# Patient Record
Sex: MALE | Race: BLACK OR AFRICAN AMERICAN | Employment: UNEMPLOYED | URBAN - METROPOLITAN AREA
[De-identification: names, ages, dates, MRNs, and addresses within clinical notes are randomized per-mention and may not be internally consistent; named-entity substitution may affect disease eponyms.]

---

## 2018-01-25 ENCOUNTER — OFFICE VISIT (OUTPATIENT)
Dept: PODIATRY | Age: 58
End: 2018-01-25
Payer: MEDICAID

## 2018-01-25 VITALS
HEIGHT: 75 IN | WEIGHT: 255 LBS | DIASTOLIC BLOOD PRESSURE: 68 MMHG | BODY MASS INDEX: 31.71 KG/M2 | HEART RATE: 68 BPM | SYSTOLIC BLOOD PRESSURE: 126 MMHG | TEMPERATURE: 98.2 F

## 2018-01-25 DIAGNOSIS — E11.51 TYPE II DIABETES MELLITUS WITH PERIPHERAL CIRCULATORY DISORDER (HCC): Primary | ICD-10-CM

## 2018-01-25 DIAGNOSIS — M79.671 PAIN IN BOTH FEET: ICD-10-CM

## 2018-01-25 DIAGNOSIS — B35.1 DERMATOPHYTOSIS OF NAIL: ICD-10-CM

## 2018-01-25 DIAGNOSIS — I73.9 PVD (PERIPHERAL VASCULAR DISEASE) (HCC): ICD-10-CM

## 2018-01-25 DIAGNOSIS — M79.672 PAIN IN BOTH FEET: ICD-10-CM

## 2018-01-25 PROBLEM — M77.9 TENDONITIS: Status: ACTIVE | Noted: 2017-09-25

## 2018-01-25 PROBLEM — T85.22XA DISLOCATED INTRAOCULAR LENS: Status: ACTIVE | Noted: 2017-04-21

## 2018-01-25 PROBLEM — M16.11 PRIMARY OSTEOARTHRITIS OF RIGHT HIP: Status: ACTIVE | Noted: 2017-03-01

## 2018-01-25 PROCEDURE — G8417 CALC BMI ABV UP PARAM F/U: HCPCS | Performed by: PODIATRIST

## 2018-01-25 PROCEDURE — 99203 OFFICE O/P NEW LOW 30 MIN: CPT | Performed by: PODIATRIST

## 2018-01-25 PROCEDURE — G8599 NO ASA/ANTIPLAT THER USE RNG: HCPCS | Performed by: PODIATRIST

## 2018-01-25 PROCEDURE — 1036F TOBACCO NON-USER: CPT | Performed by: PODIATRIST

## 2018-01-25 PROCEDURE — G8427 DOCREV CUR MEDS BY ELIG CLIN: HCPCS | Performed by: PODIATRIST

## 2018-01-25 PROCEDURE — 3046F HEMOGLOBIN A1C LEVEL >9.0%: CPT | Performed by: PODIATRIST

## 2018-01-25 PROCEDURE — G8484 FLU IMMUNIZE NO ADMIN: HCPCS | Performed by: PODIATRIST

## 2018-01-25 PROCEDURE — 11721 DEBRIDE NAIL 6 OR MORE: CPT | Performed by: PODIATRIST

## 2018-01-25 PROCEDURE — 3017F COLORECTAL CA SCREEN DOC REV: CPT | Performed by: PODIATRIST

## 2018-01-25 RX ORDER — ATORVASTATIN CALCIUM 80 MG/1
10 TABLET, FILM COATED ORAL DAILY
COMMUNITY
Start: 2018-01-16 | End: 2022-03-28 | Stop reason: DRUGHIGH

## 2018-04-26 ENCOUNTER — OFFICE VISIT (OUTPATIENT)
Dept: PODIATRY | Age: 58
End: 2018-04-26
Payer: MEDICAID

## 2018-04-26 DIAGNOSIS — B35.1 DERMATOPHYTOSIS OF NAIL: Primary | ICD-10-CM

## 2018-04-26 DIAGNOSIS — I73.9 PVD (PERIPHERAL VASCULAR DISEASE) (HCC): ICD-10-CM

## 2018-04-26 DIAGNOSIS — E11.51 TYPE II DIABETES MELLITUS WITH PERIPHERAL CIRCULATORY DISORDER (HCC): ICD-10-CM

## 2018-04-26 DIAGNOSIS — M79.672 PAIN IN BOTH FEET: ICD-10-CM

## 2018-04-26 DIAGNOSIS — M79.671 PAIN IN BOTH FEET: ICD-10-CM

## 2018-04-26 PROCEDURE — 99213 OFFICE O/P EST LOW 20 MIN: CPT | Performed by: PODIATRIST

## 2018-04-26 PROCEDURE — G8599 NO ASA/ANTIPLAT THER USE RNG: HCPCS | Performed by: PODIATRIST

## 2018-04-26 PROCEDURE — 1036F TOBACCO NON-USER: CPT | Performed by: PODIATRIST

## 2018-04-26 PROCEDURE — 11721 DEBRIDE NAIL 6 OR MORE: CPT | Performed by: PODIATRIST

## 2018-04-26 PROCEDURE — 2022F DILAT RTA XM EVC RTNOPTHY: CPT | Performed by: PODIATRIST

## 2018-04-26 PROCEDURE — G8417 CALC BMI ABV UP PARAM F/U: HCPCS | Performed by: PODIATRIST

## 2018-04-26 PROCEDURE — 3046F HEMOGLOBIN A1C LEVEL >9.0%: CPT | Performed by: PODIATRIST

## 2018-04-26 PROCEDURE — G8428 CUR MEDS NOT DOCUMENT: HCPCS | Performed by: PODIATRIST

## 2018-04-26 PROCEDURE — 3017F COLORECTAL CA SCREEN DOC REV: CPT | Performed by: PODIATRIST

## 2018-04-26 RX ORDER — GABAPENTIN 300 MG/1
300 CAPSULE ORAL 3 TIMES DAILY
Qty: 90 CAPSULE | Refills: 2 | Status: SHIPPED | OUTPATIENT
Start: 2018-04-26 | End: 2019-03-27 | Stop reason: ALTCHOICE

## 2018-06-15 DIAGNOSIS — E11.51 TYPE II DIABETES MELLITUS WITH PERIPHERAL CIRCULATORY DISORDER (HCC): Primary | ICD-10-CM

## 2018-06-15 PROCEDURE — A5513 MULTI DEN INSERT CUSTOM MOLD: HCPCS | Performed by: PODIATRIST

## 2018-06-15 PROCEDURE — A5500 DIAB SHOE FOR DENSITY INSERT: HCPCS | Performed by: PODIATRIST

## 2019-03-27 RX ORDER — METOPROLOL TARTRATE 100 MG/1
25 TABLET ORAL 2 TIMES DAILY
COMMUNITY
End: 2022-03-28 | Stop reason: DRUGHIGH

## 2019-03-27 RX ORDER — PREGABALIN 75 MG/1
75 CAPSULE ORAL 2 TIMES DAILY
COMMUNITY
End: 2022-03-28 | Stop reason: SDUPTHER

## 2019-03-27 RX ORDER — GLIPIZIDE 10 MG/1
10 TABLET ORAL
COMMUNITY
End: 2022-03-28 | Stop reason: SDUPTHER

## 2019-03-27 RX ORDER — PANTOPRAZOLE SODIUM 40 MG/1
40 GRANULE, DELAYED RELEASE ORAL NIGHTLY
COMMUNITY
End: 2022-03-28 | Stop reason: SDUPTHER

## 2019-03-27 RX ORDER — LOSARTAN POTASSIUM 25 MG/1
25 TABLET ORAL NIGHTLY
COMMUNITY
End: 2022-03-28 | Stop reason: SDUPTHER

## 2019-03-27 RX ORDER — LORATADINE 10 MG/1
10 CAPSULE, LIQUID FILLED ORAL DAILY
COMMUNITY
End: 2022-03-28

## 2019-03-27 RX ORDER — MELOXICAM 15 MG/1
15 TABLET ORAL NIGHTLY
COMMUNITY
End: 2022-03-28

## 2019-03-28 ENCOUNTER — ANESTHESIA EVENT (OUTPATIENT)
Dept: OPERATING ROOM | Age: 59
End: 2019-03-28
Payer: MEDICAID

## 2019-03-28 ENCOUNTER — ANESTHESIA (OUTPATIENT)
Dept: OPERATING ROOM | Age: 59
End: 2019-03-28
Payer: MEDICAID

## 2019-03-28 ENCOUNTER — HOSPITAL ENCOUNTER (OUTPATIENT)
Age: 59
Setting detail: OUTPATIENT SURGERY
Discharge: HOME | End: 2019-03-28
Attending: OPHTHALMOLOGY | Admitting: OPHTHALMOLOGY
Payer: MEDICAID

## 2019-03-28 VITALS
SYSTOLIC BLOOD PRESSURE: 133 MMHG | OXYGEN SATURATION: 98 % | DIASTOLIC BLOOD PRESSURE: 85 MMHG | RESPIRATION RATE: 17 BRPM

## 2019-03-28 VITALS
SYSTOLIC BLOOD PRESSURE: 135 MMHG | TEMPERATURE: 97.7 F | RESPIRATION RATE: 15 BRPM | WEIGHT: 255.73 LBS | HEART RATE: 77 BPM | BODY MASS INDEX: 32.82 KG/M2 | HEIGHT: 74 IN | OXYGEN SATURATION: 97 % | DIASTOLIC BLOOD PRESSURE: 78 MMHG

## 2019-03-28 PROBLEM — H27.121: Chronic | Status: ACTIVE | Noted: 2019-03-28

## 2019-03-28 PROBLEM — H43.391 VITREOUS OPACITIES OF RIGHT EYE: Chronic | Status: ACTIVE | Noted: 2019-03-28

## 2019-03-28 LAB
EKG ATRIAL RATE: 81 BPM
EKG P AXIS: -27 DEGREES
EKG P-R INTERVAL: 226 MS
EKG Q-T INTERVAL: 376 MS
EKG QRS DURATION: 92 MS
EKG QTC CALCULATION (BAZETT): 436 MS
EKG R AXIS: -25 DEGREES
EKG T AXIS: 4 DEGREES
EKG VENTRICULAR RATE: 81 BPM
GFR NON-AFRICAN AMERICAN: 47 ML/MIN
GFR SERPL CREATININE-BSD FRML MDRD: 57 ML/MIN
GFR SERPL CREATININE-BSD FRML MDRD: ABNORMAL ML/MIN/{1.73_M2}
GLUCOSE BLD-MCNC: 194 MG/DL (ref 75–110)
GLUCOSE BLD-MCNC: 210 MG/DL (ref 75–110)
GLUCOSE BLD-MCNC: 259 MG/DL (ref 74–100)
POC CHLORIDE: 103 MMOL/L (ref 98–107)
POC CREATININE: 1.52 MG/DL (ref 0.51–1.19)
POC HEMATOCRIT: 42 % (ref 41–53)
POC HEMOGLOBIN: 14.4 G/DL (ref 13.5–17.5)
POC IONIZED CALCIUM: 1.25 MMOL/L (ref 1.15–1.33)
POC LACTIC ACID: 1.88 MMOL/L (ref 0.56–1.39)
POC POTASSIUM: 4.4 MMOL/L (ref 3.5–4.5)
POC SODIUM: 139 MMOL/L (ref 138–146)

## 2019-03-28 PROCEDURE — 83605 ASSAY OF LACTIC ACID: CPT

## 2019-03-28 PROCEDURE — 6370000000 HC RX 637 (ALT 250 FOR IP): Performed by: OPHTHALMOLOGY

## 2019-03-28 PROCEDURE — 2580000003 HC RX 258: Performed by: OPHTHALMOLOGY

## 2019-03-28 PROCEDURE — 84132 ASSAY OF SERUM POTASSIUM: CPT

## 2019-03-28 PROCEDURE — 84295 ASSAY OF SERUM SODIUM: CPT

## 2019-03-28 PROCEDURE — 6370000000 HC RX 637 (ALT 250 FOR IP)

## 2019-03-28 PROCEDURE — 2709999900 HC NON-CHARGEABLE SUPPLY: Performed by: OPHTHALMOLOGY

## 2019-03-28 PROCEDURE — 82330 ASSAY OF CALCIUM: CPT

## 2019-03-28 PROCEDURE — 3700000001 HC ADD 15 MINUTES (ANESTHESIA): Performed by: OPHTHALMOLOGY

## 2019-03-28 PROCEDURE — 3600000004 HC SURGERY LEVEL 4 BASE: Performed by: OPHTHALMOLOGY

## 2019-03-28 PROCEDURE — 7100000040 HC SPAR PHASE II RECOVERY - FIRST 15 MIN: Performed by: OPHTHALMOLOGY

## 2019-03-28 PROCEDURE — 85014 HEMATOCRIT: CPT

## 2019-03-28 PROCEDURE — 2500000003 HC RX 250 WO HCPCS: Performed by: OPHTHALMOLOGY

## 2019-03-28 PROCEDURE — 3600000014 HC SURGERY LEVEL 4 ADDTL 15MIN: Performed by: OPHTHALMOLOGY

## 2019-03-28 PROCEDURE — 6360000002 HC RX W HCPCS: Performed by: OPHTHALMOLOGY

## 2019-03-28 PROCEDURE — 93005 ELECTROCARDIOGRAM TRACING: CPT

## 2019-03-28 PROCEDURE — 82565 ASSAY OF CREATININE: CPT

## 2019-03-28 PROCEDURE — 2580000003 HC RX 258: Performed by: ANESTHESIOLOGY

## 2019-03-28 PROCEDURE — 6360000002 HC RX W HCPCS: Performed by: SPECIALIST

## 2019-03-28 PROCEDURE — 2500000003 HC RX 250 WO HCPCS: Performed by: SPECIALIST

## 2019-03-28 PROCEDURE — 82947 ASSAY GLUCOSE BLOOD QUANT: CPT

## 2019-03-28 PROCEDURE — 7100000041 HC SPAR PHASE II RECOVERY - ADDTL 15 MIN: Performed by: OPHTHALMOLOGY

## 2019-03-28 PROCEDURE — 3700000000 HC ANESTHESIA ATTENDED CARE: Performed by: OPHTHALMOLOGY

## 2019-03-28 PROCEDURE — 82435 ASSAY OF BLOOD CHLORIDE: CPT

## 2019-03-28 RX ORDER — BALANCED SALT SOLUTION ENRICHED WITH BICARBONATE, DEXTROSE, AND GLUTATHIONE
KIT INTRAOCULAR PRN
Status: DISCONTINUED | OUTPATIENT
Start: 2019-03-28 | End: 2019-03-28 | Stop reason: ALTCHOICE

## 2019-03-28 RX ORDER — ERYTHROMYCIN 5 MG/G
OINTMENT OPHTHALMIC PRN
Status: DISCONTINUED | OUTPATIENT
Start: 2019-03-28 | End: 2019-03-28 | Stop reason: ALTCHOICE

## 2019-03-28 RX ORDER — TOBRAMYCIN AND DEXAMETHASONE 3; 1 MG/ML; MG/ML
1 SUSPENSION/ DROPS OPHTHALMIC ONCE
Status: COMPLETED | OUTPATIENT
Start: 2019-03-28 | End: 2019-03-28

## 2019-03-28 RX ORDER — FENTANYL CITRATE 50 UG/ML
INJECTION, SOLUTION INTRAMUSCULAR; INTRAVENOUS PRN
Status: DISCONTINUED | OUTPATIENT
Start: 2019-03-28 | End: 2019-03-28 | Stop reason: SDUPTHER

## 2019-03-28 RX ORDER — PROPOFOL 10 MG/ML
INJECTION, EMULSION INTRAVENOUS PRN
Status: DISCONTINUED | OUTPATIENT
Start: 2019-03-28 | End: 2019-03-28 | Stop reason: SDUPTHER

## 2019-03-28 RX ORDER — SODIUM CHLORIDE, SODIUM LACTATE, POTASSIUM CHLORIDE, CALCIUM CHLORIDE 600; 310; 30; 20 MG/100ML; MG/100ML; MG/100ML; MG/100ML
INJECTION, SOLUTION INTRAVENOUS CONTINUOUS
Status: DISCONTINUED | OUTPATIENT
Start: 2019-03-28 | End: 2019-03-28 | Stop reason: HOSPADM

## 2019-03-28 RX ORDER — TROPICAMIDE 10 MG/ML
1 SOLUTION/ DROPS OPHTHALMIC EVERY 5 MIN PRN
Status: COMPLETED | OUTPATIENT
Start: 2019-03-28 | End: 2019-03-28

## 2019-03-28 RX ORDER — CEFTAZIDIME 1 G/1
INJECTION, POWDER, FOR SOLUTION INTRAMUSCULAR; INTRAVENOUS PRN
Status: DISCONTINUED | OUTPATIENT
Start: 2019-03-28 | End: 2019-03-28 | Stop reason: ALTCHOICE

## 2019-03-28 RX ORDER — PHENYLEPHRINE HYDROCHLORIDE 100 MG/ML
1 SOLUTION/ DROPS OPHTHALMIC EVERY 5 MIN PRN
Status: COMPLETED | OUTPATIENT
Start: 2019-03-28 | End: 2019-03-28

## 2019-03-28 RX ORDER — LIDOCAINE HYDROCHLORIDE 10 MG/ML
INJECTION, SOLUTION EPIDURAL; INFILTRATION; INTRACAUDAL; PERINEURAL PRN
Status: DISCONTINUED | OUTPATIENT
Start: 2019-03-28 | End: 2019-03-28 | Stop reason: SDUPTHER

## 2019-03-28 RX ADMIN — PHENYLEPHRINE HYDROCHLORIDE 1 DROP: 100 SOLUTION/ DROPS OPHTHALMIC at 07:44

## 2019-03-28 RX ADMIN — TOBRAMYCIN AND DEXAMETHASONE 1 DROP: 3; 1 SUSPENSION/ DROPS OPHTHALMIC at 08:08

## 2019-03-28 RX ADMIN — TROPICAMIDE 1 DROP: 10 SOLUTION/ DROPS OPHTHALMIC at 07:54

## 2019-03-28 RX ADMIN — PROPOFOL 50 MG: 10 INJECTION, EMULSION INTRAVENOUS at 09:45

## 2019-03-28 RX ADMIN — FENTANYL CITRATE 50 MCG: 50 INJECTION INTRAMUSCULAR; INTRAVENOUS at 09:45

## 2019-03-28 RX ADMIN — INSULIN LISPRO 5 UNITS: 100 INJECTION, SOLUTION INTRAVENOUS; SUBCUTANEOUS at 08:16

## 2019-03-28 RX ADMIN — LIDOCAINE HYDROCHLORIDE 50 MG: 10 INJECTION, SOLUTION EPIDURAL; INFILTRATION; INTRACAUDAL; PERINEURAL at 09:45

## 2019-03-28 RX ADMIN — TROPICAMIDE 1 DROP: 10 SOLUTION/ DROPS OPHTHALMIC at 07:49

## 2019-03-28 RX ADMIN — TROPICAMIDE 1 DROP: 10 SOLUTION/ DROPS OPHTHALMIC at 07:44

## 2019-03-28 RX ADMIN — PHENYLEPHRINE HYDROCHLORIDE 1 DROP: 100 SOLUTION/ DROPS OPHTHALMIC at 07:54

## 2019-03-28 RX ADMIN — SODIUM CHLORIDE, POTASSIUM CHLORIDE, SODIUM LACTATE AND CALCIUM CHLORIDE: 600; 310; 30; 20 INJECTION, SOLUTION INTRAVENOUS at 08:08

## 2019-03-28 RX ADMIN — PHENYLEPHRINE HYDROCHLORIDE 1 DROP: 100 SOLUTION/ DROPS OPHTHALMIC at 07:49

## 2019-03-28 ASSESSMENT — PULMONARY FUNCTION TESTS
PIF_VALUE: 0

## 2019-03-28 ASSESSMENT — PAIN SCALES - GENERAL
PAINLEVEL_OUTOF10: 0

## 2019-03-28 ASSESSMENT — PAIN - FUNCTIONAL ASSESSMENT: PAIN_FUNCTIONAL_ASSESSMENT: 0-10

## 2019-03-28 NOTE — OP NOTE
Haresh Puja  3/28/2019    10:44 AM    BRIEF OPERATIVE NOTE    PREOP Diagnosis:  Vitreous Opacities, subluxation of IOL OD    POSTOP Diagnosis:  Same    Procedure:  Vitrectomy, Reposition IOL OD    Anesthesia:  MAC    Surgeon:  Deisy Suazo    EBL:  Minimal    Fluids:  See anesthesia record    Drains:  None    Specimen:  None    Complications:  None    Reason for operation:  The patient has significant vitreous opacities that are interfering with daily activities. Patient wishes to have surgery to remove opacities in anticipation of better vision. Patient understands risks of surgery include, but are not limited to, bleeding, infection and retinal detachment. The PCIOL is subluxed inferiorly  Procedure: The patient was brought to the operating room in good condition. Transient Propofol was given. Retrobulbar and topical anaesthesia were administered. The patient was prepped and draped in the usual manner. 25 gauge vitrectomy trocars were inserted in the usual quadrants. Infusion was inserted in the inferior temporal quadrant. Light pipe and ocutome placed through the superior trocars. Vitreous was removed from anterior to posterior and trimmed out past the equator in all quadrants. A limbal incision was made with a 25g MVR blade. A Sinsky hook was placed thru the ST trochar. The IOL was rotated clockwise until it centered. It seemed stable in this position. No significant bleeding occurred at any time. No tears or detachment were found with scleral depression. The trocars were removed and sutured if there any leakage. Subtenon's antibiotic was injected in the inferior nasal quadrant. The eye was patched in the usual fashion and the patient taken to the recovery room in good condition.     Electronically signed by Eliane Nicholas MD on 3/28/2019 at 10:44 AM

## 2019-03-28 NOTE — H&P
History and Physical    Pt Name: Erna Pickens  MRN: [id number]  YOB: 1960  Date of evaluation: 3/28/2019  Primary Care Physician: Jose Willett  Patient evaluated at the request of  Dr. Dayton Vazquez    Reason for evaluation:  ? Right eye( sunset) IOL repositioning , vitreous floaters in right eye  SUBJECTIVE:   History of Chief Complaint:      Elena Hudson is a 60 y.o. male          who presents with a hx of visual  changes that started approx  ago in  His    RIGHT  eye ,  DM x  10 yrs ,   Quit smoking approx 3 yrs ago  ,\" Blurry vision \" x  2 yrs \" transparent ghost moving across right eye\"   S/p right eye cataract approx 2 yrs ago with IOL. Past Medical History      has a past medical history of Arthritis, DM (diabetes mellitus) ([address] GERD (gastroesophageal reflux disease), Heart murmur, HTN (hypertension), Hyperlipidemia, Sleep apnea, Vision abnormalities, and Wears glasses. Past Surgical History   has a past surgical history that includes Colonoscopy (2010); Knee arthroscopy; eye surgery (Bilateral, 2017); and Vasectomy (). Medications   Scheduled Meds:  Continuous Infusions:  PRN Meds:. Allergies  is allergic to lisinopril; melatonin; and trazodone. Family History    family history includes Diabetes in his brother and sister.     Family Status   Relation Name Status    Mother      Father      Sister JAYLEN     Brother KATY Alive    MGM      MGF      [facility]      PGF      Sister [facility]     Sister Osmani Garcia     Sister [address] [facility]     Sister [address]    Sister [employer] Alive    Sister [address] Alive    Brother [address] Al[address]    Brother [employer] Alive    Brother [facility] Alive    Brother  Alive         Social History  Social History     Socioeconomic History    Marital status: Single     Spouse name: Not on file    Number of children: Not on file    Years of education: Not on file    Highest education level: Not on file   Occupational History    Not on file   Social Needs    Financial resource strain: Not on file    Food insecurity:     Worry: Not on file     Inability: Not on file    Transportation needs:     Medical: Not on file     Non-medical: Not on file   Tobacco Use    Smoking status: Former Smoker     Packs/day: 0.00     Last attempt to quit: 3/27/2016     Years since quitting: 3.0    Smokeless tobacco: Never Used   Substance and Sexual Activity    Alcohol use: Yes     Comment: etoh abuse. BEER, WINE  12 A WEEK    Drug use: No    Sexual activity: Yes   Lifestyle    Physical activity:     Days per week: Not on file     Minutes per session: Not on file    Stress: Not on file   Relationships    Social connections:     Talks on phone: Not on file     Gets together: Not on file     Attends Lutheran service: Not on file     Active member of club or organization: Not on file     Attends meetings of clubs or organizations: Not on file     Relationship status: Not on file    Intimate partner violence:     Fear of current or ex partner: Not on file     Emotionally abused: Not on file     Physically abused: Not on file     Forced sexual activity: Not on file   Other Topics Concern    Not on file   Social History Narrative    Not on file        Service:No    Occupation:  [employer]ande[employer] x 25 yrs     Hobbies:  Grandfather     OBJECTIVE:   VITALS:  height is 6' 2\" (1.88 m) and weight is 245 lb (111.1 kg). CONSTITUTIONAL: Alert and oriented times 3, no acute distress and cooperative to examination. friendly and pleasant , very muscular, robust  build     SKIN: rash No    HEENT: Head is normocephalic, atraumatic. EOMI, PERRLA    Oral air way :slightly narrow Yes    NECK: neck supple, no lymphadenopathy noted, trachea midline and straight       2+ carotid, no bruit    LUNGS: Chest expands equally bilaterally upon respiration, no accessory muscle used. Ausculation reveals no adventitious breath sounds. CARDIOVASCULAR: \"Heart sounds are normal.  Regular rate and rhythm without murmur,    ABDOMEN: Bowel sounds are present in all four quadrants      GENATALIA:Deferred. NEUROLOGIC: \"CN II-XII are grossly intact. EXTREMITIES: Pitting edema:  No,  Varicose veins: No     Dorsal pedal/posterior tibial pulses palpable: Yes         Strength:  Normal       Patient Active Problem List   Diagnosis    DM (diabetes mellitus) ([address]    HTN (hypertension)    [facility]    Hypertensive urgency    Abnormal ambulatory electrocardiogram    Abnormal ambulatory electrocardiography    Abnormal kidney function    Adiposity    Astigmatism    Atherosclerotic heart disease of native coronary artery without angina pectoris    Cervical radiculopathy    Chronic back pain    Decreased cardiac output    Diabetic peripheral neuropathy (HCC)    Dislocated intraocular lens    Disturbance in sleep behavior    Dizziness    Dyssomnia    Floaters    Impotence of organic origin    Left ventricular dysfunction    Low vision, both eyes    Myopia    Nuclear sclerosis of left eye    Obstructive sleep apnea syndrome    Palpitations    Personal history of other diseases of the digestive system    Posterior vitreous detachment    Presbyopia    Primary osteoarthritis of right hip    Pseudophakia of right eye    Repetitive intrusions of sleep    Sciatica    Tendonitis    Vitamin D deficiency               IMPRESSIONS:   1.  ? Right eye( sunset) IOL repositioning , vitreous floaters in right eye  2. does not have any pertinent problems on file.     [facility]  Electronically signed 3/28/2019 at 7:17 AM       Scheduled for:  Right eye surgery

## 2022-03-14 ENCOUNTER — HOSPITAL ENCOUNTER (OUTPATIENT)
Age: 62
Discharge: HOME | End: 2022-03-14
Payer: MEDICARE

## 2022-03-14 ENCOUNTER — OFFICE VISIT (OUTPATIENT)
Dept: ORTHOPEDIC SURGERY | Age: 62
End: 2022-03-14
Payer: MEDICARE

## 2022-03-14 VITALS — HEIGHT: 74 IN | BODY MASS INDEX: 32.73 KG/M2 | WEIGHT: 255 LBS

## 2022-03-14 DIAGNOSIS — Z98.890 HISTORY OF REMOVAL OF JOINT PROSTHESIS OF RIGHT HIP DUE TO INFECTION: ICD-10-CM

## 2022-03-14 DIAGNOSIS — Z86.19 HISTORY OF REMOVAL OF JOINT PROSTHESIS OF RIGHT HIP DUE TO INFECTION: ICD-10-CM

## 2022-03-14 DIAGNOSIS — M16.11 PRIMARY OSTEOARTHRITIS OF RIGHT HIP: Primary | ICD-10-CM

## 2022-03-14 DIAGNOSIS — Z96.641 HISTORY OF TOTAL HIP ARTHROPLASTY, RIGHT: Primary | ICD-10-CM

## 2022-03-14 DIAGNOSIS — Z96.641 HISTORY OF RIGHT HIP HEMIARTHROPLASTY: ICD-10-CM

## 2022-03-14 LAB
ALBUMIN SERPL-MCNC: 4.3 G/DL (ref 3.5–5.2)
ALBUMIN/GLOBULIN RATIO: 1.3 (ref 1–2.5)
ALP BLD-CCNC: 137 U/L (ref 40–129)
ALT SERPL-CCNC: 16 U/L (ref 5–41)
ANION GAP SERPL CALCULATED.3IONS-SCNC: 16 MMOL/L (ref 9–17)
AST SERPL-CCNC: 17 U/L
BILIRUB SERPL-MCNC: <0.1 MG/DL (ref 0.3–1.2)
BUN BLDV-MCNC: 25 MG/DL (ref 8–23)
C-REACTIVE PROTEIN: 22.1 MG/L (ref 0–5)
CALCIUM SERPL-MCNC: 9.5 MG/DL (ref 8.6–10.4)
CHLORIDE BLD-SCNC: 98 MMOL/L (ref 98–107)
CO2: 21 MMOL/L (ref 20–31)
CREAT SERPL-MCNC: 1.48 MG/DL (ref 0.7–1.2)
GFR AFRICAN AMERICAN: 58 ML/MIN
GFR NON-AFRICAN AMERICAN: 48 ML/MIN
GFR SERPL CREATININE-BSD FRML MDRD: ABNORMAL ML/MIN/{1.73_M2}
GLUCOSE BLD-MCNC: 235 MG/DL (ref 70–99)
HCT VFR BLD CALC: 35.1 % (ref 40.7–50.3)
HEMOGLOBIN: 10.9 G/DL (ref 13–17)
MCH RBC QN AUTO: 26.7 PG (ref 25.2–33.5)
MCHC RBC AUTO-ENTMCNC: 31.1 G/DL (ref 28.4–34.8)
MCV RBC AUTO: 86 FL (ref 82.6–102.9)
NRBC AUTOMATED: 0 PER 100 WBC
PDW BLD-RTO: 16.4 % (ref 11.8–14.4)
PLATELET # BLD: 297 K/UL (ref 138–453)
PMV BLD AUTO: 9.8 FL (ref 8.1–13.5)
POTASSIUM SERPL-SCNC: 5.4 MMOL/L (ref 3.7–5.3)
RBC # BLD: 4.08 M/UL (ref 4.21–5.77)
SEDIMENTATION RATE, ERYTHROCYTE: 78 MM (ref 0–20)
SODIUM BLD-SCNC: 135 MMOL/L (ref 135–144)
TOTAL PROTEIN: 7.6 G/DL (ref 6.4–8.3)
WBC # BLD: 8.8 K/UL (ref 3.5–11.3)

## 2022-03-14 PROCEDURE — 99204 OFFICE O/P NEW MOD 45 MIN: CPT | Performed by: PHYSICIAN ASSISTANT

## 2022-03-14 PROCEDURE — G8417 CALC BMI ABV UP PARAM F/U: HCPCS | Performed by: PHYSICIAN ASSISTANT

## 2022-03-14 PROCEDURE — 80053 COMPREHEN METABOLIC PANEL: CPT

## 2022-03-14 PROCEDURE — 85652 RBC SED RATE AUTOMATED: CPT

## 2022-03-14 PROCEDURE — 86140 C-REACTIVE PROTEIN: CPT

## 2022-03-14 PROCEDURE — 3017F COLORECTAL CA SCREEN DOC REV: CPT | Performed by: PHYSICIAN ASSISTANT

## 2022-03-14 PROCEDURE — G8484 FLU IMMUNIZE NO ADMIN: HCPCS | Performed by: PHYSICIAN ASSISTANT

## 2022-03-14 PROCEDURE — 85027 COMPLETE CBC AUTOMATED: CPT

## 2022-03-14 PROCEDURE — G8427 DOCREV CUR MEDS BY ELIG CLIN: HCPCS | Performed by: PHYSICIAN ASSISTANT

## 2022-03-14 PROCEDURE — 1036F TOBACCO NON-USER: CPT | Performed by: PHYSICIAN ASSISTANT

## 2022-03-14 ASSESSMENT — ENCOUNTER SYMPTOMS
VOMITING: 0
COLOR CHANGE: 0
COUGH: 0
SHORTNESS OF BREATH: 0

## 2022-03-14 NOTE — PROGRESS NOTES
[address] [facility]  [address] [place] [address] [facility]  Dept: [phone]    Ambulatory Orthopedic Consult      CHIEF COMPLAINT:    Chief Complaint   Patient presents with    New Patient     OA right hip. Swelling. Fluid          HISTORY OF PRESENT ILLNESS:      The patient is a 57 y.o. 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 Patient had a history of a right total hip arthroplasty performed on 11/25/2019 and subsequently underwent several perioperative I&D's on 12/11/2019, 1/6/2020, for 4/24/2020. He had persistent infection and underwent an explant on 7/15/2021 followed by a stage II revision on 10/18/2021 he also underwent a subsequent I&D on 12/9/2021. His wound has closed since then and he was placed on oral antibiotics by Dr. Roly Rothman. The patient was seen by infectious disease in January 2022 at [facility] and was placed on oral doxycycline. Labs were ordered and there was a recommendation for aspiration of the right hip. The patient has not had an aspiration of the right hip yet    Recently he denies concerns with the wound there has been no active drainage from his incision and he has not had any pain with ambulation. He feels as if he is improving but is recently completed the oral doxycycline. Patient notes that he is unable to be seen by Dr. Roly Rothman or Dr Justus Nj due to insurance issues. Therefore he was referred to our office for further evaluation and treatment. The patient is a type II diabetic. He has also recently diagnosed with prostate cancer and is awaiting radiation treatment but is unable to begin due to his chronic infection within the right hip.         Past Medical History:    Past Medical History:   Diagnosis Date    Arthritis     DM (diabetes mellitus) [facility] 2009    IDDM    GERD (gastroesophageal reflux disease) 2017    ON RX    Heart murmur 2013    ASYMPTOMATIC    HTN (hypertension) 6/29/2012    ON RX    Hyperlipidemia 06/29/2012    ON RX    Sleep apnea 2016    TRIED MACHINE COULD NOT TOLERATE    Vision abnormalities 2019    FLOATERS RIGHT EYE    Wears glasses        Past Surgical History:    Past Surgical History:   Procedure Laterality Date    COLONOSCOPY  07/23/2010    Dr. Yuridia Palma Bilateral 2017    CATARACT EXTRACTION WITH IOL    KNEE ARTHROSCOPY     [address]    VITRECTOMY Right 03/28/2019    VITRECTOMY Right 3/28/2019    VITRECTOMY 25 GAUGE,  IOL REPOSITION  (Gerber Boomman) performed by Tran Díaz MD at [place] [address]       Current Medications:   Current Outpatient Medications   Medication Sig Dispense Refill    Dulaglutide (TRULICITY) 1.5 YB/2.9VD SOPN Inject 1.5 mg into the skin once a week TAKES ON Sunday       glipiZIDE (GLUCOTROL) 10 MG tablet Take 10 mg by mouth 2 times daily (before meals)      insulin glargine (TOUJEO MAX SOLOSTAR) 300 UNIT/ML injection pen Inject 45 Units into the skin 2 times daily      loratadine (CLARITIN) 10 MG capsule Take 10 mg by mouth daily      losartan (COZAAR) 25 MG tablet Take 25 mg by mouth nightly      meloxicam (MOBIC) 15 MG tablet Take 15 mg by mouth nightly      metoprolol (LOPRESSOR) 100 MG tablet Take 25 mg by mouth 2 times daily      pantoprazole sodium (PROTONIX) 40 MG PACK packet Take 40 mg by mouth nightly      insulin lispro (HUMALOG) 100 UNIT/ML injection vial Inject 3 Units into the skin daily MORNING      pregabalin (LYRICA) 75 MG capsule Take 75 mg by mouth 2 times daily.       insulin lispro (ADMELOG) 100 UNIT/ML injection vial Inject 5 Units into the skin nightly      atorvastatin (LIPITOR) 80 MG tablet Take 10 mg by mouth daily       Cholecalciferol 2000 units CAPS Take 2,000 Units by mouth nightly       metFORMIN (GLUCOPHAGE) 500 MG tablet TAKE TWO TABLETS BY MOUTH TWICE A DAY WITH MEALS 60 tablet 0    amLODIPine (NORVASC) 10 MG tablet Take 1 tablet by mouth daily. (Patient taking differently: Take 10 mg by mouth nightly ) 30 tablet 2    aspirin 325 MG EC tablet Take 1 tablet by mouth daily. (Patient taking differently: Take 81 mg by mouth daily ) 30 tablet 2    spironolactone (ALDACTONE) 25 MG tablet Take 1 tablet by mouth daily. (Patient taking differently: Take 50 mg by mouth daily ) 30 tablet 2    Insulin Pen Needle (B-D ULTRAFINE III SHORT PEN) 31G X 8 MM MISC by Does not apply route. 100 each 3    hydrochlorothiazide (HYDRODIURIL) 25 MG tablet Take 1 tablet by mouth daily. 30 tablet 11     No current facility-administered medications for this visit. Allergies:    Lisinopril, Melatonin, and Trazodone    Social History:   Social History     Socioeconomic History    Marital status: Single     Spouse name: Not on file    Number of children: Not on file    Years of education: Not on file    Highest education level: Not on file   Occupational History    Not on file   Tobacco Use    Smoking status: Former Smoker     Packs/day: 0.00     Quit date: 3/27/2016     Years since quittin.9    Smokeless tobacco: Never Used   Vaping Use    Vaping Use: Never used   Substance and Sexual Activity    Alcohol use: Yes     Comment: etoh abuse. BEER, WINE  12 A WEEK    Drug use: No    Sexual activity: Yes   Other Topics Concern    Not on file   Social History Narrative    Not on file     Social Determinants of Health     Financial Resource Strain:     Difficulty of Paying Living Expenses: Not on file   Food Insecurity:     Worried About Running Out of Food in the Last Year: Not on file    Christine of Food in the Last Year: Not on file   Transportation Needs:     Lack of Transportation (Medical): Not on file    Lack of Transportation (Non-Medical):  Not on file   Physical Activity:     Days of Exercise per Week: Not on file    Minutes of Exercise per Session: Not on file   Stress:     Feeling of Stress : Not on file   Social Connections:     Frequency of Communication with Friends and Family: Not on file    Frequency of Social Gatherings with Friends and Family: Not on file    Attends Buddhism Services: Not on file    Active Member of [address] [place] [address] or Organizations: Not on file    Attends Club or Organization Meetings: Not on file    Marital Status: Not on file   Intimate Partner Violence:     Fear of Current or Ex-Partner: Not on file    Emotionally Abused: Not on file    Physically Abused: Not on file    Sexually Abused: Not on file   Housing Stability:     Unable to Pay for Housing in the Last Year: Not on file    Number of [place] in the Last Year: Not on file    Unstable Housing in the Last Year: Not on file       Family History:  Family History   Problem Relation Age of Onset    Diabetes Sister     Diabetes Brother          REVIEW OF SYSTEMS:  Review of Systems   Constitutional: Negative for activity change and fever. HENT: Negative for sneezing. Respiratory: Negative for cough and shortness of breath. Cardiovascular: Negative for chest pain. Gastrointestinal: Negative for vomiting. Musculoskeletal: Positive for arthralgias (right hip). Negative for joint swelling and myalgias. Skin: Negative for color change. Neurological: Negative for weakness and numbness. Psychiatric/Behavioral: Negative for sleep disturbance. PHYSICAL EXAM:  Ht 6' 2\" (1.88 m)   Wt 255 lb (115.7 kg)   BMI 32.74 kg/m²  Body mass index is 32.74 kg/m². Physical Exam  Gen: alert and oriented to person and place. Psych:  Appropriate affect; Appropriate knowledge base; Appropriate mood; No hallucinations; Head: normocephalic, atraumatic   Chest: symmetric chest excursion; nonlabored respiratory effort.   Pelvis: stable; no obvious pelvis deformity  Ortho Exam  Extremity:  Patient ambulates independently with a moderate limp noted to the right lower extremity. Evaluation of the right hip reveals a well-healed surgical incision along the lateral aspect of the right hip. There is mild erythema and chronic appearing skin changes noted along the lateral aspect of the right hip (Picture below). Patient is able to complete a seated march with minimal difficulty. Negative hip logroll appreciated. Mild tenderness noted with palpation surrounding the surgical incision. Patient notes areas of dysesthesias surrounding the surgical incision. Patient has nearly full range of motion of the right knee and ankle without discomfort noted. Sensation is intact to light touch to the left lower extremity without focal deficits present. The skin is noted to be warm with brisk capillary refill distally on the left foot. Radiology:     XR HIP RIGHT (2-3 VIEWS)    Result Date: 3/14/2022  History: Status post right total hip arthroplasty revision. Findings:    Low AP pelvis, AP Right  hip, cross table lateral xrays Right hip done in the office today shows long stem revision total hip arthroplasty in good position without signs complication. Leg lengths are approximate. Components are in good position without signs of loosening. No evidence of fracture, subluxation, dislocation, radiopaque foreign body, radiopaque foreign tumor is noted. Impression: Revision right total hip arthroplasty in good position without complication appreciated. ASSESSMENT:     1. History of total hip arthroplasty, right    2. History of removal of joint prosthesis of right hip due to infection         PLAN:     Patient is here for second opinion at the request of Dr. Giovanny Montano for presumed chronic right hip infection status post right total hip arthroplasty and multiple revision surgeries. I personally reviewed the patient's right right hip x-rays from today revealing right total hip arthroplasty revision components in good alignment without signs of loosening. Patient does have chronic skin changes noted surrounding his incision with mild erythema, but he notes that he is doing well and does not feel sick. Patient recently stopped taking doxycycline 100 mg twice daily that was previously prescribed by infectious disease. Due to the patient's known chronic right hip infection blood work was ordered today (CBC, CMP, sed rate and CRP). A follow-up appointment was made for tomorrow, 3/15/2022 with Dr. Bruno Main at the [place] location for the patient to establish care and discuss next steps in his treatment. I did discuss with him that we will likely order a right hip intra-articular aspiration that will need to be done under fluoroscopic guidance to further evaluate for continued infection within the right hip. A referral was placed for the patient to be evaluated by [facility] infectious disease, Dr. Gilbert Nichols. Patient will follow up with Dr. Lionel Fernandez tomorrow 3/15/2022. He was instructed to call our office with any questions or concerns prior to his appointment tomorrow. He voiced his understanding. Return in about 1 day (around 3/15/2022) for Follow-up appointment with Dr. Lionel Fernandez. No orders of the defined types were placed in this encounter.       Orders Placed This Encounter   Procedures    Comprehensive Metabolic Panel     Standing Status:   Future     Number of Occurrences:   1     Standing Expiration Date:   3/15/2023    CBC     Standing Status:   Future     Number of Occurrences:   1     Standing Expiration Date:   3/15/2023    C-Reactive Protein     Standing Status:   Future     Number of Occurrences:   1     Standing Expiration Date:   3/15/2023    Sedimentation Rate     Standing Status:   Future     Number of Occurrences:   1     Standing Expiration Date:   3/15/2023   Neyda Camarillo MD, Infectious Disease, [facility]     Referral Priority:   Routine     Referral Type:   Eval and Treat     Referral Reason:   Specialty Services Required     Referred to Provider:   Javon Puga MD     Requested Specialty:   Infectious Diseases     Number of Visits Requested:   1       This note is created with the assistance of a speech recognition program.  While intending to generate a document that actually reflects the content of the visit, the document can still have some errors including those of syntax and sound a like substitutions which may escape proof reading. In such instances, actual meaning can be extrapolated by contextual diversion.      Electronically signed by Joana Abarca PA-C on 3/14/2022 at 4:25 PM

## 2022-03-16 ENCOUNTER — OFFICE VISIT (OUTPATIENT)
Dept: ORTHOPEDIC SURGERY | Age: 62
End: 2022-03-16
Payer: MEDICARE

## 2022-03-16 ENCOUNTER — HOSPITAL ENCOUNTER (OUTPATIENT)
Dept: INTERVENTIONAL RADIOLOGY/VASCULAR | Age: 62
Discharge: HOME | End: 2022-03-18
Payer: MEDICARE

## 2022-03-16 VITALS — BODY MASS INDEX: 32.73 KG/M2 | WEIGHT: 255 LBS | HEIGHT: 74 IN | RESPIRATION RATE: 16 BRPM

## 2022-03-16 DIAGNOSIS — Z98.890 HISTORY OF REMOVAL OF JOINT PROSTHESIS OF RIGHT HIP DUE TO INFECTION: ICD-10-CM

## 2022-03-16 DIAGNOSIS — Z86.19 HISTORY OF REMOVAL OF JOINT PROSTHESIS OF RIGHT HIP DUE TO INFECTION: ICD-10-CM

## 2022-03-16 DIAGNOSIS — Z96.641 HISTORY OF TOTAL HIP ARTHROPLASTY, RIGHT: Primary | ICD-10-CM

## 2022-03-16 DIAGNOSIS — Z96.641 HISTORY OF TOTAL HIP ARTHROPLASTY, RIGHT: ICD-10-CM

## 2022-03-16 LAB
REASON FOR REJECTION: NORMAL
ZZ NTE CLEAN UP: ORDERED TEST: NORMAL
ZZ NTE WITH NAME CLEAN UP: SPECIMEN SOURCE: NORMAL

## 2022-03-16 PROCEDURE — 87075 CULTR BACTERIA EXCEPT BLOOD: CPT

## 2022-03-16 PROCEDURE — 77002 NEEDLE LOCALIZATION BY XRAY: CPT

## 2022-03-16 PROCEDURE — 1036F TOBACCO NON-USER: CPT | Performed by: ORTHOPAEDIC SURGERY

## 2022-03-16 PROCEDURE — 20610 DRAIN/INJ JOINT/BURSA W/O US: CPT

## 2022-03-16 PROCEDURE — G8484 FLU IMMUNIZE NO ADMIN: HCPCS | Performed by: ORTHOPAEDIC SURGERY

## 2022-03-16 PROCEDURE — 87077 CULTURE AEROBIC IDENTIFY: CPT

## 2022-03-16 PROCEDURE — 87102 FUNGUS ISOLATION CULTURE: CPT

## 2022-03-16 PROCEDURE — 87205 SMEAR GRAM STAIN: CPT

## 2022-03-16 PROCEDURE — 2709999900 HC NON-CHARGEABLE SUPPLY

## 2022-03-16 PROCEDURE — 86403 PARTICLE AGGLUT ANTBDY SCRN: CPT

## 2022-03-16 PROCEDURE — G8427 DOCREV CUR MEDS BY ELIG CLIN: HCPCS | Performed by: ORTHOPAEDIC SURGERY

## 2022-03-16 PROCEDURE — 87070 CULTURE OTHR SPECIMN AEROBIC: CPT

## 2022-03-16 PROCEDURE — 87186 SC STD MICRODIL/AGAR DIL: CPT

## 2022-03-16 PROCEDURE — G8417 CALC BMI ABV UP PARAM F/U: HCPCS | Performed by: ORTHOPAEDIC SURGERY

## 2022-03-16 PROCEDURE — 3017F COLORECTAL CA SCREEN DOC REV: CPT | Performed by: ORTHOPAEDIC SURGERY

## 2022-03-16 PROCEDURE — 99214 OFFICE O/P EST MOD 30 MIN: CPT | Performed by: ORTHOPAEDIC SURGERY

## 2022-03-16 RX ORDER — DOXYCYCLINE HYCLATE 100 MG
100 TABLET ORAL 2 TIMES DAILY
Qty: 28 TABLET | Refills: 0 | Status: CANCELLED | OUTPATIENT
Start: 2022-03-16 | End: 2022-03-30

## 2022-03-16 RX ORDER — DOXYCYCLINE 100 MG/1
100 CAPSULE ORAL 2 TIMES DAILY
Qty: 20 CAPSULE | Refills: 0 | Status: CANCELLED | OUTPATIENT
Start: 2022-03-16

## 2022-03-16 RX ORDER — DOXYCYCLINE HYCLATE 100 MG
100 TABLET ORAL 2 TIMES DAILY
Qty: 28 TABLET | Refills: 0 | Status: SHIPPED | OUTPATIENT
Start: 2022-03-16 | End: 2022-03-28 | Stop reason: ALTCHOICE

## 2022-03-16 ASSESSMENT — ENCOUNTER SYMPTOMS
ROS SKIN COMMENTS: NEGATIVE FOR RASH
ABDOMINAL PAIN: 0
SHORTNESS OF BREATH: 0
EYE DISCHARGE: 0

## 2022-03-16 NOTE — BRIEF OP NOTE
Brief Postoperative Note    Enzo Braxton  YOB: 1960  [id number]    Pre-operative Diagnosis: Right hip pain    Post-operative Diagnosis: Same    Procedure: Right hip aspiration    Anesthesia: Local    Surgeons/Assistants: WILVER Bridges    Estimated Blood Loss: less than 50     Complications: None    Specimens: Was Obtained:     Findings: Successful right hip aspiration. No spontaneous fluid aspirated, joint lavaged with 1 mL of sterile saline. Scant amount of pink fluid aspirated.      Electronically signed by WILVER Timmons on 3/16/2022 at 3:20 PM

## 2022-03-16 NOTE — PROGRESS NOTES
[address] [place] [address] AND SPORTS MEDICINE  [facility] [address]  Dept: [phone]  Dept Fax: [phone]        Ambulatory Follow Up      Subjective: Higinio Ramos is a 57y.o. year old male who presents to our office today for routine followup regarding his   1. History of total hip arthroplasty, right    2. 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 Chief Complaint   Patient presents with    Hip Pain     Right FANG        HPI     Mr. Kenny Kilpatrick is a 59-year-old male who presents the office today for recheck of his right hip. He has had multiple surgeries on the right hip secondary to infection. He has had several I&D's on 12/11/2019, 1/6/2020, 4/24/2020 after a total hip replacement on 11/25/2019. He has also undergone explantation on 7/15/2021 with a cement spacer placed and then revision of the spacer on 10/18/2021 and also a subsequent I&D on 12/9/2021. He was on oral antibiotics by his surgeon Dr. Devan Napoles. Recently the patient's insurance changed and he is no longer able to go to his primary doctor and now shows up for further evaluation for me. He saw the physician assistant Sally Joe on 3/14/2022. He has had recent labs in a presents to the office today for further evaluation. He denies fevers, chills, nausea, vomiting, diarrhea. He states that the hip itself does bother him somewhat but after being on the antibiotics he noticed moderate improvement in his symptoms. Review of Systems   Constitutional: Positive for activity change. Negative for fever. HENT: Negative for dental problem. Eyes: Negative for discharge. Respiratory: Negative for shortness of breath. Cardiovascular: Negative for chest pain. Gastrointestinal: Negative for abdominal pain. Genitourinary: Negative. Musculoskeletal: Positive for arthralgias.    Skin:        Negative for rash   Neurological: Positive for weakness. Psychiatric/Behavioral: Negative for confusion. I have reviewed the CC, HPI, ROS, PMH, FHX, Social History, and if not present in this note, I have reviewed in the patient's chart. I agree with the documentation provided by other staff and have reviewed their documentation prior to providing my signature indicating agreement. Objective :   Resp 16   Ht 6' 2\" (1.88 m)   Wt 255 lb (115.7 kg)   BMI 32.74 kg/m²  Body mass index is 32.74 kg/m². General: Uli Sierra is a 57 y.o. male who is alert and oriented and sitting comfortably in our office. Ortho Exam  MS: Evaluation of the right hip reveals hypertrophic scarring along the proximal one third. Mild increased warmth of the hip is noted. No gross signs of infection appreciated. Patient ambulates without antalgia or short weightbearing phase to the right lower extremity. Motor, sensory, vascular examination of the right lower extremity is grossly intact without focal deficits. Neuro: alert and oriented to person and place. Eyes: Extra-ocular muscles intact  Mouth: Oral mucosa moist. No perioral lesions  Pulm: Respirations unlabored and regular. Symmetric chest excursion without outward deformity is noted. Skin: warm, well perfused  Psych:   Patient has good fund of knowledge and displays understanging of exam, diagnosis, and plan. Radiology: XR HIP RIGHT (2-3 VIEWS)    Result Date: 3/16/2022  History: Status post right total hip arthroplasty revision. Findings:    Low AP pelvis, AP Right  hip, cross table lateral xrays Right hip done in the office today shows long stem revision total hip arthroplasty in good position without signs complication. Leg lengths are approximate. Components are in good position without signs of loosening. No evidence of fracture, subluxation, dislocation, radiopaque foreign body, radiopaque foreign tumor is noted.  Impression: Revision right total hip arthroplasty in good position without complication appreciated. Laboratory assessment done on 3/14/2022 shows a sedimentation rate of 78 and a CRP of 22.1. White blood cell count is 8.8. Platelets are normal.    Assessment:      1. History of total hip arthroplasty, right    2. 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 We discussed operative versus nonoperative treatment options. At this point further surgery may not be warranted as he has had multiple surgeries without successful eradication of the infection. Chronic antibiotic suppression may be considered. We would like to get him to Dr. Shirin Zamora our infectious disease specialist for further evaluation and treatment and recommendations from an infectious disease standpoint. We are going to start the patient back on doxycycline as soon as the aspiration is done we are going to try to get the aspiration done stat today or tomorrow. Follow up:No follow-ups on file. No orders of the defined types were placed in this encounter.         Orders Placed This Encounter   Procedures    Gram Stain     Right hip     Standing Status:   Future     Standing Expiration Date:   3/16/2023     Order Specific Question:   Specify Specimen Type     Answer:   Fluid    Culture, Aerobic and Anaerobic     Hold for 14 days  Right hip     Standing Status:   Future     Standing Expiration Date:   3/16/2023    AFB Fluid Culture (ISO)     Right hip     Standing Status:   Future     Standing Expiration Date:   3/16/2023    Culture, Fungus     Right hip     Standing Status:   Future     Standing Expiration Date:   3/16/2023     Order Specific Question:   Specify Specimen Type     Answer: Fluid    IR ARTHR/ASP/INJ INT JT/BURSA W US     Standing Status:   Future     Standing Expiration Date:   3/16/2023     Order Specific Question:   Reason for exam:     Answer:   right hip aspiration, send fluid to lab for cultures (already ordered)    Cell Count with Differential, Body Fluid     Right hip     Standing Status:   Future     Standing Expiration Date:   3/16/2023     Order Specific Question:   Specify Specimen Type     Answer:   Sheryl Jacinto MD, Infectious Disease, [facility]     Referral Priority:   Routine     Referral Type:   Eval and Treat     Referral Reason:   Specialty Services Required     Referred to Provider:   Veronika Terrazas MD     Requested Specialty:   Infectious Diseases     Number of Visits Requested:   1       This note is created with the assistance of a speech recognition program.  While intending to generate a document that actually reflects the content of the visit, the document can still have some errors including those of syntax and sound a like substitutions which may escape proof reading.  In such instances, actual meaning can be extrapolated by contextual diversion      Electronically signed by Judyann Gosselin on 3/16/2022 at 1:38 PM

## 2022-03-20 LAB
CULTURE: ABNORMAL
DIRECT EXAM: ABNORMAL
DIRECT EXAM: ABNORMAL
SPECIMEN DESCRIPTION: ABNORMAL

## 2022-03-23 DIAGNOSIS — Z98.890 HISTORY OF REMOVAL OF JOINT PROSTHESIS OF RIGHT HIP DUE TO INFECTION: Primary | ICD-10-CM

## 2022-03-23 DIAGNOSIS — Z86.19 HISTORY OF REMOVAL OF JOINT PROSTHESIS OF RIGHT HIP DUE TO INFECTION: Primary | ICD-10-CM

## 2022-03-23 RX ORDER — CIPROFLOXACIN 500 MG/1
500 TABLET, FILM COATED ORAL 2 TIMES DAILY
Qty: 28 TABLET | Refills: 0 | Status: SHIPPED | OUTPATIENT
Start: 2022-03-23 | End: 2022-04-06

## 2022-03-28 ENCOUNTER — OFFICE VISIT (OUTPATIENT)
Dept: FAMILY MEDICINE CLINIC | Age: 62
End: 2022-03-28
Payer: MEDICARE

## 2022-03-28 ENCOUNTER — HOSPITAL ENCOUNTER (OUTPATIENT)
Age: 62
Setting detail: SPECIMEN
Discharge: HOME | End: 2022-03-28

## 2022-03-28 ENCOUNTER — TELEPHONE (OUTPATIENT)
Dept: FAMILY MEDICINE CLINIC | Age: 62
End: 2022-03-28

## 2022-03-28 VITALS
DIASTOLIC BLOOD PRESSURE: 66 MMHG | WEIGHT: 269.6 LBS | SYSTOLIC BLOOD PRESSURE: 97 MMHG | HEART RATE: 88 BPM | BODY MASS INDEX: 34.61 KG/M2

## 2022-03-28 DIAGNOSIS — C61 PROSTATE CA (HCC): ICD-10-CM

## 2022-03-28 DIAGNOSIS — E11.69 TYPE 2 DIABETES MELLITUS WITH OTHER SPECIFIED COMPLICATION, WITH LONG-TERM CURRENT USE OF INSULIN (HCC): ICD-10-CM

## 2022-03-28 DIAGNOSIS — I10 ESSENTIAL HYPERTENSION: ICD-10-CM

## 2022-03-28 DIAGNOSIS — I50.9 CONGESTIVE HEART FAILURE, UNSPECIFIED HF CHRONICITY, UNSPECIFIED HEART FAILURE TYPE (HCC): ICD-10-CM

## 2022-03-28 DIAGNOSIS — Z79.4 TYPE 2 DIABETES MELLITUS WITH OTHER SPECIFIED COMPLICATION, WITH LONG-TERM CURRENT USE OF INSULIN (HCC): ICD-10-CM

## 2022-03-28 DIAGNOSIS — I10 ESSENTIAL HYPERTENSION: Primary | ICD-10-CM

## 2022-03-28 LAB
ANION GAP SERPL CALCULATED.3IONS-SCNC: 11 MMOL/L (ref 9–17)
BILIRUBIN, POC: NEGATIVE
BLOOD URINE, POC: NEGATIVE
BUN BLDV-MCNC: 27 MG/DL (ref 8–23)
CALCIUM SERPL-MCNC: 9.5 MG/DL (ref 8.6–10.4)
CHLORIDE BLD-SCNC: 101 MMOL/L (ref 98–107)
CLARITY, POC: NORMAL
CO2: 23 MMOL/L (ref 20–31)
COLOR, POC: NORMAL
CREAT SERPL-MCNC: 1.6 MG/DL (ref 0.7–1.2)
GFR AFRICAN AMERICAN: 53 ML/MIN
GFR NON-AFRICAN AMERICAN: 44 ML/MIN
GFR SERPL CREATININE-BSD FRML MDRD: ABNORMAL ML/MIN/{1.73_M2}
GLUCOSE BLD-MCNC: 337 MG/DL (ref 70–99)
GLUCOSE URINE, POC: 1000
HBA1C MFR BLD: 8.3 %
KETONES, POC: NEGATIVE
LEUKOCYTE EST, POC: NEGATIVE
NITRITE, POC: NEGATIVE
PH, POC: 5
POTASSIUM SERPL-SCNC: 5.7 MMOL/L (ref 3.7–5.3)
PROTEIN, POC: NEGATIVE
SODIUM BLD-SCNC: 135 MMOL/L (ref 135–144)
SPECIFIC GRAVITY, POC: 1.01
UROBILINOGEN, POC: 0.2

## 2022-03-28 PROCEDURE — G8427 DOCREV CUR MEDS BY ELIG CLIN: HCPCS | Performed by: STUDENT IN AN ORGANIZED HEALTH CARE EDUCATION/TRAINING PROGRAM

## 2022-03-28 PROCEDURE — G8484 FLU IMMUNIZE NO ADMIN: HCPCS | Performed by: STUDENT IN AN ORGANIZED HEALTH CARE EDUCATION/TRAINING PROGRAM

## 2022-03-28 PROCEDURE — 3052F HG A1C>EQUAL 8.0%<EQUAL 9.0%: CPT | Performed by: STUDENT IN AN ORGANIZED HEALTH CARE EDUCATION/TRAINING PROGRAM

## 2022-03-28 PROCEDURE — 3017F COLORECTAL CA SCREEN DOC REV: CPT | Performed by: STUDENT IN AN ORGANIZED HEALTH CARE EDUCATION/TRAINING PROGRAM

## 2022-03-28 PROCEDURE — 1036F TOBACCO NON-USER: CPT | Performed by: STUDENT IN AN ORGANIZED HEALTH CARE EDUCATION/TRAINING PROGRAM

## 2022-03-28 PROCEDURE — 83036 HEMOGLOBIN GLYCOSYLATED A1C: CPT | Performed by: STUDENT IN AN ORGANIZED HEALTH CARE EDUCATION/TRAINING PROGRAM

## 2022-03-28 PROCEDURE — 81002 URINALYSIS NONAUTO W/O SCOPE: CPT | Performed by: STUDENT IN AN ORGANIZED HEALTH CARE EDUCATION/TRAINING PROGRAM

## 2022-03-28 PROCEDURE — 2022F DILAT RTA XM EVC RTNOPTHY: CPT | Performed by: STUDENT IN AN ORGANIZED HEALTH CARE EDUCATION/TRAINING PROGRAM

## 2022-03-28 PROCEDURE — G8417 CALC BMI ABV UP PARAM F/U: HCPCS | Performed by: STUDENT IN AN ORGANIZED HEALTH CARE EDUCATION/TRAINING PROGRAM

## 2022-03-28 PROCEDURE — 99203 OFFICE O/P NEW LOW 30 MIN: CPT | Performed by: STUDENT IN AN ORGANIZED HEALTH CARE EDUCATION/TRAINING PROGRAM

## 2022-03-28 RX ORDER — MELATONIN
1000 DAILY
Qty: 90 TABLET | Refills: 1 | Status: SHIPPED | OUTPATIENT
Start: 2022-03-28 | End: 2022-04-12 | Stop reason: SDUPTHER

## 2022-03-28 RX ORDER — PREGABALIN 75 MG/1
75 CAPSULE ORAL 2 TIMES DAILY
Qty: 60 CAPSULE | Refills: 1 | Status: SHIPPED | OUTPATIENT
Start: 2022-03-28 | End: 2022-05-24

## 2022-03-28 RX ORDER — ATORVASTATIN CALCIUM 40 MG/1
40 TABLET, FILM COATED ORAL DAILY
Qty: 30 TABLET | Refills: 3 | Status: SHIPPED | OUTPATIENT
Start: 2022-03-28 | End: 2022-04-12 | Stop reason: SDUPTHER

## 2022-03-28 RX ORDER — EMPAGLIFLOZIN 25 MG/1
25 TABLET, FILM COATED ORAL DAILY
COMMUNITY
End: 2022-03-28 | Stop reason: SDUPTHER

## 2022-03-28 RX ORDER — ASPIRIN 81 MG/1
81 TABLET ORAL DAILY
Qty: 90 TABLET | Refills: 1 | Status: SHIPPED | OUTPATIENT
Start: 2022-03-28 | End: 2022-04-12 | Stop reason: SDUPTHER

## 2022-03-28 RX ORDER — EMPAGLIFLOZIN 25 MG/1
25 TABLET, FILM COATED ORAL DAILY
Qty: 30 TABLET | Refills: 1 | Status: SHIPPED | OUTPATIENT
Start: 2022-03-28 | End: 2022-04-12 | Stop reason: SDUPTHER

## 2022-03-28 RX ORDER — GLIPIZIDE 10 MG/1
10 TABLET ORAL
Qty: 60 TABLET | Refills: 0 | Status: SHIPPED | OUTPATIENT
Start: 2022-03-28 | End: 2022-04-12 | Stop reason: ALTCHOICE

## 2022-03-28 RX ORDER — INSULIN GLARGINE 100 [IU]/ML
42 INJECTION, SOLUTION SUBCUTANEOUS 2 TIMES DAILY
Qty: 5 PEN | Refills: 3 | Status: SHIPPED | OUTPATIENT
Start: 2022-03-28 | End: 2022-04-12 | Stop reason: SDUPTHER

## 2022-03-28 RX ORDER — TAMSULOSIN HYDROCHLORIDE 0.4 MG/1
0.4 CAPSULE ORAL DAILY
Qty: 30 CAPSULE | Refills: 1 | Status: SHIPPED | OUTPATIENT
Start: 2022-03-28 | End: 2022-04-12

## 2022-03-28 RX ORDER — ESOMEPRAZOLE MAGNESIUM 20 MG/1
20 FOR SUSPENSION ORAL DAILY
Qty: 30 PACKET | Refills: 1 | Status: SHIPPED | OUTPATIENT
Start: 2022-03-28 | End: 2022-04-12

## 2022-03-28 RX ORDER — LOSARTAN POTASSIUM 25 MG/1
25 TABLET ORAL NIGHTLY
Qty: 30 TABLET | Refills: 1 | Status: SHIPPED | OUTPATIENT
Start: 2022-03-28 | End: 2022-04-12

## 2022-03-28 RX ORDER — TAMSULOSIN HYDROCHLORIDE 0.4 MG/1
0.4 CAPSULE ORAL DAILY
COMMUNITY
End: 2022-03-28 | Stop reason: SDUPTHER

## 2022-03-28 RX ORDER — DULAGLUTIDE 1.5 MG/.5ML
1.5 INJECTION, SOLUTION SUBCUTANEOUS WEEKLY
Qty: 1 PEN | Refills: 2 | Status: SHIPPED | OUTPATIENT
Start: 2022-03-28 | End: 2022-04-12 | Stop reason: ALTCHOICE

## 2022-03-28 ASSESSMENT — ENCOUNTER SYMPTOMS
BLOOD IN STOOL: 0
SHORTNESS OF BREATH: 1
NAUSEA: 0
SINUS PAIN: 0
BACK PAIN: 0
SINUS PRESSURE: 0
DIARRHEA: 0
ABDOMINAL PAIN: 0
VOMITING: 0
CONSTIPATION: 0
COLOR CHANGE: 0
WHEEZING: 0
COUGH: 0

## 2022-03-28 ASSESSMENT — PATIENT HEALTH QUESTIONNAIRE - PHQ9
SUM OF ALL RESPONSES TO PHQ9 QUESTIONS 1 & 2: 0
SUM OF ALL RESPONSES TO PHQ QUESTIONS 1-9: 0
2. FEELING DOWN, DEPRESSED OR HOPELESS: 0
SUM OF ALL RESPONSES TO PHQ QUESTIONS 1-9: 0
1. LITTLE INTEREST OR PLEASURE IN DOING THINGS: 0

## 2022-03-28 NOTE — PROGRESS NOTES
Visit Information    Have you changed or started any medications since your last visit including any over-the-counter medicines, vitamins, or herbal medicines? no   Are you having any side effects from any of your medications? -  no  Have you stopped taking any of your medications? Is so, why? -  no    Have you seen any other physician or provider since your last visit? no  Have you had any other diagnostic tests since your last visit? No  Have you been seen in the emergency room and/or had an admission to a hospital since we last saw you? No  Have you had your routine dental cleaning in the past 6 months? no    Have you activated your [employer] account? If not, what are your barriers?  No:      No care team member to display    Medical History Review  Past Medical, Family, and Social History reviewed and does not contribute to the patient presenting condition    Health Maintenance   Topic Date Due    Hepatitis C screen  Never done    Depression Screen  Never done    HIV screen  Never done    Diabetic microalbuminuria test  Never done    Diabetic retinal exam  Never done    DTaP/Tdap/Td vaccine (1 - Tdap) Never done    Shingles Vaccine (2 of 3) 02/08/2011    Lipid screen  08/15/2014    Diabetic foot exam  04/27/2019    Colorectal Cancer Screen  07/23/2020    COVID-19 Vaccine (2 - Booster for Rinku series) 07/13/2021    Flu vaccine (1) 09/01/2021    Annual Wellness Visit (AWV)  03/14/2022    A1C test (Diabetic or Prediabetic)  03/01/2023    Potassium monitoring  03/14/2023    Creatinine monitoring  03/14/2023    Pneumococcal 0-64 years Vaccine (2 of 2 - PPSV23) 05/23/2024    Hepatitis A vaccine  Aged Out    Hib vaccine  Aged Out    Meningococcal (ACWY) vaccine  Aged Out

## 2022-03-28 NOTE — PATIENT INSTRUCTIONS
Thank you for letting us take care of you today. We hope all your questions were addressed. If a question was overlooked or something else comes to mind after you return home, please contact a member of your Care Team listed below. Your Care Team at [place] [address] is Team #5  Tan Horner MD (Faculty)  Lynda Howe MD (Resident)  Rosalino Summers MD (Resident)  Loida Kruse MD (Resident)  Pal Bob MD (Resident)  Celeste Zavala., DEEPAK Ayala., SARAH Beltran., Pranav Taylor, [facility] office)  Pili Mazariegos, [address] (Clinical Practice Manager)  Priya Soto [facility] (Clinical Pharmacist)       Office phone number: [phone]    If you need to get in right away due to illness, please be advised we have \"Same Day\" appointments available Monday-Friday. Please call us at [phone] option #3 to schedule your \"Same Day\" appointment.

## 2022-03-28 NOTE — PROGRESS NOTES
hypertension  aspirin EC 81 MG EC tablet    empagliflozin (JARDIANCE) 25 MG tablet    losartan (COZAAR) 25 MG tablet    metoprolol tartrate (LOPRESSOR) 25 MG tablet    tamsulosin (FLOMAX) 0.4 MG capsule    Basic Metabolic Panel    [facility]) Primary Care Pharmacist   2. Type 2 diabetes mellitus with other specified complication, with long-term current use of insulin (HCC)  vitamin D3 (CHOLECALCIFEROL) 25 MCG (1000 UT) TABS tablet    empagliflozin (JARDIANCE) 25 MG tablet    metFORMIN (GLUCOPHAGE) 500 MG tablet    Dulaglutide (TRULICITY) 1.5 QO/7.7MJ SOPN    insulin glargine (LANTUS SOLOSTAR) 100 UNIT/ML injection pen    glipiZIDE (GLUCOTROL) 10 MG tablet    insulin lispro (HUMALOG) 100 UNIT/ML injection vial    atorvastatin (LIPITOR) 40 MG tablet    esomeprazole Magnesium (NEXIUM) 20 MG PACK    pregabalin (LYRICA) 75 MG capsule    POCT Urinalysis no Micro    Hemoglobin A1C    [facility] Pharmacist    POCT glycosylated hemoglobin (Hb A1C)   3. Congestive heart failure, unspecified HF chronicity, unspecified heart failure type ([facility]  ECHO Complete 2D W Doppler [id number] [facility] Primary Care Pharmacist   4. Prostate CA [facility])  [facility] Pharmac[facility]    . I agree with orders as documented by the resident. More than 25 minutes spent  in face to face encounter with the patient and more than half in counseling. Patient's questions were answered. Patient Voiced understanding to the counseling.   Return in about 4 weeks (around 4/25/2022) for Lab Work, HTN, DM.   (GC Modifier)-Dr. Neeraj Fuentes MD

## 2022-03-28 NOTE — PROGRESS NOTES
Subjective: Gavino Grove is a 57 y.o. male with  has a past medical history of Arthritis, DM (diabetes mellitus) ([address] GERD (gastroesophageal reflux disease), Heart murmur, HTN (hypertension), Hyperlipidemia, Sleep apnea, Vision abnormalities, and Wears glasses. 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 Cc: Essential hypertension   Today's Blood Pressure: 97/66   BP monitor: Yes   Readings at home: Less than 140/90   Symptoms of HA/CP/SOB/Blurry vision/Racing of heart/AP: None   Current medication: Cozaar 25 mg, amlodipine 10 mg, Aldactone 25 mg, hydrochlorothiazide 25 mg, Lopressor 12.5 mg twice daily   Compliance: Yes   Smoking: No   Previous CVA/CAD/DM: DM   Last Lipid Profile: unremarkable   Last Hba1c: Ordered today   BP at Goal: Yes  Hypertension is defined according to age. Age group Hypertension Goal   Adults 18 to 72 ?140/90 120 to 130/70 to 79   Adults 65 to 79 ? 140/90 130 to 139/70 to 79     Adults 80 years and older   ? 160/90   130 to 139/70 to 78     Adults with diabetes, 25to 72years of age   ? 140/90   120 to 130/70 to 78     Adults with CKD, 25to 72years of age   ?  140/90   130 to 139/70 to 79       Cc: Diabetic Mellitus   Type I/II/Unknown: 2   HbA1c: Ordered today   Insulin dependant: Lantus 42 units twice daily, lispro 12 twice daily   Blood sugar numbers at home: Around 200s   Hypoglycemic episodes after last visit: None   Hyperglycemia or hospital admission: None   Current medication: Insulin as mentioned as above, Metformin 500 mg, glipizide 10 mg twice daily, Jardiance 25 mg daily, Trulicity 1.5 mg weekly   Urine Micro: Ordered today   Lipid Profile: Unremarkable on Care Everywhere   Foot exam: NA   Eye Exam: NA   Neuropathy: Yes   Gastroparesis: No   CVA/CAD/CKD: NA   BP at goal: Yes    Hypertension is defined according to age. Age group Hypertension Goal   Adults 18 to 72 ?140/90 120 to 130/70 to 79   Adults 65 to 79 ? 140/90 130 to 139/70 to 79     Adults 80 years and older   ? 160/90   130 to 139/70 to 78     Adults with diabetes, 25to 72years of age   ? 140/90   120 to 130/70 to 78     Adults with CKD, 25to 72years of age   ? 140/90   130 to 139/70 to 79                 Review of Systems   Constitutional: Negative for chills and fever. HENT: Negative for congestion, sinus pressure and sinus pain. Respiratory: Positive for shortness of breath. Negative for cough and wheezing. Cardiovascular: Negative for chest pain, palpitations and leg swelling. Gastrointestinal: Negative for abdominal pain, blood in stool, constipation, diarrhea, nausea and vomiting. Genitourinary: Negative for difficulty urinating, flank pain and hematuria. Musculoskeletal: Negative for arthralgias, back pain and myalgias. Skin: Negative for color change and wound. Neurological: Negative for dizziness, light-headedness and headaches. Hematological: Does not bruise/bleed easily. Psychiatric/Behavioral: Negative for agitation and confusion. ROS negative except what mentioned in HPI. The patient has a   Family History   Problem Relation Age of Onset    Diabetes Sister     Diabetes Brother        Objective:    BP 97/66 (Site: Left Upper Arm, Position: Sitting, Cuff Size: Medium Adult)   Pulse 88   Wt 269 lb 9.6 oz (122.3 kg)   BMI 34.61 kg/m²    BP Readings from Last 3 Encounters:   03/28/22 97/66   03/28/19 135/78   03/28/19 133/85       Physical Exam  Vitals and nursing note reviewed. Constitutional:       Appearance: Normal appearance.    HENT:      Head: Normocephalic and atraumatic. Mouth/Throat:      Mouth: Mucous membranes are moist.   Eyes:      Conjunctiva/sclera: Conjunctivae normal.   Cardiovascular:      Rate and Rhythm: Normal rate and regular rhythm. Pulmonary:      Effort: Pulmonary effort is normal.      Breath sounds: Normal breath sounds. Abdominal:      General: Bowel sounds are normal. There is no distension. Palpations: Abdomen is soft. There is no mass. Tenderness: There is no abdominal tenderness. There is no guarding. Musculoskeletal:      Right lower leg: No edema. Left lower leg: No edema. Neurological:      General: No focal deficit present. Mental Status: He is alert and oriented to person, place, and time. Psychiatric:         Mood and Affect: Mood normal.         Behavior: Behavior normal.         Lab Results   Component Value Date    WBC 8.8 03/14/2022    HGB 10.9 (L) 03/14/2022    HCT 35.1 (L) 03/14/2022     03/14/2022    CHOL 179 08/15/2013    TRIG 163 (H) 08/15/2013    HDL 52 08/15/2013    ALT 16 03/14/2022    AST 17 03/14/2022     03/14/2022    K 5.4 (H) 03/14/2022    CL 98 03/14/2022    CREATININE 1.48 (H) 03/14/2022    BUN 25 (H) 03/14/2022    CO2 21 03/14/2022    PSA 1.75 07/01/2012    INR 1.0 08/15/2013    LABA1C 7.8 (H) 08/15/2013     Lab Results   Component Value Date    CALCIUM 9.5 03/14/2022     Lab Results   Component Value Date    LDLCHOLESTEROL 94 08/15/2013       Examination including this and mentioned above in HPI. Assessment and Plan:    1. Essential hypertension  -We will discontinue amlodipine at this time, as the patient to hold Aldactone since the last BMP showed potassium: 5.5, repeat ordered today.  -Will maximize the current medicine first and then add on new medication of the blood pressure is not controlled. - aspirin EC 81 MG EC tablet; Take 1 tablet by mouth daily  Dispense: 90 tablet; Refill: 1  - empagliflozin (JARDIANCE) 25 MG tablet;  Take 25 mg by mouth daily  Dispense: 30 tablet; Refill: 1  - losartan (COZAAR) 25 MG tablet; Take 1 tablet by mouth nightly  Dispense: 30 tablet; Refill: 1  - metoprolol tartrate (LOPRESSOR) 25 MG tablet; Take 0.5 tablets by mouth 2 times daily  Dispense: 30 tablet; Refill: 0  - tamsulosin (FLOMAX) 0.4 MG capsule; Take 1 capsule by mouth daily  Dispense: 30 capsule; Refill: 1  - Basic Metabolic Panel; Future  - [facility]) Primary Care Pharmacist    2. Type 2 diabetes mellitus with other specified complication, with long-term current use of insulin ([facility])  -HbA1c: 8.3 today  -We will call pharmacy and discontinue glipizide and Trulicity if needed, will maximize Metformin and escalate on insulin therapy, patient has a history of CHF so we will continue with Jardiance. - vitamin D3 (CHOLECALCIFEROL) 25 MCG (1000 UT) TABS tablet; Take 1 tablet by mouth daily  Dispense: 90 tablet; Refill: 1  - empagliflozin (JARDIANCE) 25 MG tablet; Take 25 mg by mouth daily  Dispense: 30 tablet; Refill: 1  - metFORMIN (GLUCOPHAGE) 500 MG tablet; Take 1 tablet by mouth 2 times daily (with meals)  Dispense: 60 tablet; Refill: 1  - Dulaglutide (TRULICITY) 1.5 BZ/5.4YG SOPN; Inject 1.5 mg into the skin once a week TAKES ON Sunday  Dispense: 1 pen; Refill: 2  - insulin glargine (LANTUS SOLOSTAR) 100 UNIT/ML injection pen; Inject 42 Units into the skin 2 times daily  Dispense: 5 pen; Refill: 3  - glipiZIDE (GLUCOTROL) 10 MG tablet; Take 1 tablet by mouth 2 times daily (before meals)  Dispense: 60 tablet; Refill: 0  - insulin lispro (HUMALOG) 100 UNIT/ML injection vial; Inject 6 Units into the skin 3 times daily (before meals) MORNING  Dispense: 10 mL; Refill: 3  - atorvastatin (LIPITOR) 40 MG tablet; Take 1 tablet by mouth daily  Dispense: 30 tablet; Refill: 3  - esomeprazole Magnesium (NEXIUM) 20 MG PACK; Take 1 packet by mouth daily  Dispense: 30 packet; Refill: 1  - pregabalin (LYRICA) 75 MG capsule; Take 1 capsule by mouth 2 times daily for 30 days.   Dispense: 60 capsule; Refill: 1  - POCT Urinalysis no Micro  - Hemoglobin A1C; Future  - [facility]) Primary Care Pharmacist  - POCT glycosylated hemoglobin (Hb A1C)    3. Congestive heart failure, unspecified HF chronicity, unspecified heart failure type ([facility]  - ECHO Complete 2D W Doppler W Color; Future  - [facility]) Primary Care Pharmacist    4. Prostate CA ([facility]  -History of prostate cancer, was on Lupron, referred to hematologist oncologist care.  -Concern about smell of urine and increased frequency, urine analysis done today is unremarkable.   - [id number] Primary Care Pharmacist          Requested Prescriptions     Signed Prescriptions Disp Refills    aspirin EC 81 MG EC tablet 90 tablet 1     Sig: Take 1 tablet by mouth daily    vitamin D3 (CHOLECALCIFEROL) 25 MCG (1000 UT) TABS tablet 90 tablet 1     Sig: Take 1 tablet by mouth daily    empagliflozin (JARDIANCE) 25 MG tablet 30 tablet 1     Sig: Take 25 mg by mouth daily    metFORMIN (GLUCOPHAGE) 500 MG tablet 60 tablet 1     Sig: Take 1 tablet by mouth 2 times daily (with meals)    Dulaglutide (TRULICITY) 1.5 QO/1.7UC SOPN 1 pen 2     Sig: Inject 1.5 mg into the skin once a week TAKES ON Sunday    insulin glargine (LANTUS SOLOSTAR) 100 UNIT/ML injection pen 5 pen 3     Sig: Inject 42 Units into the skin 2 times daily    glipiZIDE (GLUCOTROL) 10 MG tablet 60 tablet 0     Sig: Take 1 tablet by mouth 2 times daily (before meals)    insulin lispro (HUMALOG) 100 UNIT/ML injection vial 10 mL 3     Sig: Inject 6 Units into the skin 3 times daily (before meals) MORNING    losartan (COZAAR) 25 MG tablet 30 tablet 1     Sig: Take 1 tablet by mouth nightly    metoprolol tartrate (LOPRESSOR) 25 MG tablet 30 tablet 0     Sig: Take 0.5 tablets by mouth 2 times daily    atorvastatin (LIPITOR) 40 MG tablet 30 tablet 3     Sig: Take 1 tablet by mouth daily    tamsulosin (FLOMAX) 0.4 MG capsule 30 capsule 1     Sig: Take 1 capsule by mouth daily  esomeprazole Magnesium (NEXIUM) 20 MG PACK 30 packet 1     Sig: Take 1 packet by mouth daily    pregabalin (LYRICA) 75 MG capsule 60 capsule 1     Sig: Take 1 capsule by mouth 2 times daily for 30 days. Medications Discontinued During This Encounter   Medication Reason    aspirin 325 MG EC tablet DOSE ADJUSTMENT    doxycycline hyclate (VIBRA-TABS) 100 MG tablet Therapy completed    insulin glargine (TOUJEO MAX SOLOSTAR) 300 UNIT/ML injection pen DUPLICATE    insulin lispro (ADMELOG) 100 UNIT/ML injection vial DUPLICATE    loratadine (CLARITIN) 10 MG capsule LIST CLEANUP    meloxicam (MOBIC) 15 MG tablet LIST CLEANUP    metoprolol (LOPRESSOR) 100 MG tablet DOSE ADJUSTMENT    atorvastatin (LIPITOR) 80 MG tablet DOSE ADJUSTMENT    amLODIPine (NORVASC) 10 MG tablet Therapy completed    Cholecalciferol 2000 units CAPS Therapy completed    metFORMIN (GLUCOPHAGE) 500 MG tablet REORDER    Dulaglutide (TRULICITY) 1.5 DU/8.5HM SOPN REORDER    glipiZIDE (GLUCOTROL) 10 MG tablet REORDER    losartan (COZAAR) 25 MG tablet REORDER    pantoprazole sodium (PROTONIX) 40 MG PACK packet REORDER    insulin lispro (HUMALOG) 100 UNIT/ML injection vial REORDER    pregabalin (LYRICA) 75 MG capsule REORDER    empagliflozin (JARDIANCE) 25 MG tablet REORDER    tamsulosin (FLOMAX) 0.4 MG capsule REORDER       Ryder received counseling on the following healthy behaviors: nutrition, exercise and medication adherence      Patient was counseled about importance of taking medication which were prescribed today and also which he is already using during today conversation. Discussed use,benefit, and side effects of prescribed medications. Barriers to medication compliance addressed. Patient was also counseled that lifestyle changes including diet, smoking and use of less alcohol will benefit their health in long term.  All the question asked by the patient were answered in non-medical terms and to be best of writer knowledge. Patient understands and agree to the plan. Teach back method was used while having the conversation. All patient questions answered. Pt voiced understanding. Return in about 3 months (around 6/28/2022). Disclaimer: Some oral of this note was transcribed using voice-recognition software. This may cause typographical errors occasionally, please review it with the context of the note. Although all effort is made to fix these errors, please do not hesitate to contact our office if there Ella Nolen concern with the understanding of this note.

## 2022-03-28 NOTE — TELEPHONE ENCOUNTER
Writer attempted to call the patient at the given number, access call cannot be completed. Tried the other number, nobody picked it up. Patient was counseled to not take Aldactone until potassium level is replete, will inform  to update the number.       Bernardo Wiseman  PGY-2  Family Medicine     3/28/2022  7:53 PM

## 2022-03-29 ENCOUNTER — TELEPHONE (OUTPATIENT)
Dept: FAMILY MEDICINE CLINIC | Age: 62
End: 2022-03-29

## 2022-03-29 NOTE — TELEPHONE ENCOUNTER
Spoke with patient was able to get through with the number in the patients chart [phone]. Patient was informed physician will reach back out.

## 2022-03-29 NOTE — TELEPHONE ENCOUNTER
----- Message from Deb Alvarez MD sent at 3/28/2022  7:53 PM EDT -----  Patient given number is not working, the significant other number is tried and nobody is picking up. If we can figure out, patient any other number that he left in the  number, patient was told that I will call him about his lab result. Let me know, if we have a new number. I want to discuss the labs which came yesterday.

## 2022-04-01 ENCOUNTER — TELEPHONE (OUTPATIENT)
Dept: FAMILY MEDICINE CLINIC | Age: 62
End: 2022-04-01

## 2022-04-01 ENCOUNTER — HOSPITAL ENCOUNTER (OUTPATIENT)
Facility: MEDICAL CENTER | Age: 62
End: 2022-04-01

## 2022-04-01 DIAGNOSIS — E87.5 HYPERKALEMIA: Primary | ICD-10-CM

## 2022-04-01 RX ORDER — SODIUM POLYSTYRENE SULFONATE 15 G/60ML
15 SUSPENSION ORAL; RECTAL 2 TIMES DAILY
Qty: 120 ML | Refills: 0 | Status: SHIPPED | OUTPATIENT
Start: 2022-04-01 | End: 2022-04-12 | Stop reason: ALTCHOICE

## 2022-04-01 NOTE — TELEPHONE ENCOUNTER
----- Message from Elvin Connolly MD sent at 4/1/2022  1:36 PM EDT -----  Can we schedule this patient appointment with me next week as his labs are abnormal, we will have to recheck his potassium and adjust medication.

## 2022-04-01 NOTE — TELEPHONE ENCOUNTER
Writer called and informed the patient about abnormal labs. Patient potassium is still 5.7, Aldactone was not refilled during the last encounter. Patient is still on Cozaar but is not sure as he is getting most of the medication with the bubble pack. We will give him 2 doses of SPS, will schedule appointment next week to recheck the potassium and adjust the medication.       Bernardo Beckman Morning  PGY-2  Family Medicine     4/1/2022  1:35 PM

## 2022-04-01 NOTE — TELEPHONE ENCOUNTER
Writer spoke with patient and scheduled an appointment for 4/4/22 at [facility] with Dr. Derik Quesada.

## 2022-04-04 ENCOUNTER — OFFICE VISIT (OUTPATIENT)
Dept: FAMILY MEDICINE CLINIC | Age: 62
End: 2022-04-04
Payer: MEDICARE

## 2022-04-04 ENCOUNTER — TELEPHONE (OUTPATIENT)
Dept: FAMILY MEDICINE CLINIC | Age: 62
End: 2022-04-04

## 2022-04-04 VITALS
SYSTOLIC BLOOD PRESSURE: 114 MMHG | WEIGHT: 271.2 LBS | HEART RATE: 93 BPM | DIASTOLIC BLOOD PRESSURE: 80 MMHG | TEMPERATURE: 96.6 F | HEIGHT: 74 IN | BODY MASS INDEX: 34.8 KG/M2

## 2022-04-04 DIAGNOSIS — E11.69 TYPE 2 DIABETES MELLITUS WITH OTHER SPECIFIED COMPLICATION, WITH LONG-TERM CURRENT USE OF INSULIN (HCC): Primary | ICD-10-CM

## 2022-04-04 DIAGNOSIS — Z79.4 TYPE 2 DIABETES MELLITUS WITH OTHER SPECIFIED COMPLICATION, WITH LONG-TERM CURRENT USE OF INSULIN (HCC): Primary | ICD-10-CM

## 2022-04-04 PROCEDURE — 3052F HG A1C>EQUAL 8.0%<EQUAL 9.0%: CPT | Performed by: STUDENT IN AN ORGANIZED HEALTH CARE EDUCATION/TRAINING PROGRAM

## 2022-04-04 PROCEDURE — 99213 OFFICE O/P EST LOW 20 MIN: CPT | Performed by: STUDENT IN AN ORGANIZED HEALTH CARE EDUCATION/TRAINING PROGRAM

## 2022-04-04 PROCEDURE — 2022F DILAT RTA XM EVC RTNOPTHY: CPT | Performed by: STUDENT IN AN ORGANIZED HEALTH CARE EDUCATION/TRAINING PROGRAM

## 2022-04-04 PROCEDURE — G8417 CALC BMI ABV UP PARAM F/U: HCPCS | Performed by: STUDENT IN AN ORGANIZED HEALTH CARE EDUCATION/TRAINING PROGRAM

## 2022-04-04 PROCEDURE — 1036F TOBACCO NON-USER: CPT | Performed by: STUDENT IN AN ORGANIZED HEALTH CARE EDUCATION/TRAINING PROGRAM

## 2022-04-04 PROCEDURE — G8427 DOCREV CUR MEDS BY ELIG CLIN: HCPCS | Performed by: STUDENT IN AN ORGANIZED HEALTH CARE EDUCATION/TRAINING PROGRAM

## 2022-04-04 PROCEDURE — 3017F COLORECTAL CA SCREEN DOC REV: CPT | Performed by: STUDENT IN AN ORGANIZED HEALTH CARE EDUCATION/TRAINING PROGRAM

## 2022-04-04 ASSESSMENT — ENCOUNTER SYMPTOMS
SINUS PRESSURE: 0
WHEEZING: 0
ABDOMINAL PAIN: 0
DIARRHEA: 0
BACK PAIN: 0
COUGH: 0
VOMITING: 0
SHORTNESS OF BREATH: 0
SINUS PAIN: 0
CONSTIPATION: 0
COLOR CHANGE: 0
NAUSEA: 0
BLOOD IN STOOL: 0

## 2022-04-04 NOTE — PROGRESS NOTES
HYPERTENSION visit     BP Readings from Last 3 Encounters:   03/28/22 97/66   03/28/19 135/78   03/28/19 133/85       LDL Cholesterol (mg/dL)   Date Value   08/15/2013 94     HDL (mg/dL)   Date Value   08/15/2013 52     BUN (mg/dL)   Date Value   03/28/2022 27 (H)     CREATININE (mg/dL)   Date Value   03/28/2022 1.60 (H)     Glucose (mg/dL)   Date Value   03/28/2022 337 (H)              Have you changed or started any medications since your last visit including any over-the-counter medicines, vitamins, or herbal medicines? no   Have you stopped taking any of your medications? Is so, why? -  no  Are you having any side effects from any of your medications? - no  How often do you miss doses of your medication? rare      Have you seen any other physician or provider since your last visit?  no   Have you had any other diagnostic tests since your last visit? yes - Labs, ECHO   Have you been seen in the emergency room and/or had an admission in a hospital since we last saw you?  no   Have you had your routine dental cleaning in the past 6 months?  no     Do you have an active MyChart account? If no, what is the barrier?   No: Declines    Patient Care Team:  Erin Dubon MD as PCP - General (Emergency Medicine)  Jonathan Beaulieu RN as Nurse Navigator (Oncology)    Medical History Review  Past Medical, Family, and Social History reviewed and does contribute to the patient presenting condition    Health Maintenance   Topic Date Due    Hepatitis C screen  Never done    HIV screen  Never done    Diabetic microalbuminuria test  Never done    Diabetic retinal exam  Never done    DTaP/Tdap/Td vaccine (1 - Tdap) Never done    Shingles Vaccine (2 of 3) 02/08/2011    PSA counseling  07/01/2013    Lipid screen  08/15/2014    Diabetic foot exam  04/27/2019    Colorectal Cancer Screen  07/23/2020    Annual Wellness Visit (AWV)  03/14/2022    Flu vaccine (Season Ended) 09/01/2022    A1C test (Diabetic or Prediabetic) 03/28/2023    Depression Screen  03/28/2023    Potassium monitoring  03/28/2023    Creatinine monitoring  03/28/2023    Pneumococcal 0-64 years Vaccine (2 of 2 - PPSV23) 05/23/2024    COVID-19 Vaccine  Completed    Hepatitis A vaccine  Aged Out    Hib vaccine  Aged Out    Meningococcal (ACWY) vaccine  Aged Out

## 2022-04-04 NOTE — PROGRESS NOTES
I have reviewed and discussed key elements of [facility] with the resident including plan of care and follow up and agree with the care elvie plan. Elevated potassium  Now has kayexalate to take. Follow up  one week. Recheck lytes then.   Also has pharmacy team appt to review and hopefully \"clean up\" med list.

## 2022-04-04 NOTE — PROGRESS NOTES
Subjective: Murali Robbins is a 57 y.o. male with  has a past medical history of Arthritis, DM (diabetes mellitus) ([address] GERD (gastroesophageal reflux disease), Heart murmur, HTN (hypertension), Hyperlipidemia, Sleep apnea, Vision abnormalities, and Wears glasses. Presented to the office today for:  Chief Complaint   Patient presents with    Hypertension     follow up labs, echo        HPI  This is a 30-year-old gentleman with a history of essential hypertension, type 2 diabetes mellitus on insulin, CKD stage III chronic septic arthritis following up with ID/orthopedic in today for regular follow-up. Cc: Follow-up on labs  Patient is taking multiple medication for the blood pressure and diabetes. Lab last time showed hyperkalemia 5.7, patient was given SPS/Kayexalate. Patient just received the medication today. He is completely asymptomatic at this time, no chest pain, no shortness of breath, no racing of heart, no lightheadedness or dizziness, no palpitation. Review of Systems   Constitutional: Negative for chills and fever. HENT: Negative for congestion, sinus pressure and sinus pain. Respiratory: Negative for cough, shortness of breath and wheezing. Cardiovascular: Negative for chest pain, palpitations and leg swelling. Gastrointestinal: Negative for abdominal pain, blood in stool, constipation, diarrhea, nausea and vomiting. Genitourinary: Negative for difficulty urinating, flank pain and hematuria. Musculoskeletal: Negative for arthralgias, back pain and myalgias. Skin: Negative for color change and wound. Neurological: Negative for dizziness, light-headedness and headaches. Psychiatric/Behavioral: Negative for agitation and confusion. ROS negative except what mentioned in HPI.         The patient has a   Family History   Problem Relation Age of Onset    Diabetes Sister     Diabetes Brother        Objective:    /80 (Site: Right Upper Arm, Position: Sitting, Cuff Size: Large Adult)   Pulse 93   Temp 96.6 °F (35.9 °C) (Temporal)   Ht 6' 2.02\" (1.88 m)   Wt 271 lb 3.2 oz (123 kg)   BMI 34.80 kg/m²    BP Readings from Last 3 Encounters:   04/04/22 114/80   03/28/22 97/66   03/28/19 135/78       Physical Exam  Vitals and nursing note reviewed. Constitutional:       Appearance: Normal appearance. HENT:      Head: Normocephalic and atraumatic. Mouth/Throat:      Mouth: Mucous membranes are moist.   Eyes:      Conjunctiva/sclera: Conjunctivae normal.   Cardiovascular:      Rate and Rhythm: Normal rate and regular rhythm. Pulmonary:      Effort: Pulmonary effort is normal.      Breath sounds: Normal breath sounds. Abdominal:      General: Bowel sounds are normal. There is no distension. Palpations: Abdomen is soft. There is no mass. Tenderness: There is no abdominal tenderness. There is no guarding. Musculoskeletal:      Right lower leg: No edema. Left lower leg: No edema. Neurological:      General: No focal deficit present. Mental Status: He is alert and oriented to person, place, and time. Psychiatric:         Mood and Affect: Mood normal.         Behavior: Behavior normal.         Lab Results   Component Value Date    WBC 8.8 03/14/2022    HGB 10.9 (L) 03/14/2022    HCT 35.1 (L) 03/14/2022     03/14/2022    CHOL 179 08/15/2013    TRIG 163 (H) 08/15/2013    HDL 52 08/15/2013    ALT 16 03/14/2022    AST 17 03/14/2022     03/28/2022    K 5.7 (H) 03/28/2022     03/28/2022    CREATININE 1.60 (H) 03/28/2022    BUN 27 (H) 03/28/2022    CO2 23 03/28/2022    PSA 1.75 07/01/2012    INR 1.0 08/15/2013    LABA1C 8.3 03/28/2022     Lab Results   Component Value Date    CALCIUM 9.5 03/28/2022     Lab Results   Component Value Date    LDLCHOLESTEROL 94 08/15/2013       Examination including this and mentioned above in HPI. Assessment and Plan:    1. Hyper kalemia:  - K: 5.7 (T), given SPS 2 doses.   We will repeat BMP in a week. - Counseled the patient to hold on Cozaar and Aldactone till we repeated the BMP to check the potassium. Wrote down the medication, patient understand and said that he will take out the medication from the bubble pack. 2.  We will de-escalate the blood pressure medication starting with Aldactone, will maximize the other medication before adding a new medication. 3.  We will discontinue Trulicity, glipizide after discussing with pharmacy. Requested Prescriptions      No prescriptions requested or ordered in this encounter       There are no discontinued medications. Gerardo Almanza received counseling on the following healthy behaviors: nutrition, exercise and medication adherence      Patient was counseled about importance of taking medication which were prescribed today and also which he is already using during today conversation. Discussed use,benefit, and side effects of prescribed medications. Barriers to medication compliance addressed. Patient was also counseled that lifestyle changes including diet, smoking and use of less alcohol will benefit their health in long term. All the question asked by the patient were answered in non-medical terms and to be best of writer knowledge. Patient understands and agree to the plan. Teach back method was used while having the conversation. All patient questions answered. Pt voiced understanding. Return in about 8 days (around 4/12/2022) for Lab Work, HTN. Disclaimer: Some oral of this note was transcribed using voice-recognition software. This may cause typographical errors occasionally, please review it with the context of the note. Although all effort is made to fix these errors, please do not hesitate to contact our office if there K[address] Syed concern with the understanding of this note.

## 2022-04-04 NOTE — TELEPHONE ENCOUNTER
----- Message from [employer] sent at 4/4/2022  2:26 PM EDT -----  Has this been done?    ----- Message -----  From: Gabi Vee MD  Sent: 4/1/2022   1:37 PM EDT  To: Gurpreet Hull MA, [employer]    Can we schedule this patient appointment with me next week as his labs are abnormal, we will have to recheck his potassium and adjust medication.

## 2022-04-07 ENCOUNTER — OFFICE VISIT (OUTPATIENT)
Dept: INFECTIOUS DISEASES | Age: 62
End: 2022-04-07
Payer: MEDICARE

## 2022-04-07 VITALS
SYSTOLIC BLOOD PRESSURE: 124 MMHG | DIASTOLIC BLOOD PRESSURE: 83 MMHG | TEMPERATURE: 96.4 F | HEIGHT: 74 IN | HEART RATE: 94 BPM | OXYGEN SATURATION: 97 % | BODY MASS INDEX: 34.32 KG/M2 | WEIGHT: 267.4 LBS

## 2022-04-07 DIAGNOSIS — T84.51XS INFECTION ASSOCIATED WITH INTERNAL RIGHT HIP PROSTHESIS, SEQUELA: Primary | ICD-10-CM

## 2022-04-07 PROCEDURE — 1036F TOBACCO NON-USER: CPT | Performed by: INTERNAL MEDICINE

## 2022-04-07 PROCEDURE — G8427 DOCREV CUR MEDS BY ELIG CLIN: HCPCS | Performed by: INTERNAL MEDICINE

## 2022-04-07 PROCEDURE — G8417 CALC BMI ABV UP PARAM F/U: HCPCS | Performed by: INTERNAL MEDICINE

## 2022-04-07 PROCEDURE — 3017F COLORECTAL CA SCREEN DOC REV: CPT | Performed by: INTERNAL MEDICINE

## 2022-04-07 PROCEDURE — 99204 OFFICE O/P NEW MOD 45 MIN: CPT | Performed by: INTERNAL MEDICINE

## 2022-04-07 RX ORDER — CIPROFLOXACIN 500 MG/1
500 TABLET, FILM COATED ORAL 2 TIMES DAILY
Qty: 60 TABLET | Refills: 3 | Status: SHIPPED | OUTPATIENT
Start: 2022-04-07 | End: 2022-05-07

## 2022-04-07 RX ORDER — DOXYCYCLINE HYCLATE 100 MG
100 TABLET ORAL 2 TIMES DAILY
Qty: 28 TABLET | Refills: 0 | Status: SHIPPED | OUTPATIENT
Start: 2022-04-07 | End: 2022-04-21

## 2022-04-07 ASSESSMENT — ENCOUNTER SYMPTOMS
RESPIRATORY NEGATIVE: 1
GASTROINTESTINAL NEGATIVE: 1
ALLERGIC/IMMUNOLOGIC NEGATIVE: 1

## 2022-04-07 NOTE — PROGRESS NOTES
[facility]   Office Consult Note  Today's Date and Time: 4/7/2022, 10:39 AM    Impression:     1. Infection associated with internal right hip prosthesis, sequela         Recommendations   · The patient has right prosthetic hip joint infection with Pseudomonas aeruginosa and coagulase-negative staph isolated on the aspirate culture of the right hip. · The question at this point in time is whether there will be a surgical approach to his treatment. · I did discuss with him that despite him having multiple surgeries the ideal approach would be a two-stage revision. · At this point in time I will start him on ciprofloxacin and doxycycline orally while he discusses his options with the surgical team.  · If there is no plan for surgery we can continue him on the above antibiotics with a plan to \"suppression\" indefinitely. · If there is no plan for surgery then I do not see any other option other than trying to proceed with his radiation seed implants for the prostate cancer    I have ordered the following medications/ labs:  No orders of the defined types were placed in this encounter. Orders Placed This Encounter   Medications    ciprofloxacin (CIPRO) 500 MG tablet     Sig: Take 1 tablet by mouth 2 times daily     Dispense:  60 tablet     Refill:  3    doxycycline hyclate (VIBRA-TABS) 100 MG tablet     Sig: Take 1 tablet by mouth 2 times daily for 14 days     Dispense:  28 tablet     Refill:  0       Chief complaint/reason for consultation:     Chief Complaint   Patient presents with    New Patient     new pt / history of total hip arthoplasty         History of Present Illness: Samantha Barragan is a 57y.o.-year-old male who is seen at there request of ROSALIA Osbornjoe Perez is seen in the office and he is here with his wife.   It is my understanding that he underwent a right prosthetic hip joint infection and this was complicated by a MSSA right prostatic hip joint infection for which he had hardware removed treated with cefazolin for 6 weeks and cefadroxil for maintenance thereafter but then he had reinfection in July 2021 underwent another joint explant and cultures grew out MSSA as well as Enterococcus faecalis for which I understand he was treated with ceftriaxone. [Ceftriaxone has no enterococcal coverage]  The patient had recurrent infection and had an I&D done in December 2021 and no cultures were sent at that time. He was given doxycycline sent to wound care and wound cultures did subsequently grow out Pseudomonas aeruginosa and ciprofloxacin was added to the antimicrobial regimen. The patient has had multiple surgeries on the right hip secondary to infection. He has had several I&D's on 12/11/2019, 1/6/2020, 4/24/2020 after a total hip replacement on 11/25/2019. He has also undergone explantation on 7/15/2021 with a cement spacer placed and then revision of the spacer on 10/18/2021 and also a subsequent I&D on 12/9/2021 as outlined above. The patient recently underwent an aspiration of the right hip on 1622 and cultures have grown out Pseudomonas aeruginosa as well as coagulase-negative staph    The patient has suspected residual infection and also was recently diagnosed with prostate cancer and there is a plan for radiation seeds to be placed. I was asked to evaluate and help with antibiotic therapy. I have personally reviewed the past medical history,past surgical history, medications, social history, and family history, and I have updated the database accordingly.   PastMedical History:     Past Medical History:   Diagnosis Date    Arthritis     DM (diabetes mellitus) ([facility] [address] 2009    IDDM    GERD (gastroesophageal reflux disease) 2017    ON RX    Heart murmur 2013    ASYMPTOMATIC    HTN (hypertension) 6/29/2012    ON RX    Hyperlipidemia 06/29/2012    ON RX    Sleep apnea 2016    TRIED MACHINE COULD NOT TOLERATE    Vision abnormalities 2019    FLOATERS RIGHT EYE    Wears glasses      Past Surgical  History:     Past Surgical History:   Procedure Laterality Date    COLONOSCOPY  07/23/2010    Dr. Macie Hollis Bilateral 2017    CATARACT EXTRACTION WITH IOL    KNEE ARTHROSCOPY     [address]    VITRECTOMY Right 03/28/2019    VITRECTOMY Right 3/28/2019    VITRECTOMY 25 GAUGE,  IOL REPOSITION  ([facility]) performed by Magalys Madrid MD at [place] [address]     Medications:     Current Outpatient Medications   Medication Sig Dispense Refill    ciprofloxacin (CIPRO) 500 MG tablet Take 1 tablet by mouth 2 times daily 60 tablet 3    doxycycline hyclate (VIBRA-TABS) 100 MG tablet Take 1 tablet by mouth 2 times daily for 14 days 28 tablet 0    aspirin EC 81 MG EC tablet Take 1 tablet by mouth daily 90 tablet 1    vitamin D3 (CHOLECALCIFEROL) 25 MCG (1000 UT) TABS tablet Take 1 tablet by mouth daily 90 tablet 1    empagliflozin (JARDIANCE) 25 MG tablet Take 25 mg by mouth daily 30 tablet 1    metFORMIN (GLUCOPHAGE) 500 MG tablet Take 1 tablet by mouth 2 times daily (with meals) 60 tablet 1    Dulaglutide (TRULICITY) 1.5 JL/0.8UN SOPN Inject 1.5 mg into the skin once a week TAKES ON Sunday 1 pen 2    insulin glargine (LANTUS SOLOSTAR) 100 UNIT/ML injection pen Inject 42 Units into the skin 2 times daily 5 pen 3    glipiZIDE (GLUCOTROL) 10 MG tablet Take 1 tablet by mouth 2 times daily (before meals) 60 tablet 0    insulin lispro (HUMALOG) 100 UNIT/ML injection vial Inject 6 Units into the skin 3 times daily (before meals) MORNING 10 mL 3    losartan (COZAAR) 25 MG tablet Take 1 tablet by mouth nightly 30 tablet 1    metoprolol tartrate (LOPRESSOR) 25 MG tablet Take 0.5 tablets by mouth 2 times daily 30 tablet 0    atorvastatin (LIPITOR) 40 MG tablet Take 1 tablet by mouth daily 30 tablet 3    tamsulosin (FLOMAX) 0.4 MG capsule Take 1 capsule by mouth daily 30 capsule 1    esomeprazole Magnesium (NEXIUM) 20 MG PACK Take 1 packet by mouth daily 30 packet 1    pregabalin (LYRICA) 75 MG capsule Take 1 capsule by mouth 2 times daily for 30 days. 60 capsule 1    Insulin Pen Needle (B-D ULTRAFINE III SHORT PEN) 31G X 8 MM MISC by Does not apply route. 100 each 3    hydrochlorothiazide (HYDRODIURIL) 25 MG tablet Take 1 tablet by mouth daily. 30 tablet 11    sodium polystyrene (SPS) 15 GM/60ML suspension Take 60 mLs by mouth 2 times daily for 2 doses 120 mL 0    spironolactone (ALDACTONE) 25 MG tablet Take 1 tablet by mouth daily. (Patient not taking: Reported on 2022) 30 tablet 2     No current facility-administered medications for this visit. Social History:     Social History     Socioeconomic History    Marital status: Single     Spouse name: Not on file    Number of children: Not on file    Years of education: Not on file    Highest education level: Not on file   Occupational History    Not on file   Tobacco Use    Smoking status: Former Smoker     Packs/day: 0.00     Years: 15.00     Pack years: 0.00     Quit date: 3/27/2016     Years since quittin.0    Smokeless tobacco: Never Used   Vaping Use    Vaping Use: Never used   Substance and Sexual Activity    Alcohol use: Yes     Comment: etoh abuse. BEER, WINE  12 A WEEK rarley    Drug use: No    Sexual activity: Yes   Other Topics Concern    Not on file   Social History Narrative    Not on file     Social Determinants of Health     Financial Resource Strain:     Difficulty of Paying Living Expenses: Not on file   Food Insecurity:     Worried About Running Out of Food in the Last Year: Not on file    Christine of Food in the Last Year: Not on file   Transportation Needs:     Lack of Transportation (Medical): Not on file    Lack of Transportation (Non-Medical):  Not on file   Physical Activity:     Days of Exercise per Week: Not on file    Minutes of Exercise per Session: Not on file   Stress:     Feeling of Stress : Not on file   Social Connections:     Frequency of Communication with Friends and Family: Not on file    Frequency of Social Gatherings with Friends and Family: Not on file    Attends Yarsanism Services: Not on file    Active Member of Clubs or Organizations: Not on file    Attends Club or Organization Meetings: Not on file    Marital Status: Not on file   Intimate Partner Violence:     Fear of Current or Ex-Partner: Not on file    Emotionally Abused: Not on file    Physically Abused: Not on file    Sexually Abused: Not on file   Housing Stability:     Unable to Pay for Housing in the Last Year: Not on file    Number of [place] in the Last Year: Not on file    Unstable Housing in the Last Year: Not on file     Family History:     Family History   Problem Relation Age of Onset    Diabetes Sister     Diabetes Brother     Cancer Brother       Allergies:   Lisinopril, Melatonin, and Trazodone     Review of Systems:   Review of Systems   Constitutional: Negative. Respiratory: Negative. Cardiovascular: Negative. Gastrointestinal: Negative. Genitourinary: Negative. Musculoskeletal: Negative. Allergic/Immunologic: Negative. Neurological: Negative. Physical Examination :   /83 (Site: Left Upper Arm, Position: Sitting, Cuff Size: Large Adult)   Pulse 94   Temp 96.4 °F (35.8 °C)   Ht 6' 2\" (1.88 m)   Wt 267 lb 6.4 oz (121.3 kg)   SpO2 97%   BMI 34.33 kg/m²     Physical Exam  Constitutional:       Appearance: He is well-developed. HENT:      Head: Normocephalic and atraumatic. Cardiovascular:      Rate and Rhythm: Normal rate. Heart sounds: Normal heart sounds. No friction rub. No gallop. Pulmonary:      Effort: Pulmonary effort is normal.      Breath sounds: Normal breath sounds. No wheezing. Abdominal:      General: Bowel sounds are normal.      Palpations: Abdomen is soft. There is no mass. Tenderness: There is no abdominal tenderness. Musculoskeletal:         General: Normal range of motion. Cervical back: Normal range of motion and neck supple. Lymphadenopathy:      Cervical: No cervical adenopathy. Skin:     General: Skin is warm and dry. Comments: There is a right lateral hip scar with some mild hyperpigmentation/erythroderma surrounding the middle of the scar. Neurological:      Mental Status: He is alert and oriented to person, place, and time.              Medical Decision Making:   I haveindependently reviewed the following labs:  CBC with Differential:  Lab Results   Component Value Date    WBC 8.8 03/14/2022    WBC 4.0 08/18/2013    HGB 10.9 03/14/2022    HGB 13.5 08/18/2013    HCT 35.1 03/14/2022    HCT 40.3 08/18/2013     03/14/2022     08/18/2013    LYMPHOPCT 45 08/15/2013    LYMPHOPCT 47 07/03/2012    MONOPCT 12 08/15/2013    MONOPCT 11 07/03/2012     BMP:  Lab Results   Component Value Date     03/28/2022     03/14/2022    K 5.7 03/28/2022    K 5.4 03/14/2022     03/28/2022    CL 98 03/14/2022    CO2 23 03/28/2022    CO2 21 03/14/2022    BUN 27 03/28/2022    BUN 25 03/14/2022    CREATININE 1.60 03/28/2022    CREATININE 1.48 03/14/2022    MG 2.0 08/16/2013    MG 1.7 07/03/2012     Hepatic Function Panel:   Lab Results   Component Value Date    PROT 7.6 03/14/2022    LABALBU 4.3 03/14/2022    LABALBU 4.6 07/03/2012    BILITOT <0.10 03/14/2022    BILITOT 0.70 07/03/2012    ALKPHOS 137 03/14/2022    ALKPHOS 75 07/03/2012    ALT 16 03/14/2022    ALT 25 07/03/2012    AST 17 03/14/2022    AST 23 07/03/2012     Lab Results   Component Value Date    CRP 22.1 (H) 03/14/2022     Lab Results   Component Value Date    SEDRATE 66 (H) 03/14/2022       Imaging Studies:   X-ray of the right hip 2-3 views done 3/18/2022  Findings:    Low AP pelvis, AP Right  hip, cross table lateral xrays Right    hip done in the office today shows long stem revision total hip    arthroplasty in good position without signs complication.   Leg lengths    are approximate.   Components are in good position without signs of    loosening.  No evidence of fracture, subluxation, dislocation, radiopaque    foreign body, radiopaque foreign tumor is noted.        Impression: Revision right total hip arthroplasty in good position without    complication appreciated.           Cultures:      3/16/22 1532   Specimen Description . HIP RIGHT    Direct Exam NO NEUTROPHILS SEEN    Direct Exam NO BACTERIA SEEN    Culture PSEUDOMONAS AERUGINOSA SCANT GROWTH Abnormal     Culture  Abnormal   STAPHYLOCOCCUS SPECIES, COAGULASE NEGATIVE Growth in broth medium only.  The significance of broth-only isolates has not been established.  Clinical correlation suggested. Culture NO ANAEROBIC ORGANISMS ISOLATED AT [facility]. Patric[address]          Susceptibility      Pseudomonas aeruginosa (1)    Antibiotic Interpretation Microscan Method Status    cefepime Sensitive <=1 BACTERIAL SUSCEPTIBILITY PANEL LELA Final    ciprofloxacin Sensitive <=0.25 BACTERIAL SUSCEPTIBILITY PANEL LELA Final    gentamicin Sensitive <=1 BACTERIAL SUSCEPTIBILITY PANEL LELA Final    tobramycin Sensitive <=1 BACTERIAL SUSCEPTIBILITY PANEL LELA Final    piperacillin-tazobactam Sensitive 8 BACTERIAL SUSCEPTIBILITY PANEL LELA Final          Specimen Collected: 03/16/22 15:32 Last Resulted: 03/20/22 12:58           Component 3/16/22 1531   Specimen Description . HIP RIGHT P    Culture NO GROWTH 18 DAYS P            Thank you for allowing us to participate in the care of this patient. Pleasecall with questions. Camelia Escobar MD  Perfect Serve messaging: [phone]    This note is created with the assistance of a speech recognition program.  While intending to generate a document that actually reflects the content ofthe visit, the document can still have some errors including those of syntax and sound a like substitutions which may escape proof reading.   It such instances, actual meaning can be extrapolated by contextual diversion.

## 2022-04-08 ENCOUNTER — TELEPHONE (OUTPATIENT)
Dept: ONCOLOGY | Age: 62
End: 2022-04-08

## 2022-04-08 NOTE — TELEPHONE ENCOUNTER
PATIENT CALLED ON 4/8/22 AND LEFT A VM TO GET RESCHEDULED FOR THE CONSULT HE MISSED ON 4/6/22. WRITER CALLED PATIENT BACK AND HE WAS AT ANOTHER APPOINTMENT SO PATIENT WILL CALL BACK TO GET RESCHEDULED WITH DR. STEVENSON.

## 2022-04-11 ENCOUNTER — HOSPITAL ENCOUNTER (OUTPATIENT)
Facility: MEDICAL CENTER | Age: 62
End: 2022-04-11

## 2022-04-11 NOTE — PROGRESS NOTES
Medication Management Service  [facility]  [phone]     CLINICAL PHARMACY NOTE: Comprehensive Medication Review      SUBJECTIVE/OBJECTIVE:    Juan Salgado is a 57 y.o. male who was referred to [facility] for Comprehensive Medication Review by Dr. Herbie Dietrich MD. Patient has compliance packaging from [address] [place] [address] and individual prescriptions from [facility]. Herbie Dietrich MD recently made medication changes. Patient currently prescribed the following medications:    Medication Orders    Current Outpatient Medications   Medication Sig Dispense Refill    ferrous sulfate (IRON 325) 325 (65 Fe) MG tablet Take 325 mg by mouth daily (with breakfast)      zinc 50 MG CAPS Take 1 capsule by mouth daily      ciprofloxacin (CIPRO) 500 MG tablet Take 1 tablet by mouth 2 times daily 60 tablet 3    doxycycline hyclate (VIBRA-TABS) 100 MG tablet Take 1 tablet by mouth 2 times daily for 14 days 28 tablet 0    pregabalin (LYRICA) 75 MG capsule Take 1 capsule by mouth 2 times daily for 30 days.  60 capsule 1    aspirin EC 81 MG EC tablet Take 1 tablet by mouth daily 90 tablet 1    atorvastatin (LIPITOR) 40 MG tablet Take 1 tablet by mouth daily 30 tablet 3    empagliflozin (JARDIANCE) 25 MG tablet Take 25 mg by mouth daily 30 tablet 1    insulin glargine (LANTUS SOLOSTAR) 100 UNIT/ML injection pen Inject 42 Units into the skin 2 times daily 5 pen 3    Insulin Pen Needle (B-D ULTRAFINE III SHORT PEN) 31G X 8 MM MISC 1 each by Does not apply route daily 100 each 3    metoprolol tartrate (LOPRESSOR) 25 MG tablet Take 0.5 tablets by mouth 2 times daily 30 tablet 0    vitamin D3 (CHOLECALCIFEROL) 25 MCG (1000 UT) TABS tablet Take 1 tablet by mouth daily 90 tablet 1    Dulaglutide (TRULICITY) 3 DP/9.8UR SOPN Inject 3 mg into the skin once a week 1 pen 3    insulin lispro, 1 Unit Dial, (HUMALOG KWIKPEN) 100 UNIT/ML SOPN Inject 6 Units into the skin 3 times daily (before meals) 3 pen 2    metFORMIN (GLUCOPHAGE) 500 MG tablet Take 2 tablets by mouth 2 times daily (with meals) 60 tablet 1    spironolactone (ALDACTONE) 25 MG tablet Take 1 tablet by mouth daily. (Patient not taking: Reported on 4/12/2022) 30 tablet 2     No current facility-administered medications for this visit. Additional Medications:      ASSESSMENT/PLAN:  · Adherence: adherent to therapy  · Cost: no cost barriers reported at this time  · Current pharmacy/pharmacies: [id number] and [facility]. Will coordinate bubble packaging at [address]   · Drug interactions: No clinically significant interactions identified via [employer] Interaction Analysis as category D or higher. Medication reconciliation completed. Identified medication discrepancies/issues:    Medication(s) Issue Action   Esomeprazole Patient is no longer taking Removed from medication list   Ferrous sulfate  Zinc Patient is taking, but medications are not currently on medication list Medications added to list   Dulaglutide  Insulin lispro  Tamsulosin Incorrect product/directions documented on medication list Medication list updated   Glipizide  Hydrochlorothiazide  Losartan  Spironolactone   Discontinued by Dr. Ministerio Fischer patient and caregiver. Identified tablets to be taken out of current compliance packaging   Aspirin  Atorvastatin  Dulaglutide  Empagliflozin  Insulin glargine  Insulin lispro  Metformin  Metoprolol tartrate  Tamsulosin  Vitamin D3   Will require new prescriptions sent to [address] to coordinate bubble packaging                 Verbally communicated with Marta Moran MD      Writer called [facility] to deactivate prescriptions and stop compliance packaging service. Deactivation confirmed by [employer]. Writer called [employer] to transfer remaining prescriptions (cirpofloxacin, ferrous sulfate, pregabalin) to [address] [place] [address] Pharmacy.      Writer called [facility] to enroll patient in compliance packaging program. Enrollment confirmed by Napoleon Paris, PharmD  PGY2 Ambulatory Care Pharmacy Resident    4/13/2022 10:41 AM    =========================================================    For Pharmacy Admin Tracking Only     CPA in place:  No   Recommendation Provided To: Provider: 7 via Note to Provider and Verbally to provider, Patient/Caregiver: 4 via In person and Other: 3   Intervention Detail: Device Training, Discontinued Rx: 5, reason: Therapy Complete, Therapy De-escalation, Dose Adjustment: 3, reason: Therapy Optimization, New Rx: 2, reason: Patient Preference and Patient Access Assistance/Sample Provided   Gap Closed?: No    Intervention Accepted By: Provider: 7, Patient/Caregiver: 4 and Other: 3   Time Spent (min): 105

## 2022-04-12 ENCOUNTER — OFFICE VISIT (OUTPATIENT)
Dept: FAMILY MEDICINE CLINIC | Age: 62
End: 2022-04-12
Payer: MEDICARE

## 2022-04-12 ENCOUNTER — HOSPITAL ENCOUNTER (OUTPATIENT)
Age: 62
Setting detail: SPECIMEN
Discharge: HOME | End: 2022-04-12

## 2022-04-12 ENCOUNTER — OFFICE VISIT (OUTPATIENT)
Dept: FAMILY MEDICINE CLINIC | Age: 62
End: 2022-04-12

## 2022-04-12 VITALS
WEIGHT: 267 LBS | HEIGHT: 74 IN | TEMPERATURE: 97.2 F | BODY MASS INDEX: 34.27 KG/M2 | HEART RATE: 94 BPM | SYSTOLIC BLOOD PRESSURE: 111 MMHG | DIASTOLIC BLOOD PRESSURE: 74 MMHG

## 2022-04-12 DIAGNOSIS — Z11.4 ENCOUNTER FOR SCREENING FOR HIV: ICD-10-CM

## 2022-04-12 DIAGNOSIS — Z11.59 NEED FOR HEPATITIS C SCREENING TEST: ICD-10-CM

## 2022-04-12 DIAGNOSIS — I10 ESSENTIAL HYPERTENSION: ICD-10-CM

## 2022-04-12 DIAGNOSIS — E11.69 TYPE 2 DIABETES MELLITUS WITH OTHER SPECIFIED COMPLICATION, WITH LONG-TERM CURRENT USE OF INSULIN (HCC): ICD-10-CM

## 2022-04-12 DIAGNOSIS — E11.69 TYPE 2 DIABETES MELLITUS WITH OTHER SPECIFIED COMPLICATION, WITH LONG-TERM CURRENT USE OF INSULIN (HCC): Primary | ICD-10-CM

## 2022-04-12 DIAGNOSIS — E11.40 TYPE 2 DIABETES MELLITUS WITH DIABETIC NEUROPATHY, UNSPECIFIED WHETHER LONG TERM INSULIN USE (HCC): ICD-10-CM

## 2022-04-12 DIAGNOSIS — Z79.4 TYPE 2 DIABETES MELLITUS WITH OTHER SPECIFIED COMPLICATION, WITH LONG-TERM CURRENT USE OF INSULIN (HCC): Primary | ICD-10-CM

## 2022-04-12 DIAGNOSIS — I10 HYPERTENSION, UNSPECIFIED TYPE: ICD-10-CM

## 2022-04-12 DIAGNOSIS — Z79.4 TYPE 2 DIABETES MELLITUS WITH OTHER SPECIFIED COMPLICATION, WITH LONG-TERM CURRENT USE OF INSULIN (HCC): ICD-10-CM

## 2022-04-12 LAB
ANION GAP SERPL CALCULATED.3IONS-SCNC: 18 MMOL/L (ref 9–17)
BUN BLDV-MCNC: 20 MG/DL (ref 8–23)
CALCIUM SERPL-MCNC: 10 MG/DL (ref 8.6–10.4)
CHLORIDE BLD-SCNC: 101 MMOL/L (ref 98–107)
CHOLESTEROL/HDL RATIO: 2.5
CHOLESTEROL: 123 MG/DL
CO2: 23 MMOL/L (ref 20–31)
CREAT SERPL-MCNC: 1.31 MG/DL (ref 0.7–1.2)
GFR AFRICAN AMERICAN: >60 ML/MIN
GFR NON-AFRICAN AMERICAN: 55 ML/MIN
GFR SERPL CREATININE-BSD FRML MDRD: ABNORMAL ML/MIN/{1.73_M2}
GLUCOSE BLD-MCNC: 67 MG/DL (ref 70–99)
HDLC SERPL-MCNC: 49 MG/DL
HEPATITIS C ANTIBODY: NONREACTIVE
HIV AG/AB: NONREACTIVE
LDL CHOLESTEROL: 52 MG/DL (ref 0–130)
POTASSIUM SERPL-SCNC: 4.5 MMOL/L (ref 3.7–5.3)
SODIUM BLD-SCNC: 142 MMOL/L (ref 135–144)
TRIGL SERPL-MCNC: 111 MG/DL

## 2022-04-12 PROCEDURE — 2022F DILAT RTA XM EVC RTNOPTHY: CPT | Performed by: STUDENT IN AN ORGANIZED HEALTH CARE EDUCATION/TRAINING PROGRAM

## 2022-04-12 PROCEDURE — 99213 OFFICE O/P EST LOW 20 MIN: CPT | Performed by: STUDENT IN AN ORGANIZED HEALTH CARE EDUCATION/TRAINING PROGRAM

## 2022-04-12 PROCEDURE — 99999 PR OFFICE/OUTPT VISIT,PROCEDURE ONLY: CPT | Performed by: PHARMACIST

## 2022-04-12 PROCEDURE — G8427 DOCREV CUR MEDS BY ELIG CLIN: HCPCS | Performed by: STUDENT IN AN ORGANIZED HEALTH CARE EDUCATION/TRAINING PROGRAM

## 2022-04-12 PROCEDURE — 1036F TOBACCO NON-USER: CPT | Performed by: STUDENT IN AN ORGANIZED HEALTH CARE EDUCATION/TRAINING PROGRAM

## 2022-04-12 PROCEDURE — 3017F COLORECTAL CA SCREEN DOC REV: CPT | Performed by: STUDENT IN AN ORGANIZED HEALTH CARE EDUCATION/TRAINING PROGRAM

## 2022-04-12 PROCEDURE — G8417 CALC BMI ABV UP PARAM F/U: HCPCS | Performed by: STUDENT IN AN ORGANIZED HEALTH CARE EDUCATION/TRAINING PROGRAM

## 2022-04-12 PROCEDURE — 3052F HG A1C>EQUAL 8.0%<EQUAL 9.0%: CPT | Performed by: STUDENT IN AN ORGANIZED HEALTH CARE EDUCATION/TRAINING PROGRAM

## 2022-04-12 PROCEDURE — 83036 HEMOGLOBIN GLYCOSYLATED A1C: CPT | Performed by: STUDENT IN AN ORGANIZED HEALTH CARE EDUCATION/TRAINING PROGRAM

## 2022-04-12 PROCEDURE — APPNB60 APP NON BILLABLE TIME 46-60 MINS: Performed by: PHARMACIST

## 2022-04-12 RX ORDER — ASPIRIN 81 MG/1
81 TABLET ORAL DAILY
Qty: 90 TABLET | Refills: 1 | Status: SHIPPED | OUTPATIENT
Start: 2022-04-12

## 2022-04-12 RX ORDER — MELATONIN
1000 DAILY
Qty: 90 TABLET | Refills: 1 | Status: SHIPPED | OUTPATIENT
Start: 2022-04-12

## 2022-04-12 RX ORDER — EMPAGLIFLOZIN 25 MG/1
25 TABLET, FILM COATED ORAL DAILY
Qty: 30 TABLET | Refills: 1 | Status: SHIPPED | OUTPATIENT
Start: 2022-04-12 | End: 2022-06-14

## 2022-04-12 RX ORDER — ATORVASTATIN CALCIUM 40 MG/1
40 TABLET, FILM COATED ORAL DAILY
Qty: 30 TABLET | Refills: 3 | Status: SHIPPED | OUTPATIENT
Start: 2022-04-12 | End: 2022-08-17 | Stop reason: SDUPTHER

## 2022-04-12 RX ORDER — FERROUS SULFATE 325(65) MG
325 TABLET ORAL
COMMUNITY

## 2022-04-12 RX ORDER — INSULIN LISPRO 100 [IU]/ML
6 INJECTION, SOLUTION INTRAVENOUS; SUBCUTANEOUS
COMMUNITY
End: 2022-04-12 | Stop reason: SDUPTHER

## 2022-04-12 RX ORDER — DULAGLUTIDE 3 MG/.5ML
3 INJECTION, SOLUTION SUBCUTANEOUS WEEKLY
COMMUNITY
End: 2022-04-12 | Stop reason: SDUPTHER

## 2022-04-12 RX ORDER — INSULIN GLARGINE 100 [IU]/ML
42 INJECTION, SOLUTION SUBCUTANEOUS 2 TIMES DAILY
Qty: 5 PEN | Refills: 3 | Status: SHIPPED | OUTPATIENT
Start: 2022-04-12 | End: 2022-05-17

## 2022-04-12 RX ORDER — INSULIN LISPRO 100 [IU]/ML
6 INJECTION, SOLUTION INTRAVENOUS; SUBCUTANEOUS
Qty: 3 PEN | Refills: 2 | Status: SHIPPED | OUTPATIENT
Start: 2022-04-12

## 2022-04-12 RX ORDER — PEN NEEDLE, DIABETIC 31 GX5/16"
1 NEEDLE, DISPOSABLE MISCELLANEOUS DAILY
Qty: 100 EACH | Refills: 3 | Status: SHIPPED | OUTPATIENT
Start: 2022-04-12

## 2022-04-12 RX ORDER — DULAGLUTIDE 3 MG/.5ML
3 INJECTION, SOLUTION SUBCUTANEOUS WEEKLY
Qty: 1 PEN | Refills: 3 | Status: SHIPPED | OUTPATIENT
Start: 2022-04-12 | End: 2022-05-17

## 2022-04-12 ASSESSMENT — ENCOUNTER SYMPTOMS
BLOOD IN STOOL: 0
BACK PAIN: 0
SINUS PRESSURE: 0
CONSTIPATION: 0
VOMITING: 0
SINUS PAIN: 0
COUGH: 0
ABDOMINAL PAIN: 0
DIARRHEA: 0
NAUSEA: 0
WHEEZING: 0
COLOR CHANGE: 0
SHORTNESS OF BREATH: 0

## 2022-04-12 NOTE — PROGRESS NOTES
Subjective: Gabriela Kruse is a 57 y.o. male with  has a past medical history of Arthritis, DM (diabetes mellitus) ([address] GERD (gastroesophageal reflux disease), Heart murmur, HTN (hypertension), Hyperlipidemia, Sleep apnea, Vision abnormalities, and Wears glasses. Presented to the office today for:  Chief Complaint   Patient presents with    Diabetes     follow up appointment on diabeties and HTN    Hypertension       HPI  This is a 50-year-old gentleman with a history of essential hypertension, type 2 diabetes mellitus on insulin, CKD stage III chronic septic arthritis following up with ID/orthopedic in today for regular follow-up. Cc: Follow-up on labs  -Last potassium was 5.7.  -S/p SPS  -Recheck BMP today  -Holding Cozaar, patient does not have Aldactone. Review of Systems   Constitutional: Negative for chills and fever. HENT: Negative for congestion, sinus pressure and sinus pain. Respiratory: Negative for cough, shortness of breath and wheezing. Cardiovascular: Negative for chest pain, palpitations and leg swelling. Gastrointestinal: Negative for abdominal pain, blood in stool, constipation, diarrhea, nausea and vomiting. Genitourinary: Negative for difficulty urinating, flank pain and hematuria. Musculoskeletal: Negative for arthralgias, back pain and myalgias. Skin: Negative for color change and wound. Neurological: Negative for dizziness, light-headedness and headaches. Psychiatric/Behavioral: Negative for agitation and confusion. ROS negative except what mentioned in HPI.         The patient has a   Family History   Problem Relation Age of Onset    Diabetes Sister     Diabetes Brother     Cancer Brother        Objective:    /74 (Site: Right Upper Arm, Position: Sitting, Cuff Size: Large Adult)   Pulse 94   Temp 97.2 °F (36.2 °C)   Ht 6' 2.02\" (1.88 m)   Wt 267 lb (121.1 kg)   BMI 34.27 kg/m²    BP Readings from Last 3 Encounters:   04/12/22 111/74   04/07/22 124/83   04/04/22 114/80       Physical Exam  Vitals and nursing note reviewed. Constitutional:       Appearance: Normal appearance. HENT:      Head: Normocephalic and atraumatic. Mouth/Throat:      Mouth: Mucous membranes are moist.   Eyes:      Conjunctiva/sclera: Conjunctivae normal.   Cardiovascular:      Rate and Rhythm: Normal rate and regular rhythm. Pulmonary:      Effort: Pulmonary effort is normal.      Breath sounds: Normal breath sounds. Abdominal:      General: Bowel sounds are normal. There is no distension. Palpations: Abdomen is soft. There is no mass. Tenderness: There is no abdominal tenderness. There is no guarding. Musculoskeletal:      Right lower leg: No edema. Left lower leg: No edema. Neurological:      General: No focal deficit present. Mental Status: He is alert and oriented to person, place, and time. Psychiatric:         Mood and Affect: Mood normal.         Behavior: Behavior normal.         Lab Results   Component Value Date    WBC 8.8 03/14/2022    HGB 10.9 (L) 03/14/2022    HCT 35.1 (L) 03/14/2022     03/14/2022    CHOL 179 08/15/2013    TRIG 163 (H) 08/15/2013    HDL 52 08/15/2013    ALT 16 03/14/2022    AST 17 03/14/2022     03/28/2022    K 5.7 (H) 03/28/2022     03/28/2022    CREATININE 1.60 (H) 03/28/2022    BUN 27 (H) 03/28/2022    CO2 23 03/28/2022    PSA 1.75 07/01/2012    INR 1.0 08/15/2013    LABA1C 8.3 03/28/2022     Lab Results   Component Value Date    CALCIUM 9.5 03/28/2022     Lab Results   Component Value Date    LDLCHOLESTEROL 94 08/15/2013       Examination including this and mentioned above in HPI. Assessment and Plan:    1. Type 2 diabetes mellitus with other specified complication, with long-term current use of insulin (HCC)  - Basic Metabolic Panel; Future  - Microalbumin, Ur; Future  - Lipid Panel; Future  - metFORMIN (GLUCOPHAGE) 500 MG tablet;  Take 2 tablets by mouth 2 times daily (with meals)  Dispense: 60 tablet; Refill: 1  -Increase Metformin to 1000 mg twice daily, will discontinue glipizide. We will keep Trulicity, Jardiance and  with insulin at this time. 2.  CHF reduced ejection fraction/essential hypertension:  -We will hold losartan and spironolactone due to concern of hyperkalemia.  -We will keep metoprolol 12.5 mg twice daily, amlodipine 10 mg.  -Discontinued hydrochlorothiazide.  -We will discontinue amlodipine once losartan and Spironolactone is resumed due to concern of diabetic nephropathy versus CHF with reduced ejection fraction. Requested Prescriptions     Signed Prescriptions Disp Refills    metFORMIN (GLUCOPHAGE) 500 MG tablet 60 tablet 1     Sig: Take 2 tablets by mouth 2 times daily (with meals)       Medications Discontinued During This Encounter   Medication Reason    sodium polystyrene (SPS) 15 GM/60ML suspension Therapy completed    glipiZIDE (GLUCOTROL) 10 MG tablet Therapy completed    hydrochlorothiazide (HYDRODIURIL) 25 MG tablet Therapy completed    metFORMIN (GLUCOPHAGE) 500 MG tablet        Ryder received counseling on the following healthy behaviors: nutrition, exercise and medication adherence      Patient was counseled about importance of taking medication which were prescribed today and also which he is already using during today conversation. Discussed use,benefit, and side effects of prescribed medications. Barriers to medication compliance addressed. Patient was also counseled that lifestyle changes including diet, smoking and use of less alcohol will benefit their health in long term. All the question asked by the patient were answered in non-medical terms and to be best of writer knowledge. Patient understands and agree to the plan. Teach back method was used while having the conversation. All patient questions answered. Pt voiced understanding. Return in about 6 weeks (around 5/24/2022).         Disclaimer: Some oral of this note was transcribed using voice-recognition software. This may cause typographical errors occasionally, please review it with the context of the note. Although all effort is made to fix these errors, please do not hesitate to contact our office if there [place] concern with the understanding of this note.

## 2022-04-12 NOTE — PROGRESS NOTES
DIABETES and HYPERTENSION visit    BP Readings from Last 3 Encounters:   04/07/22 124/83   04/04/22 114/80   03/28/22 97/66        Hemoglobin A1C (%)   Date Value   03/28/2022 8.3   08/15/2013 7.8 (H)   04/10/2013 12.7 (H)     LDL Cholesterol (mg/dL)   Date Value   08/15/2013 94     HDL (mg/dL)   Date Value   08/15/2013 52     BUN (mg/dL)   Date Value   03/28/2022 27 (H)     CREATININE (mg/dL)   Date Value   03/28/2022 1.60 (H)     Glucose (mg/dL)   Date Value   03/28/2022 337 (H)            Have you changed or started any medications since your last visit including any over-the-counter medicines, vitamins, or herbal medicines? no   Have you stopped taking any of your medications? Is so, why? -  no  Are you having any side effects from any of your medications? - no    Have you seen any other physician or provider since your last visit?  no   Have you had any other diagnostic tests since your last visit?  no   Have you been seen in the emergency room and/or had an admission in a hospital since we last saw you?  no   Have you had your routine dental cleaning in the past 6 months?  no     Have you had your annual diabetic retinal (eye) exam? No   (ensure copy of exam is in the chart)    Do you have an active MyChart account? If no, what is the barrier?   No: declined    Patient Care Team:  Ariel Armendariz MD as PCP - General (Emergency Medicine)  Nurys Ramires RN as Nurse Navigator (Oncology)  Jonah Larson MD as Consulting Physician (Infectious Diseases)    Medical History Review  Past Medical, Family, and Social History reviewed and does not contribute to the patient presenting condition    Health Maintenance   Topic Date Due    Hepatitis C screen  Never done    HIV screen  Never done    Diabetic microalbuminuria test  Never done    Diabetic retinal exam  Never done    DTaP/Tdap/Td vaccine (1 - Tdap) Never done    Shingles Vaccine (2 of 3) 02/08/2011    PSA counseling  07/01/2013    Lipid screen 08/15/2014    Diabetic foot exam  04/27/2019    Pneumococcal 0-64 years Vaccine (2 - PCV) 12/19/2019    Colorectal Cancer Screen  07/23/2020    Annual Wellness Visit (AWV)  03/14/2022    Flu vaccine (Season Ended) 09/01/2022    A1C test (Diabetic or Prediabetic)  03/28/2023    Depression Screen  03/28/2023    Potassium monitoring  03/28/2023    Creatinine monitoring  03/28/2023    COVID-19 Vaccine  Completed    Hepatitis A vaccine  Aged Out    Hib vaccine  Aged Out    Meningococcal (ACWY) vaccine  Aged Out

## 2022-04-12 NOTE — PATIENT INSTRUCTIONS
Thank you for letting us take care of you today. We hope all your questions were addressed. If a question was overlooked or something else comes to mind after you return home, please contact a member of your Care Team listed below. Your Care Team at [place] [address] is Team #5  Skyler Galicia MD (Faculty)  Panfilo Mendieta MD (Resident)  Shanika Truong MD (Resident)  Latanya Sheets MD (Resident)  Donell Bailey MD (Resident)  Ashley Mcdonald., DEEPAK Jimenez., SARAH Vega., Madhav Sheehan., [facility] office)  Astrid Chavez, [address] (Clinical Practice Manager)  Eldon Bansal [facility] (Clinical Pharmacist)       Office phone number: [phone]    If you need to get in right away due to illness, please be advised we have \"Same Day\" appointments available Monday-Friday. Please call us at [phone] option #[phone] to schedule your \"Same Day\" appointment.

## 2022-04-13 ENCOUNTER — HOSPITAL ENCOUNTER (OUTPATIENT)
Age: 62
Setting detail: SPECIMEN
Discharge: HOME | End: 2022-04-13

## 2022-04-13 DIAGNOSIS — E11.69 TYPE 2 DIABETES MELLITUS WITH OTHER SPECIFIED COMPLICATION, WITH LONG-TERM CURRENT USE OF INSULIN (HCC): ICD-10-CM

## 2022-04-13 DIAGNOSIS — Z79.4 TYPE 2 DIABETES MELLITUS WITH OTHER SPECIFIED COMPLICATION, WITH LONG-TERM CURRENT USE OF INSULIN (HCC): ICD-10-CM

## 2022-04-13 LAB
CREATININE URINE: 124.3 MG/DL (ref 39–259)
HBA1C MFR BLD: 8.3 %
MICROALBUMIN/CREAT 24H UR: <12 MG/L
MICROALBUMIN/CREAT UR-RTO: NORMAL MCG/MG CREAT

## 2022-04-14 ENCOUNTER — TELEPHONE (OUTPATIENT)
Dept: ONCOLOGY | Age: 62
End: 2022-04-14

## 2022-04-14 DIAGNOSIS — Z79.4 TYPE 2 DIABETES MELLITUS WITH OTHER SPECIFIED COMPLICATION, WITH LONG-TERM CURRENT USE OF INSULIN (HCC): Primary | ICD-10-CM

## 2022-04-14 DIAGNOSIS — E11.69 TYPE 2 DIABETES MELLITUS WITH OTHER SPECIFIED COMPLICATION, WITH LONG-TERM CURRENT USE OF INSULIN (HCC): Primary | ICD-10-CM

## 2022-04-14 RX ORDER — BLOOD-GLUCOSE TRANSMITTER
EACH MISCELLANEOUS
Qty: 1 EACH | Refills: 3 | Status: SHIPPED | OUTPATIENT
Start: 2022-04-14

## 2022-04-14 RX ORDER — BLOOD-GLUCOSE,RECEIVER,CONT
EACH MISCELLANEOUS
Qty: 1 EACH | Refills: 1 | Status: SHIPPED | OUTPATIENT
Start: 2022-04-14

## 2022-04-14 RX ORDER — BLOOD-GLUCOSE SENSOR
EACH MISCELLANEOUS
Qty: 3 EACH | Refills: 11 | Status: SHIPPED | OUTPATIENT
Start: 2022-04-14

## 2022-04-14 NOTE — TELEPHONE ENCOUNTER
Dexcom G6 order packet faxed to [address] [place] [address]. Packet scanned and uploaded to [employer].      Nupur Moreno PharmD  PGY2 Ambulatory Care Pharmacy Resident    4/14/2022 11:28 AM

## 2022-04-14 NOTE — TELEPHONE ENCOUNTER
Medication Management Service ([address] [place] [address])  [facility]  [phone]     CLINICAL PHARMACY NOTE:  Telephone    Herbie Dietrich MD requesting assistance with Dexcom G6 continuous glucose monitoring. Patient screened to determine if Continuous Glucose Monitoring (CGM) is appropriate at this time. Patient would benefit from CGM and meets the following Medicare requirements:    1. Patient has diagnosed insulin dependent Type 2 diabetes mellitus  2. Patient utilizes 5 insulin injections per day to manage their diagnosis  3. Patient's insulin treatment regimen requires frequent adjustments based on their blood glucose monitoring or continuous glucose monitor testing results  4. Patient has had an in-person appointment to evaluate diabetes management in the last 6 months   5. Patient will have an in-person follow up appointment in the next 6 months with myself or a treating practitioner to assess adherence to continuous glucose monitor and diabetes treatment plan  6. Additionally, patient has Motivation to achieve and maintain improved glycemic control and Nocturnal hypoglycemia    Patient's insurance requires utilization of a [employer] to be signed by an attending physician. Orders pended for your convenience. Will fax the following to [address] [place] [address] ([phone]):   Demographic report   This visit note and provider's last visit note if applicable   Hard copy prescription signed by attending physician      Appointment scheduled with Medication Management Service for Dexcom G6 initiation on 05/03/2022 @2:30 pm    Alex Gomez PharmD  PGY2 Ambulatory Care Pharmacy Resident    4/14/2022 9:08 AM    ======================================================================    For Pharmacy [id number] [place] [address] in place:   Yes   Recommendation Provided To: Provider: 3 via Note to Provider and Verbally to provider and Patient/Caregiver: 1 via Telephone   Intervention Detail: New Rx: 3, reason: Patient Preference and Scheduled Appointment   Gap Closed?: No    Intervention Accepted By: Provider: 3 and Patient/Caregiver: 1   Time Spent (min): 30

## 2022-04-14 NOTE — TELEPHONE ENCOUNTER
Name: Gabriela Kruse  : 1960  MRN: [id number]    Oncology Navigation- Initial Note:    Intake-  Contact Type: Telephone    Diagnosis: Prostate    Home Disposition: Lives with other who is able to assist    Patient needs and barriers to care: Financial Concerns/ Disability     Referral Source: Health Professional    Interventions-   General Interventions: none     Education/Screenings:  no     Currently on HRT: no     Referrals: [facility]     Biopsy site status: prostate       Continuum of Care: Diagnosis/Active Treatment    Notes: Writer called patient and introduced self as navigator. Name and contact number provided. Writer explained my role in his care moving forward. Pt was previously seeing [facility] but changed to [employer] change. Pt has received 2 injections and c/o severe hot flashes. Pt has his consult with Dr. Leonard Selby tomorrow at [facility]. Pt is on disablility and does have a s.o. that lives with him. Writer will place [employer] referral for assistance. Pt appreciative of call. Will continue to follow.     Electronically signed by Clovis Gates RN on 2022 at 3:22 PM

## 2022-04-15 ENCOUNTER — INITIAL CONSULT (OUTPATIENT)
Dept: ONCOLOGY | Age: 62
End: 2022-04-15
Payer: MEDICARE

## 2022-04-15 ENCOUNTER — TELEPHONE (OUTPATIENT)
Dept: ONCOLOGY | Age: 62
End: 2022-04-15

## 2022-04-15 VITALS
TEMPERATURE: 95.9 F | RESPIRATION RATE: 20 BRPM | DIASTOLIC BLOOD PRESSURE: 84 MMHG | HEIGHT: 74 IN | BODY MASS INDEX: 34.59 KG/M2 | WEIGHT: 269.5 LBS | HEART RATE: 98 BPM | SYSTOLIC BLOOD PRESSURE: 118 MMHG

## 2022-04-15 DIAGNOSIS — C61 PROSTATE CA (HCC): Primary | ICD-10-CM

## 2022-04-15 PROCEDURE — 99202 OFFICE O/P NEW SF 15 MIN: CPT

## 2022-04-15 PROCEDURE — G8427 DOCREV CUR MEDS BY ELIG CLIN: HCPCS | Performed by: INTERNAL MEDICINE

## 2022-04-15 PROCEDURE — G8417 CALC BMI ABV UP PARAM F/U: HCPCS | Performed by: INTERNAL MEDICINE

## 2022-04-15 PROCEDURE — 99205 OFFICE O/P NEW HI 60 MIN: CPT | Performed by: INTERNAL MEDICINE

## 2022-04-15 RX ORDER — VENLAFAXINE HYDROCHLORIDE 37.5 MG/1
37.5 CAPSULE, EXTENDED RELEASE ORAL DAILY
Qty: 30 CAPSULE | Refills: 3 | Status: SHIPPED | OUTPATIENT
Start: 2022-04-15 | End: 2022-07-15

## 2022-04-15 NOTE — TELEPHONE ENCOUNTER
[facility] HERE FOR CONSULTATION  RAD/ ONC REFERRAL  RV 6 WKS W/ PSA & LABS  RAD/ONC IS ON 4/20/22 @ 8:30AM @ [facility]  LABS CDP CMP FE TIBC FERRITIN PSA ORDERS GIVEN TO PT TO BE DONE 5/18/22  MD VISIT 5/25/22 @ 10:15AM  AVS PRINTED W/ INSTRUCTIONS AND GIVEN TO PT ON EXIT

## 2022-04-15 NOTE — PROGRESS NOTES
Patient ID: Roderick Brooke, 1960, [id number], 57 y.o. Referred by : Omero Zamarripa MD  Reason for consultation:   Prostate cancer diagnosed in March 2021, initial PSA 5.1, HIghest [place] Grade: 4+3=7, Clinical stage is: Stage IIc, cT1cNo MO, NCCN clinical risk group is: Unfavorable intermediate risk    Urologist Dr. Spencer Herrera    Patient has been started on 6 monthly Lupron injection in July 2021 while awaiting initiation of radiation therapy which was significantly delayed secondary to his hip infection  HISTORY OF PRESENT ILLNESS:    Oncologic History:  Roderick Brooke is a 57 y.o. AA male was seen during his consultation visit for diagnosis of prostate cancer. Patient was diagnosed with prostate cancer in March 2021 when his initial PSA was elevated at 5.11. His biopsy showed [place] 7 diagnosed with T1CN0, unfavorable intermediate risk prostatic adenocarcinoma. Patient was referred to radiation oncology for consideration of radiation therapy. He had a bone scan and CT abdomen pelvis at that that really did not show any nephrolithiasis or distant metastatic disease. He was seen by [facility] and external beam radiation therapy was recommended and he did get fiducial spacer placement in August 2021. Patient has had right hip replacement and had been following with orthopedic surgery for hip infection. He had right total hip arthroplasty revision on 10/18/2021. Therefore his radiation therapy was delayed. In the interim patient was started on Lupron injection in July 2021. He has received 2 injections so far. Patient has been following with infectious disease for his infection and has been on antibiotics. He had a debridement and VAC placement and had another surgery in December 2021. He reports that his most recent surgery was in January of this year. His most recent PSA in February was 1.8.     Past Medical History:   Diagnosis Date    Arthritis     DM (diabetes mellitus) ([facility] [address] 2009    IDDM    GERD (gastroesophageal reflux disease) 2017    ON RX    Heart murmur 2013    ASYMPTOMATIC    HTN (hypertension) 6/29/2012    ON RX    Hyperlipidemia 06/29/2012    ON RX    Sleep apnea 2016    TRIED MACHINE COULD NOT TOLERATE    Vision abnormalities 2019    FLOATERS RIGHT EYE    Wears glasses        Past Surgical History:   Procedure Laterality Date    COLONOSCOPY  07/23/2010    Dr. Sharri Moreno Bilateral 2017    CATARACT EXTRACTION WITH IOL    KNEE ARTHROSCOPY      VASECTOMY  1999    VITRECTOMY Right 03/28/2019    VITRECTOMY Right 3/28/2019    VITRECTOMY 25 GAUGE,  IOL REPOSITION  ([facility]) performed [facility] Far[facility]en Jimenes [facility] [id number]   Allergen Reactions    Lisinopril Swelling     Outer Neck swelling    Melatonin Swelling    Trazodone Swelling     Chest swells       Current Outpatient Medications   Medication Sig Dispense Refill    Continuous Blood Gluc Transmit (DEXCOM G6 TRANSMITTER) MISC Use in combination with new sensor every 10 days.  Transmitter lasts 3 months 1 each 3    Continuous Blood Gluc Sensor (DEXCOM G6 SENSOR) MISC Apply new sensor to abdomen every 10 days 3 each 11    Continuous Blood Gluc  (DEXCOM G6 ) MAMADOU Use as directed to monitor glucose continuously 1 each 1    ferrous sulfate (IRON 325) 325 (65 Fe) MG tablet Take 325 mg by mouth daily (with breakfast)      zinc 50 MG CAPS Take 1 capsule by mouth daily      aspirin EC 81 MG EC tablet Take 1 tablet by mouth daily 90 tablet 1    atorvastatin (LIPITOR) 40 MG tablet Take 1 tablet by mouth daily 30 tablet 3    empagliflozin (JARDIANCE) 25 MG tablet Take 25 mg by mouth daily 30 tablet 1    insulin glargine (LANTUS SOLOSTAR) 100 UNIT/ML injection pen Inject 42 Units into the skin 2 times daily 5 pen 3    Insulin Pen Needle (B-D ULTRAFINE III SHORT PEN) 31G X 8 MM MISC 1 each by Does not apply route daily 100 each 3    metoprolol tartrate (LOPRESSOR) 25 MG tablet Take 0.5 tablets by mouth 2 times daily 30 tablet 0    vitamin D3 (CHOLECALCIFEROL) 25 MCG (1000 UT) TABS tablet Take 1 tablet by mouth daily 90 tablet 1    Dulaglutide (TRULICITY) 3 NZ/3.8GO SOPN Inject 3 mg into the skin once a week 1 pen 3    insulin lispro, 1 Unit Dial, (HUMALOG KWIKPEN) 100 UNIT/ML SOPN Inject 6 Units into the skin 3 times daily (before meals) 3 pen 2    metFORMIN (GLUCOPHAGE) 500 MG tablet Take 2 tablets by mouth 2 times daily (with meals) 60 tablet 1    ciprofloxacin (CIPRO) 500 MG tablet Take 1 tablet by mouth 2 times daily 60 tablet 3    doxycycline hyclate (VIBRA-TABS) 100 MG tablet Take 1 tablet by mouth 2 times daily for 14 days 28 tablet 0    pregabalin (LYRICA) 75 MG capsule Take 1 capsule by mouth 2 times daily for 30 days. 60 capsule 1    spironolactone (ALDACTONE) 25 MG tablet Take 1 tablet by mouth daily. 30 tablet 2     No current facility-administered medications for this visit. Social History     Socioeconomic History    Marital status: Single     Spouse name: Not on file    Number of children: Not on file    Years of education: Not on file    Highest education level: Not on file   Occupational History    Not on file   Tobacco Use    Smoking status: Former Smoker     Packs/day: 0.00     Years: 15.00     Pack years: 0.00     Quit date: 3/27/2016     Years since quittin.0    Smokeless tobacco: Never Used   Vaping Use    Vaping Use: Never used   Substance and Sexual Activity    Alcohol use: Yes     Comment: etoh abuse.  BEER, WINE  12 A WEEK rarley    Drug use: No    Sexual activity: Yes   Other Topics Concern    Not on file   Social History Narrative    Not on file     Social Determinants of Health     Financial Resource Strain:     Difficulty of Paying Living Expenses: Not on file   Food Insecurity:     Worried About Running Out of Food in the Last Year: Not on file    Christine of Food in the Last Year: Not on file Transportation Needs:     Lack of Transportation (Medical): Not on file    Lack of Transportation (Non-Medical): Not on file   Physical Activity:     Days of Exercise per Week: Not on file    Minutes of Exercise per Session: Not on file   Stress:     Feeling of Stress : Not on file   Social Connections:     Frequency of Communication with Friends and Family: Not on file    Frequency of Social Gatherings with Friends and Family: Not on file    Attends Confucianist Services: Not on file    Active Member of [address] [place] [address] or Organizations: Not on file    Attends Club or Organization Meetings: Not on file    Marital Status: Not on file   Intimate Partner Violence:     Fear of Current or Ex-Partner: Not on file    Emotionally Abused: Not on file    Physically Abused: Not on file    Sexually Abused: Not on file   Housing Stability:     Unable to Pay for Housing in the Last Year: Not on file    Number of [place] in the Last Year: Not on file    Unstable Housing in the Last Year: Not on file       Family History   Problem Relation Age of Onset    Diabetes Sister     Diabetes Brother     Cancer Brother         REVIEW OF SYSTEM:     Constitutional: No fever or chills. No night sweats, no weight loss   Eyes: No eye discharge, double vision, or eye pain   HEENT: negative for sore mouth, sore throat, hoarseness and voice change   Respiratory: negative for cough , sputum, dyspnea, wheezing, hemoptysis, chest pain   Cardiovascular: negative for chest pain, dyspnea, palpitations, orthopnea, PND   Gastrointestinal: negative for nausea, vomiting, diarrhea, constipation, abdominal pain, Dysphagia, hematemesis and hematochezia   Genitourinary: negative for frequency, dysuria, nocturia, urinary incontinence, and hematuria   Integument: negative for rash, skin lesions, bruises.    Hematologic/Lymphatic: negative for easy bruising, bleeding, lymphadenopathy, petechiae and swelling/edema   Endocrine: negative for heat or cold intolerance, tremor, weight changes, change in bowel habits and hair loss   Musculoskeletal: negative for myalgias, arthralgias, pain, joint swelling,and bone pain   Neurological: negative for headaches, dizziness, seizures, weakness, numbness       OBJECTIVE:         Vitals:    04/15/22 1001   BP: 118/84   Pulse: 98   Resp: 20   Temp: 95.9 °F (35.5 °C)       PHYSICAL EXAM:   General appearance - well appearing, no in pain or distress   Mental status - alert and cooperative   Eyes - pupils equal and reactive, extraocular eye movements intact   Ears - bilateral TM's and external ear canals normal   Mouth - mucous membranes moist, pharynx normal without lesions   Neck - supple, no significant adenopathy   Lymphatics - no palpable lymphadenopathy, no hepatosplenomegaly   Chest - clear to auscultation, no wheezes, rales or rhonchi, symmetric air entry   Heart - normal rate, regular rhythm, normal S1, S2, no murmurs, rubs, clicks or gallops   Abdomen - soft, nontender, nondistended, no masses or organomegaly   Neurological - alert, oriented, normal speech, no focal findings or movement disorder noted   Musculoskeletal - no joint tenderness, deformity or swelling   Extremities - peripheral pulses normal, no pedal edema, no clubbing or cyanosis   Skin - normal coloration and turgor, no rashes, no suspicious skin lesions noted ,      LABORATORY DATA:     Lab Results   Component Value Date    WBC 8.8 03/14/2022    HGB 10.9 (L) 03/14/2022    HCT 35.1 (L) 03/14/2022    MCV 86.0 03/14/2022     03/14/2022    LYMPHOPCT 45 (H) 08/15/2013    RBC 4.08 (L) 03/14/2022    MCH 26.7 03/14/2022    MCHC 31.1 03/14/2022    RDW 16.4 (H) 03/14/2022    MONOPCT 12 (H) 08/15/2013    BASOPCT 1 08/15/2013    NEUTROABS 1.50 (L) 08/15/2013    LYMPHSABS 1.80 08/15/2013    MONOSABS 0.50 08/15/2013    EOSABS 0.10 08/15/2013    BASOSABS 0.00 08/15/2013         Chemistry        Component Value Date/Time     04/12/2022 1602    K 4.5 2022 1602     2022 1602    CO2 23 2022 1602    BUN 20 2022 1602    CREATININE 1.31 (H) 2022 1602        Component Value Date/Time    CALCIUM 10.0 2022 1602    ALKPHOS 137 (H) 2022 1421    AST 17 2022 1421    ALT 16 2022 1421    BILITOT <0.10 (L) 2022 1421            PATHOLOGY DATA:   Patient Name: Christiano Kessler. : 1960 (Age: 63) Gender: M Taken: 3/30/2021 Reported: 2021 Physician(s): Sasha Thornton MD ([phone]) Copy To:  Accession #: [id number] [facility] Rec. #: [id number] Acct: # [de-identified]   Final Pathologic Diagnosis   1. Prostate, LLA, core biopsy:        Prostatic tissue, no tumor present. 2. Posterior the, LLB, core biopsy:        Prostatic tissue, no tumor present. 3. Prostate, LLM, core biopsy:        Prostatic tissue, no tumor present. 4. Prostate, LB, core biopsy:        Prostatic tissue, no tumor present. 5. Prostate, LM, core biopsy:        Prostatic tissue, no tumor present. 6. Prostate, LA, core biopsy:        Prostatic tissue, no tumor present. 7. Prostate, RB, core biopsy:        Prostatic tissue, no tumor present. 8. Posterior the, RM, core biopsy:        Prostatic tissue, no tumor present. 9. Prostate, RA, core biopsy:        Prostatic tissue, no tumor present. 10. Prostate, RLB, core biopsy:        Prostatic adenocarcinoma, [place]av[place] score 3+3 = 6 (grade group 1), involving one of three tissue core fragments.        Proportion of prostatic tissue involved by tumor: 4%.        Total linear millimeters of the carcinoma: 1.0 mm.        Total linear millimeters of needle core tissue:28.0 mm.           11. Prostate, RLM, core biopsy:        Prostatic adenocarcinoma, [place] score 4+3 = 7 (grade group 3), involving one of three tissue core fragments.        Proportion of prostatic tissue involved by tumor: 21%.         Total linear millimeters of the carcinoma: 3.0 mm.        Total linear millimeters of needle core tissue:14.0 mm. 12. Prostate, RLA, core biopsy:        Prostatic tissue, no tumor present. Comment:  In order to confirm invasion, immunoperoxidase stains for p63 and K903 were performed 0n 10A and 11A with adequate control. The stains are negative in concerning glands, supporting the interpretation        IMAGING DATA:      IR ARTHR/ASP/INJ INT JT/BURSA W US  Narrative: EXAMINATION:  FLUOROSCOPIC GUIDED RIGHT HIP ASPIRATION    3/16/2022 3:21 pm    HISTORY:  ORDERING SYSTEM PROVIDED HISTORY: History of total hip arthroplasty, right  TECHNOLOGIST PROVIDED HISTORY:  right hip aspiration, send fluid to lab for cultures (already ordered)    Right hip pain    FLUOROSCOPY DOSE AND TYPE OR TIME AND EXPOSURES:  Fluoro time 0.3 minutes     cGy cm 2    PROCEDURE:  :  Hardik Alves PA-C    This procedure was performed by Marika Law PA-C under direct supervision  of . Informed consent was obtained from patient's and are questions  were answered to patient's satisfaction. The patient was placed supine and  the right hip hip was prepped and draped in standard sterile fashion using  maximum sterile barrier technique. Using fluoroscopic guidance a appropriate  location overlying the right hip prosthesis was chosen. Local anesthesia was  achieved using 1% lidocaine. Using fluoroscopic guidance, a 20 g needle was  advanced to the neck of the right hip prosthesis until metal was reached. An  attempt at spontaneous aspiration was made without success. Subsequently  approximately 1 mL sterile saline was injected in the hip joint was lavaged. Approximately 0.5 mL of pink colored fluid was aspirated at that point and  sent to the lab. The needle was withdrawn and a Band-Aid was applied at the  site. Estimated blood loss was less than 1 mL. The patient tolerated the  procedure well and left the Department in stable condition.   Impression: Successful fluoroscopic-guided right hip aspiration. XR HIP RIGHT (2-3 VIEWS)  History: Status post right total hip arthroplasty revision. Findings:    Low AP pelvis, AP Right  hip, cross table lateral xrays Right   hip done in the office today shows long stem revision total hip   arthroplasty in good position without signs complication. Leg lengths   are approximate. Components are in good position without signs of   loosening. No evidence of fracture, subluxation, dislocation, radiopaque   foreign body, radiopaque foreign tumor is noted. Impression: Revision right total hip arthroplasty in good position without   complication appreciated. ASSESSMENT:    Ed Medeiros is a 57 y.o. male with a diagnosis ofAA unfavorable intermediate risk prostate cancer, clinical stage T1 cN0 M0, [place] 4+3 + 7, initial PSA 5.11.,  Diagnosed in March 2021  I reviewed laboratory, outside records, prior records, discussed diagnosis, prognosis treatment recommendations. I reviewed the significance and goals of care. Patient did not want surgery and wanted radiation therapy at that time and [place] [facility] [place] was recommended definitive external beam radiation therapy along with androgen suppression therapy and has received 2 doses of Lupron shot 45 mg starting in July 2021. Patient received placement of fiducial marker, SpaceO[employer] on 8/13/2021. Patient had right hip revision on 10/18/21 for his ongoing hip prosthesis infection therefore his radiation was delayed. I will plan to continue androgen suppression therapy and refer him to radiation oncology. During today's visit, the patient and the family had a number of reasonable questions which were answered to their satisfaction. They verbalized understanding of the information provided and they agreed to proceed as outlined above.   PLAN:   I recommend continue androgen suppression therapy  Referral to radiation oncology  Follow-up with infectious disease for his infection and orthopedic surgery  Return to clinic in 6 weeks with labs prior        Dedrick Garcia MD  Hematologist/Medical Oncologist    On this date 4/15/22  I have spent 80 minutes reviewing previous notes, test results and face to face with the patient discussing the diagnosis and importance of compliance with the treatment plan. Greater than 50% of that time was spent face-to-face with the patient in counseling and coordinating her care. This note is created with the assistance of a speech recognition program.  While intending to generate a document that actually reflects the content of the visit, the document can still have some errors including those of syntax and sound a like substitutions which may escape proof reading. It such instances, actual meaning can be extrapolated by contextual diversion.

## 2022-04-18 LAB
CULTURE: NORMAL
SPECIMEN DESCRIPTION: NORMAL

## 2022-04-20 ENCOUNTER — TELEPHONE (OUTPATIENT)
Dept: RADIATION ONCOLOGY | Facility: MEDICAL CENTER | Age: 62
End: 2022-04-20

## 2022-04-20 ENCOUNTER — HOSPITAL ENCOUNTER (OUTPATIENT)
Dept: RADIATION ONCOLOGY | Facility: MEDICAL CENTER | Age: 62
Discharge: HOME | End: 2022-04-20
Payer: MEDICARE

## 2022-04-20 VITALS
RESPIRATION RATE: 16 BRPM | SYSTOLIC BLOOD PRESSURE: 132 MMHG | HEART RATE: 54 BPM | BODY MASS INDEX: 34.74 KG/M2 | WEIGHT: 270.6 LBS | DIASTOLIC BLOOD PRESSURE: 73 MMHG | TEMPERATURE: 98 F

## 2022-04-20 DIAGNOSIS — C61 PROSTATE CA (HCC): ICD-10-CM

## 2022-04-20 DIAGNOSIS — C61 MALIGNANT NEOPLASM OF PROSTATE (HCC): Primary | ICD-10-CM

## 2022-04-20 PROCEDURE — 99213 OFFICE O/P EST LOW 20 MIN: CPT | Performed by: STUDENT IN AN ORGANIZED HEALTH CARE EDUCATION/TRAINING PROGRAM

## 2022-04-20 PROCEDURE — 99205 OFFICE O/P NEW HI 60 MIN: CPT | Performed by: STUDENT IN AN ORGANIZED HEALTH CARE EDUCATION/TRAINING PROGRAM

## 2022-04-20 NOTE — PROGRESS NOTES
Referring Physician:       Vitals:    22 0836   BP: 132/73   Pulse: 54   Resp: 16   Temp: 98 °F (36.7 °C)    : Wt Readings from Last 1 Encounters:   22 270 lb 9.6 oz (122.7 kg)        Body mass index is 34.74 kg/m². Immunizations:    Influenza status:    []   Current   [x]   Patient declined    Pneumococcal status:  []   Current  [x]   Patient declined  Covid status:   [x]  Dose #1:                     [x]  Dose #2:     []  Booster:               []  Patient declined    Smoking Status:    [] Smoker - PPD:   [x] Nonsmoker - Quit Date:               [] Never a smoker      No chief complaint on file. Cancer Staging  No matching staging information was found for the patient. Prior Radiation Therapy? No   If yes, site treated:   Facility:                             Date:    Concurrent Chemo/radiation? No   If yes, start date:    Prior Chemotherapy? No   If yes    Facility:                             Date:    Prior Hormonal Therapy or Contraceptive Medications? Yes   If yes,  Medication: Lupron injection x2- 6 months apart                               Duration:    Head and Neck Cancer? No   If yes, please remind physician to place swallow study order and speech therapy order. Pacemaker/Defibulator/ICD:  No    Do you have any musculoskeletal diseases, Lupus or arthritic conditions? No   If yes, are you currently taking Methotrexate? []  Yes  [x]  No           BREAST/GYN  Patient only: N/A    LMP:    Age at first Menses:    Para:    :    Cup size:        PROSTATE patient only :    Oral hormone replacement therapy []  Yes  [x]  No            Current Outpatient Medications   Medication Sig Dispense Refill    venlafaxine (EFFEXOR XR) 37.5 MG extended release capsule Take 1 capsule by mouth daily 30 capsule 3    Continuous Blood Gluc Transmit (DEXCOM G6 TRANSMITTER) MISC Use in combination with new sensor every 10 days.  Transmitter lasts 3 months 1 each 3    Continuous Blood Gluc Sensor (DEXCOM G6 SENSOR) MISC Apply new sensor to abdomen every 10 days 3 each 11    Continuous Blood Gluc  (DEXCOM G6 ) MAMADOU Use as directed to monitor glucose continuously 1 each 1    ferrous sulfate (IRON 325) 325 (65 Fe) MG tablet Take 325 mg by mouth daily (with breakfast)      zinc 50 MG CAPS Take 1 capsule by mouth daily      aspirin EC 81 MG EC tablet Take 1 tablet by mouth daily 90 tablet 1    atorvastatin (LIPITOR) 40 MG tablet Take 1 tablet by mouth daily 30 tablet 3    empagliflozin (JARDIANCE) 25 MG tablet Take 25 mg by mouth daily 30 tablet 1    insulin glargine (LANTUS SOLOSTAR) 100 UNIT/ML injection pen Inject 42 Units into the skin 2 times daily 5 pen 3    Insulin Pen Needle (B-D ULTRAFINE III SHORT PEN) 31G X 8 MM MISC 1 each by Does not apply route daily 100 each 3    metoprolol tartrate (LOPRESSOR) 25 MG tablet Take 0.5 tablets by mouth 2 times daily 30 tablet 0    vitamin D3 (CHOLECALCIFEROL) 25 MCG (1000 UT) TABS tablet Take 1 tablet by mouth daily 90 tablet 1    Dulaglutide (TRULICITY) 3 PY/5.5RR SOPN Inject 3 mg into the skin once a week 1 pen 3    insulin lispro, 1 Unit Dial, (HUMALOG KWIKPEN) 100 UNIT/ML SOPN Inject 6 Units into the skin 3 times daily (before meals) 3 pen 2    metFORMIN (GLUCOPHAGE) 500 MG tablet Take 2 tablets by mouth 2 times daily (with meals) 60 tablet 1    ciprofloxacin (CIPRO) 500 MG tablet Take 1 tablet by mouth 2 times daily 60 tablet 3    doxycycline hyclate (VIBRA-TABS) 100 MG tablet Take 1 tablet by mouth 2 times daily for 14 days 28 tablet 0    pregabalin (LYRICA) 75 MG capsule Take 1 capsule by mouth 2 times daily for 30 days. 60 capsule 1    spironolactone (ALDACTONE) 25 MG tablet Take 1 tablet by mouth daily. 30 tablet 2     No current facility-administered medications for this encounter.        Past Medical History:   Diagnosis Date    Arthritis     DM (diabetes mellitus) ([facility] [address] 2009    IDDM    GERD (gastroesophageal reflux disease) 2017    ON RX    Heart murmur 2013    ASYMPTOMATIC    HTN (hypertension) 2012    ON RX    Hyperlipidemia 2012    ON RX    Sleep apnea 2016    TRIED MACHINE COULD NOT TOLERATE    Vision abnormalities 2019    FLOATERS RIGHT EYE    Wears glasses        Past Surgical History:   Procedure Laterality Date    COLONOSCOPY  2010    Dr. Katia Azar Bilateral 2017    CATARACT EXTRACTION WITH IOL    KNEE ARTHROSCOPY      VASECTOMY  1999    VITRECTOMY Right 2019    VITRECTOMY Right 3/28/2019    VITRECTOMY 25 GAUGE,  IOL REPOSITION  (Alessio Del Real) performed by Rosa Elena Vega MD at [place] History   Problem Relation Age of Onset    Diabetes Sister     Diabetes Brother     Cancer Brother        Social History     Socioeconomic History    Marital status: Single     Spouse name: Not on file    Number of children: Not on file    Years of education: Not on file    Highest education level: Not on file   Occupational History    Not on file   Tobacco Use    Smoking status: Former Smoker     Packs/day: 0.00     Years: 15.00     Pack years: 0.00     Quit date: 3/27/2016     Years since quittin.0    Smokeless tobacco: Never Used   Vaping Use    Vaping Use: Never used   Substance and Sexual Activity    Alcohol use: Yes     Comment: etoh abuse. BEER, WINE  12 A WEEK [facility] [place]    Drug use: No    Sexual activity: Yes   Other Topics Concern    Not on file   Social History Narrative    Not on file     Social Determinants of Health     Financial Resource Strain:     Difficulty of Paying Living Expenses: Not on file   Food Insecurity:     Worried About [address] in the Last Year: Not on file    Ran Out of Food in the Last Year: Not on file   Transportation Needs:     Lack of Transportation (Medical): Not on file    Lack of Transportation (Non-Medical):  Not on file   Physical Activity:     Days of Exercise per Week: Not on file    Minutes of Exercise per Session: Not on file   Stress:     Feeling of Stress : Not on file   Social Connections:     Frequency of Communication with Friends and Family: Not on file    Frequency of Social Gatherings with Friends and Family: Not on file    Attends Alevism Services: Not on file    Active Member of Clubs or Organizations: Not on file    Attends Club or Organization Meetings: Not on file    Marital Status: Not on file   Intimate Partner Violence:     Fear of Current or Ex-Partner: Not on file    Emotionally Abused: Not on file    Physically Abused: Not on file    Sexually Abused: Not on file   Housing Stability:     Unable to Pay for Housing in the Last Year: Not on file    Number of [place] in the Last Year: Not on file    Unstable Housing in the Last Year: Not on file             FALLS RISK SCREEN  Instructions:  Assess the patient and enter the appropriate indicators that are present for fall risk identification. Total the numbers entered and assign a fall risk score from Table 2.  Reassess patient at a minimum every 12 weeks or with status change. Assessment   Date  4/20/2022     1. Mental Ability: confusion/cognitively impaired 0     2. Elimination Issues: incontinence, frequency 0       3. Ambulatory: use of assistive devices (walker, cane, off-loading devices),        attached to equipment (IV pole, oxygen) 0     4. Sensory Limitations: dizziness, vertigo, impaired vision 0     5. Age less than 65        0     6. Age 72 or greater 0     7. Medication: diuretics, strong analgesics, hypnotics, sedatives,        antihypertensive agents 0   8. Falls:  recent history of falls within the last 3 months (not to include slipping or        tripping) 0   TOTAL 0    If score of 4 or greater was education given? No       TABLE 2   Risk Score Risk Level Plan of Care   0-3 Little or  No Risk 1. Provide assistance as indicated for ambulation activities  2. Reorient confused/cognitively impaired patient  3. Call-light/bell within patient's reach  4. Chair/bed in low position, stretcher/bed with siderails up except when performing patient care activities  5. Educate patient/family/caregiver on falls prevention  6.  Reassess in 12 weeks or with any noted change in patient condition which places them at a risk for a fall   4-6 Moderate Risk 1. Provide assistance as indicated for ambulation activities  2. Reorient confused/cognitively impaired patient  3. Call-light/bell within patient's reach  4. Chair/bed in low position, stretcher/bed with siderails up except when performing patient care activities  5. Educate patient/family/caregiver on falls prevention     7 or   Higher High Risk 1. Place patient in easily observable treatment room  2. Patient attended at all times by family member or staff  3. Provide assistance as indicated for ambulation activities  4. Reorient confused/cognitively impaired patient  5. Call-light/bell within patient's reach  6. Chair/bed in low position, stretcher/bed with siderails up except when performing patient care activities  7. Educate patient/family/caregiver on falls prevention             Assessment/Plan: Patient was seen today for consultation. History of prostate cancer with treatment delays due to post-op hip infection and insurance changes. AUA assessment form completed by patient. No recent PSA. Dr. Humberto Crowell examined patient and discussed treatment plan. Informed consent process completed by Dr. Humberto Crowell for radiation therapy. Patient provided with fully executed copy of informed consent. Patient will have MRI of prostate and return post MRI for follow-up to review results, teaching and simulation visit.         Sarah Beth Nguyen RN 4/20/2022 8:47 AM

## 2022-04-20 NOTE — ADDENDUM NOTE
Encounter addended by:  Aron Hall MD on: 4/20/2022 12:40 PM   Actions taken: Visit diagnoses modified

## 2022-04-20 NOTE — CONSULTS
[facility]     [address] [address], \[facility] [place] [facility]\""[address]   Lesly Patterson: [phone]  F: [phone]  [email or web address]          RADIATION ONCOLOGY NEW PATIENT VISIT:    Patient: Sukhwinder Iqbal   YOB: 1960   MRN:  [id number]     Date of Service: 4/20/2022    Referring Provider for today's consult: Mart Mario MD    CHIEF COMPLAINT: \"prostate cancer\"    DIAGNOSIS: Sukhwinder Iqbal is a 57 y.o. 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 Plan was to treat prostate with definitive radiation around August of 2021, but patient had a septic joint from a right total hip replacement done in 2019, with multiple revision surgeries and I&Ds to the hip. He is currently followed by infectious diseases and is on chronic antibiotics. HISTORY OF PRESENT ILLNESS:     Sukhwinder Iqbal is a 57 y.o. 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 Plan was to treat prostate with definitive radiation around August of 2021, but patient had a septic joint from a right total hip replacement done in 2019, with multiple revision surgeries and I&Ds to the hip. He is currently followed by infectious diseases and is on chronic antibiotics. Patient had a change in insurance and is establishing with specialists in the [facility] system. He was seen by Dr. Elvis De La Cruz and was subsequently referred here to discuss initiating radiation treatment for his prostate cancer at this time. 3/30/2021 Prostate Biopsy    Final Pathologic Diagnosis   1.  Prostate, LLA, core biopsy:        Prostatic tissue, no tumor present. 2. Posterior the, LLB, core biopsy:        Prostatic tissue, no tumor present. 3. Prostate, LLM, core biopsy:        Prostatic tissue, no tumor present. 4. Prostate, LB, core biopsy:        Prostatic tissue, no tumor present. 5. Prostate, LM, core biopsy:        Prostatic tissue, no tumor present. 6. Prostate, LA, core biopsy:        Prostatic tissue, no tumor present. 7. Prostate, RB, core biopsy:        Prostatic tissue, no tumor present. 8. Posterior the, RM, core biopsy:        Prostatic tissue, no tumor present. 9. Prostate, RA, core biopsy:        Prostatic tissue, no tumor present. 10. Prostate, RLB, core biopsy:        Prostatic adenocarcinoma, Octavio score 3+3 = 6 (grade group 1), involving one of three tissue core fragments.        Proportion of prostatic tissue involved by tumor: 4%.        Total linear millimeters of the carcinoma: 1.0 mm.        Total linear millimeters of needle core tissue:28.0 mm. 11. Prostate, RLM, core biopsy:        Prostatic adenocarcinoma, Salisbury score 4+3 = 7 (grade group 3), involving one of three tissue core fragments.        Proportion of prostatic tissue involved by tumor: 21%.      Total linear millimeters of the carcinoma: 3.0 mm.        Total linear millimeters of needle core tissue:14.0 mm. 12. Prostate, RLA, core biopsy:        Prostatic tissue, no tumor present. PSAs:    Component 02/16/22 12/13/21 12/11/20 04/22/20 10/22/19 07/18/19   PSA 1.84 2.57 5.11 High  4.49 High  4.28 High  3.86     Current symptoms:    Patient's urination is ok and main issue is frequency and nocturia. He is on Flomax. His AUA score is 13. He has normal bowel movements. In regard to his hip, he denies any pain or drainage at the surgical site. He denies any fevers.      PRIOR RADIATION HISTORY:  No prior history of radiation therapy    PACEMAKER: None    PAST MEDICAL HISTORY:    Noble Jorgensen  has a past medical history of Arthritis, DM (diabetes mellitus) ([facility] [address] (2009), GERD (gastroesophageal reflux disease) (2017), Heart murmur (2013), HTN (hypertension) (6/29/2012), Hyperlipidemia (06/29/2012), Sleep apnea (2016), Vision abnormalities (2019), and Wears glasses. PAST SURGICAL HISTORY:    Ed Medeiros  has a past surgical history that includes Colonoscopy (07/23/2010); Knee arthroscopy; eye surgery (Bilateral, 2017); Vasectomy (1999); vitrectomy (Right, 03/28/2019); and vitrectomy (Right, 3/28/2019). MEDICATIONS:  Current Outpatient Medications on File Prior to Encounter   Medication Sig Dispense Refill    venlafaxine (EFFEXOR XR) 37.5 MG extended release capsule Take 1 capsule by mouth daily 30 capsule 3    Continuous Blood Gluc Transmit (DEXCOM G6 TRANSMITTER) MISC Use in combination with new sensor every 10 days.  Transmitter lasts 3 months 1 each 3    Continuous Blood Gluc Sensor (DEXCOM G6 SENSOR) MISC Apply new sensor to abdomen every 10 days 3 each 11    Continuous Blood Gluc  (DEXCOM G6 ) MAMADOU Use as directed to monitor glucose continuously 1 each 1    ferrous sulfate (IRON 325) 325 (65 Fe) MG tablet Take 325 mg by mouth daily (with breakfast)      zinc 50 MG CAPS Take 1 capsule by mouth daily      aspirin EC 81 MG EC tablet Take 1 tablet by mouth daily 90 tablet 1    atorvastatin (LIPITOR) 40 MG tablet Take 1 tablet by mouth daily 30 tablet 3    empagliflozin (JARDIANCE) 25 MG tablet Take 25 mg by mouth daily 30 tablet 1    insulin glargine (LANTUS SOLOSTAR) 100 UNIT/ML injection pen Inject 42 Units into the skin 2 times daily 5 pen 3    Insulin Pen Needle (B-D ULTRAFINE III SHORT PEN) 31G X 8 MM MISC 1 each by Does not apply route daily 100 each 3    metoprolol tartrate (LOPRESSOR) 25 MG tablet Take 0.5 tablets by mouth 2 times daily 30 tablet 0    vitamin D3 (CHOLECALCIFEROL) 25 MCG (1000 UT) TABS tablet Take 1 tablet by mouth daily 90 tablet 1    Dulaglutide (TRULICITY) 3 EE/8.6TN SOPN Inject 3 mg into the skin once a week 1 pen 3    insulin lispro, 1 Unit Dial, (HUMALOG KWIKPEN) 100 UNIT/ML SOPN Inject 6 Units into the skin 3 times daily (before meals) 3 pen 2    metFORMIN (GLUCOPHAGE) 500 MG tablet Take 2 tablets by mouth 2 times daily (with meals) 60 tablet 1    ciprofloxacin (CIPRO) 500 MG tablet Take 1 tablet by mouth 2 times daily 60 tablet 3    doxycycline hyclate (VIBRA-TABS) 100 MG tablet Take 1 tablet by mouth 2 times daily for 14 days 28 tablet 0    pregabalin (LYRICA) 75 MG capsule Take 1 capsule by mouth 2 times daily for 30 days. 60 capsule 1    spironolactone (ALDACTONE) 25 MG tablet Take 1 tablet by mouth daily. 30 tablet 2     No current facility-administered medications on file prior to encounter. ALLERGIES:   Lisinopril, Melatonin, and Trazodone    SOCIAL HISTORY:  Sukhwinder Iqbal  reports that he quit smoking about 6 years ago. He smoked 0.00 packs per day for 15.00 years. He has never used smokeless tobacco. He reports current alcohol use. He reports that he does not use drugs. FAMILY HISTORY:  Sukhwinder Iqbal family history includes Cancer in his brother; Diabetes in his brother and sister. PHYSICAL EXAMINATION:    CHAPERONE: Family/friend/ present  ECO Asymptomatic  /73   Pulse 54   Temp 98 °F (36.7 °C) (Temporal)   Resp 16   Wt 270 lb 9.6 oz (122.7 kg)   BMI 34.74 kg/m²   CONSTITUTIONAL: Well developed, well nourished male. Alert and oriented x3. No acute distress. HEENT: Head normocephalic and atraumatic. Extraocular movements intact. NEUROLOGIC EXAM: Cranial nerves II through XII grossly intact. No focal neurologic deficit. Speech is fluent. Gait and posture are steady. PSYCHIATRIC:  Appropriate mood and affect for his clinical situation. Right hip:        ASSESSMENT AND PLAN:     --- I discussed at length with Mr. [place] Iqbal his diagnosis and the nature of intermediate-risk prostate adenocarcinoma.  This included how considerations such as PSA, Octavio score, the amount of cancer present on the biopsy report, clinical exam, among other things can influence recommendations. --- I explained different treatment strategies, including surgery versus radiation therapy, hormonal therapy, as well as differences of active surveillance and watchful waiting. This included pros and cons of the various options; balancing likelihood of successfully management vs possible side effects. --- I explained in detail the logistics of EBRT including the planning simulation followed by treatment for approximately 6 weeks. We discussed the potential early and late side effects of RT which include but are not limited to obstructive or irritative bladder symptoms, loose stools or diarrhea and fatigue. In the long term there is risk of bleeding from rectum or bladder and decreased sexual function. We also discussed the risk aggravating his right hip septic joint, as he is on antibiotics for the forseable future (as there are no further surgical plans to clear the infection). I explained that while he main remain on ADT to treat his prostate cancer, this is not curable. Other options include surgery however given his issues with his right hip replacement, he does not want to pursue this. I therefore recommended that we proceed with definitive radiation to his prostate, understanding that he is at risk of potentiating this infection. I advised him to follow-up with infectious diseases as well as his orthopedic surgeon to rule out any further surgery to his hip. Based on the available notes, there is no planned surgery to clear his right hip infection. --- Mr. Leann Mckee seemed to understand our conversation well. He asked very appropriate questions which were answered to the best of our ability and to their apparent satisfaction. --- He would like to proceed with definitive radiation treatment. Informed consent was obtained.    --- He will be schedule for a CT simulation, with an MRI of his prostate prior for treatment planning. He was instructed to arrive with a full bladder and an empty rectum for the simulation and daily radiation treatments. --- Lastly, I referred the patient to Urology to establish with a [facility].      Thank you for the referral and allowing me to participate in the care of this pleasant patient. Please do not hesitate to reach out @ [phone] if you would like to discuss the case further. Orders Placed:     Orders Placed This Encounter   Procedures   Heather Ontiveros MD, Urology, [place]   - CT simulation    CC:    Jodi Olivares MD    Total time spent on this case on the day of encounter is more than 80 minutes, with over 50% of this visit was spent on counseling and coordinating care. This time includes combination of one or more of the following - review of necessary tests, review of pertinent medical records from the EMR, performing medically appropriate examination and evaluation, counseling and educating the patient/family/caregiver, ordering necessary medical tests, procedures etc., documenting the clinical information in the electronic medical record, care coordination, referring and communicating with other health care providers and interpretation of results independently.

## 2022-04-20 NOTE — TELEPHONE ENCOUNTER
Dr Chani Dewtit referred Wilton Moritz to Dr Luigi Dimas.  I called his office to verify that they received referral.

## 2022-04-22 ENCOUNTER — HOSPITAL ENCOUNTER (OUTPATIENT)
Dept: NON INVASIVE DIAGNOSTICS | Age: 62
Discharge: HOME | End: 2022-04-22
Payer: MEDICARE

## 2022-04-22 DIAGNOSIS — I50.9 CONGESTIVE HEART FAILURE, UNSPECIFIED HF CHRONICITY, UNSPECIFIED HEART FAILURE TYPE (HCC): ICD-10-CM

## 2022-04-22 LAB
LV EF: 55 %
LVEF MODALITY: NORMAL

## 2022-04-22 PROCEDURE — 93306 TTE W/DOPPLER COMPLETE: CPT

## 2022-04-23 DIAGNOSIS — E11.69 TYPE 2 DIABETES MELLITUS WITH OTHER SPECIFIED COMPLICATION, WITH LONG-TERM CURRENT USE OF INSULIN (HCC): ICD-10-CM

## 2022-04-23 DIAGNOSIS — Z79.4 TYPE 2 DIABETES MELLITUS WITH OTHER SPECIFIED COMPLICATION, WITH LONG-TERM CURRENT USE OF INSULIN (HCC): ICD-10-CM

## 2022-04-25 ENCOUNTER — OFFICE VISIT (OUTPATIENT)
Dept: UROLOGY | Age: 62
End: 2022-04-25
Payer: MEDICARE

## 2022-04-25 VITALS
SYSTOLIC BLOOD PRESSURE: 132 MMHG | HEIGHT: 74 IN | TEMPERATURE: 97.3 F | DIASTOLIC BLOOD PRESSURE: 76 MMHG | HEART RATE: 59 BPM | RESPIRATION RATE: 16 BRPM | WEIGHT: 270 LBS | BODY MASS INDEX: 34.65 KG/M2

## 2022-04-25 DIAGNOSIS — R23.2 HOT FLASHES: ICD-10-CM

## 2022-04-25 DIAGNOSIS — C61 PROSTATE CANCER (HCC): Primary | ICD-10-CM

## 2022-04-25 DIAGNOSIS — R39.12 WEAK URINARY STREAM: ICD-10-CM

## 2022-04-25 PROCEDURE — 3017F COLORECTAL CA SCREEN DOC REV: CPT | Performed by: UROLOGY

## 2022-04-25 PROCEDURE — G8427 DOCREV CUR MEDS BY ELIG CLIN: HCPCS | Performed by: UROLOGY

## 2022-04-25 PROCEDURE — 99204 OFFICE O/P NEW MOD 45 MIN: CPT | Performed by: UROLOGY

## 2022-04-25 PROCEDURE — 1036F TOBACCO NON-USER: CPT | Performed by: UROLOGY

## 2022-04-25 PROCEDURE — G8417 CALC BMI ABV UP PARAM F/U: HCPCS | Performed by: UROLOGY

## 2022-04-25 RX ORDER — GLIPIZIDE 10 MG/1
TABLET ORAL
Qty: 60 TABLET | Refills: 0 | OUTPATIENT
Start: 2022-04-25

## 2022-04-25 ASSESSMENT — ENCOUNTER SYMPTOMS
SHORTNESS OF BREATH: 0
NAUSEA: 0
WHEEZING: 0
DIARRHEA: 0
CONSTIPATION: 0
BACK PAIN: 0
EYE PAIN: 0
ABDOMINAL PAIN: 0
VOMITING: 0
COUGH: 0

## 2022-04-25 NOTE — TELEPHONE ENCOUNTER
E-scribe request for med refills. Please review and e-scribe if applicable. Last Visit Date:4/12/22    Next Visit Date:  5/3/2022    Hemoglobin A1C (%)   Date Value   04/12/2022 8.3   03/28/2022 8.3   08/15/2013 7.8 (H)             ( goal A1C is < 7)   Microalb/Crt.  Ratio (mcg/mg creat)   Date Value   04/13/2022 Can not be calculated     LDL Cholesterol (mg/dL)   Date Value   04/12/2022 52       (goal LDL is <100)   AST (U/L)   Date Value   03/14/2022 17     ALT (U/L)   Date Value   03/14/2022 16     BUN (mg/dL)   Date Value   04/12/2022 20     BP Readings from Last 3 Encounters:   04/20/22 132/73   04/15/22 118/84   04/12/22 111/74          (goal 120/80)        Patient Active Problem List:     DM (diabetes mellitus) ([address]     HTN (hypertension)     ETOHism (HCC)     Hypertensive urgency     Abnormal ambulatory electrocardiogram     Abnormal ambulatory electrocardiography     Abnormal kidney function     Adiposity     Astigmatism     Atherosclerotic heart disease of native coronary artery without angina pectoris     Cervical radiculopathy     Chronic back pain     Decreased cardiac output     Diabetic peripheral neuropathy (HCC)     Dislocated intraocular lens     Disturbance in sleep behavior     Dizziness     Dyssomnia     Floaters     Impotence of organic origin     Left ventricular dysfunction     Low vision, both eyes     Myopia     Nuclear sclerosis of left eye     Obstructive sleep apnea syndrome     Palpitations     Personal history of other diseases of the digestive system     Posterior vitreous detachment     Presbyopia     Primary osteoarthritis of right hip     Pseudophakia of right eye     Repetitive intrusions of sleep     Sciatica     Tendonitis     Vitamin D deficiency     Vitreous opacities of right eye     Dislocated IOL (intraocular lens), anterior, right     Prostate CA ([address]     Congestive heart failure ([address]     Essential hypertension     Need for hepatitis C screening test     Encounter for screening for HIV      ----Sonu Gama

## 2022-04-25 NOTE — PROGRESS NOTES
Review of Systems   Constitutional: Negative for appetite change, chills, fatigue and fever. Eyes: Negative for pain and visual disturbance. Respiratory: Negative for cough, shortness of breath and wheezing. Cardiovascular: Negative for chest pain and leg swelling. Gastrointestinal: Negative for abdominal pain, constipation, diarrhea, nausea and vomiting. Genitourinary: Negative for difficulty urinating, dysuria, frequency, hematuria, penile pain and testicular pain. Musculoskeletal: Negative for back pain and myalgias. Neurological: Negative for dizziness, tremors, weakness, light-headedness, numbness and headaches. Hematological: Negative for adenopathy. Does not bruise/bleed easily.    Patient states spartic urination form time to time

## 2022-04-25 NOTE — PROGRESS NOTES
[address] [address]  Dept: [phone]  Dept Fax: [phone] [facility] Urology Office Note - New patient    Patient:  Sukhwinder Iqbal  YOB: 1960  Date: 4/25/2022    The patient is a 57 y.o. male who presents todayfor evaluation of the following problems:   Chief Complaint   Patient presents with    New Patient     prostate cancer      referred by Dora Castro MD.      HPI  78-year-old gentleman who has a history of prostate cancer. He was diagnosed by Dr. Le Nielsen about 1 year ago. He switched insurance plans and can no longer see Dr. Le Nielsen. He had been worked up for external beam radiation therapy and has been on androgen deprivation therapy for 9 months. Unfortunately, he developed a septic hip joint and required 6 exploratory operations including debridements and incision and drainage for these infections. This put his radiation on hold. He is currently being evaluated by [facility] radiation oncology. However, he needs clearance from orthopedic surgery before proceeding with radiation. He does take Flomax for voiding dysfunction. This seems to help his symptoms to his satisfaction. (Patient's old records have been requested, reviewed and summarized in today's note.)    Summary of old records: N/A    Additional History: N/A    Procedures Today: N/A    Last several PSA's:  Lab Results   Component Value Date    PSA 1.75 07/01/2012     Last total testosterone:  No results found for: TESTOSTERONE  Urinalysis today:  No results found for this visit on 04/25/22. AUA Symptom Score (4/25/2022):   INCOMPLETE EMPTYING: How often have you had the sensation of not emptying your bladder?: Not at all  FREQUENCY: How often do you have to urinate less than every two hours?: Not at all  INTERMITTENCY: How often have you found you stopped and started again several times when you urinated?: About Half the time  URGENCY: How often have you found it difficult to postpone urination?: Not at all  WEAK STREAM: How often have you had a weak urinary stream?: Not at all  STRAINING: How often have you had to strain to start  urination?: About Half the time  NOCTURIA: How many times did you typically get up at night to uriniate?: 3 Times  TOTAL I-PSS SCORE[de-identified] 9       Last BUN and creatinine:  Lab Results   Component Value Date    BUN 20 04/12/2022     Lab Results   Component Value Date    CREATININE 1.31 (H) 04/12/2022       Additional Lab/Culture results: none    Imaging Reviewed during this Office Visit: none  (results were independently reviewed by physician and radiology report verified)    PAST MEDICAL, FAMILY AND SOCIAL HISTORY:  Past Medical History:   Diagnosis Date    Arthritis     DM (diabetes mellitus) ([facility] [address] 2009    IDDM    GERD (gastroesophageal reflux disease) 2017    ON RX    Heart murmur 2013    ASYMPTOMATIC    HTN (hypertension) 6/29/2012    ON RX    Hyperlipidemia 06/29/2012    ON RX    Sleep apnea 2016    TRIED MACHINE COULD NOT TOLERATE    Vision abnormalities 2019    FLOATERS RIGHT EYE    Wears glasses      Past Surgical History:   Procedure Laterality Date    COLONOSCOPY  07/23/2010    Dr. Clem Walden Bilateral 2017    CATARACT EXTRACTION WITH IOL    KNEE ARTHROSCOPY      VASECTOMY  1999    VITRECTOMY Right 03/28/2019    VITRECTOMY Right 3/28/2019    VITRECTOMY 25 GAUGE,  IOL REPOSITION  (Abelardo Sweeney) performed by Mare Velazquez MD at [address] [place] [address] History   Problem Relation Age of Onset    Diabetes Sister     Diabetes Brother     Cancer Brother      Outpatient Medications Marked as Taking for the 4/25/22 encounter (Office Visit) with Maite Whiteside MD   Medication Sig Dispense Refill    venlafaxine (EFFEXOR XR) 37.5 MG extended release capsule Take 1 capsule by mouth daily 30 capsule 3    Continuous Blood Gluc Transmit (DEXCOM G6 TRANSMITTER) MISC Use in combination with new sensor every 10 days. Transmitter lasts 3 months 1 each 3    Continuous Blood Gluc Sensor (DEXCOM G6 SENSOR) MISC Apply new sensor to abdomen every 10 days 3 each 11    Continuous Blood Gluc  (DEXCOM G6 ) MAMADOU Use as directed to monitor glucose continuously 1 each 1    ferrous sulfate (IRON 325) 325 (65 Fe) MG tablet Take 325 mg by mouth daily (with breakfast)      zinc 50 MG CAPS Take 1 capsule by mouth daily      aspirin EC 81 MG EC tablet Take 1 tablet by mouth daily 90 tablet 1    atorvastatin (LIPITOR) 40 MG tablet Take 1 tablet by mouth daily 30 tablet 3    empagliflozin (JARDIANCE) 25 MG tablet Take 25 mg by mouth daily 30 tablet 1    insulin glargine (LANTUS SOLOSTAR) 100 UNIT/ML injection pen Inject 42 Units into the skin 2 times daily 5 pen 3    Insulin Pen Needle (B-D ULTRAFINE III SHORT PEN) 31G X 8 MM MISC 1 each by Does not apply route daily 100 each 3    metoprolol tartrate (LOPRESSOR) 25 MG tablet Take 0.5 tablets by mouth 2 times daily 30 tablet 0    vitamin D3 (CHOLECALCIFEROL) 25 MCG (1000 UT) TABS tablet Take 1 tablet by mouth daily 90 tablet 1    Dulaglutide (TRULICITY) 3 FV/8.0HB SOPN Inject 3 mg into the skin once a week 1 pen 3    insulin lispro, 1 Unit Dial, (HUMALOG KWIKPEN) 100 UNIT/ML SOPN Inject 6 Units into the skin 3 times daily (before meals) 3 pen 2    metFORMIN (GLUCOPHAGE) 500 MG tablet Take 2 tablets by mouth 2 times daily (with meals) 60 tablet 1    ciprofloxacin (CIPRO) 500 MG tablet Take 1 tablet by mouth 2 times daily 60 tablet 3    pregabalin (LYRICA) 75 MG capsule Take 1 capsule by mouth 2 times daily for 30 days. 60 capsule 1    spironolactone (ALDACTONE) 25 MG tablet Take 1 tablet by mouth daily.  30 tablet 2        Lisinopril, Melatonin, and Trazodone  Social History     Tobacco Use   Smoking Status Former Smoker    Packs/day: 0.00    Years: 15.00    Pack years: 0.00    Quit date: 3/27/2016  Years since quittin.0   Smokeless Tobacco Never Used      (If patient a smoker, smoking cessation counseling offered)   Social History     Substance and Sexual Activity   Alcohol Use Yes    Comment: etoh abuse. BEER, WINE  12 A WEEK emma       REVIEW OF SYSTEMS:  Review of Systems    Physical Exam:    This a 57 y.o. male   Vitals:    22 1400   BP: 132/76   Pulse: 59   Resp: 16   Temp: 97.3 °F (36.3 °C)     Body mass index is 34.67 kg/m². Physical Exam  Constitutional: Patient in no acute distress. Neuro: Alert and oriented to person, place and time. Psych: Mood normal, affect normal  Skin: No rash noted  HEENT: Head: Normocephalic and atraumatic  Conjunctivae and EOM are normal. Pupils are equal, round  Nose: Normal  Right External Ear: Normal; Left External Ear: Normal  Mouth: Mucosa Moist  Neck: Supple  Lungs:Respiratory effort is normal  Cardiovascular: Warm & Pink  Abdomen: Soft, non-tender, non-distendedwith no CVA,  No flank tenderness,  Orhepatosplenomegaly   Lymphatics: No palpable lymphadenopathy. Bladder non-tender and not distended. Musculoskeletal: Normal gait and station  Penis normal and circumcised  Urethral meatus normal  Scrotal exam normal  Testicles normal bilaterally  Epididymis normal bilaterally  No evidence of inguinal hernia  Normal rectal tone with no masses        Assessment and Plan      1. Prostate cancer ([address]    2. Weak urinary stream    3. Hot flashes           Plan: Will need clearance from Dr. Rico lopez to proceed with XRT  Will hold off on additional ADT for now (pt has had two 6 mo injections of Lupron)  XRT pending  F/u 3 mo PSA       Prescriptions Ordered:  No orders of the defined types were placed in this encounter. Orders Placed:  Orders Placed This Encounter   Procedures    PSA, Diagnostic     Standing Status:   Future     Standing Expiration Date:   2023             Kate Ayala MD    Agree with the ROS entered by the MA.

## 2022-04-26 ENCOUNTER — OFFICE VISIT (OUTPATIENT)
Dept: PODIATRY | Age: 62
End: 2022-04-26
Payer: MEDICARE

## 2022-04-26 VITALS — HEIGHT: 74 IN | RESPIRATION RATE: 16 BRPM | BODY MASS INDEX: 33.88 KG/M2 | WEIGHT: 264 LBS

## 2022-04-26 DIAGNOSIS — I73.9 INTERMITTENT CLAUDICATION (HCC): Primary | ICD-10-CM

## 2022-04-26 PROCEDURE — 3017F COLORECTAL CA SCREEN DOC REV: CPT | Performed by: PODIATRIST

## 2022-04-26 PROCEDURE — G8417 CALC BMI ABV UP PARAM F/U: HCPCS | Performed by: PODIATRIST

## 2022-04-26 PROCEDURE — 99203 OFFICE O/P NEW LOW 30 MIN: CPT | Performed by: PODIATRIST

## 2022-04-26 PROCEDURE — G8427 DOCREV CUR MEDS BY ELIG CLIN: HCPCS | Performed by: PODIATRIST

## 2022-04-26 PROCEDURE — 1036F TOBACCO NON-USER: CPT | Performed by: PODIATRIST

## 2022-04-26 RX ORDER — AMOXICILLIN 500 MG/1
TABLET, FILM COATED ORAL
COMMUNITY
Start: 2022-03-28 | End: 2022-06-08 | Stop reason: ALTCHOICE

## 2022-04-26 NOTE — PROGRESS NOTES
[address] [facility] PODIATRY [facility]  [address] [address] [place] [address]  Dept: [phone]  Dept Fax: [phone]    NEW PATIENT PROGRESS NOTE  Date of patient's visit: 4/26/2022  Patient's Name:  Juan Salgado YOB: 1960            Patient Care Team:  Herbie Dietrich MD as PCP - General (Emergency Medicine)  Jerry Rosas RN as Nurse Navigator (Oncology)  Jolanta Barragan MD as Consulting Physician (Infectious Diseases)  Roma Jorge MD as Consulting Physician (Radiation Oncology)  Laura Barkley DPM as Physician (Podiatry)        Chief Complaint   Patient presents with    New Patient     to establish care    Diabetes    Peripheral Neuropathy    Foot Pain     left foot pain after hip sx          HPI:   Juan Salgado is a 57 y.o. male who presents to the office today complaining of foot pain, numbness and cold. Symptoms began 2 year(s) ago. Patient relates pain is Present. Pain is rated 10 out of 10 and is described as constant. Treatments prior to today's visit include: previous EMG . Currently denies F/C/N/V. Pt's primary care physician is Herbie Dietrich MD last seen 04/12/2022     Allergies   Allergen Reactions    Lisinopril Swelling     Outer Neck swelling    Melatonin Swelling    Trazodone Swelling     Chest swells       Past Medical History:   Diagnosis Date    Arthritis     DM (diabetes mellitus) ([facility] [address] 2009    IDDM    GERD (gastroesophageal reflux disease) 2017    ON RX    Heart murmur 2013    ASYMPTOMATIC    HTN (hypertension) 6/29/2012    ON RX    Hyperlipidemia 06/29/2012    ON RX    Sleep apnea 2016    TRIED MACHINE COULD NOT TOLERATE    Vision abnormalities 2019    FLOATERS RIGHT EYE    Wears glasses        Prior to Admission medications    Medication Sig Start Date End Date Taking?  Authorizing Provider   Amoxicillin 500 MG TABS take 4 tablets by mouth 1 hour prior to dental appointment 3/28/22  Yes Historical Provider, MD   venlafaxine (EFFEXOR XR) 37.5 MG extended release capsule Take 1 capsule by mouth daily 4/15/22  Yes Mathew Dela Cruz MD   Continuous Blood Gluc Transmit (DEXCOM G6 TRANSMITTER) MISC Use in combination with new sensor every 10 days.  Transmitter lasts 3 months 4/14/22  Yes [place] Better, DO   Continuous Blood Gluc Sensor (DEXCOM G6 SENSOR) MISC Apply new sensor to abdomen every 10 days 4/14/22  Yes [place] Better, DO   Continuous Blood Gluc  ([address]) MAMADOU Use as directed to monitor glucose continuously 4/14/22  Yes Neal Better, DO   ferrous sulfate (IRON 325) 325 (65 Fe) MG tablet Take 325 mg by mouth daily (with breakfast)   Yes Historical Provider, MD   zinc 50 MG CAPS Take 1 capsule by mouth daily   Yes Historical Provider, MD   aspirin EC 81 MG EC tablet Take 1 tablet by mouth daily 4/12/22  Yes Bernardo Slade MD   atorvastatin (LIPITOR) 40 MG tablet Take 1 tablet by mouth daily 4/12/22  Yes Bernardo Slade MD   empagliflozin (JARDIANCE) 25 MG tablet Take 25 mg by mouth daily 4/12/22  Yes Bernardo Slade MD   insulin glargine (LANTUS SOLOSTAR) 100 UNIT/ML injection pen Inject 42 Units into the skin 2 times daily 4/12/22  Yes Bernardo Slade MD   Insulin Pen Needle (B-D ULTRAFINE III SHORT PEN) 31G X 8 MM MISC 1 each by Does not apply route daily 4/12/22  Yes Bernardo Slade MD   metoprolol tartrate (LOPRESSOR) 25 MG tablet Take 0.5 tablets by mouth 2 times daily 4/12/22  Yes Bernardo Slade MD   vitamin D3 (CHOLECALCIFEROL) 25 MCG (1000 UT) TABS tablet Take 1 tablet by mouth daily 4/12/22  Yes Bernardo Slade MD   Dulaglutide (TRULICITY) 3 HA/7.3UE SOPN Inject 3 mg into the skin once a week 4/12/22  Yes Bernardo Slade MD   insulin lispro, 1 Unit Dial, (HUMALOG KWIKPEN) 100 UNIT/ML SOPN Inject 6 Units into the skin 3 times daily (before meals) 4/12/22  Yes Bernardo Perez [place], MD   metFORMIN (GLUCOPHAGE) 500 MG tablet Take 2 tablets by mouth 2 times daily (with meals) 22  Yes Bernardo Strong MD   ciprofloxacin (CIPRO) 500 MG tablet Take 1 tablet by mouth 2 times daily 22 Yes Jolanta Barragan MD   pregabalin (LYRICA) 75 MG capsule Take 1 capsule by mouth 2 times daily for 30 days. 3/28/22 4/27/22 Yes Bernardo Strong MD   spironolactone (ALDACTONE) 25 MG tablet Take 1 tablet by mouth daily. 13  Yes Ángel Garrett DO       Past Surgical History:   Procedure Laterality Date    COLONOSCOPY  2010    Dr. Leonardo Soulier Bilateral 2017    CATARACT EXTRACTION WITH IOL    KNEE ARTHROSCOPY      VASECTOMY  1999    VITRECTOMY Right 2019    VITRECTOMY Right 3/28/2019    VITRECTOMY 25 GAUGE,  IOL REPOSITION  (Fatoumata Stone) performed by Benton Mondragon MD at [place] History   Problem Relation Age of Onset    Diabetes Sister     Diabetes Brother     Cancer Brother        Social History     Tobacco Use    Smoking status: Former Smoker     Packs/day: 0.00     Years: 15.00     Pack years: 0.00     Quit date: 3/27/2016     Years since quittin.0    Smokeless tobacco: Never Used   Substance Use Topics    Alcohol use: Yes     Comment: etoh abuse. BEER, WINE  12 A WEEK emma       Review of Systems    Review of Systems:   History obtained from chart review and the patient  General ROS: negative for - chills, fatigue, fever, night sweats or weight gain  Constitutional: Negative for chills, diaphoresis, fatigue, fever and unexpected weight change. Musculoskeletal: Positive for arthralgias, gait problem and joint swelling. Neurological ROS: negative for - behavioral changes, confusion, headaches or seizures. Negative for weakness and numbness. Dermatological ROS: negative for - mole changes, rash  Cardiovascular: Negative for leg swelling. Gastrointestinal: Negative for constipation, diarrhea, nausea and vomiting.                Lower Extremity Physical Examination: Vitals:   Vitals:    04/26/22 1045   Resp: 16     General: AAO x 3 in NAD. Dermatologic Exam:  Skin lesion/ulceration Absent . Skin No rashes or nodules noted. .       Musculoskeletal:     1st MPJ ROM decreased, Bilateral.  Muscle strength 5/5, Bilateral.  Pain present upon palpation of right and left foot along the entire leg. .  Medial longitudinal arch, Bilateral WNL. Ankle ROM WNL,Bilateral.    Dorsally contracted digits absent digits 1-5 Bilateral.     Vascular: DP and PT pulses palpable 2/4, Bilateral.  CFT <3 seconds, Bilateral.  Hair growth present to the level of the digits, Bilateral.  Edema absent, Bilateral.  Varicosities absent, Bilateral. Erythema absent, Bilateral    Neurological: Sensation intact to light touch to level of digits, Bilateral.  Protective sensation intact 10/10 sites via 5.07/10g [place]-Stephen Monofilament, Bilateral.  negative Tinel's, Bilateral.  negative Valleix sign, Bilateral.      Integument: Warm, dry, supple, Bilateral.  Open lesion absent, Bilateral.  Interdigital maceration absent to web spaces 1-4, Bilateral.  Nails are normal in length, thickness and color 1-5 bilateral.  Fissures absent, Bilateral.       Asessment: Patient is a 57 y.o. male with:    Diagnosis Orders   1. Intermittent claudication (HCC)  VL LOWER EXTREMITY ARTERIAL SEGMENTAL PRESSURES W PPG         Plan: Patient examined and evaluated. Current condition and treatment options discussed in detail. Discussed conservative and surgical options with the patient. Advised pt to his conditon. I am ordering non invasive vasuclar studies the patients lower legs are extremely cold and discolored with cyanosis. The patient has decreased hair growth to the lower extremites with a delayed capillary refill time. This test is necessary to see if vascular surgery involvement is necessary and would relieve patient of their painful symptoms. .  Verbal and written instructions given to patient.  Contact office with any questions/problems/concerns. RTC in 1week(s) after results .     4/26/2022    Electronically signed by Grecia Yee DPM on 4/26/2022 at 10:48 AM  4/26/2022

## 2022-04-27 ENCOUNTER — HOSPITAL ENCOUNTER (OUTPATIENT)
Dept: MRI IMAGING | Age: 62
Discharge: HOME | End: 2022-04-29
Payer: MEDICARE

## 2022-04-27 ENCOUNTER — OFFICE VISIT (OUTPATIENT)
Dept: ORTHOPEDIC SURGERY | Age: 62
End: 2022-04-27
Payer: MEDICARE

## 2022-04-27 ENCOUNTER — TELEPHONE (OUTPATIENT)
Dept: ONCOLOGY | Age: 62
End: 2022-04-27

## 2022-04-27 VITALS — HEIGHT: 74 IN | WEIGHT: 267 LBS | BODY MASS INDEX: 34.27 KG/M2 | RESPIRATION RATE: 12 BRPM

## 2022-04-27 DIAGNOSIS — T84.50XD INFECTION OF PROSTHETIC JOINT, SUBSEQUENT ENCOUNTER: Primary | ICD-10-CM

## 2022-04-27 DIAGNOSIS — C61 MALIGNANT NEOPLASM OF PROSTATE (HCC): ICD-10-CM

## 2022-04-27 PROCEDURE — 2580000003 HC RX 258: Performed by: STUDENT IN AN ORGANIZED HEALTH CARE EDUCATION/TRAINING PROGRAM

## 2022-04-27 PROCEDURE — 72197 MRI PELVIS W/O & W/DYE: CPT

## 2022-04-27 PROCEDURE — 99213 OFFICE O/P EST LOW 20 MIN: CPT | Performed by: ORTHOPAEDIC SURGERY

## 2022-04-27 PROCEDURE — G8417 CALC BMI ABV UP PARAM F/U: HCPCS | Performed by: ORTHOPAEDIC SURGERY

## 2022-04-27 PROCEDURE — 1036F TOBACCO NON-USER: CPT | Performed by: ORTHOPAEDIC SURGERY

## 2022-04-27 PROCEDURE — 6360000004 HC RX CONTRAST MEDICATION: Performed by: STUDENT IN AN ORGANIZED HEALTH CARE EDUCATION/TRAINING PROGRAM

## 2022-04-27 PROCEDURE — 3017F COLORECTAL CA SCREEN DOC REV: CPT | Performed by: ORTHOPAEDIC SURGERY

## 2022-04-27 PROCEDURE — G8427 DOCREV CUR MEDS BY ELIG CLIN: HCPCS | Performed by: ORTHOPAEDIC SURGERY

## 2022-04-27 PROCEDURE — A9579 GAD-BASE MR CONTRAST NOS,1ML: HCPCS | Performed by: STUDENT IN AN ORGANIZED HEALTH CARE EDUCATION/TRAINING PROGRAM

## 2022-04-27 RX ORDER — SODIUM CHLORIDE 0.9 % (FLUSH) 0.9 %
10 SYRINGE (ML) INJECTION
Status: COMPLETED | OUTPATIENT
Start: 2022-04-27 | End: 2022-04-27

## 2022-04-27 RX ORDER — IBUPROFEN 600 MG/1
600 TABLET ORAL 3 TIMES DAILY PRN
Qty: 90 TABLET | Refills: 0 | Status: SHIPPED | OUTPATIENT
Start: 2022-04-27 | End: 2022-06-04 | Stop reason: SDUPTHER

## 2022-04-27 RX ORDER — 0.9 % SODIUM CHLORIDE 0.9 %
20 INTRAVENOUS SOLUTION INTRAVENOUS
Status: DISCONTINUED | OUTPATIENT
Start: 2022-04-27 | End: 2022-04-30 | Stop reason: HOSPADM

## 2022-04-27 RX ADMIN — GADOTERIDOL 20 ML: 279.3 INJECTION, SOLUTION INTRAVENOUS at 14:35

## 2022-04-27 RX ADMIN — Medication 10 ML: at 14:35

## 2022-04-27 NOTE — PROGRESS NOTES
Patient ambulates with a mildly antalgic gait. Surgical incision is well-healed with no signs of infection. He does have some mild tenderness palpation over the lateral hip. Compartments are soft and compressible. EHL/FHL/TA/GS complex motor intact. Baseline dysesthesias in the lower extremity but he is responsive to light touch in the sural, saphenous, superificial/deep peroneal, and plantar nerve distribution. Posterior tibial pulse 2+ with BCR. CV: no obvious JVD, no dependent edema, distal pulses 2+  Respiratory: chest rise symmetric, unlabored respirations, no audible wheezing  Skin: warm, well perfused, no obvious rashes or lesions  Psych: Patient displays understanding of exam, diagnosis, and plan. Radiology:   No new imaging obtained in the office today. Assessment:      1. 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 We discussed treatment options including surgical treatment modalities such as further irrigation debridements with potential two-stage procedure versus a Girdlestone procedure to the right hip. We also discussed nonoperative treatment modalities regarding lifelong chronic suppressive antibiotics. Patient continues to elect to pursue lifelong suppressive antibiotics regarding his right hip. We also discussed that it is okay for radiation therapy from an orthopedics perspective. Patient had asked for Motrin for pain control. We did provide a prescription for Motrin 600 mg 3 times daily as needed. We also discussed he should reach out to his primary care physician regarding safety of the taking Motrin in regards to his other comorbidities. We did prescribe laboratory markers for inflammation that he is to get prior to his next visit in our office. We would also like for an x-ray of the right hip at his next visit.   He is to follow-up in our office in 3 months time for routine monitoring. Follow up:Return in about 3 months (around 7/27/2022) for routine follow up with xray imaging.     Orders Placed This Encounter   Medications    ibuprofen (ADVIL;MOTRIN) 600 MG tablet     Sig: Take 1 tablet by mouth 3 times daily as needed for Pain     Dispense:  90 tablet     Refill:  0          Orders Placed This Encounter   Procedures    C-reactive protein    Sedimentation Rate     Standing Status:   Future     Standing Expiration Date:   4/27/2023    CBC with Auto Differential     Standing Status:   Future     Standing Expiration Date:   4/27/2023       Emili Bond DO  Orthopedic Surgery Resident, PGY-3  [facility]

## 2022-04-27 NOTE — TELEPHONE ENCOUNTER
Name: Beverly Mcfadden  : 1960  MRN: [id number]    Oncology Navigation Follow-Up Note    Contact Type:  Telephone    Notes: Writer called patient to check on him and to see what if Dr. Ronn Olivas cleared him for radiation per Dr. Aracely Reardon recent note. Pt states he seen Dr. Ronn Olivas this am and he is cleared to proceed with XRT, see note in epic. Writer encouraged him to reach out to me for any needs, if not, I'd f/u with him soon. Pt appreciative of check in. Will continue to follow.     Electronically signed by Lucero Hamilton RN on 2022 at 2:55 PM

## 2022-05-02 ENCOUNTER — HOSPITAL ENCOUNTER (OUTPATIENT)
Dept: VASCULAR LAB | Age: 62
Discharge: HOME | End: 2022-05-02
Payer: MEDICARE

## 2022-05-02 DIAGNOSIS — I73.9 INTERMITTENT CLAUDICATION (HCC): ICD-10-CM

## 2022-05-02 PROCEDURE — 93923 UPR/LXTR ART STDY 3+ LVLS: CPT

## 2022-05-03 ENCOUNTER — OFFICE VISIT (OUTPATIENT)
Dept: FAMILY MEDICINE CLINIC | Age: 62
End: 2022-05-03

## 2022-05-03 VITALS — WEIGHT: 262.4 LBS | BODY MASS INDEX: 33.69 KG/M2

## 2022-05-03 DIAGNOSIS — Z79.4 TYPE 2 DIABETES MELLITUS WITH OTHER SPECIFIED COMPLICATION, WITH LONG-TERM CURRENT USE OF INSULIN (HCC): Primary | ICD-10-CM

## 2022-05-03 DIAGNOSIS — E11.40 TYPE 2 DIABETES MELLITUS WITH DIABETIC NEUROPATHY, UNSPECIFIED WHETHER LONG TERM INSULIN USE (HCC): ICD-10-CM

## 2022-05-03 DIAGNOSIS — E11.69 TYPE 2 DIABETES MELLITUS WITH OTHER SPECIFIED COMPLICATION, WITH LONG-TERM CURRENT USE OF INSULIN (HCC): Primary | ICD-10-CM

## 2022-05-03 PROCEDURE — 99999 PR OFFICE/OUTPT VISIT,PROCEDURE ONLY: CPT | Performed by: PHARMACIST

## 2022-05-03 PROCEDURE — APPNB60 APP NON BILLABLE TIME 46-60 MINS: Performed by: PHARMACIST

## 2022-05-03 NOTE — PATIENT INSTRUCTIONS
1. Call DevinCaleb Cortland Obdulio ([phone]) and ask about iron and vitamin D in next bubble packaging  2. Starting looking at the carbohydrates in your foods  3. Bring your Dexcom reader to your follow up   4.  Call [phone] with any concerns

## 2022-05-03 NOTE — PROGRESS NOTES
Medication Management Service  Liset Batista is a 57 y.o. male that presents for an initial diabetes mellitus office visit with Medication Management Service per referral from Dr. Bryan Hoff MD  for Diabetes Management under Collaborative Practice Agreement with A1c goal < 8 %. Subjective/Objective     New Problems/Changes: Dexcom G6 initiation    DIET  Breakfast: coffee, muffin  Lunch: [employer] quarterpounder, fries, water  Dinner: can't remember  Snacks: fruit  Sweets: jello cups  Beverages: Lower calorie V8 juices, water, Squirt soda    EXERCISE  Patient plans to ride bicycle and walk more with nicer weather. Patient reports that he would like to take his dog for a walk. WEIGHT  Weight: 262 pounds 6.4 ounces  BMI: 33.69 kg/m2    DIABETES MANAGEMENT    Diabetes Goals: Using ADA Standards of Care     Goal A1c:  Less than 8 %   Fasting Blood Sugars:  80-130mg/dL  Postprandial glucose:  Less than 180mg/dL     Lab Results   Component Value Date    LABA1C 8.3 2022    LABA1C 8.3 2022    LABA1C 7.8 (H) 08/15/2013       Current DM Medications   Jardiance 25 mg daily   Insulin glargine 42 units twice daily   Trulicity 3 mg once weekly   Insulin lispro 6 units three times daily before meals   Metformin 500 m tablets twice daily    Home Glucose Readings:   Patient recently began utilizing Dexcom G6 continuous glucose monitor. Patient reports his friend assisted with application of sensor and transmitter and set up of reader on 2022 (5 days ago)    Patient utilized Dexcom G6 CGM to monitor glucose. Data from 14 days Priscilla Moon report (2022 through 2022) with initiation on 2022    Ambulatory Glucose Profile (AGP) Report indicates an adequate number of days (14) for assessment.      Glucose Statistics and Targets:     Goal Patient's Value per AGP Report Interpretation of patient's value compared to goal     Average Glucose Target Glucose Range:   70 - 180 mg/dL for patients with T1DM or T2DM 184 mg/dL Not at goal.     Glucose Management Indication   (similar to A1c)   Patient's goal A1c:  < 8% N/A % N/A   Glucose Variability < 36% 26.1 % At goal. However, patient recently began wearing device and is consistently above glucose goals       Snapshot:  Average Glucose:     Goal Patient's Value per AGP Report Interpretation of patient's value compared to goal     % of time above Target Glucose Range   < 25% for patients with T1DM or T2DM 48 % Not at goal.     % of time in Target Glucose Range   > 70% for patients with T1DM or T2DM 51 % Not at goal.     % of time below Target Glucose Range   < 4% for patients with T1DM or T2DM    1 % At goal     Full Ambulatory Glucose Profile Report uploaded as Media and attached to this encounter     Hypoglycemia:    No symptoms of hypoglycemia   Patient reports two episodes of nocturnal hypoglycemia in the last 7 days that caused patient to wake up in the middle of the night, but did not provide writer glucose readings during episodes   Patient treats hypoglycemia with orange juice and/or peanut candies    Renal monitoring:  Lab Results   Component Value Date    LABMICR Can not be calculated 04/13/2022     Lab Results   Component Value Date    CREATININE 1.31 (H) 04/12/2022     Estimated Creatinine Clearance: 81 mL/min (A) (based on SCr of 1.31 mg/dL (H)).     MEDICATIONS    New/Discontinued medications since last encounter:    Discontinue glipizide    Current Outpatient Medications   Medication Sig Dispense Refill    ibuprofen (ADVIL;MOTRIN) 600 MG tablet Take 1 tablet by mouth 3 times daily as needed for Pain 90 tablet 0    Amoxicillin 500 MG TABS take 4 tablets by mouth 1 hour prior to dental appointment      venlafaxine (EFFEXOR XR) 37.5 MG extended release capsule Take 1 capsule by mouth daily 30 capsule 3    Continuous Blood Gluc Transmit (DEXCOM G6 TRANSMITTER) MISC Use in combination with new sensor every 10 days. Transmitter lasts 3 months 1 each 3    Continuous Blood Gluc Sensor (DEXCOM G6 SENSOR) MISC Apply new sensor to abdomen every 10 days 3 each 11    Continuous Blood Gluc  (DEXCOM G6 ) MAMADOU Use as directed to monitor glucose continuously 1 each 1    ferrous sulfate (IRON 325) 325 (65 Fe) MG tablet Take 325 mg by mouth daily (with breakfast)      zinc 50 MG CAPS Take 1 capsule by mouth daily      aspirin EC 81 MG EC tablet Take 1 tablet by mouth daily 90 tablet 1    atorvastatin (LIPITOR) 40 MG tablet Take 1 tablet by mouth daily 30 tablet 3    empagliflozin (JARDIANCE) 25 MG tablet Take 25 mg by mouth daily 30 tablet 1    insulin glargine (LANTUS SOLOSTAR) 100 UNIT/ML injection pen Inject 42 Units into the skin 2 times daily 5 pen 3    Insulin Pen Needle (B-D ULTRAFINE III SHORT PEN) 31G X 8 MM MISC 1 each by Does not apply route daily 100 each 3    metoprolol tartrate (LOPRESSOR) 25 MG tablet Take 0.5 tablets by mouth 2 times daily 30 tablet 0    vitamin D3 (CHOLECALCIFEROL) 25 MCG (1000 UT) TABS tablet Take 1 tablet by mouth daily 90 tablet 1    Dulaglutide (TRULICITY) 3 QH/8.5MN SOPN Inject 3 mg into the skin once a week 1 pen 3    insulin lispro, 1 Unit Dial, (HUMALOG KWIKPEN) 100 UNIT/ML SOPN Inject 6 Units into the skin 3 times daily (before meals) 3 pen 2    metFORMIN (GLUCOPHAGE) 500 MG tablet Take 2 tablets by mouth 2 times daily (with meals) 60 tablet 1    ciprofloxacin (CIPRO) 500 MG tablet Take 1 tablet by mouth 2 times daily 60 tablet 3    pregabalin (LYRICA) 75 MG capsule Take 1 capsule by mouth 2 times daily for 30 days. 60 capsule 1    spironolactone (ALDACTONE) 25 MG tablet Take 1 tablet by mouth daily. 30 tablet 2     No current facility-administered medications for this visit.      On Statin: Yes    On ACE-I or ARB for HTN or Microalbuminuria: No- lisinopril allergy and holding losartan    Medication Adherence/Cost/Adverse Events:    Newly enrolled in [address] bubble pack program. Patient picked up packages for the first time prior to today's appointment  o Missing iron and vitamin D  o Per Estefania Watson, both iron and vitamin D were not covered by insurance.   o Following appointment, writer left voicemail for patient regarding iron and vitamin D omission and encouraged him to call [address] to arrange fill//packaging   Patient used to have medications dispensed in bubble packages through [employer]. Assessment/Plan     Diabetes Management:   Nearly-controlled diabetes mellitus as evidence by A1c of 8.3% on 04/12/2022 and average glucose of 184 mg/dL. Unable to fully assess Ambulatory Glucose Profile Report provided by Dexcom G6 continuous glucose monitor as patient has only worn device for roughly 5 days.  Diet  o Patient is not currently following dietary recommendations for patients with diabetes  o Discussed material included in \"Planning Healthy Meals\" booklet, including portion sizes, My Plate, and how to find carbohydrate/sugar contents on nutrition labels  o Encouraged patient to begin to look at portion sizes and carbohydrate contents in beverages  o Anticipate further discussion of \"Planning Healthy Meals\" booklet at follow up appointment   Physical Activity  o Patient is not currently performing 150 minutes per week of cardiovascular exercise  o Encouraged patient to take his dog [employer] mix) for a brisk walk every other day   Glucose Testing  o Patient recently began utilizing the Dexcom G6 continuous glucose monitoring system  o Educated patient on Dexcom G6 device set up and utilization with visual demonstration  o Will downloaded and fully assess Ambulatory Glucose Profile report at follow up appointment once patient has worn device for 14 days.    Pharmacologic Therapy  o Will defer adjustments to pharmacologic therapy until assessment of complete Ambulatory Glucose Profile Report at follow up appointment  o Continue the following medications:   Jardiance 25 mg daily   Insulin glargine 42 units twice daily with hopes of decreasing basal requirements over time (0.69 units/kg currently)   Trulicity 3 mg once weekly   Insulin lispro 6 units three times daily before meals   Metformin 500 m tablets twice daily    The following education was provided during today's visit:     [x] Medication adherence   [] Insulin technique and storage   [x] Healthy lifestyle including diet and exercise changes   [x] Hypoglycemia symptoms and management   [x] Blood glucose goals    [x] Introduction to Dexcom G6 and continuing glucose monitoring   [x] Interpreting Ambulatory Glucose Profile (AGP) Report with focus on page 1, average number of scans and glucose levels per day, and snapshot     The following brochure(s)/handout(s) discussed with patient during visit:       [] What is diabetes   [] Checking your blood sugar   [] Know your numbers   [] Hypoglycemia symptoms and management/Hyperglycemia symptoms   [] Blood glucose log sheet(s)   [x] Planning Healthy Meals   [] My Plate   [] DM snacks   [] One Week-DM meal plan   [] Building a balanced meal   [] FreeStyle Domenico 2 - Get Started    [x] Today's Ambulatory Glucose Profile (AGP) Report    Next Follow-Up:   · Medication Management office visit 2022 @ 2:30 pm  · Song Hinds MD office visit 2022 @ 2:15 pm    Patient verbalized understanding of care plan. Patient advised to call Medication Management with any questions, concerns, or changes prior to next appointment. Progress note sent to referring provider. (Dr Song Hinds MD)   Patient acknowledges working in a consult agreement with the pharmacist as referred by his/her physician.      Aundrea Adams, [place]  PGY2 Ambulatory Care Pharmacy Resident    2022 7:15 PM      ==================================================================    For Pharmacy Admin Tracking Only     CPA in place: Yes   Recommendation Provided To: Patient/Caregiver: 4 via In person   Intervention Detail: Adherence Monitorin, Device Training and Scheduled Appointment   Gap Closed?: No    Intervention Accepted By: Patient/Caregiver: 4   Time Spent (min): 60

## 2022-05-04 ENCOUNTER — HOSPITAL ENCOUNTER (OUTPATIENT)
Dept: RADIATION ONCOLOGY | Facility: MEDICAL CENTER | Age: 62
Discharge: HOME | End: 2022-05-04
Attending: STUDENT IN AN ORGANIZED HEALTH CARE EDUCATION/TRAINING PROGRAM
Payer: MEDICARE

## 2022-05-04 PROCEDURE — 77263 THER RADIOLOGY TX PLNG CPLX: CPT | Performed by: STUDENT IN AN ORGANIZED HEALTH CARE EDUCATION/TRAINING PROGRAM

## 2022-05-04 PROCEDURE — 77334 RADIATION TREATMENT AID(S): CPT | Performed by: STUDENT IN AN ORGANIZED HEALTH CARE EDUCATION/TRAINING PROGRAM

## 2022-05-04 NOTE — PROGRESS NOTES
Pt here today for teach and simulation scan. Radiation and You information provided along with additional education regarding radiation therapy and what to expect. Pt verbalizes understanding and all questions answered to the best of my knowledge. Pt escorted to CT room without any difficulty.

## 2022-05-05 ENCOUNTER — TELEPHONE (OUTPATIENT)
Dept: INFUSION THERAPY | Age: 62
End: 2022-05-05

## 2022-05-05 ENCOUNTER — TELEPHONE (OUTPATIENT)
Dept: ONCOLOGY | Facility: CLINIC | Age: 62
End: 2022-05-05

## 2022-05-05 ENCOUNTER — OFFICE VISIT (OUTPATIENT)
Dept: PODIATRY | Age: 62
End: 2022-05-05
Payer: MEDICARE

## 2022-05-05 VITALS — HEIGHT: 74 IN | BODY MASS INDEX: 33.62 KG/M2 | WEIGHT: 262 LBS

## 2022-05-05 DIAGNOSIS — M54.31 SCIATICA OF RIGHT SIDE: Primary | ICD-10-CM

## 2022-05-05 PROCEDURE — G8427 DOCREV CUR MEDS BY ELIG CLIN: HCPCS | Performed by: PODIATRIST

## 2022-05-05 PROCEDURE — 3017F COLORECTAL CA SCREEN DOC REV: CPT | Performed by: PODIATRIST

## 2022-05-05 PROCEDURE — 1036F TOBACCO NON-USER: CPT | Performed by: PODIATRIST

## 2022-05-05 PROCEDURE — G8417 CALC BMI ABV UP PARAM F/U: HCPCS | Performed by: PODIATRIST

## 2022-05-05 PROCEDURE — 99213 OFFICE O/P EST LOW 20 MIN: CPT | Performed by: PODIATRIST

## 2022-05-05 NOTE — TELEPHONE ENCOUNTER
SW reviewed biopsychosocial. SW followed up with patient and financial concerns. SW advised it looks like he has dual coverage so he should not have medical bills. States he gets 1800 per month. States that he was told by [facility] that insurance might not cover something for him but he wasn't sure. SW advised will look into it and get back to him. MICHAEL emailed Vin Tyson RN, for updates on the insurance. Will monitor for response.

## 2022-05-05 NOTE — PROGRESS NOTES
[address] PODIATRY [facility]  [address] [place] [address]  Dept: [phone]  Dept Fax: [phone]    RETURN PATIENT PROGRESS NOTE  Date of patient's visit: 5/5/2022  Patient's Name:  Izabella Coleman YOB: 1960            Patient Care Team:  Judie Varghese MD as PCP - General (Emergency Medicine)  Dyana Reynolds RN as Nurse Navigator (Oncology)  Coy Washington MD as Consulting Physician (Infectious Diseases)  Aron Hall MD as Consulting Physician (Radiation Oncology)  Elena Rincon DPM as Physician (Podiatry)       Izabella Coleman 57 y.o. male that presents for follow-up of   Chief Complaint   Patient presents with    Follow Up After Procedure     review vascular test - completed 5/2/22     Pt's primary care physician is Judie Varghese MD last seen 05/03/2022  Symptoms began several  month(s) ago and are unchanged . Patient relates pain is Absent . Pain is rated 0 out of 10 and is described as none. Treatments prior to today's visit include: previous podiatry treatment and here to discuss Vascular results today. Currently denies F/C/N/V. Allergies   Allergen Reactions    Lisinopril Swelling     Outer Neck swelling    Melatonin Swelling    Trazodone Swelling     Chest swells       Past Medical History:   Diagnosis Date    Arthritis     DM (diabetes mellitus) ([facility] [address] 2009    IDDM    GERD (gastroesophageal reflux disease) 2017    ON RX    Heart murmur 2013    ASYMPTOMATIC    HTN (hypertension) 6/29/2012    ON RX    Hyperlipidemia 06/29/2012    ON RX    Sleep apnea 2016    TRIED MACHINE COULD NOT TOLERATE    Vision abnormalities 2019    FLOATERS RIGHT EYE    Wears glasses        Prior to Admission medications    Medication Sig Start Date End Date Taking?  Authorizing Provider   ibuprofen (ADVIL;MOTRIN) 600 MG tablet Take 1 tablet by mouth 3 times daily as needed for Pain 4/27/22 5/27/22 Yes Sreedhar Stewart, DO   Amoxicillin 500 MG TABS take 4 tablets by mouth 1 hour prior to dental appointment 3/28/22  Yes Historical Provider, MD   venlafaxine (EFFEXOR XR) 37.5 MG extended release capsule Take 1 capsule by mouth daily 4/15/22  Yes Susie Samaniego MD   Continuous Blood Gluc Transmit (DEXCOM G6 TRANSMITTER) MISC Use in combination with new sensor every 10 days.  Transmitter lasts 3 months 4/14/22  Yes Marshall Fail, DO   Continuous Blood Gluc Sensor (DEXCOM G6 SENSOR) MISC Apply new sensor to abdomen every 10 days 4/14/22  Yes Marshall Fail, DO   Continuous Blood Gluc  ([address]) MAMADOU Use as directed to monitor glucose continuously 4/14/22  Yes Marshall Fail, DO   ferrous sulfate (IRON 325) 325 (65 Fe) MG tablet Take 325 mg by mouth daily (with breakfast)   Yes Historical Provider, MD   zinc 50 MG CAPS Take 1 capsule by mouth daily   Yes Historical Provider, MD   aspirin EC 81 MG EC tablet Take 1 tablet by mouth daily 4/12/22  Yes Bernardo Lange MD   atorvastatin (LIPITOR) 40 MG tablet Take 1 tablet by mouth daily 4/12/22  Yes Bernardo Lange MD   empagliflozin (JARDIANCE) 25 MG tablet Take 25 mg by mouth daily 4/12/22  Yes Bernardo Lange MD   insulin glargine (LANTUS SOLOSTAR) 100 UNIT/ML injection pen Inject 42 Units into the skin 2 times daily 4/12/22  Yes Bernardo Lange MD   Insulin Pen Needle (B-D ULTRAFINE III SHORT PEN) 31G X 8 MM MISC 1 each by Does not apply route daily 4/12/22  Yes Bernardo Lange MD   metoprolol tartrate (LOPRESSOR) 25 MG tablet Take 0.5 tablets by mouth 2 times daily 4/12/22  Yes Bernardo Lange MD   vitamin D3 (CHOLECALCIFEROL) 25 MCG (1000 UT) TABS tablet Take 1 tablet by mouth daily 4/12/22  Yes Bernardo Lange MD   Dulaglutide (TRULICITY) 3 MK/2.4IY SOPN Inject 3 mg into the skin once a week 4/12/22  Yes Bernardo Lange MD   insulin lispro, 1 Unit Dial, (HUMALOG KWIKPEN) 100 UNIT/ML SOPN Inject 6 Units into the skin 3 times daily (before meals) 4/12/22  Yes Bernardo Yeung MD   metFORMIN (GLUCOPHAGE) 500 MG tablet Take 2 tablets by mouth 2 times daily (with meals) 4/12/22  Yes Bernardo Yeung MD   ciprofloxacin (CIPRO) 500 MG tablet Take 1 tablet by mouth 2 times daily 4/7/22 5/7/22 Yes Barbara Payan MD   spironolactone (ALDACTONE) 25 MG tablet Take 1 tablet by mouth daily. 8/17/13  Yes Juli Pickard DO   pregabalin (LYRICA) 75 MG capsule Take 1 capsule by mouth 2 times daily for 30 days. 3/28/22 4/27/22  Bernardo Yeung MD       Review of Systems    Review of Systems:  History obtained from chart review and the patient  General ROS: negative for - chills, fatigue, fever, night sweats or weight gain  Constitutional: Negative for chills, diaphoresis, fatigue, fever and unexpected weight change. Musculoskeletal: Positive for arthralgias, gait problem and joint swelling. Neurological ROS: negative for - behavioral changes, confusion, headaches or seizures. Negative for weakness and numbness. Dermatological ROS: negative for - mole changes, rash  Cardiovascular: Negative for leg swelling. Gastrointestinal: Negative for constipation, diarrhea, nausea and vomiting. Lower Extremity Physical Examination:     Vitals:   Vitals:    05/05/22 1253   Resp: 16     General: AAO x 3 in NAD. Dermatologic Exam:  Skin lesion/ulceration Absent . Skin No rashes or nodules noted. .       Musculoskeletal:     1st MPJ ROM decreased, Bilateral.  Muscle strength 5/5, Bilateral.  Pain present upon palpation of right posteriour hip and to the common peroneal nerve right. No foot drop seen . Medial longitudinal arch, Bilateral WNL.   Ankle ROM WNL,Bilateral.    Dorsally contracted digits absent digits 1-5 Bilateral.     Vascular: DP and PT pulses palpable 2/4, Bilateral.  CFT <3 seconds, Bilateral.  Hair growth present to the level of the digits, Bilateral.  Edema absent, Bilateral.  Varicosities absent, Bilateral. Erythema absent, Bilateral    Neurological: Sensation intact to light touch to level of digits, Bilateral.  Protective sensation intact 10/10 sites via 5.07/10g [place] Monofilament, Bilateral.  negative Tinel's, Bilateral.  negative Valleix sign, Bilateral.      Integument: Warm, dry, supple, Bilateral.  Open lesion absent, Bilateral.  Interdigital maceration absent to web spaces 1-4, Bilateral.  Nails are normal in length, thickness and color 1-5 bilateral.  Fissures absent, Bilateral.     PVR w PPG         - Brachial Pressure:Right: 140. Left:129.         - MANFRED:Right: 1.15. Left: 1.09.       Plethysmographic Digit Evaluation   +---------++--------+-----+---------------++--------+-----+----------------+   !         ! ! Right   !     ! Left           !!        !     !                !   +---------++--------+-----+---------------++--------+-----+----------------+   ! Location ! !Pressure! Ratio! PPG Wave Form  !!Pressure! Ratio! PPG Wave Form   !   +---------++--------+-----+---------------++--------+-----+----------------+   ! Great WZK!!255     !1.06 !               !!141     !1.01 !                !   +---------++--------+-----+---------------++--------+-----+----------------+       Asessment: Patient is a 57 y.o. male with:    Diagnosis Orders   1. Sciatica of right side  Ambulatory referral to Physical Therapy         Plan: Patient examined and evaluated. Current condition and treatment options discussed in detail. Advised pt to his condition. pvr results are as above. Pt does not have a severe blood flow issue but may have a nerve entrapment. Pt is very tight to the posterior hip musculature      rx provided for PT to work on posteriour hip capsule muslces via ultrasound stretching and or ionophoresis     . Verbal and written instructions given to patient. Contact office with any questions/problems/concerns. No orders of the defined types were placed in this encounter.     No orders of the defined types were placed in

## 2022-05-05 NOTE — TELEPHONE ENCOUNTER
[facility] Radiation Oncology Nutrition Note    PGSGA scoring tool reviewed with score <4. Automatic nutrition assessment consult populates from PGSGA tool for scores > 4. Will continue to follow as requested.     EDUAR Cai, RD, LD  Registered Dietitian   [place]  [phone]

## 2022-05-06 ENCOUNTER — HOSPITAL ENCOUNTER (OUTPATIENT)
Dept: RADIATION ONCOLOGY | Facility: MEDICAL CENTER | Age: 62
Discharge: HOME | End: 2022-05-06
Attending: STUDENT IN AN ORGANIZED HEALTH CARE EDUCATION/TRAINING PROGRAM
Payer: MEDICARE

## 2022-05-06 PROCEDURE — 77336 RADIATION PHYSICS CONSULT: CPT | Performed by: STUDENT IN AN ORGANIZED HEALTH CARE EDUCATION/TRAINING PROGRAM

## 2022-05-06 PROCEDURE — 77338 DESIGN MLC DEVICE FOR IMRT: CPT | Performed by: STUDENT IN AN ORGANIZED HEALTH CARE EDUCATION/TRAINING PROGRAM

## 2022-05-06 PROCEDURE — 77301 RADIOTHERAPY DOSE PLAN IMRT: CPT | Performed by: STUDENT IN AN ORGANIZED HEALTH CARE EDUCATION/TRAINING PROGRAM

## 2022-05-06 PROCEDURE — 77300 RADIATION THERAPY DOSE PLAN: CPT | Performed by: STUDENT IN AN ORGANIZED HEALTH CARE EDUCATION/TRAINING PROGRAM

## 2022-05-09 ENCOUNTER — HOSPITAL ENCOUNTER (OUTPATIENT)
Dept: RADIATION ONCOLOGY | Facility: MEDICAL CENTER | Age: 62
Discharge: HOME | End: 2022-05-09
Attending: STUDENT IN AN ORGANIZED HEALTH CARE EDUCATION/TRAINING PROGRAM
Payer: MEDICARE

## 2022-05-09 PROCEDURE — 77385 HC NTSTY MODUL RAD TX DLVR SMPL: CPT | Performed by: STUDENT IN AN ORGANIZED HEALTH CARE EDUCATION/TRAINING PROGRAM

## 2022-05-09 PROCEDURE — 77014 PR CT GUIDANCE PLACEMENT RAD THERAPY FIELDS: CPT | Performed by: STUDENT IN AN ORGANIZED HEALTH CARE EDUCATION/TRAINING PROGRAM

## 2022-05-10 ENCOUNTER — HOSPITAL ENCOUNTER (OUTPATIENT)
Dept: RADIATION ONCOLOGY | Facility: MEDICAL CENTER | Age: 62
Discharge: HOME | End: 2022-05-10
Attending: STUDENT IN AN ORGANIZED HEALTH CARE EDUCATION/TRAINING PROGRAM
Payer: MEDICARE

## 2022-05-10 VITALS
DIASTOLIC BLOOD PRESSURE: 94 MMHG | HEART RATE: 64 BPM | BODY MASS INDEX: 33.92 KG/M2 | SYSTOLIC BLOOD PRESSURE: 151 MMHG | RESPIRATION RATE: 16 BRPM | TEMPERATURE: 97 F | WEIGHT: 264.2 LBS | OXYGEN SATURATION: 98 %

## 2022-05-10 PROCEDURE — 77014 PR CT GUIDANCE PLACEMENT RAD THERAPY FIELDS: CPT | Performed by: STUDENT IN AN ORGANIZED HEALTH CARE EDUCATION/TRAINING PROGRAM

## 2022-05-10 PROCEDURE — 77385 HC NTSTY MODUL RAD TX DLVR SMPL: CPT | Performed by: STUDENT IN AN ORGANIZED HEALTH CARE EDUCATION/TRAINING PROGRAM

## 2022-05-10 NOTE — PROGRESS NOTES
Radiation Oncology On-Treatment Visit:     Fraction #  (5 Gy)    Diagnosis:  Mr. Indiana Iqbal is a 57 y.o. 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 He is currently followed by infectious diseases and is on chronic antibiotics. Treatment course: Definitive radiation to the prostate    Progress note:  Mr. [place] Iqbal was seen and evaluated. Patient has not noticed any notable side effects of radiation (second treatment today). Physical exam:   VITAL SIGNS: There were no vitals taken for this visit. ECO Asymptomatic   CONSTITUTIONAL: Well developed, well nourished male. Alert and oriented x3. No acute distress. HEENT: Head normocephalic and atraumatic. Extraocular movements intact. NEUROLOGIC EXAM: Cranial nerves II through XII grossly intact. No focal neurologic deficit. Speech is fluent. Gait and posture are steady. PSYCHIATRIC:  Appropriate mood and affect for his clinical situation. Plan:  · Continue radiation therapy as planned. · Expected toxicities reviewed with the patient. · I have checked the imaging performed to date, which confirm appropriate positioning.

## 2022-05-10 NOTE — PROGRESS NOTES
Hannah Montiel  5/10/2022  Wt Readings from Last 3 Encounters:   05/10/22 264 lb 3.2 oz (119.8 kg)   05/05/22 262 lb (118.8 kg)   05/03/22 262 lb 6.4 oz (119 kg)     Body mass index is 33.92 kg/m². Treatment Area:prostate    Patient was seen today for weekly visit. Treatment day 2. Reviewed potential side effects- urinary symptoms, fatigue and diarrhea and management strategies. Patient voices understanding of information. Dr. Niru Resendez examined patient. No change in plan. Comfort Alteration    Fatigue: None    Nutritional Alteration  Anorexia: No  Nausea: No  Vomiting: No     Elimination Alterations  Constipation: no  Diarrhea:  no  Urinary Frequency/Urgency: No  Urinary Retention: No  Dysuria: No  Urinary Incontinence: No  Proctitis: No  Nocturia: Yes #/night: 2     Emotional  Coping: effective      Skin Alteration   Sensation:intact    Radiation Dermatitis:  Intact [x]     Erythema  []     Discoloration  []     Rash []     Dry desquamation  []     Moist desquamation []           Injury, potential bleeding or infection: none    Lab Results   Component Value Date    WBC 8.8 03/14/2022     03/14/2022         BP (!) 151/94   Pulse 64   Temp 97 °F (36.1 °C) (Temporal)   Resp 16   Wt 264 lb 3.2 oz (119.8 kg)   SpO2 98%   BMI 33.92 kg/m²      Pain Assessment: None - Denies Pain            Assessment/Plan: Patient was seen today for weekly visit.       Jas Eagle RN

## 2022-05-11 ENCOUNTER — HOSPITAL ENCOUNTER (OUTPATIENT)
Dept: RADIATION ONCOLOGY | Facility: MEDICAL CENTER | Age: 62
Discharge: HOME | End: 2022-05-11
Attending: STUDENT IN AN ORGANIZED HEALTH CARE EDUCATION/TRAINING PROGRAM
Payer: MEDICARE

## 2022-05-11 PROCEDURE — 77385 HC NTSTY MODUL RAD TX DLVR SMPL: CPT | Performed by: STUDENT IN AN ORGANIZED HEALTH CARE EDUCATION/TRAINING PROGRAM

## 2022-05-11 PROCEDURE — 77014 PR CT GUIDANCE PLACEMENT RAD THERAPY FIELDS: CPT | Performed by: STUDENT IN AN ORGANIZED HEALTH CARE EDUCATION/TRAINING PROGRAM

## 2022-05-11 PROCEDURE — 77336 RADIATION PHYSICS CONSULT: CPT | Performed by: STUDENT IN AN ORGANIZED HEALTH CARE EDUCATION/TRAINING PROGRAM

## 2022-05-12 ENCOUNTER — HOSPITAL ENCOUNTER (OUTPATIENT)
Dept: RADIATION ONCOLOGY | Facility: MEDICAL CENTER | Age: 62
Discharge: HOME | End: 2022-05-12
Attending: STUDENT IN AN ORGANIZED HEALTH CARE EDUCATION/TRAINING PROGRAM
Payer: MEDICARE

## 2022-05-12 PROBLEM — Z11.59 NEED FOR HEPATITIS C SCREENING TEST: Status: RESOLVED | Noted: 2022-04-12 | Resolved: 2022-05-12

## 2022-05-12 PROBLEM — Z11.4 ENCOUNTER FOR SCREENING FOR HIV: Status: RESOLVED | Noted: 2022-04-12 | Resolved: 2022-05-12

## 2022-05-12 PROCEDURE — 77014 PR CT GUIDANCE PLACEMENT RAD THERAPY FIELDS: CPT | Performed by: STUDENT IN AN ORGANIZED HEALTH CARE EDUCATION/TRAINING PROGRAM

## 2022-05-12 PROCEDURE — 77385 HC NTSTY MODUL RAD TX DLVR SMPL: CPT | Performed by: STUDENT IN AN ORGANIZED HEALTH CARE EDUCATION/TRAINING PROGRAM

## 2022-05-13 ENCOUNTER — HOSPITAL ENCOUNTER (OUTPATIENT)
Dept: RADIATION ONCOLOGY | Facility: MEDICAL CENTER | Age: 62
Discharge: HOME | End: 2022-05-13
Attending: STUDENT IN AN ORGANIZED HEALTH CARE EDUCATION/TRAINING PROGRAM
Payer: MEDICARE

## 2022-05-13 PROCEDURE — 77014 PR CT GUIDANCE PLACEMENT RAD THERAPY FIELDS: CPT | Performed by: STUDENT IN AN ORGANIZED HEALTH CARE EDUCATION/TRAINING PROGRAM

## 2022-05-13 PROCEDURE — 77427 RADIATION TX MANAGEMENT X5: CPT | Performed by: STUDENT IN AN ORGANIZED HEALTH CARE EDUCATION/TRAINING PROGRAM

## 2022-05-13 PROCEDURE — 77385 HC NTSTY MODUL RAD TX DLVR SMPL: CPT | Performed by: STUDENT IN AN ORGANIZED HEALTH CARE EDUCATION/TRAINING PROGRAM

## 2022-05-16 ENCOUNTER — HOSPITAL ENCOUNTER (OUTPATIENT)
Dept: RADIATION ONCOLOGY | Facility: MEDICAL CENTER | Age: 62
Discharge: HOME | End: 2022-05-16
Attending: STUDENT IN AN ORGANIZED HEALTH CARE EDUCATION/TRAINING PROGRAM
Payer: MEDICARE

## 2022-05-16 ENCOUNTER — HOSPITAL ENCOUNTER (OUTPATIENT)
Dept: PHYSICAL THERAPY | Age: 62
Setting detail: THERAPIES SERIES
Discharge: HOME | End: 2022-05-16
Payer: MEDICARE

## 2022-05-16 PROCEDURE — 77385 HC NTSTY MODUL RAD TX DLVR SMPL: CPT | Performed by: STUDENT IN AN ORGANIZED HEALTH CARE EDUCATION/TRAINING PROGRAM

## 2022-05-16 PROCEDURE — 97110 THERAPEUTIC EXERCISES: CPT

## 2022-05-16 PROCEDURE — 97161 PT EVAL LOW COMPLEX 20 MIN: CPT

## 2022-05-16 PROCEDURE — 77014 PR CT GUIDANCE PLACEMENT RAD THERAPY FIELDS: CPT | Performed by: STUDENT IN AN ORGANIZED HEALTH CARE EDUCATION/TRAINING PROGRAM

## 2022-05-16 NOTE — CONSULTS
[x] Dean Crane  Outpatient Physical Therapy  [address]  Phone: [phone]  Fax: [phone] [] [facility] at [address] [place] [address]  Phone: [phone]   Fax: [phone]     Physical Therapy Evaluation    Date:  2022  Patient: Ashanti George  : 1960  MRN: [id number]  Physician: Prasanth Blackwell DPM   Insurance: [facility] Medicare / Medicaid   Medical Diagnosis: Sciatica of right side M54.31    Rehab Codes: M54.31, M25.571, M25.572, R20.2   Onset Date: 22                              Next 's appt:     Subjective:   CC:Patient reports bilateral foot pain, numbness, and tingling that is getting worse. Patient notes he also has a history of back and R hip pain. Patient has difficulty ambulating, standing, and sleeping due to pain in his feet. HPI: History of diabetic neuropathy, R FANG with multiple irrigation and debridement due to infection. Patient showed large hypertrophic scar on his lateral R hip.       Past Medical History:   Diagnosis Date    Arthritis     DM (diabetes mellitus) ([facility] [address]     IDDM    GERD (gastroesophageal reflux disease) 2017    ON RX    Heart murmur 2013    ASYMPTOMATIC    HTN (hypertension) 2012    ON RX    Hyperlipidemia 2012    ON RX    Sleep apnea 2016    TRIED MACHINE COULD NOT TOLERATE    Vision abnormalities 2019    FLOATERS RIGHT EYE    Wears glasses      Prostate Cancer (currently receiving treatment)      Comorbidities:   [x] Obesity [] Dialysis  [] Other:   [] Asthma/COPD [] Dementia [] Other:   [] Stroke [] Sleep apnea [] Other:   [] Vascular disease [] Rheumatic disease [] Other:     Tests: [] X-Ray: [] MRI:  [x] Other: PVR: Normal     Medications: [x] Refer to full medical record [] None [] Other:  Allergies:      [x] Refer to full medical record  [] None [] Other:    Function:    Patient lives with: alone   In what type of home -  One story   x Two story   - Split level Number of stairs to enter 3   With handrail on the -x Right to enter   - Left to enter   Bathroom has a -  Tub only  x Tub/shower combo   - Walk in shower    -  Grab bars   Washing machine is on -x Main level   - Second level   - Basement     ADL/IADL Previous level of function Current level of function Who currently assists the patient with task   Bathing  - Independent   - Independent  -    Dress/grooming - Independent  - - Independent  -    Transfer/mobility - Independent  -  - Independent  - Assist    Feeding - Independent  -  - Independent      Toileting - Independent  - - Independent  -    Driving - Independent   - Independent  -    Housekeeping - Independent  -  - Independent  -     Grocery shop/meal prep - Independent  -  - Independent  -       Gait Prior level of function Current level of function    - Independent  - - Independent  -    Device: - Independent - Independent     Working:  [] Full-time  [] Part-time  [] Off d/t condition  [x] Retired  [] Disability  [] N/A  Job/Employer:    Pain:  [x] Yes  [] No Location: Feet  Pain Rating: (0-10 scale) 7/10, pain, numbness   Pain altered Tx:  [] Yes  [x] No  Action:    Objective: p = pain, L = Lacks      ROM  ° A/P STRENGTH  ROM    Left Right Left Right Cervical    Shoulder Flex     Flexion    Abduction     Extension    ER     Rotation L R    IR     Side bending L R   Elbow Flex          Ext          Wrist Flex         Ext     Lumbar    Hand      Flexion 75%   Hip Flexion   90 4/5 4/5 Extension 25%   Ext   4/5 4/5 Rotation L 75% R 75%   Abd   4/5 4/5 Sidebend L 75% R 75%   Add     -------------------- --------- --------   ER     STRENGTH     IR     Abdominals 4/5    Knee Flex   5/5 5/5 Erector Spinae 4/5    Ext    5/5 5/5 Scapular L R   Ankle DF 10 10 5/5 5/5 -Scaption     PF full  full 5/5 5/5 -Retraction     Inversion full full 5/5 5/5 -Horz Abd     Eversion full full 5/5 5/5 -Extension        Special Tests:   Positive:        Negative:SLR, slump test OBSERVATION Comments   Posture No Deficit    Joint Alignment No Deficit    Gait No Deficit    Palpation Large hypertrophic scar along lateral hip   Edema No Deficit    Neurological Numbness plantar and dorsal surface of foot. Assessment: Patient demonstrates bilateral foot and low back pain likely due to chronic  diabetic neuropathy vs. Sciatica. Patient may benefit form physical therapy to improve core strengthening and reduce radicular pain. Problems:    [x] ? Pain:Feet, back   [x] ? ROM: R hip extension, hip internal and external rotation  [x] ? Flexibility: R hip flexor, hamstrings, calves   [x] ? Strength: glutes, hip flexors, abs  [x] ? Function: Oswestry score 58% impaired  [] Other:    STG: (to be met in 10 treatments)  1. ? Pain: 3/10 feet pain with ADLs. 2. ? Strength: 4+/5 R hip flexion to demonstrate improved core strength. 3. ? Function: Oswestry score to 38% impaired or better to improve ADLs. 4. Independent with Home Exercise Programs    Patient goals: reduce pain in feet   Rehab Potential:  [x] Good  [] Fair  [] Poor   Suggested Professional Referral:  [x] No  [] Yes:  Barriers to Goal Achievement[de-identified]  [x] No  [] Yes:  Domestic Concerns:  [x] No  [] Yes:    Pt. Education:  [x] Plans/Goals, Risks/Benefits discussed  [x] Home exercise program: See chart below  Method of Education: [x] Verbal  [x] Demo  [x] Written  Comprehension of Education:  [x] Verbalizes understanding. [x] Demonstrates understanding. [x] Needs Review. [] Demonstrates/verbalizes understanding of HEP/Ed previously given.     Treatment Plan:  [x] Therapeutic Exercise   12650  [x] Vasopneumatic cold with compression  52633    [x] Therapeutic Activity  52655 [x] Cold/hotpack    [] Gait Training   44551 [x] Lumbar/Cervical Traction  [id number]   [x] Neuromuscular Re-education  [id number] [x] Electrical Stimulation Unattended  64933   [x] Manual Therapy    11455 [x] Electrical Stimulation Attended  [id number]   [] Iontophoresis: 4 mg/mL Dexamethasone Sodium Phosphate  [facility]  [address] []  Medication allergies reviewed for use of   Dexamethasone Sodium Phosphate 4mg/ml with iontophoresis treatments. Pt is not allergic. Frequency:  2x/week for 10 visits    Todays Treatment:  Precautions:  Modalities:   Exercises:  Access Code: [email or web address]  URL: [email or web address]  Date: 05/16/2022  Prepared by: Jacque Felty    Exercises  Supine Active Straight Leg Raise - 1 x daily - 7 x weekly - 3 sets - 10 reps  Bent Knee Fallouts - 1 x daily - 7 x weekly - 3 sets - 10 reps  Prone Knee Flexion - 1 x daily - 7 x weekly - 3 sets - 10 reps  Supine Quadriceps Stretch with Strap on Table - 1 x daily - 7 x weekly - 3 sets - 10 reps - 20 hold  Seated Hamstring Stretch - 1 x daily - 7 x weekly - 3 sets - 10 reps    Specific Instructions for next treatment[de-identified] R hip and core strengthening       Evaluation Complexity:  History (Personal factors, comorbidities) [] 0 [x] 1-2 [] 3+   Exam (limitations, restrictions) [] 1-2 [x] 3 [] 4+   Clinical presentation (progression) [] Stable [x] Evolving  [] Unstable   Decision Making [x] Low [] Moderate [] High    [x] Low Complexity [] Moderate Complexity [] High Complexity       Treatment Charges: Mins Units   [x] Evaluation       [x]  Low       []  Moderate       []  High 25 1   []  Modalities     [x]  Ther Exercise 15 1   []  Manual Therapy     []  Ther Activities     []  Aquatics     []  Vasocompression     []  Other       TOTAL TREATMENT TIME: 40      Time in:1000    Time out:1040    Electronically signed by: Sherrie Solorzano PT

## 2022-05-17 ENCOUNTER — HOSPITAL ENCOUNTER (OUTPATIENT)
Dept: RADIATION ONCOLOGY | Facility: MEDICAL CENTER | Age: 62
Discharge: HOME | End: 2022-05-17
Attending: STUDENT IN AN ORGANIZED HEALTH CARE EDUCATION/TRAINING PROGRAM
Payer: MEDICARE

## 2022-05-17 VITALS
RESPIRATION RATE: 16 BRPM | HEART RATE: 92 BPM | SYSTOLIC BLOOD PRESSURE: 138 MMHG | WEIGHT: 260 LBS | BODY MASS INDEX: 33.38 KG/M2 | TEMPERATURE: 97 F | DIASTOLIC BLOOD PRESSURE: 89 MMHG | OXYGEN SATURATION: 99 %

## 2022-05-17 DIAGNOSIS — Z79.4 TYPE 2 DIABETES MELLITUS WITH OTHER SPECIFIED COMPLICATION, WITH LONG-TERM CURRENT USE OF INSULIN (HCC): ICD-10-CM

## 2022-05-17 DIAGNOSIS — C61 PROSTATE CA (HCC): Primary | ICD-10-CM

## 2022-05-17 DIAGNOSIS — I10 ESSENTIAL HYPERTENSION: ICD-10-CM

## 2022-05-17 DIAGNOSIS — E11.69 TYPE 2 DIABETES MELLITUS WITH OTHER SPECIFIED COMPLICATION, WITH LONG-TERM CURRENT USE OF INSULIN (HCC): ICD-10-CM

## 2022-05-17 PROCEDURE — 77385 HC NTSTY MODUL RAD TX DLVR SMPL: CPT | Performed by: STUDENT IN AN ORGANIZED HEALTH CARE EDUCATION/TRAINING PROGRAM

## 2022-05-17 PROCEDURE — 77014 PR CT GUIDANCE PLACEMENT RAD THERAPY FIELDS: CPT | Performed by: STUDENT IN AN ORGANIZED HEALTH CARE EDUCATION/TRAINING PROGRAM

## 2022-05-17 RX ORDER — DULAGLUTIDE 3 MG/.5ML
INJECTION, SOLUTION SUBCUTANEOUS
Qty: 2 ML | Refills: 3 | Status: SHIPPED | OUTPATIENT
Start: 2022-05-17 | End: 2022-09-07

## 2022-05-17 RX ORDER — PREGABALIN 75 MG/1
CAPSULE ORAL
Qty: 60 CAPSULE | Refills: 0 | OUTPATIENT
Start: 2022-05-17

## 2022-05-17 RX ORDER — INSULIN GLARGINE 100 [IU]/ML
INJECTION, SOLUTION SUBCUTANEOUS
Qty: 15 ML | Refills: 3 | Status: SHIPPED | OUTPATIENT
Start: 2022-05-17

## 2022-05-17 NOTE — PROGRESS NOTES
Radiation Oncology On-Treatment Visit:     Fraction #  (17.5 Gy)    Diagnosis:  Mr. Nona Arreguin is a 57 y.o. male with unfavorable intermediate risk prostate adenocarcinoma (cT1c, Octavio 4+3, PSA 5.11) s/p prostate biopsy on 3/30/2021, ADT with leuprolide 45 mg on 2021 and 2022,  Space[facility] and fiducial placement on 2021 at [address] [place] [address] St was to treat prostate with definitive radiation around 2021, but patient had a septic joint from a right total hip replacement done in , with multiple revision surgeries and I&Ds to the hip. He is currently followed by infectious diseases and is on chronic antibiotics. Treatment course: Definitive radiation to the prostate    Progress note:  Mr. Nona Arreguin was seen and evaluated. Patient has not noticed any notable side effects of radiation. Physical exam:   VITAL SIGNS: /89   Pulse 92   Temp 97 °F (36.1 °C) (Temporal)   Resp 16   Wt 260 lb (117.9 kg)   SpO2 99%   BMI 33.38 kg/m²   ECO Asymptomatic   CONSTITUTIONAL: Well developed, well nourished male. Alert and oriented x3. No acute distress. HEENT: Head normocephalic and atraumatic. Extraocular movements intact. NEUROLOGIC EXAM: Cranial nerves II through XII grossly intact. No focal neurologic deficit. Speech is fluent. Gait and posture are steady. PSYCHIATRIC:  Appropriate mood and affect for his clinical situation. Plan:  · Continue radiation therapy as planned. · I have checked the imaging performed to date, which confirm appropriate positioning.

## 2022-05-17 NOTE — PROGRESS NOTES
Noble Jorgensen  5/17/2022  Wt Readings from Last 3 Encounters:   05/17/22 260 lb (117.9 kg)   05/10/22 264 lb 3.2 oz (119.8 kg)   05/05/22 262 lb (118.8 kg)     Body mass index is 33.38 kg/m². Treatment Area:Prostate #7/28    Patient was seen today for weekly visit. Denies any bowel or urinary complaints. Complains of mild fatigue. Encouraged to stay as active as possible but to rest when needed. Dr. Uriel Pressley examined patient. Continue treatment plan. Comfort Alteration    Fatigue: Moderate    Nutritional Alteration  Anorexia: No  Nausea: No  Vomiting: No     Elimination Alterations  Constipation: no  Diarrhea:  no  Urinary Frequency/Urgency: No  Urinary Retention: No  Dysuria: No  Urinary Incontinence: No  Proctitis: No  Nocturia: Yes #/night: 1     Emotional  Coping: effective      Skin Alteration   Sensation:intact    Radiation Dermatitis:  Intact [x]     Erythema  []     Discoloration  []     Rash []     Dry desquamation  []     Moist desquamation []           Injury, potential bleeding or infection: none    Lab Results   Component Value Date    WBC 8.8 03/14/2022     03/14/2022         /89   Pulse 92   Temp 97 °F (36.1 °C) (Temporal)   Resp 16   Wt 260 lb (117.9 kg)   SpO2 99%   BMI 33.38 kg/m²      Pain Assessment: None - Denies Pain            Assessment/Plan: Patient was seen today for weekly visit.       Salome Valerio RN

## 2022-05-17 NOTE — TELEPHONE ENCOUNTER
Please address the medication refill and close the encounter. If I can be of assistance, please route to the applicable pool. Thank you.       Last visit: 5-3-22  Last Med refill: 5-3-22 Lantus    5-3-22 metoprolol    5-73-41 Trulicity    8-2-54 metformin    5-3-22 pregabalin    Does patient have enough medication for 72 hours: No:     Next Visit Date:  Future Appointments   Date Time Provider Liset Pierce   5/18/2022  3:15 PM [facility] Lorra Campos   5/19/2022 11:45 AM Amanda Jean, PTA [facility]   5/19/2022  3:15 PM [facility]   5/20/2022  3:15 PM [facility]   5/23/2022  2:15 PM Bernardo Lora MD [facility]   5/23/2022  3:15 PM [facility]   5/24/2022  1:00 PM Jacquie Garcia, PTA [facility]   5/24/2022  3:15 PM [facility] ST[facility] RAD [facility]   5/24/2022  3:30 PM Gloria Schroeder MD [facility] ST[facility] RAD [facility]   5/25/2022 10:15 AM Jolynn Giordano MD [facility]   5/25/2022  3:15 PM STV[facility] STA RAD [facility]   5/26/2022 12:00 PM Jacquie Garcia, PTA [facility]   5/26/2022  3:15 PM STVZ [facility] STA RAD [facility]   5/27/2022  3:15 PM STV[facility]SA [facility]Z STA RAD [facility]   5/31/2022  3:15 PM STVZ MIGNON VERSA [facility] STA RAD [facility]   5/31/2022  3:30 PM Azradha Berkeley, MD [facility] ST[facility]   6/1/2022  3:15 PM ST[facility]   6/2/2022  3:15 PM STV[facility] ST[facility]   6/3/2022  3:15 PM ST[facility]   6/6/2022  1:15 PM DO SERA [place] SPECIA [facility]   6/6/2022  3:15 PM STVZ MIGNON VERSA STVZ STA RAD [facility]   6/7/2022  3:15 PM STVZ MIGNON VERSA STVZ STA RAD [facility]   6/7/2022  3:30 PM Gloria Schroeder MD [facility] ST[facility] [place]   6/8/2022 10:45 AM Megan Del Castillo MD INFT DISEASE [facility]   6/8/2022  3:15 PM STV[facility]   6/9/2022  3:15 PM STVZ MIGNON VERSA [facility]Z STA RAD [facility]   6/10/2022  3:15 PM STVZ MIGNON VERSA [facility] STA RAD [facility]   6/13/2022  3:15 PM STVZ MINGON VERSA [facility]VZ STA RAD [facility]   6/14/2022 11:30 AM Nneka Moore DPM [facility]   6/14/2022  3:15 PM STV[facility]SA [facility] STA RAD [facility]   6/14/2022  3:30 PM Gloria Schroeder MD [facility] STA RAD [facility]   6/15/2022  3:15 PM STVZ IMGNON VERSA STVZ STA RAD [facility]   6/16/2022  3:15 PM STVZ MIGNON VERSA STVZ STA RAD [facility]   7/25/2022  9:45 AM Neela Olmstead DO ORTHO SPECIA [facility]   7/25/2022  1:50 PM Usama Saini MD [address] Maintenance   Topic Date Due    Annual Wellness Visit (AWV)  Never done    Diabetic retinal exam  Never done    DTaP/Tdap/Td vaccine (1 - Tdap) Never done    Shingles vaccine (2 of 3) 02/08/2011    Diabetic foot exam  04/27/2019    Pneumococcal 0-64 years Vaccine (2 - PCV) 12/19/2019    Colorectal Cancer Screen  07/23/2020    COVID-19 Vaccine (3 - Booster for Rinku series) 01/25/2022    Flu vaccine (Season Ended) 09/01/2022    Prostate Specific Antigen (PSA) Screening or Monitoring  02/16/2023    Depression Screen  03/28/2023    A1C test (Diabetic or Prediabetic)  04/12/2023    Lipids  04/12/2023    Diabetic microalbuminuria test  04/13/2023    Hepatitis C screen  Completed    HIV screen  Completed    Hepatitis A vaccine  Aged Out    Hib vaccine  Aged Out    Meningococcal (ACWY) vaccine  Aged Out       Hemoglobin A1C (%)   Date Value   04/12/2022 8.3   03/28/2022 8.3   08/15/2013 7.8 (H)             ( goal A1C is < 7)   Microalb/Crt.  Ratio (mcg/mg creat)   Date Value   04/13/2022 Can not be calculated     LDL Cholesterol (mg/dL)   Date Value   04/12/2022 52   08/15/2013 94       (goal LDL is <100)   AST (U/L)   Date Value   03/14/2022 17     ALT (U/L)   Date Value   03/14/2022 16     BUN (mg/dL)   Date Value   04/12/2022 20     BP Readings from Last 3 Encounters: 05/17/22 138/89   05/10/22 (!) 151/94   04/25/22 132/76          (goal 120/80)    All Future Testing planned in [facility]  Lab Frequency Next Occurrence   Hemoglobin A1C Once 03/28/2023   PSA, Diagnostic Once 04/15/2022   Ferritin Once 04/15/2022   Iron and TIBC Once 04/15/2022   CBC with Auto Differential Once 04/15/2022   Comprehensive Metabolic Panel Once 69/89/4706   PSA, Diagnostic Once 07/24/2022   Sedimentation Rate Once 04/27/2022   CBC with Auto Differential Once 04/27/2022               Patient Active Problem List:     DM (diabetes mellitus) ([address]     HTN (hypertension)     ETOHism ([address]     Hypertensive urgency     Abnormal ambulatory electrocardiogram     Abnormal ambulatory electrocardiography     Abnormal kidney function     Adiposity     Astigmatism     Atherosclerotic heart disease of native coronary artery without angina pectoris     Cervical radiculopathy     Chronic back pain     Decreased cardiac output     Diabetic peripheral neuropathy (HCC)     Dislocated intraocular lens     Disturbance in sleep behavior     Dizziness     Dyssomnia     Floaters     Impotence of organic origin     Left ventricular dysfunction     Low vision, both eyes     Myopia     Nuclear sclerosis of left eye     Obstructive sleep apnea syndrome     Palpitations     Personal history of other diseases of the digestive system     Posterior vitreous detachment     Presbyopia     Primary osteoarthritis of right hip     Pseudophakia of right eye     Repetitive intrusions of sleep     Sciatica     Tendonitis     Vitamin D deficiency     Vitreous opacities of right eye     Dislocated IOL (intraocular lens), anterior, right     Prostate CA ([address]     Congestive heart failure ([address]     Essential hypertension

## 2022-05-18 ENCOUNTER — HOSPITAL ENCOUNTER (OUTPATIENT)
Age: 62
Setting detail: SPECIMEN
Discharge: HOME | End: 2022-05-18

## 2022-05-18 ENCOUNTER — HOSPITAL ENCOUNTER (OUTPATIENT)
Dept: RADIATION ONCOLOGY | Facility: MEDICAL CENTER | Age: 62
Discharge: HOME | End: 2022-05-18
Attending: STUDENT IN AN ORGANIZED HEALTH CARE EDUCATION/TRAINING PROGRAM
Payer: MEDICARE

## 2022-05-18 DIAGNOSIS — C61 PROSTATE CA (HCC): ICD-10-CM

## 2022-05-18 LAB
ABSOLUTE EOS #: 0.18 K/UL (ref 0–0.44)
ABSOLUTE IMMATURE GRANULOCYTE: <0.03 K/UL (ref 0–0.3)
ABSOLUTE LYMPH #: 1.57 K/UL (ref 1.1–3.7)
ABSOLUTE MONO #: 0.53 K/UL (ref 0.1–1.2)
ALBUMIN SERPL-MCNC: 4.2 G/DL (ref 3.5–5.2)
ALBUMIN/GLOBULIN RATIO: 1.1 (ref 1–2.5)
ALP BLD-CCNC: 125 U/L (ref 40–129)
ALT SERPL-CCNC: 19 U/L (ref 5–41)
ANION GAP SERPL CALCULATED.3IONS-SCNC: 14 MMOL/L (ref 9–17)
AST SERPL-CCNC: 22 U/L
BASOPHILS # BLD: 1 % (ref 0–2)
BASOPHILS ABSOLUTE: 0.04 K/UL (ref 0–0.2)
BILIRUB SERPL-MCNC: 0.42 MG/DL (ref 0.3–1.2)
BUN BLDV-MCNC: 17 MG/DL (ref 8–23)
CALCIUM SERPL-MCNC: 9.7 MG/DL (ref 8.6–10.4)
CHLORIDE BLD-SCNC: 100 MMOL/L (ref 98–107)
CO2: 24 MMOL/L (ref 20–31)
CREAT SERPL-MCNC: 1.06 MG/DL (ref 0.7–1.2)
EOSINOPHILS RELATIVE PERCENT: 4 % (ref 1–4)
FERRITIN: 169 NG/ML (ref 30–400)
GFR AFRICAN AMERICAN: >60 ML/MIN
GFR NON-AFRICAN AMERICAN: >60 ML/MIN
GFR SERPL CREATININE-BSD FRML MDRD: ABNORMAL ML/MIN/{1.73_M2}
GLUCOSE BLD-MCNC: 173 MG/DL (ref 70–99)
HCT VFR BLD CALC: 39.2 % (ref 40.7–50.3)
HEMOGLOBIN: 12 G/DL (ref 13–17)
IMMATURE GRANULOCYTES: 0 %
IRON SATURATION: 21 % (ref 20–55)
IRON: 51 UG/DL (ref 59–158)
LYMPHOCYTES # BLD: 33 % (ref 24–43)
MCH RBC QN AUTO: 25.9 PG (ref 25.2–33.5)
MCHC RBC AUTO-ENTMCNC: 30.6 G/DL (ref 28.4–34.8)
MCV RBC AUTO: 84.7 FL (ref 82.6–102.9)
MONOCYTES # BLD: 11 % (ref 3–12)
NRBC AUTOMATED: 0 PER 100 WBC
PDW BLD-RTO: 15.9 % (ref 11.8–14.4)
PLATELET # BLD: 389 K/UL (ref 138–453)
PMV BLD AUTO: 9.7 FL (ref 8.1–13.5)
POTASSIUM SERPL-SCNC: 4.2 MMOL/L (ref 3.7–5.3)
PROSTATE SPECIFIC ANTIGEN: 5.12 NG/ML
RBC # BLD: 4.63 M/UL (ref 4.21–5.77)
RBC # BLD: ABNORMAL 10*6/UL
SEG NEUTROPHILS: 51 % (ref 36–65)
SEGMENTED NEUTROPHILS ABSOLUTE COUNT: 2.49 K/UL (ref 1.5–8.1)
SODIUM BLD-SCNC: 138 MMOL/L (ref 135–144)
TOTAL IRON BINDING CAPACITY: 247 UG/DL (ref 250–450)
TOTAL PROTEIN: 7.9 G/DL (ref 6.4–8.3)
UNSATURATED IRON BINDING CAPACITY: 196 UG/DL (ref 112–347)
WBC # BLD: 4.8 K/UL (ref 3.5–11.3)

## 2022-05-18 PROCEDURE — 77014 PR CT GUIDANCE PLACEMENT RAD THERAPY FIELDS: CPT | Performed by: STUDENT IN AN ORGANIZED HEALTH CARE EDUCATION/TRAINING PROGRAM

## 2022-05-18 PROCEDURE — 77385 HC NTSTY MODUL RAD TX DLVR SMPL: CPT | Performed by: STUDENT IN AN ORGANIZED HEALTH CARE EDUCATION/TRAINING PROGRAM

## 2022-05-18 PROCEDURE — 77336 RADIATION PHYSICS CONSULT: CPT | Performed by: STUDENT IN AN ORGANIZED HEALTH CARE EDUCATION/TRAINING PROGRAM

## 2022-05-19 ENCOUNTER — HOSPITAL ENCOUNTER (OUTPATIENT)
Dept: PHYSICAL THERAPY | Age: 62
Setting detail: THERAPIES SERIES
Discharge: HOME | End: 2022-05-19
Payer: MEDICARE

## 2022-05-19 ENCOUNTER — HOSPITAL ENCOUNTER (OUTPATIENT)
Dept: RADIATION ONCOLOGY | Facility: MEDICAL CENTER | Age: 62
Discharge: HOME | End: 2022-05-19
Attending: STUDENT IN AN ORGANIZED HEALTH CARE EDUCATION/TRAINING PROGRAM
Payer: MEDICARE

## 2022-05-19 PROCEDURE — 97110 THERAPEUTIC EXERCISES: CPT

## 2022-05-19 PROCEDURE — 77385 HC NTSTY MODUL RAD TX DLVR SMPL: CPT | Performed by: STUDENT IN AN ORGANIZED HEALTH CARE EDUCATION/TRAINING PROGRAM

## 2022-05-19 PROCEDURE — 77014 PR CT GUIDANCE PLACEMENT RAD THERAPY FIELDS: CPT | Performed by: STUDENT IN AN ORGANIZED HEALTH CARE EDUCATION/TRAINING PROGRAM

## 2022-05-19 NOTE — FLOWSHEET NOTE
[] [address] [facility] &  Therapy  [address]  [phone]  F: [phone] [] [address]  [address]   [address]  P: [phone]  F: [phone] [] [address]  [address]  P: [phone]  F: [phone] [] [address]  P: [phone]  F: [phone] [] [address]  [facility]   [address]   [place]: [phone]  F: [phone]      Physical Therapy Daily Treatment Note    Date:  2022  Patient Name:  Kavitha Jay    :  1960  MRN: [id number]  Physician: Hortencia Dennis DPM                     Insurance: [facility] Medicare / Medicaid   Medical Diagnosis: Sciatica of right side M54.31               Rehab Codes: M54.31, M25.571, M25.572, R20.2   Onset Date: 22                              Next 's appt:   Visit# / total visits: 2/10    Cancels/No Shows: 0/0    Subjective:    Pain:  [x] Yes  [] No Location: R hip  Pain Rating: (0-10 scale) 8/10  Pain altered Tx:  [x] No  [] Yes  Action:  Comments: Pt arrives reporting moderately high R hip pain this date. Reports compliance with HEP.     Objective:  Modalities:   Precautions:  Exercises:  Exercise Reps/ Time Weight/ Level Comments   SciFit 5' L1          Seated      Hamstring stretch 3x15\"     LAQ 15x ea     Hamstring curl 15x ea lime    Piriformis stretch 3x30\"     Hip abduction 15x lime    Hip adduction  15x ball    Heel raises 15x                  Supine      Bent knee fall out 5x10\"     SLR 10x ea     Marches with TrA 15x ea      LTR 10x5\"     SKTC 3x15\" ea     Amor stretch 2x1' ea           Prone      Hamstring curls 15x ea             Other:      Treatment Charges: Mins Units   []  Modalities     [x]  Ther Exercise 45 3   []  Manual Therapy []  Ther Activities     []  Aquatics     []  Vasocompression     []  Other     Total Treatment time 45 3       Assessment: [x] Progressing toward goals. Initiated treatment with SciFit for active warm up with good tolerance. Focused treatment on progressing bilateral hip and core strength this date. Good recall of HEP exercises with only min verbal cues required for exercise technique. Pt reports an overall decrease in hip pain at end of treatment. [] No change. [] Other:  [x] Patient would continue to benefit from skilled physical therapy services in order to:  improve core strengthening and reduce radicular pain. STG: (to be met in 10 treatments)  1. ? Pain: 3/10 feet pain with ADLs. 2. ? Strength: 4+/5 R hip flexion to demonstrate improved core strength. 3. ? Function: Oswestry score to 38% impaired or better to improve ADLs. 4. Independent with Home Exercise Programs      Pt. Education:  [x] Yes  [] No  [x] Reviewed Prior HEP/Ed  Method of Education: [x] Verbal  [x] Demo  [] Written  Comprehension of Education:  [x] Verbalizes understanding. [] Demonstrates understanding. [x] Needs review. [] Demonstrates/verbalizes HEP/Ed previously given. Access Code: [email or web address]  URL: [email or web address]  Date: 05/16/2022  Prepared by: Benja Santos     Exercises  Supine Active Straight Leg Raise - 1 x daily - 7 x weekly - 3 sets - 10 reps  Bent Knee Fallouts - 1 x daily - 7 x weekly - 3 sets - 10 reps  Prone Knee Flexion - 1 x daily - 7 x weekly - 3 sets - 10 reps  Supine Quadriceps Stretch with Strap on Table - 1 x daily - 7 x weekly - 3 sets - 10 reps - 20 hold  Seated Hamstring Stretch - 1 x daily - 7 x weekly - 3 sets - 10 reps    Plan: [x] Continue current frequency toward long and short term goals.     [x] Specific Instructions for subsequent treatments:  R hip and core strengthening          Time In: 11:24am            Time Out: 12:15pm    Electronically signed by:  Joie Mcgarry

## 2022-05-20 ENCOUNTER — HOSPITAL ENCOUNTER (OUTPATIENT)
Dept: RADIATION ONCOLOGY | Facility: MEDICAL CENTER | Age: 62
Discharge: HOME | End: 2022-05-20
Attending: STUDENT IN AN ORGANIZED HEALTH CARE EDUCATION/TRAINING PROGRAM
Payer: MEDICARE

## 2022-05-20 PROCEDURE — 77014 PR CT GUIDANCE PLACEMENT RAD THERAPY FIELDS: CPT | Performed by: STUDENT IN AN ORGANIZED HEALTH CARE EDUCATION/TRAINING PROGRAM

## 2022-05-20 PROCEDURE — 77427 RADIATION TX MANAGEMENT X5: CPT | Performed by: STUDENT IN AN ORGANIZED HEALTH CARE EDUCATION/TRAINING PROGRAM

## 2022-05-20 PROCEDURE — 77385 HC NTSTY MODUL RAD TX DLVR SMPL: CPT | Performed by: STUDENT IN AN ORGANIZED HEALTH CARE EDUCATION/TRAINING PROGRAM

## 2022-05-23 ENCOUNTER — HOSPITAL ENCOUNTER (OUTPATIENT)
Dept: RADIATION ONCOLOGY | Facility: MEDICAL CENTER | Age: 62
Discharge: HOME | End: 2022-05-23
Payer: MEDICARE

## 2022-05-23 PROCEDURE — 77014 PR CT GUIDANCE PLACEMENT RAD THERAPY FIELDS: CPT | Performed by: STUDENT IN AN ORGANIZED HEALTH CARE EDUCATION/TRAINING PROGRAM

## 2022-05-23 PROCEDURE — 77385 HC NTSTY MODUL RAD TX DLVR SMPL: CPT | Performed by: STUDENT IN AN ORGANIZED HEALTH CARE EDUCATION/TRAINING PROGRAM

## 2022-05-24 ENCOUNTER — HOSPITAL ENCOUNTER (OUTPATIENT)
Dept: PHYSICAL THERAPY | Age: 62
Setting detail: THERAPIES SERIES
Discharge: HOME | End: 2022-05-24
Payer: MEDICARE

## 2022-05-24 ENCOUNTER — HOSPITAL ENCOUNTER (OUTPATIENT)
Dept: RADIATION ONCOLOGY | Facility: MEDICAL CENTER | Age: 62
Discharge: HOME | End: 2022-05-24
Attending: STUDENT IN AN ORGANIZED HEALTH CARE EDUCATION/TRAINING PROGRAM
Payer: MEDICARE

## 2022-05-24 ENCOUNTER — HOSPITAL ENCOUNTER (OUTPATIENT)
Dept: RADIATION ONCOLOGY | Facility: MEDICAL CENTER | Age: 62
Discharge: HOME | End: 2022-05-24
Payer: MEDICARE

## 2022-05-24 ENCOUNTER — HOSPITAL ENCOUNTER (OUTPATIENT)
Facility: MEDICAL CENTER | Age: 62
End: 2022-05-24

## 2022-05-24 VITALS — BODY MASS INDEX: 33.05 KG/M2 | WEIGHT: 257.4 LBS

## 2022-05-24 DIAGNOSIS — C61 PROSTATE CA (HCC): Primary | ICD-10-CM

## 2022-05-24 DIAGNOSIS — Z79.4 TYPE 2 DIABETES MELLITUS WITH OTHER SPECIFIED COMPLICATION, WITH LONG-TERM CURRENT USE OF INSULIN (HCC): ICD-10-CM

## 2022-05-24 DIAGNOSIS — E11.69 TYPE 2 DIABETES MELLITUS WITH OTHER SPECIFIED COMPLICATION, WITH LONG-TERM CURRENT USE OF INSULIN (HCC): ICD-10-CM

## 2022-05-24 DIAGNOSIS — I10 ESSENTIAL HYPERTENSION: ICD-10-CM

## 2022-05-24 PROCEDURE — 77014 PR CT GUIDANCE PLACEMENT RAD THERAPY FIELDS: CPT | Performed by: STUDENT IN AN ORGANIZED HEALTH CARE EDUCATION/TRAINING PROGRAM

## 2022-05-24 PROCEDURE — 97110 THERAPEUTIC EXERCISES: CPT

## 2022-05-24 PROCEDURE — 77385 HC NTSTY MODUL RAD TX DLVR SMPL: CPT | Performed by: STUDENT IN AN ORGANIZED HEALTH CARE EDUCATION/TRAINING PROGRAM

## 2022-05-24 RX ORDER — TAMSULOSIN HYDROCHLORIDE 0.4 MG/1
0.4 CAPSULE ORAL DAILY
Qty: 90 CAPSULE | Refills: 1 | Status: SHIPPED | OUTPATIENT
Start: 2022-05-24

## 2022-05-24 RX ORDER — PREGABALIN 75 MG/1
CAPSULE ORAL
Qty: 60 CAPSULE | Refills: 1 | Status: SHIPPED | OUTPATIENT
Start: 2022-05-24 | End: 2022-06-17 | Stop reason: SDUPTHER

## 2022-05-24 NOTE — PROGRESS NOTES
Sadiqangela Mathew  5/24/2022  Wt Readings from Last 3 Encounters:   05/24/22 257 lb 6.4 oz (116.8 kg)   05/17/22 260 lb (117.9 kg)   05/10/22 264 lb 3.2 oz (119.8 kg)     Body mass index is 33.05 kg/m². Treatment Area:prostate    Patient was seen today for weekly visit. Tolerating treatment without complaints. Complains of slow urine stream.  States he has to sit down to urinate because it takes too long to empty bladder. Dr. Wili Cisse notified of assessment and examined patient. Flomax ordered for urinary symptoms. No change in treatment plan. Comfort Alteration    Fatigue: None    Nutritional Alteration  Anorexia: No  Nausea: No  Vomiting: No     Elimination Alterations  Constipation: no  Diarrhea:  no  Urinary Frequency/Urgency: No  Urinary Retention: No  Dysuria: No  Urinary Incontinence: No  Proctitis: No  Nocturia: No #/night: 1     Emotional  Coping: effective      Skin Alteration   Sensation:intact    Radiation Dermatitis:  Intact [x]     Erythema  []     Discoloration  []     Rash []     Dry desquamation  []     Moist desquamation []           Injury, potential bleeding or infection:none    Lab Results   Component Value Date    WBC 4.8 05/18/2022     05/18/2022         Wt 257 lb 6.4 oz (116.8 kg)   BMI 33.05 kg/m²      Pain Assessment: None - Denies Pain            Assessment/Plan: Patient was seen today for weekly visit.       Nnamdi Lux RN

## 2022-05-24 NOTE — TELEPHONE ENCOUNTER
Last visit: 5/3/22  Last Med refill: 5/22  Does patient have enough medication for 72 hours: Yes    Next Visit Date:  Future Appointments   Date Time Provider Liset Estradai   5/24/2022  1:00 PM Vivi Rolle, PTA [facility]   5/24/2022  3:15 PM [facility]   5/24/2022  3:30 PM Carlos Verdugo MD [facility]   5/25/2022 10:15 AM Vane Kat MD [facility]   5/25/2022  3:15 PM [facility]   5/26/2022 12:00 PM Vivi Rolle, PTA [facility]   5/26/2022  3:15 PM ST[facility]SA [facility]   5/27/2022  3:15 PM STV[facility] M[facility] VERSA [facility]   5/31/2022  3:15 PM ST[facility] MIGN[facility] VERSA [facility]   5/31/2022  3:30 PM Morgan Nicolas MD [facility]   6/1/2022  3:15 PM ST[facility]SA [facility]   6/2/2022  3:15 PM ST[facility]SA [facility]   6/3/2022  3:15 PM ST[facility]SA [facility]   6/6/2022  1:15 PM Juve William, DO ORTHO [facility]   6/6/2022  3:15 PM [facility]   6/7/2022  3:15 PM [facility]   6/7/2022  3:30 PM Carlos Verdugo MD [facility]   6/8/2022 10:45 AM Linsey Zapien MD [facility]   6/8/2022  3:15 PM [facility]SA [facility]   6/9/2022  3:15 PM [facility]   6/10/2022  3:15 PM ST[facility] M[facility] VERSA [facility]   6/13/2022  3:15 PM [facility]   6/14/2022 11:30 AM Halley Quick DPM [facility]   6/14/2022  3:15 PM [facility]   6/14/2022  3:30 PM Carlos Verdugo MD [facility]   6/15/2022  3:15 PM [facility]   6/16/2022  3:15 PM ST[facility] VERSA [facility]   6/17/2022 10:45 AM Bernardo Gutierres MD [facility]   7/25/2022  9:45 AM Juve William DO ORTHO SPECIA [facility]   7/25/2022  1:50 PM Abdi Stephen MD [address]  Maintenance   Topic Date Due    Annual Wellness Visit (AWV)  Never done    Diabetic retinal exam  Never done    DTaP/Tdap/Td vaccine (1 - Tdap) Never done    Shingles vaccine (2 of 3) 02/08/2011    Diabetic foot exam  04/27/2019    Pneumococcal 0-64 years Vaccine (2 - PCV) 12/19/2019    Colorectal Cancer Screen  07/23/2020    COVID-19 Vaccine (3 - Booster for Rinku series) 01/25/2022    Flu vaccine (Season Ended) 09/01/2022    Depression Screen  03/28/2023    A1C test (Diabetic or Prediabetic)  04/12/2023    Lipids  04/12/2023    Diabetic microalbuminuria test  04/13/2023    Prostate Specific Antigen (PSA) Screening or Monitoring  05/18/2023    Hepatitis C screen  Completed    HIV screen  Completed    Hepatitis A vaccine  Aged Out    Hib vaccine  Aged Out    Meningococcal (ACWY) vaccine  Aged Out       Hemoglobin A1C (%)   Date Value   04/12/2022 8.3   03/28/2022 8.3   08/15/2013 7.8 (H)             ( goal A1C is < 7)   Microalb/Crt.  Ratio (mcg/mg creat)   Date Value   04/13/2022 Can not be calculated     LDL Cholesterol (mg/dL)   Date Value   04/12/2022 52   08/15/2013 94       (goal LDL is <100)   AST (U/L)   Date Value   05/18/2022 22     ALT (U/L)   Date Value   05/18/2022 19     BUN (mg/dL)   Date Value   05/18/2022 17     BP Readings from Last 3 Encounters:   05/17/22 138/89   05/10/22 (!) 151/94   04/25/22 132/76          (goal 120/80)    All Future Testing planned in [facility]  Lab Frequency Next Occurrence   Hemoglobin A1C Once 03/28/2023   PSA, Diagnostic Once 07/24/2022   Sedimentation Rate Once 04/27/2022   CBC with Auto Differential Once 04/27/2022               Patient Active Problem List:     DM (diabetes mellitus) ([facility] [facility]     HTN (hypertension)     ETOHism ([facility] [address] [facility]     Hypertensive urgency     Abnormal ambulatory electrocardiogram     Abnormal ambulatory electrocardiography     Abnormal kidney function     Adiposity     Astigmatism     Atherosclerotic heart disease of native coronary artery without angina pectoris     Cervical radiculopathy     Chronic back pain     Decreased cardiac output     Diabetic peripheral neuropathy (HCC)     Dislocated intraocular lens     Disturbance in sleep behavior     Dizziness     Dyssomnia     Floaters     Impotence of organic origin     Left ventricular dysfunction     Low vision, both eyes     Myopia     Nuclear sclerosis of left eye     Obstructive sleep apnea syndrome     Palpitations     Personal history of other diseases of the digestive system     Posterior vitreous detachment     Presbyopia     Primary osteoarthritis of right hip     Pseudophakia of right eye     Repetitive intrusions of sleep     Sciatica     Tendonitis     Vitamin D deficiency     Vitreous opacities of right eye     Dislocated IOL (intraocular lens), anterior, right     Prostate CA ([address]     Congestive heart failure ([address]     Essential hypertension

## 2022-05-24 NOTE — PROGRESS NOTES
Radiation Oncology On-Treatment Visit:     Fraction #  (30 Gy)    Diagnosis:  Mr. Gela Aviles is a 60 y.o. male with unfavorable intermediate risk prostate adenocarcinoma (cT1c, Octavio 4+3, PSA 5.11) s/p prostate biopsy on 3/30/2021, ADT with leuprolide 45 mg on 2021 and 2022,  [employer] and fiducial placement on 2021 at [address] [place] [address] St was to treat prostate with definitive radiation around 2021, but patient had a septic joint from a right total hip replacement done in , with multiple revision surgeries and I&Ds to the hip. He is currently followed by infectious diseases and is on chronic antibiotics. Treatment course: Definitive radiation to the prostate    Progress note:  Mr. Gela Aviles was seen and evaluated. Patient notes some increased incomplete emptying of urine. Normal bowel movements. Some hot flashes. Physical exam:   VITAL SIGNS: Wt 257 lb 6.4 oz (116.8 kg)   BMI 33.05 kg/m²   ECO Asymptomatic   CONSTITUTIONAL: Well developed, well nourished male. Alert and oriented x3. No acute distress. HEENT: Head normocephalic and atraumatic. Extraocular movements intact. NEUROLOGIC EXAM: Cranial nerves II through XII grossly intact. No focal neurologic deficit. Speech is fluent. Gait and posture are steady. PSYCHIATRIC:  Appropriate mood and affect for his clinical situation. Plan:  · Continue radiation therapy as planned. · Incomplete emptying of urine: Will start flomax 0.4 mg QHS. · I have checked the imaging performed to date, which confirm appropriate positioning.     Drugs Prescribed:  New Prescriptions    TAMSULOSIN (FLOMAX) 0.4 MG CAPSULE    Take 1 capsule by mouth daily

## 2022-05-24 NOTE — FLOWSHEET NOTE
[] [facility]) - [facility]  [facility]rapy  [address]  [phone]  F: [phone] [] [address]  [address]   [address]  P: [phone]  F: [phone] [] [address]  [address]  P: [phone]  F: [phone] [] [address]  P: [phone]  F: [phone] [] [address]  [facility]   [address]   [place]: [phone]  F: [phone]      Physical Therapy Daily Treatment Note    Date:  2022  Patient Name:  Misael Freedman    :  1960  MRN: [id number]  Physician: Jerica Navarro DPM                     Insurance: [facility] Medicare / Medicaid   Medical Diagnosis: Sciatica of right side M54.31               Rehab Codes: M54.31, M25.571, M25.572, R20.2   Onset Date: 22                              Next 's appt:   Visit# / total visits: 3/10    Cancels/No Shows: 0/0    Subjective:    Pain:  [x] Yes  [] No Location: R hip  Pain Rating: (0-10 scale) 9/10  Pain altered Tx:  [x] No  [] Yes  Action:  Comments: Patient arrives with higher pain numeric today. Reports adherence to HEP \"as he can\" and notes no significant issues with that. Reports he does feel that his walking is getting more fluid.     Objective:  Modalities:   Precautions:  Exercises:  Exercise Reps/ Time Weight/ Level Comments   SciFit 5' L2          Standing   Added 5/24   Gastroc stretch 3x20\"     Heel raises  15x           Seated      Hamstring stretch 3x15\"     LAQ 15x ea     Hamstring curl 15x ea lime    Piriformis stretch 3x30\"     Hip adduction  15x ball    Heel raises                  Supine      Bent knee fall out 5x10\"     SLR 10x ea     Marches with TrA 15x ea      LTR 10x5\"     SKTC 3x15\" ea     Amor stretch 2x1' ea     Bridge 15x Added 5/24   Clamshell 2x10 Lime          Prone      Hamstring curls 15x ea             Other:      Treatment Charges: Mins Units   []  Modalities     [x]  Ther Exercise 45 3   []  Manual Therapy     []  Ther Activities     []  Aquatics     []  Vasocompression     []  Other     Total Treatment time 45 3       Assessment: [x] Progressing toward goals. Progressed LE strengthening with addition of standing exercises as noted above with no considerable pain or symptoms noted per patient. Notes improved motion and decreased tension in R hip post treatment this date. Educated for use of ice/heat at if any soreness onset. [] No change. [] Other:  [x] Patient would continue to benefit from skilled physical therapy services in order to:  improve core strengthening and reduce radicular pain. STG: (to be met in 10 treatments)  1. ? Pain: 3/10 feet pain with ADLs. 2. ? Strength: 4+/5 R hip flexion to demonstrate improved core strength. 3. ? Function: Oswestry score to 38% impaired or better to improve ADLs. 4. Independent with Home Exercise Programs      Pt. Education:  [x] Yes  [] No  [x] Reviewed Prior HEP/Ed  Method of Education: [x] Verbal  [x] Demo  [] Written  Comprehension of Education:  [x] Verbalizes understanding. [] Demonstrates understanding. [x] Needs review. [] Demonstrates/verbalizes HEP/Ed previously given. Access Code: [email or web address]  URL: [email or web address]  Date: 05/16/2022  Prepared by: Srinath Moralez     Exercises  Supine Active Straight Leg Raise - 1 x daily - 7 x weekly - 3 sets - 10 reps  Bent Knee Fallouts - 1 x daily - 7 x weekly - 3 sets - 10 reps  Prone Knee Flexion - 1 x daily - 7 x weekly - 3 sets - 10 reps  Supine Quadriceps Stretch with Strap on Table - 1 x daily - 7 x weekly - 3 sets - 10 reps - 20 hold  Seated Hamstring Stretch - 1 x daily - 7 x weekly - 3 sets - 10 reps    Plan: [x] Continue current frequency toward long and short term goals.     [x] Specific Instructions for subsequent treatments:  R hip and core strengthening          Time In: 100 pm            Time Out: 150 pm    Electronically signed by:  Mei Colin PTA

## 2022-05-25 ENCOUNTER — OFFICE VISIT (OUTPATIENT)
Dept: ONCOLOGY | Age: 62
End: 2022-05-25
Payer: MEDICARE

## 2022-05-25 ENCOUNTER — HOSPITAL ENCOUNTER (OUTPATIENT)
Dept: RADIATION ONCOLOGY | Facility: MEDICAL CENTER | Age: 62
Discharge: HOME | End: 2022-05-25
Payer: MEDICARE

## 2022-05-25 ENCOUNTER — TELEPHONE (OUTPATIENT)
Dept: ONCOLOGY | Age: 62
End: 2022-05-25

## 2022-05-25 VITALS
WEIGHT: 259.1 LBS | BODY MASS INDEX: 33.27 KG/M2 | DIASTOLIC BLOOD PRESSURE: 91 MMHG | HEART RATE: 91 BPM | SYSTOLIC BLOOD PRESSURE: 132 MMHG | TEMPERATURE: 96.9 F

## 2022-05-25 DIAGNOSIS — I10 ESSENTIAL HYPERTENSION: ICD-10-CM

## 2022-05-25 DIAGNOSIS — E11.69 TYPE 2 DIABETES MELLITUS WITH OTHER SPECIFIED COMPLICATION, WITH LONG-TERM CURRENT USE OF INSULIN (HCC): ICD-10-CM

## 2022-05-25 DIAGNOSIS — C61 PROSTATE CA (HCC): Primary | ICD-10-CM

## 2022-05-25 DIAGNOSIS — Z79.4 TYPE 2 DIABETES MELLITUS WITH OTHER SPECIFIED COMPLICATION, WITH LONG-TERM CURRENT USE OF INSULIN (HCC): ICD-10-CM

## 2022-05-25 PROCEDURE — 77385 HC NTSTY MODUL RAD TX DLVR SMPL: CPT | Performed by: STUDENT IN AN ORGANIZED HEALTH CARE EDUCATION/TRAINING PROGRAM

## 2022-05-25 PROCEDURE — G8427 DOCREV CUR MEDS BY ELIG CLIN: HCPCS | Performed by: INTERNAL MEDICINE

## 2022-05-25 PROCEDURE — 77336 RADIATION PHYSICS CONSULT: CPT | Performed by: STUDENT IN AN ORGANIZED HEALTH CARE EDUCATION/TRAINING PROGRAM

## 2022-05-25 PROCEDURE — 1036F TOBACCO NON-USER: CPT | Performed by: INTERNAL MEDICINE

## 2022-05-25 PROCEDURE — G8417 CALC BMI ABV UP PARAM F/U: HCPCS | Performed by: INTERNAL MEDICINE

## 2022-05-25 PROCEDURE — 77014 PR CT GUIDANCE PLACEMENT RAD THERAPY FIELDS: CPT | Performed by: STUDENT IN AN ORGANIZED HEALTH CARE EDUCATION/TRAINING PROGRAM

## 2022-05-25 PROCEDURE — 99214 OFFICE O/P EST MOD 30 MIN: CPT | Performed by: INTERNAL MEDICINE

## 2022-05-25 PROCEDURE — 99211 OFF/OP EST MAY X REQ PHY/QHP: CPT

## 2022-05-25 PROCEDURE — 3017F COLORECTAL CA SCREEN DOC REV: CPT | Performed by: INTERNAL MEDICINE

## 2022-05-25 NOTE — PROGRESS NOTES
Patient ID: Heather Payan, 1960, [id number], 60 y.o. Referred by : Susan Chávez MD  Reason for consultation:   Prostate cancer diagnosed in March 2021, initial PSA 5.1, HIghest [place] Grade: 4+3=7, Clinical stage is: Stage IIc, cT1cNo MO, NCCN clinical risk group is: Unfavorable intermediate risk    Urologist Dr. Chandrika Burt    Patient has been started on 6 monthly Lupron injection in July 2021 while awaiting initiation of radiation therapy which was significantly delayed secondary to his hip infection  Now his radiation therapy started on 5/9/2022  HISTORY OF PRESENT ILLNESS:    Oncologic History:  Heather Payan is a 60 y.o. AA male was seen during his consultation visit for diagnosis of prostate cancer. Patient was diagnosed with prostate cancer in March 2021 when his initial PSA was elevated at 5.11. His biopsy showed [place] 7 diagnosed with T1CN0, unfavorable intermediate risk prostatic adenocarcinoma. Patient was referred to radiation oncology for consideration of radiation therapy. He had a bone scan and CT abdomen pelvis at that that really did not show any nephrolithiasis or distant metastatic disease. He was seen by [facility] and external beam radiation therapy was recommended and he did get fiducial spacer placement in August 2021. Patient has had right hip replacement and had been following with orthopedic surgery for hip infection. He had right total hip arthroplasty revision on 10/18/2021. Therefore his radiation therapy was delayed. In the interim patient was started on Lupron injection in July 2021. He has received 2 injections so far. Patient has been following with infectious disease for his infection and has been on antibiotics. He had a debridement and VAC placement and had another surgery in December 2021. He reports that his most recent surgery was in January of this year. His most recent PSA in February was 1.8.     Interval history:  Patient is returning for follow-up visit and to discuss lab results and further recommendations. His PSA has slightly increased to 5.1. He has started radiation therapy about 2 weeks ago and tolerating well. He denies any abdominal pain, nausea vomiting. He is still on antibiotic as per ID for his hip infection. He is following with orthopedic surgery Dr. Marcella Blake. And no plans for surgery at this point. During this visit patient's allergy, social, medical, surgical history and medications were reviewed and updated.       Past Medical History:   Diagnosis Date    Arthritis     DM (diabetes mellitus) ([address] 2009    IDDM    GERD (gastroesophageal reflux disease) 2017    ON RX    Heart murmur 2013    ASYMPTOMATIC    HTN (hypertension) 6/29/2012    ON RX    Hyperlipidemia 06/29/2012    ON RX    Sleep apnea 2016    TRIED MACHINE COULD NOT TOLERATE    Vision abnormalities 2019    FLOATERS RIGHT EYE    Wears glasses        Past Surgical History:   Procedure Laterality Date    COLONOSCOPY  07/23/2010    Dr. Asif Yao Bilateral 2017    CATARACT EXTRACTION WITH IOL    KNEE ARTHROSCOPY     [address]    VITRECTOMY Right 03/28/2019    VITRECTOMY Right 3/28/2019    VITRECTOMY 25 GAUGE,  IOL REPOSITION  (Jasmyn Sheth) performed by Diamond Velasquez MD at [address]   Allergen Reactions    Lisinopril Swelling     Outer Neck swelling    Melatonin Swelling    Trazodone Swelling     Chest swells       Current Outpatient Medications   Medication Sig Dispense Refill    metoprolol tartrate (LOPRESSOR) 25 MG tablet TAKE ONE - HALF (1/2) TABLET BY MOUTH TWO TIMES A DAY 30 tablet 2    pregabalin (LYRICA) 75 MG capsule TAKE 1 CAPSULE BY MOUTH 2 TIMES A DAY 60 capsule 1    tamsulosin (FLOMAX) 0.4 mg capsule Take 1 capsule by mouth daily 90 capsule 1    metFORMIN (GLUCOPHAGE) 500 MG tablet TAKE TWO TABLETS BY MOUTH TWICE A DAY WITH MEALS 60 tablet 3    TRULICITY 3 YS/7.8AQ SOPN INJECT THE CONTENTS OF ONE SYRINGE (3MG) UNDER THE SKIN ONCE WEEKLY 2 mL 3    LANTUS SOLOSTAR 100 UNIT/ML injection pen INJECT 42 UNITS INTO THE SKIN TWO TIMES A DAY 15 mL 3    ibuprofen (ADVIL;MOTRIN) 600 MG tablet Take 1 tablet by mouth 3 times daily as needed for Pain 90 tablet 0    Continuous Blood Gluc Transmit (DEXCOM G6 TRANSMITTER) MISC Use in combination with new sensor every 10 days. Transmitter lasts 3 months 1 each 3    Continuous Blood Gluc Sensor (DEXCOM G6 SENSOR) MISC Apply new sensor to abdomen every 10 days 3 each 11    Continuous Blood Gluc  (DEXCOM G6 ) MAMADOU Use as directed to monitor glucose continuously 1 each 1    zinc 50 MG CAPS Take 1 capsule by mouth daily      aspirin EC 81 MG EC tablet Take 1 tablet by mouth daily 90 tablet 1    atorvastatin (LIPITOR) 40 MG tablet Take 1 tablet by mouth daily 30 tablet 3    empagliflozin (JARDIANCE) 25 MG tablet Take 25 mg by mouth daily 30 tablet 1    Insulin Pen Needle (B-D ULTRAFINE III SHORT PEN) 31G X 8 MM MISC 1 each by Does not apply route daily 100 each 3    insulin lispro, 1 Unit Dial, (HUMALOG KWIKPEN) 100 UNIT/ML SOPN Inject 6 Units into the skin 3 times daily (before meals) 3 pen 2    spironolactone (ALDACTONE) 25 MG tablet Take 1 tablet by mouth daily. 30 tablet 2    Amoxicillin 500 MG TABS take 4 tablets by mouth 1 hour prior to dental appointment (Patient not taking: Reported on 5/25/2022)      venlafaxine (EFFEXOR XR) 37.5 MG extended release capsule Take 1 capsule by mouth daily 30 capsule 3    ferrous sulfate (IRON 325) 325 (65 Fe) MG tablet Take 325 mg by mouth daily (with breakfast) (Patient not taking: Reported on 5/25/2022)      vitamin D3 (CHOLECALCIFEROL) 25 MCG (1000 UT) TABS tablet Take 1 tablet by mouth daily (Patient not taking: Reported on 5/25/2022) 90 tablet 1     No current facility-administered medications for this visit.        Social History     Socioeconomic History    Marital status: Single Spouse name: Not on file    Number of children: Not on file    Years of education: Not on file    Highest education level: Not on file   Occupational History    Not on file   Tobacco Use    Smoking status: Former Smoker     Packs/day: 0.00     Years: 15.00     Pack years: 0.00     Quit date: 3/27/2016     Years since quittin.1    Smokeless tobacco: Never Used   Vaping Use    Vaping Use: Never used   Substance and Sexual Activity    Alcohol use: Yes     Comment: etoh abuse. BEER, WINE  12 A WEEK rarley    Drug use: No    Sexual activity: Yes   Other Topics Concern    Not on file   Social History Narrative    Not on file     Social Determinants of Health     Financial Resource Strain:     Difficulty of Paying Living Expenses: Not on file   Food Insecurity:     Worried About Running Out of Food in the Last Year: Not on file    Christine of Food in the Last Year: Not on file   Transportation Needs:     Lack of Transportation (Medical): Not on file    Lack of Transportation (Non-Medical):  Not on file   Physical Activity:     Days of Exercise per Week: Not on file    Minutes of Exercise per Session: Not on file   Stress:     Feeling of Stress : Not on file   Social Connections:     Frequency of Communication with Friends and Family: Not on file    Frequency of Social Gatherings with Friends and Family: Not on file    Attends Taoism Services: Not on file    Active Member of [address] [place] [address] or Organizations: Not on file    Attends Club or Organization Meetings: Not on file    Marital Status: Not on file   Intimate Partner Violence:     Fear of Current or Ex-Partner: Not on file    Emotionally Abused: Not on file    Physically Abused: Not on file    Sexually Abused: Not on file   Housing Stability:     Unable to Pay for Housing in the Last Year: Not on file    Number of [place] in the Last Year: Not on file    Unstable Housing in the Last Year: Not on file       Family History   Problem Relation Age of Onset    Diabetes Sister     Diabetes Brother     Cancer Brother         REVIEW OF SYSTEM:     Constitutional: No fever or chills. No night sweats, no weight loss   Eyes: No eye discharge, double vision, or eye pain   HEENT: negative for sore mouth, sore throat, hoarseness and voice change   Respiratory: negative for cough , sputum, dyspnea, wheezing, hemoptysis, chest pain   Cardiovascular: negative for chest pain, dyspnea, palpitations, orthopnea, PND   Gastrointestinal: negative for nausea, vomiting, diarrhea, constipation, abdominal pain, Dysphagia, hematemesis and hematochezia   Genitourinary: negative for frequency, dysuria, nocturia, urinary incontinence, and hematuria   Integument: negative for rash, skin lesions, bruises.    Hematologic/Lymphatic: negative for easy bruising, bleeding, lymphadenopathy, petechiae and swelling/edema   Endocrine: negative for heat or cold intolerance, tremor, weight changes, change in bowel habits and hair loss   Musculoskeletal: negative for myalgias, arthralgias, pain, joint swelling,and bone pain   Neurological: negative for headaches, dizziness, seizures, weakness, numbness       OBJECTIVE:         Vitals:    05/25/22 1033   BP: (!) 132/91   Pulse:    Temp:        PHYSICAL EXAM:   General appearance - well appearing, no in pain or distress   Mental status - alert and cooperative   Eyes - pupils equal and reactive, extraocular eye movements intact   Ears - bilateral TM's and external ear canals normal   Mouth - mucous membranes moist, pharynx normal without lesions   Neck - supple, no significant adenopathy   Lymphatics - no palpable lymphadenopathy, no hepatosplenomegaly   Chest - clear to auscultation, no wheezes, rales or rhonchi, symmetric air entry   Heart - normal rate, regular rhythm, normal S1, S2, no murmurs, rubs, clicks or gallops   Abdomen - soft, nontender, nondistended, no masses or organomegaly   Neurological - alert, oriented, normal speech, no focal findings or movement disorder noted   Musculoskeletal - no joint tenderness, deformity or swelling   Extremities - peripheral pulses normal, no pedal edema, no clubbing or cyanosis   Skin - normal coloration and turgor, no rashes, no suspicious skin lesions noted ,      LABORATORY DATA:     Lab Results   Component Value Date    WBC 4.8 2022    HGB 12.0 (L) 2022    HCT 39.2 (L) 2022    MCV 84.7 2022     2022    LYMPHOPCT 33 2022    RBC 4.63 2022    MCH 25.9 2022    MCHC 30.6 2022    RDW 15.9 (H) 2022    MONOPCT 11 2022    BASOPCT 1 2022    NEUTROABS 2.49 2022    LYMPHSABS 1.57 2022    MONOSABS 0.53 2022    EOSABS 0.18 2022    BASOSABS 0.04 2022         Chemistry        Component Value Date/Time     2022 0808    K 4.2 2022 0808     2022 0808    CO2 24 2022 0808    BUN 17 2022 0808    CREATININE 1.06 2022 0808        Component Value Date/Time    CALCIUM 9.7 2022 0808    ALKPHOS 125 2022 0808    AST 22 2022 0808    ALT 19 2022 0808    BILITOT 0.42 2022 0808            PATHOLOGY DATA:   Patient Name: Elham Vasquez. : 1960 (Age: 63) Gender: M Taken: 3/30/2021 Reported: 2021 Physician(s): Lex Bauman MD [phone]) Copy To:  Accession #: [id number] [facility]. Rec. #: [id number] Acct: # [de-identified]   Final Pathologic Diagnosis   1. Prostate, LLA, core biopsy:        Prostatic tissue, no tumor present. 2. Posterior the, LLB, core biopsy:        Prostatic tissue, no tumor present. 3. Prostate, LLM, core biopsy:        Prostatic tissue, no tumor present. 4. Prostate, LB, core biopsy:        Prostatic tissue, no tumor present. 5. Prostate, LM, core biopsy:        Prostatic tissue, no tumor present. 6. Prostate, LA, core biopsy:        Prostatic tissue, no tumor present.      7. Prostate, RB, core biopsy:        Prostatic tissue, no tumor present. 8. Posterior the, RM, core biopsy:        Prostatic tissue, no tumor present. 9. Prostate, RA, core biopsy:        Prostatic tissue, no tumor present. 10. Prostate, RLB, core biopsy:        Prostatic adenocarcinoma, Octavio score 3+3 = 6 (grade group 1), involving one of three tissue core fragments.        Proportion of prostatic tissue involved by tumor: 4%.        Total linear millimeters of the carcinoma: 1.0 mm.        Total linear millimeters of needle core tissue:28.0 mm.           11. Prostate, RLM, core biopsy:        Prostatic adenocarcinoma, [place] score 4+3 = 7 (grade group 3), involving one of three tissue core fragments.        Proportion of prostatic tissue involved by tumor: 21%.      Total linear millimeters of the carcinoma: 3.0 mm.        Total linear millimeters of needle core tissue:14.0 mm. 12. Prostate, RLA, core biopsy:        Prostatic tissue, no tumor present. Comment:  In order to confirm invasion, immunoperoxidase stains for p63 and K903 were performed 0n 10A and 11A with adequate control.  The stains are negative in concerning glands, supporting the interpretation        IMAGING DATA:      Echocardiogram Complete 2D w Doppler w Color  Transthoracic Echocardiography Report (TTE)     Patient Name  Eddie Reagan     Date of Study                   04/22/2022                 Carmen Little      Date of Birth 1960  Gender                          Male      Age           57 year(s)  Race                            Black      Room Number      Corporate ID  [id number]   #      Patient Acct  [de-identified]   #      MR #          [id number]     Sonographer                     Cami Silver      Accession #   [id number]  Interpreting Physician          [address] [place] [address]      Fellow                    Referring Nurse Practitioner      Interpreting              Referring Physician             Manda Esparza   Fellow     Type of Study      TTE procedure:2D Echocardiogram, M-Mode, Doppler, Color Doppler. Procedure Date  Date: 04/22/2022 Start: 11:10 AM    Study Location: [facility]  Technical Quality: Adequate visualization    Indications:Congestive heart failure. History / Tech. Comments:  Procedure explained to patient. Echo completed in the Echo Lab. PMHx:  Former smoker, Alcohol abuse  Hypertension, Diabetes    Patient Status: Outpatient    CONCLUSIONS    Summary  Left ventricle is normal in size, global left ventricular systolic function  is preserved, estimated ejection fraction is 55%. (Abnormal heart rate,  making it difficult to assess for wall motion abnormalities.)  Evidence of diastolic dysfunction. Left atrial dilatation. Inter-atrial septum is intact with no evidence for an atrial septal defect,  by color doppler. Signature  ----------------------------------------------------------------------------   Electronically signed by Laurence Portillo(Sonographer) on 04/22/2022 02:39 PM  ----------------------------------------------------------------------------    ----------------------------------------------------------------------------   Electronically signed by Warren Mesa(Interpreting physician) on 04/22/2022   04:11 PM  ----------------------------------------------------------------------------  FINDINGS  Left Atrium  Left atrial dilatation. Inter-atrial septum is intact with no evidence for an atrial septal defect,  by color doppler. Left Ventricle  Left ventricle is normal in size, global left ventricular systolic function  is preserved, estimated ejection fraction is 55%. (Abnormal heart rate,  making it difficult to assess for wall motion abnormalities.)  Evidence of diastolic dysfunction. Right Atrium  Right atrium is normal in size. Right Ventricle  Normal right ventricular size and function. Mitral Valve  Normal mitral valve structure. No mitral stenosis. No evidence of mitral regurgitation.   Aortic Valve  Aortic valve is trileaflet. No aortic stenosis. No aortic insufficiency. Tricuspid Valve  Tricuspid valve was not well visualized. No tricuspid regurgitation was seen. Insignificant tricuspid regurgitation, unable to estimate RVSP. Pulmonic Valve  Pulmonic valve not well visualized but Doppler velocities are normal.  Pericardial Effusion  No significant pericardial effusion is seen. Miscellaneous  Normal aortic root dimension. E/E' average = 7.2. IVC normal diameter & inspiratory collapse indicating normal RA filling  pressure .     M-mode / 2D Measurements & Calculations:      LVIDd:5.1 cm(3.7 - 5.6 cm)        Diastolic AJNFPW:805.67 ml   IVSd:1.4 cm(0.6 - 1.1 cm)         Systolic MQUBCE:48.3 ml   LVPWd:1 cm(0.6 - 1.1 cm)          Aortic Root:3.6 cm(2.0 - 3.7 cm)                                     LA Dimension: 4.1 cm(1.9 - 4.0 cm)   Calculated LVEF (%): 56.93 %      LA volume/Index: 80.26 ml                                     LVOT:2.3 cm      Mitral:                                 Aortic      Valve Area (P1/2-Time): 3.55 cm^2       Peak Velocity: 1.12 m/s   Peak E-Wave: 0.62 m/s                   Mean Velocity: 0.80 m/s   Peak A-Wave: 0.75 m/s                   Peak Gradient: 5.02 mmHg   E/A Ratio: 0.82                         Mean Gradient: 3 mmHg   Peak Gradient: 1.54 mmHg   Mean Gradient: 1 mmHg   Deceleration Time: 185 msec             Area (continuity): 4.28 cm^2   P1/2t: 62 msec                          AV VTI: 22.1 cm      Area (continuity): 4.08 cm^2   Mean Velocity: 0.50 m/s                                              Pulmonic:                                              Peak Velocity: 1.31 m/s                                           Peak Gradient: 6.86 mmHg     Diastology / Tissue Doppler  Septal Wall E' velocity:0.08 m/s  Septal Wall E/E':7.4  Lateral Wall E' velocity:0.09 m/s  Lateral Wall E/E':7     ASSESSMENT:    Mireille Sumner is a 60 y.o. male with a diagnosis ofAA unfavorable intermediate risk prostate cancer, clinical stage T1 cN0 M0, [place] 4+3 + 7, initial PSA 5.11.,  Diagnosed in March 2021  I reviewed laboratory, outside records, prior records, discussed diagnosis, prognosis treatment recommendations. I reviewed the significance and goals of care. Patient did not want surgery and wanted radiation therapy at that time and was evaluated by radiation oncology at [facility].  He was recommended definitive external beam radiation therapy along with androgen suppression therapy and has received 2 doses of Lupron shot 45 mg starting in July 2021. Patient received placement of fiducial marker, SpaceO[employer] on 8/13/2021. Patient had right hip revision on 10/18/21 for his ongoing hip prosthesis infection therefore his radiation was delayed. Now he started on radiation therapy. During today's visit, the patient and the family had a number of reasonable questions which were answered to their satisfaction. They verbalized understanding of the information provided and they agreed to proceed as outlined above. PLAN:   I reviewed his recent lab work, discussed diagnosis and treatment recommendations  Now he started on radiation therapy about 2 weeks ago and tolerating well  Follow-up with orthopedic surgery and ID for his hip infection  Return to clinic in 8 weeks with labs prior        Dedrick De La Cruz MD  Hematologist/Medical Oncologist    On this date 5/25/22  I have spent 40 minutes reviewing previous notes, test results and face to face with the patient discussing the diagnosis and importance of compliance with the treatment plan. Greater than 50% of that time was spent face-to-face with the patient in counseling and coordinating her care.         This note is created with the assistance of a speech recognition program.  While intending to generate a document that actually reflects the content of the visit, the document can still have some errors including those of syntax and sound a like substitutions which may escape proof reading. It such instances, actual meaning can be extrapolated by contextual diversion.

## 2022-05-25 NOTE — TELEPHONE ENCOUNTER
Last visit:   Last Med refill:   Does patient have enough medication for 72 hours: No:     Next Visit Date:  Future Appointments   Date Time Provider Liset Kari   5/25/2022  3:15 PM STVZ MIGNON VERSA [facility]   5/26/2022 12:00 PM Diccarola Fletcher, PTA [facility]   5/26/2022  3:15 PM STVZ MIGNON VERSA [facility]Z ST[facility]   5/27/2022  3:15 PM STVZ MIGNON VERSA [facility]VZ ST[facility]   5/31/2022  3:15 PM STVZ MIGNON VERSA [facility]VZ ST[facility]   5/31/2022  3:30 PM Mirella Basilio MD [facility]   6/1/2022  9:00 AM Phillip Fletcher, PTA [facility]   6/1/2022  3:15 PM STVZ MIGNON VERSA [facility]VZ ST[facility]   6/2/2022  3:15 PM STVZ MIGNON VERSA [facility]VZ ST[facility]   6/3/2022  9:00 AM Phillip Fletcher, PTA [facility]   6/3/2022  3:15 PM STVZ MIGNON VERSA STVZ STA [facility]   6/6/2022  1:15 PM [place] DO ORTHO SPECIA [facility]   6/6/2022  3:15 PM STVZ MIGNON VERSA STVZ STA RAD [facility]   6/7/2022  3:15 PM STVZ MIGNON VERSA STVZ STA [facility]   6/7/2022  3:30 PM Mirella Basilio MD [facility]   6/8/2022 10:45 AM [facility]   6/8/2022  3:15 PM STVZ MIGNON VERSA STVZ STA [facility]   6/9/2022  3:15 PM STVZ MIGNON VERSA STVZ STA [facility]   6/10/2022  3:15 PM STVZ MIGNON VERSA [facility]VZ ST[facility]   6/13/2022  3:15 PM STVZ MIGNON VERSA [facility]VZ ST[facility]   6/14/2022 11:30 AM Naomi Riley DPM [facility]   6/14/2022  3:15 PM STVZ MIGNON VERSA [facility]VZ STA [facility]   6/14/2022  3:30 PM Mirella Basilio MD [facility]   6/15/2022  3:15 PM STVZ MIGNON VERSA [facility]VZ STA [facility]   6/16/2022  3:15 PM STVZ MIGNON VERSA [facility]VZ ST[facility]   6/17/2022 10:45 AM Bernardo Fleming MD [facility]   7/6/2022  9:00 AM Gisell Hagan MD [facility]   7/25/2022  9:45 AM Breezy Enriquez DO ORTHO SPECIA [facility]   7/25/2022  1:50 PM Iam Ellington MD [facility] [address] Maintenance   Topic Date Due    Annual Wellness Visit (AWV)  Never done    Diabetic retinal exam  Never done    DTaP/Tdap/Td vaccine (1 - Tdap) Never done    Shingles vaccine (2 of 3) 02/08/2011    Diabetic foot exam  04/27/2019    Pneumococcal 0-64 years Vaccine (2 - PCV) 12/19/2019    Colorectal Cancer Screen  07/23/2020    COVID-19 Vaccine (3 - Booster for Rinku series) 01/25/2022    Flu vaccine (Season Ended) 09/01/2022    Depression Screen  03/28/2023    A1C test (Diabetic or Prediabetic)  04/12/2023    Lipids  04/12/2023    Diabetic microalbuminuria test  04/13/2023    Prostate Specific Antigen (PSA) Screening or Monitoring  05/18/2023    Hepatitis C screen  Completed    HIV screen  Completed    Hepatitis A vaccine  Aged Out    Hib vaccine  Aged Out    Meningococcal (ACWY) vaccine  Aged Out       Hemoglobin A1C (%)   Date Value   04/12/2022 8.3   03/28/2022 8.3   08/15/2013 7.8 (H)             ( goal A1C is < 7)   Microalb/Crt.  Ratio (mcg/mg creat)   Date Value   04/13/2022 Can not be calculated     LDL Cholesterol (mg/dL)   Date Value   04/12/2022 52   08/15/2013 94       (goal LDL is <100)   AST (U/L)   Date Value   05/18/2022 22     ALT (U/L)   Date Value   05/18/2022 19     BUN (mg/dL)   Date Value   05/18/2022 17     BP Readings from Last 3 Encounters:   05/25/22 (!) 132/91   05/17/22 138/89   05/10/22 (!) 151/94          (goal 120/80)    All Future Testing planned in [facility]  Lab Frequency Next Occurrence   Hemoglobin A1C Once 03/28/2023   PSA, Diagnostic Once 07/24/2022   Sedimentation Rate Once 04/27/2022   CBC with Auto Differential Once 04/27/2022   CBC with Auto Differential Once 05/25/2022   Comprehensive Metabolic Panel Once 91/28/7375   PSA, Diagnostic Once 05/25/2022               Patient Active Problem List:     DM (diabetes mellitus) ([facility]     HTN (hypertension)     ETOHism ([facility] [address]     Hypertensive urgency     Abnormal ambulatory electrocardiogram Abnormal ambulatory electrocardiography     Abnormal kidney function     Adiposity     Astigmatism     Atherosclerotic heart disease of native coronary artery without angina pectoris     Cervical radiculopathy     Chronic back pain     Decreased cardiac output     Diabetic peripheral neuropathy (HCC)     Dislocated intraocular lens     Disturbance in sleep behavior     Dizziness     Dyssomnia     Floaters     Impotence of organic origin     Left ventricular dysfunction     Low vision, both eyes     Myopia     Nuclear sclerosis of left eye     Obstructive sleep apnea syndrome     Palpitations     Personal history of other diseases of the digestive system     Posterior vitreous detachment     Presbyopia     Primary osteoarthritis of right hip     Pseudophakia of right eye     Repetitive intrusions of sleep     Sciatica     Tendonitis     Vitamin D deficiency     Vitreous opacities of right eye     Dislocated IOL (intraocular lens), anterior, right     Prostate CA (HCC)     Congestive heart failure ([address]     Essential hypertension           Please address the medication refill and close the encounter. If I can be of assistance, please route to the applicable pool. Thank you.

## 2022-05-25 NOTE — TELEPHONE ENCOUNTER
Ettie Carrel MD VISIT  RTC in 6-8 weeks with Labs a week prior  LABS CDP CMP PSA ORDERS GIVEN TO PT ON EXIT TO BE DONE ON 6/29/22  MD VISIT 7/6/22 @ 9AM  AVS PRINTED W/ INSTRUCTIONS AND GIVEN TO PT ON EXIT

## 2022-05-26 ENCOUNTER — HOSPITAL ENCOUNTER (OUTPATIENT)
Dept: RADIATION ONCOLOGY | Facility: MEDICAL CENTER | Age: 62
Discharge: HOME | End: 2022-05-26
Payer: MEDICARE

## 2022-05-26 ENCOUNTER — HOSPITAL ENCOUNTER (OUTPATIENT)
Dept: PHYSICAL THERAPY | Age: 62
Setting detail: THERAPIES SERIES
Discharge: HOME | End: 2022-05-26
Payer: MEDICARE

## 2022-05-26 PROCEDURE — 77385 HC NTSTY MODUL RAD TX DLVR SMPL: CPT | Performed by: STUDENT IN AN ORGANIZED HEALTH CARE EDUCATION/TRAINING PROGRAM

## 2022-05-26 PROCEDURE — 77014 PR CT GUIDANCE PLACEMENT RAD THERAPY FIELDS: CPT | Performed by: STUDENT IN AN ORGANIZED HEALTH CARE EDUCATION/TRAINING PROGRAM

## 2022-05-26 PROCEDURE — 97110 THERAPEUTIC EXERCISES: CPT

## 2022-05-26 RX ORDER — PREGABALIN 75 MG/1
CAPSULE ORAL
Qty: 60 CAPSULE | Refills: 1 | OUTPATIENT
Start: 2022-05-26 | End: 2022-06-23

## 2022-05-26 NOTE — FLOWSHEET NOTE
[x] [facility]) - [facility]  [facility]  [address]  [phone]  F: [phone] [] [address]  [address]   [address]  P: [phone]  F: [phone] [] [address]  [address]  P: [phone]  F: [phone] [] [address]  P: [phone]  F: [phone] [] [address]  [facility]   [address]   [place]: [phone]  F: [phone]      Physical Therapy Daily Treatment Note    Date:  2022  Patient Name:  Gabriela Kruse    :  1960  MRN: [id number]  Physician: Carlos Smith DPM                     Insurance: [facility] Medicare / Medicaid   Medical Diagnosis: Sciatica of right side M54.31               Rehab Codes: M54.31, M25.571, M25.572, R20.2   Onset Date: 22                              Next 's appt:   Visit# / total visits: 4/10    Cancels/No Shows: 0/0    Subjective:    Pain:  [x] Yes  [] No Location: R hip  Pain Rating: (0-10 scale) 9/10  Pain altered Tx:  [x] No  [] Yes  Action:  Comments: Patient states pain is mostly unchanged and reports he did not complete any HEP exercise yesterday, \"had other stuff to do\". Notes felt more loose after last treatment with no significant soreness.      Objective:  Modalities:   Precautions:  Exercises:  Exercise Reps/ Time Weight/ Level Comments   SciFit 5' L2          Standing      Gastroc stretch 3x20\"     Heel raises  15x     Hip abduction 15x ea  Added                      Seated   Added weight    Hamstring stretch 3x15\"     LAQ 15x ea 2lb    Hamstring curl 15x ea lime    Piriformis stretch 3x30\"     Hip adduction  15x ball    Lumbar flexion 10x5\"  Added          Supine   Added weights    Bent knee fall out 5x10\"     SLR 15x ea 2lb [place] with TrA 15x ea  2lb    LTR 10x5\"     SKTC 3x15\" ea     Amor stretch 2x1' ea     Bridge 15x     Clamshell 2x10 Blue          Prone      Hamstring curls 15x ea 2lb            Other:      Treatment Charges: Mins Units   []  Modalities     [x]  Ther Exercise 45 3   []  Manual Therapy     []  Ther Activities     []  Aquatics     []  Vasocompression     []  Other     Total Treatment time 45 3       Assessment: [x] Progressing toward goals. Continue with standing progressions with patient noting minimal increased pain in R hip with abduction. Added resistance to seated/mat exercises as noted above with excellent tolerance. Verbalized minimal fatigue with addition of weight, but patient pleased with challenge. [] No change. [] Other:  [x] Patient would continue to benefit from skilled physical therapy services in order to:  improve core strengthening and reduce radicular pain. STG: (to be met in 10 treatments)  1. ? Pain: 3/10 feet pain with ADLs. 2. ? Strength: 4+/5 R hip flexion to demonstrate improved core strength. 3. ? Function: Oswestry score to 38% impaired or better to improve ADLs. 4. Independent with Home Exercise Programs      Pt. Education:  [x] Yes  [] No  [x] Reviewed Prior HEP/Ed  Method of Education: [x] Verbal  [x] Demo  [] Written  Comprehension of Education:  [x] Verbalizes understanding. [] Demonstrates understanding. [x] Needs review. [] Demonstrates/verbalizes HEP/Ed previously given. Access Code: [email or web address]  URL: [email or web address]  Date: 05/16/2022  Prepared by: Justino Nowak     Exercises  Supine Active Straight Leg Raise - 1 x daily - 7 x weekly - 3 sets - 10 reps  Bent Knee Fallouts - 1 x daily - 7 x weekly - 3 sets - 10 reps  Prone Knee Flexion - 1 x daily - 7 x weekly - 3 sets - 10 reps  Supine Quadriceps Stretch with Strap on Table - 1 x daily - 7 x weekly - 3 sets - 10 reps - 20 hold  Seated Hamstring Stretch - 1 x daily - 7 x weekly - 3 sets - 10 reps    Plan: [x] Continue current frequency toward long and short term goals.     [x] Specific Instructions for subsequent treatments:  R hip and core strengthening          Time In: 1770 am            Time Out: 1235 pm    Electronically signed by:  Mei Colin PTA

## 2022-05-27 ENCOUNTER — HOSPITAL ENCOUNTER (OUTPATIENT)
Dept: RADIATION ONCOLOGY | Facility: MEDICAL CENTER | Age: 62
Discharge: HOME | End: 2022-05-27
Payer: MEDICARE

## 2022-05-27 PROCEDURE — 77385 HC NTSTY MODUL RAD TX DLVR SMPL: CPT | Performed by: STUDENT IN AN ORGANIZED HEALTH CARE EDUCATION/TRAINING PROGRAM

## 2022-05-27 PROCEDURE — 77427 RADIATION TX MANAGEMENT X5: CPT | Performed by: STUDENT IN AN ORGANIZED HEALTH CARE EDUCATION/TRAINING PROGRAM

## 2022-05-27 PROCEDURE — 77014 PR CT GUIDANCE PLACEMENT RAD THERAPY FIELDS: CPT | Performed by: STUDENT IN AN ORGANIZED HEALTH CARE EDUCATION/TRAINING PROGRAM

## 2022-05-31 ENCOUNTER — HOSPITAL ENCOUNTER (OUTPATIENT)
Dept: RADIATION ONCOLOGY | Facility: MEDICAL CENTER | Age: 62
Discharge: HOME | End: 2022-05-31
Attending: STUDENT IN AN ORGANIZED HEALTH CARE EDUCATION/TRAINING PROGRAM
Payer: MEDICARE

## 2022-05-31 ENCOUNTER — HOSPITAL ENCOUNTER (OUTPATIENT)
Dept: PHYSICAL THERAPY | Age: 62
Setting detail: THERAPIES SERIES
Discharge: HOME | End: 2022-05-31
Payer: MEDICARE

## 2022-05-31 ENCOUNTER — TELEPHONE (OUTPATIENT)
Dept: ONCOLOGY | Age: 62
End: 2022-05-31

## 2022-05-31 ENCOUNTER — HOSPITAL ENCOUNTER (OUTPATIENT)
Dept: RADIATION ONCOLOGY | Facility: MEDICAL CENTER | Age: 62
Discharge: HOME | End: 2022-05-31
Payer: MEDICARE

## 2022-05-31 VITALS
WEIGHT: 258.8 LBS | HEART RATE: 108 BPM | TEMPERATURE: 98 F | OXYGEN SATURATION: 98 % | BODY MASS INDEX: 33.23 KG/M2 | SYSTOLIC BLOOD PRESSURE: 142 MMHG | DIASTOLIC BLOOD PRESSURE: 90 MMHG | RESPIRATION RATE: 16 BRPM

## 2022-05-31 DIAGNOSIS — C61 PROSTATE CA (HCC): Primary | ICD-10-CM

## 2022-05-31 PROCEDURE — 97110 THERAPEUTIC EXERCISES: CPT

## 2022-05-31 PROCEDURE — 77014 PR CT GUIDANCE PLACEMENT RAD THERAPY FIELDS: CPT | Performed by: STUDENT IN AN ORGANIZED HEALTH CARE EDUCATION/TRAINING PROGRAM

## 2022-05-31 PROCEDURE — 97140 MANUAL THERAPY 1/> REGIONS: CPT

## 2022-05-31 PROCEDURE — 77385 HC NTSTY MODUL RAD TX DLVR SMPL: CPT | Performed by: STUDENT IN AN ORGANIZED HEALTH CARE EDUCATION/TRAINING PROGRAM

## 2022-05-31 NOTE — PROGRESS NOTES
Murphy Clay  5/31/2022  Wt Readings from Last 3 Encounters:   05/31/22 258 lb 12.8 oz (117.4 kg)   05/25/22 259 lb 1.6 oz (117.5 kg)   05/24/22 257 lb 6.4 oz (116.8 kg)     Body mass index is 33.23 kg/m². Treatment Area:prostate    Patient was seen today for weekly visit. Comfort Alteration    Fatigue: Mild    Nutritional Alteration  Anorexia: No  Nausea: No  Vomiting: No     Elimination Alterations  Constipation: no  Diarrhea:  no  Urinary Frequency/Urgency: No  Urinary Retention: Yes  Dysuria: No  Urinary Incontinence: No  Proctitis: No  Nocturia: Yes #/night: 1     Emotional  Coping: effective      Skin Alteration   Sensation:intact    Radiation Dermatitis:  Intact [x]     Erythema  []     Discoloration  []     Rash []     Dry desquamation  []     Moist desquamation []           Injury, potential bleeding or infection: none    Lab Results   Component Value Date    WBC 4.8 05/18/2022     05/18/2022         BP (!) 142/90   Pulse (!) 108   Temp 98 °F (36.7 °C) (Temporal)   Resp 16   Wt 258 lb 12.8 oz (117.4 kg)   SpO2 98%   BMI 33.23 kg/m²      Pain Assessment: None - Denies Pain            Assessment/Plan: Patient was seen today for weekly visit. States his urine has a strong smell. Also states he has difficulty starting stream.  Flomax ordered last week. Patient unsure if it has been added to his pill packs from pharmacy but will verify. Dr. Mary Koenig examined patient. Urine analysis ordered. Patient will provide speciimen tomorrow. Continue current plan.     Dimitri Deleon RN

## 2022-05-31 NOTE — FLOWSHEET NOTE
[x] [facility] &  [facility]  [address]  [phone]  F: [phone] [] [address]  [address]   [address]  P: [phone]  F: [phone] [] [address]  [address]  P: [phone]  F: [phone] [] [address]  P: [phone]  F: [phone] [] [address]  [facility]   [address]   [place]: [phone]  F: [phone]      Physical Therapy Daily Treatment Note    Date:  2022  Patient Name:  Gabriela Kruse    :  1960  MRN: [id number]  Physician: Carlos Smith DPM                     Insurance: [facility] Medicare / Medicaid   Medical Diagnosis: Sciatica of right side M54.31               Rehab Codes: M54.31, M25.571, M25.572, R20.2   Onset Date: 22                              Next 's appt:   Visit# / total visits: 5/10    Cancels/No Shows: 0/0    Subjective:    Pain:  [x] Yes  [] No Location: R hip  Pain Rating: (0-10 scale) 7/10  Pain altered Tx:  [x] No  [] Yes  Action:  Comments: Patient states pain is the same, radiating down to foot. Denies tingling, just pain. Notes that he's been busy mowing the lawn and doing outside work, and this seems to aggravate pain as well.      Objective:  Modalities:   Precautions:  Exercises:  Exercise Reps/ Time Weight/ Level Comments   SciFit 5' L2          Standing   HELD on  d/t time constraints w/ manual evaluation   Gastroc stretch 3x20\"     Heel raises  15x     Hip abduction 15x ea  Added          Seated   Added weight    Hamstring stretch 3x15\"     LAQ 15x ea 2lb    Hamstring curl 15x ea blue    Piriformis stretch 3x30\"     Hip adduction  15x ball    Lumbar flexion 10x5\"  Added          Supine   Added weights    Bent knee fall out 5x10\"     SLR 15x ea 2lb    Marches with TrA 20x ea  2lb    LTR 10x5\"     SKTC 3x15\" ea     Amor stretch 2x1' ea     Bridge 15x     Clamshell 2x15 Blue          Prone      Hamstring curls 15x ea 2lb            Other:   1) Skilled assessment of leg length - noted to be functionally longer in RLE at all major landmarks, however objective length of RLE is 1 cm shorter than LLE (96.5cm R vs 97.5cm L)  2) Anterior tilt on R ilium MET correction - fair to good correction noted - no change in gait or pain  3) Lateral shift manual adjustment - hips to right, shoulders to left x10 - no change in gait or pain   - all billed with manual therapy    Treatment Charges: Mins Units   []  Modalities     [x]  Ther Exercise 27 2   [x]  Manual Therapy 20 1   []  Ther Activities     []  Aquatics     []  Vasocompression     []  Other     Total Treatment time 47 3       Assessment: [x] Progressing toward goals. Pt ambulates in with limp on RLE, noted to have slowed gait speed and obvious pain with RLE weightbearing. Postural assessment reveals R trunk shift, attempted manual correction but poor ability to correct, and no effect on gait/pain. Leg length discrepancy apparent, however functionally appears longer on mat table on RLE, with excessive anterior tilt on R ilium noted. Corrected this with MET technique with fair to good correction noted, however no change on gait/pain. Resumed LE/core strengthening and lumbar mobility program with good tolerance, weakness noted throughout RLE with all exercises thus far, good challenge noted with current program.     [] No change. [x] Other: Plan to return to more standing RLE strengthening in upcoming sessions. [x] Patient would continue to benefit from skilled physical therapy services in order to:  improve core strengthening and reduce radicular pain. STG: (to be met in 10 treatments)  1. ? Pain: 3/10 feet pain with ADLs.    2. ? Strength: 4+/5 R hip flexion to demonstrate improved core strength. 3. ? Function: Oswestry score to 38% impaired or better to improve ADLs. 4. Independent with Home Exercise Programs      Pt. Education:  [x] Yes  [] No  [x] Reviewed Prior HEP/Ed  Method of Education: [x] Verbal  [x] Demo  [] Written  Comprehension of Education:  [x] Verbalizes understanding. [] Demonstrates understanding. [x] Needs review. [] Demonstrates/verbalizes HEP/Ed previously given. Access Code: [email or web address]  URL: [email or web address]  Date: 05/16/2022  Prepared by: Dereje Isaacs     Exercises  Supine Active Straight Leg Raise - 1 x daily - 7 x weekly - 3 sets - 10 reps  Bent Knee Fallouts - 1 x daily - 7 x weekly - 3 sets - 10 reps  Prone Knee Flexion - 1 x daily - 7 x weekly - 3 sets - 10 reps  Supine Quadriceps Stretch with Strap on Table - 1 x daily - 7 x weekly - 3 sets - 10 reps - 20 hold  Seated Hamstring Stretch - 1 x daily - 7 x weekly - 3 sets - 10 reps    Plan: [x] Continue current frequency toward long and short term goals.     [x] Specific Instructions for subsequent treatments:  R hip and core strengthening          Time In: 9:00 am            Time Out: 9:52 am    Electronically signed by:  Gwendolyn Curry PT

## 2022-05-31 NOTE — TELEPHONE ENCOUNTER
Writer called patient to check on him and to see how XRT was going. No answer, detailed VM left requesting a return call.

## 2022-05-31 NOTE — PROGRESS NOTES
Radiation Oncology On-Treatment Visit:     Fraction # 16  (40 Gy)    Diagnosis:  Mr. Indiana Iqbal is a 60 y.o. 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 He is currently followed by infectious diseases and is on chronic antibiotics. Treatment course: Definitive radiation to the prostate    Progress note:  Mr. Indiana Iqbal was seen and evaluated. Patient notes some increased incomplete emptying of urine. Unsure if he started Flomax yet. He also notes smell in his urine - no dysuria or blood in urine. Normal bowel movements. Physical exam:   VITAL SIGNS: BP (!) 142/90   Pulse (!) 108   Temp 98 °F (36.7 °C) (Temporal)   Resp 16   Wt 258 lb 12.8 oz (117.4 kg)   SpO2 98%   BMI 33.23 kg/m²   ECO Asymptomatic   CONSTITUTIONAL: Well developed, well nourished male. Alert and oriented x3. No acute distress. HEENT: Head normocephalic and atraumatic. Extraocular movements intact. NEUROLOGIC EXAM: Cranial nerves II through XII grossly intact. No focal neurologic deficit. Speech is fluent. Gait and posture are steady. PSYCHIATRIC:  Appropriate mood and affect for his clinical situation. Plan:  · Continue radiation therapy as planned. · Incomplete emptying of urine: Continue flomax 0.4 mg QHS. · Smell in urine - I am not overly concerned about this but the patient appeared to be worried. I will order a UA to evaluate. · I have checked the imaging performed to date, which confirm appropriate positioning.     Orders Placed:     Orders Placed This Encounter   Procedures    Urinalysis with Reflex to Culture

## 2022-06-01 ENCOUNTER — HOSPITAL ENCOUNTER (OUTPATIENT)
Age: 62
Setting detail: SPECIMEN
Discharge: HOME | End: 2022-06-01

## 2022-06-01 ENCOUNTER — HOSPITAL ENCOUNTER (OUTPATIENT)
Dept: PHYSICAL THERAPY | Age: 62
Setting detail: THERAPIES SERIES
End: 2022-06-01
Payer: MEDICARE

## 2022-06-01 ENCOUNTER — HOSPITAL ENCOUNTER (OUTPATIENT)
Dept: RADIATION ONCOLOGY | Facility: MEDICAL CENTER | Age: 62
Discharge: HOME | End: 2022-06-01
Payer: MEDICARE

## 2022-06-01 DIAGNOSIS — C61 PROSTATE CA (HCC): ICD-10-CM

## 2022-06-01 LAB
BILIRUBIN URINE: NEGATIVE
COLOR: YELLOW
COMMENT UA: ABNORMAL
GLUCOSE URINE: ABNORMAL
KETONES, URINE: NEGATIVE
LEUKOCYTE ESTERASE, URINE: NEGATIVE
NITRITE, URINE: NEGATIVE
PH UA: 5 (ref 5–8)
PROTEIN UA: NEGATIVE
SPECIFIC GRAVITY UA: 1.05 (ref 1–1.03)
TURBIDITY: CLEAR
URINE HGB: NEGATIVE
UROBILINOGEN, URINE: NORMAL

## 2022-06-01 PROCEDURE — 77385 HC NTSTY MODUL RAD TX DLVR SMPL: CPT | Performed by: STUDENT IN AN ORGANIZED HEALTH CARE EDUCATION/TRAINING PROGRAM

## 2022-06-01 PROCEDURE — 77014 PR CT GUIDANCE PLACEMENT RAD THERAPY FIELDS: CPT | Performed by: STUDENT IN AN ORGANIZED HEALTH CARE EDUCATION/TRAINING PROGRAM

## 2022-06-02 ENCOUNTER — HOSPITAL ENCOUNTER (OUTPATIENT)
Dept: RADIATION ONCOLOGY | Facility: MEDICAL CENTER | Age: 62
Discharge: HOME | End: 2022-06-02
Payer: MEDICARE

## 2022-06-02 PROCEDURE — 77014 PR CT GUIDANCE PLACEMENT RAD THERAPY FIELDS: CPT | Performed by: STUDENT IN AN ORGANIZED HEALTH CARE EDUCATION/TRAINING PROGRAM

## 2022-06-02 PROCEDURE — 77385 HC NTSTY MODUL RAD TX DLVR SMPL: CPT | Performed by: STUDENT IN AN ORGANIZED HEALTH CARE EDUCATION/TRAINING PROGRAM

## 2022-06-03 ENCOUNTER — HOSPITAL ENCOUNTER (OUTPATIENT)
Dept: PHYSICAL THERAPY | Age: 62
Setting detail: THERAPIES SERIES
Discharge: HOME | End: 2022-06-03
Payer: MEDICARE

## 2022-06-03 ENCOUNTER — HOSPITAL ENCOUNTER (OUTPATIENT)
Dept: RADIATION ONCOLOGY | Facility: MEDICAL CENTER | Age: 62
Discharge: HOME | End: 2022-06-03
Payer: MEDICARE

## 2022-06-03 PROCEDURE — 77014 PR CT GUIDANCE PLACEMENT RAD THERAPY FIELDS: CPT | Performed by: STUDENT IN AN ORGANIZED HEALTH CARE EDUCATION/TRAINING PROGRAM

## 2022-06-03 PROCEDURE — 77385 HC NTSTY MODUL RAD TX DLVR SMPL: CPT | Performed by: STUDENT IN AN ORGANIZED HEALTH CARE EDUCATION/TRAINING PROGRAM

## 2022-06-03 PROCEDURE — 97110 THERAPEUTIC EXERCISES: CPT

## 2022-06-03 NOTE — FLOWSHEET NOTE
[x] [facility] &  [facility]  [address]  [phone]  F: [phone] [] [address]  [address]   [address]  P: [phone]  F: [phone] [] [address]  [address]  P: [phone]  F: [phone] [] [address]  P: [phone]  F: [phone] [] [address]  [facility]   [address]   [place]: [phone]  F: [phone]      Physical Therapy Daily Treatment Note    Date:  6/3/2022  Patient Name:  Beverly Mcfadden    :  1960  MRN: [id number]  Physician: Jacky Stanton DPM                     Insurance: [facility] Medicare / Medicaid   Medical Diagnosis: Sciatica of right side M54.31               Rehab Codes: M54.31, M25.571, M25.572, R20.2   Onset Date: 22                              Next 's appt:   Visit# / total visits: 6/10    Cancels/No Shows: 0/0    Subjective:    Pain:  [x] Yes  [] No Location: R hip  Pain Rating: (0-10 scale) --/10  Pain altered Tx:  [x] No  [] Yes  Action:  Comments: Patient arrives stating mostly stiff, no considerable pain, rates mostly stiff today. R hip bothering him the most. Notes therapist issued him a heel lift at previous appt, has had no reactions to it and feels okay, but notes no considerable pain difference.       Objective:  Modalities:   Precautions:  Exercises:  Exercise Reps/ Time Weight/ Level Comments    SciFit 5' L2  x          Standing       Gastroc stretch 3x20\"   x   Heel raises  15x   x   Hip abduction 15x ea   x   Hip extension 15x ea  Added 6/3 x   Total [employer] squats 2x10 L30 Added 6/3 x          Seated       Hamstring stretch 3x15\"   x   LAQ 15x ea 2lb  x   Piriformis stretch 1 min ea   x   Hip adduction  15x ball     Lumbar flexion 10x5\"   x Supine       Bent knee fall out 5x10\"      SLR 15x ea 2lb  x   Marches with TrA 20x ea  2lb  x   LTR 10x5\"      SKTC 3x15\" ea      Hip flexor stretch EOB 2x1' ea      Bridge 15x   x   Clamshell 2x15 Blue  x          Prone       Hamstring curls 15x ea 2lb              Other:       Treatment Charges: Mins Units   []  Modalities     [x]  Ther Exercise 50 3   [x]  Manual Therapy     []  Ther Activities     []  Aquatics     []  Vasocompression     []  Other     Total Treatment time 50 3       Assessment: [x] Progressing toward goals. Resumed therapeutic exercises with progressions as noted per log with good tolerance. Patient with no noted exacerbation or onset of pain/symptoms in R hip. Continues to demonstrate R hip weakness>LLE throughout. [] No change. [x] Other: Various exercises held due to longer than average time to schedule patient for remaining appts. [x] Patient would continue to benefit from skilled physical therapy services in order to:  improve core strengthening and reduce radicular pain. STG: (to be met in 10 treatments)  1. ? Pain: 3/10 feet pain with ADLs. 2. ? Strength: 4+/5 R hip flexion to demonstrate improved core strength. 3. ? Function: Oswestry score to 38% impaired or better to improve ADLs. 4. Independent with Home Exercise Programs      Pt. Education:  [x] Yes  [] No  [x] Reviewed Prior HEP/Ed  Method of Education: [x] Verbal  [x] Demo  [] Written  Comprehension of Education:  [x] Verbalizes understanding. [] Demonstrates understanding. [x] Needs review. [] Demonstrates/verbalizes HEP/Ed previously given. Access Code: [email or web address]  URL: [email or web address]  Date: 05/16/2022  Prepared by: Jen Triana     Exercises  Supine Active Straight Leg Raise - 1 x daily - 7 x weekly - 3 sets - 10 reps  Bent Knee Fallouts - 1 x daily - 7 x weekly - 3 sets - 10 reps  Prone Knee Flexion - 1 x daily - 7 x weekly - 3 sets - 10 reps  Supine Quadriceps Stretch with Strap on Table - 1 x daily - 7 x weekly - 3 sets - 10 reps - 20 hold  Seated Hamstring Stretch - 1 x daily - 7 x weekly - 3 sets - 10 reps    Plan: [x] Continue current frequency toward long and short term goals.     [x] Specific Instructions for subsequent treatments:  R hip and core strengthening          Time In: 9:00 am            Time Out: 1005 am    Electronically signed by:  Juli Pastor PTA

## 2022-06-04 RX ORDER — IBUPROFEN 600 MG/1
600 TABLET ORAL 3 TIMES DAILY PRN
Qty: 90 TABLET | Refills: 0 | Status: SHIPPED | OUTPATIENT
Start: 2022-06-04 | End: 2022-08-17 | Stop reason: ALTCHOICE

## 2022-06-06 ENCOUNTER — TELEPHONE (OUTPATIENT)
Dept: ONCOLOGY | Age: 62
End: 2022-06-06

## 2022-06-06 ENCOUNTER — OFFICE VISIT (OUTPATIENT)
Dept: ORTHOPEDIC SURGERY | Age: 62
End: 2022-06-06
Payer: MEDICARE

## 2022-06-06 ENCOUNTER — HOSPITAL ENCOUNTER (OUTPATIENT)
Dept: RADIATION ONCOLOGY | Facility: MEDICAL CENTER | Age: 62
End: 2022-06-06
Payer: MEDICARE

## 2022-06-06 VITALS — WEIGHT: 258 LBS | HEIGHT: 74 IN | BODY MASS INDEX: 33.11 KG/M2

## 2022-06-06 DIAGNOSIS — M16.11 PRIMARY OSTEOARTHRITIS OF RIGHT HIP: Primary | ICD-10-CM

## 2022-06-06 DIAGNOSIS — Z96.641 HISTORY OF TOTAL HIP ARTHROPLASTY, RIGHT: ICD-10-CM

## 2022-06-06 DIAGNOSIS — Z98.890 HISTORY OF REMOVAL OF JOINT PROSTHESIS OF RIGHT HIP DUE TO INFECTION: ICD-10-CM

## 2022-06-06 DIAGNOSIS — Z86.19 HISTORY OF REMOVAL OF JOINT PROSTHESIS OF RIGHT HIP DUE TO INFECTION: ICD-10-CM

## 2022-06-06 DIAGNOSIS — T84.50XD INFECTION OF PROSTHETIC JOINT, SUBSEQUENT ENCOUNTER: ICD-10-CM

## 2022-06-06 PROCEDURE — 3017F COLORECTAL CA SCREEN DOC REV: CPT | Performed by: ORTHOPAEDIC SURGERY

## 2022-06-06 PROCEDURE — 99213 OFFICE O/P EST LOW 20 MIN: CPT | Performed by: ORTHOPAEDIC SURGERY

## 2022-06-06 PROCEDURE — 1036F TOBACCO NON-USER: CPT | Performed by: ORTHOPAEDIC SURGERY

## 2022-06-06 PROCEDURE — G8417 CALC BMI ABV UP PARAM F/U: HCPCS | Performed by: ORTHOPAEDIC SURGERY

## 2022-06-06 PROCEDURE — G8427 DOCREV CUR MEDS BY ELIG CLIN: HCPCS | Performed by: ORTHOPAEDIC SURGERY

## 2022-06-06 ASSESSMENT — ENCOUNTER SYMPTOMS
SHORTNESS OF BREATH: 0
ABDOMINAL PAIN: 0
ROS SKIN COMMENTS: NEGATIVE FOR RASH
EYE DISCHARGE: 0

## 2022-06-06 NOTE — PROGRESS NOTES
[address]  [address]  Dept: [phone]  Dept Fax: [phone]        Ambulatory Follow Up      Subjective: Ed Medeiros is a 60y.o. year old male who presents to our office today for routine followup regarding his   1. Primary osteoarthritis of right hip    2. History of total hip arthroplasty, right    3. History of removal of joint prosthesis of right hip due to infection    4. Infection of prosthetic joint, subsequent encounter    . Chief Complaint   Patient presents with    Follow-up     right hip -        HPI     \Mr. Kapil Lindsey is a 70-year-old male who presents the office today for recheck of his right hip. He notes that he has seen some improvement with physical therapy and is starting to walk a little bit better. He denies any fevers, chills, nausea, vomiting, diarrhea, feelings of being sick. For review of his history he is undergone previous right total hip arthroplasty with multiple incision, irrigation, debridement as well as cement spacer placement and revision as well as incision, irrigation, debridement after the incision. He is on chronic antibiotic suppression. Review of Systems   Constitutional: Positive for activity change. Negative for fever. HENT: Negative for dental problem. Eyes: Negative for discharge. Respiratory: Negative for shortness of breath. Cardiovascular: Negative for chest pain. Gastrointestinal: Negative for abdominal pain. Genitourinary: Negative. Musculoskeletal: Positive for arthralgias. Skin:        Negative for rash   Neurological: Positive for weakness. Psychiatric/Behavioral: Negative for confusion. I have reviewed the CC, HPI, ROS, PMH, FHX, Social History, and if not present in this note, I have reviewed in the patient's chart.    I agree with the documentation provided by other staff and have reviewed their documentation prior to providing my signature indicating agreement. Objective :   Ht 6' 2\" (1.88 m)   Wt 258 lb (117 kg)   BMI 33.13 kg/m²  Body mass index is 33.13 kg/m². General: Beverly Mcfadden is a 60 y.o. male who is alert and oriented and sitting comfortably in our office. Ortho Exam  MS: Right hip incision is unchanged from previous. Minimal warmth is appreciated. Patient ambulates with minimal short weightbearing phase to the right lower extremity and no antalgia. Motor, sensory, vascular examination of the right lower extremity is grossly intact without focal deficits. Neuro: alert and oriented to person and place. Eyes: Extra-ocular muscles intact  Mouth: Oral mucosa moist. No perioral lesions  Pulm: Respirations unlabored and regular. Symmetric chest excursion without outward deformity is noted. Skin: warm, well perfused  Psych:   Patient has good fund of knowledge and displays understanging of exam, diagnosis, and plan. Radiology: No results found. Assessment:      1. Primary osteoarthritis of right hip    2. History of total hip arthroplasty, right    3. History of removal of joint prosthesis of right hip due to infection    4. Infection of prosthetic joint, subsequent encounter       Plan:      All and all the patient continues to improve with physical therapy and time. He is on chronic antibiotic suppression. We did discuss options surgical and nonsurgical.  The best treatment at this point is felt to be chronic suppression and the patient notes understanding. I plan to repeat x-rays in 6 months and clinical evaluation in 6 months to ensure that there is no loosening of components or osteolysis from osteomyelitis. The patient notes understanding. He is going to continue with the physical therapy/home exercise program process. Follow up:Return in about 6 months (around 12/6/2022) for x-rays. No orders of the defined types were placed in this encounter.         No orders of the defined types were placed in this encounter.       This note is created with the assistance of a speech recognition program.  While intending to generate a document that actually reflects the content of the visit, the document can still have some errors including those of syntax and sound a like substitutions which may escape proof reading.  In such instances, actual meaning can be extrapolated by contextual diversion      Electronically signed by Radha Oneal on 6/6/2022 at 4:27 PM

## 2022-06-06 NOTE — TELEPHONE ENCOUNTER
Name: Morena Donohue  : 1960  MRN: [id number]    Oncology Navigation Follow-Up Note    Contact Type:  Telephone    Notes: Writer received a call back from patient. He states his radiation is going well other than feeling a bit more tired. He states his hip wound is still healing and bothering him however he has had some improvement. He states he's doing PT for his back but it doesn't seem to be helping that much so he is going to talk to his ordering DrChristine About it. Pt was appreciative of check in. Will continue to follow.     Electronically signed by Blanca Silverman RN on 2022 at 12:20 PM

## 2022-06-07 ENCOUNTER — APPOINTMENT (OUTPATIENT)
Dept: RADIATION ONCOLOGY | Facility: MEDICAL CENTER | Age: 62
End: 2022-06-07
Payer: MEDICARE

## 2022-06-08 ENCOUNTER — HOSPITAL ENCOUNTER (OUTPATIENT)
Dept: RADIATION ONCOLOGY | Facility: MEDICAL CENTER | Age: 62
Discharge: HOME | End: 2022-06-08
Payer: MEDICARE

## 2022-06-08 ENCOUNTER — OFFICE VISIT (OUTPATIENT)
Dept: INFECTIOUS DISEASES | Age: 62
End: 2022-06-08
Payer: MEDICARE

## 2022-06-08 VITALS
DIASTOLIC BLOOD PRESSURE: 72 MMHG | WEIGHT: 259 LBS | HEIGHT: 74 IN | HEART RATE: 117 BPM | BODY MASS INDEX: 33.24 KG/M2 | SYSTOLIC BLOOD PRESSURE: 111 MMHG | TEMPERATURE: 96.8 F

## 2022-06-08 VITALS
SYSTOLIC BLOOD PRESSURE: 129 MMHG | BODY MASS INDEX: 33.46 KG/M2 | WEIGHT: 260.6 LBS | DIASTOLIC BLOOD PRESSURE: 91 MMHG | RESPIRATION RATE: 16 BRPM | OXYGEN SATURATION: 97 % | TEMPERATURE: 98 F

## 2022-06-08 DIAGNOSIS — C61 PROSTATE CA (HCC): Primary | ICD-10-CM

## 2022-06-08 DIAGNOSIS — C61 PROSTATE CA (HCC): ICD-10-CM

## 2022-06-08 DIAGNOSIS — T84.51XS INFECTION ASSOCIATED WITH INTERNAL RIGHT HIP PROSTHESIS, SEQUELA: Primary | ICD-10-CM

## 2022-06-08 PROCEDURE — 77427 RADIATION TX MANAGEMENT X5: CPT | Performed by: STUDENT IN AN ORGANIZED HEALTH CARE EDUCATION/TRAINING PROGRAM

## 2022-06-08 PROCEDURE — 1036F TOBACCO NON-USER: CPT | Performed by: INTERNAL MEDICINE

## 2022-06-08 PROCEDURE — 77385 HC NTSTY MODUL RAD TX DLVR SMPL: CPT | Performed by: STUDENT IN AN ORGANIZED HEALTH CARE EDUCATION/TRAINING PROGRAM

## 2022-06-08 PROCEDURE — 77336 RADIATION PHYSICS CONSULT: CPT | Performed by: STUDENT IN AN ORGANIZED HEALTH CARE EDUCATION/TRAINING PROGRAM

## 2022-06-08 PROCEDURE — G8417 CALC BMI ABV UP PARAM F/U: HCPCS | Performed by: INTERNAL MEDICINE

## 2022-06-08 PROCEDURE — 99214 OFFICE O/P EST MOD 30 MIN: CPT | Performed by: INTERNAL MEDICINE

## 2022-06-08 PROCEDURE — 3017F COLORECTAL CA SCREEN DOC REV: CPT | Performed by: INTERNAL MEDICINE

## 2022-06-08 PROCEDURE — 77014 PR CT GUIDANCE PLACEMENT RAD THERAPY FIELDS: CPT | Performed by: STUDENT IN AN ORGANIZED HEALTH CARE EDUCATION/TRAINING PROGRAM

## 2022-06-08 PROCEDURE — G8427 DOCREV CUR MEDS BY ELIG CLIN: HCPCS | Performed by: INTERNAL MEDICINE

## 2022-06-08 RX ORDER — CIPROFLOXACIN 500 MG/1
500 TABLET, FILM COATED ORAL 2 TIMES DAILY
Qty: 60 TABLET | Refills: 3 | Status: SHIPPED | OUTPATIENT
Start: 2022-06-08 | End: 2022-07-08

## 2022-06-08 RX ORDER — LOSARTAN POTASSIUM 25 MG/1
TABLET ORAL
COMMUNITY
Start: 2022-05-05 | End: 2022-06-17 | Stop reason: ALTCHOICE

## 2022-06-08 RX ORDER — CIPROFLOXACIN 500 MG/1
TABLET, FILM COATED ORAL
COMMUNITY
Start: 2022-05-24 | End: 2022-06-08 | Stop reason: SDUPTHER

## 2022-06-08 RX ORDER — DOXYCYCLINE HYCLATE 100 MG
100 TABLET ORAL 2 TIMES DAILY
Qty: 56 TABLET | Refills: 3 | Status: SHIPPED | OUTPATIENT
Start: 2022-06-08 | End: 2022-07-06

## 2022-06-08 ASSESSMENT — ENCOUNTER SYMPTOMS
RESPIRATORY NEGATIVE: 1
GASTROINTESTINAL NEGATIVE: 1
ALLERGIC/IMMUNOLOGIC NEGATIVE: 1

## 2022-06-08 NOTE — PROGRESS NOTES
Twilaredd Farmer  6/8/2022  Wt Readings from Last 3 Encounters:   06/08/22 260 lb 9.6 oz (118.2 kg)   06/08/22 259 lb (117.5 kg)   06/06/22 258 lb (117 kg)     Body mass index is 33.46 kg/m². Treatment Area:prostate    Patient was seen today for weekly visit. Denies complaints. Dr. Danna Perez examined patient. No change in treatment plan. Comfort Alteration    Fatigue: Mild    Nutritional Alteration  Anorexia: No  Nausea: No  Vomiting: No     Elimination Alterations  Constipation: no  Diarrhea:  no  Urinary Frequency/Urgency: No  Urinary Retention: No  Dysuria: No  Urinary Incontinence: No  Proctitis: No  Nocturia: Yes #/night: 2     Emotional  Coping: effective      Skin Alteration   Sensation:intact    Radiation Dermatitis:  Intact [x]     Erythema  []     Discoloration  []     Rash []     Dry desquamation  []     Moist desquamation []           Injury, potential bleeding or infection: None    Lab Results   Component Value Date    WBC 4.8 05/18/2022     05/18/2022         BP (!) 129/91   Temp 98 °F (36.7 °C) (Temporal)   Resp 16   Wt 260 lb 9.6 oz (118.2 kg)   SpO2 97%   BMI 33.46 kg/m²      Pain Assessment: None - Denies Pain            Assessment/Plan: Patient was seen today for weekly visit.       Lito Muniz RN

## 2022-06-08 NOTE — PROGRESS NOTES
Radiation Oncology On-Treatment Visit:     Fraction # 20 /  (50 Gy)    Diagnosis:  Mr. Kristie Esquivel is a 60 y.o. 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 He is currently followed by infectious diseases and is on chronic antibiotics. Treatment course: Definitive radiation to the prostate    Progress note:  Mr. Kristie Esquivel was seen and evaluated. Urinating normally. Normal bowel movements. Physical exam:   VITAL SIGNS: BP (!) 129/91   Temp 98 °F (36.7 °C) (Temporal)   Resp 16   Wt 260 lb 9.6 oz (118.2 kg)   SpO2 97%   BMI 33.46 kg/m²   ECO Asymptomatic   CONSTITUTIONAL: Well developed, well nourished male. Alert and oriented x3. No acute distress. HEENT: Head normocephalic and atraumatic. Extraocular movements intact. NEUROLOGIC EXAM: Cranial nerves II through XII grossly intact. No focal neurologic deficit. Speech is fluent. Gait and posture are steady. PSYCHIATRIC:  Appropriate mood and affect for his clinical situation. Plan:  · Continue radiation therapy as planned. · Incomplete emptying of urine: Continue flomax 0.4 mg QHS. · Smell in urine - UA was negative. · I have checked the imaging performed to date, which confirm appropriate positioning.

## 2022-06-08 NOTE — PROGRESS NOTES
[facility]  Office Progress Note  Today's Date and Time: 6/8/2022, 11:05 AM    Impression:     1. Infection associated with internal right hip prosthesis, sequela    2. Prostate CA ([address]         Recommendations   · At this point in time there is no plan for any surgical intervention for his right hip. · The patient continues on radiation treatment for the prostate cancer. · He will continue on oral suppressive therapy with ciprofloxacin and doxycycline  · We will continue treatment for 3 to 6 months and reassess at that point in time. · The care was discussed with the patient and his wife and all of their questions were answered    I have ordered the following medications/ labs:  No orders of the defined types were placed in this encounter. Orders Placed This Encounter   Medications    ciprofloxacin (CIPRO) 500 mg tablet     Sig: Take 1 tablet by mouth 2 times daily     Dispense:  60 tablet     Refill:  3    doxycycline hyclate (VIBRA-TABS) 100 MG tablet     Sig: Take 1 tablet by mouth 2 times daily for 28 days     Dispense:  56 tablet     Refill:  3       Chief complaint/reason for consultation:     Chief Complaint   Patient presents with    Frequent Infections     infection right hip prosthesis        History of Present Illness: Hannah Montiel is a 60y.o.-year-old male who I am seeing in follow-up for a right hip prosthetic joint infection. The patient has had Pseudomonas and coagulase-negative staph isolated from the hip joint and he has been on oral antimicrobial therapy with ciprofloxacin and doxycycline. The patient has undergone multiple surgical revisions in the past and was seeking medical treatment also to be able for him to address his prostate cancer. The patient has been tolerating his radiation therapy well and also has been tolerating the antimicrobial therapy well without any untoward effects. Does not report any nausea, vomiting or diarrhea.   His hip has otherwise been doing well he does not report any increased pain or any drainage from his incision. He has been getting radiation treatment for his prostate cancer  I have personally reviewedthe past medical history, medications, social history, and I have updated the database accordingly.   Past Medical History:     Past Medical History:   Diagnosis Date    Arthritis     DM (diabetes mellitus) ([facility] [address] 2009    IDDM    GERD (gastroesophageal reflux disease) 2017    ON RX    Heart murmur 2013    ASYMPTOMATIC    HTN (hypertension) 6/29/2012    ON RX    Hyperlipidemia 06/29/2012    ON RX    Sleep apnea 2016    TRIED MACHINE COULD NOT TOLERATE    Vision abnormalities 2019    FLOATERS RIGHT EYE    Wears glasses      Medications:     Current Outpatient Medications   Medication Sig Dispense Refill    losartan (COZAAR) 25 MG tablet take 1 tablet by mouth nightly      ciprofloxacin (CIPRO) 500 mg tablet Take 1 tablet by mouth 2 times daily 60 tablet 3    doxycycline hyclate (VIBRA-TABS) 100 MG tablet Take 1 tablet by mouth 2 times daily for 28 days 56 tablet 3    ibuprofen (ADVIL;MOTRIN) 600 MG tablet Take 1 tablet by mouth 3 times daily as needed for Pain 90 tablet 0    metoprolol tartrate (LOPRESSOR) 25 MG tablet TAKE ONE - HALF (1/2) TABLET BY MOUTH TWO TIMES A DAY 30 tablet 2    pregabalin (LYRICA) 75 MG capsule TAKE 1 CAPSULE BY MOUTH 2 TIMES A DAY 60 capsule 1    tamsulosin (FLOMAX) 0.4 mg capsule Take 1 capsule by mouth daily 90 capsule 1    metFORMIN (GLUCOPHAGE) 500 MG tablet TAKE TWO TABLETS BY MOUTH TWICE A DAY WITH MEALS 60 tablet 3    TRULICITY 3 MS/8.5KG SOPN INJECT THE CONTENTS OF ONE SYRINGE (3MG) UNDER THE SKIN ONCE WEEKLY 2 mL 3    LANTUS SOLOSTAR 100 UNIT/ML injection pen INJECT 42 UNITS INTO THE SKIN TWO TIMES A DAY 15 mL 3    venlafaxine (EFFEXOR XR) 37.5 MG extended release capsule Take 1 capsule by mouth daily 30 capsule 3    Continuous Blood Gluc Transmit (DEXCOM G6 TRANSMITTER) gallop. Pulmonary:      Effort: Pulmonary effort is normal.      Breath sounds: Normal breath sounds. No wheezing. Abdominal:      General: Bowel sounds are normal.      Palpations: Abdomen is soft. There is no mass. Tenderness: There is no abdominal tenderness. Musculoskeletal:         General: Normal range of motion. Cervical back: Normal range of motion and neck supple. Lymphadenopathy:      Cervical: No cervical adenopathy. Skin:     General: Skin is warm and dry. Comments: The patient does have an incision in the right lateral hip which is well approximated. There are some dermatitis around the incision but this is chronic. Neurological:      Mental Status: He is alert and oriented to person, place, and time.          Laboratory studies :  Medical Decision Making:   I have independently reviewed the following labs:  CBC with Differential:  Lab Results   Component Value Date    WBC 4.8 05/18/2022    WBC 8.8 03/14/2022    HGB 12.0 05/18/2022    HGB 10.9 03/14/2022    HCT 39.2 05/18/2022    HCT 35.1 03/14/2022     05/18/2022     03/14/2022    LYMPHOPCT 33 05/18/2022    LYMPHOPCT 45 08/15/2013    MONOPCT 11 05/18/2022    MONOPCT 12 08/15/2013       BMP:  Lab Results   Component Value Date     05/18/2022     04/12/2022    K 4.2 05/18/2022    K 4.5 04/12/2022     05/18/2022     04/12/2022    CO2 24 05/18/2022    CO2 23 04/12/2022    BUN 17 05/18/2022    BUN 20 04/12/2022    CREATININE 1.06 05/18/2022    CREATININE 1.31 04/12/2022    MG 2.0 08/16/2013    MG 1.7 07/03/2012       Hepatic Function Panel:   Lab Results   Component Value Date    PROT 7.9 05/18/2022    PROT 7.6 03/14/2022    LABALBU 4.2 05/18/2022    LABALBU 4.3 03/14/2022    BILITOT 0.42 05/18/2022    BILITOT <0.10 03/14/2022    ALKPHOS 125 05/18/2022    ALKPHOS 137 03/14/2022    ALT 19 05/18/2022    ALT 16 03/14/2022    AST 22 05/18/2022    AST 17 03/14/2022       Lab Results   Component Value Date    CRP 22.1 03/14/2022     Lab Results   Component Value Date    SEDRATE 78 03/14/2022         Thank you for allowing us to participate in the care of this patient. Pleasecall with questions. Johny Marroquin MD  [employer] messaging: [phone]    This note is created with the assistance of a speech recognition program.  While intending to generate a document that actually reflects the content ofthe visit, the document can still have some errors including those of syntax and sound a like substitutions which may escape proof reading. It such instances, actual meaning can be extrapolated by contextual diversion.

## 2022-06-09 ENCOUNTER — HOSPITAL ENCOUNTER (OUTPATIENT)
Dept: PHYSICAL THERAPY | Age: 62
Setting detail: THERAPIES SERIES
Discharge: HOME | End: 2022-06-09
Payer: MEDICARE

## 2022-06-09 ENCOUNTER — HOSPITAL ENCOUNTER (OUTPATIENT)
Dept: RADIATION ONCOLOGY | Facility: MEDICAL CENTER | Age: 62
Discharge: HOME | End: 2022-06-09
Payer: MEDICARE

## 2022-06-09 PROCEDURE — 77385 HC NTSTY MODUL RAD TX DLVR SMPL: CPT | Performed by: STUDENT IN AN ORGANIZED HEALTH CARE EDUCATION/TRAINING PROGRAM

## 2022-06-09 PROCEDURE — 97110 THERAPEUTIC EXERCISES: CPT

## 2022-06-09 PROCEDURE — 77014 PR CT GUIDANCE PLACEMENT RAD THERAPY FIELDS: CPT | Performed by: STUDENT IN AN ORGANIZED HEALTH CARE EDUCATION/TRAINING PROGRAM

## 2022-06-09 NOTE — FLOWSHEET NOTE
[x] [facility] &  Therapy  [address]  [phone]  F: [phone] [] [address]  [address]   [address]  P: [phone]  F: [phone] [] [address]  [address]  P: [phone]  F: [phone] [] [address]  P: [phone]  F: [phone] [] [address]  [facility]   [address]   [place]: [phone]  F: [phone]      Physical Therapy Daily Treatment Note    Date:  2022  Patient Name:  Izabella Coleman    :  1960  MRN: [id number]  Physician: Elena Rincon DPM                     Insurance: [facility] Medicare / Medicaid   Medical Diagnosis: Sciatica of right side M54.31               Rehab Codes: M54.31, M25.571, M25.572, R20.2   Onset Date: 22                              Next 's appt:   Visit# / total visits: 7/10    Cancels/No Shows: 0/0    Subjective:    Pain:  [x] Yes  [] No Location: R hip  Pain Rating: (0-10 scale) 5/10  Pain altered Tx:  [x] No  [] Yes  Action:  Comments: Patient states has continued pain in the hip, increased antalgic gait ambulating into clinic. Reports that the insert in R shoe has helped some. Overall reports getting around at home and outside the home has minimal change.      Objective:  Modalities:   Precautions:  Exercises:  Exercise Reps/ Time Weight/ Level Comments    SciFit 5' L2  x          Standing       Gastroc stretch 3x20\"   x   Heel raises  20x   x   Hip abduction 20x ea  Increased reps 6/9 x   Hip extension 20x ea  Increased reps 6/9 x   Total Gym squats 2x10 L35  x   Palloff press 15x ea Blue tube Added 6/9 x   Tband ext 2x10 Blue tube Added 6/9 x          Seated       Hamstring stretch 3x15\"   x   LAQ 20x ea 2lb  x   Piriformis stretch 1 min ea      Hip adduction  15x ball     Lumbar flexion 10x5\"   x          Supine       Bent knee fall out 5x10\"      SLR 20x ea 2lb  x   Marches with TrA 20x ea  2lb  x   LTR 10x5\"   x   SKTC 3x15\" ea      Hip flexor stretch EOB 2x1' ea      Bridge 15x   x   Clamshell 2x15 Blue  x          Prone       Hamstring curls 15x ea 2lb              Other:       Treatment Charges: Mins Units   []  Modalities     [x]  Ther Exercise 50 3   [x]  Manual Therapy     []  Ther Activities     []  Aquatics     []  Vasocompression     []  Other     Total Treatment time 50 3       Assessment: [x] Progressing toward goals. Progressed core strengthening with addition of tband resisted exercises as noted above with good tolerance and no onset of symptoms or discomfort. Verbal cues for core activation throughout progressions with patient noting could feel activation. [] No change. [x] Other:  Patient with increased overall exhaustion and drowsiness due to not sleeping well the night before. Required increased seated rest breaks throughout program, but able to complete with progressions. [x] Patient would continue to benefit from skilled physical therapy services in order to:  improve core strengthening and reduce radicular pain. STG: (to be met in 10 treatments)  1. ? Pain: 3/10 feet pain with ADLs. 2. ? Strength: 4+/5 R hip flexion to demonstrate improved core strength. 3. ? Function: Oswestry score to 38% impaired or better to improve ADLs. 4. Independent with Home Exercise Programs      Pt. Education:  [x] Yes  [] No  [x] Reviewed Prior HEP/Ed  Method of Education: [x] Verbal  [x] Demo  [] Written  Comprehension of Education:  [x] Verbalizes understanding. [] Demonstrates understanding. [x] Needs review. [] Demonstrates/verbalizes HEP/Ed previously given. Access Code: [email or web address]  URL: [email or web address]  Date: 05/16/2022  Prepared by: Olivier Corral  Supine Active Straight Leg Raise - 1 x daily - 7 x weekly - 3 sets - 10 reps  Bent Knee Fallouts - 1 x daily - 7 x weekly - 3 sets - 10 reps  Prone Knee Flexion - 1 x daily - 7 x weekly - 3 sets - 10 reps  Supine Quadriceps Stretch with Strap on Table - 1 x daily - 7 x weekly - 3 sets - 10 reps - 20 hold  Seated Hamstring Stretch - 1 x daily - 7 x weekly - 3 sets - 10 reps    Plan: [x] Continue current frequency toward long and short term goals.     [x] Specific Instructions for subsequent treatments:  R hip and core strengthening          Time In: 9:55 am            Time Out: 1045 am    Electronically signed by:  Richi Majano PTA

## 2022-06-10 ENCOUNTER — HOSPITAL ENCOUNTER (OUTPATIENT)
Dept: RADIATION ONCOLOGY | Facility: MEDICAL CENTER | Age: 62
Discharge: HOME | End: 2022-06-10
Payer: MEDICARE

## 2022-06-10 ENCOUNTER — HOSPITAL ENCOUNTER (OUTPATIENT)
Dept: PHYSICAL THERAPY | Age: 62
Setting detail: THERAPIES SERIES
Discharge: HOME | End: 2022-06-10
Payer: MEDICARE

## 2022-06-10 PROCEDURE — 97110 THERAPEUTIC EXERCISES: CPT

## 2022-06-10 PROCEDURE — 77014 PR CT GUIDANCE PLACEMENT RAD THERAPY FIELDS: CPT | Performed by: STUDENT IN AN ORGANIZED HEALTH CARE EDUCATION/TRAINING PROGRAM

## 2022-06-10 PROCEDURE — 77385 HC NTSTY MODUL RAD TX DLVR SMPL: CPT | Performed by: STUDENT IN AN ORGANIZED HEALTH CARE EDUCATION/TRAINING PROGRAM

## 2022-06-10 NOTE — FLOWSHEET NOTE
Supine       Bent knee fall out 5x10\"      SLR 20x ea 2lb  x   Marches with TrA 20x ea  2lb  x   LTR 10x5\"   x   SKTC 3x15\" ea      Hip flexor stretch EOB 2x1' ea      Bridge 15x   x   Clamshell 2x15 Blue  x          Prone       Hamstring curls 15x ea 2lb              Other:       Treatment Charges: Mins Units   []  Modalities     [x]  Ther Exercise 40 3   [x]  Manual Therapy     []  Ther Activities     []  Aquatics     []  Vasocompression     []  Other     Total Treatment time 40 3       Assessment: [x] Progressing toward goals. Pt with good tolerance to all activities, in need of multiple breaks due to fatigue. Pt was able to perform all reps as listed above. No progressions this date due to fatigue. [] No change. [x] Other:  Patient with increased overall exhaustion and drowsiness due to not sleeping well the night before. Required increased seated rest breaks throughout program, but able to complete with progressions. [x] Patient would continue to benefit from skilled physical therapy services in order to:  improve core strengthening and reduce radicular pain. STG: (to be met in 10 treatments)  1. ? Pain: 3/10 feet pain with ADLs. 2. ? Strength: 4+/5 R hip flexion to demonstrate improved core strength. 3. ? Function: Oswestry score to 38% impaired or better to improve ADLs. 4. Independent with Home Exercise Programs      Pt. Education:  [x] Yes  [] No  [x] Reviewed Prior HEP/Ed  Method of Education: [x] Verbal  [x] Demo  [] Written  Comprehension of Education:  [x] Verbalizes understanding. [] Demonstrates understanding. [x] Needs review. [] Demonstrates/verbalizes HEP/Ed previously given. Access Code: [email or web address]  URL: [email or web address]  Date: 05/16/2022  Prepared by: Ab Bassett     Exercises  Supine Active Straight Leg Raise - 1 x daily - 7 x weekly - 3 sets - 10 reps  Bent Knee Fallouts - 1 x daily - 7 x weekly - 3 sets - 10 reps  Prone Knee Flexion - 1 x daily -

## 2022-06-13 ENCOUNTER — HOSPITAL ENCOUNTER (OUTPATIENT)
Dept: RADIATION ONCOLOGY | Facility: MEDICAL CENTER | Age: 62
Discharge: HOME | End: 2022-06-13
Payer: MEDICARE

## 2022-06-13 ENCOUNTER — HOSPITAL ENCOUNTER (OUTPATIENT)
Dept: PHYSICAL THERAPY | Age: 62
Setting detail: THERAPIES SERIES
Discharge: HOME | End: 2022-06-13
Payer: MEDICARE

## 2022-06-13 DIAGNOSIS — Z79.4 TYPE 2 DIABETES MELLITUS WITH OTHER SPECIFIED COMPLICATION, WITH LONG-TERM CURRENT USE OF INSULIN (HCC): ICD-10-CM

## 2022-06-13 DIAGNOSIS — I10 ESSENTIAL HYPERTENSION: ICD-10-CM

## 2022-06-13 DIAGNOSIS — E11.69 TYPE 2 DIABETES MELLITUS WITH OTHER SPECIFIED COMPLICATION, WITH LONG-TERM CURRENT USE OF INSULIN (HCC): ICD-10-CM

## 2022-06-13 PROCEDURE — 77014 PR CT GUIDANCE PLACEMENT RAD THERAPY FIELDS: CPT | Performed by: STUDENT IN AN ORGANIZED HEALTH CARE EDUCATION/TRAINING PROGRAM

## 2022-06-13 PROCEDURE — 77385 HC NTSTY MODUL RAD TX DLVR SMPL: CPT | Performed by: STUDENT IN AN ORGANIZED HEALTH CARE EDUCATION/TRAINING PROGRAM

## 2022-06-13 NOTE — FLOWSHEET NOTE
[x] [facility] &  Therapy  [address]    [phone]  F: [phone]   [] [address]  [facility] [address]   [address]  P: [phone]  F: [phone]  [] [address] &  Therapy  [address]  P: [phone]  F: [phone] [] [address]  P: [phone]  F: [phone]  [] [address]  [facility]   [address]   [place]: [phone]  F: [phone]   [] [place] [address] [facility] [address]  [place]: [phone]   F: [phone]     Physical Therapy Cancel/No Show note    Date: 2022  Patient: Misael Freedman  : 1960  MRN: [id number]    Cancels/No Shows to date:     For today's appointment patient:    [x]  Cancelled    [] Rescheduled appointment    [] No-show     Reason given by patient:    []  Patient ill    []  Conflicting appointment    [] No transportation      [] Conflict with work    [x] No reason given    [] Weather related    [] [id number]    [] Other:      Comments:        [x] Next appointment was confirmed    Electronically signed by: Angelita Gregg PTA

## 2022-06-14 ENCOUNTER — HOSPITAL ENCOUNTER (OUTPATIENT)
Dept: RADIATION ONCOLOGY | Facility: MEDICAL CENTER | Age: 62
Discharge: HOME | End: 2022-06-14
Payer: MEDICARE

## 2022-06-14 ENCOUNTER — HOSPITAL ENCOUNTER (OUTPATIENT)
Dept: RADIATION ONCOLOGY | Facility: MEDICAL CENTER | Age: 62
Discharge: HOME | End: 2022-06-14
Attending: STUDENT IN AN ORGANIZED HEALTH CARE EDUCATION/TRAINING PROGRAM
Payer: MEDICARE

## 2022-06-14 ENCOUNTER — OFFICE VISIT (OUTPATIENT)
Dept: PODIATRY | Age: 62
End: 2022-06-14
Payer: MEDICARE

## 2022-06-14 VITALS
RESPIRATION RATE: 16 BRPM | WEIGHT: 249.6 LBS | DIASTOLIC BLOOD PRESSURE: 85 MMHG | BODY MASS INDEX: 32.05 KG/M2 | HEART RATE: 100 BPM | OXYGEN SATURATION: 98 % | TEMPERATURE: 97.8 F | SYSTOLIC BLOOD PRESSURE: 119 MMHG

## 2022-06-14 VITALS — HEIGHT: 74 IN | WEIGHT: 260 LBS | BODY MASS INDEX: 33.37 KG/M2

## 2022-06-14 DIAGNOSIS — M51.16 RADICULOPATHY DUE TO LUMBAR INTERVERTEBRAL DISC DISORDER: Primary | ICD-10-CM

## 2022-06-14 DIAGNOSIS — C61 PROSTATE CA (HCC): Primary | ICD-10-CM

## 2022-06-14 DIAGNOSIS — M54.31 SCIATICA OF RIGHT SIDE: ICD-10-CM

## 2022-06-14 PROCEDURE — G8427 DOCREV CUR MEDS BY ELIG CLIN: HCPCS | Performed by: PODIATRIST

## 2022-06-14 PROCEDURE — 99213 OFFICE O/P EST LOW 20 MIN: CPT | Performed by: PODIATRIST

## 2022-06-14 PROCEDURE — G8417 CALC BMI ABV UP PARAM F/U: HCPCS | Performed by: PODIATRIST

## 2022-06-14 PROCEDURE — 77014 PR CT GUIDANCE PLACEMENT RAD THERAPY FIELDS: CPT | Performed by: STUDENT IN AN ORGANIZED HEALTH CARE EDUCATION/TRAINING PROGRAM

## 2022-06-14 PROCEDURE — 77385 HC NTSTY MODUL RAD TX DLVR SMPL: CPT | Performed by: STUDENT IN AN ORGANIZED HEALTH CARE EDUCATION/TRAINING PROGRAM

## 2022-06-14 PROCEDURE — 1036F TOBACCO NON-USER: CPT | Performed by: PODIATRIST

## 2022-06-14 PROCEDURE — 3017F COLORECTAL CA SCREEN DOC REV: CPT | Performed by: PODIATRIST

## 2022-06-14 RX ORDER — EMPAGLIFLOZIN 25 MG/1
TABLET, FILM COATED ORAL
Qty: 30 TABLET | Refills: 1 | Status: SHIPPED | OUTPATIENT
Start: 2022-06-14 | End: 2022-08-12

## 2022-06-14 NOTE — TELEPHONE ENCOUNTER
Please address the medication refill and close the encounter. If I can be of assistance, please route to the applicable pool. Thank you.       Last visit: 5-3-22  Last Med refill: 4-12-22  Does patient have enough medication for 72 hours: No:     Next Visit Date:  Future Appointments   Date Time Provider Liset Estradai   6/14/2022 11:30 AM Lion Ulrich DPM [facility]   6/14/2022  3:15 PM STVZ MIGNON VERSA [facility] ST[facility]   6/14/2022  3:30 PM Joseph Monroe MD [facility] ST[facility] RAD [facility]   6/15/2022  3:15 PM STVZ MIGNON VERSA STVZ STA RAD [facility]   6/16/2022  1:30 PM Leanne Cleary, PT [facility]   6/16/2022  3:15 PM STVZ MIGNON VERSA STVZ STA RAD [facility]   6/17/2022 10:45 AM Bernardo Garcia MD [facility]   6/17/2022  3:15 PM STVZ MIGNON VERSA STVZ STA RAD [facility]   7/6/2022  9:00 AM [facility]   7/25/2022  1:50 PM N[facility]stor Patterson MD [facility]   9/8/2022 10:30 AM Nicolás Mckeon MD INFT DISEASE [facility]   12/19/2022 11:00 AM Breezy Cevallos, [address]   Topic Date Due    Annual Wellness Visit (AWV)  Never done    Diabetic retinal exam  Never done    DTaP/Tdap/Td vaccine (1 - Tdap) Never done    Shingles vaccine (2 of 3) 02/08/2011    Diabetic foot exam  04/27/2019    Pneumococcal 0-64 years Vaccine (2 - PCV) 12/19/2019    Colorectal Cancer Screen  07/23/2020    COVID-19 Vaccine (3 - Booster for Rinku series) 01/25/2022    Flu vaccine (Season Ended) 09/01/2022    Depression Screen  03/28/2023    A1C test (Diabetic or Prediabetic)  04/12/2023    Lipids  04/12/2023    Diabetic microalbuminuria test  04/13/2023    Prostate Specific Antigen (PSA) Screening or Monitoring  05/18/2023    Hepatitis C screen  Completed    HIV screen  Completed    Hepatitis A vaccine  Aged Out    Hib vaccine  Aged Out    Meningococcal (ACWY) vaccine  Aged Out       Hemoglobin A1C (%) Date Value   04/12/2022 8.3   03/28/2022 8.3   08/15/2013 7.8 (H)             ( goal A1C is < 7)   Microalb/Crt.  Ratio (mcg/mg creat)   Date Value   04/13/2022 Can not be calculated     LDL Cholesterol (mg/dL)   Date Value   04/12/2022 52   08/15/2013 94       (goal LDL is <100)   AST (U/L)   Date Value   05/18/2022 22     ALT (U/L)   Date Value   05/18/2022 19     BUN (mg/dL)   Date Value   05/18/2022 17     BP Readings from Last 3 Encounters:   06/08/22 (!) 129/91   06/08/22 111/72   05/31/22 (!) 142/90          (goal 120/80)    All Future Testing planned in [facility]  Lab Frequency Next Occurrence   Hemoglobin A1C Once 03/28/2023   PSA, Diagnostic Once 07/24/2022   Sedimentation Rate Once 04/27/2022   CBC with Auto Differential Once 04/27/2022   CBC with Auto Differential Once 05/25/2022   Comprehensive Metabolic Panel Once 35/50/6863   PSA, Diagnostic Once 05/25/2022               Patient Active Problem List:     DM (diabetes mellitus) ([address]     HTN (hypertension)     ETOHism ([address]     Hypertensive urgency     Abnormal ambulatory electrocardiogram     Abnormal ambulatory electrocardiography     Abnormal kidney function     Adiposity     Astigmatism     Atherosclerotic heart disease of native coronary artery without angina pectoris     Cervical radiculopathy     Chronic back pain     Decreased cardiac output     Diabetic peripheral neuropathy ([address]     Dislocated intraocular lens     Disturbance in sleep behavior     Dizziness     Dyssomnia     Floaters     Impotence of organic origin     Left ventricular dysfunction     Low vision, both eyes     Myopia     Nuclear sclerosis of left eye     Obstructive sleep apnea syndrome     Palpitations     Personal history of other diseases of the digestive system     Posterior vitreous detachment     Presbyopia     Primary osteoarthritis of right hip     Pseudophakia of right eye     Repetitive intrusions of sleep     Sciatica     Tendonitis     Vitamin D deficiency Vitreous opacities of right eye     Dislocated IOL (intraocular lens), anterior, right     Prostate CA (HCC)     Congestive heart failure (HCC)     Essential hypertension

## 2022-06-14 NOTE — PROGRESS NOTES
[address] [facility] PODIATRY [facility]  [address] [place] [address]  Dept: [phone]  Dept Fax: [phone]    RETURN PATIENT PROGRESS NOTE  Date of patient's visit: 6/14/2022  Patient's Name:  Beverly Mcfadden YOB: 1960            Patient Care Team:  Noel Russell MD as PCP - General (Emergency Medicine)  Dmitriy Gtz RN as Nurse Navigator (Oncology)  Lazarus Gutta, MD as Consulting Physician (Infectious Diseases)  Luisa Sharma MD as Consulting Physician (Radiation Oncology)  Jacky Stanton DPM as Physician (Podiatry)       Beverly Mcfadden 60 y.o. male that presents for follow-up of   Chief Complaint   Patient presents with    Foot Pain     right     Diabetes     Pt's primary care physician is oNel Russell MD last seen 05/03/2022  Symptoms began several month(s) ago and are unchanged . Patient relates pain is Present. Pain is rated 6 out of 10 and is described as constant, moderate. Treatments prior to today's visit include: stretching and PT. Currently denies F/C/N/V. Allergies   Allergen Reactions    Lisinopril Swelling     Outer Neck swelling    Melatonin Swelling    Trazodone Swelling     Chest swells       Past Medical History:   Diagnosis Date    Arthritis     DM (diabetes mellitus) [facility] 2009    IDDM    GERD (gastroesophageal reflux disease) 2017    ON RX    Heart murmur 2013    ASYMPTOMATIC    HTN (hypertension) 6/29/2012    ON RX    Hyperlipidemia 06/29/2012    ON RX    Sleep apnea 2016    TRIED MACHINE COULD NOT TOLERATE    Vision abnormalities 2019    FLOATERS RIGHT EYE    Wears glasses        Prior to Admission medications    Medication Sig Start Date End Date Taking?  Authorizing Provider   JARDIANCE 25 MG tablet TAKE 1 TABLET BY MOUTH ONE TIME A DAY 6/14/22  Yes Antony Avelar MD   losartan (COZAAR) 25 MG tablet take 1 tablet by mouth nightly 5/5/22  Yes Historical Provider, MD   ciprofloxacin (CIPRO) 500 mg tablet Take 1 tablet by mouth 2 times daily 6/8/22 7/8/22 Yes Montserrat Patterson MD   doxycycline hyclate (VIBRA-TABS) 100 MG tablet Take 1 tablet by mouth 2 times daily for 28 days 6/8/22 7/6/22 Yes Montserrat Patterson MD   ibuprofen (ADVIL;MOTRIN) 600 MG tablet Take 1 tablet by mouth 3 times daily as needed for Pain 6/4/22 7/4/22 Yes Belle Choe MD   metoprolol tartrate (LOPRESSOR) 25 MG tablet TAKE ONE - HALF (1/2) TABLET BY MOUTH TWO TIMES A DAY 5/24/22  Yes Bernardo Lozano MD   pregabalin (LYRICA) 75 MG capsule TAKE 1 CAPSULE BY MOUTH 2 TIMES A DAY 5/24/22 6/21/22 Yes Bernardo Lozano MD   tamsulosin (FLOMAX) 0.4 mg capsule Take 1 capsule by mouth daily 5/24/22  Yes Morgan Nicolas MD   metFORMIN (GLUCOPHAGE) 500 MG tablet TAKE TWO TABLETS BY MOUTH TWICE A DAY WITH MEALS 5/17/22  Yes Manny Davis MD   TRULICITY 3 PK/1.9IZ SOPN INJECT THE CONTENTS OF ONE SYRINGE (3MG) UNDER THE SKIN ONCE WEEKLY 5/17/22  Yes Manny Davis MD   LANTUS SOLOSTAR 100 UNIT/ML injection pen INJECT 42 UNITS INTO THE SKIN TWO TIMES A DAY 5/17/22  Yes Manny Davis MD   venlafaxine (EFFEXOR XR) 37.5 MG extended release capsule Take 1 capsule by mouth daily 4/15/22  Yes Leena Friend MD   Continuous Blood Gluc Transmit (DEXCOM G6 TRANSMITTER) MISC Use in combination with new sensor every 10 days.  Transmitter lasts 3 months 4/14/22  Yes Ross Dubose, DO   Continuous Blood Gluc Sensor (DEXCOM G6 SENSOR) MISC Apply new sensor to abdomen every 10 days 4/14/22  Yes Ross Dubose, DO   Continuous Blood Gluc  (DEXCOM G6 ) MAMADOU Use as directed to monitor glucose continuously 4/14/22  Yes Ross Dubose, DO   ferrous sulfate (IRON 325) 325 (65 Fe) MG tablet Take 325 mg by mouth daily (with breakfast)    Yes Historical Provider, MD   zinc 50 MG CAPS Take 1 capsule by mouth daily   Yes Historical Provider, MD   aspirin EC 81 MG EC tablet Take 1 tablet by mouth daily 4/12/22  Yes Bernardo Hardy MD   atorvastatin (LIPITOR) 40 MG tablet Take 1 tablet by mouth daily 4/12/22  Yes Bernardo Hardy MD   Insulin Pen Needle (B-D ULTRAFINE III SHORT PEN) 31G X 8 MM MISC 1 each by Does not apply route daily 4/12/22  Yes Bernardo Hardy MD   vitamin D3 (CHOLECALCIFEROL) 25 MCG (1000 UT) TABS tablet Take 1 tablet by mouth daily 4/12/22  Yes Bernardo Hardy MD   insulin lispro, 1 Unit Dial, (HUMALOG KWIKPEN) 100 UNIT/ML SOPN Inject 6 Units into the skin 3 times daily (before meals) 4/12/22  Yes Bernardo Hardy MD   spironolactone (ALDACTONE) 25 MG tablet Take 1 tablet by mouth daily. 8/17/13  Yes Moisés Salgado DO       Review of Systems    Review of Systems:  History obtained from chart review and the patient  General ROS: negative for - chills, fatigue, fever, night sweats or weight gain  Constitutional: Negative for chills, diaphoresis, fatigue, fever and unexpected weight change. Musculoskeletal: Positive for arthralgias, gait problem and joint swelling. Neurological ROS: negative for - behavioral changes, confusion, headaches or seizures. Negative for weakness and numbness. Dermatological ROS: negative for - mole changes, rash  Cardiovascular: Negative for leg swelling. Gastrointestinal: Negative for constipation, diarrhea, nausea and vomiting. Lower Extremity Physical Examination:     Vitals: There were no vitals filed for this visit. General: AAO x 3 in NAD. Dermatologic Exam:  Skin lesion/ulceration Absent . Skin No rashes or nodules noted. .       Musculoskeletal:     1st MPJ ROM decreased, Bilateral.  Muscle strength 5/5, Bilateral.  Pain present upon palpation of right leg and tightness to the hip flexors . Medial longitudinal arch, Bilateral WNL.   Ankle ROM WNL,Bilateral.    Dorsally contracted digits absent digits 1-5 Bilateral.     Vascular: DP and PT pulses palpable 2/4, Bilateral.  CFT <3 seconds, Bilateral.  Hair growth present to the level of the digits, Bilateral.  Edema absent, Bilateral.  Varicosities absent, Bilateral. Erythema absent, Bilateral    Neurological: Sensation intact to light touch to level of digits, Bilateral.  Protective sensation intact 10/10 sites via 5.07/10g Clinton-Stephen Monofilament, Bilateral.  negative Tinel's, Bilateral.  negative Valleix sign, Bilateral.      Integument: Warm, dry, supple, Bilateral.  Open lesion absent, Bilateral.  Interdigital maceration absent to web spaces 1-4, Bilateral.  Nails are normal in length, thickness and color 1-5 bilateral.  Fissures absent, Bilateral.         Asessment: Patient is a 60 y.o. male with:    Diagnosis Orders   1. Radiculopathy due to lumbar intervertebral disc disorder  EMG   2. Sciatica of right side  EMG       Plan: Patient examined and evaluated. Current condition and treatment options discussed in detail. Advised pt to his condition. His leg numbness  May be due from sciatica and pt will need to get an EMG for the sarah lower legs to rule out radiculopathy . Verbal and written instructions given to patient. Contact office with any questions/problems/concerns. No orders of the defined types were placed in this encounter. No orders of the defined types were placed in this encounter. RTC in 6week(s).     6/14/2022      Electronically signed by Lion Ulrich DPM on 6/14/2022 at 11:04 AM  6/14/2022

## 2022-06-14 NOTE — PATIENT INSTRUCTIONS
Schedule a Vaccine  When you qualify to receive the vaccine, call the [facility]) COVID-19 Vaccination Hotline to schedule your appointment or to get additional information about the [facility]) locations which are offering the COVID-19 vaccine. To be 94% effective, it's important that you receive two doses of one of the COVID-19 vaccines. -If you are receiving the [employer] vaccine, your second shot will be scheduled as close to 21 days after the first shot as possible. -If you are receiving the Moderna vaccine, your second shot will be scheduled as close to 28 days after the first shot as possible. [facility]) COVID-19 Vaccination Hotline: [phone]    Links to [facility]) website and [facility] website:    [employer]-monitoring-coronavirus-covid-19/covid-19-vaccine/[email or web address]-vaccine    [email or web address]

## 2022-06-14 NOTE — PROGRESS NOTES
Sumitkatiakoffi Saraviacathy  6/14/2022  Wt Readings from Last 3 Encounters:   06/14/22 249 lb 9.6 oz (113.2 kg)   06/14/22 260 lb (117.9 kg)   06/08/22 260 lb 9.6 oz (118.2 kg)     Body mass index is 32.05 kg/m². Treatment Area:prostate    Patient was seen today for weekly visit. Comfort Alteration    Fatigue: Mild    Nutritional Alteration  Anorexia: No  Nausea: No  Vomiting: No     Elimination Alterations  Constipation: no  Diarrhea:  no  Urinary Frequency/Urgency: No  Urinary Retention: No  Dysuria: No  Urinary Incontinence: No  Proctitis: No  Nocturia: Yes #/night: 1     Emotional  Coping: effective      Skin Alteration   Sensation:intact    Radiation Dermatitis:  Intact [x]     Erythema  []     Discoloration  []     Rash []     Dry desquamation  []     Moist desquamation []           Injury, potential bleeding or infection:none    Lab Results   Component Value Date    WBC 4.8 05/18/2022     05/18/2022         /85   Pulse 100   Temp 97.8 °F (36.6 °C) (Temporal)   Resp 16   Wt 249 lb 9.6 oz (113.2 kg)   SpO2 98%   BMI 32.05 kg/m²      Pain Assessment: None - Denies Pain            Assessment/Plan: Patient was seen today for weekly visit. Denies any complaints other than left knee/hip pain- chronic. Continues to go to physical therapy. Dr. Millicent Alvarenga notified of assessment and examined patient. No change in plan.     Shaquille Martini RN

## 2022-06-15 ENCOUNTER — HOSPITAL ENCOUNTER (OUTPATIENT)
Dept: RADIATION ONCOLOGY | Facility: MEDICAL CENTER | Age: 62
Discharge: HOME | End: 2022-06-15
Payer: MEDICARE

## 2022-06-15 PROCEDURE — 77385 HC NTSTY MODUL RAD TX DLVR SMPL: CPT | Performed by: STUDENT IN AN ORGANIZED HEALTH CARE EDUCATION/TRAINING PROGRAM

## 2022-06-15 PROCEDURE — 77014 PR CT GUIDANCE PLACEMENT RAD THERAPY FIELDS: CPT | Performed by: STUDENT IN AN ORGANIZED HEALTH CARE EDUCATION/TRAINING PROGRAM

## 2022-06-16 ENCOUNTER — HOSPITAL ENCOUNTER (OUTPATIENT)
Dept: PHYSICAL THERAPY | Age: 62
Setting detail: THERAPIES SERIES
Discharge: HOME | End: 2022-06-16
Payer: MEDICARE

## 2022-06-16 ENCOUNTER — HOSPITAL ENCOUNTER (OUTPATIENT)
Dept: RADIATION ONCOLOGY | Facility: MEDICAL CENTER | Age: 62
Discharge: HOME | End: 2022-06-16
Payer: MEDICARE

## 2022-06-16 PROCEDURE — 77385 HC NTSTY MODUL RAD TX DLVR SMPL: CPT | Performed by: STUDENT IN AN ORGANIZED HEALTH CARE EDUCATION/TRAINING PROGRAM

## 2022-06-16 PROCEDURE — 97110 THERAPEUTIC EXERCISES: CPT

## 2022-06-16 PROCEDURE — 77014 PR CT GUIDANCE PLACEMENT RAD THERAPY FIELDS: CPT | Performed by: STUDENT IN AN ORGANIZED HEALTH CARE EDUCATION/TRAINING PROGRAM

## 2022-06-16 NOTE — FLOWSHEET NOTE
[x] [facility]) - [facility] TWELVESTEP [facility] CENTER &  Therapy  [address]  [phone]  F: [phone] [] [address]  [address]   [address]  P: [phone]  F: [phone] [] [address]  [address]  P: [phone]  F: [phone] [] [address]  P: [phone]  F: [phone] [] [address]  [facility]   [address]   [place]: [phone]  F: [phone]      Physical Therapy Daily Treatment Note    Date:  2022  Patient Name:  Ankush Booth    :  1960  MRN: [id number]  Physician: Fadia Carlos DPM                     Insurance: [facility] Medicare / Medicaid   Medical Diagnosis: Sciatica of right side M54.31               Rehab Codes: M54.31, M25.571, M25.572, R20.2   Onset Date: 22                              Next 's appt:   Visit# / total visits: 9/10    Cancels/No Shows: 0/0    Subjective:    Pain:  [x] Yes  [] No Location: R hip  Pain Rating: (0-10 scale) 5/10  Pain altered Tx:  [x] No  [] Yes  Action:  Comments: Patient reports no change in R hip pain.       Objective:  Modalities:   Precautions:  Exercises:  Exercise Reps/ Time Weight/ Level Comments    SciFit 5' L2  x          Standing       Gastroc stretch 3x20\"   x   Heel raises  20x   x   Hip abduction 20x ea  Increased reps 6/9 x   Hip extension 20x ea  Increased reps 6/9 x   Hip flexion  20x   x   [employer] squats 2x10 L35  x   Palloff press 15x ea Blue tube Added 6/9 x   Rows       Tband ext 2x10 Blue tube Added 6/9 x          Seated       Hamstring stretch 3x15\"   x   LAQ 20x ea 2lb     Piriformis stretch 1 min ea      Hip adduction  15x ball     Lumbar flexion 10x5\"   x          Supine       Bent knee fall out 5x10\"      SLR 20x ea 2lb  x   [employer] with TrA 20x ea  2lb  x   LTR 10x5\"   x   SKTC 3x15\" ea      Hip flexor stretch EOB 2x1' ea      Bridge 15x   x   Clamshell 2x15 Blue  x          Prone       Hamstring curls 15x ea 2lb              Other:       Treatment Charges: Mins Units   []  Modalities     [x]  Ther Exercise 40 3   [x]  Manual Therapy     []  Ther Activities     []  Aquatics     []  Vasocompression     []  Other     Total Treatment time 40 3       Assessment: [x] Progressing toward goals. See Discharge note. STG: (to be met in 10 treatments)  1. ? Pain: 3/10 feet pain with ADLs. Not MET  2. ? Strength: 4+/5 R hip flexion to demonstrate improved core strength. MET  3. ? Function: Oswestry score to 38% impaired or better to improve ADLs. 4. Independent with Home Exercise Programs. MET      Pt. Education:  [x] Yes  [] No  [x] Reviewed Prior HEP/Ed  Method of Education: [x] Verbal  [x] Demo  [] Written  Comprehension of Education:  [x] Verbalizes understanding. [] Demonstrates understanding. [x] Needs review. [] Demonstrates/verbalizes HEP/Ed previously given. Access Code: [email or web address]  URL: [email or web address]  Date: 05/16/2022  Prepared by: Yanira Carrillo     Exercises  Supine Active Straight Leg Raise - 1 x daily - 7 x weekly - 3 sets - 10 reps  Bent Knee Fallouts - 1 x daily - 7 x weekly - 3 sets - 10 reps  Prone Knee Flexion - 1 x daily - 7 x weekly - 3 sets - 10 reps  Supine Quadriceps Stretch with Strap on Table - 1 x daily - 7 x weekly - 3 sets - 10 reps - 20 hold  Seated Hamstring Stretch - 1 x daily - 7 x weekly - 3 sets - 10 reps    Plan: [x] Continue current frequency toward long and short term goals.     [x] Specific Instructions for subsequent treatments:  R hip and core strengthening          Time In: 1330            Time Out: 1410    Electronically signed by:  Bita Keith, PT

## 2022-06-16 NOTE — DISCHARGE SUMMARY
[x] [facility] &  Therapy  [address]  [phone]  F: [phone] [] [address]  [address]   [address]  P: [phone]  F: [phone] [] [address] &  Therapy  [address]  P: [phone]  F: [phone] [] [address]  P: [phone]  F: [phone] [] [address]  [address] [facility] [address]   [address]   [place]: [phone]  F: [phone]      Physical Therapy Discharge Note    Date: 2022      Patient: Tanna Freire  : 1960  MRN: [id number]    [address] [place] [address]                     Insurance: [facility] Medicare / [facility] of right side M54.31               Rehab Codes: M54.31, M25.571, M25.572, R20.2   Onset Date: 22                              Next 's appt:   Visit# / total visits: 9/10                      Cancels/No Shows: 0/0  Date of initial visit:  22              Date of final visit: 22    Subjective:    Pain:  [x]? Yes  []? No   Location: R hip            Pain Rating: (0-10 scale) 5/10  Pain altered Tx:  [x]? No  []? Yes  Action:  Comments: Patient reports his R hip and low back pain has improved. He is compliant with HEP. Patient inquires about Aquatic therapy to help his feet. Objective:  Test Measurements:  Strength: 4+/5 R hip flexion   Function: Oswestry score     Assessment:  Patient has improved trunk and hip ROM, strength, and tolerance to ADLs. Patient's bilateral foot pain , numbness, and tingling is unchanged. Recommends f/u with referring physician to discuss this issue. His condition appears to be bilateral peripheral neuropathy over sciatica. D/C to HEP at this time.      STG: (to be met in 10 treatments)  1. ? Pain: 3/10 feet pain with ADLs. Not MET  2. ? Strength: 4+/5 R hip flexion to demonstrate improved core strength.  MET  3. ? Function: Oswestry score to 38% impaired or better to improve ADLs.  Progress 40% impaired   4. Independent with Home Exercise Programs. MET      Treatment to Date:  [x] Therapeutic Exercise    [] Modalities:  [] Therapeutic Activity     [] Ultrasound  [] Electrical Stimulation  [] Gait Training     [] Massage       [] Lumbar/Cervical Traction  [] Neuromuscular Re-education [] Cold/hotpack [] Iontophoresis: 4 mg/mL  [x] Instruction in Home Exercise Program                     Dexamethasone Sodium  [] Manual Therapy             Phosphate 40-80 mAmin  [] Aquatic Therapy                   [] Vasocompression/    [] Other:             Game Ready    Discharge Status:     [] Pt recovered from conditions. Treatment goals were met. [x] Pt received maximum benefit. No further therapy indicated at this time. [x] Pt to continue exercise/home instructions independently. [] Therapy interrupted due to:    [] Pt has 2 or more no shows/cancels, is discontinued per our policy. [] Pt has completed prescribed number of treatment sessions. [] Other:         Electronically signed by Elliot Fernandez PT on 6/16/2022 at 2:07 PM      If you have any questions or concerns, please don't hesitate to call.   Thank you for your referral.

## 2022-06-17 ENCOUNTER — HOSPITAL ENCOUNTER (OUTPATIENT)
Dept: RADIATION ONCOLOGY | Facility: MEDICAL CENTER | Age: 62
Discharge: HOME | End: 2022-06-17
Payer: MEDICARE

## 2022-06-17 ENCOUNTER — OFFICE VISIT (OUTPATIENT)
Dept: FAMILY MEDICINE CLINIC | Age: 62
End: 2022-06-17
Payer: MEDICARE

## 2022-06-17 VITALS
DIASTOLIC BLOOD PRESSURE: 86 MMHG | BODY MASS INDEX: 32.23 KG/M2 | SYSTOLIC BLOOD PRESSURE: 125 MMHG | TEMPERATURE: 97.1 F | HEART RATE: 110 BPM | WEIGHT: 251 LBS

## 2022-06-17 DIAGNOSIS — E11.69 TYPE 2 DIABETES MELLITUS WITH OTHER SPECIFIED COMPLICATION, WITH LONG-TERM CURRENT USE OF INSULIN (HCC): Primary | ICD-10-CM

## 2022-06-17 DIAGNOSIS — Z79.4 TYPE 2 DIABETES MELLITUS WITH OTHER SPECIFIED COMPLICATION, WITH LONG-TERM CURRENT USE OF INSULIN (HCC): Primary | ICD-10-CM

## 2022-06-17 DIAGNOSIS — Z12.11 COLON CANCER SCREENING: ICD-10-CM

## 2022-06-17 PROCEDURE — G8427 DOCREV CUR MEDS BY ELIG CLIN: HCPCS | Performed by: STUDENT IN AN ORGANIZED HEALTH CARE EDUCATION/TRAINING PROGRAM

## 2022-06-17 PROCEDURE — 3052F HG A1C>EQUAL 8.0%<EQUAL 9.0%: CPT | Performed by: STUDENT IN AN ORGANIZED HEALTH CARE EDUCATION/TRAINING PROGRAM

## 2022-06-17 PROCEDURE — 99213 OFFICE O/P EST LOW 20 MIN: CPT | Performed by: STUDENT IN AN ORGANIZED HEALTH CARE EDUCATION/TRAINING PROGRAM

## 2022-06-17 PROCEDURE — G8417 CALC BMI ABV UP PARAM F/U: HCPCS | Performed by: STUDENT IN AN ORGANIZED HEALTH CARE EDUCATION/TRAINING PROGRAM

## 2022-06-17 PROCEDURE — 2022F DILAT RTA XM EVC RTNOPTHY: CPT | Performed by: STUDENT IN AN ORGANIZED HEALTH CARE EDUCATION/TRAINING PROGRAM

## 2022-06-17 PROCEDURE — 77385 HC NTSTY MODUL RAD TX DLVR SMPL: CPT | Performed by: STUDENT IN AN ORGANIZED HEALTH CARE EDUCATION/TRAINING PROGRAM

## 2022-06-17 PROCEDURE — 1036F TOBACCO NON-USER: CPT | Performed by: STUDENT IN AN ORGANIZED HEALTH CARE EDUCATION/TRAINING PROGRAM

## 2022-06-17 PROCEDURE — 3017F COLORECTAL CA SCREEN DOC REV: CPT | Performed by: STUDENT IN AN ORGANIZED HEALTH CARE EDUCATION/TRAINING PROGRAM

## 2022-06-17 PROCEDURE — 77014 PR CT GUIDANCE PLACEMENT RAD THERAPY FIELDS: CPT | Performed by: STUDENT IN AN ORGANIZED HEALTH CARE EDUCATION/TRAINING PROGRAM

## 2022-06-17 RX ORDER — PREGABALIN 100 MG/1
100 CAPSULE ORAL 2 TIMES DAILY
Qty: 60 CAPSULE | Refills: 2 | Status: SHIPPED | OUTPATIENT
Start: 2022-06-17 | End: 2022-09-07

## 2022-06-17 ASSESSMENT — ENCOUNTER SYMPTOMS
COUGH: 0
COLOR CHANGE: 0
SHORTNESS OF BREATH: 0
ABDOMINAL PAIN: 0
SINUS PRESSURE: 0
CONSTIPATION: 0
BACK PAIN: 0
WHEEZING: 0
BLOOD IN STOOL: 0
DIARRHEA: 0
VOMITING: 0
SINUS PAIN: 0
NAUSEA: 0

## 2022-06-17 NOTE — PROGRESS NOTES
I have reviewed and discussed key elements of [facility] with the resident including plan of care and follow up and agree with the care elvie plan. Past Medical History:   Diagnosis Date    Arthritis     DM (diabetes mellitus) ([address] 2009    IDDM    GERD (gastroesophageal reflux disease) 2017    ON RX    Heart murmur 2013    ASYMPTOMATIC    HTN (hypertension) 6/29/2012    ON RX    Hyperlipidemia 06/29/2012    ON RX    Sleep apnea 2016    TRIED MACHINE COULD NOT TOLERATE    Vision abnormalities 2019    FLOATERS RIGHT EYE    Wears glasses        Vitals:    06/17/22 1042   BP: 125/86   Pulse: (!) 110   Temp: 97.1 °F (36.2 °C)     Component Ref Range & Units 4/12/22 1418 3/28/22 1548 8/15/13 0514 4/10/13 1528 7/3/12 0920 6/29/12 1509   Hemoglobin A1C % 8.3  8.3  7.8 High  R  12.7 High  R  10.3 High  R  10.2 High  R     (Resulted: 4/22/2022) Provider Status: Reviewed       Echocardiogram Complete 2D w Doppler w Color  Order: [id number]   Status: Edited Result - FINAL       Visible to patient: No (not released)       Next appt:  Today at 03:15 PM in Radiation Oncology ([address])       0 Result Notes      Details    Reading Physician Reading Date Result Priority   Sylvia Velasquez, [address] 4/22/2022    Unknown Provider Result 4/22/2022      Narrative & Impression  Transthoracic Echocardiography Report (TTE)      Patient Name  Liu Araujo     Date of Study                   04/22/2022                 Anaya Kirk      Date of Birth 1960  Gender                          Male      Age           57 year(s)  Race                            Black      Room Number      Corporate ID  [id number]   #      Patient Acct  [de-identified]   #      MR #          [id number]     Sonographer                     Lamont Barrera      Accession #   [id number]  Interpreting Physician          [facility]      Fellow                    Referring Nurse Practitioner      Interpreting              Referring Physician Bernardo Brown   Fellow     Type of Study      TTE procedure:2D Echocardiogram, M-Mode, Doppler, Color Doppler. Procedure Date  Date: 04/22/2022 Start: 11:10 AM     Study Location: [facility]  Technical Quality: Adequate visualization     Indications:Congestive heart failure.     History / Tech. Comments:  Procedure explained to patient. Echo completed in the Echo Lab.     PMHx:  Former smoker, Alcohol abuse  Hypertension, Diabetes     Patient Status: Outpatient     CONCLUSIONS     Summary  Left ventricle is normal in size, global left ventricular systolic function  is preserved, estimated ejection fraction is 55%. (Abnormal heart rate,  making it difficult to assess for wall motion abnormalities.)  Evidence of diastolic dysfunction. Left atrial dilatation. Inter-atrial septum is intact with no evidence for an atrial septal defect,  by color doppler.                 Diagnosis Orders   1. Type 2 diabetes mellitus with other specified complication, with long-term current use of insulin (HCC)  pregabalin (LYRICA) 100 MG capsule    [facility] Care Pharmacist   2.  Colon cancer screening  Fecal DNA Colorectal cancer screening (Cologuard)

## 2022-06-17 NOTE — PROGRESS NOTES
Visit Information    Have you changed or started any medications since your last visit including any over-the-counter medicines, vitamins, or herbal medicines? no   Have you stopped taking any of your medications? Is so, why? -  no  Are you having any side effects from any of your medications? - no    Have you seen any other physician or provider since your last visit?  no   Have you had any other diagnostic tests since your last visit?  no   Have you been seen in the emergency room and/or had an admission in a hospital since we last saw you?  no   Have you had your routine dental cleaning in the past 6 months?  no     Do you have an active MyChart account? If no, what is the barrier?   No:     Patient Care Team:  Ziggy Damon MD as PCP - General (Emergency Medicine)  Terrie Arnett RN as Nurse Navigator (Oncology)  Shoshana Joseph MD as Consulting Physician (Infectious Diseases)  Russ Tobias MD as Consulting Physician (Radiation Oncology)  oGvind White DPM as Physician (Podiatry)    Medical History Review  Past Medical, Family, and Social History reviewed and does not contribute to the patient presenting condition    Health Maintenance   Topic Date Due    Annual Wellness Visit (AWV)  Never done    Diabetic retinal exam  Never done    DTaP/Tdap/Td vaccine (1 - Tdap) Never done    Shingles vaccine (2 of 3) 02/08/2011    Diabetic foot exam  04/27/2019    Pneumococcal 0-64 years Vaccine (2 - PCV) 12/19/2019    Colorectal Cancer Screen  07/23/2020    COVID-19 Vaccine (3 - Booster for Rinku series) 01/25/2022    Flu vaccine (Season Ended) 09/01/2022    Depression Screen  03/28/2023    A1C test (Diabetic or Prediabetic)  04/12/2023    Lipids  04/12/2023    Diabetic microalbuminuria test  04/13/2023    Prostate Specific Antigen (PSA) Screening or Monitoring  05/18/2023    Hepatitis C screen  Completed    HIV screen  Completed    Hepatitis A vaccine  Aged Out    Hib vaccine  Aged Out

## 2022-06-17 NOTE — PROGRESS NOTES
Subjective: Twila Farmer is a 60 y.o. male with  has a past medical history of Arthritis, DM (diabetes mellitus) ([address] GERD (gastroesophageal reflux disease), Heart murmur, HTN (hypertension), Hyperlipidemia, Sleep apnea, Vision abnormalities, and Wears glasses. 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 Cc: Type 2 diabetes mellitus   Type I/II/Unknown: 2   HbA1c: 8.3 (4/12/2022)   Insulin dependant: Yes   Blood sugar numbers at home: On CGM managed by pharmacy   Hypoglycemic episodes after last visit: None   Hyperglycemia or hospital admission: None   Current medication: Jardiance 25 Mg daily, insulin Lantus 42 units twice a day, Trulicity 3 mg once weekly, 6 units of lispro 3 times daily before meals, metformin 500 mg twice daily   Urine Micro: Unremarkable   Lipid Profile: Done on 4/12/2022, on Lipitor 40 mg   Foot exam: Established with podiatry   Eye Exam: Scheduled in August   Neuropathy: Yes   Gastroparesis: No   CVA/CAD/CKD: None   BP at goal: Yes    Hypertension is defined according to age. Age group Hypertension Goal   Adults 18 to 72 ?140/90 120 to 130/70 to 79   Adults 65 to 79 ? 140/90 130 to 139/70 to 79     Adults 80 years and older   ? 160/90   130 to 139/70 to 78     Adults with diabetes, 25to 72years of age   ? 140/90   120 to 130/70 to 78     Adults with CKD, 25to 72years of age   ? 140/90   130 to 139/70 to 79                 Review of Systems   Constitutional: Negative for chills and fever.    HENT: Negative for congestion, sinus pressure and sinus pain. Respiratory: Negative for cough, shortness of breath and wheezing. Cardiovascular: Negative for chest pain, palpitations and leg swelling. Gastrointestinal: Negative for abdominal pain, blood in stool, constipation, diarrhea, nausea and vomiting. Genitourinary: Negative for difficulty urinating, flank pain and hematuria. Musculoskeletal: Negative for arthralgias, back pain and myalgias. Skin: Negative for color change and wound. Neurological: Positive for numbness. Negative for dizziness, light-headedness and headaches. Psychiatric/Behavioral: Negative for agitation and confusion. ROS negative except what mentioned in HPI. The patient has a   Family History   Problem Relation Age of Onset    Diabetes Sister     Diabetes Brother     Cancer Brother        Objective:    /86 (Site: Left Upper Arm, Position: Sitting, Cuff Size: Medium Adult)   Pulse (!) 110   Temp 97.1 °F (36.2 °C) (Temporal)   Wt 251 lb (113.9 kg)   BMI 32.23 kg/m²    BP Readings from Last 3 Encounters:   06/17/22 125/86   06/14/22 119/85   06/08/22 (!) 129/91       Physical Exam  Vitals and nursing note reviewed. Constitutional:       Appearance: Normal appearance. HENT:      Mouth/Throat:      Mouth: Mucous membranes are moist.   Eyes:      Conjunctiva/sclera: Conjunctivae normal.   Cardiovascular:      Rate and Rhythm: Normal rate and regular rhythm. Pulmonary:      Effort: Pulmonary effort is normal.      Breath sounds: Normal breath sounds. Abdominal:      General: Bowel sounds are normal. There is no distension. Palpations: Abdomen is soft. There is no mass. Tenderness: There is no abdominal tenderness. There is no guarding. Musculoskeletal:      Right lower leg: No edema. Left lower leg: No edema. Neurological:      General: No focal deficit present. Mental Status: He is alert and oriented to person, place, and time. Psychiatric:         Mood and Affect: Mood normal.         Behavior: Behavior normal.         Lab Results   Component Value Date    WBC 4.8 05/18/2022    HGB 12.0 (L) 05/18/2022    HCT 39.2 (L) 05/18/2022     05/18/2022    CHOL 123 04/12/2022    TRIG 111 04/12/2022    HDL 49 04/12/2022    ALT 19 05/18/2022    AST 22 05/18/2022     05/18/2022    K 4.2 05/18/2022     05/18/2022    CREATININE 1.06 05/18/2022    BUN 17 05/18/2022    CO2 24 05/18/2022    PSA 5.12 (H) 05/18/2022    INR 1.0 08/15/2013    LABA1C 8.3 04/12/2022    LABMICR Can not be calculated 04/13/2022     Lab Results   Component Value Date    CALCIUM 9.7 05/18/2022     Lab Results   Component Value Date    LDLCHOLESTEROL 52 04/12/2022       Examination including this and mentioned above in HPI. Assessment and Plan:    1. Type 2 diabetes mellitus with other specified complication, with long-term current use of insulin ([facility])  -HbA1c: 8.3, will repeat in 2 months  -Continue Jardiance 25 Mg daily, insulin Lantus 42 units twice a day, Trulicity 3 mg once weekly, 6 units of lispro 3 times daily before meals, metformin 500 mg twice daily  - pregabalin (LYRICA) 100 MG capsule; Take 1 capsule by mouth 2 times daily for 30 days. Dispense: 60 capsule; Refill: 2  - [facility]) Primary Care Pharmacist    2. Colon cancer screening  -No family history of colon cancer, no blood per stools we will do a Cologuard. - Fecal DNA Colorectal cancer screening (Cologuard)          Requested Prescriptions     Signed Prescriptions Disp Refills    pregabalin (LYRICA) 100 MG capsule 60 capsule 2     Sig: Take 1 capsule by mouth 2 times daily for 30 days.        Medications Discontinued During This Encounter   Medication Reason    losartan (COZAAR) 25 MG tablet Therapy completed    pregabalin (LYRICA) 75 MG capsule REORDER       Ryder received counseling on the following healthy behaviors: nutrition, exercise and medication adherence      Patient was counseled about importance of taking medication which were prescribed today and also which he is already using during today conversation. Discussed use,benefit, and side effects of prescribed medications. Barriers to medication compliance addressed. Patient was also counseled that lifestyle changes including diet, smoking and use of less alcohol will benefit their health in long term. All the question asked by the patient were answered in non-medical terms and to be best of writer knowledge. Patient understands and agree to the plan. Teach back method was used while having the conversation. All patient questions answered. Pt voiced understanding. Return in about 3 months (around 9/17/2022) for HTN, DM. Disclaimer: Some oral of this note was transcribed using voice-recognition software. This may cause typographical errors occasionally, please review it with the context of the note. Although all effort is made to fix these errors, please do not hesitate to contact our office if there Minerva Lambert concern with the understanding of this note.

## 2022-06-20 ENCOUNTER — HOSPITAL ENCOUNTER (OUTPATIENT)
Dept: RADIATION ONCOLOGY | Facility: MEDICAL CENTER | Age: 62
Discharge: HOME | End: 2022-06-20
Payer: MEDICARE

## 2022-06-20 PROCEDURE — 77014 PR CT GUIDANCE PLACEMENT RAD THERAPY FIELDS: CPT | Performed by: STUDENT IN AN ORGANIZED HEALTH CARE EDUCATION/TRAINING PROGRAM

## 2022-06-20 PROCEDURE — 77385 HC NTSTY MODUL RAD TX DLVR SMPL: CPT | Performed by: STUDENT IN AN ORGANIZED HEALTH CARE EDUCATION/TRAINING PROGRAM

## 2022-06-23 PROCEDURE — 77336 RADIATION PHYSICS CONSULT: CPT | Performed by: STUDENT IN AN ORGANIZED HEALTH CARE EDUCATION/TRAINING PROGRAM

## 2022-06-29 ENCOUNTER — HOSPITAL ENCOUNTER (OUTPATIENT)
Age: 62
Discharge: HOME | End: 2022-06-29
Payer: MEDICARE

## 2022-06-29 DIAGNOSIS — C61 PROSTATE CA (HCC): ICD-10-CM

## 2022-06-29 LAB
ABSOLUTE EOS #: 0.13 K/UL (ref 0–0.44)
ABSOLUTE IMMATURE GRANULOCYTE: <0.03 K/UL (ref 0–0.3)
ABSOLUTE LYMPH #: 0.72 K/UL (ref 1.1–3.7)
ABSOLUTE MONO #: 0.49 K/UL (ref 0.1–1.2)
ALBUMIN SERPL-MCNC: 4.3 G/DL (ref 3.5–5.2)
ALBUMIN/GLOBULIN RATIO: 1.1 (ref 1–2.5)
ALP BLD-CCNC: 131 U/L (ref 40–129)
ALT SERPL-CCNC: 18 U/L (ref 5–41)
ANION GAP SERPL CALCULATED.3IONS-SCNC: 13 MMOL/L (ref 9–17)
AST SERPL-CCNC: 17 U/L
BASOPHILS # BLD: 1 % (ref 0–2)
BASOPHILS ABSOLUTE: 0.04 K/UL (ref 0–0.2)
BILIRUB SERPL-MCNC: 0.35 MG/DL (ref 0.3–1.2)
BUN BLDV-MCNC: 13 MG/DL (ref 8–23)
CALCIUM SERPL-MCNC: 9.6 MG/DL (ref 8.6–10.4)
CHLORIDE BLD-SCNC: 100 MMOL/L (ref 98–107)
CO2: 25 MMOL/L (ref 20–31)
CREAT SERPL-MCNC: 0.92 MG/DL (ref 0.7–1.2)
EOSINOPHILS RELATIVE PERCENT: 3 % (ref 1–4)
GFR AFRICAN AMERICAN: >60 ML/MIN
GFR NON-AFRICAN AMERICAN: >60 ML/MIN
GFR SERPL CREATININE-BSD FRML MDRD: ABNORMAL ML/MIN/{1.73_M2}
GLUCOSE BLD-MCNC: 199 MG/DL (ref 70–99)
HCT VFR BLD CALC: 38.8 % (ref 40.7–50.3)
HEMOGLOBIN: 12 G/DL (ref 13–17)
IMMATURE GRANULOCYTES: 0 %
LYMPHOCYTES # BLD: 18 % (ref 24–43)
MCH RBC QN AUTO: 26 PG (ref 25.2–33.5)
MCHC RBC AUTO-ENTMCNC: 30.9 G/DL (ref 28.4–34.8)
MCV RBC AUTO: 84.2 FL (ref 82.6–102.9)
MONOCYTES # BLD: 12 % (ref 3–12)
NRBC AUTOMATED: 0 PER 100 WBC
PDW BLD-RTO: 15.6 % (ref 11.8–14.4)
PLATELET # BLD: 324 K/UL (ref 138–453)
PMV BLD AUTO: 9.3 FL (ref 8.1–13.5)
POTASSIUM SERPL-SCNC: 4.2 MMOL/L (ref 3.7–5.3)
PROSTATE SPECIFIC ANTIGEN: 1.23 NG/ML
RBC # BLD: 4.61 M/UL (ref 4.21–5.77)
RBC # BLD: ABNORMAL 10*6/UL
SEG NEUTROPHILS: 66 % (ref 36–65)
SEGMENTED NEUTROPHILS ABSOLUTE COUNT: 2.63 K/UL (ref 1.5–8.1)
SODIUM BLD-SCNC: 138 MMOL/L (ref 135–144)
TOTAL PROTEIN: 8.2 G/DL (ref 6.4–8.3)
WBC # BLD: 4 K/UL (ref 3.5–11.3)

## 2022-06-29 PROCEDURE — 80053 COMPREHEN METABOLIC PANEL: CPT

## 2022-06-29 PROCEDURE — 85025 COMPLETE CBC W/AUTO DIFF WBC: CPT

## 2022-06-29 PROCEDURE — 84153 ASSAY OF PSA TOTAL: CPT

## 2022-06-29 PROCEDURE — 36415 COLL VENOUS BLD VENIPUNCTURE: CPT

## 2022-06-30 ENCOUNTER — HOSPITAL ENCOUNTER (OUTPATIENT)
Facility: MEDICAL CENTER | Age: 62
End: 2022-06-30

## 2022-07-05 ENCOUNTER — OFFICE VISIT (OUTPATIENT)
Dept: FAMILY MEDICINE CLINIC | Age: 62
End: 2022-07-05

## 2022-07-05 VITALS — BODY MASS INDEX: 31.92 KG/M2 | WEIGHT: 248.6 LBS

## 2022-07-05 DIAGNOSIS — Z79.899 MEDICATION MANAGEMENT: Primary | ICD-10-CM

## 2022-07-05 PROCEDURE — 99999 PR OFFICE/OUTPT VISIT,PROCEDURE ONLY: CPT | Performed by: PHARMACIST

## 2022-07-05 NOTE — Clinical Note
Hi Dr Bowen Chavez,    Met with patient today to review CGM readings. Patient did not bring  to appointment today. Unable to download report. Patient talked of seeing high readings in the 300s today and then mentions taking insulin. So admittedly I am a little confused as to how he is taking his insulin. Patient is scheduled tomorrow to see you. Patient will come in early so that I can download a report. No changes made to his DM medications today. Rick Cartagena, Pharm. D., [address] [facility] Medication Management Service  [phone]  7/5/2022  2:42 PM

## 2022-07-05 NOTE — PROGRESS NOTES
Medication Management Service  Liset Teresa is a 60 y.o. male that presents for an initial and a follow-up diabetes mellitus office visit with Medication Management Service per referral from Dr. Mojgan Williamson MD for Diabetes Management under Collaborative Practice Agreement with A1c goal < 8 %. Blister packing through [facility]. Patient is unsure of medications. Patient did not bring Dexcom 6  to appointment. Unable to download readings. Mentioned seeing a reading in the 300s this morning after eating. Did take 6 units of Humalog with the reading being so high. Subjective/Objective     New Problems/Changes: On CGM (Dexcom 6), managed by Meds Mgmt. Last Meds Mgmt appointment was cancelled by patient on 2022. No recent downloads of Clarity report. Last down load was on 2022. On 5/3/2022, the following was planned. ? Encouraged patient to begin to look at portion sizes and carbohydrate contents in beverages  ? Encouraged patient to take his dog [employer] mix) for a brisk walk every other day  ? Educated patient on Dexcom G6 device set up and utilization with visual demonstration  ? Will defer adjustments to pharmacologic therapy until assessment of complete Ambulatory Glucose Profile Report at follow up appointment  ? Continue the following medications:  · Jardiance 25 mg daily  · Insulin glargine 42 units twice daily with hopes of decreasing basal requirements over time (0.69 units/kg currently)-takes   · Trulicity 3 mg once weekly  · Insulin lispro 6 units (Humalog) three times daily before meals  · Metformin 500 m tablets twice daily    DIET  2 meals per with 3 snacks. Fruit and variety. Drinking [place] tea-maybe 1 per day. Unsure if it is sugar free. EXERCISE  Not walking dog as much. Getting out of the house some.       WEIGHT  Weight: 112.8 kg  BMI: 31.92 kg/m2    DIABETES MANAGEMENT    Diabetes Goals: Using ADA Standards of Care     Goal A1c:  Less than 8 %   Fasting Blood Sugars:  80-130mg/dL  Postprandial glucose:  Less than 180mg/dL     Lab Results   Component Value Date    LABA1C 8.3 2022    LABA1C 8.3 2022    LABA1C 7.8 (H) 08/15/2013     Current DM Medications  · Jardiance 25 mg daily  · Insulin glargine 42 units twice daily  · Trulicity 3 mg once weekly  · Insulin lispro (Humalog) 6 units three times daily before meals  · Metformin 500 m tablets twice daily    Home Glucose Readings:  Patient uses streamOnce glucose monitoring system for monitoring glucose. Patient did not bring  to appointment today. Unable to download Clarity report. Hypoglycemia:    Did not discuss    Renal monitoring:  Lab Results   Component Value Date/Time    MICROALBUR <12 2022 10:15 AM    LABCREA 124.3 2022 10:15 AM    LABMICR Can not be calculated 2022 10:15 AM     CrCl cannot be calculated (Unknown ideal weight.). BLOOD PRESSURE MANAGEMENT  BP Readings from Last 3 Encounters:   22 125/86   22 119/85   22 (!) 129/91       CHOLESTEROL MANAGEMENT    Lab Results   Component Value Date    CHOL 123 2022    TRIG 111 2022    HDL 49 2022    LDLCHOLESTEROL 52 2022     ALT   Date Value Ref Range Status   2022 18 5 - 41 U/L Final     AST   Date Value Ref Range Status   2022 17 <40 U/L Final     The ASCVD Risk score (Susan Cannon et al., 2013) failed to calculate for the following reasons: The valid total cholesterol range is 130 to 320 mg/dL     MEDICATIONS    Current Outpatient Medications   Medication Sig Dispense Refill    pregabalin (LYRICA) 100 MG capsule Take 1 capsule by mouth 2 times daily for 30 days.  60 capsule 2    JARDIANCE 25 MG tablet TAKE 1 TABLET BY MOUTH ONE TIME A DAY 30 tablet 1    ciprofloxacin (CIPRO) 500 mg tablet Take 1 tablet by mouth 2 times daily 60 tablet 3    doxycycline hyclate (VIBRA-TABS) 100 MG tablet Take 1 tablet by mouth 2 times daily for 28 days 56 tablet 3    ibuprofen (ADVIL;MOTRIN) 600 MG tablet Take 1 tablet by mouth 3 times daily as needed for Pain 90 tablet 0    metoprolol tartrate (LOPRESSOR) 25 MG tablet TAKE ONE - HALF (1/2) TABLET BY MOUTH TWO TIMES A DAY 30 tablet 2    tamsulosin (FLOMAX) 0.4 mg capsule Take 1 capsule by mouth daily 90 capsule 1    metFORMIN (GLUCOPHAGE) 500 MG tablet TAKE TWO TABLETS BY MOUTH TWICE A DAY WITH MEALS 60 tablet 3    TRULICITY 3 AZ/2.6HV SOPN INJECT THE CONTENTS OF ONE SYRINGE (3MG) UNDER THE SKIN ONCE WEEKLY 2 mL 3    LANTUS SOLOSTAR 100 UNIT/ML injection pen INJECT 42 UNITS INTO THE SKIN TWO TIMES A DAY 15 mL 3    venlafaxine (EFFEXOR XR) 37.5 MG extended release capsule Take 1 capsule by mouth daily 30 capsule 3    Continuous Blood Gluc Transmit (DEXCOM G6 TRANSMITTER) MISC Use in combination with new sensor every 10 days. Transmitter lasts 3 months 1 each 3    Continuous Blood Gluc Sensor (DEXCOM G6 SENSOR) MISC Apply new sensor to abdomen every 10 days 3 each 11    Continuous Blood Gluc  (DEXCOM G6 ) MAMADOU Use as directed to monitor glucose continuously 1 each 1    ferrous sulfate (IRON 325) 325 (65 Fe) MG tablet Take 325 mg by mouth daily (with breakfast)       zinc 50 MG CAPS Take 1 capsule by mouth daily      aspirin EC 81 MG EC tablet Take 1 tablet by mouth daily 90 tablet 1    atorvastatin (LIPITOR) 40 MG tablet Take 1 tablet by mouth daily 30 tablet 3    Insulin Pen Needle (B-D ULTRAFINE III SHORT PEN) 31G X 8 MM MISC 1 each by Does not apply route daily 100 each 3    vitamin D3 (CHOLECALCIFEROL) 25 MCG (1000 UT) TABS tablet Take 1 tablet by mouth daily 90 tablet 1    insulin lispro, 1 Unit Dial, (HUMALOG KWIKPEN) 100 UNIT/ML SOPN Inject 6 Units into the skin 3 times daily (before meals) 3 pen 2    spironolactone (ALDACTONE) 25 MG tablet Take 1 tablet by mouth daily.  30 tablet 2     No current facility-administered medications for this visit. On Statin: Yes-atorvastatin 40mg daily-included in adherence packaging. On ACE-I or ARB for HTN or Microalbuminuria: No    Medication Adherence/Cost/Adverse Events:    Patient talks of missing a dose of insulin glargine. Unclear as to the frequency.  Talks of using rapid acting insulin (Humalog) when he notices blood glucose readings are elevated. On average uses 2x per day. Assessment/Plan     Diabetes Management:   DM appears uncontrolled with readings of 300mg/dL being reported by patient. Patient did not bring  to appointment today. Uable to download Clarity report.  Diet  o Encouraged patient to begin to look at portion sizes and carbohydrate contents in beverages   Physical Activity  ? Encouraged patient to take his dog for a brisk walk every other day   Glucose Testing  o Continue to use Dexcom G6 CGM for glucose monitoring. o Plan to meet with writer prior to PCP appointment tomorrow to download clarity report.    o Patient will bring both  and phone tomorrow.  o Will try to set up cell phone so that patient can use either  or phone for monitoring glucose  o Patient reports having an android government phone   Pharmacologic Therapy  ? Will defer adjustments to pharmacologic therapy until assessment of complete Ambulatory Glucose Profile Report at follow up appointment  ? Continue the following medications:  · Jardiance 25 mg daily  · Insulin glargine 42 units twice daily with hopes of decreasing basal requirements over time (0.69 units/kg currently)-takes   · Trulicity 3 mg once weekly  · Insulin lispro 6 units (Humalog) three times daily before meals  · Metformin 500 m tablets twice daily  ? Medications are placed into adherence packaging through [address] [place] [address]. Next Follow-Up:   Medication Management,  to download clarity report and possibly set up phone to receive glucose readings.   Will meet with patient before PCP appointment. Next PCP appointment scheduled for 7/6/2022     Patient verbalized understanding of care plan. Patient advised to call Medication Management with any questions, concerns, or changes prior to next appointment. Progress note sent to referring provider. (Dr Robbin Teran))   Patient acknowledges working in a consult agreement with the pharmacist as referred by his/her physician. DM CPA has been signed and uploaded into Epic. Lizzie Bright, Pharm. D., [address] Medication Management Service  [phone]  7/5/2022  2:39 PM    ==================================================================    For Pharmacy Admin Tracking Only     CPA in place:   Yes   Recommendation Provided To: Patient/Caregiver: 1 via In person   Intervention Detail: Scheduled Appointment   Intervention Accepted By: Patient/Caregiver: 1   Time Spent (min): 60

## 2022-07-06 ENCOUNTER — TELEPHONE (OUTPATIENT)
Dept: FAMILY MEDICINE CLINIC | Age: 62
End: 2022-07-06

## 2022-07-06 ENCOUNTER — TELEPHONE (OUTPATIENT)
Dept: ONCOLOGY | Age: 62
End: 2022-07-06

## 2022-07-06 ENCOUNTER — OFFICE VISIT (OUTPATIENT)
Dept: ONCOLOGY | Age: 62
End: 2022-07-06
Payer: MEDICARE

## 2022-07-06 ENCOUNTER — OFFICE VISIT (OUTPATIENT)
Dept: FAMILY MEDICINE CLINIC | Age: 62
End: 2022-07-06
Payer: MEDICARE

## 2022-07-06 VITALS
DIASTOLIC BLOOD PRESSURE: 86 MMHG | TEMPERATURE: 99.1 F | WEIGHT: 248.8 LBS | RESPIRATION RATE: 16 BRPM | SYSTOLIC BLOOD PRESSURE: 118 MMHG | BODY MASS INDEX: 31.94 KG/M2 | HEART RATE: 88 BPM

## 2022-07-06 DIAGNOSIS — Z00.00 WELCOME TO MEDICARE PREVENTIVE VISIT: Primary | ICD-10-CM

## 2022-07-06 DIAGNOSIS — Z79.4 TYPE 2 DIABETES MELLITUS WITH OTHER SPECIFIED COMPLICATION, WITH LONG-TERM CURRENT USE OF INSULIN (HCC): ICD-10-CM

## 2022-07-06 DIAGNOSIS — I10 BENIGN ESSENTIAL HTN: ICD-10-CM

## 2022-07-06 DIAGNOSIS — C61 PROSTATE CA (HCC): ICD-10-CM

## 2022-07-06 DIAGNOSIS — E11.69 TYPE 2 DIABETES MELLITUS WITH OTHER SPECIFIED COMPLICATION, WITH LONG-TERM CURRENT USE OF INSULIN (HCC): ICD-10-CM

## 2022-07-06 PROBLEM — E11.9 DIABETES MELLITUS (HCC): Status: ACTIVE | Noted: 2022-07-06

## 2022-07-06 PROCEDURE — G8417 CALC BMI ABV UP PARAM F/U: HCPCS | Performed by: INTERNAL MEDICINE

## 2022-07-06 PROCEDURE — 3052F HG A1C>EQUAL 8.0%<EQUAL 9.0%: CPT | Performed by: STUDENT IN AN ORGANIZED HEALTH CARE EDUCATION/TRAINING PROGRAM

## 2022-07-06 PROCEDURE — 3017F COLORECTAL CA SCREEN DOC REV: CPT | Performed by: STUDENT IN AN ORGANIZED HEALTH CARE EDUCATION/TRAINING PROGRAM

## 2022-07-06 PROCEDURE — G0402 INITIAL PREVENTIVE EXAM: HCPCS | Performed by: STUDENT IN AN ORGANIZED HEALTH CARE EDUCATION/TRAINING PROGRAM

## 2022-07-06 PROCEDURE — 3017F COLORECTAL CA SCREEN DOC REV: CPT | Performed by: INTERNAL MEDICINE

## 2022-07-06 PROCEDURE — 99211 OFF/OP EST MAY X REQ PHY/QHP: CPT | Performed by: INTERNAL MEDICINE

## 2022-07-06 PROCEDURE — G8427 DOCREV CUR MEDS BY ELIG CLIN: HCPCS | Performed by: INTERNAL MEDICINE

## 2022-07-06 PROCEDURE — 99214 OFFICE O/P EST MOD 30 MIN: CPT | Performed by: INTERNAL MEDICINE

## 2022-07-06 PROCEDURE — 1036F TOBACCO NON-USER: CPT | Performed by: INTERNAL MEDICINE

## 2022-07-06 SDOH — ECONOMIC STABILITY: FOOD INSECURITY: WITHIN THE PAST 12 MONTHS, YOU WORRIED THAT YOUR FOOD WOULD RUN OUT BEFORE YOU GOT MONEY TO BUY MORE.: NEVER TRUE

## 2022-07-06 SDOH — ECONOMIC STABILITY: FOOD INSECURITY: WITHIN THE PAST 12 MONTHS, THE FOOD YOU BOUGHT JUST DIDN'T LAST AND YOU DIDN'T HAVE MONEY TO GET MORE.: NEVER TRUE

## 2022-07-06 ASSESSMENT — LIFESTYLE VARIABLES
HOW MANY STANDARD DRINKS CONTAINING ALCOHOL DO YOU HAVE ON A TYPICAL DAY: 3 OR 4
HOW OFTEN DO YOU HAVE A DRINK CONTAINING ALCOHOL: MONTHLY OR LESS

## 2022-07-06 ASSESSMENT — PATIENT HEALTH QUESTIONNAIRE - PHQ9
7. TROUBLE CONCENTRATING ON THINGS, SUCH AS READING THE NEWSPAPER OR WATCHING TELEVISION: 0
SUM OF ALL RESPONSES TO PHQ QUESTIONS 1-9: 9
SUM OF ALL RESPONSES TO PHQ QUESTIONS 1-9: 9
4. FEELING TIRED OR HAVING LITTLE ENERGY: 3
2. FEELING DOWN, DEPRESSED OR HOPELESS: 3
5. POOR APPETITE OR OVEREATING: 0
8. MOVING OR SPEAKING SO SLOWLY THAT OTHER PEOPLE COULD HAVE NOTICED. OR THE OPPOSITE, BEING SO FIGETY OR RESTLESS THAT YOU HAVE BEEN MOVING AROUND A LOT MORE THAN USUAL: 0
3. TROUBLE FALLING OR STAYING ASLEEP: 0
1. LITTLE INTEREST OR PLEASURE IN DOING THINGS: 3
6. FEELING BAD ABOUT YOURSELF - OR THAT YOU ARE A FAILURE OR HAVE LET YOURSELF OR YOUR FAMILY DOWN: 0
SUM OF ALL RESPONSES TO PHQ QUESTIONS 1-9: 9
SUM OF ALL RESPONSES TO PHQ QUESTIONS 1-9: 9
SUM OF ALL RESPONSES TO PHQ9 QUESTIONS 1 & 2: 6
10. IF YOU CHECKED OFF ANY PROBLEMS, HOW DIFFICULT HAVE THESE PROBLEMS MADE IT FOR YOU TO DO YOUR WORK, TAKE CARE OF THINGS AT HOME, OR GET ALONG WITH OTHER PEOPLE: 1
9. THOUGHTS THAT YOU WOULD BE BETTER OFF DEAD, OR OF HURTING YOURSELF: 0

## 2022-07-06 ASSESSMENT — SOCIAL DETERMINANTS OF HEALTH (SDOH): HOW HARD IS IT FOR YOU TO PAY FOR THE VERY BASICS LIKE FOOD, HOUSING, MEDICAL CARE, AND HEATING?: NOT HARD AT ALL

## 2022-07-06 NOTE — PROGRESS NOTES
Medicare Annual Wellness Visit    Adam Teresa is here for Medicare AWV    Assessment & Plan   Welcome to Medicare preventive visit  Benign essential HTN  Type 2 diabetes mellitus with other specified complication, with long-term current use of insulin ([address]      Recommendations for Preventive Services Due: see orders and patient instructions/AVS.  Recommended screening schedule for the next 5-10 years is provided to the patient in written form: see Patient Instructions/AVS.     Return for Medicare Annual Wellness Visit in 1 year. Subjective   The following acute and/or chronic problems were also addressed today:  Type 2 diabetes mellitus. Patient's complete Health Risk Assessment and screening values have been reviewed and are found in Flowsheets. The following problems were reviewed today and where indicated follow up appointments were made and/or referrals ordered.     Positive Risk Factor Screenings with Interventions:    Fall Risk:  Do you feel unsteady or are you worried about falling? : (!) yes  2 or more falls in past year?: no  Fall with injury in past year?: no     Fall Risk Interventions:    · Home safety tips provided  · Home exercises provided to promote strength and balance  · Patient advised to follow-up in this office for further evaluation and treatment within 3 month(s)     Depression:  PHQ-2 Score: 6  PHQ-9 Total Score: 9    Severity:1-4 = minimal depression, 5-9 = mild depression, 10-14 = moderate depression, 15-19 = moderately severe depression, 20-27 = severe depression    Depression Interventions:  · PHQ-9 is 7            General Health and ACP:  General  In general, how would you say your health is?: (!) Poor  In the past 7 days, have you experienced any of the following: New or Increased Pain, New or Increased Fatigue, Loneliness, Social Isolation, Stress or Anger?: (!) Yes  Select all that apply: (!) New or Increased Pain  Do you get the social and emotional support that you need?: Yes  Do you have a Living Will?: (!) No    Advance Directives     Power of [address] Will ACP-Advance Directive ACP-Power of     Not on File Not on File Not on File Not on File      General Health Risk Interventions:  · Fatigue: regular exercise recommended- 3-5 times per week, 30-45 minutes per session  · No Living Will: Patient referred to [place] Habits/Nutrition:     Physical Activity: Inactive    Days of Exercise per Week: 0 days    Minutes of Exercise per Session: 0 min     Have you lost any weight without trying in the past 3 months?: (!) Yes     Have you seen the dentist within the past year?: Yes    Health Habits/Nutrition Interventions:  · Inadequate physical activity:  patient agrees to exercise for at least 150 minutes/week     Safety:  Do you have working smoke detectors?: (!) No  Do you have any tripping hazards - loose or unsecured carpets or rugs?: No  Do you have any tripping hazards - clutter in doorways, halls, or stairs?: No  Do you have either shower bars, grab bars, non-slip mats or non-slip surfaces in your shower or bathtub?: Yes  Do all of your stairways have a railing or banister?: Yes  Do you always fasten your seatbelt when you are in a car?: Yes    Safety Interventions:  · Home safety tips provided           Objective   There were no vitals filed for this visit. There is no height or weight on file to calculate BMI.         General Appearance: alert and oriented to person, place and time, well developed and well- nourished, in no acute distress  Eyes: pupils equal, round, and reactive to light, extraocular eye movements intact, conjunctivae normal  Pulmonary/Chest: clear to auscultation bilaterally- no wheezes, rales or rhonchi, normal air movement, no respiratory distress  Cardiovascular: normal rate, regular rhythm, normal S1 and S2, no murmurs, rubs, clicks, or gallops, distal pulses intact, no carotid bruits  Abdomen: soft, non-tender, non-distended, normal bowel sounds, no masses or organomegaly  Extremities: no cyanosis, clubbing or edema  Neurologic: reflexes normal and symmetric, no cranial nerve deficit, gait, coordination and speech normal       Allergies   Allergen Reactions    Lisinopril Swelling     Outer Neck swelling    Melatonin Swelling    Trazodone Swelling     Chest swells     Prior to Visit Medications    Medication Sig Taking? Authorizing Provider   pregabalin (LYRICA) 100 MG capsule Take 1 capsule by mouth 2 times daily for 30 days. Yes Bernardo Green MD   JARDIANCE 25 MG tablet TAKE 1 TABLET BY MOUTH ONE TIME A DAY Yes Sana Cobian MD   ciprofloxacin (CIPRO) 500 mg tablet Take 1 tablet by mouth 2 times daily Yes Michelle Michaud MD   doxycycline hyclate (VIBRA-TABS) 100 MG tablet Take 1 tablet by mouth 2 times daily for 28 days Yes Michelle Michaud MD   ibuprofen (ADVIL;MOTRIN) 600 MG tablet Take 1 tablet by mouth 3 times daily as needed for Pain Yes Francine Laughlin MD   metoprolol tartrate (LOPRESSOR) 25 MG tablet TAKE ONE - HALF (1/2) TABLET BY MOUTH TWO TIMES A DAY Yes Bernardo Green MD   tamsulosin (FLOMAX) 0.4 mg capsule Take 1 capsule by mouth daily Yes Morgan Nicolas MD   metFORMIN (GLUCOPHAGE) 500 MG tablet TAKE TWO TABLETS BY MOUTH TWICE A DAY WITH MEALS Yes Blanche Brown MD   TRULICITY 3 QX/5.0AP SOPN INJECT THE CONTENTS OF ONE SYRINGE (3MG) UNDER THE SKIN ONCE WEEKLY Yes Blanche Brown MD   LANTUS SOLOSTAR 100 UNIT/ML injection pen INJECT 42 UNITS INTO THE SKIN TWO TIMES A DAY Yes Blanche Brown MD   venlafaxine (EFFEXOR XR) 37.5 MG extended release capsule Take 1 capsule by mouth daily Yes Arlene Fernandez MD   Continuous Blood Gluc Transmit (DEXCOM G6 TRANSMITTER) MISC Use in combination with new sensor every 10 days.  Transmitter lasts 3 months Yes Ashia oHpson, DO   Continuous Blood Gluc Sensor (DEXCOM G6 SENSOR) MISC Apply new sensor to abdomen every 10 days Yes blood pressure and treat if higher than 140/90 mmHg  · Maintain blood total cholesterol levels under 5 mmol/l or 190 mg/dl  · Maintain LDL cholesterol levels under 3.0 mmol/l or 115 mg/dl   · Control blood glucose levels  · Consider taking aspirin (75 mg daily), once blood pressure is controlled   Provided a follow up plan.   Time spent (minutes): 35 MINS

## 2022-07-06 NOTE — PROGRESS NOTES
Attending Physician Statement  I have discussed the care of Nirav Whiting 60 y.o. male, including pertinent history and exam findings, with the resident Dr. Kenyon Segovia MD.    History and Exam:   Chief Complaint   Patient presents with    Medicare AWV       Past Medical History:   Diagnosis Date    Arthritis     DM (diabetes mellitus) ([address] 2009    IDDM    GERD (gastroesophageal reflux disease) 2017    ON RX    Heart murmur 2013    ASYMPTOMATIC    HTN (hypertension) 6/29/2012    ON RX    Hyperlipidemia 06/29/2012    ON RX    Sleep apnea 2016    TRIED MACHINE COULD NOT TOLERATE    Vision abnormalities 2019    FLOATERS RIGHT EYE    Wears glasses      Allergies   Allergen Reactions    Lisinopril Swelling     Outer Neck swelling    Melatonin Swelling    Trazodone Swelling     Chest swells      I have seen and examined the patient and the key elements of the encounter have been performed by me. BP Readings from Last 3 Encounters:   07/06/22 118/86   06/17/22 125/86   06/14/22 119/85     There were no vitals taken for this visit. Lab Results   Component Value Date    WBC 4.0 06/29/2022    HGB 12.0 (L) 06/29/2022    HCT 38.8 (L) 06/29/2022     06/29/2022    CHOL 123 04/12/2022    TRIG 111 04/12/2022    HDL 49 04/12/2022    ALT 18 06/29/2022    AST 17 06/29/2022     06/29/2022    K 4.2 06/29/2022     06/29/2022    CREATININE 0.92 06/29/2022    BUN 13 06/29/2022    CO2 25 06/29/2022    PSA 1.23 06/29/2022    INR 1.0 08/15/2013    LABA1C 8.3 04/12/2022    LABMICR Can not be calculated 04/13/2022     Lab Results   Component Value Date    LABPROT 7.6 07/03/2012    LABALBU 4.3 06/29/2022     Lab Results   Component Value Date    IRON 51 (L) 05/18/2022    TIBC 247 (L) 05/18/2022    FERRITIN 169 05/18/2022     Lab Results   Component Value Date    LDLCHOLESTEROL 52 04/12/2022     I agree with the assessment, plan and the diagnosis of    Diagnosis Orders   1.  Welcome to Medicare preventive visit     2. Benign essential HTN     3. Type 2 diabetes mellitus with other specified complication, with long-term current use of insulin ([facility] [address] [facility]      . I agree with orders as documented by the resident. More than 25 minutes spent  in face to face encounter with the patient and more than half in counseling. Patient's questions were answered. Patient Voiced understanding to the counseling. Return for Medicare Annual Wellness Visit in 1 year.    (GC Modifier)-Dr. Elbert Parikh MD

## 2022-07-06 NOTE — PROGRESS NOTES
Visit Information    Have you changed or started any medications since your last visit including any over-the-counter medicines, vitamins, or herbal medicines? no   Have you stopped taking any of your medications? Is so, why? -  no  Are you having any side effects from any of your medications? - no    Have you seen any other physician or provider since your last visit?  no   Have you had any other diagnostic tests since your last visit?  no   Have you been seen in the emergency room and/or had an admission in a hospital since we last saw you?  no   Have you had your routine dental cleaning in the past 6 months?  yes -      Do you have an active MyChart account? If no, what is the barrier?   No: declined    Patient Care Team:  Rachael Moreno MD as PCP - General (Emergency Medicine)  Shun Mullen RN as Nurse Navigator (Oncology)  Berkley Lopes MD as Consulting Physician (Infectious Diseases)  Marquita Duff MD as Consulting Physician (Radiation Oncology)  Aimee Back DPM as Physician (Podiatry)  Linda Cruz MD as Consulting Physician (Hematology and Oncology)    Medical History Review  Past Medical, Family, and Social History reviewed and does not contribute to the patient presenting condition    Health Maintenance   Topic Date Due    Annual Wellness Visit (AWV)  Never done    Diabetic retinal exam  Never done    Colorectal Cancer Screen  Never done    Diabetic foot exam  04/27/2019    DTaP/Tdap/Td vaccine (1 - Tdap) 06/17/2023 (Originally 5/23/1979)    Shingles vaccine (2 of 3) 06/17/2023 (Originally 2/8/2011)   Alex Linn COVID-19 Vaccine (3 - Booster for Philbert Bc series) 05/28/2024 (Originally 1/25/2022)    Pneumococcal 0-64 years Vaccine (2 - PCV) 06/11/2024 (Originally 12/19/2019)    Flu vaccine (1) 09/01/2022    Depression Screen  03/28/2023    A1C test (Diabetic or Prediabetic)  04/12/2023    Lipids  04/12/2023    Diabetic microalbuminuria test  04/13/2023    Prostate Specific Antigen (PSA) Screening or Monitoring  06/29/2023    Hepatitis C screen  Completed    HIV screen  Completed    Hepatitis A vaccine  Aged Out    Hib vaccine  Aged Out    Meningococcal (ACWY) vaccine  Aged Out

## 2022-07-06 NOTE — PATIENT INSTRUCTIONS
Thank you for letting us take care of you today. We hope all your questions were addressed. If a question was overlooked or something else comes to mind after you return home, please contact a member of your Care Team listed below. Your Care Team at [place] [address] is Team #5  Sina Medrano MD (Faculty)  Maxi Ochoa MD (Resident)  Es Buck MD (Resident)  Elodia Greene MD (Resident)  Edith Rea MD (Resident)  Denton Godfrey., DEEPAK Arteaga,  SARAH Estes., Jason Beauchamp., [facility] office)  Keren Saha, [address] (Clinical Practice Manager)  Cholo Seth [facility] (Clinical Pharmacist)       Office phone number: [phone]    If you need to get in right away due to illness, please be advised we have \"Same Day\" appointments available Monday-Friday. Please call us at [phone] option #3 to schedule your \"Same Day\" appointment. Patient Education        Well Visit, Men 48 to 72: Care Instructions  Overview     Well visits can help you stay healthy. Your doctor has checked your overall health and may have suggested ways to take good care of yourself. Your doctor also may have recommended tests. At home, you can help prevent illness withhealthy eating, regular exercise, and other steps. Follow-up care is a key part of your treatment and safety. Be sure to make and go to all appointments, and call your doctor if you are having problems. It's also a good idea to know your test results and keep alist of the medicines you take. How can you care for yourself at home?  Get screening tests that you and your doctor decide on. Screening helps find diseases before any symptoms appear.  Eat healthy foods. Choose fruits, vegetables, whole grains, protein, and low-fat dairy foods. Limit fat, especially saturated fat. Reduce salt in your diet.  Limit alcohol. Have no more than 2 drinks a day or 14 drinks a week.  Get at least 30 minutes of exercise on most days of the week. Walking is a good choice. You also may want to do other activities, such as running, swimming, cycling, or playing tennis or team sports.  Reach and stay at a healthy weight. This will lower your risk for many problems, such as obesity, diabetes, heart disease, and high blood pressure.  Do not smoke. Smoking can make health problems worse. If you need help quitting, talk to your doctor about stop-smoking programs and medicines. These can increase your chances of quitting for good.  Care for your mental health. It is easy to get weighed down by worry and stress. Learn strategies to manage stress, like deep breathing and mindfulness, and stay connected with your family and community. If you find you often feel sad or hopeless, talk with your doctor. Treatment can help.  Talk to your doctor about whether you have any risk factors for sexually transmitted infections (STIs). You can help prevent STIs if you wait to have sex with a new partner (or partners) until you've each been tested for STIs. It also helps if you use condoms (male or female condoms) and if you limit your sex partners to one person who only has sex with you. Vaccines are available for some STIs.  If it's important to you to prevent pregnancy with your partner, talk with your doctor about birth control options that might be best for you.  If you think you may have a problem with alcohol or drug use, talk to your doctor. This includes prescription medicines (such as amphetamines and opioids) and illegal drugs (such as cocaine and methamphetamine). Your doctor can help you figure out what type of treatment is best for you.  Protect your skin from too much sun. When you're outdoors from 10 a.m. to 4 p.m., stay in the shade or cover up with clothing and a hat with a wide brim. Wear sunglasses that block UV rays. Even when it's cloudy, put broad-spectrum sunscreen (SPF 30 or higher) on any exposed skin.    See a dentist one or two times a year for checkups and to have range  Cholesterol test.  This test measures the amount of a type of fat in the blood. It is common for people with diabetes to also have high cholesterol. Too much cholesterol in the blood can build up inside the blood vessels and raise the risk for heart attackand stroke.  How often: At the time of your diabetes diagnosis, and as often as your doctor recommends after that   Goal: A cholesterol level in your target range  Albumin-creatinine ratio test.  This test checks for kidney damage by looking for the protein albumin (say \"al-BYOO-he\") in the urine. Albumin is normally found in the blood. Kandee Binning can let small amounts of it (microalbumin) leak into the urine.  How often: Once a year   Goal: No protein in the urine  Blood creatinine test/estimated glomerular filtration (eGFR). The blood creatinine (say \"bicy-ZW-md-neen\") level shows how well your kidneys are working. Creatinine is a waste product that muscles release into the blood. Blood creatinine is used to estimate the glomerular filtration rate. A high level of creatinine and/or a low eGFR may mean your kidneys are not working aswell as they should.  How often: Once a year   Goal: Normal level of creatinine in the blood. The eGFR goal is greater than 60 mL/min/1.73 m². Complete foot exam.  The doctor checks for foot sores and whether any sensation has been lost.   How often: Once a year   Goal: Healthy feet with no foot ulcers or loss of feeling  Dental exam and cleaning. The dentist checks for gum disease and tooth decay. People with high bloodsugar are more likely to have these problems.  How often: Every 6 months   Goal: Healthy teeth and gums  Complete eye exam.  High blood sugar levels can damage the eyes. This exam is done by an ophthalmologist or optometrist. It includes a dilated eye exam. The exam showswhether there's damage to the back of the eye (diabetic retinopathy).  How often: Once a year.  If you don't have any signs of diabetic retinopathy, your doctor may recommend an exam every 2 years.  Goal: No damage to the back of the eye  Thyroid-stimulating hormone (TSH) blood test.  This test checks for thyroid disease. Too little thyroid hormone can cause some medicines (like insulin) to stay in the body longer. This can cause low blood sugar. You may be tested if you have high cholesterol or are a woman over 54years old.  How often: As part of your diabetes diagnosis, and as often as your doctor recommends after that   Goal: Normal level of TSH in the blood  Follow-up care is a key part of your treatment and safety. Be sure to make and go to all appointments, and call your doctor if you are having problems. It's also a good idea to know your test results and keep alist of the medicines you take. Where can you learn more? Go to [email or web address] org and sign in to your [employer] account. Enter [id number] in the [facility] box to learn more about \"Learning About Tests When You Have Diabetes. \"     If you do not have an account, please click on the \"Sign Up Now\" link. Current as of: July 28, 2021               Content Version: 13.3  © 2006-2022 Healthwise, Incorporated. Care instructions adapted under license by [facility] ([facility]). If you have questions about a medical condition or this instruction, always ask your healthcare professional. [place] [address] any warranty or liability for your use of this information.

## 2022-07-06 NOTE — TELEPHONE ENCOUNTER
Medication Management Service ([address] [place] [address])  [facility]  [phone]     CLINICAL PHARMACY NOTE:      Met with patient during PCP to download Clarity report and then uploaded into Epic. Summary of report findings below:    Patient utilized Dexcom Continuous Glucose Monitor to monitor glucose. Data from 13.6 days    Ambulatory Glucose Profile Report (06/23/2022 through 07/06/2022) indicates an adequate number of days (? 12) for assessment. Glucose Statistics and Targets:     Goal Patient's Value per AGP Report Interpretation of patient's value compared to goal     Average Glucose Target Glucose Range:   70 - 180 mg/dL for patients with T1DM or T2DM 213 mg/dL Not at goal.     Glucose Management Indication   (similar to A1c)   Patient's goal A1c:  < 8% 8.4% % Not at goal.     Glucose Variability < 36% 26 % At goal       Snapshot:  Average Glucose:     Goal Patient's Value per AGP Report Interpretation of patient's value compared to goal     % of time above Target Glucose Range   < 25% for patients with T1DM or T2DM 68.8 % Not at goal.     % of time in Target Glucose Range   > 50% for patients that are older or high risk 31.2 % Not at goal.     % of time below Target Glucose Range   < 4% for patients with T1DM or T2DM 0 % At goal     Low glucose events: None recorded      Full Ambulatory Glucose Profile Report uploaded as Media and attached to this encounter    Findings of report shared with PCP (Fadi Perry MD)  · No changes made to DM medications. Need clarity on how patient is taking insulin glargine and insulin lispro (Humalog). Concern for adherence with insulin administration/adherence. Unclear as to how many doses per week of insulin glargine patient is missing. · Requested that patient keep track of how much insulin he takes each day. Also make note if he misses a dose.   Provided education on the importance of taking Humalog with meals as opposed to waiting to see if glucose goes up.  · Continue the following medications:  · Jardiance 25 mg daily  · Insulin glargine 42 units twice daily with hopes of decreasing basal requirements over time (0.69 units/kg currently)-takes   · Trulicity 3 mg once weekly  · Insulin lispro 6 units (Humalog) three times daily before meals  · Metformin 500 m tablets twice daily  ? Medications are placed into adherence packaging through [address] [place] [address]. Tried to download Dexcom G6 application to patient's government phone. Would not download as the current version of the application is not supported by his phone. Patient will need to continue to use only the  to obtain glucose readings. Follow up visit with Medication Management on Friday, 2022. Pt instructed to bring  so that Clarity report can be downloaded and reviewed. Per PCP request. Phone call to [address] [place] [address]. Confirmed that neither losartan or spironolactone has not been in adherence packing since patient started filling at the pharmacy on 5/3/2022. This information was shared with PCP. Minna Cox, Pharm. D., [address] [facility] Medication Management Service  [phone]  2022  5:01 PM    ====================================  For Pharmacy Admin Tracking Only     CPA in place:   Yes   Recommendation Provided To: Patient/Caregiver: 1 via In person   Intervention Detail: Scheduled Appointment   Intervention Accepted By: Patient/Caregiver: 1   Time Spent (min): 45

## 2022-07-06 NOTE — PROGRESS NOTES
Patient ID: Lesley Mcclain, 1960, [id number], 60 y.o. Referred by : Doristine Heimlich, MD  Reason for consultation:   Prostate cancer diagnosed in March 2021, initial PSA 5.1, HIghest Octavio Grade: 4+3=7, Clinical stage is: Stage IIc, cT1cNo MO, NCCN clinical risk group is: Unfavorable intermediate risk    Urologist Dr. Anca Pedersen    Patient has been started on 6 monthly Lupron injection in July 2021 while awaiting initiation of radiation therapy which was significantly delayed secondary to his hip infection  Now his radiation therapy started on 5/9/2022 and radiation therapy completed on 6/20/2022  HISTORY OF PRESENT ILLNESS:    Oncologic History:  Lesley Mcclain is a 60 y.o. AA male was seen during his consultation visit for diagnosis of prostate cancer. Patient was diagnosed with prostate cancer in March 2021 when his initial PSA was elevated at 5.11. His biopsy showed Gallant 7 diagnosed with T1CN0, unfavorable intermediate risk prostatic adenocarcinoma. Patient was referred to radiation oncology for consideration of radiation therapy. He had a bone scan and CT abdomen pelvis at that that really did not show any nephrolithiasis or distant metastatic disease. He was seen by ration oncology and external beam radiation therapy was recommended and he did get fiducial spacer placement in August 2021. Patient has had right hip replacement and had been following with orthopedic surgery for hip infection. He had right total hip arthroplasty revision on 10/18/2021. Therefore his radiation therapy was delayed. In the interim patient was started on Lupron injection in July 2021. He has received 2 injections so far. Patient has been following with infectious disease for his infection and has been on antibiotics. He had a debridement and VAC placement and had another surgery in December 2021. He reports that his most recent surgery was in January of this year.   His most recent PSA in February was 1.8. Interval history:  Patient is returning for follow-up Makenziean to discuss lab results and further recommendations. He has completed radiation therapy on 6/20/2022. He denies any abdominal pain nausea vomiting. Any new bone pain or back pain he is on chronic antibiotic as per infectious disease. He denies any fever chills. His PSA has decreased. During this visit patient's allergy, social, medical, surgical history and medications were reviewed and updated. Past Medical History:   Diagnosis Date    Arthritis     DM (diabetes mellitus) ([facility] [address] 2009    IDDM    GERD (gastroesophageal reflux disease) 2017    ON RX    Heart murmur 2013    ASYMPTOMATIC    HTN (hypertension) 6/29/2012    ON RX    Hyperlipidemia 06/29/2012    ON RX    Sleep apnea 2016    TRIED MACHINE COULD NOT TOLERATE    Vision abnormalities 2019    FLOATERS RIGHT EYE    Wears glasses        Past Surgical History:   Procedure Laterality Date    COLONOSCOPY  07/23/2010    Dr. Sherman Cornea Bilateral 2017    CATARACT EXTRACTION WITH IOL    KNEE ARTHROSCOPY     [address]    VITRECTOMY Right 03/28/2019    VITRECTOMY Right 3/28/2019    VITRECTOMY 25 GAUGE,  IOL REPOSITION  ([facility]ete A[facility]son) performed by Marquis Garcia MD at [address] [place] [address] Rd [address]   Allergen Reactions    Lisinopril Swelling     Outer Neck swelling    Melatonin Swelling    Trazodone Swelling     Chest swells       Current Outpatient Medications   Medication Sig Dispense Refill    pregabalin (LYRICA) 100 MG capsule Take 1 capsule by mouth 2 times daily for 30 days.  60 capsule 2    JARDIANCE 25 MG tablet TAKE 1 TABLET BY MOUTH ONE TIME A DAY 30 tablet 1    ciprofloxacin (CIPRO) 500 mg tablet Take 1 tablet by mouth 2 times daily 60 tablet 3    doxycycline hyclate (VIBRA-TABS) 100 MG tablet Take 1 tablet by mouth 2 times daily for 28 days 56 tablet 3    ibuprofen (ADVIL;MOTRIN) 600 MG tablet Take 1 tablet by mouth 3 times daily as needed for Pain 90 tablet 0    metoprolol tartrate (LOPRESSOR) 25 MG tablet TAKE ONE - HALF (1/2) TABLET BY MOUTH TWO TIMES A DAY 30 tablet 2    tamsulosin (FLOMAX) 0.4 mg capsule Take 1 capsule by mouth daily 90 capsule 1    metFORMIN (GLUCOPHAGE) 500 MG tablet TAKE TWO TABLETS BY MOUTH TWICE A DAY WITH MEALS 60 tablet 3    TRULICITY 3 BY/8.6QP SOPN INJECT THE CONTENTS OF ONE SYRINGE (3MG) UNDER THE SKIN ONCE WEEKLY 2 mL 3    LANTUS SOLOSTAR 100 UNIT/ML injection pen INJECT 42 UNITS INTO THE SKIN TWO TIMES A DAY 15 mL 3    venlafaxine (EFFEXOR XR) 37.5 MG extended release capsule Take 1 capsule by mouth daily 30 capsule 3    Continuous Blood Gluc Transmit (DEXCOM G6 TRANSMITTER) MISC Use in combination with new sensor every 10 days. Transmitter lasts 3 months 1 each 3    Continuous Blood Gluc Sensor (DEXCOM G6 SENSOR) MISC Apply new sensor to abdomen every 10 days 3 each 11    Continuous Blood Gluc  (DEXCOM G6 ) MAMADOU Use as directed to monitor glucose continuously 1 each 1    ferrous sulfate (IRON 325) 325 (65 Fe) MG tablet Take 325 mg by mouth daily (with breakfast)       zinc 50 MG CAPS Take 1 capsule by mouth daily      aspirin EC 81 MG EC tablet Take 1 tablet by mouth daily (Patient taking differently: Take 162 mg by mouth daily ) 90 tablet 1    atorvastatin (LIPITOR) 40 MG tablet Take 1 tablet by mouth daily 30 tablet 3    Insulin Pen Needle (B-D ULTRAFINE III SHORT PEN) 31G X 8 MM MISC 1 each by Does not apply route daily 100 each 3    vitamin D3 (CHOLECALCIFEROL) 25 MCG (1000 UT) TABS tablet Take 1 tablet by mouth daily 90 tablet 1    insulin lispro, 1 Unit Dial, (HUMALOG KWIKPEN) 100 UNIT/ML SOPN Inject 6 Units into the skin 3 times daily (before meals) 3 pen 2    spironolactone (ALDACTONE) 25 MG tablet Take 1 tablet by mouth daily. 30 tablet 2     No current facility-administered medications for this visit.        Social History     Socioeconomic History    Marital status: Problem Relation Age of Onset    Diabetes Sister     Diabetes Brother     Cancer Brother         REVIEW OF SYSTEM:     Constitutional: No fever or chills. No night sweats, no weight loss   Eyes: No eye discharge, double vision, or eye pain   HEENT: negative for sore mouth, sore throat, hoarseness and voice change   Respiratory: negative for cough , sputum, dyspnea, wheezing, hemoptysis, chest pain   Cardiovascular: negative for chest pain, dyspnea, palpitations, orthopnea, PND   Gastrointestinal: negative for nausea, vomiting, diarrhea, constipation, abdominal pain, Dysphagia, hematemesis and hematochezia   Genitourinary: negative for frequency, dysuria, nocturia, urinary incontinence, and hematuria   Integument: negative for rash, skin lesions, bruises.    Hematologic/Lymphatic: negative for easy bruising, bleeding, lymphadenopathy, petechiae and swelling/edema   Endocrine: negative for heat or cold intolerance, tremor, weight changes, change in bowel habits and hair loss   Musculoskeletal: negative for myalgias, arthralgias, pain, joint swelling,and bone pain   Neurological: negative for headaches, dizziness, seizures, weakness, numbness       OBJECTIVE:         Vitals:    07/06/22 0907   BP: 118/86   Pulse: 88   Resp: 16   Temp: 99.1 °F (37.3 °C)       PHYSICAL EXAM:   General appearance - well appearing, no in pain or distress   Mental status - alert and cooperative   Eyes - pupils equal and reactive, extraocular eye movements intact   Ears - bilateral TM's and external ear canals normal   Mouth - mucous membranes moist, pharynx normal without lesions   Neck - supple, no significant adenopathy   Lymphatics - no palpable lymphadenopathy, no hepatosplenomegaly   Chest - clear to auscultation, no wheezes, rales or rhonchi, symmetric air entry   Heart - normal rate, regular rhythm, normal S1, S2, no murmurs, rubs, clicks or gallops   Abdomen - soft, nontender, nondistended, no masses or organomegaly Neurological - alert, oriented, normal speech, no focal findings or movement disorder noted   Musculoskeletal - no joint tenderness, deformity or swelling   Extremities - peripheral pulses normal, no pedal edema, no clubbing or cyanosis   Skin - normal coloration and turgor, no rashes, no suspicious skin lesions noted ,      LABORATORY DATA:     Lab Results   Component Value Date    WBC 4.0 2022    HGB 12.0 (L) 2022    HCT 38.8 (L) 2022    MCV 84.2 2022     2022    LYMPHOPCT 18 (L) 2022    RBC 4.61 2022    MCH 26.0 2022    MCHC 30.9 2022    RDW 15.6 (H) 2022    MONOPCT 12 2022    BASOPCT 1 2022    NEUTROABS 2.63 2022    LYMPHSABS 0.72 (L) 2022    MONOSABS 0.49 2022    EOSABS 0.13 2022    BASOSABS 0.04 2022         Chemistry        Component Value Date/Time     2022 1037    K 4.2 2022 1037     2022 1037    CO2 25 2022 1037    BUN 13 2022 1037    CREATININE 0.92 2022 1037        Component Value Date/Time    CALCIUM 9.6 2022 1037    ALKPHOS 131 (H) 2022 1037    AST 17 2022 1037    ALT 18 2022 1037    BILITOT 0.35 2022 1037            PATHOLOGY DATA:   Patient Name: Anthony Kong. : 1960 (Age: 63) Gender: M Taken: 3/30/2021 Reported: 2021 Physician(s): Zan Lugo MD [phone]) Copy To:  Accession #: [id number] Med. Rec. #: [id number] Acct: # [de-identified]   Final Pathologic Diagnosis   1. Prostate, LLA, core biopsy:        Prostatic tissue, no tumor present. 2. Posterior the, LLB, core biopsy:        Prostatic tissue, no tumor present. 3. Prostate, LLM, core biopsy:        Prostatic tissue, no tumor present. 4. Prostate, LB, core biopsy:        Prostatic tissue, no tumor present. 5. Prostate, LM, core biopsy:        Prostatic tissue, no tumor present.      6. Prostate, LA, core biopsy:        Prostatic tissue, no tumor present. 7. Prostate, RB, core biopsy:        Prostatic tissue, no tumor present. 8. Posterior the, RM, core biopsy:        Prostatic tissue, no tumor present. 9. Prostate, RA, core biopsy:        Prostatic tissue, no tumor present. 10. Prostate, RLB, core biopsy:        Prostatic adenocarcinoma, [place] score 3+3 = 6 (grade group 1), involving one of three tissue core fragments.        Proportion of prostatic tissue involved by tumor: 4%.        Total linear millimeters of the carcinoma: 1.0 mm.        Total linear millimeters of needle core tissue:28.0 mm.           11. Prostate, RLM, core biopsy:        Prostatic adenocarcinoma, Octavio score 4+3 = 7 (grade group 3), involving one of three tissue core fragments.        Proportion of prostatic tissue involved by tumor: 21%.      Total linear millimeters of the carcinoma: 3.0 mm.        Total linear millimeters of needle core tissue:14.0 mm. 12. Prostate, RLA, core biopsy:        Prostatic tissue, no tumor present. Comment:  In order to confirm invasion, immunoperoxidase stains for p63 and K903 were performed 0n 10A and 11A with adequate control.  The stains are negative in concerning glands, supporting the interpretation        IMAGING DATA:      Echocardiogram Complete 2D w Doppler w Color  Transthoracic Echocardiography Report (TTE)     Patient Name  Inda Soulier     Date of Study                   04/22/2022                 Екатерина Stark      Date of Birth 1960  Gender                          Male      Age           57 year(s)  Race                            Black      Room Number      Corporate ID  [id number]   #      Patient Fabián  [de-identified]   #      MR #          [id number]     Sonographer                     Maricruz Powers      Accession #   [id number]  Interpreting Physician          [address] [place] [address]      Fellow                    Referring Nurse Practitioner      Interpreting              Referring Physician Bernardo Gaxiola     Type of Study      TTE procedure:2D Echocardiogram, M-Mode, Doppler, Color Doppler. Procedure Date  Date: 04/22/2022 Start: 11:10 AM    Study Location: [facility]  Technical Quality: Adequate visualization    Indications:Congestive heart failure. History / Tech. Comments:  Procedure explained to patient. Echo completed in the Echo Lab. PMHx:  Former smoker, Alcohol abuse  Hypertension, Diabetes    Patient Status: Outpatient    CONCLUSIONS    Summary  Left ventricle is normal in size, global left ventricular systolic function  is preserved, estimated ejection fraction is 55%. (Abnormal heart rate,  making it difficult to assess for wall motion abnormalities.)  Evidence of diastolic dysfunction. Left atrial dilatation. Inter-atrial septum is intact with no evidence for an atrial septal defect,  by color doppler. Signature  ----------------------------------------------------------------------------   Electronically signed by Laurence Gilman(Sonographer) on 04/22/2022 02:39 PM  ----------------------------------------------------------------------------    ----------------------------------------------------------------------------   Electronically signed by Warren Mesa(Interpreting physician) on 04/22/2022   04:11 PM  ----------------------------------------------------------------------------  FINDINGS  Left Atrium  Left atrial dilatation. Inter-atrial septum is intact with no evidence for an atrial septal defect,  by color doppler. Left Ventricle  Left ventricle is normal in size, global left ventricular systolic function  is preserved, estimated ejection fraction is 55%. (Abnormal heart rate,  making it difficult to assess for wall motion abnormalities.)  Evidence of diastolic dysfunction. Right Atrium  Right atrium is normal in size. Right Ventricle  Normal right ventricular size and function. Mitral Valve  Normal mitral valve structure.   No mitral stenosis. No evidence of mitral regurgitation. Aortic Valve  Aortic valve is trileaflet. No aortic stenosis. No aortic insufficiency. Tricuspid Valve  Tricuspid valve was not well visualized. No tricuspid regurgitation was seen. Insignificant tricuspid regurgitation, unable to estimate RVSP. Pulmonic Valve  Pulmonic valve not well visualized but Doppler velocities are normal.  Pericardial Effusion  No significant pericardial effusion is seen. Miscellaneous  Normal aortic root dimension. E/E' average = 7.2. IVC normal diameter & inspiratory collapse indicating normal RA filling  pressure .     M-mode / 2D Measurements & Calculations:      LVIDd:5.1 cm(3.7 - 5.6 cm)        Diastolic SPIPQX:402.45 ml   IVSd:1.4 cm(0.6 - 1.1 cm)         Systolic ZUIWZQ:96.7 ml   LVPWd:1 cm(0.6 - 1.1 cm)          Aortic Root:3.6 cm(2.0 - 3.7 cm)                                     LA Dimension: 4.1 cm(1.9 - 4.0 cm)   Calculated LVEF (%): 56.93 %      LA volume/Index: 80.26 ml                                     LVOT:2.3 cm      Mitral:                                 Aortic      Valve Area (P1/2-Time): 3.55 cm^2       Peak Velocity: 1.12 m/s   Peak E-Wave: 0.62 m/s                   Mean Velocity: 0.80 m/s   Peak A-Wave: 0.75 m/s                   Peak Gradient: 5.02 mmHg   E/A Ratio: 0.82                         Mean Gradient: 3 mmHg   Peak Gradient: 1.54 mmHg   Mean Gradient: 1 mmHg   Deceleration Time: 185 msec             Area (continuity): 4.28 cm^2   P1/2t: 62 msec                          AV VTI: 22.1 cm      Area (continuity): 4.08 cm^2   Mean Velocity: 0.50 m/s                                              Pulmonic:                                              Peak Velocity: 1.31 m/s                                           Peak Gradient: 6.86 mmHg     Diastology / Tissue Doppler  Septal Wall E' velocity:0.08 m/s  Septal Wall E/E':7.4  Lateral Wall E' velocity:0.09 m/s  Lateral Wall E/E':7     ASSESSMENT:    Alley Jay Abhi Aviles is a 60 y.o. male with a diagnosis ofAA unfavorable intermediate risk prostate cancer, clinical stage T1 cN0 M0, [place] 4+3 + 7, initial PSA 5.11.,  Diagnosed in March 2021  I reviewed laboratory, outside records, prior records, discussed diagnosis, prognosis treatment recommendations. I reviewed the significance and goals of care. Patient did not want surgery and wanted radiation therapy at that time and was evaluated by radiation oncology at [facility].  He was recommended definitive external beam radiation therapy along with androgen suppression therapy and has received 2 doses of Lupron shot 45 mg starting in July 2021. Patient received placement of fiducial marker, SpaceOAR on 8/13/2021. Patient had right hip revision on 10/18/21 for his ongoing hip prosthesis infection therefore his radiation was delayed. Now he has completed radiation therapy on 6/20/2022. During today's visit, the patient and the family had a number of reasonable questions which were answered to their satisfaction. They verbalized understanding of the information provided and they agreed to proceed as outlined above. PLAN:   I reviewed his recent lab work, discussed diagnosis and treatment recommendations   Completed ration therapy on 6/20   PSA has declined   Return to clinic in 3 months with labs prior or earlier if needed     Dedrick Malloy MD  Hematologist/Medical Oncologist    On this date 7/6/22  I have spent 40 minutes reviewing previous notes, test results and face to face with the patient discussing the diagnosis and importance of compliance with the treatment plan. Greater than 50% of that time was spent face-to-face with the patient in counseling and coordinating her care.         This note is created with the assistance of a speech recognition program.  While intending to generate a document that actually reflects the content of the visit, the document can still have some errors including those of syntax

## 2022-07-06 NOTE — TELEPHONE ENCOUNTER
Rola Alvarenga MD VISIT  RTC in 3 months with labs a week prior  LABS CDP CMP PSA ORDERS GIVEN TO PT ON EXIT TO BE DONE 9/28/22  MD VISIT 10/5/22 @ 11:15AM  AVS PRINTED W/ INSTRUCTIONS AND GIVEN TO PT ON EXIT

## 2022-07-07 ENCOUNTER — TELEPHONE (OUTPATIENT)
Dept: ONCOLOGY | Age: 62
End: 2022-07-07

## 2022-07-07 NOTE — TELEPHONE ENCOUNTER
Name: Marilu Rodriguez  : 1960  MRN: [id number]    Oncology Navigation Follow-Up Note    Contact Type:  Telephone    Notes: Writer called patient to check on him and to assess any needs he may have. No answer, detailed VM left requesting a return call.     Electronically signed by Eduardo Jackson RN on 2022 at 2:57 PM

## 2022-07-12 DIAGNOSIS — E11.69 TYPE 2 DIABETES MELLITUS WITH OTHER SPECIFIED COMPLICATION, WITH LONG-TERM CURRENT USE OF INSULIN (HCC): ICD-10-CM

## 2022-07-12 DIAGNOSIS — Z79.4 TYPE 2 DIABETES MELLITUS WITH OTHER SPECIFIED COMPLICATION, WITH LONG-TERM CURRENT USE OF INSULIN (HCC): ICD-10-CM

## 2022-07-12 NOTE — TELEPHONE ENCOUNTER
Last visit: 7/6/22  Last Med refill:   Does patient have enough medication for 72 hours: No:     Next Visit Date:  Future Appointments   Date Time Provider Liset Kari   7/20/2022  2:45 PM SCHEDULE, [facility]C NURSE [facility]   7/25/2022  1:50 PM Aureliano Barraza MD [facility]   7/29/2022 11:00 AM Kizzy Antony, [address] FP TOLPP   9/8/2022 10:30 AM Kathia Villatoro MD INFT DISEASE TOLP   9/14/2022  2:00 PM ST[facility] EMG  [facility] EMG [facility]   9/14/2022  2:00 PM [facility]   9/20/2022 11:00 AM Saba Isaacs DPM [facility] TOLP   10/5/2022 11:15 AM Avinash Little MD [facility] TOLP   12/19/2022 11:00 AM Breezy Mark,  [facility] Maintenance   Topic Date Due    Diabetic retinal exam  Never done    Colorectal Cancer Screen  Never done    Diabetic foot exam  04/27/2019    DTaP/Tdap/Td vaccine (1 - Tdap) 06/17/2023 (Originally 5/23/1978)    Shingles vaccine (2 of 3) 06/17/2023 (Originally 2/8/2011)    COVID-19 Vaccine (3 - Booster for Rinku series) 05/28/2024 (Originally 1/25/2022)    Pneumococcal 0-64 years Vaccine (2 - PCV) 06/11/2024 (Originally 12/19/2019)    Flu vaccine (1) 09/01/2022    A1C test (Diabetic or Prediabetic)  04/12/2023    Lipids  04/12/2023    Diabetic microalbuminuria test  04/13/2023    Prostate Specific Antigen (PSA) Screening or Monitoring  06/29/2023    Depression Screen  07/06/2023    Annual Wellness Visit (AWV)  07/07/2023    Hepatitis C screen  Completed    HIV screen  Completed    Hepatitis A vaccine  Aged Out    Hib vaccine  Aged Out    Meningococcal (ACWY) vaccine  Aged Out       Hemoglobin A1C (%)   Date Value   04/12/2022 8.3   03/28/2022 8.3   08/15/2013 7.8 (H)             ( goal A1C is < 7)   Microalb/Crt.  Ratio (mcg/mg creat)   Date Value   04/13/2022 Can not be calculated     LDL Cholesterol (mg/dL)   Date Value   04/12/2022 52 08/15/2013 94       (goal LDL is <100)   AST (U/L)   Date Value   06/29/2022 17     ALT (U/L)   Date Value   06/29/2022 18     BUN (mg/dL)   Date Value   06/29/2022 13     BP Readings from Last 3 Encounters:   07/06/22 118/86   06/17/22 125/86   06/14/22 119/85          (goal 120/80)    All Future Testing planned in [facility]  Lab Frequency Next Occurrence   Hemoglobin A1C Once 03/28/2023   PSA, Diagnostic Once 07/24/2022   Sedimentation Rate Once 04/27/2022   CBC with Auto Differential Once 04/27/2022   EMG Once 06/14/2022   PSA, Diagnostic Once 07/06/2022   CBC with Auto Differential Once 07/06/2022   Comprehensive Metabolic Panel Once 24/61/0713               Patient Active Problem List:     DM (diabetes mellitus) ([address]     HTN (hypertension)     ETOHism ([address]     Hypertensive urgency     Abnormal ambulatory electrocardiogram     Abnormal ambulatory electrocardiography     Abnormal kidney function     Adiposity     Astigmatism     Atherosclerotic heart disease of native coronary artery without angina pectoris     Cervical radiculopathy     Chronic back pain     Decreased cardiac output     Diabetic peripheral neuropathy (HCC)     Dislocated intraocular lens     Disturbance in sleep behavior     Dizziness     Dyssomnia     Floaters     Impotence of organic origin     Left ventricular dysfunction     Low vision, both eyes     Myopia     Nuclear sclerosis of left eye     Obstructive sleep apnea syndrome     Palpitations     Personal history of other diseases of the digestive system     Posterior vitreous detachment     Presbyopia     Primary osteoarthritis of right hip     Pseudophakia of right eye     Repetitive intrusions of sleep     Sciatica     Tendonitis     Vitamin D deficiency     Vitreous opacities of right eye     Dislocated IOL (intraocular lens), anterior, right     Prostate CA ([address]     Congestive heart failure ([address]     Essential hypertension     Welcome to Medicare preventive visit     Benign

## 2022-07-15 RX ORDER — VENLAFAXINE HYDROCHLORIDE 37.5 MG/1
CAPSULE, EXTENDED RELEASE ORAL
Qty: 30 CAPSULE | Refills: 3 | Status: SHIPPED | OUTPATIENT
Start: 2022-07-15

## 2022-07-20 ENCOUNTER — HOSPITAL ENCOUNTER (OUTPATIENT)
Dept: RADIATION ONCOLOGY | Facility: MEDICAL CENTER | Age: 62
Discharge: HOME | End: 2022-07-20
Payer: MEDICARE

## 2022-07-20 ENCOUNTER — HOSPITAL ENCOUNTER (OUTPATIENT)
Age: 62
Setting detail: SPECIMEN
Discharge: HOME | End: 2022-07-20

## 2022-07-20 VITALS
SYSTOLIC BLOOD PRESSURE: 130 MMHG | HEART RATE: 105 BPM | TEMPERATURE: 96.7 F | WEIGHT: 250.5 LBS | BODY MASS INDEX: 32.16 KG/M2 | DIASTOLIC BLOOD PRESSURE: 94 MMHG | OXYGEN SATURATION: 96 % | RESPIRATION RATE: 20 BRPM

## 2022-07-20 DIAGNOSIS — C61 PROSTATE CA (HCC): ICD-10-CM

## 2022-07-20 DIAGNOSIS — C61 PROSTATE CA (HCC): Primary | ICD-10-CM

## 2022-07-20 LAB
BILIRUBIN URINE: NEGATIVE
COLOR: YELLOW
COMMENT UA: ABNORMAL
GLUCOSE URINE: ABNORMAL
KETONES, URINE: ABNORMAL
LEUKOCYTE ESTERASE, URINE: NEGATIVE
NITRITE, URINE: NEGATIVE
PH UA: 5.5 (ref 5–8)
PROSTATE SPECIFIC ANTIGEN: 0.89 NG/ML
PROTEIN UA: NEGATIVE
SPECIFIC GRAVITY UA: 1.04 (ref 1–1.03)
TURBIDITY: CLEAR
URINE HGB: NEGATIVE
UROBILINOGEN, URINE: NORMAL

## 2022-07-20 PROCEDURE — 99212 OFFICE O/P EST SF 10 MIN: CPT | Performed by: STUDENT IN AN ORGANIZED HEALTH CARE EDUCATION/TRAINING PROGRAM

## 2022-07-20 ASSESSMENT — PAIN DESCRIPTION - DESCRIPTORS: DESCRIPTORS: BURNING

## 2022-07-20 ASSESSMENT — PAIN DESCRIPTION - LOCATION: LOCATION: PENIS

## 2022-07-20 ASSESSMENT — PAIN SCALES - GENERAL: PAINLEVEL_OUTOF10: 6

## 2022-07-20 ASSESSMENT — PAIN DESCRIPTION - FREQUENCY: FREQUENCY: INTERMITTENT

## 2022-07-20 NOTE — PROGRESS NOTES
Radiation Oncology Office Note    Diagnosis/Treatment History:   Mr. Diogo Bedoya is a 60 y.o. male with unfavorable intermediate risk prostate adenocarcinoma (cT1c, Octavio 4+3, PSA 5.11) s/p prostate biopsy on 3/30/2021, ADT with leuprolide 45 mg on 2021 and 2022,  SpaceOAR and fiducial placement on 2021 at [facility]. Plan was to treat prostate with definitive radiation around 2021, but patient had a septic joint from a right total hip replacement done in 2019, with multiple revision surgeries and I&Ds to the hip. He is currently followed by infectious diseases and is on chronic antibiotics. He completed definitive radiation to the prostate - 70 Gy in 28 fractions - on 2022. Interval History:   Mr. Diogo Bedoya returns with his wife for routine follow-up. Overall, he reports some burning with urination. His AUA score is 16 (up from 13 pre-treatment). He otherwise has normal urination and bowel movements. PSAs:  Lab Results   Component Value Date    PSA 1.23 2022    PSA 5.12 (H) 2022    PSA 1.75 2012     Physical Examination:   BP (!) 130/94   Pulse (!) 105   Temp (!) 96.7 °F (35.9 °C) (Temporal)   Resp 20   Wt 250 lb 8 oz (113.6 kg)   SpO2 96%   BMI 32.16 kg/m²   ECO Asymptomatic  CONSTITUTIONAL: Well developed, well nourished male. Alert and oriented x3. No acute distress. HEENT: Head normocephalic and atraumatic. Extraocular movements intact. BACK: Non-tender to palpation or percussion; no CVA tenderness. Axial skeleton non-tender to palpation. NEUROLOGIC EXAM: Cranial nerves II through XII grossly intact. No focal neurologic deficit. Speech is fluent. Gait and posture are steady. PSYCHIATRIC:  Appropriate mood and affect for his clinical situation. Assessment/Plan:  Mr. Diogo Bedoya appears to be doing well from an oncologic standpoint with decreasing PSA after definitve radiation and short course ADT (~1 year).      Regarding the burning with

## 2022-07-20 NOTE — PROGRESS NOTES
Nolan Christy  7/20/2022  2:45 PM    PT here for follow up visit. Patient states pain with urination only. PSA went down. Dr Ramesh Render to eval.  Vitals:    07/20/22 1437   BP: (!) 130/94   Pulse: (!) 105   Resp: 20   Temp: (!) 96.7 °F (35.9 °C)   SpO2: 96%    :     Pain Assessment: 0-10  Pain Level: 6       Wt Readings from Last 1 Encounters:   07/20/22 250 lb 8 oz (113.6 kg)                Current Outpatient Medications:     DOXYCYCLINE CALCIUM PO, Take by mouth Pt unsure of dose., Disp: , Rfl:     venlafaxine (EFFEXOR XR) 37.5 MG extended release capsule, TAKE 1 CAPSULE BY MOUTH ONE TIME A DAY, Disp: 30 capsule, Rfl: 3    metFORMIN (GLUCOPHAGE) 500 MG tablet, TAKE TWO TABLETS BY MOUTH TWICE A DAY WITH MEALS, Disp: 60 tablet, Rfl: 3    pregabalin (LYRICA) 100 MG capsule, Take 1 capsule by mouth 2 times daily for 30 days. , Disp: 60 capsule, Rfl: 2    JARDIANCE 25 MG tablet, TAKE 1 TABLET BY MOUTH ONE TIME A DAY, Disp: 30 tablet, Rfl: 1    ibuprofen (ADVIL;MOTRIN) 600 MG tablet, Take 1 tablet by mouth 3 times daily as needed for Pain, Disp: 90 tablet, Rfl: 0    metoprolol tartrate (LOPRESSOR) 25 MG tablet, TAKE ONE - HALF (1/2) TABLET BY MOUTH TWO TIMES A DAY, Disp: 30 tablet, Rfl: 2    tamsulosin (FLOMAX) 0.4 mg capsule, Take 1 capsule by mouth daily, Disp: 90 capsule, Rfl: 1    TRULICITY 3 AB/6.3CS SOPN, INJECT THE CONTENTS OF ONE SYRINGE (3MG) UNDER THE SKIN ONCE WEEKLY, Disp: 2 mL, Rfl: 3    LANTUS SOLOSTAR 100 UNIT/ML injection pen, INJECT 42 UNITS INTO THE SKIN TWO TIMES A DAY, Disp: 15 mL, Rfl: 3    Continuous Blood Gluc Transmit (DEXCOM G6 TRANSMITTER) MISC, Use in combination with new sensor every 10 days.  Transmitter lasts 3 months, Disp: 1 each, Rfl: 3    Continuous Blood Gluc Sensor (DEXCOM G6 SENSOR) MISC, Apply new sensor to abdomen every 10 days, Disp: 3 each, Rfl: 11    Continuous Blood Gluc  (DEXCOM G6 ) MAMADOU, Use as directed to monitor glucose continuously, Disp: 1 each, Rfl: 1 ferrous sulfate (IRON 325) 325 (65 Fe) MG tablet, Take 325 mg by mouth daily (with breakfast) , Disp: , Rfl:     zinc 50 MG CAPS, Take 1 capsule by mouth daily, Disp: , Rfl:     aspirin EC 81 MG EC tablet, Take 1 tablet by mouth daily (Patient taking differently: Take 162 mg by mouth daily ), Disp: 90 tablet, Rfl: 1    atorvastatin (LIPITOR) 40 MG tablet, Take 1 tablet by mouth daily, Disp: 30 tablet, Rfl: 3    Insulin Pen Needle (B-D ULTRAFINE III SHORT PEN) 31G X 8 MM MISC, 1 each by Does not apply route daily, Disp: 100 each, Rfl: 3    vitamin D3 (CHOLECALCIFEROL) 25 MCG (1000 UT) TABS tablet, Take 1 tablet by mouth daily, Disp: 90 tablet, Rfl: 1    insulin lispro, 1 Unit Dial, (HUMALOG KWIKPEN) 100 UNIT/ML SOPN, Inject 6 Units into the skin 3 times daily (before meals), Disp: 3 pen, Rfl: 2    Immunizations:    Influenza status:    []   Current   [x]   Patient declined    Pneumococcal status:  []   Current  [x]   Patient declined  Covid status:   [x]  Dose #1:                     [x]  Dose #2:     booster              []   Patient declined    Smoking Status:    [] Smoker - PPD:   [x] Nonsmoker - Quit Date:   2016            [] Never a smoker      Cancer Screening:  Colonoscopy   [x] Current       [] Not current   [] Not current, but scheduled   [] NA  Mammogram   [] Current       [] Not current   [] Not current, but scheduled   [x] NA  Prostate           [x] Current       [] Not current   [] Not current, but scheduled   [] NA  PAP/Pelvic      [] Current       [] Not current   [] Not current, but scheduled   [x] NA  Skin                 [] Current       [x] Not current   [] Not current, but scheduled   [] NA     Hormone:  Lupron []   Last dose given:           Next dose due:   Eligard []   Last dose given:           Next dose due:   Aromatase Inhibitors []   Medication name: January was last given. Client unsure of what kind.   N/A:  []           FALLS RISK SCREEN  Instructions:  Assess the patient and enter the position, stretcher/bed with siderails up except when performing patient care activities  6.   Educate patient/family/caregiver on falls prevention         PLAN: Patient is seen today in follow up        Lux Myers RN

## 2022-07-21 ENCOUNTER — TELEPHONE (OUTPATIENT)
Dept: ONCOLOGY | Age: 62
End: 2022-07-21

## 2022-07-21 LAB — NONINV COLON CA DNA+OCC BLD SCRN STL QL: NEGATIVE

## 2022-07-25 ENCOUNTER — OFFICE VISIT (OUTPATIENT)
Dept: UROLOGY | Age: 62
End: 2022-07-25
Payer: MEDICARE

## 2022-07-25 VITALS
OXYGEN SATURATION: 98 % | TEMPERATURE: 97.2 F | DIASTOLIC BLOOD PRESSURE: 70 MMHG | SYSTOLIC BLOOD PRESSURE: 100 MMHG | HEIGHT: 74 IN | WEIGHT: 250 LBS | HEART RATE: 98 BPM | BODY MASS INDEX: 32.08 KG/M2

## 2022-07-25 DIAGNOSIS — R30.0 DYSURIA: ICD-10-CM

## 2022-07-25 DIAGNOSIS — R23.2 HOT FLASHES: ICD-10-CM

## 2022-07-25 DIAGNOSIS — R39.12 WEAK URINARY STREAM: ICD-10-CM

## 2022-07-25 DIAGNOSIS — C61 PROSTATE CANCER (HCC): Primary | ICD-10-CM

## 2022-07-25 PROCEDURE — 99214 OFFICE O/P EST MOD 30 MIN: CPT | Performed by: UROLOGY

## 2022-07-25 RX ORDER — DOXYCYCLINE HYCLATE 100 MG
TABLET ORAL
COMMUNITY
Start: 2022-07-12

## 2022-07-25 ASSESSMENT — ENCOUNTER SYMPTOMS
DIARRHEA: 0
SHORTNESS OF BREATH: 0
SORE THROAT: 0
VOMITING: 0
WHEEZING: 0
COUGH: 1
NAUSEA: 0

## 2022-07-25 NOTE — PROGRESS NOTES
[address] [address]  Dept: [address] [facility] Urology Office Note - Established    Patient:  Ghanshyam Batista  YOB: 1960  Date: 7/25/2022    The patient is a 60 y.o. male who presents todayfor evaluation of the following problems:   Chief Complaint   Patient presents with    Prostate Cancer    3 Month Follow-Up     PSA Follow up       HPI  This is a very pleasant 69-year-old gentleman with a history of prostate cancer. He was diagnosed over a year ago when his definitive treatment with radiation was delayed due to an infected hip joint which required multiple surgical interventions. He has finally finished his radiation therapy about 3 to 4 weeks ago. His PSAs come down to 0.89. He does have worsening dysuria and frequency since radiation. He continues to take Flomax and he thinks that this is helping him. Summary of old records: N/A    Additional History: N/A    Procedures Today: N/A    Urinalysis today:  No results found for this visit on 07/25/22. Last several PSA's:  Lab Results   Component Value Date    PSA 0.89 07/20/2022    PSA 1.23 06/29/2022    PSA 5.12 (H) 05/18/2022     Last total testosterone:  No results found for: TESTOSTERONE    AUA Symptom Score (7/25/2022):                                Last BUN and creatinine:  Lab Results   Component Value Date    BUN 13 06/29/2022     Lab Results   Component Value Date    CREATININE 0.92 06/29/2022       Additional Lab/Culture results: none    Imaging Reviewed during this Office Visit: none  (results were independently reviewed by physician and radiology report verified)    PAST MEDICAL, FAMILY AND SOCIAL HISTORY UPDATE:  Past Medical History:   Diagnosis Date    Arthritis     DM (diabetes mellitus) ([facility] [address] 2009    IDDM    GERD (gastroesophageal reflux disease) 2017    ON RX    Heart murmur 2013    ASYMPTOMATIC    HTN (hypertension) 6/29/2012    ON RX    Hyperlipidemia 06/29/2012    ON RX    Sleep apnea 2016    TRIED MACHINE COULD NOT TOLERATE    Vision abnormalities 2019    FLOATERS RIGHT EYE    Wears glasses      Past Surgical History:   Procedure Laterality Date    COLONOSCOPY  07/23/2010    Dr. Kp Garces Bilateral 2017    CATARACT EXTRACTION WITH IOL    KNEE ARTHROSCOPY      VASECTOMY  1999    VITRECTOMY Right 03/28/2019    VITRECTOMY Right 3/28/2019    VITRECTOMY 25 GAUGE,  IOL REPOSITION  (Ant Armendariz) performed by Hiro Torres MD at [address] [place] [address] History   Problem Relation Age of Onset    Diabetes Sister     Diabetes Brother     Cancer Brother      Outpatient Medications Marked as Taking for the 7/25/22 encounter (Office Visit) with Usama Saini MD   Medication Sig Dispense Refill    venlafaxine (EFFEXOR XR) 37.5 MG extended release capsule TAKE 1 CAPSULE BY MOUTH ONE TIME A DAY 30 capsule 3    metFORMIN (GLUCOPHAGE) 500 MG tablet TAKE TWO TABLETS BY MOUTH TWICE A DAY WITH MEALS 60 tablet 3    JARDIANCE 25 MG tablet TAKE 1 TABLET BY MOUTH ONE TIME A DAY 30 tablet 1    metoprolol tartrate (LOPRESSOR) 25 MG tablet TAKE ONE - HALF (1/2) TABLET BY MOUTH TWO TIMES A DAY 30 tablet 2    tamsulosin (FLOMAX) 0.4 mg capsule Take 1 capsule by mouth daily 90 capsule 1    TRULICITY 3 [place]/9.0FU SOPN INJECT THE CONTENTS OF ONE SYRINGE (3MG) UNDER THE SKIN ONCE WEEKLY 2 mL 3    LANTUS SOLOSTAR 100 UNIT/ML injection pen INJECT 42 UNITS INTO THE SKIN TWO TIMES A DAY 15 mL 3    Continuous Blood Gluc Transmit (DEXCOM G6 TRANSMITTER) MISC Use in combination with new sensor every 10 days.  Transmitter lasts 3 months 1 each 3    Continuous Blood Gluc Sensor (DEXCOM G6 SENSOR) MISC Apply new sensor to abdomen every 10 days 3 each 11    Continuous Blood Gluc  (DEXCOM G6 ) MAMADOU Use as directed to monitor glucose continuously 1 each 1    ferrous sulfate (IRON 325) 325 (65 Fe) MG tablet Take 325 mg by mouth daily (with breakfast)       zinc 50 MG CAPS Take 1 capsule by mouth daily      atorvastatin (LIPITOR) 40 MG tablet Take 1 tablet by mouth daily 30 tablet 3    Insulin Pen Needle (B-D ULTRAFINE III SHORT PEN) 31G X 8 MM MISC 1 each by Does not apply route daily 100 each 3    vitamin D3 (CHOLECALCIFEROL) 25 MCG (1000 UT) TABS tablet Take 1 tablet by mouth daily 90 tablet 1    insulin lispro, 1 Unit Dial, (HUMALOG KWIKPEN) 100 UNIT/ML SOPN Inject 6 Units into the skin 3 times daily (before meals) 3 pen 2       Lisinopril, Melatonin, and Trazodone  Social History     Tobacco Use   Smoking Status Former    Packs/day: 0.00    Years: 15.00    Pack years: 0.00    Types: Cigarettes    Quit date: 3/27/2016    Years since quittin.3   Smokeless Tobacco Never     (Ifpatient a smoker, smoking cessation counseling offered)    Social History     Substance and Sexual Activity   Alcohol Use Yes    Comment: etoh abuse. BEER, WINE  12 A WEEK emma       REVIEW OF SYSTEMS:  Review of Systems    Physical Exam:      Vitals:    22 1346   BP: 100/70   Pulse: 98   Temp: 97.2 °F (36.2 °C)   SpO2: 98%     Body mass index is 32.1 kg/m². Patient is a 60 y.o. male in no acute distress and alert and oriented to person, place and time. Physical Exam  Constitutional: Patient in no acute distress. Neuro: Alert and oriented to person, place and time. Psych: Mood normal, affect normal  Skin: No rash noted  HEENT: Head: Normocephalic andatraumatic  Conjunctivae and EOM are normal. Pupils are equal, round      Assessment and Plan      1. Prostate cancer ([address]    2. Weak urinary stream    3. Hot flashes    4. Dysuria           Plan:   F/u 3 mo psa  Cont flomax  Will reassess LUTS (they have worsened since xrt)      Return in about 3 months (around 10/25/2022) for psa and T.    Prescriptions Ordered:  No orders of the defined types were placed in this encounter.     Orders Placed:  Orders Placed This Encounter   Procedures    PSA, Diagnostic Standing Status:   Future     Standing Expiration Date:   7/25/2023    Testosterone     Standing Status:   Future     Standing Expiration Date:   7/20/2023             Junior Nichols MD    Agree with the ROS entered by the MA.

## 2022-07-26 ENCOUNTER — TELEPHONE (OUTPATIENT)
Dept: FAMILY MEDICINE CLINIC | Age: 62
End: 2022-07-26

## 2022-07-27 ENCOUNTER — TELEPHONE (OUTPATIENT)
Dept: FAMILY MEDICINE CLINIC | Age: 62
End: 2022-07-27

## 2022-07-27 NOTE — TELEPHONE ENCOUNTER
Medication Management Service ([address] [place] [address])  [facility]  [phone]     CLINICAL PHARMACY NOTE:      Telephone/Face-To-Face/Follow-up     Spoke to patient on the phone this morning regarding his upcoming appointment with Meds Management on 7/29/22. He was agreeable to reschedule this appointment to 8/12/22 at 11 am.  Appointment was rescheduled for this time.     Howard Barker, PharmD, [address]  PGY2 Ambulatory Care Pharmacy Resident   [facility] Medication Management Service  [phone]  7/27/2022  9:18 AM

## 2022-08-03 ENCOUNTER — TELEPHONE (OUTPATIENT)
Dept: RADIATION ONCOLOGY | Facility: MEDICAL CENTER | Age: 62
End: 2022-08-03

## 2022-08-05 PROBLEM — Z00.00 WELCOME TO MEDICARE PREVENTIVE VISIT: Status: RESOLVED | Noted: 2022-07-06 | Resolved: 2022-08-05

## 2022-08-12 ENCOUNTER — TELEPHONE (OUTPATIENT)
Dept: FAMILY MEDICINE CLINIC | Age: 62
End: 2022-08-12

## 2022-08-12 DIAGNOSIS — E11.69 TYPE 2 DIABETES MELLITUS WITH OTHER SPECIFIED COMPLICATION, WITH LONG-TERM CURRENT USE OF INSULIN (HCC): ICD-10-CM

## 2022-08-12 DIAGNOSIS — Z79.4 TYPE 2 DIABETES MELLITUS WITH OTHER SPECIFIED COMPLICATION, WITH LONG-TERM CURRENT USE OF INSULIN (HCC): ICD-10-CM

## 2022-08-12 DIAGNOSIS — I10 ESSENTIAL HYPERTENSION: ICD-10-CM

## 2022-08-12 DIAGNOSIS — M25.512 LEFT SHOULDER PAIN, UNSPECIFIED CHRONICITY: Primary | ICD-10-CM

## 2022-08-12 RX ORDER — EMPAGLIFLOZIN 25 MG/1
TABLET, FILM COATED ORAL
Qty: 30 TABLET | Refills: 1 | Status: SHIPPED | OUTPATIENT
Start: 2022-08-12 | End: 2022-10-10

## 2022-08-12 NOTE — TELEPHONE ENCOUNTER
Last visit: 7/6/22  Last Med refill: 7/14/22  Does patient have enough medication for 72 hours: Yes    Next Visit Date:  Future Appointments   Date Time Provider Liset Kari   8/15/2022  2:45 PM Meli Iqbal DO Sports Med  [place]   8/17/2022  3:30 PM Bernardo Perez MD [facility] FP [facility]   9/8/2022 10:30 AM Alexey Calle MD INFT DISEASE [facility]   9/14/2022  2:00 PM STC EMG  [facility]Z EMG [facility]   9/14/2022  2:00 PM Manolo Gomez MD [facility] med/reha [facility]   9/20/2022 11:00 AM Nancy Birseno DPM [facility]   10/5/2022 11:15 AM Joaquín Ludwig MD [facility]   10/24/2022  2:10 PM Catie Coleman MD [facility]   12/19/2022 11:00 AM Meli Iqbal DO Sports Med  [place]   1/23/2023  2:45 PM SCHEDULE, [facility] ONC NURSE [facility] RAD [address] Maintenance   Topic Date Due    Diabetic retinal exam  Never done    Diabetic foot exam  04/27/2019    DTaP/Tdap/Td vaccine (1 - Tdap) 06/17/2023 (Originally 5/23/1978)    Shingles vaccine (1 of 2) 06/17/2023 (Originally 2/8/2011)    COVID-19 Vaccine (3 - Rinku risk series) 05/28/2024 (Originally 1/25/2022)    Pneumococcal 0-64 years Vaccine (2 - PCV) 06/11/2024 (Originally 12/19/2019)    Flu vaccine (1) 09/01/2022    A1C test (Diabetic or Prediabetic)  04/12/2023    Lipids  04/12/2023    Diabetic microalbuminuria test  04/13/2023    Depression Screen  07/06/2023    Annual Wellness Visit (AWV)  07/07/2023    Prostate Specific Antigen (PSA) Screening or Monitoring  07/20/2023    Colorectal Cancer Screen  07/16/2025    Hepatitis C screen  Completed    HIV screen  Completed    Hepatitis A vaccine  Aged Out    Hib vaccine  Aged Out    Meningococcal (ACWY) vaccine  Aged Out       Hemoglobin A1C (%)   Date Value   04/12/2022 8.3   03/28/2022 8.3   08/15/2013 7.8 (H)             ( goal A1C is < 7)   Microalb/Crt.  Ratio (mcg/mg creat)   Date Value   04/13/2022 Can not be calculated     LDL Cholesterol (mg/dL)   Date Value   04/12/2022 52   08/15/2013 94       (goal LDL is <100)   AST (U/L)   Date Value   06/29/2022 17     ALT (U/L)   Date Value   06/29/2022 18     BUN (mg/dL)   Date Value   06/29/2022 13     BP Readings from Last 3 Encounters:   07/25/22 100/70   07/20/22 (!) 130/94   07/06/22 118/86          (goal 120/80)    All Future Testing planned in [facility]  Lab Frequency Next Occurrence   Hemoglobin A1C Once 03/28/2023   PSA, Diagnostic Once 07/24/2022   Sedimentation Rate Once 04/27/2022   CBC with Auto Differential Once 04/27/2022   EMG Once 06/14/2022   CBC with Auto Differential Once 07/06/2022   Comprehensive Metabolic Panel Once 08/88/3043   PSA, Diagnostic Once 01/20/2023   PSA, Diagnostic Once 10/23/2022   Testosterone Once 10/25/2022   XR SHOULDER LEFT (MIN 2 VIEWS) Once 08/12/2022               Patient Active Problem List:     DM (diabetes mellitus) ([address]     HTN (hypertension)     ETOHism ([address]     Hypertensive urgency     Abnormal ambulatory electrocardiogram     Abnormal ambulatory electrocardiography     Abnormal kidney function     Adiposity     Astigmatism     Atherosclerotic heart disease of native coronary artery without angina pectoris     Cervical radiculopathy     Chronic back pain     Decreased cardiac output     Diabetic peripheral neuropathy (HCC)     Dislocated intraocular lens     Disturbance in sleep behavior     Dizziness     Dyssomnia     Floaters     Impotence of organic origin     Left ventricular dysfunction     Low vision, both eyes     Myopia     Nuclear sclerosis of left eye     Obstructive sleep apnea syndrome     Palpitations     Personal history of other diseases of the digestive system     Posterior vitreous detachment     Presbyopia     Primary osteoarthritis of right hip     Pseudophakia of right eye     Repetitive intrusions of sleep     Sciatica     Tendonitis     Vitamin D deficiency     Vitreous opacities of right eye     Dislocated IOL (intraocular lens), anterior, right     Prostate CA ([facility] [address]     Congestive heart failure (HCC)     Essential hypertension     Benign essential HTN     Diabetes mellitus ([facility] [address]

## 2022-08-12 NOTE — TELEPHONE ENCOUNTER
Medication Management Service  Liset Godfrey is a 60 y.o. male was schedule for a follow-up diabetes mellitus office visit with Medication Management Service per referral from Dr. Jeanine Talavera MD   for Diabetes Management under Collaborative Practice Agreement with A1c goal < 7 %. Patient did not present for appointment or call to reschedule. Writer called patient to reschedule appointment. Voicemail box was either full or not set-up - was not able to leave a message. Appointment not rescheduled, will try to reach out again in order to reschedule.       Israel Ace, PharmD  Pharmacy PGY1 Resident  Medication Management Service - [facility]  [phone]

## 2022-08-12 NOTE — TELEPHONE ENCOUNTER
Last visit: 7/6/22  Last Med refill: 7/24/22  Does patient have enough medication for 72 hours: Yes    Next Visit Date:  Future Appointments   Date Time Provider Liset Kari   8/15/2022  2:45 PM Julisa Rakes, DO Sports Med [place]   8/17/2022  3:30 PM Bernardo Gupta MD [id number] [facility] FP [facility]   9/8/2022 10:30 AM Renay Johnson MD INFT DISEASE [facility]   9/14/2022  2:00 PM STC EMG  [facility] EMG [facility]   9/14/2022  2:00 PM Danial Grady MD [facility] med/reha [facility]   9/20/2022 11:00 AM Olayinka Sánchez DPM [facility]   10/5/2022 11:15 AM Néstor Shaw MD [facility]   10/24/2022  2:10 PM Hannah Wood MD [facility]   12/19/2022 11:00 AM Julisa Nowakkes, DO Sports Med [place]   1/23/2023  2:45 PM SCHEDULE, [facility] ONC NURSE [facility] [address] Maintenance   Topic Date Due    Diabetic retinal exam  Never done    Diabetic foot exam  04/27/2019    DTaP/Tdap/Td vaccine (1 - Tdap) 06/17/2023 (Originally 5/23/1978)    Shingles vaccine (1 of 2) 06/17/2023 (Originally 2/8/2011)    COVID-19 Vaccine (3 - Rinku risk series) 05/28/2024 (Originally 1/25/2022)    Pneumococcal 0-64 years Vaccine (2 - PCV) 06/11/2024 (Originally 12/19/2019)    Flu vaccine (1) 09/01/2022    A1C test (Diabetic or Prediabetic)  04/12/2023    Lipids  04/12/2023    Diabetic microalbuminuria test  04/13/2023    Depression Screen  07/06/2023    Annual Wellness Visit (AWV)  07/07/2023    Prostate Specific Antigen (PSA) Screening or Monitoring  07/20/2023    Colorectal Cancer Screen  07/16/2025    Hepatitis C screen  Completed    HIV screen  Completed    Hepatitis A vaccine  Aged Out    Hib vaccine  Aged Out    Meningococcal (ACWY) vaccine  Aged Out       Hemoglobin A1C (%)   Date Value   04/12/2022 8.3   03/28/2022 8.3   08/15/2013 7.8 (H)             ( goal A1C is < 7)   Microalb/Crt.  Ratio (mcg/mg creat)   Date Value   04/13/2022 Can not be calculated     LDL Cholesterol (mg/dL)   Date Value   04/12/2022 52   08/15/2013 94       (goal LDL is <100)   AST (U/L)   Date Value   06/29/2022 17     ALT (U/L)   Date Value   06/29/2022 18     BUN (mg/dL)   Date Value   06/29/2022 13     BP Readings from Last 3 Encounters:   07/25/22 100/70   07/20/22 (!) 130/94   07/06/22 118/86          (goal 120/80)    All Future Testing planned in [facility]  Lab Frequency Next Occurrence   Hemoglobin A1C Once 03/28/2023   PSA, Diagnostic Once 07/24/2022   Sedimentation Rate Once 04/27/2022   CBC with Auto Differential Once 04/27/2022   EMG Once 06/14/2022   CBC with Auto Differential Once 07/06/2022   Comprehensive Metabolic Panel Once 89/23/6974   PSA, Diagnostic Once 01/20/2023   PSA, Diagnostic Once 10/23/2022   Testosterone Once 10/25/2022   XR SHOULDER LEFT (MIN 2 VIEWS) Once 08/12/2022               Patient Active Problem List:     DM (diabetes mellitus) ([address]     HTN (hypertension)     ETOHism ([address]     Hypertensive urgency     Abnormal ambulatory electrocardiogram     Abnormal ambulatory electrocardiography     Abnormal kidney function     Adiposity     Astigmatism     Atherosclerotic heart disease of native coronary artery without angina pectoris     Cervical radiculopathy     Chronic back pain     Decreased cardiac output     Diabetic peripheral neuropathy (HCC)     Dislocated intraocular lens     Disturbance in sleep behavior     Dizziness     Dyssomnia     Floaters     Impotence of organic origin     Left ventricular dysfunction     Low vision, both eyes     Myopia     Nuclear sclerosis of left eye     Obstructive sleep apnea syndrome     Palpitations     Personal history of other diseases of the digestive system     Posterior vitreous detachment     Presbyopia     Primary osteoarthritis of right hip     Pseudophakia of right eye     Repetitive intrusions of sleep     Sciatica     Tendonitis     Vitamin D deficiency     Vitreous opacities of right eye     Dislocated IOL (intraocular lens), anterior, right     Prostate CA ([facility] [address]     Congestive heart failure (HCC)     Essential hypertension     Benign essential HTN     Diabetes mellitus ([facility] [address]

## 2022-08-15 ENCOUNTER — OFFICE VISIT (OUTPATIENT)
Dept: ORTHOPEDIC SURGERY | Age: 62
End: 2022-08-15
Payer: MEDICARE

## 2022-08-15 VITALS
OXYGEN SATURATION: 97 % | HEIGHT: 74 IN | WEIGHT: 246 LBS | SYSTOLIC BLOOD PRESSURE: 131 MMHG | DIASTOLIC BLOOD PRESSURE: 95 MMHG | BODY MASS INDEX: 31.57 KG/M2 | HEART RATE: 118 BPM

## 2022-08-15 DIAGNOSIS — Z96.641 HISTORY OF TOTAL HIP ARTHROPLASTY, RIGHT: ICD-10-CM

## 2022-08-15 DIAGNOSIS — T84.50XD INFECTION OF PROSTHETIC JOINT, SUBSEQUENT ENCOUNTER: ICD-10-CM

## 2022-08-15 DIAGNOSIS — M19.012 PRIMARY OSTEOARTHRITIS OF LEFT SHOULDER: Primary | ICD-10-CM

## 2022-08-15 DIAGNOSIS — M75.102 ROTATOR CUFF TEAR ARTHROPATHY OF LEFT SHOULDER: ICD-10-CM

## 2022-08-15 DIAGNOSIS — M12.812 ROTATOR CUFF TEAR ARTHROPATHY OF LEFT SHOULDER: ICD-10-CM

## 2022-08-15 PROCEDURE — 3017F COLORECTAL CA SCREEN DOC REV: CPT | Performed by: ORTHOPAEDIC SURGERY

## 2022-08-15 PROCEDURE — G8417 CALC BMI ABV UP PARAM F/U: HCPCS | Performed by: ORTHOPAEDIC SURGERY

## 2022-08-15 PROCEDURE — 99214 OFFICE O/P EST MOD 30 MIN: CPT | Performed by: ORTHOPAEDIC SURGERY

## 2022-08-15 PROCEDURE — G8427 DOCREV CUR MEDS BY ELIG CLIN: HCPCS | Performed by: ORTHOPAEDIC SURGERY

## 2022-08-15 PROCEDURE — 20610 DRAIN/INJ JOINT/BURSA W/O US: CPT | Performed by: ORTHOPAEDIC SURGERY

## 2022-08-15 PROCEDURE — 1036F TOBACCO NON-USER: CPT | Performed by: ORTHOPAEDIC SURGERY

## 2022-08-16 RX ORDER — BUPIVACAINE HYDROCHLORIDE 2.5 MG/ML
2 INJECTION, SOLUTION INFILTRATION; PERINEURAL ONCE
Status: SHIPPED | OUTPATIENT
Start: 2022-08-16

## 2022-08-16 RX ORDER — METHYLPREDNISOLONE ACETATE 80 MG/ML
80 INJECTION, SUSPENSION INTRA-ARTICULAR; INTRALESIONAL; INTRAMUSCULAR; SOFT TISSUE ONCE
Status: SHIPPED | OUTPATIENT
Start: 2022-08-16

## 2022-08-16 ASSESSMENT — ENCOUNTER SYMPTOMS
ROS SKIN COMMENTS: NEGATIVE FOR RASH
EYE DISCHARGE: 0
SHORTNESS OF BREATH: 0
ABDOMINAL PAIN: 0

## 2022-08-16 NOTE — PROGRESS NOTES
[address] [place] [address] AND SPORTS MEDICINE  [facility] [address]  Dept: [phone]  Dept Fax: [phone]        Ambulatory Follow Up      Subjective: Luma Montana is a 60y.o. year old male who presents to our office today for routine followup regarding his   1. Primary osteoarthritis of left shoulder    2. History of total hip arthroplasty, right    3. Infection of prosthetic joint, subsequent encounter    4. Rotator cuff tear arthropathy of left shoulder    . Chief Complaint   Patient presents with    Shoulder Pain     Left shoulder pain       HPI  Fly Xiao is a 19-year-old male who presents to the office today for evaluation of left shoulder pain. Patient has a history of prostate cancer and he finished radiation treatment to his right hip recently. The patient has had previous multiple surgeries for his right hip and has chronic right periprosthetic infection for which he continuous chronic antibiotic suppression. He also has a previous history of right reverse total shoulder arthroplasty on 1/11/2021. He states he was supposed to undergo left shoulder replacement by Dr. Kamilah Wakefield in the past.    Patient has difficulty ambulating long distances secondary to his chronic pain with a right hip and would like to consider a motorized scooter secondary to him being mobility impaired. Review of Systems   Constitutional:  Positive for activity change. Negative for fever. HENT:  Negative for dental problem. Eyes:  Negative for discharge. Respiratory:  Negative for shortness of breath. Cardiovascular:  Negative for chest pain. Gastrointestinal:  Negative for abdominal pain. Genitourinary: Negative. Musculoskeletal:  Positive for arthralgias. Skin:         Negative for rash   Neurological:  Positive for weakness. Psychiatric/Behavioral:  Negative for confusion.       I have reviewed the CC, HPI, ROS, PMH, FHX, Social History, and if not present in this note, I have reviewed in the patient's chart. I agree with the documentation provided by other staff and have reviewed their documentation prior to providing my signature indicating agreement. Objective :   BP (!) 131/95   Pulse (!) 118   Ht 6' 2\" (1.88 m)   Wt 246 lb (111.6 kg)   SpO2 97%   BMI 31.58 kg/m²  Body mass index is 31.58 kg/m². General: Ra Clay is a 60 y.o. male who is alert and oriented and sitting comfortably in our office. Ortho Exam  MS: Evaluation of the left shoulder reveals no outward deformity. Range of motion is 260 degrees of forward elevation and 160 degrees of abduction. Patient has mild decreased motor strength with external rotation strength testing. No instability of the left shoulder is noted. Motor, sensory, vascular examination of the left lower extremity is grossly intact without focal deficits. Moderate swelling of the right hip is appreciated. Warmth of the right hip is noted. Skin discoloration without erythema as noted over the right hip. Patient ambulates with mild short weightbearing phase and mild antalgia to the right lower extremity. Motor, sensory, vascular examination of the right lower extremity is grossly intact without focal deficits. Neuro: alert and oriented to person and place. Eyes: Extra-ocular muscles intact  Mouth: Oral mucosa moist. No perioral lesions  Pulm: Respirations unlabored and regular. Symmetric chest excursion without outward deformity is noted. Skin: warm, well perfused  Psych:   Patient has good fund of knowledge and displays understanging of exam, diagnosis, and plan.     Radiology: XR SHOULDER LEFT (MIN 2 VIEWS)    Result Date: 8/15/2022  History: Left shoulder pain Findings: AP, scapular Y, axial view x-rays of the left shoulder done the office today shows erosions, sclerotic changes and cystic changes of the greater tuberosity insertion of the rotator cuff. Humeral head is well-maintained within the glenoid. Significant degenerative narrowing at the acromioclavicular joint is appreciated. No further evidence of fracture, subluxation, dislocation, radiopaque foreign body, radiopaque tumors noted. Mild glenohumeral degenerative changes are noted. Impression: Rotator cuff pathology left shoulder with degenerative changes as described above. SHOULDER INJECTION PROCEDURE NOTE:  The patient was identified. The left shoulder was confirmed with the patient. After a sterile prep with Betadine the shoulder  was injected using a posterior approach to the glenohumeral space with a mixture of 2 mL of 0.25% Marcaine and 80 mg of Depo-Medrol. Patient tolerated the procedure well without post injection complications. I instructed the patient to call our office immediately if they have any swelling or increased pain at the injection site. Assessment:      1. Primary osteoarthritis of left shoulder    2. History of total hip arthroplasty, right    3. Infection of prosthetic joint, subsequent encounter    4. Rotator cuff tear arthropathy of left shoulder         Plan:      We discussed multiple treatment options for the patient's shoulder today. At this point with a chronic infection to the right hip it is felt that shoulder replacement surgery should not be undertaken at this time. The shoulder was injected on the left without complications. The patient is going to continue home exercise program.    The patient is concerned about safe ambulation especially long distances with the right hip and I think that is very reasonable as he is mobility impaired and does have at least a moderate limp to the right lower extremity. It is felt that a motorized scooter would be very helpful and should be considered at this point. I will be seeing the patient back for the routine follow-up for his hip and as needed for his shoulder. Follow up: No follow-ups on file. Orders Placed This Encounter   Medications    bupivacaine (MARCAINE) 0.25 % injection 5 mg    methylPREDNISolone acetate (DEPO-MEDROL) injection 80 mg         Orders Placed This Encounter   Procedures    KS ARTHROCENTESIS ASPIR&/INJ MAJOR JT/BURSA W/O US    DME Order for (Specify) as OP     - DME device ordered - motorized scooter   - Diagnosis: history of total hip arthroplasty right, infection of prosthetic joint,   - Length of Need: 12 Months       This note is created with the assistance of a speech recognition program.  While intending to generate a document that actually reflects the content of the visit, the document can still have some errors including those of syntax and sound a like substitutions which may escape proof reading.   In such instances, actual meaning can be extrapolated by contextual diversion    Electronically signed by Greg Streeter DO, FAOAO on 8/16/2022 at 7:32 AM

## 2022-08-17 ENCOUNTER — OFFICE VISIT (OUTPATIENT)
Dept: FAMILY MEDICINE CLINIC | Age: 62
End: 2022-08-17
Payer: MEDICARE

## 2022-08-17 VITALS
WEIGHT: 243.4 LBS | TEMPERATURE: 96.8 F | DIASTOLIC BLOOD PRESSURE: 78 MMHG | BODY MASS INDEX: 31.24 KG/M2 | HEART RATE: 105 BPM | SYSTOLIC BLOOD PRESSURE: 130 MMHG | HEIGHT: 74 IN | OXYGEN SATURATION: 96 %

## 2022-08-17 DIAGNOSIS — Z79.4 TYPE 2 DIABETES MELLITUS WITH OTHER SPECIFIED COMPLICATION, WITH LONG-TERM CURRENT USE OF INSULIN (HCC): ICD-10-CM

## 2022-08-17 DIAGNOSIS — E11.69 TYPE 2 DIABETES MELLITUS WITH OTHER SPECIFIED COMPLICATION, WITH LONG-TERM CURRENT USE OF INSULIN (HCC): Primary | ICD-10-CM

## 2022-08-17 DIAGNOSIS — Z79.4 TYPE 2 DIABETES MELLITUS WITH OTHER SPECIFIED COMPLICATION, WITH LONG-TERM CURRENT USE OF INSULIN (HCC): Primary | ICD-10-CM

## 2022-08-17 DIAGNOSIS — E11.69 TYPE 2 DIABETES MELLITUS WITH OTHER SPECIFIED COMPLICATION, WITH LONG-TERM CURRENT USE OF INSULIN (HCC): ICD-10-CM

## 2022-08-17 DIAGNOSIS — M25.512 CHRONIC LEFT SHOULDER PAIN: ICD-10-CM

## 2022-08-17 DIAGNOSIS — B49 FUNGAL INFECTION: ICD-10-CM

## 2022-08-17 DIAGNOSIS — G89.29 CHRONIC LEFT SHOULDER PAIN: ICD-10-CM

## 2022-08-17 DIAGNOSIS — B35.0 TINEA CAPITIS: ICD-10-CM

## 2022-08-17 LAB — HBA1C MFR BLD: 8.1 %

## 2022-08-17 PROCEDURE — 1036F TOBACCO NON-USER: CPT | Performed by: STUDENT IN AN ORGANIZED HEALTH CARE EDUCATION/TRAINING PROGRAM

## 2022-08-17 PROCEDURE — 99213 OFFICE O/P EST LOW 20 MIN: CPT | Performed by: STUDENT IN AN ORGANIZED HEALTH CARE EDUCATION/TRAINING PROGRAM

## 2022-08-17 PROCEDURE — G8417 CALC BMI ABV UP PARAM F/U: HCPCS | Performed by: STUDENT IN AN ORGANIZED HEALTH CARE EDUCATION/TRAINING PROGRAM

## 2022-08-17 PROCEDURE — 3017F COLORECTAL CA SCREEN DOC REV: CPT | Performed by: STUDENT IN AN ORGANIZED HEALTH CARE EDUCATION/TRAINING PROGRAM

## 2022-08-17 PROCEDURE — 83036 HEMOGLOBIN GLYCOSYLATED A1C: CPT | Performed by: STUDENT IN AN ORGANIZED HEALTH CARE EDUCATION/TRAINING PROGRAM

## 2022-08-17 PROCEDURE — 3052F HG A1C>EQUAL 8.0%<EQUAL 9.0%: CPT | Performed by: STUDENT IN AN ORGANIZED HEALTH CARE EDUCATION/TRAINING PROGRAM

## 2022-08-17 PROCEDURE — 2022F DILAT RTA XM EVC RTNOPTHY: CPT | Performed by: STUDENT IN AN ORGANIZED HEALTH CARE EDUCATION/TRAINING PROGRAM

## 2022-08-17 PROCEDURE — G8427 DOCREV CUR MEDS BY ELIG CLIN: HCPCS | Performed by: STUDENT IN AN ORGANIZED HEALTH CARE EDUCATION/TRAINING PROGRAM

## 2022-08-17 RX ORDER — ATORVASTATIN CALCIUM 40 MG/1
40 TABLET, FILM COATED ORAL DAILY
Qty: 30 TABLET | Refills: 3 | Status: SHIPPED | OUTPATIENT
Start: 2022-08-17

## 2022-08-17 RX ORDER — IBUPROFEN 400 MG/1
400 TABLET ORAL EVERY 6 HOURS PRN
Qty: 120 TABLET | Refills: 1 | Status: SHIPPED | OUTPATIENT
Start: 2022-08-17

## 2022-08-17 RX ORDER — PRENATAL VIT 91/IRON/FOLIC/DHA 28-975-200
COMBINATION PACKAGE (EA) ORAL
Qty: 15 G | Refills: 0 | Status: SHIPPED | OUTPATIENT
Start: 2022-08-17 | End: 2022-08-30

## 2022-08-17 NOTE — TELEPHONE ENCOUNTER
Please address the medication refill and close the encounter. If I can be of assistance, please route to the applicable pool. Thank you. Last visit: 7/5/22  Last Med refill: 4/12/22  Does patient have enough medication for 72 hours: No:     Next Visit Date:  Future Appointments   Date Time Provider Liset Estradai   8/17/2022  3:30 PM Bernardo Rebolledo MD [facility] FP [facility]   9/8/2022 10:30 AM Laz Somers MD INFT DISEASE [facility]   9/14/2022  2:00 PM [facility] EMG  [facility] EMG [facility]   9/14/2022  2:00 PM Jessenia Frye MD [facility]   9/20/2022 11:00 AM Cuauhtemoc Mathews DPM [facility]   10/5/2022 11:15 AM Beau Cross MD [facility]   10/24/2022  2:10 PM Abdi Stephen MD [facility]   12/19/2022 11:00 AM Susan Leonard DO Sports Med [facility]   1/23/2023  2:45 PM SCHEDULE, STV[facility] RAD ONC NURSE [facility] [address] [place] Maintenance   Topic Date Due    Diabetic retinal exam  Never done    Diabetic foot exam  04/27/2019    DTaP/Tdap/Td vaccine (1 - Tdap) 06/17/2023 (Originally 5/23/1978)    Shingles vaccine (1 of 2) 06/17/2023 (Originally 2/8/2011)    COVID-19 Vaccine (3 - Rinku risk series) 05/28/2024 (Originally 1/25/2022)    Pneumococcal 0-64 years Vaccine (2 - PCV) 06/11/2024 (Originally 12/19/2019)    Flu vaccine (1) 09/01/2022    A1C test (Diabetic or Prediabetic)  04/12/2023    Lipids  04/12/2023    Diabetic microalbuminuria test  04/13/2023    Depression Screen  07/06/2023    Annual Wellness Visit (AWV)  07/07/2023    Prostate Specific Antigen (PSA) Screening or Monitoring  07/20/2023    Colorectal Cancer Screen  07/16/2025    Hepatitis C screen  Completed    HIV screen  Completed    Hepatitis A vaccine  Aged Out    Hib vaccine  Aged Out    Meningococcal (ACWY) vaccine  Aged Out       Hemoglobin A1C (%)   Date Value   04/12/2022 8.3   03/28/2022 8.3   08/15/2013 7.8 (H)             ( goal A1C is < 7)   Microalb/Crt.  Ratio (mcg/mg creat)   Date Value   04/13/2022 Can not be calculated     LDL Cholesterol (mg/dL)   Date Value   04/12/2022 52   08/15/2013 94       (goal LDL is <100)   AST (U/L)   Date Value   06/29/2022 17     ALT (U/L)   Date Value   06/29/2022 18     BUN (mg/dL)   Date Value   06/29/2022 13     BP Readings from Last 3 Encounters:   08/15/22 (!) 131/95   07/25/22 100/70   07/20/22 (!) 130/94          (goal 120/80)    All Future Testing planned in [facility]  Lab Frequency Next Occurrence   Hemoglobin A1C Once 03/28/2023   PSA, Diagnostic Once 07/24/2022   Sedimentation Rate Once 04/27/2022   CBC with Auto Differential Once 04/27/2022   CBC with Auto Differential Once 07/06/2022   Comprehensive Metabolic Panel Once 41/88/4748   PSA, Diagnostic Once 01/20/2023   PSA, Diagnostic Once 10/23/2022   Testosterone Once 10/25/2022               Patient Active Problem List:     DM (diabetes mellitus) ([address]     HTN (hypertension)     ETOHism ([facility] [address]     Hypertensive urgency     Abnormal ambulatory electrocardiogram     Abnormal ambulatory electrocardiography     Abnormal kidney function     Adiposity     Astigmatism     Atherosclerotic heart disease of native coronary artery without angina pectoris     Cervical radiculopathy     Chronic back pain     Decreased cardiac output     Diabetic peripheral neuropathy (HCC)     Dislocated intraocular lens     Disturbance in sleep behavior     Dizziness     Dyssomnia     Floaters     Impotence of organic origin     Left ventricular dysfunction     Low vision, both eyes     Myopia     Nuclear sclerosis of left eye     Obstructive sleep apnea syndrome     Palpitations     Personal history of other diseases of the digestive system     Posterior vitreous detachment     Presbyopia     Primary osteoarthritis of right hip     Pseudophakia of right eye     Repetitive intrusions of sleep     Sciatica     Tendonitis     Vitamin D deficiency     Vitreous opacities of right eye     Dislocated IOL

## 2022-08-17 NOTE — PATIENT INSTRUCTIONS
Thank you for letting us take care of you today. We hope all your questions were addressed. If a question was overlooked or something else comes to mind after you return home, please contact a member of your Care Team listed below. Your Care Team at [place] [address] is Team #5  Sina Medrano MD (Faculty)  Mason Hdz MD (Resident)  Elodia Greene MD (Resident)  Edith Rea MD (Resident)  Windy Dennison MD, (Resident)  Denton Godfrey., DEEPAK Hernandez., YESENIA Yu.,  LPN  Sonny Estes., Jason Beauchamp., [facility] office)  Keren Saha, [address] (Clinical Practice Manager)  Cholo Seth, [facility] (Clinical Pharmacist)       Office phone number: [phone]    If you need to get in right away due to illness, please be advised we have \"Same Day\" appointments available Monday-Friday. Please call us at [phone] option #3 to schedule your \"Same Day\" appointment.

## 2022-08-17 NOTE — PROGRESS NOTES
[facility] Medicine Residency Program - Outpatient Note      Subjective: Syd Godfrey is a 60 y.o. male  presented to the office on 08/17/22 with complaints of: Follow Up on Diabetes    Cc: Diabetes Mellitus  Type I/II/Unknown: 2  HbA1c: 8.3--->8.1 (T)  Insulin dependant: Yes  Blood sugar numbers at home: on CGM  Hypoglycemic episodes after last visit: None  Hyperglycemia or hospital admission: None  Current medication: Jardiance 25 Mg daily, insulin 42 mg twice daily, Trulicity 3 mg every week, 6 units of lispro 3 times daily before meals, metformin 500 mg twice daily  Urine Micro: See report  Lipid Profile: See report  Neuropathy: Yes  Gastroparesis: None  CVA/CAD/CKD: None  BP at goal: Yes    Hypertension is defined according to age. Age group Hypertension Goal   Adults 18 to 72 ?140/90 120 to 130/70 to 79   Adults 65 to 79 ? 140/90 130 to 139/70 to 79     Adults 80 years and older   ? 160/90   130 to 139/70 to 78     Adults with diabetes, 25to 72years of age   ? 140/90   120 to 130/70 to 78     Adults with CKD, 25to 72years of age   ? 140/90   130 to 139/70 to 79               Review of systems (Except what is mentioned in the HPI)  CONSTITUTIONAL: Negative  RESPIRATORY: Negative  CARDIOVASCULAR: Negative  GASTROINTESTINAL: Negative  GENITOURINARY: Negative   MUSCULOSKELETAL: Negative  NEUROLOGICAL: Negative  BEHAVIOR/PSYCH: Negative      Objective:      Vitals:    08/17/22 1527   BP: 130/78   Pulse: (!) 105   Temp: 96.8 °F (36 °C)   SpO2: 96%       General Appearance - Alert and oriented x 3  HEENT - No obvious deformity  Lungs - Bilateral good air entry , no wheezes or rales  Cardiovascular - Regular rate and rhythm. No murmur  Abdomen - Soft and nontender  Neurologic - No new focal motor or sensory deficits  Skin - No bruising or bleeding on exposed skin area  MSK - No new joint/bone pains   Psych - normal affect       Assessment:        ICD-10-CM    1.  Type 2 diabetes mellitus with other specified complication, with long-term current use of insulin (HCC)  E11.69 POCT glycosylated hemoglobin (Hb A1C)    Z79.4           Plan:    Hemoglobin A1c is trending down. Do not have full report from [facility]. We will schedule patient with pharmacist and physician together in 3 months. We will try to simplify the regimen further. Patient do mention complaints of white spots on the scalp, concern of tenia versicolor, will give patient topical terbinafine and reassess. Patient also requested rheumatology evaluation. No follow-ups on file. Requested Prescriptions      No prescriptions requested or ordered in this encounter       There are no discontinued medications. Ella Escobar received counseling on the following healthy behaviors: nutrition, exercise and medication adherence    Discussed use, benefit, and side effects of prescribed medications. Barriers to medication compliance addressed. All patient questions answered. Pt voiced understanding. Disclaimer: Some oral of this note was transcribed using voice-recognition software. This may cause typographical errors occasionally. Although all effort is made to fix these errors, please do not hesitate to contact our office if there Augusta Howell concern with the understanding of this note.     Bernardo Anderson President  PGY-3  Family Medicine     8/17/2022  3:45 PM

## 2022-08-22 NOTE — PROGRESS NOTES
Attending Physician Statement    Wt Readings from Last 3 Encounters:   08/17/22 243 lb 6.4 oz (110.4 kg)   08/15/22 246 lb (111.6 kg)   07/25/22 250 lb (113.4 kg)     Temp Readings from Last 3 Encounters:   08/17/22 96.8 °F (36 °C) (Temporal)   07/25/22 97.2 °F (36.2 °C) (Infrared)   07/20/22 (!) 96.7 °F (35.9 °C) (Temporal)     BP Readings from Last 3 Encounters:   08/17/22 130/78   08/15/22 (!) 131/95   07/25/22 100/70     Pulse Readings from Last 3 Encounters:   08/17/22 (!) 105   08/15/22 (!) 118   07/25/22 98         I have discussed the care of Michelle Loving, including pertinent history and exam findings with the resident. I have reviewed the key elements of all parts of the encounter with the resident. I agree with the assessment, plan and orders as documented by the resident.   (GE Modifier)

## 2022-08-25 ENCOUNTER — OFFICE VISIT (OUTPATIENT)
Dept: FAMILY MEDICINE CLINIC | Age: 62
End: 2022-08-25
Payer: MEDICARE

## 2022-08-25 ENCOUNTER — NURSE TRIAGE (OUTPATIENT)
Dept: OTHER | Facility: CLINIC | Age: 62
End: 2022-08-25

## 2022-08-25 VITALS
BODY MASS INDEX: 31.73 KG/M2 | TEMPERATURE: 100.1 F | SYSTOLIC BLOOD PRESSURE: 116 MMHG | WEIGHT: 247.2 LBS | HEART RATE: 117 BPM | DIASTOLIC BLOOD PRESSURE: 87 MMHG | HEIGHT: 74 IN

## 2022-08-25 DIAGNOSIS — M00.9 CHRONIC INFECTION OF RIGHT HIP ON ANTIBIOTICS (HCC): Primary | ICD-10-CM

## 2022-08-25 PROCEDURE — G8417 CALC BMI ABV UP PARAM F/U: HCPCS

## 2022-08-25 PROCEDURE — G8427 DOCREV CUR MEDS BY ELIG CLIN: HCPCS

## 2022-08-25 PROCEDURE — 3017F COLORECTAL CA SCREEN DOC REV: CPT

## 2022-08-25 PROCEDURE — 99213 OFFICE O/P EST LOW 20 MIN: CPT

## 2022-08-25 PROCEDURE — 1036F TOBACCO NON-USER: CPT

## 2022-08-25 ASSESSMENT — ENCOUNTER SYMPTOMS
VOMITING: 0
DIARRHEA: 0
STRIDOR: 0
ABDOMINAL DISTENTION: 0
SHORTNESS OF BREATH: 0
NAUSEA: 0
WHEEZING: 0
RHINORRHEA: 0
CONSTIPATION: 0
COUGH: 0
CHEST TIGHTNESS: 0
COLOR CHANGE: 1

## 2022-08-25 NOTE — TELEPHONE ENCOUNTER
Received call from Blanca Quesada at [facility] with [employer]. Subjective: Caller states \"incision on right hip looks infected. Swollen and red and getting larger. \"     Has followed up surgeon and Infectious Disease  Denies discharge    Current Symptoms: see above    Onset: 1 month ago; worsening    Associated Symptoms: NA    Pain Severity: 6/10; dull; constant    Temperature: denies fever     What has been tried: Ibuprofen, antibiotics    LMP: NA Pregnant: NA    Recommended disposition: Go to Office Now    Care advice provided, patient verbalizes understanding; denies any other questions or concerns; instructed to call back for any new or worsening symptoms. Patient/Caller agrees with recommended disposition; writer provided warm transfer to Han Lawler at [facility] for appointment scheduling     Attention Provider: Thank you for allowing me to participate in the care of your patient. The patient was connected to triage in response to information provided to the ECC/PSC. Please do not respond through this encounter as the response is not directed to a shared pool.       Reason for Disposition   Incision looks infected (spreading redness, pain)    Protocols used: Post-Op Incision Symptoms and Questions-ADULT-[place]

## 2022-08-25 NOTE — PROGRESS NOTES
Visit Information    Have you changed or started any medications since your last visit including any over-the-counter medicines, vitamins, or herbal medicines? no   Have you stopped taking any of your medications? Is so, why? -  no  Are you having any side effects from any of your medications? - no    Have you seen any other physician or provider since your last visit?  no   Have you had any other diagnostic tests since your last visit?  no   Have you been seen in the emergency room and/or had an admission in a hospital since we last saw you?  no   Have you had your routine dental cleaning in the past 6 months?  no     Do you have an active MyChart account? If no, what is the barrier?   No: Declined    Patient Care Team:  Levander Lennox, MD as PCP - General (Emergency Medicine)  Nathen Castellano RN as Nurse Navigator (Oncology)  Jose Hickman MD as Consulting Physician (Infectious Diseases)  James Devries MD as Consulting Physician (Radiation Oncology)  Governor Rebeca DPM as Physician (Podiatry)  Trena Yoo MD as Consulting Physician (Hematology and Oncology)    Medical History Review  Past Medical, Family, and Social History reviewed and does not contribute to the patient presenting condition    Health Maintenance   Topic Date Due    Diabetic retinal exam  Never done    Diabetic foot exam  04/27/2019    DTaP/Tdap/Td vaccine (1 - Tdap) 06/17/2023 (Originally 5/23/1978)    Shingles vaccine (1 of 2) 06/17/2023 (Originally 2/8/2011)    COVID-19 Vaccine (3 - Rinku risk series) 05/28/2024 (Originally 1/25/2022)    Pneumococcal 0-64 years Vaccine (2 - PCV) 06/11/2024 (Originally 12/19/2019)    Flu vaccine (1) 09/01/2022    Lipids  04/12/2023    Diabetic microalbuminuria test  04/13/2023    Depression Screen  07/06/2023    Annual Wellness Visit (AWV)  07/07/2023    Prostate Specific Antigen (PSA) Screening or Monitoring  07/20/2023    A1C test (Diabetic or Prediabetic)  08/17/2023    Colorectal Cancer Screen  07/16/2025    Hepatitis C screen  Completed    HIV screen  Completed    Hepatitis A vaccine  Aged Out    Hib vaccine  Aged Out    Meningococcal (ACWY) vaccine  Aged Out

## 2022-08-25 NOTE — PROGRESS NOTES
Subjective: Olamide Paez is a 60 y.o. male with  has a past medical history of Arthritis, DM (diabetes mellitus) ([address] GERD (gastroesophageal reflux disease), Heart murmur, HTN (hypertension), Hyperlipidemia, Sleep apnea, Vision abnormalities, and Wears glasses. Presented to the office today for:  Chief Complaint   Patient presents with    Wound Check     Complaint of wound infection of right hip - patient had hip replacement surgery twice - patient concerned for UTI due to hip infection causing a strong odor in urine. 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 The patient endorsed new onset bubbling and pain at the incision site, present for the past 1 month. He mentioned he has experienced similar symptoms in the past, which always progressed to prosthetic hip infection. He also mentioned black discoloration around the incision site for the past 1 year. The patient has been following with infectious disease and was prescribed Doxycycline. He saw an orthopedic surgeon last week who advised hip replacement, amputation, or continuation of antibiotics. The patient elected to continue antibiotics, however the bubbling at the incision site progressed, prompting the patient to make an appointment at the [facility] clinic. The patient also endorsed dysuria and foul smelling urine. He denies headache, dizziness, syncope, chest pain, shortness of breath, abdominal pain, nausea, vomiting, constipation and diarrhea. On physical examination, the patient had tachycardia () and low grade fever (100.1). Review of Systems   Constitutional:  Negative for chills, fatigue and fever. HENT:  Negative for congestion and rhinorrhea. Respiratory:  Negative for cough, chest tightness, shortness of breath, wheezing and stridor.     Cardiovascular: Negative for chest pain, palpitations and leg swelling. Gastrointestinal:  Negative for abdominal distention, constipation, diarrhea, nausea and vomiting. Endocrine: Negative for cold intolerance and heat intolerance. Genitourinary:  Positive for dysuria and frequency. Negative for urgency. Skin:  Positive for color change. Negative for pallor and rash. Black discoloration on right hip around incision site (1 year); new onset bubbling at incision site for past 1 month    Neurological:  Negative for dizziness, syncope, weakness, light-headedness and headaches. Psychiatric/Behavioral:  Negative for agitation and confusion. The patient has a   Family History   Problem Relation Age of Onset    Diabetes Sister     Diabetes Brother     Cancer Brother        Objective:    /87 (Site: Right Upper Arm, Position: Sitting, Cuff Size: Large Adult) Comment: m  Pulse (!) 117   Temp 100.1 °F (37.8 °C) (Oral)   Ht 6' 2.02\" (1.88 m)   Wt 247 lb 3.2 oz (112.1 kg)   BMI 31.72 kg/m²    BP Readings from Last 3 Encounters:   08/25/22 116/87   08/17/22 130/78   08/15/22 (!) 131/95       Physical Exam  Constitutional:       General: He is not in acute distress. Appearance: Normal appearance. HENT:      Head: Normocephalic and atraumatic. Nose: No congestion or rhinorrhea. Eyes:      Extraocular Movements: Extraocular movements intact. Conjunctiva/sclera: Conjunctivae normal.      Pupils: Pupils are equal, round, and reactive to light. Cardiovascular:      Rate and Rhythm: Normal rate and regular rhythm. Pulses: Normal pulses. Heart sounds: Normal heart sounds. No murmur heard. No friction rub. No gallop. Pulmonary:      Effort: Pulmonary effort is normal. No respiratory distress. Breath sounds: Normal breath sounds. No wheezing or rales. Abdominal:      General: Abdomen is flat. Bowel sounds are normal. There is no distension. Tenderness:  There is no abdominal tenderness. There is no guarding. Musculoskeletal:      Right lower leg: No edema. Left lower leg: No edema. Skin:     Capillary Refill: Capillary refill takes less than 2 seconds. Coloration: Skin is not jaundiced or pale. Comments: Black discoloration on right hip; new onset bubbling at incision site   Neurological:      General: No focal deficit present. Mental Status: He is alert and oriented to person, place, and time. Sensory: No sensory deficit. Motor: No weakness. Psychiatric:         Mood and Affect: Mood normal.         Assessment and Plan:    There are no diagnoses linked to this encounter. Incision site infection (right hip)  -Low grade fever 100.1 and tachycardia 117  -Infectious disease appointment September 8  -patient saw orthopedic surgeon last week; recommended to continue antibiotics or amputate or replace hip  -continue doxycycline   -patient advised to go to ED in the morning      Hypertension  -BP well controlled (116/87)  -continue metoprolol 25 mg po daily  -continue to monitor; follow up in 4 weeks       DM 2  -Hgb A1C on 8/17/22: 8.1%  -continue Jardiance 25 mg po daily   -continue metformin 500 mg BID po  -continue insulin lispro       Requested Prescriptions      No prescriptions requested or ordered in this encounter       There are no discontinued medications. All patient questions answered. Pt voiced understanding. No follow-ups on file.

## 2022-08-25 NOTE — PATIENT INSTRUCTIONS
Thank you for letting us take care of you today. We hope all your questions were addressed. If a question was overlooked or something else comes to mind after you return home, please contact a member of your Care Team listed below. Your Care Team at [place] [id number] is Team #2  Verónica Mercer DO (Faculty)  Johanny Wood (Faculty)  Tomas Perez MD (Resident)  Mary Kay Escobar MD (Resident)  Genny Lopes MD (Resident)  Alejandra More MD (Resident)  Ryder Fong., YESENIA Blankenship.,  AYLEEN Laws., LPN Lovenia Brunner., [facility] office)  Roderick Castaneda, [address] (Clinical Practice Manager)  Shayna Velazquez [facility] (Clinical Pharmacist)     Office phone number: [phone]    If you need to get in right away due to illness, please be advised we have \"Same Day\" appointments available Monday-Friday. Please call us at [phone] option #3 to schedule your \"Same Day\" appointment.

## 2022-08-25 NOTE — PROGRESS NOTES
Attending Physician Statement  I have seen and discussed the care of Gokul Vega 60 y.o. male, including pertinent history and exam findings, with the resident Dr. Natalie Garrett MD.    History and Exam:   Chief Complaint   Patient presents with    Wound Check     Complaint of wound infection of right hip - patient had hip replacement surgery twice - patient concerned for UTI due to hip infection causing a strong odor in urine. Past Medical History:   Diagnosis Date    Arthritis     DM (diabetes mellitus) ([address] 2009    IDDM    GERD (gastroesophageal reflux disease) 2017    ON RX    Heart murmur 2013    ASYMPTOMATIC    HTN (hypertension) 6/29/2012    ON RX    Hyperlipidemia 06/29/2012    ON RX    Sleep apnea 2016    TRIED MACHINE COULD NOT TOLERATE    Vision abnormalities 2019    FLOATERS RIGHT EYE    Wears glasses      Allergies   Allergen Reactions    Lisinopril Swelling     Outer Neck swelling    Melatonin Swelling    Trazodone Swelling     Chest swells      I have seen and examined the patient and the key elements of the encounter have been performed by me.   BP Readings from Last 3 Encounters:   08/26/22 129/82   08/25/22 116/87   08/17/22 130/78     /87 (Site: Right Upper Arm, Position: Sitting, Cuff Size: Large Adult) Comment: m  Pulse (!) 117   Temp 100.1 °F (37.8 °C) (Oral)   Ht 6' 2.02\" (1.88 m)   Wt 247 lb 3.2 oz (112.1 kg)   BMI 31.72 kg/m²   Lab Results   Component Value Date    WBC 6.3 08/26/2022    HGB 10.8 (L) 08/26/2022    HCT 33.7 (L) 08/26/2022     08/26/2022    CHOL 123 04/12/2022    TRIG 111 04/12/2022    HDL 49 04/12/2022    ALT 18 06/29/2022    AST 17 06/29/2022     08/26/2022    K 4.6 08/26/2022     08/26/2022    CREATININE 0.82 08/26/2022    BUN 16 08/26/2022    CO2 26 08/26/2022    PSA 0.89 07/20/2022    INR 1.0 08/15/2013    LABA1C 8.1 08/17/2022    LABMICR Can not be calculated 04/13/2022     Lab Results   Component Value Date    LABPROT 7.6 07/03/2012    LABALBU 4.3 06/29/2022     Lab Results   Component Value Date    IRON 51 (L) 05/18/2022    TIBC 247 (L) 05/18/2022    FERRITIN 169 05/18/2022     Lab Results   Component Value Date    LDLCHOLESTEROL 52 04/12/2022     I agree with the assessment, plan and the diagnosis of    Diagnosis Orders   1. Chronic infection of right hip on antibiotics ([facility] [address]         . I agree with orders as documented by the resident. More than 25 minutes spent  in face to face encounter with the patient and more than half in counseling. Patient's questions were answered. Patient Voiced understanding to the counseling. Return in about 4 weeks (around 9/22/2022).    (GC Modifier)-Dr. Blanche Rain MD

## 2022-08-26 ENCOUNTER — HOSPITAL ENCOUNTER (EMERGENCY)
Age: 62
Discharge: HOME | End: 2022-08-26
Attending: EMERGENCY MEDICINE
Payer: MEDICARE

## 2022-08-26 ENCOUNTER — APPOINTMENT (OUTPATIENT)
Dept: GENERAL RADIOLOGY | Age: 62
End: 2022-08-26
Payer: MEDICARE

## 2022-08-26 VITALS
TEMPERATURE: 98.4 F | HEART RATE: 91 BPM | OXYGEN SATURATION: 99 % | RESPIRATION RATE: 18 BRPM | DIASTOLIC BLOOD PRESSURE: 82 MMHG | SYSTOLIC BLOOD PRESSURE: 129 MMHG

## 2022-08-26 DIAGNOSIS — M25.559 HIP PAIN: Primary | ICD-10-CM

## 2022-08-26 LAB
ABSOLUTE EOS #: 0.13 K/UL (ref 0–0.44)
ABSOLUTE IMMATURE GRANULOCYTE: <0.03 K/UL (ref 0–0.3)
ABSOLUTE LYMPH #: 0.9 K/UL (ref 1.1–3.7)
ABSOLUTE MONO #: 0.56 K/UL (ref 0.1–1.2)
ANION GAP SERPL CALCULATED.3IONS-SCNC: 11 MMOL/L (ref 9–17)
BASOPHILS # BLD: 1 % (ref 0–2)
BASOPHILS ABSOLUTE: 0.04 K/UL (ref 0–0.2)
BILIRUBIN URINE: NEGATIVE
BUN BLDV-MCNC: 16 MG/DL (ref 8–23)
C-REACTIVE PROTEIN: 34.7 MG/L (ref 0–5)
CALCIUM SERPL-MCNC: 8.9 MG/DL (ref 8.6–10.4)
CHLORIDE BLD-SCNC: 100 MMOL/L (ref 98–107)
CO2: 26 MMOL/L (ref 20–31)
COLOR: YELLOW
COMMENT UA: ABNORMAL
CREAT SERPL-MCNC: 0.82 MG/DL (ref 0.7–1.2)
EOSINOPHILS RELATIVE PERCENT: 2 % (ref 1–4)
GFR AFRICAN AMERICAN: >60 ML/MIN
GFR NON-AFRICAN AMERICAN: >60 ML/MIN
GFR SERPL CREATININE-BSD FRML MDRD: ABNORMAL ML/MIN/{1.73_M2}
GLUCOSE BLD-MCNC: 203 MG/DL (ref 70–99)
GLUCOSE URINE: ABNORMAL
HCT VFR BLD CALC: 33.7 % (ref 40.7–50.3)
HEMOGLOBIN: 10.8 G/DL (ref 13–17)
IMMATURE GRANULOCYTES: 0 %
KETONES, URINE: NEGATIVE
LACTIC ACID, WHOLE BLOOD: 2 MMOL/L (ref 0.7–2.1)
LEUKOCYTE ESTERASE, URINE: NEGATIVE
LYMPHOCYTES # BLD: 14 % (ref 24–43)
MCH RBC QN AUTO: 26.6 PG (ref 25.2–33.5)
MCHC RBC AUTO-ENTMCNC: 32 G/DL (ref 28.4–34.8)
MCV RBC AUTO: 83 FL (ref 82.6–102.9)
MONOCYTES # BLD: 9 % (ref 3–12)
NITRITE, URINE: NEGATIVE
NRBC AUTOMATED: 0 PER 100 WBC
PDW BLD-RTO: 16.3 % (ref 11.8–14.4)
PH UA: 6 (ref 5–8)
PLATELET # BLD: 321 K/UL (ref 138–453)
PMV BLD AUTO: 9 FL (ref 8.1–13.5)
POTASSIUM SERPL-SCNC: 4.6 MMOL/L (ref 3.7–5.3)
PROTEIN UA: NEGATIVE
RBC # BLD: 4.06 M/UL (ref 4.21–5.77)
RBC # BLD: ABNORMAL 10*6/UL
SEDIMENTATION RATE, ERYTHROCYTE: 104 MM/HR (ref 0–20)
SEG NEUTROPHILS: 74 % (ref 36–65)
SEGMENTED NEUTROPHILS ABSOLUTE COUNT: 4.6 K/UL (ref 1.5–8.1)
SODIUM BLD-SCNC: 137 MMOL/L (ref 135–144)
SPECIFIC GRAVITY UA: 1.02 (ref 1–1.03)
TURBIDITY: CLEAR
URINE HGB: NEGATIVE
UROBILINOGEN, URINE: NORMAL
WBC # BLD: 6.3 K/UL (ref 3.5–11.3)

## 2022-08-26 PROCEDURE — 83605 ASSAY OF LACTIC ACID: CPT

## 2022-08-26 PROCEDURE — 85025 COMPLETE CBC W/AUTO DIFF WBC: CPT

## 2022-08-26 PROCEDURE — 81003 URINALYSIS AUTO W/O SCOPE: CPT

## 2022-08-26 PROCEDURE — 99284 EMERGENCY DEPT VISIT MOD MDM: CPT

## 2022-08-26 PROCEDURE — 73502 X-RAY EXAM HIP UNI 2-3 VIEWS: CPT

## 2022-08-26 PROCEDURE — 6370000000 HC RX 637 (ALT 250 FOR IP)

## 2022-08-26 PROCEDURE — 80048 BASIC METABOLIC PNL TOTAL CA: CPT

## 2022-08-26 PROCEDURE — 86140 C-REACTIVE PROTEIN: CPT

## 2022-08-26 PROCEDURE — 2580000003 HC RX 258

## 2022-08-26 PROCEDURE — 85652 RBC SED RATE AUTOMATED: CPT

## 2022-08-26 RX ORDER — ACETAMINOPHEN 500 MG
1000 TABLET ORAL ONCE
Status: COMPLETED | OUTPATIENT
Start: 2022-08-26 | End: 2022-08-26

## 2022-08-26 RX ORDER — 0.9 % SODIUM CHLORIDE 0.9 %
1000 INTRAVENOUS SOLUTION INTRAVENOUS ONCE
Status: COMPLETED | OUTPATIENT
Start: 2022-08-26 | End: 2022-08-26

## 2022-08-26 RX ADMIN — SODIUM CHLORIDE 1000 ML: 9 INJECTION, SOLUTION INTRAVENOUS at 10:47

## 2022-08-26 RX ADMIN — ACETAMINOPHEN 1000 MG: 500 TABLET ORAL at 10:11

## 2022-08-26 ASSESSMENT — ENCOUNTER SYMPTOMS
RHINORRHEA: 0
WHEEZING: 0
BACK PAIN: 0
SHORTNESS OF BREATH: 0
VOMITING: 0
ABDOMINAL PAIN: 0

## 2022-08-26 ASSESSMENT — PAIN DESCRIPTION - LOCATION
LOCATION: LEG
LOCATION: HIP

## 2022-08-26 ASSESSMENT — PAIN SCALES - GENERAL
PAINLEVEL_OUTOF10: 7
PAINLEVEL_OUTOF10: 6

## 2022-08-26 ASSESSMENT — PAIN DESCRIPTION - ORIENTATION: ORIENTATION: RIGHT

## 2022-08-26 NOTE — ED PROVIDER NOTES
[facility]  Emergency Department  Faculty Attestation     I performed a history and physical examination of the patient and discussed management with the resident. I reviewed the residents note and agree with the documented findings and plan of care. Any areas of disagreement are noted on the chart. I was personally present for the key portions of any procedures. I have documented in the chart those procedures where I was not present during the key portions. I have reviewed the emergency nurses triage note. I agree with the chief complaint, past medical history, past surgical history, allergies, medications, social and family history as documented unless otherwise noted below. For Physician Assistant/ Nurse Practitioner cases/documentation I have personally evaluated this patient and have completed at least one if not all key elements of the E/M (history, physical exam, and MDM). Additional findings are as noted. Primary Care Physician:  Theola Fothergill, MD    Screenings:  [unfilled]    CHIEF COMPLAINT       Chief Complaint   Patient presents with    Post-op Problem       RECENT VITALS:   Temp: 98.4 °F (36.9 °C),  Heart Rate: 91, Resp: 18, BP: 129/82    LABS:  Labs Reviewed   CBC WITH AUTO DIFFERENTIAL - Abnormal; Notable for the following components:       Result Value    RBC 4.06 (*)     Hemoglobin 10.8 (*)     Hematocrit 33.7 (*)     RDW 16.3 (*)     Seg Neutrophils 74 (*)     Lymphocytes 14 (*)     Absolute Lymph # 0.90 (*)     All other components within normal limits   BASIC METABOLIC PANEL W/ REFLEX TO MG FOR LOW K   C-REACTIVE PROTEIN   SEDIMENTATION RATE   URINALYSIS WITH REFLEX TO CULTURE       Radiology  XR HIP RIGHT (2-3 VIEWS)    (Results Pending)       CRITICAL CARE: There was a high probability of clinically significant/life threatening deterioration in this patient's condition which required my urgent intervention.   Total critical care time was none minutes. This excludes any time for separately reportable procedures. EKG:      Attending Physician Additional  Notes    Patient's had a right hip replacement years ago was complicated by recurrent osteomyelitis. He has had IV antibiotics on 2 occasions. He has been on doxycycline for quite some time. He has a large area of redness and swelling over the lateral aspect of the hip with a keloid that is getting more swollen. He has had odor to his urine but urine testing has been negative. No dysuria frequency hematuria. No recent fever chills or sweats. He does have some fatigue. On exam he is nontoxic afebrile vital signs are normal.  Right hip has full range of movement. There is a large area of hyperpigmentation and slight erythema with minimal warmth but no tenderness over the greater trochanter and proximal lateral thigh. He has a a large keloid over the incision which she states is getting bigger than before. There may be fluctuance. Impression is chronic osteomyelitis, possible subcutaneous abscess, rule out UTI. Plan is bedside ultrasound over the incision, laboratory studies, imaging, urinalysis, consultation with orthopedics, considerable needle aspiration of cutaneous abscess if present on ultrasound, consider IV antibiotics, consider ID consultation. Polly Winkler.  Sally Alva MD, [facility]  Attending Emergency  Physician                Al Cortes MD  08/26/22 [id number]

## 2022-08-26 NOTE — ED NOTES
Pt. Complains of Right thigh tenderness, site of hip replacement a year ago. Pt. States that he felt theres a feeling of pus inside his thigh. He had fever yesterday.  Current temperature in 99.1      [facility]  08/26/22 [id number]

## 2022-08-26 NOTE — ED NOTES
The following labs were labeled with appropriate pt sticker and tubed to lab:     [] Blue     [x] Lavender   [] on ice  [x] Green/yellow  [] Green/black [] on ice  [] Lily Pester  [] on ice  [] Yellow  [] Red  [] Pink  [] ABG  [] VBG    [] COVID-19 swab    [] Rapid  [] PCR  [] Flu swab  [] Peds Viral Panel     [] Urine Sample  [] Pelvic Cultures  [] Blood Cultures   [] STREP Cultures       Ricke Galeazzi, RN  08/26/22 [id number]

## 2022-08-26 NOTE — DISCHARGE INSTRUCTIONS
Thank you for visiting [facility] Emergency Department. You need to call Dot Tran MD to make an appointment as directed for follow up. Should you have any questions regarding your care or further treatment, please call [facility] Emergency Department at [phone]. Return to emergency department for any new or worrisome symptoms including any fever, vomiting, chest pain, shortness of breath. Continue antibiotics as directed.

## 2022-08-26 NOTE — ED PROVIDER NOTES
Single     Spouse name: Not on file    Number of children: Not on file    Years of education: Not on file    Highest education level: Not on file   Occupational History    Not on file   Tobacco Use    Smoking status: Former     Packs/day: 0.00     Years: 15.00     Pack years: 0.00     Types: Cigarettes     Quit date: 3/27/2016     Years since quittin.4    Smokeless tobacco: Never   Vaping Use    Vaping Use: Never used   Substance and Sexual Activity    Alcohol use: Yes     Comment: etoh abuse. BEER, WINE  12 A WEEK rarley    Drug use: No    Sexual activity: Yes   Other Topics Concern    Not on file   Social History Narrative    Not on file     Social Determinants of Health     Financial Resource Strain: Low Risk     Difficulty of Paying Living Expenses: Not hard at all   Food Insecurity: No Food Insecurity    Worried About Running Out of Food in the Last Year: Never true    Ran Out of Food in the Last Year: Never true   Transportation Needs: Not on file   Physical Activity: Inactive    Days of Exercise per Week: 0 days    Minutes of Exercise per Session: 0 min   Stress: Not on file   Social Connections: Not on file   Intimate Partner Violence: Not on file   Housing Stability: Not on file       Family History   Problem Relation Age of Onset    Diabetes Sister     Diabetes Brother     Cancer Brother        Allergies:  Lisinopril, Melatonin, and Trazodone    Home Medications:  Prior to Admission medications    Medication Sig Start Date End Date Taking? Authorizing Provider   atorvastatin (LIPITOR) 40 MG tablet Take 1 tablet by mouth daily 22   Wade Restrepo MD   ibuprofen (ADVIL;MOTRIN) 400 MG tablet Take 1 tablet by mouth every 6 hours as needed for Pain 22   Kinjal Haddad MD   terbinafine (LAMISIL) 1 % cream Apply topically 2 times daily.  22   Bernardo Sosa MD   metoprolol tartrate (LOPRESSOR) 25 MG tablet TAKE ONE-HALF TABLET BY MOUTH TWO TIMES A DAY 22   Lenora Weaver MD JARDIANCE 25 MG tablet TAKE 1 TABLET BY MOUTH ONCE DAILY. 8/12/22   Reji Marie MD   doxycycline hyclate (VIBRA-TABS) 100 MG tablet  7/12/22   Historical Provider, MD   venlafaxine (EFFEXOR XR) 37.5 MG extended release capsule TAKE 1 CAPSULE BY MOUTH ONE TIME A DAY 7/15/22   Maria Luisa Simms MD   metFORMIN (GLUCOPHAGE) 500 MG tablet TAKE TWO TABLETS BY MOUTH TWICE A DAY WITH MEALS 7/12/22   Bernardo Rodriguez MD   pregabalin (LYRICA) 100 MG capsule Take 1 capsule by mouth 2 times daily for 30 days. 6/17/22 7/17/22  Bernardo Rodriguez MD   tamsulosin (FLOMAX) 0.4 mg capsule Take 1 capsule by mouth daily 5/24/22   Morgan Nicolas MD   TRULICITY 3 AF/2.9VC SOPN INJECT THE CONTENTS OF ONE SYRINGE (3MG) UNDER THE SKIN ONCE WEEKLY 5/17/22   Haylee Galicia MD   LANTUS SOLOSTAR 100 UNIT/ML injection pen INJECT 42 UNITS INTO THE SKIN TWO TIMES A DAY 5/17/22   Maria Guadalupe Lomax MD   Continuous Blood Gluc Transmit (DEXCOM G6 TRANSMITTER) MISC Use in combination with new sensor every 10 days.  Transmitter lasts 3 months 4/14/22   Nick Seo, DO   Continuous Blood Gluc Sensor (DEXCOM G6 SENSOR) MISC Apply new sensor to abdomen every 10 days 4/14/22   Nick Seo, DO   Continuous Blood Gluc  (DEXCOM G6 ) MAMADOU Use as directed to monitor glucose continuously 4/14/22   Nick Seo, DO   ferrous sulfate (IRON 325) 325 (65 Fe) MG tablet Take 325 mg by mouth daily (with breakfast)     Historical Provider, MD   zinc 50 MG CAPS Take 1 capsule by mouth daily    Historical Provider, MD   aspirin EC 81 MG EC tablet Take 1 tablet by mouth daily  Patient not taking: No sig reported 4/12/22   Manny Moseley MD   Insulin Pen Needle (B-D ULTRAFINE III SHORT PEN) 31G X 8 MM MISC 1 each by Does not apply route daily 4/12/22   Manny Moseley MD   vitamin D3 (CHOLECALCIFEROL) 25 MCG (1000 UT) TABS tablet Take 1 tablet by mouth daily 4/12/22   Bernardo Rodriguez MD   insulin lispro, 1 Unit Dial, (HUMALOG KWIKPEN) 100 UNIT/ML SOPN Inject 6 Units into the skin 3 times daily (before meals) 4/12/22   Bernardo Billy MD       REVIEW OF SYSTEMS    (2-9 systems for level 4, 10 or more for level 5)      Review of Systems   Constitutional:  Negative for chills and fever. HENT:  Negative for congestion and rhinorrhea. Respiratory:  Negative for shortness of breath and wheezing. Cardiovascular:  Negative for chest pain and palpitations. Gastrointestinal:  Negative for abdominal pain and vomiting. Genitourinary:  Positive for dysuria. Musculoskeletal:  Negative for back pain and neck pain. Skin:  Positive for wound. Neurological:  Negative for dizziness and headaches. PHYSICAL EXAM   (up to 7 for level 4, 8 or more for level 5)      INITIAL VITALS:   /82   Pulse 91   Temp 98.4 °F (36.9 °C) (Oral)   Resp 18   SpO2 99%     Physical Exam  Vitals and nursing note reviewed. Constitutional:       General: He is not in acute distress. HENT:      Head: Atraumatic. Right Ear: External ear normal.      Left Ear: External ear normal.      Nose: Nose normal.      Mouth/Throat:      Mouth: Mucous membranes are moist.      Pharynx: Oropharynx is clear. Cardiovascular:      Rate and Rhythm: Normal rate and regular rhythm. Pulses: Normal pulses. Pulmonary:      Effort: Pulmonary effort is normal.      Breath sounds: Normal breath sounds. Abdominal:      Palpations: Abdomen is soft. Tenderness: There is no abdominal tenderness. Musculoskeletal:         General: Normal range of motion. Cervical back: Normal range of motion. Comments: Minimal R hip tenderness   Skin:     General: Skin is warm and dry. Capillary Refill: Capillary refill takes less than 2 seconds.       Comments: Hyperpigmentation to lateral aspect of anterolateral R thigh inferior to R hip with what appears to be keloid, no fluctuance or drainage, minimal tenderness without surrounding erythema Neurological:      General: No focal deficit present. Mental Status: He is alert and oriented to person, place, and time.        DIFFERENTIAL  DIAGNOSIS     PLAN (LABS / IMAGING / EKG):  Orders Placed This Encounter   Procedures    XR HIP RIGHT (2-3 VIEWS)    CBC with Auto Differential    Basic Metabolic Panel w/ Reflex to MG    C-Reactive Protein    Sedimentation Rate    Urinalysis with Reflex to Culture    Lactic Acid       MEDICATIONS ORDERED:  Orders Placed This Encounter   Medications    acetaminophen (TYLENOL) tablet 1,000 mg    0.9 % sodium chloride bolus       DDX: cellulitis, osteo, gangrene    DIAGNOSTIC RESULTS / EMERGENCY DEPARTMENT COURSE / MDM   LAB RESULTS:  Results for orders placed or performed during the hospital encounter of 08/26/22   CBC with Auto Differential   Result Value Ref Range    WBC 6.3 3.5 - 11.3 k/uL    RBC 4.06 (L) 4.21 - 5.77 m/uL    Hemoglobin 10.8 (L) 13.0 - 17.0 g/dL    Hematocrit 33.7 (L) 40.7 - 50.3 %    MCV 83.0 82.6 - 102.9 fL    MCH 26.6 25.2 - 33.5 pg    MCHC 32.0 28.4 - 34.8 g/dL    RDW 16.3 (H) 11.8 - 14.4 %    Platelets 507 601 - 962 k/uL    MPV 9.0 8.1 - 13.5 fL    NRBC Automated 0.0 0.0 per 100 WBC    Seg Neutrophils 74 (H) 36 - 65 %    Lymphocytes 14 (L) 24 - 43 %    Monocytes 9 3 - 12 %    Eosinophils % 2 1 - 4 %    Basophils 1 0 - 2 %    Immature Granulocytes 0 0 %    Segs Absolute 4.60 1.50 - 8.10 k/uL    Absolute Lymph # 0.90 (L) 1.10 - 3.70 k/uL    Absolute Mono # 0.56 0.10 - 1.20 k/uL    Absolute Eos # 0.13 0.00 - 0.44 k/uL    Basophils Absolute 0.04 0.00 - 0.20 k/uL    Absolute Immature Granulocyte <0.03 0.00 - 0.30 k/uL    RBC Morphology ANISOCYTOSIS PRESENT    Basic Metabolic Panel w/ Reflex to MG   Result Value Ref Range    Glucose 203 (H) 70 - 99 mg/dL    BUN 16 8 - 23 mg/dL    Creatinine 0.82 0.70 - 1.20 mg/dL    Calcium 8.9 8.6 - 10.4 mg/dL    Sodium 137 135 - 144 mmol/L    Potassium 4.6 3.7 - 5.3 mmol/L    Chloride 100 98 - 107 mmol/L    CO2 26 20 - 31 mmol/L    Anion Gap 11 9 - 17 mmol/L    GFR Non-African American >60 >60 mL/min    GFR African American >60 >60 mL/min    GFR Comment         C-Reactive Protein   Result Value Ref Range    CRP 34.7 (H) 0.0 - 5.0 mg/L   Sedimentation Rate   Result Value Ref Range    Sed Rate 104 (H) 0 - 20 mm/Hr   Urinalysis with Reflex to Culture    Specimen: Urine   Result Value Ref Range    Color, UA Yellow Yellow    Turbidity UA Clear Clear    Glucose, Ur 3+ (A) NEGATIVE    Bilirubin Urine NEGATIVE NEGATIVE    Ketones, Urine NEGATIVE NEGATIVE    Specific Gravity, UA 1.019 1.005 - 1.030    Urine Hgb NEGATIVE NEGATIVE    pH, UA 6.0 5.0 - 8.0    Protein, UA NEGATIVE NEGATIVE    Urobilinogen, Urine Normal Normal    Nitrite, Urine NEGATIVE NEGATIVE    Leukocyte Esterase, Urine NEGATIVE NEGATIVE    Urinalysis Comments       Microscopic exam not performed based on chemical results unless requested in original order. Lactic Acid   Result Value Ref Range    Lactic Acid, Whole Blood 2.0 0.7 - 2.1 mmol/L       IMPRESSION: R hip pain    RADIOLOGY:  XR HIP RIGHT (2-3 VIEWS)   Final Result   No soft tissue gas or evidence hardware failure status post long-stem   revision right hip replacement, as above. EMERGENCY DEPARTMENT COURSE:  62yoM presented to ED, hx chronic complicated post op course since R hip replacement 2019 and chronically on doxycycline and follows with ortho s/p multiple revisions and infectious disease. Was noted to have \"bubbling\" \"black\" wound by pcp yesteray at office visit. No fever or vomiting. On exam, afebrile, nontachycardic, hyperpigmentation to R proximal lateral thigh inferior to hip with keloid and no drainage, minimal tenderness R hip. XR without evidence of osteo and no subcutaneous air noted. No drainable fluid collection over wound via bedside US. ESR and CRP elevated, but no leukocytosis or elevated lactate and pt already on antibiotics.  UA checked due to pt noted foul smelling urine that was negative. Instructed f/u with ortho, ID, and pcp. Instructed return for fever, vomiting, worsening pain. Pt agreeable to plan. ED Course as of 08/26/22 2315   Fri Aug 26, 2022   1017 Sed Rate(!): 104 [AR]   1017 WBC: 6.3 [AR]   1026 Added on lactate, ESR elevated. No WBC and afebrile [AR]   1027 LRINEC score low, less likely gangrene [AR]   1103 Lactic Acid, Whole Blood: 2.0 [AR]   1103 Bedside ultrasound performed without any evidence of drainable abscess or fluid collection. Did note some cobblestoning. [AR]   1122 X-ray without evidence of soft tissue gas [AR]   1221 CRP(!): 34.7 [AR]   1253 Nitrite, Urine: NEGATIVE [AR]   1253 Leukocyte Esterase, Urine: NEGATIVE [AR]      ED Course User Index  [AR] Joo De León MD       No notes of EC Admission Criteria type on file. PROCEDURES:  None    CONSULTS:  None          FINAL IMPRESSION      1. Hip pain          DISPOSITION / PLAN     DISPOSITION Decision To Discharge 08/26/2022 12:53:01 PM      PATIENT REFERRED TO:  Joie Cisneros MD  [address]   [address]  [address]  [phone]    In 2 days      [facility]  [address] [address]  [phone]    As needed    DISCHARGE MEDICATIONS:  Discharge Medication List as of 8/26/2022 12:53 PM          Aramis Landin MD  Emergency Medicine Resident    (Please note that portions of thisnote were completed with a voice recognition program.  Efforts were made to edit the dictations but occasionally words are mis-transcribed.)       Joo De León MD  Resident  08/26/22 3009

## 2022-08-26 NOTE — ED NOTES
Pt complains of incisional pain to the right hip. Pt states surgery was 2 years ago to the right hip.      Anayeli Pickering  08/26/22 9713

## 2022-08-26 NOTE — ED NOTES
The following labs were labeled with appropriate pt sticker and tubed to lab:     [] Blue     [] Lavender   [] on ice  [] Green/yellow  [x] Green/black [] on ice  [] Roberta Sniff  [] on ice  [] Yellow  [] Red  [] Pink  [] ABG  [] VBG    [] COVID-19 swab    [] Rapid  [] PCR  [] Flu swab  [] Peds Viral Panel     [] Urine Sample  [] Pelvic Cultures  [] Blood Cultures   [] STREP Cultures       Binu Hubbard RN  08/26/22 1038

## 2022-08-26 NOTE — ED NOTES
Pt. Is comfortably sitting in bed and has no complaints as of this time.       Dereje Rivas RN  08/26/22 5917

## 2022-08-30 ENCOUNTER — OFFICE VISIT (OUTPATIENT)
Dept: DERMATOLOGY | Age: 62
End: 2022-08-30
Payer: MEDICARE

## 2022-08-30 VITALS
HEART RATE: 113 BPM | SYSTOLIC BLOOD PRESSURE: 118 MMHG | TEMPERATURE: 97.2 F | HEIGHT: 74 IN | DIASTOLIC BLOOD PRESSURE: 76 MMHG | WEIGHT: 244 LBS | BODY MASS INDEX: 31.32 KG/M2 | OXYGEN SATURATION: 95 %

## 2022-08-30 DIAGNOSIS — B36.0 TINEA VERSICOLOR: ICD-10-CM

## 2022-08-30 DIAGNOSIS — L21.9 SEBORRHEIC DERMATITIS: Primary | ICD-10-CM

## 2022-08-30 PROCEDURE — 3017F COLORECTAL CA SCREEN DOC REV: CPT | Performed by: DERMATOLOGY

## 2022-08-30 PROCEDURE — G8417 CALC BMI ABV UP PARAM F/U: HCPCS | Performed by: DERMATOLOGY

## 2022-08-30 PROCEDURE — G8427 DOCREV CUR MEDS BY ELIG CLIN: HCPCS | Performed by: DERMATOLOGY

## 2022-08-30 PROCEDURE — 99204 OFFICE O/P NEW MOD 45 MIN: CPT | Performed by: DERMATOLOGY

## 2022-08-30 PROCEDURE — 1036F TOBACCO NON-USER: CPT | Performed by: DERMATOLOGY

## 2022-08-30 RX ORDER — KETOCONAZOLE 20 MG/G
CREAM TOPICAL
Qty: 30 G | Refills: 2 | Status: SHIPPED | OUTPATIENT
Start: 2022-08-30

## 2022-08-30 RX ORDER — KETOCONAZOLE 20 MG/ML
SHAMPOO TOPICAL
Qty: 120 ML | Refills: 3 | Status: SHIPPED | OUTPATIENT
Start: 2022-08-30

## 2022-08-30 NOTE — PROGRESS NOTES
Dermatology Patient Note  [facility]  [address]  [address]  Dept: [phone]  Dept Fax: [phone]      VISITDATE: 8/30/2022   REFERRING PROVIDER: Dariela Abrams MD      Syd Godfrey is a 60 y.o. male  who presents today in the office for:    New Patient (Pt states he has small bumps on his scalp that he would like looked at. Pt also states he has many moles on his upper back he would like removed. Family hx of skin cancer ) and Mole      HISTORY OF PRESENT ILLNESS:  As above    MEDICAL PROBLEMS:  Patient Active Problem List    Diagnosis Date Noted    Benign essential HTN 07/06/2022     Priority: Medium    Diabetes mellitus ([facility] 07/06/2022     Priority: Medium    Prostate CA ([facility] 03/28/2022    Congestive heart failure ([facility] 03/28/2022    Essential hypertension 03/28/2022    Vitreous opacities of right eye 03/28/2019    Dislocated IOL (intraocular lens), anterior, right 03/28/2019    Tendonitis 09/25/2017     Overview:   Right forearm       Dislocated intraocular lens 04/21/2017    Primary osteoarthritis of right hip 03/01/2017    Abnormal ambulatory electrocardiography 11/21/2016    Disturbance in sleep behavior 11/21/2016    Dizziness 11/21/2016    Abnormal ambulatory electrocardiogram 09/02/2016    Abnormal kidney function 09/02/2016    Adiposity 09/02/2016    Atherosclerotic heart disease of native coronary artery without angina pectoris 09/02/2016    Cervical radiculopathy 09/02/2016    Chronic back pain 09/02/2016    Decreased cardiac output 09/02/2016    Diabetic peripheral neuropathy ([facility] 09/02/2016    Dyssomnia 09/02/2016    Impotence of organic origin 09/02/2016     Overview:   +++++++++  11/22/2016 ALLSCRIPTS SUMMARY +++++++++++   First evaluated in October 2016. Four year history of erectile dysfunction. Has diabetes and hypertension. Denies nitrate use. Augustus questionnaire 10/10.   Buck score 6 equals severe erectile dysfunction.  ========= 11/22/2016  =========== total testosterone normal.  Failed Cialis 20 milligrams. Free slightly low.  =====11/15/17====Patient will start 20/30/1 TriMix therapy-he was instructed how to titrate and will call with any concerns/questions    Last Assessment & Plan:   Discussed vacuum erection device as well as penile injection therapy. Certainly I do not feel that were to the point of any type of prosthesis. Patient does give insulin injections already to himself. We discussed the risk of penile injection therapy. The does have the highest chance for success rate this point. Patient willing to proceed. Set this up.   Return to clinic thereafter      Left ventricular dysfunction 09/02/2016    Low vision, both eyes 09/02/2016    Obstructive sleep apnea syndrome 09/02/2016    Palpitations 09/02/2016    Personal history of other diseases of the digestive system 09/02/2016    Repetitive intrusions of sleep 09/02/2016    Sciatica 09/02/2016    Vitamin D deficiency 09/02/2016    Astigmatism 07/27/2016    Myopia 07/27/2016    Nuclear sclerosis of left eye 07/27/2016    Presbyopia 07/27/2016    Pseudophakia of right eye 07/27/2016    Floaters 07/08/2016    Posterior vitreous detachment 07/08/2016    Hypertensive urgency 08/15/2013    ETOHism ([facility] 07/02/2012    [facility] 06/29/2012    HTN (hypertension) 06/29/2012       CURRENT MEDICATIONS:   Current Outpatient Medications   Medication Sig Dispense Refill    ketoconazole (NIZORAL) 2 % shampoo Use as wash to chest, back, neck, face, and scalp leaving on for 5 minutes prior to washing off once daily for 2 weeks then three times weekly 120 mL 3    ketoconazole (NIZORAL) 2 % cream Apply daily to affected areas on face, scalp, neck, and chest 30 g 2    atorvastatin (LIPITOR) 40 MG tablet Take 1 tablet by mouth daily 30 tablet 3    ibuprofen (ADVIL;MOTRIN) 400 MG tablet Take 1 tablet by mouth every 6 hours as needed for Pain 120 tablet 1    metoprolol tartrate (LOPRESSOR) 25 MG tablet TAKE ONE-HALF TABLET BY MOUTH TWO TIMES A DAY 30 tablet 2    JARDIANCE 25 MG tablet TAKE 1 TABLET BY MOUTH ONCE DAILY. 30 tablet 1    doxycycline hyclate (VIBRA-TABS) 100 MG tablet       venlafaxine (EFFEXOR XR) 37.5 MG extended release capsule TAKE 1 CAPSULE BY MOUTH ONE TIME A DAY 30 capsule 3    metFORMIN (GLUCOPHAGE) 500 MG tablet TAKE TWO TABLETS BY MOUTH TWICE A DAY WITH MEALS 60 tablet 3    tamsulosin (FLOMAX) 0.4 mg capsule Take 1 capsule by mouth daily 90 capsule 1    TRULICITY 3 SY/9.2OE SOPN INJECT THE CONTENTS OF ONE SYRINGE (3MG) UNDER THE SKIN ONCE WEEKLY 2 mL 3    LANTUS SOLOSTAR 100 UNIT/ML injection pen INJECT 42 UNITS INTO THE SKIN TWO TIMES A DAY 15 mL 3    Continuous Blood Gluc Transmit (DEXCOM G6 TRANSMITTER) MISC Use in combination with new sensor every 10 days. Transmitter lasts 3 months 1 each 3    Continuous Blood Gluc Sensor (DEXCOM G6 SENSOR) MISC Apply new sensor to abdomen every 10 days 3 each 11    Continuous Blood Gluc  (DEXCOM G6 ) MAMADOU Use as directed to monitor glucose continuously 1 each 1    ferrous sulfate (IRON 325) 325 (65 Fe) MG tablet Take 325 mg by mouth daily (with breakfast)       zinc 50 MG CAPS Take 1 capsule by mouth daily      aspirin EC 81 MG EC tablet Take 1 tablet by mouth daily 90 tablet 1    Insulin Pen Needle (B-D ULTRAFINE III SHORT PEN) 31G X 8 MM MISC 1 each by Does not apply route daily 100 each 3    vitamin D3 (CHOLECALCIFEROL) 25 MCG (1000 UT) TABS tablet Take 1 tablet by mouth daily 90 tablet 1    insulin lispro, 1 Unit Dial, (HUMALOG KWIKPEN) 100 UNIT/ML SOPN Inject 6 Units into the skin 3 times daily (before meals) 3 pen 2    pregabalin (LYRICA) 100 MG capsule Take 1 capsule by mouth 2 times daily for 30 days.  60 capsule 2     Current Facility-Administered Medications   Medication Dose Route Frequency Provider Last Rate Last Admin    bupivacaine (MARCAINE) 0.25 % injection 5 mg  2 mL Intra-artICUlar Once Santa Lunch, DO        methylPREDNISolone acetate (DEPO-MEDROL) injection 80 mg  80 mg Intra-artICUlar Once Santa Lunch, DO           ALLERGIES:   Allergies   Allergen Reactions    Lisinopril Swelling     Outer Neck swelling    Melatonin Swelling    Trazodone Swelling     Chest swells       SOCIAL HISTORY:  Social History     Tobacco Use    Smoking status: Former     Packs/day: 0.00     Years: 15.00     Pack years: 0.00     Types: Cigarettes     Quit date: 3/27/2016     Years since quittin.4    Smokeless tobacco: Never   Substance Use Topics    Alcohol use: Yes     Comment: etoh abuse. BEER, WINE  12 A WEEK emma       Pertinent ROS:  Review of Systems  Skin: Denies any new changing, growing or bleeding lesions or rashes except as described in the HPI   Constitutional: Denies fevers, chills, and malaise. PHYSICAL EXAM:   /76   Pulse (!) 113   Temp 97.2 °F (36.2 °C) (Temporal)   Ht 6' 2\" (1.88 m)   Wt 244 lb (110.7 kg)   SpO2 95%   BMI 31.33 kg/m²     The patient is generally well appearing, well nourished, alert and conversational. Affect is normal.    Cutaneous Exam:  Physical Exam  Waist-up skin: the head/face,neck, both arms, chest, back, abdomen, digits and/or nails, was examined. Facial covering was removed during examination. Diagnoses/exam findings/medical history pertinent to this visit are listed below:    Assessment:   Diagnosis Orders   1. Seborrheic dermatitis  ketoconazole (NIZORAL) 2 % shampoo    ketoconazole (NIZORAL) 2 % cream      2. Tinea versicolor  ketoconazole (NIZORAL) 2 % shampoo    ketoconazole (NIZORAL) 2 % cream           Plan:  1.  Seborrheic dermatitis, macular with hypopigmentation on face and scalp (possibly overlapping with TV)  - discussed diagnosis, etiology, natural course, and treatment options  - counseled that resolution of PIH will lag behind treatment of disease itself  - chronic illness with progression and/or exacerbation  - ketoconazole (NIZORAL) 2 % shampoo; Use as wash to chest, back, neck, face, and scalp leaving on for 5 minutes prior to washing off once daily for 2 weeks then three times weekly  Dispense: 120 mL; Refill: 3  - ketoconazole (NIZORAL) 2 % cream; Apply daily to affected areas on face, scalp, neck, and chest  Dispense: 30 g; Refill: 2    2. Tinea versicolor (KOH positive patch on chest)  - discussed diagnosis, etiology, natural course, and treatment options  - ketoconazole (NIZORAL) 2 % shampoo; Use as wash to chest, back, neck, face, and scalp leaving on for 5 minutes prior to washing off once daily for 2 weeks then three times weekly  Dispense: 120 mL; Refill: 3  - ketoconazole (NIZORAL) 2 % cream; Apply daily to affected areas on face, scalp, neck, and chest  Dispense: 30 g; Refill: 2      RTC 2 months    Future Appointments   Date Time Provider Liset Pierce   9/8/2022 10:30 AM Mustapah Phillip MD INFT DISEASE [facility]   9/14/2022  2:00 PM [facility] EMG  [facility]   9/14/2022  2:00 PM Reyna Campbell MD [facility] med/reha [facility]   9/20/2022 11:00 AM Rain Jose DPM [facility]   10/5/2022 11:15 AM Odette Vasquez MD [facility]   10/24/2022  2:10 PM Kamilah Moore MD [facility]   10/31/2022 11:00 AM Justin Hutchins MD  derm [facility]   12/19/2022 11:00 AM Bhavesh Montanez DO Sports Med [facility]   1/23/2023  2:45 PM SCHEDULE, [facility] ONC NURSE [facility] STA RAD [place]         There are no Patient Instructions on file for this visit. This note was created with the assistance of a speech-recognition program.  Although the intention is to generate a document that actually reflects the content of the visit, no guarantees can be provided that every mistake has been identified and corrected by editing.     Electronically signed by Justin Hutchins MD on 8/30/22 at 11:39 AM GENETT

## 2022-08-31 ENCOUNTER — TELEPHONE (OUTPATIENT)
Dept: SURGERY | Age: 62
End: 2022-08-31

## 2022-08-31 ENCOUNTER — NURSE TRIAGE (OUTPATIENT)
Dept: OTHER | Facility: CLINIC | Age: 62
End: 2022-08-31

## 2022-08-31 NOTE — TELEPHONE ENCOUNTER
----- Message from Tommydeepti Landaverde sent at 8/31/2022 11:30 AM EDT -----  Subject: Message to Provider    QUESTIONS  Information for Provider? Lorena Castillo from [employer] is calling and needs   all of Dhruv Culver MD updated information in order for this patient to   be seen by this provider. ---------------------------------------------------------------------------  --------------  Wellington Quinn INFO  [phone]; Do not leave any message, patient will call back for answer  ---------------------------------------------------------------------------  --------------  SCRIPT ANSWERS  Relationship to Patient? Third Party  Third Party Type? Insurance? Representative Name?  Lorena Castillo

## 2022-08-31 NOTE — TELEPHONE ENCOUNTER
Spoke with [facility] and Dr. sAhlee Moralez is listed as PCP with them. No change is needed. Maryam Mcgovern

## 2022-09-07 DIAGNOSIS — E11.69 TYPE 2 DIABETES MELLITUS WITH OTHER SPECIFIED COMPLICATION, WITH LONG-TERM CURRENT USE OF INSULIN (HCC): ICD-10-CM

## 2022-09-07 DIAGNOSIS — Z79.4 TYPE 2 DIABETES MELLITUS WITH OTHER SPECIFIED COMPLICATION, WITH LONG-TERM CURRENT USE OF INSULIN (HCC): ICD-10-CM

## 2022-09-07 RX ORDER — PREGABALIN 100 MG/1
CAPSULE ORAL
Qty: 60 CAPSULE | Refills: 2 | Status: SHIPPED | OUTPATIENT
Start: 2022-09-07 | End: 2022-12-06

## 2022-09-07 RX ORDER — DULAGLUTIDE 3 MG/.5ML
INJECTION, SOLUTION SUBCUTANEOUS
Qty: 2 ML | Refills: 0 | Status: SHIPPED | OUTPATIENT
Start: 2022-09-07

## 2022-09-07 NOTE — TELEPHONE ENCOUNTER
Last visit: 8/25/22  Last Med refill: 8/17/22  Does patient have enough medication for 72 hours: Yes    Next Visit Date:  Future Appointments   Date Time Provider Liset Estradai   9/14/2022  2:00  [facility] EMG  [facility] EM[facility] [address] [facility]   9/14/2022  2:00 PM Audrey Ferrer MD [facility] med/reha [facility]   9/20/2022 11:00 AM Nneka Moore DPM [facility]   9/21/2022  9:15 AM Megan Del Castillo MD INFT DISEASE [facility]   10/5/2022 11:15 AM Jolynn Giordano MD [facility]   10/7/2022 10:15 AM Bernardo Lora [facility]   10/24/2022  2:10 PM Usama Saini MD [facility]   10/31/2022 11:00 AM Rahul Kingsley MD  derm [facility]   12/19/2022 11:00 AM Neela Olmstead DO Sports Med [facility]   1/23/2023  2:45 PM SCHEDULE, [facility] ONC NURSE [facility]   Topic Date Due    Diabetic retinal exam  Never done    Diabetic foot exam  04/27/2019    Flu vaccine (1) 09/01/2022    DTaP/Tdap/Td vaccine (1 - Tdap) 06/17/2023 (Originally 5/23/1978)    Shingles vaccine (1 of 2) 06/17/2023 (Originally 2/8/2011)    COVID-19 Vaccine (3 - Rinku risk series) 05/28/2024 (Originally 1/25/2022)    Pneumococcal 0-64 years Vaccine (2 - PCV) 06/11/2024 (Originally 12/19/2019)    Lipids  04/12/2023    Diabetic microalbuminuria test  04/13/2023    Depression Screen  07/06/2023    Annual Wellness Visit (AWV)  07/07/2023    Prostate Specific Antigen (PSA) Screening or Monitoring  07/20/2023    A1C test (Diabetic or Prediabetic)  08/17/2023    Colorectal Cancer Screen  07/16/2025    Hepatitis C screen  Completed    HIV screen  Completed    Hepatitis A vaccine  Aged Out    Hib vaccine  Aged Out    Meningococcal (ACWY) vaccine  Aged Out       Hemoglobin A1C (%)   Date Value   08/17/2022 8.1   04/12/2022 8.3   03/28/2022 8.3             ( goal A1C is < 7)   Microalb/Crt.  Ratio (mcg/mg creat)   Date Value   04/13/2022 Can not be calculated     LDL Cholesterol (mg/dL) Date Value   04/12/2022 52   08/15/2013 94       (goal LDL is <100)   AST (U/L)   Date Value   06/29/2022 17     ALT (U/L)   Date Value   06/29/2022 18     BUN (mg/dL)   Date Value   08/26/2022 16     BP Readings from Last 3 Encounters:   08/30/22 118/76   08/26/22 129/82   08/25/22 116/87          (goal 120/80)    All Future Testing planned in [facility]  Lab Frequency Next Occurrence   Hemoglobin A1C Once 03/28/2023   PSA, Diagnostic Once 07/24/2022   Sedimentation Rate Once 04/27/2022   CBC with Auto Differential Once 04/27/2022   CBC with Auto Differential Once 07/06/2022   Comprehensive Metabolic Panel Once 27/88/5399   PSA, Diagnostic Once 01/20/2023   PSA, Diagnostic Once 10/23/2022   Testosterone Once 10/25/2022               Patient Active Problem List:     DM (diabetes mellitus) ([address]     HTN (hypertension)     ETOHism ([address]     Hypertensive urgency     Abnormal ambulatory electrocardiogram     Abnormal ambulatory electrocardiography     Abnormal kidney function     Adiposity     Astigmatism     Atherosclerotic heart disease of native coronary artery without angina pectoris     Cervical radiculopathy     Chronic back pain     Decreased cardiac output     Diabetic peripheral neuropathy (HCC)     Dislocated intraocular lens     Disturbance in sleep behavior     Dizziness     Dyssomnia     Floaters     Impotence of organic origin     Left ventricular dysfunction     Low vision, both eyes     Myopia     Nuclear sclerosis of left eye     Obstructive sleep apnea syndrome     Palpitations     Personal history of other diseases of the digestive system     Posterior vitreous detachment     Presbyopia     Primary osteoarthritis of right hip     Pseudophakia of right eye     Repetitive intrusions of sleep     Sciatica     Tendonitis     Vitamin D deficiency     Vitreous opacities of right eye     Dislocated IOL (intraocular lens), anterior, right     Prostate CA ([address]     Congestive heart failure ([address]     Essential hypertension     Benign essential HTN     Diabetes mellitus ([facility]

## 2022-09-08 DIAGNOSIS — E11.69 TYPE 2 DIABETES MELLITUS WITH OTHER SPECIFIED COMPLICATION, WITH LONG-TERM CURRENT USE OF INSULIN (HCC): ICD-10-CM

## 2022-09-08 DIAGNOSIS — Z79.4 TYPE 2 DIABETES MELLITUS WITH OTHER SPECIFIED COMPLICATION, WITH LONG-TERM CURRENT USE OF INSULIN (HCC): ICD-10-CM

## 2022-09-08 NOTE — TELEPHONE ENCOUNTER
E-scribe request for MED REFILL. Please review and e-scribe if applicable. LAST SCRIPT WAS ONLY FOR 2 WEEKS. PLEASE RESEND. Last Visit Date:  8/25/22  Next Visit Date:  10/7/2022    Hemoglobin A1C (%)   Date Value   08/17/2022 8.1   04/12/2022 8.3   03/28/2022 8.3             ( goal A1C is < 7)   Microalb/Crt.  Ratio (mcg/mg creat)   Date Value   04/13/2022 Can not be calculated     LDL Cholesterol (mg/dL)   Date Value   04/12/2022 52       (goal LDL is <100)   AST (U/L)   Date Value   06/29/2022 17     ALT (U/L)   Date Value   06/29/2022 18     BUN (mg/dL)   Date Value   08/26/2022 16     BP Readings from Last 3 Encounters:   08/30/22 118/76   08/26/22 129/82   08/25/22 116/87          (goal 120/80)        Patient Active Problem List:     DM (diabetes mellitus) ([facility]     HTN (hypertension)     ETOHism (HCC)     Hypertensive urgency     Abnormal ambulatory electrocardiogram     Abnormal ambulatory electrocardiography     Abnormal kidney function     Adiposity     Astigmatism     Atherosclerotic heart disease of native coronary artery without angina pectoris     Cervical radiculopathy     Chronic back pain     Decreased cardiac output     Diabetic peripheral neuropathy (HCC)     Dislocated intraocular lens     Disturbance in sleep behavior     Dizziness     Dyssomnia     Floaters     Impotence of organic origin     Left ventricular dysfunction     Low vision, both eyes     Myopia     Nuclear sclerosis of left eye     Obstructive sleep apnea syndrome     Palpitations     Personal history of other diseases of the digestive system     Posterior vitreous detachment     Presbyopia     Primary osteoarthritis of right hip     Pseudophakia of right eye     Repetitive intrusions of sleep     Sciatica     Tendonitis     Vitamin D deficiency     Vitreous opacities of right eye     Dislocated IOL (intraocular lens), anterior, right     Prostate CA (HCC)     Congestive heart failure (HCC)     Essential hypertension     Benign essential HTN     Diabetes mellitus ([facility]      ----Sasha George

## 2022-09-14 ENCOUNTER — HOSPITAL ENCOUNTER (OUTPATIENT)
Dept: NEUROLOGY | Age: 62
Discharge: HOME | End: 2022-09-14
Payer: MEDICARE

## 2022-09-14 PROCEDURE — 95909 NRV CNDJ TST 5-6 STUDIES: CPT | Performed by: PHYSICAL MEDICINE & REHABILITATION

## 2022-09-14 PROCEDURE — 95886 MUSC TEST DONE W/N TEST COMP: CPT | Performed by: PHYSICAL MEDICINE & REHABILITATION

## 2022-09-16 ENCOUNTER — OFFICE VISIT (OUTPATIENT)
Dept: INFECTIOUS DISEASES | Age: 62
End: 2022-09-16
Payer: MEDICARE

## 2022-09-16 ENCOUNTER — OFFICE VISIT (OUTPATIENT)
Dept: FAMILY MEDICINE CLINIC | Age: 62
End: 2022-09-16
Payer: MEDICARE

## 2022-09-16 ENCOUNTER — TELEPHONE (OUTPATIENT)
Dept: FAMILY MEDICINE CLINIC | Age: 62
End: 2022-09-16

## 2022-09-16 ENCOUNTER — HOSPITAL ENCOUNTER (OUTPATIENT)
Age: 62
Setting detail: SPECIMEN
Discharge: HOME | End: 2022-09-16

## 2022-09-16 VITALS
SYSTOLIC BLOOD PRESSURE: 120 MMHG | HEIGHT: 74 IN | WEIGHT: 244.4 LBS | BODY MASS INDEX: 31.37 KG/M2 | DIASTOLIC BLOOD PRESSURE: 84 MMHG | TEMPERATURE: 97.3 F

## 2022-09-16 VITALS
BODY MASS INDEX: 31.32 KG/M2 | HEIGHT: 74 IN | WEIGHT: 244 LBS | HEART RATE: 113 BPM | SYSTOLIC BLOOD PRESSURE: 148 MMHG | OXYGEN SATURATION: 100 % | RESPIRATION RATE: 16 BRPM | DIASTOLIC BLOOD PRESSURE: 100 MMHG

## 2022-09-16 DIAGNOSIS — E11.69 TYPE 2 DIABETES MELLITUS WITH OTHER SPECIFIED COMPLICATION, WITH LONG-TERM CURRENT USE OF INSULIN (HCC): Primary | ICD-10-CM

## 2022-09-16 DIAGNOSIS — Z79.4 TYPE 2 DIABETES MELLITUS WITH OTHER SPECIFIED COMPLICATION, WITH LONG-TERM CURRENT USE OF INSULIN (HCC): Primary | ICD-10-CM

## 2022-09-16 DIAGNOSIS — T84.51XS INFECTION ASSOCIATED WITH INTERNAL RIGHT HIP PROSTHESIS, SEQUELA: Primary | ICD-10-CM

## 2022-09-16 DIAGNOSIS — T84.51XS INFECTION ASSOCIATED WITH INTERNAL RIGHT HIP PROSTHESIS, SEQUELA: ICD-10-CM

## 2022-09-16 DIAGNOSIS — L02.415 CUTANEOUS ABSCESS OF RIGHT HIP: ICD-10-CM

## 2022-09-16 DIAGNOSIS — T88.8XXA FLUID COLLECTION AT SURGICAL SITE, INITIAL ENCOUNTER: ICD-10-CM

## 2022-09-16 PROCEDURE — G8427 DOCREV CUR MEDS BY ELIG CLIN: HCPCS

## 2022-09-16 PROCEDURE — G8417 CALC BMI ABV UP PARAM F/U: HCPCS

## 2022-09-16 PROCEDURE — G8427 DOCREV CUR MEDS BY ELIG CLIN: HCPCS | Performed by: INTERNAL MEDICINE

## 2022-09-16 PROCEDURE — 3052F HG A1C>EQUAL 8.0%<EQUAL 9.0%: CPT

## 2022-09-16 PROCEDURE — 99213 OFFICE O/P EST LOW 20 MIN: CPT | Performed by: INTERNAL MEDICINE

## 2022-09-16 PROCEDURE — 1036F TOBACCO NON-USER: CPT | Performed by: INTERNAL MEDICINE

## 2022-09-16 PROCEDURE — 99213 OFFICE O/P EST LOW 20 MIN: CPT

## 2022-09-16 PROCEDURE — 3017F COLORECTAL CA SCREEN DOC REV: CPT | Performed by: INTERNAL MEDICINE

## 2022-09-16 PROCEDURE — 3017F COLORECTAL CA SCREEN DOC REV: CPT

## 2022-09-16 PROCEDURE — 1036F TOBACCO NON-USER: CPT

## 2022-09-16 PROCEDURE — 2022F DILAT RTA XM EVC RTNOPTHY: CPT

## 2022-09-16 PROCEDURE — G8417 CALC BMI ABV UP PARAM F/U: HCPCS | Performed by: INTERNAL MEDICINE

## 2022-09-16 ASSESSMENT — ENCOUNTER SYMPTOMS
SHORTNESS OF BREATH: 0
NAUSEA: 0
VOMITING: 0
ABDOMINAL PAIN: 0
DIARRHEA: 0
WHEEZING: 0
CONSTIPATION: 0

## 2022-09-16 NOTE — TELEPHONE ENCOUNTER
Called Infectious disease per Dr. Ralph Jett, to have patient seen with them as soon as possible due to infection in RT. Hip.  Patient was sent to clinic to be seen today

## 2022-09-16 NOTE — PATIENT INSTRUCTIONS
Thank you for letting us take care of you today. We hope all your questions were addressed. If a question was overlooked or something else comes to mind after you return home, please contact a member of your Care Team listed below. Your Care Team at [place] [address] is Team #5  Nidia Stark MD (Faculty)  Shannon Julien MD (Resident)  Eula Iverson MD (Resident)  Fernando Mclean MD (Resident)  Eliane Horne MD, (Resident)  Antoine Figueroa., DEEPAK Thurston., RMA  Mikaela Lackey.,  LPN  Rowena Wiley., Kip Fong., Roz Officer [facility] office)  Drew Sood, [address] (Clinical Practice Manager)  [facility] (Clinical Pharmacist)       Office phone number: [phone]    If you need to get in right away due to illness, please be advised we have \"Same Day\" appointments available Monday-Friday. Please call us at [phone] option #[phone] to schedule your \"Same Day\" appointment.

## 2022-09-16 NOTE — PROGRESS NOTES
Attending Physician Statement  I have discussed the care off. First name Stewartincluding pertinent history and exam findings,  with the resident. I have seen and examined the patient and the key elements of all parts of the encounter have been performed by me. I agree with the assessment, plan and orders as documented by the resident. (GC Modifier)    Chronic R hip pain- s/p Arthoplasty  infected Keloid draining- sees ID h/o Pseudomonas on Doxycyline since Dec 2021  A 3X3 cms Abscess R trochanteric region erythema/warmth/Fluctuant. Drainage noticed on the dressing. Spoke with Dr. Kacie Curry patient was sent to his office now for further evaluation.

## 2022-09-16 NOTE — PROGRESS NOTES
Subjective: Sarahi Cortez is a 60 y.o. male with  has a past medical history of Arthritis, DM (diabetes mellitus) ([address] GERD (gastroesophageal reflux disease), Heart murmur, HTN (hypertension), Hyperlipidemia, Sleep apnea, Vision abnormalities, and Wears glasses. Presented to the office today for:  Chief Complaint   Patient presents with    Follow-Up from Hospital     RT. Hip infection - Patient states the antibiotics are not working and it is starting to protruding       HPI    Pt presents today with concerns of right hip wound abscess. This continues to drain and is enlarging in size as per wife. Pt mentions having low grade fevers at home. Patient has a history of right hip replacement in 2019 with revision and chronic wound infections. Patient also has a history of osteomyelitis and has been on chronic doxycycline therapy since December 2021. Pt went to the ER 08/26/22. Labs performed which were remarkable for anemia, no leukocytosis, CRP elevated 34.7, elevated ESR 24, normal lactic acid. Patient was discharged without any adjustment in medications and advised to follow with ID and PCP. Pt is following up with ID (Dr Girish Zuñiga). Last appt with ID was 6-8 weeks ago. Next appt was supposed to be on the 09/08/22 which they rescheduled. Pt is also following up with ortho. R hip culture showed Pseudomonas Aeruginosa on 03/22. Pt was initially started on Cipro and then changed to Doxycycline. Patient mentions his blood sugars have been fluctuating but patient continues to take his medication as prescribed. Last A1c 8.1 on 8/17/2022. Review of Systems   Constitutional:  Positive for fever. Negative for chills. Respiratory:  Negative for shortness of breath and wheezing. Cardiovascular:  Negative for chest pain, palpitations and leg swelling. Gastrointestinal:  Negative for abdominal pain, constipation, diarrhea, nausea and vomiting. Genitourinary:  Negative for dysuria and flank pain. Foul-smelling urine improving   Musculoskeletal:  Negative for arthralgias. Skin:  Positive for wound (Right hip, draining). The patient has a   Family History   Problem Relation Age of Onset    Diabetes Sister     Diabetes Brother     Cancer Brother        Objective:    /84 (Site: Left Upper Arm, Position: Sitting, Cuff Size: Large Adult) Comment: manual  Temp 97.3 °F (36.3 °C)   Ht 6' 2.02\" (1.88 m)   Wt 244 lb 6.4 oz (110.9 kg)   BMI 31.37 kg/m²    BP Readings from Last 3 Encounters:   09/16/22 (!) 148/100   09/16/22 120/84   08/30/22 118/76       Physical Exam  Cardiovascular:      Rate and Rhythm: Regular rhythm. Tachycardia present. Heart sounds: Normal heart sounds. Pulmonary:      Effort: Pulmonary effort is normal.      Breath sounds: Normal breath sounds. Abdominal:      General: There is no distension. Palpations: Abdomen is soft. Tenderness: There is no abdominal tenderness. There is no guarding. Musculoskeletal:      Right lower leg: No edema. Left lower leg: No edema. Skin:     General: Skin is warm and dry. Findings: Erythema and lesion present.       Comments: Right hip abscess, draining, warm to touch, fluctuant   Psychiatric:         Mood and Affect: Mood normal.         Behavior: Behavior normal.       Lab Results   Component Value Date    WBC 6.3 08/26/2022    HGB 10.8 (L) 08/26/2022    HCT 33.7 (L) 08/26/2022     08/26/2022    CHOL 123 04/12/2022    TRIG 111 04/12/2022    HDL 49 04/12/2022    ALT 18 06/29/2022    AST 17 06/29/2022     08/26/2022    K 4.6 08/26/2022     08/26/2022    CREATININE 0.82 08/26/2022    BUN 16 08/26/2022    CO2 26 08/26/2022    PSA 0.89 07/20/2022    INR 1.0 08/15/2013    LABA1C 8.1 08/17/2022    LABMICR Can not be calculated 04/13/2022     Lab Results   Component Value Date    CALCIUM 8.9 08/26/2022     Lab Results   Component Value Date    LDLCHOLESTEROL 52 04/12/2022       Assessment and Plan: 1. Type 2 diabetes mellitus with other specified complication, with long-term current use of insulin (HCC)  -Last A1c 8.1 08/17/22  -Currently taking Jardiance 25 mg daily, Trulicity q1 wk, metformin 500 mg bid, lispro 6 units 3 times daily before meals    2. Cutaneous abscess of right hip  -Chronic right hip abscess currently draining and enlarging in size. Currently on doxycycline 100 mg.  -Patient follows up with ID, last scheduled appointment 09/08/2022 which was rescheduled.  -We called Dr. Devan Schultz office (ID) to have patient seen immediately. Dr. Devan Schultz partner on call will have patient come in to the office now to have it evaluated and possibly drained. Pt provided instructions to go to ID office immediately, pt verbalized understanding. Requested Prescriptions      No prescriptions requested or ordered in this encounter       There are no discontinued medications. Jennie Wilkinson received counseling on the following healthy behaviors: nutrition, exercise and medication adherence    Discussed use,benefit, and side effects of prescribed medications. Barriers to medication compliance addressed. All patient questions answered. Pt voiced understanding. Return in about 2 weeks (around 9/30/2022) for R hip abscess follow up . Disclaimer: Some orall of this note was transcribed using voice-recognition software. This may cause typographical errors occasionally. Although all effort is made to fix these errors, please do not hesitate to contact our office if there Aramis Recinos concern with the understanding of this note.

## 2022-09-16 NOTE — PATIENT INSTRUCTIONS
Dr Dreik Eric (orthopedics) appt on Wednesday 9/21 at [address] [facility],[address], [address]      Will call pt once cultures are back. If cultures are negative, no follow up needed with Dr Nadia Hendricks.  If cultures are positive we will schedule at follow up at that time. -Beverly Siu

## 2022-09-16 NOTE — PROGRESS NOTES
DIABETES and HYPERTENSION visit    BP Readings from Last 3 Encounters:   08/30/22 118/76   08/26/22 129/82   08/25/22 116/87        Hemoglobin A1C (%)   Date Value   08/17/2022 8.1   04/12/2022 8.3   03/28/2022 8.3     Microalb/Crt. Ratio (mcg/mg creat)   Date Value   04/13/2022 Can not be calculated     LDL Cholesterol (mg/dL)   Date Value   04/12/2022 52     HDL (mg/dL)   Date Value   04/12/2022 49     BUN (mg/dL)   Date Value   08/26/2022 16     Creatinine (mg/dL)   Date Value   08/26/2022 0.82     Glucose (mg/dL)   Date Value   08/26/2022 203 (H)            Have you changed or started any medications since your last visit including any over-the-counter medicines, vitamins, or herbal medicines? no   Have you stopped taking any of your medications? Is so, why? -  no  Are you having any side effects from any of your medications? - no    Have you seen any other physician or provider since your last visit? yes - Dermatology    Have you had any other diagnostic tests since your last visit? yes - EMG , XR- RT. Hip ,Labs    Have you been seen in the emergency room and/or had an admission in a hospital since we last saw you?  yes - [facility]   Have you had your routine dental cleaning in the past 6 months?  no     Have you had your annual diabetic retinal (eye) exam? No   (ensure copy of exam is in the chart)    Do you have an active MyChart account? If no, what is the barrier?   No: declined    Patient Care Team:  Ky Roe MD as PCP - General (Emergency Medicine)  Jina Lau RN as Nurse Navigator (Oncology)  Patricio Ortiz MD as Consulting Physician (Infectious Diseases)  Flor Villanueva MD as Consulting Physician (Radiation Oncology)  Chun Lim DPM as Physician (Podiatry)  Wade Pemberton MD as Consulting Physician (Hematology and Oncology)    Medical History Review  Past Medical, Family, and Social History reviewed and does not contribute to the patient presenting condition    Health Maintenance   Topic Date Due    Diabetic retinal exam  Never done    Diabetic foot exam  04/27/2019    Flu vaccine (1) 09/01/2022    DTaP/Tdap/Td vaccine (1 - Tdap) 06/17/2023 (Originally 5/23/1979)    Shingles vaccine (1 of 2) 06/17/2023 (Originally 2/8/2011)    COVID-19 Vaccine (3 - Rinku risk series) 05/28/2024 (Originally 1/25/2022)    Pneumococcal 0-64 years Vaccine (2 - PCV) 06/11/2024 (Originally 12/19/2019)    Lipids  04/12/2023    Diabetic microalbuminuria test  04/13/2023    Depression Screen  07/06/2023    Annual Wellness Visit (AWV)  07/07/2023    Prostate Specific Antigen (PSA) Screening or Monitoring  07/20/2023    A1C test (Diabetic or Prediabetic)  08/17/2023    Colorectal Cancer Screen  07/16/2025    Hepatitis C screen  Completed    HIV screen  Completed    Hepatitis A vaccine  Aged Out    Hib vaccine  Aged Out    Meningococcal (ACWY) vaccine  Aged Out

## 2022-09-16 NOTE — PROGRESS NOTES
[facility] - Initial Office Consult Note  Today's Date and Time: 9/16/2022, 3:43 PM    Impression:     1. Infection associated with internal right hip prosthesis, sequela     Fluid collection at incision site Rt hip. No growth on culture of aspirate from 9-16-22    Recommendations   Aspirate of the raised proximal aspect of his keloid was taken during today's visit. Collection was sent off for culture  Appointment was made with Dr. John Hubbard to discuss possible surgical intervention and debridement of area. Patient was told to continue with his antibiotic therapy: Doxycycline 100 mg twice daily  Patient was told that based off of cultures, reappointment would be made if necessary  The patient continues on radiation treatment for the prostate cancer. The care was discussed with the patient and his wife and all of their questions were answered    I have ordered the following medications/ labs:  Orders Placed This Encounter   Procedures    Culture, Aerobic     Aspirate from Rt hip abscess     Standing Status:   Future     Standing Expiration Date:   9/16/2023        No orders of the defined types were placed in this encounter. Chief complaint/reason for consultation:    Infection associated with internal right hip prosthesis, Sequela     History of Present Illness:   Initial History     Josefa Hendricks is a 60y.o.-year-old male who I am seeing in follow-up for a right hip prosthetic joint infection. The patient has had Pseudomonas and coagulase-negative staph isolated from the hip joint and he has been on oral antimicrobial therapy with ciprofloxacin and doxycycline. The patient has undergone multiple surgical revisions in the past and was seeking medical treatment also to be able for him to address his prostate cancer.     The patient has been tolerating his radiation therapy well and also has been tolerating the antimicrobial therapy well without any untoward effects. Does not report any nausea, vomiting or diarrhea. His hip has otherwise been doing well he does not report any increased pain or any drainage from his incision. He has been getting radiation treatment for his prostate cancer  I have personally reviewedthe past medical history, medications, social history, and I have updated the database accordingly. CURRENT EVALUATION :9/20/2022    Office visit: 9/16/22    Patient presents to the office today with his wife for chief complaint of right hip infection    Patient states that he started noticing the proximal aspect of his keloid had become erythematous and raised about 4 months ago. Since that time it has progressively gotten worse. Patient states that this is the second time that this is happened and that the last time the area had to be drained. He states that he is also noted a spreading discoloration to his anterior thigh. Patient had been scheduled to continue antibiotic therapy with oral ciprofloxacin and doxycycline, however he has discontinued his ciprofloxacin since his last visit. Patient is currently only on doxycycline therapy    Patient has no systemic signs of infection including: Nausea, vomiting, shortness of breath, fever, chills, diarrhea, body aches    Patient has a large raised lesion at the proximal aspect of his keloid scar from previous hip replacement surgery. This lesion measures approximately 5.5 cm x 4.5 cm x 3 cm. Wife states that the lesion has had continuous drainage over the last couple weeks and has been keeping it covered with dressings    Plan:  Aspirate was taken during today's visit using a sterile 18-gauge needle and 20 mL syringe. Aspirate was bloody in nature. Aspirate was then sent off for culture.   Culture subsequently yielded no growth  Patient was told to continue antibiotic therapy: Doxycycline 100 mg twice daily  Patient was told that an appointment with Dr. Shital Valderrama would be needed for possible surgical intervention: I&D with debridement  Patient was notified of the results of his culture by phone. Patient's phone number is: [phone]    Past Medical History:     Past Medical History:   Diagnosis Date    Arthritis     DM (diabetes mellitus) ([facility] [address] 2009    IDDM    GERD (gastroesophageal reflux disease) 2017    ON RX    Heart murmur 2013    ASYMPTOMATIC    HTN (hypertension) 6/29/2012    ON RX    Hyperlipidemia 06/29/2012    ON RX    Sleep apnea 2016    TRIED MACHINE COULD NOT TOLERATE    Vision abnormalities 2019    FLOATERS RIGHT EYE    Wears glasses        Past Surgical  History:     Past Surgical History:   Procedure Laterality Date    COLONOSCOPY  07/23/2010    Dr. Tasha Brush Bilateral 2017    CATARACT EXTRACTION WITH IOL    KNEE ARTHROSCOPY      VASECTOMY  1999    VITRECTOMY Right 03/28/2019    VITRECTOMY Right 3/28/2019    VITRECTOMY 25 GAUGE,  IOL REPOSITION  (Dieudonne Giordano) performed by Maribell Nichole MD at [place] [address]       Medications:     Current Outpatient Medications:     pregabalin (LYRICA) 100 MG capsule, TAKE 1 CAPSULE BY MOUTH TWICE DAILY. , Disp: 60 capsule, Rfl: 2    TRULICITY 3 YH/2.3JW SOPN, INJECT THE CONTENTS OF ONE SYRINGE (3MG) UNDER THE SKIN ONCE WEEKLY, Disp: 2 mL, Rfl: 0    metFORMIN (GLUCOPHAGE) 500 MG tablet, TAKE TWO TABLETS BY MOUTH TWICE A DAY WITH MEALS, Disp: 60 tablet, Rfl: 3    ketoconazole (NIZORAL) 2 % shampoo, Use as wash to chest, back, neck, face, and scalp leaving on for 5 minutes prior to washing off once daily for 2 weeks then three times weekly, Disp: 120 mL, Rfl: 3    ketoconazole (NIZORAL) 2 % cream, Apply daily to affected areas on face, scalp, neck, and chest, Disp: 30 g, Rfl: 2    atorvastatin (LIPITOR) 40 MG tablet, Take 1 tablet by mouth daily, Disp: 30 tablet, Rfl: 3    ibuprofen (ADVIL;MOTRIN) 400 MG tablet, Take 1 tablet by mouth every 6 hours as needed for Pain, Disp: 120 tablet, Rfl: 1    metoprolol tartrate (LOPRESSOR) 25 MG tablet, TAKE ONE-HALF TABLET BY MOUTH TWO TIMES A DAY, Disp: 30 tablet, Rfl: 2    JARDIANCE 25 MG tablet, TAKE 1 TABLET BY MOUTH ONCE DAILY. , Disp: 30 tablet, Rfl: 1    doxycycline hyclate (VIBRA-TABS) 100 MG tablet, , Disp: , Rfl:     venlafaxine (EFFEXOR XR) 37.5 MG extended release capsule, TAKE 1 CAPSULE BY MOUTH ONE TIME A DAY, Disp: 30 capsule, Rfl: 3    tamsulosin (FLOMAX) 0.4 mg capsule, Take 1 capsule by mouth daily, Disp: 90 capsule, Rfl: 1    LANTUS SOLOSTAR 100 UNIT/ML injection pen, INJECT 42 UNITS INTO THE SKIN TWO TIMES A DAY, Disp: 15 mL, Rfl: 3    Continuous Blood Gluc Transmit (DEXCOM G6 TRANSMITTER) MISC, Use in combination with new sensor every 10 days.  Transmitter lasts 3 months, Disp: 1 each, Rfl: 3    Continuous Blood Gluc Sensor (DEXCOM G6 SENSOR) MISC, Apply new sensor to abdomen every 10 days, Disp: 3 each, Rfl: 11    Continuous Blood Gluc  (DEXCOM G6 ) MAMADOU, Use as directed to monitor glucose continuously, Disp: 1 each, Rfl: 1    ferrous sulfate (IRON 325) 325 (65 Fe) MG tablet, Take 325 mg by mouth daily (with breakfast) , Disp: , Rfl:     zinc 50 MG CAPS, Take 1 capsule by mouth daily, Disp: , Rfl:     aspirin EC 81 MG EC tablet, Take 1 tablet by mouth daily, Disp: 90 tablet, Rfl: 1    Insulin Pen Needle (B-D ULTRAFINE III SHORT PEN) 31G X 8 MM MISC, 1 each by Does not apply route daily, Disp: 100 each, Rfl: 3    vitamin D3 (CHOLECALCIFEROL) 25 MCG (1000 UT) TABS tablet, Take 1 tablet by mouth daily, Disp: 90 tablet, Rfl: 1    insulin lispro, 1 Unit Dial, (HUMALOG KWIKPEN) 100 UNIT/ML SOPN, Inject 6 Units into the skin 3 times daily (before meals), Disp: 3 pen, Rfl: 2     Social History:     Social History     Socioeconomic History    Marital status: Single     Spouse name: Not on file    Number of children: Not on file    Years of education: Not on file    Highest education level: Not on file   Occupational History    Not on file   Tobacco Use    Smoking status: Former     Packs/day: 0.00     Years: 15.00     Pack years: 0.00     Types: Cigarettes     Quit date: 3/27/2016     Years since quittin.4    Smokeless tobacco: Never   Vaping Use    Vaping Use: Never used   Substance and Sexual Activity    Alcohol use: Yes     Comment: etoh abuse. BEER, WINE  12 A WEEK rarley    Drug use: No    Sexual activity: Yes   Other Topics Concern    Not on file   Social History Narrative    Not on file     Social Determinants of Health     Financial Resource Strain: Low Risk     Difficulty of Paying Living Expenses: Not hard at all   Food Insecurity: No Food Insecurity    Worried About Running Out of Food in the Last Year: Never true    Ran Out of Food in the Last Year: Never true   Transportation Needs: Not on file   Physical Activity: Inactive    Days of Exercise per Week: 0 days    Minutes of Exercise per Session: 0 min   Stress: Not on file   Social Connections: Not on file   Intimate Partner Violence: Not on file   Housing Stability: Not on file       Family History:     Family History   Problem Relation Age of Onset    Diabetes Sister     Diabetes Brother     Cancer Brother         Allergies:   Lisinopril, Melatonin, and Trazodone     Review of Systems:   Constitutional: No fevers or chills. No systemic complaints  Head: No headaches  Eyes: No double vision or blurry vision. ENT: No sore throat or runny nose. . No hearing loss, tinnitus or vertigo. Cardiovascular: No chest pain or palpitations. No shortness of breath. No MELLO  Lung: No shortness of breath or cough. No sputum production  Abdomen: No nausea, vomiting, diarrhea, or abdominal pain. .  Genitourinary: No increased urinary frequency, or dysuria. No hematuria. No suprapubic or CVA pain  Musculoskeletal: Slight tenderness with palpation to the proximal aspect of keloid   Hematologic: No bleeding or bruising. Neurologic: No headache, weakness, numbness, or tingling.     Physical Examination :   BP (!) 148/100 (Site: Left Lower Arm, Position: Sitting, Cuff Size: Medium Adult)   Pulse (!) 113   Resp 16   Ht 6' 2\" (1.88 m)   Wt 244 lb (110.7 kg)   SpO2 100%   BMI 31.33 kg/m²    General Appearance: Awake, alert, and in no apparent distress  Head:  Normocephalic, no trauma  Eyes: Pupils equal, round, reactive, to light and accommodation; extraocular movements intact; sclera anicteric; conjunctivae pink. No embolic phenomena. ENT: Oropharynx clear, without erythema, exudate, or thrush. No tenderness of sinuses. Mouth/throat: mucosa pink and moist. No lesions. Dentition in good repair. Neck:Supple, without lymphadenopathy. Thyroid normal, No bruits. Pulmonary/Chest: Clear to auscultation, without wheezes, rales, or rhonchi. No dullness to percussion. Cardiovascular: Regular rate and rhythm without murmurs, rubs, or gallops. Abdomen: Soft, non tender. Bowel sounds normal. No organomegaly  All four Extremities: No cyanosis, clubbing, edema, or effusions. Neurologic: No gross sensory or motor deficits. Skin: Large fluid collection to the proximal aspect of patient's keloid. Slightly erythematous in nature.   Drainage noted    Medical Decision Making:   I have independently reviewed/ordered the following labs:    CBC with Differential:  Lab Results   Component Value Date/Time    WBC 6.3 08/26/2022 09:50 AM    WBC 4.0 06/29/2022 10:37 AM    HGB 10.8 08/26/2022 09:50 AM    HGB 12.0 06/29/2022 10:37 AM    HCT 33.7 08/26/2022 09:50 AM    HCT 38.8 06/29/2022 10:37 AM     08/26/2022 09:50 AM     06/29/2022 10:37 AM    LYMPHOPCT 14 08/26/2022 09:50 AM    LYMPHOPCT 18 06/29/2022 10:37 AM    MONOPCT 9 08/26/2022 09:50 AM    MONOPCT 12 06/29/2022 10:37 AM     BMP:   Lab Results   Component Value Date/Time     08/26/2022 09:50 AM     06/29/2022 10:37 AM    K 4.6 08/26/2022 09:50 AM    K 4.2 06/29/2022 10:37 AM     08/26/2022 09:50 AM     06/29/2022 10:37 AM    CO2 26 08/26/2022 09:50 AM    CO2 25 06/29/2022 10:37 AM BUN 16 08/26/2022 09:50 AM    BUN 13 06/29/2022 10:37 AM    CREATININE 0.82 08/26/2022 09:50 AM    CREATININE 0.92 06/29/2022 10:37 AM    MG 2.0 08/16/2013 06:29 AM    MG 1.7 07/03/2012 09:20 AM     Hepatic Function Panel:  Lab Results   Component Value Date/Time    PROT 8.2 06/29/2022 10:37 AM    PROT 7.9 05/18/2022 08:08 AM    LABALBU 4.3 06/29/2022 10:37 AM    LABALBU 4.2 05/18/2022 08:08 AM    BILITOT 0.35 06/29/2022 10:37 AM    BILITOT 0.42 05/18/2022 08:08 AM    ALKPHOS 131 06/29/2022 10:37 AM    ALKPHOS 125 05/18/2022 08:08 AM    ALT 18 06/29/2022 10:37 AM    ALT 19 05/18/2022 08:08 AM    AST 17 06/29/2022 10:37 AM    AST 22 05/18/2022 08:08 AM     No results found for: RPR  No results found for: HIV  No results found for: [facility]  Lab Results   Component Value Date/Time    RBC 4.06 08/26/2022 09:50 AM    WBC 6.3 08/26/2022 09:50 AM    TURBIDITY Clear 08/26/2022 12:16 PM     Lab Results   Component Value Date/Time    CREATININE 0.82 08/26/2022 09:50 AM    GLUCOSE 203 08/26/2022 09:50 AM       Thank you for allowing us to participate in the care of this patient. Please call with questions. Miguel Jarrett DPM  Podiatric Medicine & Surgery, PGY-1  [facility], [address]:    I have discussed the case, including pertinent history and exam findings with the residents and students. I have seen and examined the patient and the key elements of the encounter have been performed by me. I was present when the student obtained his information or examined the patient. I have reviewed the laboratory data, other diagnostic studies and discussed them with the residents. I have updated the medical record where necessary. I agree with the assessment, plan and orders as documented by the resident/ student.     Pippa Sanchez MD.

## 2022-09-17 LAB
CULTURE: NO GROWTH
DIRECT EXAM: NORMAL
DIRECT EXAM: NORMAL
SPECIMEN DESCRIPTION: NORMAL

## 2022-09-20 ENCOUNTER — OFFICE VISIT (OUTPATIENT)
Dept: PODIATRY | Age: 62
End: 2022-09-20
Payer: MEDICARE

## 2022-09-20 VITALS — HEIGHT: 74 IN | BODY MASS INDEX: 31.32 KG/M2 | WEIGHT: 244 LBS

## 2022-09-20 DIAGNOSIS — M79.671 PAIN IN BOTH FEET: ICD-10-CM

## 2022-09-20 DIAGNOSIS — E11.51 TYPE II DIABETES MELLITUS WITH PERIPHERAL CIRCULATORY DISORDER (HCC): Primary | ICD-10-CM

## 2022-09-20 DIAGNOSIS — M20.42 HAMMER TOES OF BOTH FEET: ICD-10-CM

## 2022-09-20 DIAGNOSIS — M20.41 HAMMER TOES OF BOTH FEET: ICD-10-CM

## 2022-09-20 DIAGNOSIS — I73.9 PVD (PERIPHERAL VASCULAR DISEASE) (HCC): ICD-10-CM

## 2022-09-20 DIAGNOSIS — M79.672 PAIN IN BOTH FEET: ICD-10-CM

## 2022-09-20 PROCEDURE — G8427 DOCREV CUR MEDS BY ELIG CLIN: HCPCS | Performed by: PODIATRIST

## 2022-09-20 PROCEDURE — 2022F DILAT RTA XM EVC RTNOPTHY: CPT | Performed by: PODIATRIST

## 2022-09-20 PROCEDURE — 3052F HG A1C>EQUAL 8.0%<EQUAL 9.0%: CPT | Performed by: PODIATRIST

## 2022-09-20 PROCEDURE — G8417 CALC BMI ABV UP PARAM F/U: HCPCS | Performed by: PODIATRIST

## 2022-09-20 PROCEDURE — 3017F COLORECTAL CA SCREEN DOC REV: CPT | Performed by: PODIATRIST

## 2022-09-20 PROCEDURE — 1036F TOBACCO NON-USER: CPT | Performed by: PODIATRIST

## 2022-09-20 PROCEDURE — 99214 OFFICE O/P EST MOD 30 MIN: CPT | Performed by: PODIATRIST

## 2022-09-20 NOTE — PATIENT INSTRUCTIONS
77 The medication list included in this document is our record of what you are currently taking, including any changes that were made at today's visit. If you find any differences when compared to your medications at home, or have any questions that were not answered at your visit, please contact the office.

## 2022-09-20 NOTE — PROGRESS NOTES
[address] [facility]  [address] [address] [place] [address]  Dept: [phone]  Dept Fax: [phone]    RETURN PATIENT PROGRESS NOTE  Date of patient's visit: 9/20/2022  Patient's Name:  Michael Mo YOB: 1960            Patient Care Team:  Mikayla Mchugh MD as PCP - General (Emergency Medicine)  Verona Person RN as Nurse Navigator (Oncology)  Sosa Lucio MD as Consulting Physician (Infectious Diseases)  Asa Sanchez MD as Consulting Physician (Radiation Oncology)  Patricia Turner DPM as Physician (Podiatry)  Fallon Doran MD as Consulting Physician (Hematology and Oncology)       Michael Mo 60 y.o. male that presents for follow-up of   Chief Complaint   Patient presents with    Foot Pain     Bilateral feet    Diabetes     Type 2 diabetic     Follow Up After Procedure     Bilateral EMG review at [facility] on 9/14/2022     Pt's primary care physician is Mikayla Mchugh MD last seen 09/16/2022  Symptoms began many year(s) ago and are unchanged . Patient relates pain is Present. Pain is rated 7 out of 10 and is described as constant, severe, excruciating. Treatments prior to today's visit include: none. Currently denies F/C/N/V. Allergies   Allergen Reactions    Lisinopril Swelling     Outer Neck swelling    Melatonin Swelling    Trazodone Swelling     Chest swells       Past Medical History:   Diagnosis Date    Arthritis     DM (diabetes mellitus) ([facility] [address] 2009    IDDM    GERD (gastroesophageal reflux disease) 2017    ON RX    Heart murmur 2013    ASYMPTOMATIC    HTN (hypertension) 6/29/2012    ON RX    Hyperlipidemia 06/29/2012    ON RX    Sleep apnea 2016    TRIED MACHINE COULD NOT TOLERATE    Vision abnormalities 2019    FLOATERS RIGHT EYE    Wears glasses        Prior to Admission medications    Medication Sig Start Date End Date Taking?  Authorizing Provider   pregabalin (LYRICA) 100 MG capsule TAKE 1 CAPSULE BY MOUTH TWICE DAILY. 9/7/22 12/6/22 Yes Bernardo Gonzales MD   TRULICITY 3 XF/8.5VN SOPN INJECT THE CONTENTS OF ONE SYRINGE (3MG) UNDER THE SKIN ONCE WEEKLY 9/7/22  Yes Kathy Brady MD   metFORMIN (GLUCOPHAGE) 500 MG tablet TAKE TWO TABLETS BY MOUTH TWICE A DAY WITH MEALS 9/7/22  Yes Ladan Shi MD   ketoconazole (NIZORAL) 2 % shampoo Use as wash to chest, back, neck, face, and scalp leaving on for 5 minutes prior to washing off once daily for 2 weeks then three times weekly 8/30/22  Yes Ira Trejo MD   ketoconazole (NIZORAL) 2 % cream Apply daily to affected areas on face, scalp, neck, and chest 8/30/22  Yes Ira Trejo MD   atorvastatin (LIPITOR) 40 MG tablet Take 1 tablet by mouth daily 8/17/22  Yes Johny Diggs MD   ibuprofen (ADVIL;MOTRIN) 400 MG tablet Take 1 tablet by mouth every 6 hours as needed for Pain 8/17/22  Yes Bernardo Gonzales MD   metoprolol tartrate (LOPRESSOR) 25 MG tablet TAKE ONE-HALF TABLET BY MOUTH TWO TIMES A DAY 8/12/22  Yes Kathy Brady MD   JARDIANCE 25 MG tablet TAKE 1 TABLET BY MOUTH ONCE DAILY. 8/12/22  Yes Kathy Brady MD   doxycycline hyclate (VIBRA-TABS) 100 MG tablet  7/12/22  Yes Vitaliy Rodriguez MD   venlafaxine (EFFEXOR XR) 37.5 MG extended release capsule TAKE 1 CAPSULE BY MOUTH ONE TIME A DAY 7/15/22  Yes Elly Ramirez MD   tamsulosin (FLOMAX) 0.4 mg capsule Take 1 capsule by mouth daily 5/24/22  Yes Oma Phillips MD   LANTUS SOLOSTAR 100 UNIT/ML injection pen INJECT 42 UNITS INTO THE SKIN TWO TIMES A DAY 5/17/22  Yes Rayo Barahona MD   Continuous Blood Gluc Transmit (DEXCOM G6 TRANSMITTER) MISC Use in combination with new sensor every 10 days.  Transmitter lasts 3 months 4/14/22  Yes Danielle Matias,    Continuous Blood Gluc Sensor (DEXCOM G6 SENSOR) MISC Apply new sensor to abdomen every 10 days 4/14/22  Yes Danielle Matias, DO   Continuous Blood Gluc  (DEXCOM G6 ) MAMADOU Use as Vascular: DP and PT pulses palpable 2/4, Bilateral.  CFT <3 seconds, Bilateral.  Hair growth present to the level of the digits, Bilateral.  Edema absent, Bilateral.  Varicosities absent, Bilateral. Erythema absent, Bilateral    Neurological: Sensation intact to light touch to level of digits, Bilateral.  Protective sensation intact 10/10 sites via 5.07/10g Layton-Stephen Monofilament, Bilateral.  negative Tinel's, Bilateral.  negative Valleix sign, Bilateral.      Integument: Warm, dry, supple, Bilateral.  Open lesion absent, Bilateral.  Interdigital maceration absent to web spaces 1-4, Bilateral.  Nails are normal in length, thickness and color 1-5 bilateral.  Fissures absent, Bilateral.     Asessment: Patient is a 60 y.o. male with:    Diagnosis Orders   1. Type II diabetes mellitus with peripheral circulatory disorder (HCC)  Misc. Devices MISC      2. PVD (peripheral vascular disease) ([address]  Misc. Devices MISC      3. Pain in both feet  Misc. Devices MISC      4. Hammer toes of both feet  Misc. Devices MISC            Plan: Patient examined and evaluated. Current condition and treatment options discussed in detail. Advised pt to his condition. The neuropathy is from the diabetic neuropathy         Patient was fitted in their diabetic shoes and inserts today. I feel that these appliances are medically necessary for my patients well being and in my opinion are both reasonable and necessary to the accepted medical practice in the treatment of the above conditions. Diabetic  shoes prevent the frictional forces on the feet and  from the feet being overloaded and decrease plantar presssure to the forefoot Abnormal foot/leg functions demands mechanical, functional protections and control. Without the diabetic shoes and inserts, the patient may develop an ulcer to the forefoot. Later on in life, the patient may develop an ulcer which leads to osteomyelitis and infections.   These shoes accommodate the toe deformities    Description of the devices: These devices prevent ulcerations on diabetics  Due to the nature of my patients condition, I feel that this therapy is necessary indefinitely, as this is a form of continuous therapy. This is NOT a convenience item. It is my opinion that these devices will prevent future problems with the patients foot deformities     . Verbal and written instructions given to patient. Contact office with any questions/problems/concerns. No orders of the defined types were placed in this encounter. No orders of the defined types were placed in this encounter. All labs were reviewed and all imagining including the above findings were reviewed PRIOR to the patients arrival and with the patient today. Previous patient encounter was reviewed. Encounters from the patients other medical providers were reviewed and noted. Time was spent educating the patient and their families/caregivers on proper care of the feet and ankles. All the above diagnosis were addressed at todays visit and all questions were answered. A total of 30 minutes was spent with this patients encounter which included charting after the patients visit     RTC in 9week(s).     9/20/2022      Electronically signed by Jagdeep Gtz DPM on 9/20/2022 at 11:09 AM  9/20/2022

## 2022-09-21 ENCOUNTER — OFFICE VISIT (OUTPATIENT)
Dept: INFECTIOUS DISEASES | Age: 62
End: 2022-09-21
Payer: MEDICARE

## 2022-09-21 ENCOUNTER — OFFICE VISIT (OUTPATIENT)
Dept: ORTHOPEDIC SURGERY | Age: 62
End: 2022-09-21
Payer: MEDICARE

## 2022-09-21 ENCOUNTER — TELEPHONE (OUTPATIENT)
Dept: INTERVENTIONAL RADIOLOGY/VASCULAR | Age: 62
End: 2022-09-21

## 2022-09-21 VITALS
TEMPERATURE: 96.8 F | WEIGHT: 243.2 LBS | HEART RATE: 98 BPM | DIASTOLIC BLOOD PRESSURE: 105 MMHG | OXYGEN SATURATION: 100 % | SYSTOLIC BLOOD PRESSURE: 146 MMHG | BODY MASS INDEX: 31.21 KG/M2 | HEIGHT: 74 IN

## 2022-09-21 VITALS — RESPIRATION RATE: 12 BRPM | WEIGHT: 243 LBS | OXYGEN SATURATION: 100 % | HEIGHT: 74 IN | BODY MASS INDEX: 31.18 KG/M2

## 2022-09-21 DIAGNOSIS — Z96.641 HISTORY OF TOTAL HIP ARTHROPLASTY, RIGHT: ICD-10-CM

## 2022-09-21 DIAGNOSIS — T84.50XD INFECTION OF PROSTHETIC JOINT, SUBSEQUENT ENCOUNTER: ICD-10-CM

## 2022-09-21 DIAGNOSIS — T84.51XS INFECTION ASSOCIATED WITH INTERNAL RIGHT HIP PROSTHESIS, SEQUELA: ICD-10-CM

## 2022-09-21 DIAGNOSIS — T88.8XXD FLUID COLLECTION AT SURGICAL SITE, SUBSEQUENT ENCOUNTER: Primary | ICD-10-CM

## 2022-09-21 DIAGNOSIS — M25.551 RIGHT HIP PAIN: Primary | ICD-10-CM

## 2022-09-21 DIAGNOSIS — T84.51XD INFECTION ASSOCIATED WITH INTERNAL RIGHT HIP PROSTHESIS, SUBSEQUENT ENCOUNTER: Primary | ICD-10-CM

## 2022-09-21 DIAGNOSIS — T84.51XD INFECTION ASSOCIATED WITH INTERNAL RIGHT HIP PROSTHESIS, SUBSEQUENT ENCOUNTER: ICD-10-CM

## 2022-09-21 PROCEDURE — G8417 CALC BMI ABV UP PARAM F/U: HCPCS | Performed by: INTERNAL MEDICINE

## 2022-09-21 PROCEDURE — 3017F COLORECTAL CA SCREEN DOC REV: CPT | Performed by: ORTHOPAEDIC SURGERY

## 2022-09-21 PROCEDURE — G8417 CALC BMI ABV UP PARAM F/U: HCPCS | Performed by: ORTHOPAEDIC SURGERY

## 2022-09-21 PROCEDURE — 1036F TOBACCO NON-USER: CPT | Performed by: INTERNAL MEDICINE

## 2022-09-21 PROCEDURE — 99213 OFFICE O/P EST LOW 20 MIN: CPT | Performed by: ORTHOPAEDIC SURGERY

## 2022-09-21 PROCEDURE — 3017F COLORECTAL CA SCREEN DOC REV: CPT | Performed by: INTERNAL MEDICINE

## 2022-09-21 PROCEDURE — G8427 DOCREV CUR MEDS BY ELIG CLIN: HCPCS | Performed by: ORTHOPAEDIC SURGERY

## 2022-09-21 PROCEDURE — 99214 OFFICE O/P EST MOD 30 MIN: CPT | Performed by: INTERNAL MEDICINE

## 2022-09-21 PROCEDURE — G8427 DOCREV CUR MEDS BY ELIG CLIN: HCPCS | Performed by: INTERNAL MEDICINE

## 2022-09-21 PROCEDURE — 1036F TOBACCO NON-USER: CPT | Performed by: ORTHOPAEDIC SURGERY

## 2022-09-21 ASSESSMENT — ENCOUNTER SYMPTOMS
EYE DISCHARGE: 0
SHORTNESS OF BREATH: 0
ABDOMINAL PAIN: 0
ROS SKIN COMMENTS: NEGATIVE FOR RASH

## 2022-09-21 NOTE — PROGRESS NOTES
[facility]  Office Progress Note  Today's Date and Time: 9/21/2022, 9:54 AM    Impression:     1. Fluid collection at surgical site, subsequent encounter    2. Infection associated with internal right hip prosthesis, sequela         Recommendations   The patient continues on antimicrobial therapy with ciprofloxacin and doxycycline  Again discussed that this is all sequelae of a chronic infection in the right hip  And is scheduled to see Dr. Rosita Tee to discuss the options and at this point in time the options remain aggressive with debridement, two-stage revision  The patient does not seem too keen about undergoing surgery but at this point in time I do not feel that he has any other options    I have ordered the following medications/ labs:  No orders of the defined types were placed in this encounter. No orders of the defined types were placed in this encounter. Chief complaint/reason for consultation:     Chief Complaint   Patient presents with    Frequent Infections     3 month f/u , infection has came back in right hip, kevin  seen pt In emergency room and did a culture and stated to him then that he didn't see anything         History of Present Illness: Gay Grajeda is a 60y.o.-year-old male who I am seeing in follow-up and he has a history of a right prosthetic hip joint-chronic for which she is on oral suppressive therapy with ciprofloxacin and doxycycline given he has had Pseudomonas aeruginosa and coagulase-negative staph isolated from the hip on multiple occasions. The patient had been seen in the office by my partner Dr. Geoff Rosales and he has been treated for prostate cancer with radiation therapy without any major untoward effects. The patient developed a large right-sided soft tissue fluid collection at the right hip incision site and had an aspiration done 9/16/2022 that did not yield any organisms.     Patient comes in today for follow-up and is otherwise doing well does not report any new issues or concerns. I have personally reviewedthe past medical history, medications, social history, and I have updated the database accordingly. Past Medical History:     Past Medical History:   Diagnosis Date    Arthritis     DM (diabetes mellitus) ([facility] [address] 2009    IDDM    GERD (gastroesophageal reflux disease) 2017    ON RX    Heart murmur 2013    ASYMPTOMATIC    HTN (hypertension) 6/29/2012    ON RX    Hyperlipidemia 06/29/2012    ON RX    Sleep apnea 2016    TRIED MACHINE COULD NOT TOLERATE    Vision abnormalities 2019    FLOATERS RIGHT EYE    Wears glasses      Medications:     Current Outpatient Medications   Medication Sig Dispense Refill    Misc. Devices MISC 1 PAIR OF DIABETIC SHOES (1 LEFT/ 1 RIGHT)  1-3 PAIRS OF INSERTS (LEFT/ RIGHT) 2 each 0    pregabalin (LYRICA) 100 MG capsule TAKE 1 CAPSULE BY MOUTH TWICE DAILY. 60 capsule 2    TRULICITY 3 VN/2.9QY SOPN INJECT THE CONTENTS OF ONE SYRINGE (3MG) UNDER THE SKIN ONCE WEEKLY 2 mL 0    metFORMIN (GLUCOPHAGE) 500 MG tablet TAKE TWO TABLETS BY MOUTH TWICE A DAY WITH MEALS 60 tablet 3    ketoconazole (NIZORAL) 2 % shampoo Use as wash to chest, back, neck, face, and scalp leaving on for 5 minutes prior to washing off once daily for 2 weeks then three times weekly 120 mL 3    ketoconazole (NIZORAL) 2 % cream Apply daily to affected areas on face, scalp, neck, and chest 30 g 2    atorvastatin (LIPITOR) 40 MG tablet Take 1 tablet by mouth daily 30 tablet 3    ibuprofen (ADVIL;MOTRIN) 400 MG tablet Take 1 tablet by mouth every 6 hours as needed for Pain 120 tablet 1    metoprolol tartrate (LOPRESSOR) 25 MG tablet TAKE ONE-HALF TABLET BY MOUTH TWO TIMES A DAY 30 tablet 2    JARDIANCE 25 MG tablet TAKE 1 TABLET BY MOUTH ONCE DAILY.  30 tablet 1    doxycycline hyclate (VIBRA-TABS) 100 MG tablet       venlafaxine (EFFEXOR XR) 37.5 MG extended release capsule TAKE 1 CAPSULE BY MOUTH ONE TIME A DAY 30 capsule 3    tamsulosin (FLOMAX) 0.4 mg capsule Take 1 capsule by mouth daily 90 capsule 1    LANTUS SOLOSTAR 100 UNIT/ML injection pen INJECT 42 UNITS INTO THE SKIN TWO TIMES A DAY 15 mL 3    Continuous Blood Gluc Transmit (DEXCOM G6 TRANSMITTER) MISC Use in combination with new sensor every 10 days. Transmitter lasts 3 months 1 each 3    Continuous Blood Gluc Sensor (DEXCOM G6 SENSOR) MISC Apply new sensor to abdomen every 10 days 3 each 11    Continuous Blood Gluc  (DEXCOM G6 ) MAMADOU Use as directed to monitor glucose continuously 1 each 1    ferrous sulfate (IRON 325) 325 (65 Fe) MG tablet Take 325 mg by mouth daily (with breakfast)       zinc 50 MG CAPS Take 1 capsule by mouth daily      aspirin EC 81 MG EC tablet Take 1 tablet by mouth daily 90 tablet 1    Insulin Pen Needle (B-D ULTRAFINE III SHORT PEN) 31G X 8 MM MISC 1 each by Does not apply route daily 100 each 3    vitamin D3 (CHOLECALCIFEROL) 25 MCG (1000 UT) TABS tablet Take 1 tablet by mouth daily 90 tablet 1    insulin lispro, 1 Unit Dial, (HUMALOG KWIKPEN) 100 UNIT/ML SOPN Inject 6 Units into the skin 3 times daily (before meals) 3 pen 2     Current Facility-Administered Medications   Medication Dose Route Frequency Provider Last Rate Last Admin    bupivacaine (MARCAINE) 0.25 % injection 5 mg  2 mL Intra-artICUlar Once Breezy Henderson DO        methylPREDNISolone acetate (DEPO-MEDROL) injection 80 mg  80 mg Intra-artICUlar Once Sasha Espino DO          Allergies:   Lisinopril, Melatonin, and Trazodone     Review of Systems:   Review of Systems   Constitutional: Negative. HENT: Negative. Respiratory: Negative. Cardiovascular: Negative. Gastrointestinal: Negative. Genitourinary: Negative. Musculoskeletal: Negative. Skin:         Large soft tissue mass along the right lateral hip incision   Neurological: Negative. Psychiatric/Behavioral: Negative.         Physical Examination :   BP (!) 146/105 (Site: Right Lower Arm, Position: Sitting, Cuff Size: Medium Adult)   Pulse 98   Temp 96.8 °F (36 °C)   Ht 6' 2\" (1.88 m)   Wt 243 lb 3.2 oz (110.3 kg)   SpO2 100%   BMI 31.23 kg/m²     Physical Exam  Constitutional:       Appearance: He is well-developed. HENT:      Head: Normocephalic and atraumatic. Cardiovascular:      Rate and Rhythm: Normal rate. Heart sounds: Normal heart sounds. No friction rub. No gallop. Pulmonary:      Effort: Pulmonary effort is normal.      Breath sounds: Normal breath sounds. No wheezing. Abdominal:      General: Bowel sounds are normal.      Palpations: Abdomen is soft. There is no mass. Tenderness: There is no abdominal tenderness. Musculoskeletal:         General: Normal range of motion. Cervical back: Normal range of motion and neck supple. Lymphadenopathy:      Cervical: No cervical adenopathy. Skin:     General: Skin is warm and dry. Comments: The right lateral hip patient has a soft tissue swelling consistent with a soft tissue abscess   Neurological:      Mental Status: He is alert and oriented to person, place, and time.          Laboratory studies :  Medical Decision Making:   I have independently reviewed the following labs:  CBC with Differential:  Lab Results   Component Value Date/Time    WBC 6.3 08/26/2022 09:50 AM    WBC 4.0 06/29/2022 10:37 AM    HGB 10.8 08/26/2022 09:50 AM    HGB 12.0 06/29/2022 10:37 AM    HCT 33.7 08/26/2022 09:50 AM    HCT 38.8 06/29/2022 10:37 AM     08/26/2022 09:50 AM     06/29/2022 10:37 AM    LYMPHOPCT 14 08/26/2022 09:50 AM    LYMPHOPCT 18 06/29/2022 10:37 AM    MONOPCT 9 08/26/2022 09:50 AM    MONOPCT 12 06/29/2022 10:37 AM       BMP:  Lab Results   Component Value Date/Time     08/26/2022 09:50 AM     06/29/2022 10:37 AM    K 4.6 08/26/2022 09:50 AM    K 4.2 06/29/2022 10:37 AM     08/26/2022 09:50 AM     06/29/2022 10:37 AM    CO2 26 08/26/2022 09:50 AM    CO2 25 06/29/2022 10:37 AM    BUN 16 08/26/2022 09:50 AM    BUN 13 06/29/2022 10:37 AM    CREATININE 0.82 08/26/2022 09:50 AM    CREATININE 0.92 06/29/2022 10:37 AM    MG 2.0 08/16/2013 06:29 AM    MG 1.7 07/03/2012 09:20 AM       Hepatic Function Panel:   Lab Results   Component Value Date/Time    PROT 8.2 06/29/2022 10:37 AM    PROT 7.9 05/18/2022 08:08 AM    LABALBU 4.3 06/29/2022 10:37 AM    LABALBU 4.2 05/18/2022 08:08 AM    BILITOT 0.35 06/29/2022 10:37 AM    BILITOT 0.42 05/18/2022 08:08 AM    ALKPHOS 131 06/29/2022 10:37 AM    ALKPHOS 125 05/18/2022 08:08 AM    ALT 18 06/29/2022 10:37 AM    ALT 19 05/18/2022 08:08 AM    AST 17 06/29/2022 10:37 AM    AST 22 05/18/2022 08:08 AM       Lab Results   Component Value Date/Time    CRP 34.7 08/26/2022 09:50 AM    CRP 22.1 03/14/2022 02:21 PM     Lab Results   Component Value Date/Time    SEDRATE 104 08/26/2022 09:50 AM    SEDRATE 78 03/14/2022 02:21 PM         Thank you for allowing us to participate in the care of this patient. Pleasecall with questions. Sharee Kirkland MD  [employer] messaging: [phone]    This note is created with the assistance of a speech recognition program.  While intending to generate a document that actually reflects the content ofthe visit, the document can still have some errors including those of syntax and sound a like substitutions which may escape proof reading. It such instances, actual meaning can be extrapolated by contextual diversion.

## 2022-09-22 NOTE — PROGRESS NOTES
[address] [place] [address] AND SPORTS MEDICINE  [facility] [id number]  Dept: [phone]  Dept Fax: [phone]        Ambulatory Follow Up      Subjective: Olamide Paez is a 60y.o. year old male who presents to our office today for routine followup regarding his   1. Right hip pain    2. Infection associated with internal right hip prosthesis, subsequent encounter    3. History of total hip arthroplasty, right    4. Infection of prosthetic joint, subsequent encounter    . Chief Complaint   Patient presents with    Hip Pain     Right hip pain, f/u lab results        HPI  Anastacio Bang is a 58-year-old male who presents the office today for recheck of his right total hip arthroplasty prosthetic infection. He was recently seen by the infectious disease team where an attempt was made to aspirate a prominent area over his incision right hip. Minimal aspirate was obtained and the patient was asked to follow-up in our office for further evaluation and treatment. Patient notes not feeling well at times but he states this is no different now than it has been even over the last few months or so. He continues to be on suppressive doxycycline treatment for chronic periprosthetic infection. He has had multiple surgeries to the right hip previously. Review of Systems   Constitutional:  Positive for activity change. Negative for fever. HENT:  Negative for dental problem. Eyes:  Negative for discharge. Respiratory:  Negative for shortness of breath. Cardiovascular:  Negative for chest pain. Gastrointestinal:  Negative for abdominal pain. Genitourinary: Negative. Musculoskeletal:  Positive for arthralgias. Skin:         Negative for rash   Neurological:  Positive for weakness. Psychiatric/Behavioral:  Negative for confusion.       I have reviewed the CC, HPI, ROS, PMH, FHX, Social History, and if not present in this note, I have reviewed in the patient's chart. I agree with the documentation provided by other staff and have reviewed their documentation prior to providing my signature indicating agreement. Objective :   Resp 12   Ht 6' 2\" (1.88 m)   Wt 243 lb (110.2 kg)   SpO2 100%   BMI 31.20 kg/m²  Body mass index is 31.2 kg/m². General: Norman Cantu is a 60 y.o. male who is alert and oriented and sitting comfortably in our office. Ortho Exam  MS: Fluctuant area over the mid incision on the right is appreciated without gross erythema. Patient ambulates with mild short weightbearing phase to the right lower extremity but no antalgia. Motor, sensory, vascular examination of the right lower extremity is grossly intact without focal deficits. Neuro: alert and oriented to person and place. Eyes: Extra-ocular muscles intact  Mouth: Oral mucosa moist. No perioral lesions  Pulm: Respirations unlabored and regular. Symmetric chest excursion without outward deformity is noted. Skin: warm, well perfused  Psych:   Patient has good fund of knowledge and displays understanging of exam, diagnosis, and plan. Radiology: XR HIP RIGHT (2-3 VIEWS)    Result Date: 8/26/2022  EXAMINATION: TWO XRAY VIEWS OF THE RIGHT HIP 8/26/2022 10:32 am COMPARISON: Multi view study of the right hip and pelvis from 03/14/2022 HISTORY: ORDERING SYSTEM PROVIDED HISTORY: R hip pain, post op, r/o osteo vs subcu air TECHNOLOGIST PROVIDED HISTORY: R hip pain, post op, r/o osteo vs subcu air FINDINGS: Long-stem revised right hip prosthesis re-identified in unchanged position. No hardware complication identified radiographically. Proliferative bony change along the superolateral acetabulum, unchanged or slightly increased as compared to the previous study. No acute bony finding. No soft tissue gas. 3 prostate gold seed markers. Mild degenerative pubic symphysis changes.      No soft tissue gas or evidence hardware failure status post long-stem revision right hip replacement, as above. Assessment:      1. Right hip pain    2. Infection associated with internal right hip prosthesis, subsequent encounter    3. History of total hip arthroplasty, right    4. Infection of prosthetic joint, subsequent encounter         Plan:      I spoke with the patient's infectious disease physician today about further treatment options. We would like to obtain a more accurate specimen from the fluctuant area and we are going to have the fluctuant area assessed and aspirated in interventional radiology presumably under ultrasound guidance. That will be sent for aerobic, anaerobic, fungal and AFB cultures. I plan to see the patient back after that aspirate is obtained so that we can further discuss other treatment options as necessary. Follow up:No follow-ups on file. No orders of the defined types were placed in this encounter. Orders Placed This Encounter   Procedures    IR ABSCESS DRAINAGE PERC     Standing Status:   Future     Standing Expiration Date:   9/21/2023     Scheduling Instructions:      Right Hip US Guided      Send for ordered cultures       This note is created with the assistance of a speech recognition program.  While intending to generate a document that actually reflects the content of the visit, the document can still have some errors including those of syntax and sound a like substitutions which may escape proof reading.   In such instances, actual meaning can be extrapolated by contextual diversion      Electronically signed by Leopold Midget, DO, FAOAO on 9/21/2022 at 10:33 PM None

## 2022-09-23 ENCOUNTER — HOSPITAL ENCOUNTER (OUTPATIENT)
Dept: INTERVENTIONAL RADIOLOGY/VASCULAR | Age: 62
Discharge: HOME | End: 2022-09-25
Payer: MEDICARE

## 2022-09-23 VITALS — OXYGEN SATURATION: 98 % | HEART RATE: 104 BPM

## 2022-09-23 DIAGNOSIS — T84.51XD INFECTION ASSOCIATED WITH INTERNAL RIGHT HIP PROSTHESIS, SUBSEQUENT ENCOUNTER: ICD-10-CM

## 2022-09-23 PROCEDURE — 77002 NEEDLE LOCALIZATION BY XRAY: CPT

## 2022-09-23 PROCEDURE — 2709999900 HC NON-CHARGEABLE SUPPLY

## 2022-09-23 PROCEDURE — 20610 DRAIN/INJ JOINT/BURSA W/O US: CPT

## 2022-09-23 PROCEDURE — 87102 FUNGUS ISOLATION CULTURE: CPT

## 2022-09-23 PROCEDURE — 87075 CULTR BACTERIA EXCEPT BLOOD: CPT

## 2022-09-23 PROCEDURE — 87070 CULTURE OTHR SPECIMN AEROBIC: CPT

## 2022-09-23 PROCEDURE — 87205 SMEAR GRAM STAIN: CPT

## 2022-09-23 NOTE — BRIEF OP NOTE
Brief Postoperative Note    Kayla Harding  YOB: 1960  [id number]    Pre-operative Diagnosis: Right hip pain    Post-operative Diagnosis: Same    Procedure: Right hip aspiration    Anesthesia: Local    Surgeons/Assistants: Fide Johnson    Estimated Blood Loss: less than 50     Complications: None    Specimens: Was Obtained:     Findings: Successful right hip aspiration. No fluid was spontaneously obtained. Joint was lavaged and <1 mL of sero/sang fluid was aspirated.      Electronically signed by WILVER Damian on 9/23/2022 at 9:59 AM

## 2022-09-26 ENCOUNTER — TELEPHONE (OUTPATIENT)
Dept: ORTHOPEDIC SURGERY | Age: 62
End: 2022-09-26

## 2022-09-26 NOTE — TELEPHONE ENCOUNTER
Patient had hip aspiration done 09/23 and he is confused for follow up.      Did you need to see him or can you give results over the phone    Please call him with direction  Thank you

## 2022-09-26 NOTE — TELEPHONE ENCOUNTER
Dr. Vernell Martin,     Patient had his US guided aspiration done on 9/23 @ [facility]. He is calling to know if you will go over results with him over the phone or would you like to speak with him in person? How soon would you like him in? I noticed in the patients chart that not all of the labs were collected (only aerobic and anaerobic & the fungus were collected--AFB was not collected), and I called IR to find out what happened. I spoke with Cornelio Harrington, and she stated that they barely got a 1/2 cc out of fluid. She stated unfortunately it was not enough to collect for all of the cultures. They tried by US and x-ray guided, and still couldn't get much of anything out. She stated that they took an image, and this is in PACS.

## 2022-09-27 NOTE — TELEPHONE ENCOUNTER
Will call patient after clinic today, and find out what times work for him and inform Dr. Andry Mayer.

## 2022-09-27 NOTE — TELEPHONE ENCOUNTER
I spoke with patient. He chose to be seen in person. He is scheduled in [place] on 9/29 @ 8:30 am. Was given instructions on where to go, and he stated that everything was written down. I told him to call if he had any further questions.

## 2022-09-28 LAB
CULTURE: ABNORMAL
DIRECT EXAM: ABNORMAL
DIRECT EXAM: ABNORMAL
SPECIMEN DESCRIPTION: ABNORMAL

## 2022-09-29 ENCOUNTER — TELEPHONE (OUTPATIENT)
Dept: ORTHOPEDIC SURGERY | Age: 62
End: 2022-09-29

## 2022-09-29 ENCOUNTER — OFFICE VISIT (OUTPATIENT)
Dept: ORTHOPEDIC SURGERY | Age: 62
End: 2022-09-29
Payer: MEDICARE

## 2022-09-29 VITALS — WEIGHT: 249.6 LBS | BODY MASS INDEX: 32.03 KG/M2 | HEIGHT: 74 IN

## 2022-09-29 DIAGNOSIS — M16.11 PRIMARY OSTEOARTHRITIS OF RIGHT HIP: Primary | ICD-10-CM

## 2022-09-29 DIAGNOSIS — Z96.641 HISTORY OF TOTAL HIP ARTHROPLASTY, RIGHT: ICD-10-CM

## 2022-09-29 DIAGNOSIS — T84.51XD INFECTION ASSOCIATED WITH INTERNAL RIGHT HIP PROSTHESIS, SUBSEQUENT ENCOUNTER: ICD-10-CM

## 2022-09-29 PROCEDURE — 99213 OFFICE O/P EST LOW 20 MIN: CPT | Performed by: ORTHOPAEDIC SURGERY

## 2022-09-29 PROCEDURE — G8417 CALC BMI ABV UP PARAM F/U: HCPCS | Performed by: ORTHOPAEDIC SURGERY

## 2022-09-29 PROCEDURE — 3017F COLORECTAL CA SCREEN DOC REV: CPT | Performed by: ORTHOPAEDIC SURGERY

## 2022-09-29 PROCEDURE — 1036F TOBACCO NON-USER: CPT | Performed by: ORTHOPAEDIC SURGERY

## 2022-09-29 PROCEDURE — G8427 DOCREV CUR MEDS BY ELIG CLIN: HCPCS | Performed by: ORTHOPAEDIC SURGERY

## 2022-09-29 ASSESSMENT — ENCOUNTER SYMPTOMS
ROS SKIN COMMENTS: NEGATIVE FOR RASH
EYE DISCHARGE: 0
SHORTNESS OF BREATH: 0
ABDOMINAL PAIN: 0

## 2022-09-29 NOTE — PROGRESS NOTES
[address] [facility]  [address] [address] [place] [address] [place]  [address] [address]  Dept: [phone]  Dept Fax: [phone]        Ambulatory Follow Up      Subjective: nAy Schroeder is a 60y.o. year old male who presents to our office today for routine followup regarding his   1. Primary osteoarthritis of right hip    2. History of total hip arthroplasty, right    3. Infection associated with internal right hip prosthesis, subsequent encounter    . Chief Complaint   Patient presents with    Hip Pain     Hip is painful. Culture results today        HPI  Winston Curran is a 59-year-old male who presents the office today for recheck. He recently underwent aspiration of his hip in interventional radiology. No significant aspirate could be obtained and the hip was lavaged and that was sent for pathologic evaluation. That has come back negative for infection at 5 days. Patient continues to have a draining bolus to his incisional area the right that is problematic. Review of Systems   Constitutional:  Positive for activity change. Negative for fever. HENT:  Negative for dental problem. Eyes:  Negative for discharge. Respiratory:  Negative for shortness of breath. Cardiovascular:  Negative for chest pain. Gastrointestinal:  Negative for abdominal pain. Genitourinary: Negative. Musculoskeletal:  Positive for arthralgias. Skin:         Negative for rash   Neurological:  Positive for weakness. Psychiatric/Behavioral:  Negative for confusion. I have reviewed the CC, HPI, ROS, PMH, FHX, Social History, and if not present in this note, I have reviewed in the patient's chart. I agree with the documentation provided by other staff and have reviewed their documentation prior to providing my signature indicating agreement.     Objective :   Ht 6' 2\" (1.88 m)   Wt 249 lb 9.6 oz (113.2 kg)   BMI 32.05 kg/m²  Body mass index is 32.05 kg/m². General: Verito Terry is a 60 y.o. male who is alert and oriented and sitting comfortably in our office. Ortho Exam  MS: Large bullous draining serous fluid from the lateral aspect the knee incision on the right is appreciated. Patient ambulates with mild short weightbearing phase to the right lower extremity but no antalgia. Motor, sensory, vascular examination of the right lower extremity is grossly intact without focal deficits. Neuro: alert and oriented to person and place. Eyes: Extra-ocular muscles intact  Mouth: Oral mucosa moist. No perioral lesions  Pulm: Respirations unlabored and regular. Symmetric chest excursion without outward deformity is noted. Skin: warm, well perfused  Psych:   Patient has good fund of knowledge and displays understanging of exam, diagnosis, and plan. Radiology: No new x-rays were taken in office today. Assessment:      1. Primary osteoarthritis of right hip    2. History of total hip arthroplasty, right    3. Infection associated with internal right hip prosthesis, subsequent encounter         Plan:      I spent a great deal of time talking to the patient and his wife who was present during the entire evaluation about the complexity of the patient's situation. He has prostate cancer for which she is being treated as well as multiple surgeries to the right hip and a persistent chronic right hip infection. We discussed Girdlestone procedure with removal of implants and leaving the patient without a hip versus removal of implants and placement of a spacer versus aggressive debridement and potential femoral head and polyethylene exchange. I have also discussed the same options with the patient's infectious disease team.  It is felt that the patient, with his multiple comorbidities, is best served with debridement at this time.     All details of the procedure, as well as risks, benefits and alternatives, including the option of non operative versus operative treatment were discussed. The patient understands that risks of the surgery include but are not limited to: bleeding, malunion/nonunion, loss of fixation, loss of reduction, hardware failure, angular or rotational deformity, length discrepancy, limp, transfusion, skin blistering or breakdown, progressive post traumatic degenerative joint disease, possible need for further surgery, bone grafting, infection, nerve injury, paralysis, numbness, blood vessel injury, excessive scaring, wound complication or breakdown, failure of symptoms to improve or actual deterioration in condition, significant acute and/or chronic pain, possible need for amputation, permanent loss of motion, and permanent loss of function. As well as the general complications of anesthesia, which include but are not limited to: myocardial infarction and/or heart attack, stroke, multi organ system failure or even possible death, prolonged hospital stay, blood clots, pulmonary embolism, abnormal reaction to medication, visual and neurological disturbances, constipation, ischemic bowel, bowel obstruction, bowel perforation, ileus and mental status changes. No guarantees were made. The patient will need to be very compliant with postoperative antibiotic regimen to try to chronically suppress the infection and he and his wife both understanding. Follow up:Return for Two weeks post op. No orders of the defined types were placed in this encounter. No orders of the defined types were placed in this encounter. This note is created with the assistance of a speech recognition program.  While intending to generate a document that actually reflects the content of the visit, the document can still have some errors including those of syntax and sound a like substitutions which may escape proof reading.   In such instances, actual meaning can be extrapolated by contextual diversion      Electronically signed by [place] De Dios DO, Clarke Sills on 9/29/2022 at 2:23 PM

## 2022-10-04 ENCOUNTER — ANESTHESIA EVENT (OUTPATIENT)
Dept: OPERATING ROOM | Age: 62
DRG: 502 | End: 2022-10-04
Payer: MEDICARE

## 2022-10-05 ENCOUNTER — ANESTHESIA (OUTPATIENT)
Dept: OPERATING ROOM | Age: 62
DRG: 502 | End: 2022-10-05
Payer: MEDICARE

## 2022-10-05 ENCOUNTER — HOSPITAL ENCOUNTER (INPATIENT)
Age: 62
LOS: 2 days | Discharge: NURSING, REHAB OR LONG-TERM CARE | DRG: 502 | End: 2022-10-07
Attending: ORTHOPAEDIC SURGERY | Admitting: ORTHOPAEDIC SURGERY
Payer: MEDICARE

## 2022-10-05 DIAGNOSIS — T84.51XD INFLAMMATORY REACTION DUE TO INTERNAL PROSTHESIS OF RIGHT HIP, SUBSEQUENT ENCOUNTER: ICD-10-CM

## 2022-10-05 DIAGNOSIS — G89.18 POST-OP PAIN: Primary | ICD-10-CM

## 2022-10-05 PROBLEM — Z96.641 AFTERCARE FOLLOWING RIGHT HIP JOINT REPLACEMENT SURGERY: Status: ACTIVE | Noted: 2022-10-05

## 2022-10-05 PROBLEM — Z47.1 AFTERCARE FOLLOWING RIGHT HIP JOINT REPLACEMENT SURGERY: Status: ACTIVE | Noted: 2022-10-05

## 2022-10-05 PROBLEM — T84.51XA INFECTION OF RIGHT PROSTHETIC HIP JOINT (HCC): Status: ACTIVE | Noted: 2022-10-05

## 2022-10-05 LAB
CREAT SERPL-MCNC: 0.99 MG/DL (ref 0.7–1.2)
GFR SERPL CREATININE-BSD FRML MDRD: >60 ML/MIN/1.73M2
GLUCOSE BLD-MCNC: 108 MG/DL (ref 75–110)
GLUCOSE BLD-MCNC: 127 MG/DL (ref 75–110)
GLUCOSE BLD-MCNC: 289 MG/DL (ref 75–110)
GLUCOSE BLD-MCNC: 78 MG/DL (ref 75–110)
GLUCOSE BLD-MCNC: 92 MG/DL (ref 75–110)

## 2022-10-05 PROCEDURE — 97605 NEG PRS WND THER DME<=50SQCM: CPT | Performed by: ORTHOPAEDIC SURGERY

## 2022-10-05 PROCEDURE — 2500000003 HC RX 250 WO HCPCS: Performed by: ANESTHESIOLOGY

## 2022-10-05 PROCEDURE — 1200000000 HC SEMI PRIVATE

## 2022-10-05 PROCEDURE — 2580000003 HC RX 258: Performed by: ANESTHESIOLOGY

## 2022-10-05 PROCEDURE — 87075 CULTR BACTERIA EXCEPT BLOOD: CPT

## 2022-10-05 PROCEDURE — 7100000001 HC PACU RECOVERY - ADDTL 15 MIN: Performed by: ORTHOPAEDIC SURGERY

## 2022-10-05 PROCEDURE — 6360000002 HC RX W HCPCS: Performed by: ANESTHESIOLOGY

## 2022-10-05 PROCEDURE — 2709999900 HC NON-CHARGEABLE SUPPLY: Performed by: ORTHOPAEDIC SURGERY

## 2022-10-05 PROCEDURE — 3700000001 HC ADD 15 MINUTES (ANESTHESIA): Performed by: ORTHOPAEDIC SURGERY

## 2022-10-05 PROCEDURE — 2580000003 HC RX 258: Performed by: ORTHOPAEDIC SURGERY

## 2022-10-05 PROCEDURE — 11043 DBRDMT MUSC&/FSCA 1ST 20/<: CPT | Performed by: ORTHOPAEDIC SURGERY

## 2022-10-05 PROCEDURE — 82947 ASSAY GLUCOSE BLOOD QUANT: CPT

## 2022-10-05 PROCEDURE — 6360000002 HC RX W HCPCS: Performed by: STUDENT IN AN ORGANIZED HEALTH CARE EDUCATION/TRAINING PROGRAM

## 2022-10-05 PROCEDURE — 6370000000 HC RX 637 (ALT 250 FOR IP): Performed by: STUDENT IN AN ORGANIZED HEALTH CARE EDUCATION/TRAINING PROGRAM

## 2022-10-05 PROCEDURE — 6360000002 HC RX W HCPCS: Performed by: NURSE ANESTHETIST, CERTIFIED REGISTERED

## 2022-10-05 PROCEDURE — 2580000003 HC RX 258: Performed by: STUDENT IN AN ORGANIZED HEALTH CARE EDUCATION/TRAINING PROGRAM

## 2022-10-05 PROCEDURE — 87206 SMEAR FLUORESCENT/ACID STAI: CPT

## 2022-10-05 PROCEDURE — 2500000003 HC RX 250 WO HCPCS: Performed by: NURSE ANESTHETIST, CERTIFIED REGISTERED

## 2022-10-05 PROCEDURE — 2580000003 HC RX 258: Performed by: INTERNAL MEDICINE

## 2022-10-05 PROCEDURE — 3600000012 HC SURGERY LEVEL 2 ADDTL 15MIN: Performed by: ORTHOPAEDIC SURGERY

## 2022-10-05 PROCEDURE — 87176 TISSUE HOMOGENIZATION CULTR: CPT

## 2022-10-05 PROCEDURE — 2720000010 HC SURG SUPPLY STERILE: Performed by: ORTHOPAEDIC SURGERY

## 2022-10-05 PROCEDURE — 6370000000 HC RX 637 (ALT 250 FOR IP): Performed by: NURSE PRACTITIONER

## 2022-10-05 PROCEDURE — 3700000000 HC ANESTHESIA ATTENDED CARE: Performed by: ORTHOPAEDIC SURGERY

## 2022-10-05 PROCEDURE — 6360000002 HC RX W HCPCS: Performed by: INTERNAL MEDICINE

## 2022-10-05 PROCEDURE — 7100000000 HC PACU RECOVERY - FIRST 15 MIN: Performed by: ORTHOPAEDIC SURGERY

## 2022-10-05 PROCEDURE — 87116 MYCOBACTERIA CULTURE: CPT

## 2022-10-05 PROCEDURE — 87070 CULTURE OTHR SPECIMN AEROBIC: CPT

## 2022-10-05 PROCEDURE — 76942 ECHO GUIDE FOR BIOPSY: CPT | Performed by: ANESTHESIOLOGY

## 2022-10-05 PROCEDURE — 3600000002 HC SURGERY LEVEL 2 BASE: Performed by: ORTHOPAEDIC SURGERY

## 2022-10-05 PROCEDURE — 36415 COLL VENOUS BLD VENIPUNCTURE: CPT

## 2022-10-05 PROCEDURE — 6360000002 HC RX W HCPCS: Performed by: ORTHOPAEDIC SURGERY

## 2022-10-05 PROCEDURE — 82565 ASSAY OF CREATININE: CPT

## 2022-10-05 PROCEDURE — 0KBN0ZZ EXCISION OF RIGHT HIP MUSCLE, OPEN APPROACH: ICD-10-PCS | Performed by: ORTHOPAEDIC SURGERY

## 2022-10-05 PROCEDURE — 87205 SMEAR GRAM STAIN: CPT

## 2022-10-05 PROCEDURE — 87077 CULTURE AEROBIC IDENTIFY: CPT

## 2022-10-05 PROCEDURE — 87186 SC STD MICRODIL/AGAR DIL: CPT

## 2022-10-05 PROCEDURE — 99222 1ST HOSP IP/OBS MODERATE 55: CPT | Performed by: INTERNAL MEDICINE

## 2022-10-05 PROCEDURE — 87102 FUNGUS ISOLATION CULTURE: CPT

## 2022-10-05 RX ORDER — INSULIN LISPRO 100 [IU]/ML
0-4 INJECTION, SOLUTION INTRAVENOUS; SUBCUTANEOUS NIGHTLY
Status: DISCONTINUED | OUTPATIENT
Start: 2022-10-05 | End: 2022-10-07 | Stop reason: HOSPADM

## 2022-10-05 RX ORDER — SODIUM CHLORIDE 9 MG/ML
INJECTION, SOLUTION INTRAVENOUS CONTINUOUS
Status: DISCONTINUED | OUTPATIENT
Start: 2022-10-05 | End: 2022-10-05

## 2022-10-05 RX ORDER — MIDAZOLAM HYDROCHLORIDE 1 MG/ML
2 INJECTION INTRAMUSCULAR; INTRAVENOUS ONCE
Status: COMPLETED | OUTPATIENT
Start: 2022-10-05 | End: 2022-10-05

## 2022-10-05 RX ORDER — ONDANSETRON 2 MG/ML
4 INJECTION INTRAMUSCULAR; INTRAVENOUS
Status: DISCONTINUED | OUTPATIENT
Start: 2022-10-05 | End: 2022-10-05 | Stop reason: HOSPADM

## 2022-10-05 RX ORDER — HYDROMORPHONE HCL 110MG/55ML
PATIENT CONTROLLED ANALGESIA SYRINGE INTRAVENOUS PRN
Status: DISCONTINUED | OUTPATIENT
Start: 2022-10-05 | End: 2022-10-05 | Stop reason: SDUPTHER

## 2022-10-05 RX ORDER — SODIUM CHLORIDE 0.9 % (FLUSH) 0.9 %
5-40 SYRINGE (ML) INJECTION PRN
Status: DISCONTINUED | OUTPATIENT
Start: 2022-10-05 | End: 2022-10-05 | Stop reason: HOSPADM

## 2022-10-05 RX ORDER — OXYCODONE HYDROCHLORIDE AND ACETAMINOPHEN 5; 325 MG/1; MG/1
1-2 TABLET ORAL EVERY 6 HOURS PRN
Qty: 42 TABLET | Refills: 0 | Status: SHIPPED | OUTPATIENT
Start: 2022-10-05 | End: 2022-10-12

## 2022-10-05 RX ORDER — SODIUM CHLORIDE 0.9 % (FLUSH) 0.9 %
5-40 SYRINGE (ML) INJECTION EVERY 12 HOURS SCHEDULED
Status: DISCONTINUED | OUTPATIENT
Start: 2022-10-05 | End: 2022-10-07 | Stop reason: HOSPADM

## 2022-10-05 RX ORDER — INSULIN GLARGINE 100 [IU]/ML
42 INJECTION, SOLUTION SUBCUTANEOUS 2 TIMES DAILY
Status: DISCONTINUED | OUTPATIENT
Start: 2022-10-05 | End: 2022-10-06

## 2022-10-05 RX ORDER — DEXAMETHASONE SODIUM PHOSPHATE 4 MG/ML
INJECTION, SOLUTION INTRA-ARTICULAR; INTRALESIONAL; INTRAMUSCULAR; INTRAVENOUS; SOFT TISSUE PRN
Status: DISCONTINUED | OUTPATIENT
Start: 2022-10-05 | End: 2022-10-05 | Stop reason: SDUPTHER

## 2022-10-05 RX ORDER — ACETAMINOPHEN 500 MG
1000 TABLET ORAL EVERY 8 HOURS SCHEDULED
Status: DISCONTINUED | OUTPATIENT
Start: 2022-10-05 | End: 2022-10-07 | Stop reason: HOSPADM

## 2022-10-05 RX ORDER — ROPIVACAINE HYDROCHLORIDE 5 MG/ML
INJECTION, SOLUTION EPIDURAL; INFILTRATION; PERINEURAL PRN
Status: DISCONTINUED | OUTPATIENT
Start: 2022-10-05 | End: 2022-10-05 | Stop reason: SDUPTHER

## 2022-10-05 RX ORDER — PROMETHAZINE HYDROCHLORIDE 12.5 MG/1
12.5 TABLET ORAL EVERY 6 HOURS PRN
Status: DISCONTINUED | OUTPATIENT
Start: 2022-10-05 | End: 2022-10-07 | Stop reason: HOSPADM

## 2022-10-05 RX ORDER — ONDANSETRON 2 MG/ML
INJECTION INTRAMUSCULAR; INTRAVENOUS PRN
Status: DISCONTINUED | OUTPATIENT
Start: 2022-10-05 | End: 2022-10-05 | Stop reason: SDUPTHER

## 2022-10-05 RX ORDER — SODIUM CHLORIDE 9 MG/ML
INJECTION, SOLUTION INTRAVENOUS PRN
Status: DISCONTINUED | OUTPATIENT
Start: 2022-10-05 | End: 2022-10-07 | Stop reason: HOSPADM

## 2022-10-05 RX ORDER — SODIUM CHLORIDE 0.9 % (FLUSH) 0.9 %
5-40 SYRINGE (ML) INJECTION PRN
Status: DISCONTINUED | OUTPATIENT
Start: 2022-10-05 | End: 2022-10-07 | Stop reason: HOSPADM

## 2022-10-05 RX ORDER — TRANEXAMIC ACID 100 MG/ML
INJECTION, SOLUTION INTRAVENOUS PRN
Status: DISCONTINUED | OUTPATIENT
Start: 2022-10-05 | End: 2022-10-05 | Stop reason: SDUPTHER

## 2022-10-05 RX ORDER — HYDROMORPHONE HYDROCHLORIDE 1 MG/ML
0.5 INJECTION, SOLUTION INTRAMUSCULAR; INTRAVENOUS; SUBCUTANEOUS EVERY 5 MIN PRN
Status: DISCONTINUED | OUTPATIENT
Start: 2022-10-05 | End: 2022-10-05 | Stop reason: HOSPADM

## 2022-10-05 RX ORDER — LIDOCAINE HYDROCHLORIDE 10 MG/ML
INJECTION, SOLUTION INFILTRATION; PERINEURAL PRN
Status: DISCONTINUED | OUTPATIENT
Start: 2022-10-05 | End: 2022-10-05 | Stop reason: SDUPTHER

## 2022-10-05 RX ORDER — MAGNESIUM HYDROXIDE 1200 MG/15ML
LIQUID ORAL CONTINUOUS PRN
Status: COMPLETED | OUTPATIENT
Start: 2022-10-05 | End: 2022-10-05

## 2022-10-05 RX ORDER — SODIUM CHLORIDE, SODIUM LACTATE, POTASSIUM CHLORIDE, CALCIUM CHLORIDE 600; 310; 30; 20 MG/100ML; MG/100ML; MG/100ML; MG/100ML
INJECTION, SOLUTION INTRAVENOUS CONTINUOUS
Status: DISCONTINUED | OUTPATIENT
Start: 2022-10-05 | End: 2022-10-05

## 2022-10-05 RX ORDER — FENTANYL CITRATE 50 UG/ML
INJECTION, SOLUTION INTRAMUSCULAR; INTRAVENOUS PRN
Status: DISCONTINUED | OUTPATIENT
Start: 2022-10-05 | End: 2022-10-05 | Stop reason: SDUPTHER

## 2022-10-05 RX ORDER — ATORVASTATIN CALCIUM 40 MG/1
40 TABLET, FILM COATED ORAL DAILY
Status: DISCONTINUED | OUTPATIENT
Start: 2022-10-05 | End: 2022-10-07 | Stop reason: HOSPADM

## 2022-10-05 RX ORDER — TRANEXAMIC ACID 100 MG/ML
INJECTION, SOLUTION INTRAVENOUS
Status: COMPLETED
Start: 2022-10-05 | End: 2022-10-05

## 2022-10-05 RX ORDER — DEXTROSE MONOHYDRATE 100 MG/ML
INJECTION, SOLUTION INTRAVENOUS CONTINUOUS PRN
Status: DISCONTINUED | OUTPATIENT
Start: 2022-10-05 | End: 2022-10-07 | Stop reason: HOSPADM

## 2022-10-05 RX ORDER — INSULIN LISPRO 100 [IU]/ML
4 INJECTION, SOLUTION INTRAVENOUS; SUBCUTANEOUS ONCE
Status: COMPLETED | OUTPATIENT
Start: 2022-10-05 | End: 2022-10-05

## 2022-10-05 RX ORDER — KETOROLAC TROMETHAMINE 30 MG/ML
30 INJECTION, SOLUTION INTRAMUSCULAR; INTRAVENOUS EVERY 8 HOURS
Status: COMPLETED | OUTPATIENT
Start: 2022-10-05 | End: 2022-10-07

## 2022-10-05 RX ORDER — INSULIN LISPRO 100 [IU]/ML
0-8 INJECTION, SOLUTION INTRAVENOUS; SUBCUTANEOUS
Status: DISCONTINUED | OUTPATIENT
Start: 2022-10-06 | End: 2022-10-07 | Stop reason: HOSPADM

## 2022-10-05 RX ORDER — ROCURONIUM BROMIDE 10 MG/ML
INJECTION, SOLUTION INTRAVENOUS PRN
Status: DISCONTINUED | OUTPATIENT
Start: 2022-10-05 | End: 2022-10-05 | Stop reason: SDUPTHER

## 2022-10-05 RX ORDER — OXYCODONE HYDROCHLORIDE 5 MG/1
5 TABLET ORAL EVERY 6 HOURS PRN
Status: DISCONTINUED | OUTPATIENT
Start: 2022-10-05 | End: 2022-10-07 | Stop reason: HOSPADM

## 2022-10-05 RX ORDER — VANCOMYCIN HYDROCHLORIDE 1 G/20ML
INJECTION, POWDER, LYOPHILIZED, FOR SOLUTION INTRAVENOUS PRN
Status: DISCONTINUED | OUTPATIENT
Start: 2022-10-05 | End: 2022-10-05 | Stop reason: ALTCHOICE

## 2022-10-05 RX ORDER — SODIUM CHLORIDE 0.9 % (FLUSH) 0.9 %
5-40 SYRINGE (ML) INJECTION EVERY 12 HOURS SCHEDULED
Status: DISCONTINUED | OUTPATIENT
Start: 2022-10-05 | End: 2022-10-05 | Stop reason: HOSPADM

## 2022-10-05 RX ORDER — SODIUM CHLORIDE 9 MG/ML
INJECTION, SOLUTION INTRAVENOUS PRN
Status: DISCONTINUED | OUTPATIENT
Start: 2022-10-05 | End: 2022-10-05 | Stop reason: HOSPADM

## 2022-10-05 RX ORDER — ONDANSETRON 2 MG/ML
4 INJECTION INTRAMUSCULAR; INTRAVENOUS EVERY 6 HOURS PRN
Status: DISCONTINUED | OUTPATIENT
Start: 2022-10-05 | End: 2022-10-07 | Stop reason: HOSPADM

## 2022-10-05 RX ORDER — VANCOMYCIN HYDROCHLORIDE 1 G/20ML
INJECTION, POWDER, LYOPHILIZED, FOR SOLUTION INTRAVENOUS
Status: DISPENSED
Start: 2022-10-05 | End: 2022-10-06

## 2022-10-05 RX ORDER — LIDOCAINE HYDROCHLORIDE 10 MG/ML
1 INJECTION, SOLUTION EPIDURAL; INFILTRATION; INTRACAUDAL; PERINEURAL
Status: DISCONTINUED | OUTPATIENT
Start: 2022-10-05 | End: 2022-10-05 | Stop reason: HOSPADM

## 2022-10-05 RX ORDER — SODIUM CHLORIDE 9 MG/ML
INJECTION, SOLUTION INTRAVENOUS CONTINUOUS
Status: DISCONTINUED | OUTPATIENT
Start: 2022-10-05 | End: 2022-10-07 | Stop reason: HOSPADM

## 2022-10-05 RX ORDER — LIDOCAINE HYDROCHLORIDE 20 MG/ML
INJECTION, SOLUTION EPIDURAL; INFILTRATION; INTRACAUDAL; PERINEURAL PRN
Status: DISCONTINUED | OUTPATIENT
Start: 2022-10-05 | End: 2022-10-05 | Stop reason: SDUPTHER

## 2022-10-05 RX ORDER — TAMSULOSIN HYDROCHLORIDE 0.4 MG/1
0.4 CAPSULE ORAL DAILY
Status: DISCONTINUED | OUTPATIENT
Start: 2022-10-05 | End: 2022-10-07 | Stop reason: HOSPADM

## 2022-10-05 RX ORDER — PHENYLEPHRINE HCL IN 0.9% NACL 1 MG/10 ML
VIAL (ML) INTRAVENOUS PRN
Status: DISCONTINUED | OUTPATIENT
Start: 2022-10-05 | End: 2022-10-05 | Stop reason: SDUPTHER

## 2022-10-05 RX ORDER — PREGABALIN 75 MG/1
75 CAPSULE ORAL 2 TIMES DAILY
Status: DISCONTINUED | OUTPATIENT
Start: 2022-10-05 | End: 2022-10-07 | Stop reason: HOSPADM

## 2022-10-05 RX ORDER — FENTANYL CITRATE 50 UG/ML
25 INJECTION, SOLUTION INTRAMUSCULAR; INTRAVENOUS EVERY 5 MIN PRN
Status: DISCONTINUED | OUTPATIENT
Start: 2022-10-05 | End: 2022-10-05 | Stop reason: HOSPADM

## 2022-10-05 RX ORDER — PROPOFOL 10 MG/ML
INJECTION, EMULSION INTRAVENOUS PRN
Status: DISCONTINUED | OUTPATIENT
Start: 2022-10-05 | End: 2022-10-05 | Stop reason: SDUPTHER

## 2022-10-05 RX ADMIN — Medication 100 MCG: at 13:17

## 2022-10-05 RX ADMIN — HYDROMORPHONE HYDROCHLORIDE 0.5 MG: 1 INJECTION, SOLUTION INTRAMUSCULAR; INTRAVENOUS; SUBCUTANEOUS at 15:35

## 2022-10-05 RX ADMIN — ROCURONIUM BROMIDE 50 MG: 10 INJECTION, SOLUTION INTRAVENOUS at 13:03

## 2022-10-05 RX ADMIN — SODIUM CHLORIDE, POTASSIUM CHLORIDE, SODIUM LACTATE AND CALCIUM CHLORIDE: 600; 310; 30; 20 INJECTION, SOLUTION INTRAVENOUS at 14:11

## 2022-10-05 RX ADMIN — KETOROLAC TROMETHAMINE 30 MG: 30 INJECTION, SOLUTION INTRAMUSCULAR at 17:26

## 2022-10-05 RX ADMIN — LIDOCAINE HYDROCHLORIDE 3 ML: 10 INJECTION, SOLUTION INFILTRATION; PERINEURAL at 12:32

## 2022-10-05 RX ADMIN — SODIUM CHLORIDE, POTASSIUM CHLORIDE, SODIUM LACTATE AND CALCIUM CHLORIDE: 600; 310; 30; 20 INJECTION, SOLUTION INTRAVENOUS at 11:25

## 2022-10-05 RX ADMIN — Medication 50 MCG: at 13:03

## 2022-10-05 RX ADMIN — CEFAZOLIN 2000 MG: 10 INJECTION, POWDER, FOR SOLUTION INTRAVENOUS at 12:55

## 2022-10-05 RX ADMIN — Medication 200 MCG: at 13:27

## 2022-10-05 RX ADMIN — PROPOFOL 200 MG: 10 INJECTION, EMULSION INTRAVENOUS at 13:03

## 2022-10-05 RX ADMIN — Medication 50 MCG: at 12:55

## 2022-10-05 RX ADMIN — ACETAMINOPHEN 1000 MG: 500 TABLET ORAL at 17:13

## 2022-10-05 RX ADMIN — HYDROMORPHONE HYDROCHLORIDE 0.5 MG: 2 INJECTION INTRAMUSCULAR; INTRAVENOUS; SUBCUTANEOUS at 13:59

## 2022-10-05 RX ADMIN — OXYCODONE HYDROCHLORIDE 5 MG: 5 TABLET ORAL at 21:20

## 2022-10-05 RX ADMIN — INSULIN LISPRO 4 UNITS: 100 INJECTION, SOLUTION INTRAVENOUS; SUBCUTANEOUS at 22:25

## 2022-10-05 RX ADMIN — ROPIVACAINE HYDROCHLORIDE 20 ML: 5 INJECTION, SOLUTION EPIDURAL; INFILTRATION; PERINEURAL at 12:32

## 2022-10-05 RX ADMIN — SUGAMMADEX 200 MG: 100 INJECTION, SOLUTION INTRAVENOUS at 14:31

## 2022-10-05 RX ADMIN — PREGABALIN 75 MG: 75 CAPSULE ORAL at 17:14

## 2022-10-05 RX ADMIN — HYDROMORPHONE HYDROCHLORIDE 0.5 MG: 1 INJECTION, SOLUTION INTRAMUSCULAR; INTRAVENOUS; SUBCUTANEOUS at 15:30

## 2022-10-05 RX ADMIN — DEXAMETHASONE SODIUM PHOSPHATE 4 MG: 4 INJECTION, SOLUTION INTRAMUSCULAR; INTRAVENOUS at 12:32

## 2022-10-05 RX ADMIN — Medication 200 MCG: at 13:46

## 2022-10-05 RX ADMIN — SODIUM CHLORIDE: 9 INJECTION, SOLUTION INTRAVENOUS at 17:23

## 2022-10-05 RX ADMIN — TRANEXAMIC ACID 1000 MG: 100 INJECTION, SOLUTION INTRAVENOUS at 13:10

## 2022-10-05 RX ADMIN — BISACODYL 5 MG: 5 TABLET, COATED ORAL at 17:14

## 2022-10-05 RX ADMIN — ATORVASTATIN CALCIUM 40 MG: 40 TABLET, FILM COATED ORAL at 17:15

## 2022-10-05 RX ADMIN — TRANEXAMIC ACID 1000 MG: 100 INJECTION, SOLUTION INTRAVENOUS at 13:30

## 2022-10-05 RX ADMIN — HYDROMORPHONE HYDROCHLORIDE 0.5 MG: 2 INJECTION INTRAMUSCULAR; INTRAVENOUS; SUBCUTANEOUS at 14:37

## 2022-10-05 RX ADMIN — Medication 100 MCG: at 13:36

## 2022-10-05 RX ADMIN — ACETAMINOPHEN 1000 MG: 500 TABLET ORAL at 21:21

## 2022-10-05 RX ADMIN — Medication 100 MCG: at 13:21

## 2022-10-05 RX ADMIN — TAMSULOSIN HYDROCHLORIDE 0.4 MG: 0.4 CAPSULE ORAL at 17:14

## 2022-10-05 RX ADMIN — HYDROMORPHONE HYDROCHLORIDE 0.5 MG: 2 INJECTION INTRAMUSCULAR; INTRAVENOUS; SUBCUTANEOUS at 14:04

## 2022-10-05 RX ADMIN — ONDANSETRON 4 MG: 2 INJECTION INTRAMUSCULAR; INTRAVENOUS at 14:01

## 2022-10-05 RX ADMIN — HYDROMORPHONE HYDROCHLORIDE 0.5 MG: 2 INJECTION INTRAMUSCULAR; INTRAVENOUS; SUBCUTANEOUS at 14:42

## 2022-10-05 RX ADMIN — SODIUM CHLORIDE, PRESERVATIVE FREE 10 ML: 5 INJECTION INTRAVENOUS at 17:19

## 2022-10-05 RX ADMIN — MIDAZOLAM HYDROCHLORIDE 2 MG: 1 INJECTION, SOLUTION INTRAMUSCULAR; INTRAVENOUS at 12:29

## 2022-10-05 RX ADMIN — Medication 100 MCG: at 13:41

## 2022-10-05 RX ADMIN — LIDOCAINE HYDROCHLORIDE 100 MG: 20 INJECTION, SOLUTION EPIDURAL; INFILTRATION; INTRACAUDAL; PERINEURAL at 13:03

## 2022-10-05 RX ADMIN — CEFEPIME 2000 MG: 2 INJECTION, POWDER, FOR SOLUTION INTRAVENOUS at 21:30

## 2022-10-05 ASSESSMENT — PAIN DESCRIPTION - ORIENTATION
ORIENTATION: RIGHT

## 2022-10-05 ASSESSMENT — PAIN DESCRIPTION - LOCATION
LOCATION: HIP

## 2022-10-05 ASSESSMENT — PAIN SCALES - GENERAL
PAINLEVEL_OUTOF10: 7
PAINLEVEL_OUTOF10: 6
PAINLEVEL_OUTOF10: 8
PAINLEVEL_OUTOF10: 8

## 2022-10-05 ASSESSMENT — PAIN DESCRIPTION - DESCRIPTORS
DESCRIPTORS: ACHING
DESCRIPTORS: ACHING
DESCRIPTORS: DISCOMFORT;ACHING
DESCRIPTORS: ACHING

## 2022-10-05 ASSESSMENT — PAIN - FUNCTIONAL ASSESSMENT
PAIN_FUNCTIONAL_ASSESSMENT: PREVENTS OR INTERFERES SOME ACTIVE ACTIVITIES AND ADLS
PAIN_FUNCTIONAL_ASSESSMENT: 0-10

## 2022-10-05 NOTE — PLAN OF CARE
PROTOCOLS  NURSING IMPLEMENTED    TOTAL JOINT DVT/PE  VENOUS THROMBOEMBOLISM PROPHYLAXIS  (Nursing Automatically Implement)    Pamela Scherer  [phone]  [unfilled]  10/5/22    YES DVT RISK FACTOR SCORE YES MAJOR BLEEDING RISK FACTORS SCORE     [x] 48years old or greater (1)   [] Hx. Easy Bleeding (1)      [x] Heart failure (2)   [] NSAID Use in Last 5 Days (2)      [] Varicose veins - Hx. (1)   [] Gastrointestinal or Genitourinary bleeding in Last 14 Days (2)      [] Myocardial Infarction - Hx. (1)         [] Cancer - Hx. (2)         [] Atrial fibrillation - Hx. (1)         [] Ischemic Stroke - Hx. (1)         [x] Diabetes Mellitus - Hx. (1)         [] Previous DVT/PE - Hx.  (2)         [] Hormone Replacement Therapy (1)         [x] Obesity (1)         [] Paralysis (1)         [] Pregnancy (1)         [] Smoking (1)                   [] Thromophilia (1)   []   Mild to Moderate Bleeding (2)      [] Total Hip Arthroplasty (1)   [] Active Bleeding (4)      [] Family history of PE or DVT? (4) (Consider the following labs to test for presence of inhibitor deficiency state:) Factor V Leiden, Prothrombin Gene Mutation, Protein S Deficiency, Protien C Deficiency, Antithrombin Deficiency   [] Malignant Hypertension (2)        [] Thrombocytopenia 20k to 100k (2)        [] Thrombocytopenia less than 20k (4)        [] Bleeding Diathesis (4)        [] \"Bloody Stick\" Epidural or Spinal (2)     TOTAL DVT SCORE   TOTAL BLEEDING SCORE      [] CLASS A   Standard Risk DVT (0-3)    [] CLASS X Standard Risk Bleeding (0-4)      [x] CLASS B Elevated Risk DVT (greater than 3)    [] CLASS Y High Risk Bleeding (greater than 4)     FINAL MATRIX (e.g. AY)       *If allergic to ASA use Warfarin  *BY patient consider no treatment  **Consider venous filter with high risk PE  **If on Coumadin pre-op, then restart night of surgery      []  DVT Prophylaxis: Class AX, AY    Ecotrin 81 mg by mouth BID starting day of surgery for 6 weeks for all total joints. (If allergic to aspirin, give eliquis 2.5mg BID X 14 days (knees) or 35 days (hips) starting first day postop at 0600). []  DVT Prophylaxis:  Class BX (lazaro choice)    [x]Eliquis 2.5mg BID x 14 days (knees) or 35 days (hips) starting first day postop at 0600      [] Lovenox 40 mg subcu daily starting first day postop at 0600 x 14 days (knees) or 35 days (hips)    Ecotrin 81 mg PO BID-start when Lovenox is finished. (Teach injection prior to discharge.)       [] Xarelto 10 mg by mouth daily starting first day postop at 0600     14 days (knees) or 35 days (hips). If creatinine clearance less than 30 mL/min, give Lovenox 30 mg subcutaneously daily starting first day postop at 0600. Ecotrin 81 mg PO BID-starting when Lovenox is finished. (Teach injection prior to discharge.) (For discharge fill throughout the 10 mg daily with no refills.)      []  DVT Prophylaxis:  Class BY (lazaro choice)               []  Eliquis 2.5mg BID x 14 days (knees) or 35 days (hips) starting first day postop at 0600        []  Ecotrin 81 mg PO BID x 6weeks (all joints)      []  Lovenox 40mg SQ daily to start at 0600 first day post-Op day. 14 days for knees, 35 days for hips    Ecotrin 81 mg PO BID - start when Lovenox is finished. (Teach injection prior to discharge.)       [] Xarelto 10 mg PO daily starting first day Post-Op at 0600    14 days (knees) or 35 days (hips)    If Creatinine Clearance less than 30ml/min, give Lovenox 30 mg SQ daily starting first day Post-Op at 0600 x 14 days (knees) or 35 days (hips). Ecotrin 81 mg PO BID - start when Lovenox is finished.  (Teach injection prior to discharge.)  (For discharge fill throughout the 10 mg daily #32 with no refills.)      Electronically signed by Severino Munroe MD on 10/5/2022 at 12:37 PM

## 2022-10-05 NOTE — CONSULTS
[facility]  Initial Consult Note  Date: 10/5/2022    Hospital day :0     Impression:   Right-sided prosthetic hip joint infection    Recommendations   Options were discussed with a Girdlestone procedure with removal of implants and leaving the patient without a hip with versus removal of implants and placement of a spacer versus aggressive debridement and the patient underwent the debridement procedure at this time given his multiple comorbidities. The patient has undergone the procedure today and I will start him on cefepime and vancomycin  Follow the operative culture data but therapy tentative plan will be for discharge on IV antimicrobial therapy    Chief complaint/reason for consultation:   Right prosthetic hip joint infection    History of Present Illness: Damien Wallis is a 60y.o.-year-old male who was initially admitted on 10/5/2022. 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 The patient has undergone multiple surgical revision procedures in the past.    It is my understanding that he underwent a right prosthetic hip joint infection and this was complicated by a MSSA right prostatic hip joint infection for which he had hardware removed treated with cefazolin for 6 weeks and cefadroxil for maintenance thereafter but then he had reinfection in July 2021 underwent another joint explant and cultures grew out MSSA as well as Enterococcus faecalis for which I understand he was treated with ceftriaxone. [Ceftriaxone has no enterococcal coverage]  The patient had recurrent infection and had an I&D done in December 2021 and no cultures were sent at that time.   He was given doxycycline sent to wound care and wound cultures did subsequently grow out Pseudomonas aeruginosa and ciprofloxacin was added to the antimicrobial regimen. The patient has had multiple surgeries on the right hip secondary to infection. He has had several I&D's on 12/11/2019, 1/6/2020, 4/24/2020 after a total hip replacement on 11/25/2019. He has also undergone explantation on 7/15/2021 with a cement spacer placed and then revision of the spacer on 10/18/2021 and also a subsequent I&D on 12/9/2021 as outlined above. The patient underwent an aspiration of the right hip and cultures have grown out Pseudomonas aeruginosa as well as coagulase-negative staph. The patient did recently get treated for prostate cancer and was seen in the office more recently with a enlarging soft tissue mass/infection in the right lateral hip. Patient was admitted today and underwent excisional debridement of the skin, subcutaneous tissue, muscle, fascia, and debridement and irrigation of the right total hip arthroplasty infection with application of a wound VAC and multiple cultures sent. I was asked to evaluate and help with antibiotic choice. I have personally reviewed the past medical history, past surgical history, medications, social history, and family history, and I have updated the database accordingly.   Past Medical History:     Past Medical History:   Diagnosis Date    Arthritis     CAD (coronary artery disease)     Cancer ([facility] [address]     prostate    Cervical radiculopathy     CHF (congestive heart failure) ([facility])     DM (diabetes mellitus) ([facility] [address] 2009    IDDM    GERD (gastroesophageal reflux disease) 2017    ON RX    Heart murmur 2013    ASYMPTOMATIC    HTN (hypertension) 06/29/2012    ON RX    Hyperlipidemia 06/29/2012    ON RX    Sleep apnea 2016    TRIED MACHINE COULD NOT TOLERATE    Vision abnormalities 2019    FLOATERS RIGHT EYE    Wears glasses      Past Surgical  History:     Past Surgical History:   Procedure Laterality Date    COLONOSCOPY  07/23/2010    Dr. Maximiliano Stuart Bilateral 2017    CATARACT EXTRACTION WITH IOL    KNEE ARTHROSCOPY      REVISION TOTAL HIP ARTHROPLASTY Right 10/5/2022    RIGHT HIP EXCISIONAL DEBRIDEMENT SKIN TO SUBCUTANEOUS TISSUE AND FASCIA AND IRRIGATION performed by Federica Carson DO at [address] [place] [address] [place] Right 2019    VITRECTOMY Right 3/28/2019    VITRECTOMY 25 GAUGE,  IOL REPOSITION  (Amanda Pappas) performed by Filippo Malhotra MD at [place] [address]     Medications:      vancomycin        atorvastatin  40 mg Oral Daily    tamsulosin  0.4 mg Oral Daily    sodium chloride flush  5-40 mL IntraVENous 2 times per day    ceFAZolin (ANCEF) IVPB  2,000 mg IntraVENous Q8H    pregabalin  75 mg Oral BID    ketorolac  30 mg IntraVENous Q8H    acetaminophen  1,000 mg Oral 3 times per day    [START ON 10/6/2022] apixaban  2.5 mg Oral BID    bisacodyl  5 mg Oral Daily    [START ON 10/6/2022] metFORMIN  1,000 mg Oral BID WC     Social History:     Social History     Socioeconomic History    Marital status: Single     Spouse name: Not on file    Number of children: Not on file    Years of education: Not on file    Highest education level: Not on file   Occupational History    Not on file   Tobacco Use    Smoking status: Former     Packs/day: 0.00     Years: 15.00     Pack years: 0.00     Types: Cigarettes     Quit date: 3/27/2016     Years since quittin.5    Smokeless tobacco: Never   Vaping Use    Vaping Use: Never used   Substance and Sexual Activity    Alcohol use: Yes     Comment: etoh abuse.  BEER, WINE  12 A WEEK rarley    Drug use: No    Sexual activity: Yes   Other Topics Concern    Not on file   Social History Narrative    Not on file     Social Determinants of Health     Financial Resource Strain: Low Risk     Difficulty of Paying Living Expenses: Not hard at all   Food Insecurity: No Food Insecurity    Worried About Running Out of Food in the Last Year: Never true    Ran Out of Food in the Last Year: Never true   Transportation Needs: Not on file   Physical Activity: Inactive    Days of Exercise per Week: 0 days    Minutes of Exercise per Session: 0 min   Stress: Not on file   Social Connections: Not on file   Intimate Partner Violence: Not on file   Housing Stability: Not on file     Family History:     Family History   Problem Relation Age of Onset    Diabetes Sister     Diabetes Brother     Cancer Brother       Allergies:   Lisinopril, Melatonin, and Trazodone     Review of Systems:   General: No fevers or chills. Eyes: No double vision or blurry vision. ENT: No sore throat or runny nose. Cardiovascular: No chest pain or palpitations. Lung: No shortness of breath or cough. Abdomen: No nausea, vomiting, diarrhea, or abdominal pain. Genitourinary: No increased urinary frequency, or dysuria. Musculoskeletal: No muscle aches or pains. Hematologic: No bleeding or bruising. Neurologic: No headache, weakness, numbness, or tingling. Physical Examination :   BP (!) 141/96   Pulse 84   Temp 97.9 °F (36.6 °C) (Oral)   Resp 16   Ht 6' 2\" (1.88 m)   Wt 248 lb (112.5 kg)   SpO2 92%   BMI 31.84 kg/m²     Temperature Range: Temp: 97.9 °F (36.6 °C) Temp  Av.3 °F (36.3 °C)  Min: 96.9 °F (36.1 °C)  Max: 97.9 °F (36.6 °C)  General Appearance: Awake, alert, and in no apparent distress  Head: Normocephalic, without obvious abnormality, atraumatic  Eyes: Pupils equal, round, reactive, to light and accommodation; extraocular movements intact; sclera anicteric; conjunctivae pink  ENT: Oropharynx clear, without erythema, exudate, or thrush. Neck: Supple, without lymphadenopathy. Pulmonary/Chest: Clear to auscultation, without wheezes, rales, or rhonchi  Cardiovascular: Regular rate and rhythm without murmurs, rubs, or gallops. Abdomen: Soft, nontender, nondistended. Extremities: No cyanosis, clubbing, edema, or effusions. Neurologic: No gross sensory or motor deficits.     Skin: Wound VAC in the right lateral hip    Medical Decision Making:   I have independently reviewed/ordered the following labs:  CBC with Differential: No results for input(s): WBC, HGB, HCT, PLT, SEGSPCT, BANDSPCT, LYMPHOPCT, MONOPCT, EOSPCT in the last 72 hours. BMP: No results for input(s): NA, K, CL, CO2, BUN, CREATININE, CA, MG in the last 72 hours. Hepatic Function Panel: No results for input(s): PROT, LABALBU, BILIDIR, IBILI, BILITOT, ALKPHOS, ALT, AST in the last 72 hours. No results found for: PROCAL    Lab Results   Component Value Date/Time    CRP 34.7 08/26/2022 09:50 AM    CRP 22.1 03/14/2022 02:21 PM     Lab Results   Component Value Date/Time    FERRITIN 169 05/18/2022 08:08 AM     No results found for: FIBRINOGEN  No results found for: DDIMER  No results found for: LDH    Lab Results   Component Value Date    SEDRATE 104 (H) 08/26/2022       No results found for: COVID19  No results found for requested labs within last 30 days. Imaging Studies:   No results found.     Cultures:     Culture, Anaerobic and Aerobic [[id number]] Collected: 10/05/22 1405   Order Status: Sent Specimen: Swab from Hip Updated: 10/05/22 1846   Culture, Tissue [[id number]] Collected: 10/05/22 1409   Order Status: Sent Specimen: Tissue from Hip Updated: 10/05/22 1846   Culture, Fungus [[id number]] Collected: 10/05/22 1405   Order Status: Sent Specimen: Swab from Hip Updated: 10/05/22 1502   Culture with Smear, Acid Fast Clarisa Pope [[id number]] Collected: 10/05/22 1409   Order Status: Sent Specimen: Tissue from Hip Updated: 10/05/22 1453   Culture, Fungus [[id number]] Collected: 10/05/22 1409   Order Status: Sent Specimen: Tissue from Hip Updated: 10/05/22 1450   Culture, Anaerobic and Aerobic [[id number]] Collected: 10/05/22 1409   Order Status: Canceled Specimen: Tissue from Hip    Culture, Aerobic [[id number]] Collected: 10/05/22 1405   Order Status: Canceled Specimen: Swab from Hip    Culture with Smear, Acid Fast Clarisa Pope [[id number]] Collected: 10/05/22 1405   Order Status: Canceled Specimen: Swab from Hip        Thank you for allowing us to participate in the care of this patient. Please call with questions. Electronically signed by Adrian Gambino MD on 10/5/2022 at 5:57 PM      [facility]  Adrian Gambino MD  [employer]  OFFICE: [phone]      This note is created with the assistance of a speech recognition program.  While intending to generate a document that actually reflects the content of the visit, the document can still have some errors including those of syntax and sound a like substitutions which may escape proof reading. In such instances, actual meaning can be extrapolated by contextual diversion.

## 2022-10-05 NOTE — ANESTHESIA PRE PROCEDURE
Department of Anesthesiology  Preprocedure Note       Name:  Ronnie Weinstein   Age:  60 y.o.  :  1960                                          MRN:  [id number]         Date:  10/5/2022      Surgeon: Olivia Contreras):  Daisy Kahn DO    Procedure: Procedure(s):  RIGHT HIP EXCISIONAL DEBRIDEMENT SKIN TO SUBCUTANEOUS TISSUE AND FASCIA AND IRRIGATION    Medications prior to admission:   Prior to Admission medications    Medication Sig Start Date End Date Taking? Authorizing Provider   oxyCODONE-acetaminophen (PERCOCET) 5-325 MG per tablet Take 1-2 tablets by mouth every 6 hours as needed for Pain for up to 7 days. 10/5/22 10/12/22 Yes Lisa Dolan MD   apixaban Jen Bunde) 2.5 MG TABS tablet Take 1 tablet by mouth 2 times daily 10/5/22 11/9/22 Yes Lisa Dolan MD   Misc. Devices MISC 1 PAIR OF DIABETIC SHOES (1 LEFT/ 1 RIGHT)  1-3 PAIRS OF INSERTS (LEFT/ RIGHT) 22   Maxwell Isabel DPM   pregabalin (LYRICA) 100 MG capsule TAKE 1 CAPSULE BY MOUTH TWICE DAILY.  22  Bernardo Vega MD   TRULICITY 3 HT/6.4XD SOPN INJECT THE CONTENTS OF ONE SYRINGE (3MG) UNDER THE SKIN ONCE WEEKLY 22   Beverly Foster MD   metFORMIN (GLUCOPHAGE) 500 MG tablet TAKE TWO TABLETS BY MOUTH TWICE A DAY WITH MEALS 22   Ladan Shi MD   ketoconazole (NIZORAL) 2 % shampoo Use as wash to chest, back, neck, face, and scalp leaving on for 5 minutes prior to washing off once daily for 2 weeks then three times weekly 22   Felisa Jarrett MD   ketoconazole (NIZORAL) 2 % cream Apply daily to affected areas on face, scalp, neck, and chest 22   Jeremy Mcgarry MD   atorvastatin (LIPITOR) 40 MG tablet Take 1 tablet by mouth daily 22   Dayton Eid MD   ibuprofen (ADVIL;MOTRIN) 400 MG tablet Take 1 tablet by mouth every 6 hours as needed for Pain 22   Justina Celaya MD   metoprolol tartrate (LOPRESSOR) 25 MG tablet TAKE ONE-HALF TABLET BY MOUTH TWO TIMES A DAY 22 Papi Olsen MD   JARDIANCE 25 MG tablet TAKE 1 TABLET BY MOUTH ONCE DAILY. 8/12/22   Papi Olsen MD   doxycycline hyclate (VIBRA-TABS) 100 MG tablet  7/12/22   Historical Provider, MD   venlafaxine (EFFEXOR XR) 37.5 MG extended release capsule TAKE 1 CAPSULE BY MOUTH ONE TIME A DAY 7/15/22   Olivia Guzman MD   tamsulosin (FLOMAX) 0.4 mg capsule Take 1 capsule by mouth daily 5/24/22   Morgan Nicolas MD   LANTUS SOLOSTAR 100 UNIT/ML injection pen INJECT 42 UNITS INTO THE SKIN TWO TIMES A DAY 5/17/22   Phyllistine MD Miah   Continuous Blood Gluc Transmit (DEXCOM G6 TRANSMITTER) MISC Use in combination with new sensor every 10 days.  Transmitter lasts 3 months 4/14/22   Alejo Meeks, DO   Continuous Blood Gluc Sensor (DEXCOM G6 SENSOR) MISC Apply new sensor to abdomen every 10 days 4/14/22   Alejo Meeks, DO   Continuous Blood Gluc  (DEXCOM G6 ) MAMADOU Use as directed to monitor glucose continuously 4/14/22   Alejo Meeks, DO   ferrous sulfate (IRON 325) 325 (65 Fe) MG tablet Take 325 mg by mouth daily (with breakfast)     Historical Provider, MD   zinc 50 MG CAPS Take 1 capsule by mouth daily    Historical Provider, MD   aspirin EC 81 MG EC tablet Take 1 tablet by mouth daily 4/12/22   Bernardo Saavedra MD   Insulin Pen Needle (B-D ULTRAFINE III SHORT PEN) 31G X 8 MM MISC 1 each by Does not apply route daily 4/12/22   Cherelle sHieh MD   vitamin D3 (CHOLECALCIFEROL) 25 MCG (1000 UT) TABS tablet Take 1 tablet by mouth daily 4/12/22   Bernardo Saavedra MD   insulin lispro, 1 Unit Dial, (HUMALOG KWIKPEN) 100 UNIT/ML SOPN Inject 6 Units into the skin 3 times daily (before meals) 4/12/22   Bernardo Saavedra MD       Current medications:    Current Facility-Administered Medications   Medication Dose Route Frequency Provider Last Rate Last Admin    lidocaine PF 1 % injection 1 mL  1 mL IntraDERmal Once PRN Basim Perez MD        0.9 % sodium chloride infusion   IntraVENous Continuous Adarsh Pastor MD        lactated ringers infusion   IntraVENous Continuous Adarsh Pastor  mL/hr at 10/05/22 1255 NoRateChange at 10/05/22 1255    sodium chloride flush 0.9 % injection 5-40 mL  5-40 mL IntraVENous 2 times per day Adarsh Pastor MD        sodium chloride flush 0.9 % injection 5-40 mL  5-40 mL IntraVENous PRN Adarsh Pastor MD        0.9 % sodium chloride infusion   IntraVENous PRN Adarsh Pastor MD        vancomycin (VANCOCIN) 1 g injection             vancomycin (VANCOCIN) injection    PRN Marylee Alf, DO   1,000 mg at 10/05/22 1358    sodium chloride 0.9 % irrigation    Continuous PRN Breezy Salomon Sole, DO   9,000 mL at 10/05/22 1359     Facility-Administered Medications Ordered in Other Encounters   Medication Dose Route Frequency Provider Last Rate Last Admin    fentaNYL (SUBLIMAZE) injection   IntraVENous PRN Hephzibah Muskrat, APRN - CRNA   50 mcg at 10/05/22 1303    lidocaine PF 2 % injection   IntraVENous PRN Hephzibah Muskrat, APRN - CRNA   100 mg at 10/05/22 1303    propofol injection   IntraVENous PRN Conor Muskrat, APRN - CRNA   200 mg at 10/05/22 1303    rocuronium (ZEMURON) injection   IntraVENous PRN Conor Muskrat, APRN - CRNA   50 mg at 10/05/22 1303    phenylephrine (MITCHELL-SYNEPHRINE) 1 MG/10ML injection   IntraVENous PRN Conor Muskrat, APRN - CRNA   200 mcg at 10/05/22 1346    tranexamic acid (CYKLOKAPRON) injection   IntraVENous PRN Conor Muskrat, APRN - CRNA   1,000 mg at 10/05/22 1330    ondansetron (ZOFRAN) injection   IntraVENous PRN Conor Muskrat, APRN - CRNA   4 mg at 10/05/22 1401    HYDROmorphone (DILAUDID) injection   IntraVENous PRN Conor Muskrat, APRN - CRNA   0.5 mg at 10/05/22 1404       Allergies:     Allergies   Allergen Reactions    Lisinopril Swelling     Outer Neck swelling    Melatonin Swelling    Trazodone Swelling     Chest swells       Problem List:    Patient Active Problem List   Diagnosis Code    DM (diabetes mellitus) ([facility] E11.9  HTN (hypertension) I10    ETOHism ([facility] F10.20    Hypertensive urgency I16.0    Abnormal ambulatory electrocardiogram R94.31    Abnormal ambulatory electrocardiography R94.31    Abnormal kidney function N28.9    Adiposity E66.9    Astigmatism H52.209    Atherosclerotic heart disease of native coronary artery without angina pectoris I25.10    Cervical radiculopathy M54.12    Chronic back pain M54.9, G89.29    Decreased cardiac output R09.89    Diabetic peripheral neuropathy (HCC) E11.42    Dislocated intraocular lens T85. 22XA    Disturbance in sleep behavior G47.9    Dizziness R42    Dyssomnia G47.9    Floaters H43.399    Impotence of organic origin N52.9    Left ventricular dysfunction I51.9    Low vision, both eyes H54.3    Myopia H52.10    Nuclear sclerosis of left eye H25.12    Obstructive sleep apnea syndrome G47.33    Palpitations R00.2    Personal history of other diseases of the digestive system Z87.19    Posterior vitreous detachment H43.819    Presbyopia H52.4    Primary osteoarthritis of right hip M16.11    Pseudophakia of right eye Z96.1    Repetitive intrusions of sleep G47.9    Sciatica M54.30    Tendonitis M77.9    Vitamin D deficiency E55.9    Vitreous opacities of right eye H43.391    Dislocated IOL (intraocular lens), anterior, right H27.121    Prostate CA (HCC) C61    Congestive heart failure (HCC) I50.9    Essential hypertension I10    Benign essential HTN I10    Diabetes mellitus ([facility] [address] E11.9       Past Medical History:        Diagnosis Date    Arthritis     CAD (coronary artery disease)     Cancer ([facility] [address]     prostate    Cervical radiculopathy     CHF (congestive heart failure) (HCC)     [facility] 2009    IDDM    GERD (gastroesophageal reflux disease) 2017    ON RX    Heart murmur 2013    ASYMPTOMATIC    HTN (hypertension) 06/29/2012    ON RX    Hyperlipidemia 06/29/2012    ON RX    Sleep apnea 2016    TRIED MACHINE COULD NOT TOLERATE    Vision abnormalities 2019    FLOATERS RIGHT EYE    Wears glasses        Past Surgical History:        Procedure Laterality Date    COLONOSCOPY  2010    Dr. Anatoliy Cobian Bilateral 2017    CATARACT EXTRACTION WITH IOL    KNEE ARTHROSCOPY      VASECTOMY  1999    VITRECTOMY Right 2019    VITRECTOMY Right 3/28/2019    VITRECTOMY 25 GAUGE,  IOL REPOSITION  (Michelle Olvera) performed by Tawana Morales MD at [address] [place] [address] History:    Social History     Tobacco Use    Smoking status: Former     Packs/day: 0.00     Years: 15.00     Pack years: 0.00     Types: Cigarettes     Quit date: 3/27/2016     Years since quittin.5    Smokeless tobacco: Never   Substance Use Topics    Alcohol use: Yes     Comment: etoh abuse. BEER, WINE  12 A WEEK rarley                                Counseling given: Not Answered      Vital Signs (Current):   Vitals:    10/05/22 1105 10/05/22 1230 10/05/22 1233 10/05/22 1237   BP: 138/89 138/84 139/85 (!) 127/92   Pulse: 88 85 78 85   Resp: 16 16 20 20   Temp: 96.9 °F (36.1 °C)      TempSrc: Temporal      SpO2: 98% 98% (!) 84% 99%   Weight:       Height:                                                  BP Readings from Last 3 Encounters:   10/05/22 (!) 127/92   22 (!) 146/105   22 (!) 148/100       NPO Status: Time of last liquid consumption:                         Time of last solid consumption:                         Date of last liquid consumption: 10/04/22                        Date of last solid food consumption: 10/04/22    BMI:   Wt Readings from Last 3 Encounters:   10/05/22 248 lb (112.5 kg)   22 249 lb 9.6 oz (113.2 kg)   22 243 lb (110.2 kg)     Body mass index is 31.84 kg/m².     CBC:   Lab Results   Component Value Date/Time    WBC 6.3 2022 09:50 AM    RBC 4.06 2022 09:50 AM    HGB 10.8 2022 09:50 AM    HCT 33.7 2022 09:50 AM    MCV 83.0 2022 09:50 AM    RDW 16.3 08/26/2022 09:50 AM     08/26/2022 09:50 AM       CMP:   Lab Results   Component Value Date/Time     08/26/2022 09:50 AM    K 4.6 08/26/2022 09:50 AM     08/26/2022 09:50 AM    CO2 26 08/26/2022 09:50 AM    BUN 16 08/26/2022 09:50 AM    CREATININE 0.82 08/26/2022 09:50 AM    GFRAA >60 08/26/2022 09:50 AM    LABGLOM >60 08/26/2022 09:50 AM    GLUCOSE 203 08/26/2022 09:50 AM    PROT 8.2 06/29/2022 10:37 AM    CALCIUM 8.9 08/26/2022 09:50 AM    BILITOT 0.35 06/29/2022 10:37 AM    ALKPHOS 131 06/29/2022 10:37 AM    AST 17 06/29/2022 10:37 AM    ALT 18 06/29/2022 10:37 AM       POC Tests:   Recent Labs     10/05/22  1123   POCGLU 108       Coags:   Lab Results   Component Value Date/Time    PROTIME 10.5 08/15/2013 05:14 AM    INR 1.0 08/15/2013 05:14 AM    APTT 24.5 08/15/2013 05:14 AM       HCG (If Applicable): No results found for: PREGTESTUR, PREGSERUM, HCG, HCGQUANT     ABGs: No results found for: PHART, PO2ART, AMC5CTM, NWQ8OUE, BEART, G2CRPUQU     Type & Screen (If Applicable):  No results found for: LABABO, LABRH    Drug/Infectious Status (If Applicable):  Lab Results   Component Value Date/Time    HEPCAB NONREACTIVE 04/12/2022 04:02 PM       COVID-19 Screening (If Applicable): No results found for: COVID19        Anesthesia Evaluation  Patient summary reviewed and Nursing notes reviewed no history of anesthetic complications:   Airway: Mallampati: II  TM distance: >3 FB   Neck ROM: full  Mouth opening: > = 3 FB   Dental:          Pulmonary:normal exam    (+) sleep apnea:                             Cardiovascular:  Exercise tolerance: no interval change,   (+) hypertension:, CAD:, CHF:,           Rate: normal                    Neuro/Psych:   (+) neuromuscular disease:,             GI/Hepatic/Renal:   (+) GERD:,           Endo/Other:    (+) Diabetes, . Abdominal:             Vascular:           Other Findings:           Anesthesia Plan      general     ASA 3       Induction: intravenous. MIPS: Prophylactic antiemetics administered. Anesthetic plan and risks discussed with patient. Plan discussed with CRNA.     Attending anesthesiologist reviewed and agrees with Preprocedure content                Neal Haddad,    10/5/2022

## 2022-10-05 NOTE — H&P
Interval H&P Note    Pt Name: Yessenia Hall  MRN: [id number]  YOB: 1960  Date of evaluation: 10/5/2022      [x] I have reviewed progress notes by Dr. Apple Ramirez dated 9/29/22 for an Interval History and Physical note. [x] I have examined  Yessenia Hall  There are no changes to the patient who is scheduled for RIGHT HIP INCISION AND  DRAINAGE    POSSIBLE HEAD AND POLY EXCHANGE    POSSIBLE WOUND VAC       - Ethelyn Bad by La Carrera DO for Inflammatory reaction due to internal prosthesis of right hip, subsequent encounter . The patient denies new health changes, fever, chills, wheezing, cough, increased SOB, chest pain, open sores or wounds. Blood sugar in preop 108 mg/dl. Last took baby aspirin 81mg yesterday night. Last ate and drank yesterday 8pm.     Vital signs: /89   Pulse 88   Temp 96.9 °F (36.1 °C) (Temporal)   Resp 16   Ht 6' 2\" (1.88 m)   Wt 248 lb (112.5 kg)   SpO2 98%   BMI 31.84 kg/m²     Allergies:  Lisinopril, Melatonin, and Trazodone    Medications:    Prior to Admission medications    Medication Sig Start Date End Date Taking? Authorizing Provider   Misc. Devices MISC 1 PAIR OF DIABETIC SHOES (1 LEFT/ 1 RIGHT)  1-3 PAIRS OF INSERTS (LEFT/ RIGHT) 9/20/22   Agustina Royal DPM   pregabalin (LYRICA) 100 MG capsule TAKE 1 CAPSULE BY MOUTH TWICE DAILY.  9/7/22 12/6/22  Bernardo Davis MD   [facility]EB SOPN INJECT THE CONTENTS OF ONE SYRINGE (3MG) UNDER THE SKIN ONCE WEEKLY 9/7/22   Alexia Kerns MD   metFORMIN (GLUCOPHAGE) 500 MG tablet TAKE TWO TABLETS BY MOUTH TWICE A DAY WITH MEALS 9/7/22   Ladan Shi MD   ketoconazole (NIZORAL) 2 % shampoo Use as wash to chest, back, neck, face, and scalp leaving on for 5 minutes prior to washing off once daily for 2 weeks then three times weekly 8/30/22   Vanesa Zuñiga MD   ketoconazole (NIZORAL) 2 % cream Apply daily to affected areas on face, scalp, neck, and chest 8/30/22   Vanesa Zuñiga MD atorvastatin (LIPITOR) 40 MG tablet Take 1 tablet by mouth daily 8/17/22   Devon Farley MD   ibuprofen (ADVIL;MOTRIN) 400 MG tablet Take 1 tablet by mouth every 6 hours as needed for Pain 8/17/22   Jen Magdaleno MD   metoprolol tartrate (LOPRESSOR) 25 MG tablet TAKE ONE-HALF TABLET BY MOUTH TWO TIMES A DAY 8/12/22   Kim Joe MD   JARDIANCE 25 MG tablet TAKE 1 TABLET BY MOUTH ONCE DAILY. 8/12/22   Kim Joe MD   doxycycline hyclate (VIBRA-TABS) 100 MG tablet  7/12/22   Historical Provider, MD   venlafaxine (EFFEXOR XR) 37.5 MG extended release capsule TAKE 1 CAPSULE BY MOUTH ONE TIME A DAY 7/15/22   Richard Brito MD   tamsulosin (FLOMAX) 0.4 mg capsule Take 1 capsule by mouth daily 5/24/22   Morgan Nicolas MD   LANTUS SOLOSTAR 100 UNIT/ML injection pen INJECT 42 UNITS INTO THE SKIN TWO TIMES A DAY 5/17/22   Tania Arizmendi MD   Continuous Blood Gluc Transmit (DEXCOM G6 TRANSMITTER) MISC Use in combination with new sensor every 10 days.  Transmitter lasts 3 months 4/14/22   Shawnappmehul Campbell, DO   Continuous Blood Gluc Sensor (DEXCOM G6 SENSOR) MISC Apply new sensor to abdomen every 10 days 4/14/22   Sotero Campbell, DO   Continuous Blood Gluc  (DEXCOM G6 ) MAMADOU Use as directed to monitor glucose continuously 4/14/22   Sotero Campbell, DO   ferrous sulfate (IRON 325) 325 (65 Fe) MG tablet Take 325 mg by mouth daily (with breakfast)     Historical Provider, MD   zinc 50 MG CAPS Take 1 capsule by mouth daily    Historical Provider, MD   aspirin EC 81 MG EC tablet Take 1 tablet by mouth daily 4/12/22   Bernardo Zurita MD   Insulin Pen Needle (B-D ULTRAFINE III SHORT PEN) 31G X 8 MM MISC 1 each by Does not apply route daily 4/12/22   Jen Magdaleno MD   vitamin D3 (CHOLECALCIFEROL) 25 MCG (1000 UT) TABS tablet Take 1 tablet by mouth daily 4/12/22   Bernardo Zurita MD   insulin lispro, 1 Unit Dial, (HUMALOG KWIKPEN) 100 UNIT/ML SOPN Inject 6 Units into the skin 3 times daily (before meals) 4/12/22   Bernardo Lucio MD         This is a 60 y.o. male who is pleasant, cooperative, alert and oriented x3, in no acute distress. Heart: Heart sounds are normal.  HR  regular rate and rhythm without murmur, gallop or rub. Lungs: Normal respiratory effort with equal expansion, good air exchange, unlabored and clear to auscultation without wheezes or rales bilaterally   Abdomen: soft, nontender, nondistended with bowel sounds . Labs:  No results for input(s): HGB, HCT, WBC, MCV, PLT, NA, K, CL, CO2, BUN, CREATININE, GLUCOSE, INR, PROTIME, APTT, AST, ALT, LABALBU, HCG in the last 720 hours. No results for input(s): COVID19 in the last 720 hours. SHANTELLE Hansen CNP  Electronically signed 10/5/2022 at 11:16 AM    Federica Carson DO   Physician   Specialty:  Orthopedic Surgery   Progress Notes       Signed   Encounter Date:  9/29/2022          Related encounter: Office Visit from 9/29/2022 in [facility]  [address] [address] [place] [address] [address] [address]  Dept: [phone]  Dept Fax: [phone]         Ambulatory Follow Up        Subjective: Waylon Apple is a 60y.o. year old male who presents to our office today for routine followup regarding his   1. Primary osteoarthritis of right hip    2. History of total hip arthroplasty, right    3. Infection associated with internal right hip prosthesis, subsequent encounter    .           Chief Complaint   Patient presents with    Hip Pain       Hip is painful. Culture results today          HPI  George Moody is a 59-year-old male who presents the office today for recheck. He recently underwent aspiration of his hip in interventional radiology. No significant aspirate could be obtained and the hip was lavaged and that was sent for pathologic evaluation. That has come back negative for infection at 5 days. Patient continues to have a draining bolus to his incisional area the right that is problematic. Review of Systems   Constitutional:  Positive for activity change. Negative for fever. HENT:  Negative for dental problem. Eyes:  Negative for discharge. Respiratory:  Negative for shortness of breath. Cardiovascular:  Negative for chest pain. Gastrointestinal:  Negative for abdominal pain. Genitourinary: Negative. Musculoskeletal:  Positive for arthralgias. Skin:         Negative for rash   Neurological:  Positive for weakness. Psychiatric/Behavioral:  Negative for confusion. I have reviewed the CC, HPI, ROS, PMH, FHX, Social History, and if not present in this note, I have reviewed in the patient's chart. I agree with the documentation provided by other staff and have reviewed their documentation prior to providing my signature indicating agreement. Objective :   Ht 6' 2\" (1.88 m)   Wt 249 lb 9.6 oz (113.2 kg)   BMI 32.05 kg/m²  Body mass index is 32.05 kg/m². General: Guru Christensen is a 60 y.o. male who is alert and oriented and sitting comfortably in our office. Ortho Exam  MS: Large bullous draining serous fluid from the lateral aspect the knee incision on the right is appreciated. Patient ambulates with mild short weightbearing phase to the right lower extremity but no antalgia. Motor, sensory, vascular examination of the right lower extremity is grossly intact without focal deficits. Neuro: alert and oriented to person and place.    Eyes: Extra-ocular muscles intact  Mouth: Oral mucosa moist. No perioral lesions  Pulm: Respirations unlabored and regular. Symmetric chest excursion without outward deformity is noted. Skin: warm, well perfused  Psych:   Patient has good fund of knowledge and displays understanging of exam, diagnosis, and plan. Radiology: No new x-rays were taken in office today. Assessment:   1. Primary osteoarthritis of right hip    2. History of total hip arthroplasty, right    3. Infection associated with internal right hip prosthesis, subsequent encounter       Plan:   I spent a great deal of time talking to the patient and his wife who was present during the entire evaluation about the complexity of the patient's situation. He has prostate cancer for which she is being treated as well as multiple surgeries to the right hip and a persistent chronic right hip infection. We discussed Girdlestone procedure with removal of implants and leaving the patient without a hip versus removal of implants and placement of a spacer versus aggressive debridement and potential femoral head and polyethylene exchange. I have also discussed the same options with the patient's infectious disease team.  It is felt that the patient, with his multiple comorbidities, is best served with debridement at this time. All details of the procedure, as well as risks, benefits and alternatives, including the option of non operative versus operative treatment were discussed.   The patient understands that risks of the surgery include but are not limited to: bleeding, malunion/nonunion, loss of fixation, loss of reduction, hardware failure, angular or rotational deformity, length discrepancy, limp, transfusion, skin blistering or breakdown, progressive post traumatic degenerative joint disease, possible need for further surgery, bone grafting, infection, nerve injury, paralysis, numbness, blood vessel injury, excessive scaring, wound complication or breakdown, failure of symptoms to improve or actual deterioration in condition, significant acute and/or chronic pain, possible need for amputation, permanent loss of motion, and permanent loss of function. As well as the general complications of anesthesia, which include but are not limited to: myocardial infarction and/or heart attack, stroke, multi organ system failure or even possible death, prolonged hospital stay, blood clots, pulmonary embolism, abnormal reaction to medication, visual and neurological disturbances, constipation, ischemic bowel, bowel obstruction, bowel perforation, ileus and mental status changes. No guarantees were made. The patient will need to be very compliant with postoperative antibiotic regimen to try to chronically suppress the infection and he and his wife both understanding. Follow up:Return for Two weeks post op. Encounter Medications    No orders of the defined types were placed in this encounter. No orders of the defined types were placed in this encounter. This note is created with the assistance of a speech recognition program.  While intending to generate a document that actually reflects the content of the visit, the document can still have some errors including those of syntax and sound a like substitutions which may escape proof reading.   In such instances, actual meaning can be extrapolated by contextual diversion        Electronically signed by Sasha Espino DO, Connie Roan on 9/29/2022 at 2:23 PM

## 2022-10-05 NOTE — ANESTHESIA POSTPROCEDURE EVALUATION
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     Kel Phase I: Kel Score: 9    Kel Phase II:        Anesthesia Post Evaluation    Patient location during evaluation: PACU  Patient participation: complete - patient participated  Level of consciousness: awake and alert  Airway patency: patent  Nausea & Vomiting: no nausea and no vomiting  Complications: no  Cardiovascular status: hemodynamically stable  Respiratory status: acceptable  Hydration status: stable

## 2022-10-05 NOTE — PROGRESS NOTES
Pt admitted to room 2106 per bed in good condition from OR  Oriented to room and surroundings  Bed in lowest position, wheels locked, 2/4 side rails up  Call light in reach, room free of clutter, adequate lighting provided  Denies any further questions at this time  Instructed to call out with any questions/concerns/new onset of pain and/or n/v   White board updated  Continue to monitor with hourly rounding  STAY WITH ME protocol initiated   Bed alarm on/Fall Risk signs in place/Fall risk sticker to wrist band  Non-skid socks on/at bedside

## 2022-10-05 NOTE — DISCHARGE INSTRUCTIONS
Keep it Clean - Post-Operative Home instructions    These instructions are to help you have the best possible recovery after your surgical procedure. Jorge Donahue is here to support you. If you have questions, call [phone] Monday through Friday from 7:30AM to 8:30PM to speak to a nurse. If you need to speak to someone outside of these hours, call your physician. Incision Dos and Donts  Do wash hands before and after dressing changes or when you have had any contact with your incision. Use hand  or antibacterial soap. Do keep your incision clean and dry. Its OK to wash the skin around your incision with mild soap and water. Do change your dressing as you were told. Do notify your doctor if the dressing becomes wet or dirty. Do use a clean washcloth every time when cleaning your incision. Do sleep on clean linens. Do keep pets away from incision site. Dont sit in a bathtub, pool, or hot tub until your incision is fully closed and any drains are removed. Dont scrub, pick, scratch, or pull at your incision. Dont use oils, lotions, or creams on your incision unless your healthcare provider approves it. Follow-up  You will have one or more follow-up visits with your healthcare provider. These are needed to check how well youre healing. Your drain, stitches, or staples may also be removed during these visits. Do not miss your follow-up visit, even if you are feeling better. Call your healthcare provider right away if you have the following:  Fever of 100.4°F (38°C) or higher, or as advised by your healthcare provider. Chest pain or trouble breathing. Pain or tenderness in your leg(s). Increased pain, redness, swelling, bleeding, or foul-smelling drainage at the incision site. Incision changes, separates or is hot to the touch. Problems with the drain if you have one. Itchy, swollen skin; skin rash.     Medicines, Diet, and Activity:   Refer to your discharge paperwork for further instructions

## 2022-10-05 NOTE — ANESTHESIA PROCEDURE NOTES
Peripheral Block    Patient location during procedure: pre-op  Reason for block: post-op pain management and at surgeon's request  Start time: 10/5/2022 12:27 PM  End time: 10/5/2022 12:32 PM  Staffing  Performed: anesthesiologist   Anesthesiologist: Ronnell Wade DO  Preanesthetic Checklist  Completed: patient identified, IV checked, site marked, risks and benefits discussed, surgical/procedural consents, equipment checked, pre-op evaluation, timeout performed, anesthesia consent given, oxygen available and monitors applied/VS acknowledged  Peripheral Block   Patient position: supine  Prep: ChloraPrep  Provider prep: mask and sterile gloves  Patient monitoring: cardiac monitor, continuous pulse ox, frequent blood pressure checks and IV access  Block type: Fascia iliaca  Laterality: right  Injection technique: single-shot  Guidance: ultrasound guided  Local infiltration: lidocaine  Infiltration strength: 1 %  Local infiltration: lidocaine  Dose: 3 mL    Needle   Needle gauge: 21 G  Needle localization: ultrasound guidance  Needle length: 10 cm  Assessment   Injection assessment: negative aspiration for heme, no paresthesia on injection and local visualized surrounding nerve on ultrasound  Paresthesia pain: none  Slow fractionated injection: yes  Hemodynamics: stable  Outcomes: uncomplicated    Additional Notes  Right FI single shot  20ml 0.5% ropivacaine + 4mg decadron

## 2022-10-05 NOTE — PLAN OF CARE
Problem: Chronic Conditions and Co-morbidities  Goal: Patient's chronic conditions and co-morbidity symptoms are monitored and maintained or improved  Outcome: Progressing  Flowsheets (Taken 10/5/2022 1834)  Care Plan - Patient's Chronic Conditions and Co-Morbidity Symptoms are Monitored and Maintained or Improved: Monitor and assess patient's chronic conditions and comorbid symptoms for stability, deterioration, or improvement     Problem: Discharge Planning  Goal: Discharge to home or other facility with appropriate resources  Outcome: Progressing  Flowsheets (Taken 10/5/2022 1834)  Discharge to home or other facility with appropriate resources:   Identify barriers to discharge with patient and caregiver   Arrange for needed discharge resources and transportation as appropriate   Identify discharge learning needs (meds, wound care, etc)     Problem: Pain  Goal: Verbalizes/displays adequate comfort level or baseline comfort level  Outcome: Progressing  Note: Pain level assessment complete.    Patient educated on pain scale and control interventions  PRN pain medication given per patient request  Patient instructed to call out with new onset of pain or unrelieved pain       Problem: Safety - Adult  Goal: Free from fall injury  Outcome: Progressing  Note: Proper pt identification  Hourly rounding performed  Anticipatory needs met  Non-skid socks worn  Proper transferring technique  2/4 side rails up  Personal necessities within reach  Bed low and locked  Call light in reach  Proper lighting  Room free of clutter     Problem: ABCDS Injury Assessment  Goal: Absence of physical injury  Outcome: Progressing  Flowsheets (Taken 10/5/2022 1834)  Absence of Physical Injury: Implement safety measures based on patient assessment

## 2022-10-05 NOTE — OP NOTE
Operative Note      Patient: Rama Paredes  YOB: 1960  MRN: [id number]    Date of Procedure: 10/5/2022    Pre-Op Diagnosis: Infected right total hip arthroplasty    Post-Op Diagnosis: Infected right total hip arthroplasty       Procedure: Excisional debridement skin, subcutaneous tissue, muscle, fascia and debridement and irrigation right total hip arthroplasty infection, application of wound vacuum less than 50 cm²     Surgeon(s):  Malaika Amaya DO    Assistant:   Resident: Irina Duval MD    Anesthesia: General    Estimated Blood Loss (mL): 017    Complications: None    Specimens:   ID Type Source Tests Collected by Time Destination   1 : RIGHT HIP  Swab Hip CULTURE, FUNGUS, CULTURE, AEROBIC, CULTURE WITH SMEAR, ACID FAST BACILLIUS, CULTURE, ANAEROBIC AND AEROBIC Malaika Amaya,  10/5/2022 1405    2 : RIGHT HIP  Tissue Hip CULTURE, FUNGUS, CULTURE, TISSUE, CULTURE WITH SMEAR, ACID FAST BACILLIUS, CULTURE, ANAEROBIC AND AEROBIC Malaika Amaya,  10/5/2022 1409        Implants:  * No implants in log *      Drains:   Negative Pressure Wound Therapy Hip Right (Active)       Findings: Per operative note    Detailed Description of Procedure: Isaiah Castillo is a 57-year-old male who presented to [facility]  surgical department for excisional debridement, irrigation and application of a wound vacuum to the right total hip arthroplasty internal prosthesis infection. The patient has had a chronically infected right hip. He has had multiple surgeries in the past for this. Recently he had a debridement of a superficial infection however that superficial infection has returned and is concerning for deep space infection. The patient's symptoms of failed to improve despite nonoperative management to include antibiotic therapy and as a result it is felt he is best served with surgical debridement at this time.     Discussion was had about staged procedure with antibiotic spacer versus simple debridement and the patient wishes to preserve his hip prosthesis and not go through a staged procedure. In addition in close conversation with the patient's infectious disease team, the patient has multiple medical comorbidities which would make serial debridements and staged procedure difficult for the patient to recover from and so retention of the internal prosthesis with chronic antibiotic suppression in the future as planned. The patient was identified preoperatively where consent was obtained, signed, placed on the chart. The procedure was described. His questions were answered. All details of the procedure, as well as risks, benefits and alternatives, including the option of non operative versus operative treatment were discussed. The patient understands that risks of the surgery include but are not limited to: bleeding, malunion/nonunion, loss of fixation, loss of reduction, hardware failure, angular or rotational deformity, length discrepancy, limp, transfusion, skin blistering or breakdown, progressive post traumatic degenerative joint disease, possible need for further surgery, bone grafting, infection, nerve injury, paralysis, numbness, blood vessel injury, excessive scaring, wound complication or breakdown, failure of symptoms to improve or actual deterioration in condition, significant acute and/or chronic pain, possible need for amputation, permanent loss of motion, and permanent loss of function. As well as the general complications of anesthesia, which include but are not limited to: myocardial infarction and/or heart attack, stroke, multi organ system failure or even possible death, prolonged hospital stay, blood clots, pulmonary embolism, abnormal reaction to medication, visual and neurological disturbances, constipation, ischemic bowel, bowel obstruction, bowel perforation, ileus and mental status changes. No guarantees were made.       A preoperative fascia iliac a block was affected to the right lower extremity. Patient was then taken to the operative suite was placed upon upon the operative table. Perioperative antibiotics were administered IV. General anesthesia was affected. The patient was placed on a well-padded pegboard in the lateral decubitus position with all bony prominences well-padded and an axillary roll placed 1 fist breath below the axillary structures. The right lower extremity was then prepped and draped in a sterile fashion. After an appropriate surgical timeout an elliptical excisional debridement sharply with a scalpel was made of the skin and subcutaneous tissue removing any questionable involved skin as well as a large bullous noted at the mid incision. Once this was excised deep excisional debridement of any concerning tissue for infection or necrosis was made down to the prosthesis posteriorly with a sinus tract that was noted to communicate with the prosthesis posteriorly. Once this was thoroughly debrided with scalpel, electrocautery, rongeur down to good bleeding healthy looking tissue, 9 L of pulse lavage irrigation was used to thoroughly irrigate all of these tissues along with scrubbing with a sterile toothbrush. It should be noted that tissue cultures and swab cultures were taken prior to irrigation. 1/2 L Irrisept irrigation was used to irrigate the soft tissues, was left in place for 90 seconds and then suction. 1 g of vancomycin powder was placed within the joint and then a layered closure was done with PDS suture. A wound vacuum was placed over the incision encompassing 50 cm² or less and was noted to seal and suction appropriately. Anesthesia was reversed and the patient was taken to postop recovery room in stable condition. There were no immediate postoperative complications and the procedure was tolerated well. Electrocautery was well-maintained throughout the procedure for meticulous hemostasis.   In addition no significant purulence was encountered during the procedure.     Electronically signed by Francine Smith DO on 10/5/2022 at 2:30 PM

## 2022-10-05 NOTE — PROGRESS NOTES
Nerve block complete. Pt remains on monitor and O2 2L NC. Side rails up x 2.   Pt resting comfortably

## 2022-10-05 NOTE — BRIEF OP NOTE
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     Post-Op Diagnosis: Infected right total hip arthroplasty       Procedure: Excisional debridement skin, subcutaneous tissue, muscle, fascia and debridement and irrigation right total hip arthroplasty infection, application of wound vacuum less than 50 cm²      Surgeon(s):  Bartolome Oleary DO     Assistant:   Resident: Jhoana Potter MD     Anesthesia: General     Estimated Blood Loss (mL): 225    Complications: None    Specimens:   ID Type Source Tests Collected by Time Destination   1 : RIGHT HIP  Swab Hip CULTURE, FUNGUS, CULTURE, AEROBIC, CULTURE WITH SMEAR, ACID FAST BACILLIUS, CULTURE, ANAEROBIC AND AEROBIC Bartolome Oleary DO 10/5/2022 1405    2 : RIGHT HIP  Tissue Hip CULTURE, FUNGUS, CULTURE, TISSUE, CULTURE WITH SMEAR, ACID FAST BACILLIUS, CULTURE, ANAEROBIC AND AEROBIC Bartolome Oleary DO 10/5/2022 1409        Implants:  * No implants in log *      Drains:   Negative Pressure Wound Therapy Hip Right (Active)       Findings: see op note    Electronically signed by Jhoana Potter MD on 10/5/2022 at 2:41 PM

## 2022-10-06 PROBLEM — G89.18 POST-OP PAIN: Status: ACTIVE | Noted: 2022-10-06

## 2022-10-06 PROBLEM — N40.1 BENIGN LOCALIZED PROSTATIC HYPERPLASIA WITH LOWER URINARY TRACT SYMPTOMS (LUTS): Status: ACTIVE | Noted: 2021-01-27

## 2022-10-06 LAB
ABSOLUTE EOS #: <0.03 K/UL (ref 0–0.44)
ABSOLUTE IMMATURE GRANULOCYTE: 0.02 K/UL (ref 0–0.3)
ABSOLUTE LYMPH #: 0.68 K/UL (ref 1.1–3.7)
ABSOLUTE MONO #: 0.53 K/UL (ref 0.1–1.2)
BASOPHILS # BLD: 0 % (ref 0–2)
BASOPHILS ABSOLUTE: <0.03 K/UL (ref 0–0.2)
EOSINOPHILS RELATIVE PERCENT: 0 % (ref 1–4)
ESTIMATED AVERAGE GLUCOSE: 194 MG/DL
GLUCOSE BLD-MCNC: 233 MG/DL (ref 75–110)
GLUCOSE BLD-MCNC: 269 MG/DL (ref 75–110)
GLUCOSE BLD-MCNC: 300 MG/DL (ref 75–110)
HBA1C MFR BLD: 8.4 % (ref 4–6)
HCT VFR BLD CALC: 30.1 % (ref 40.7–50.3)
HEMOGLOBIN: 9 G/DL (ref 13–17)
IMMATURE GRANULOCYTES: 0 %
LYMPHOCYTES # BLD: 13 % (ref 24–43)
MCH RBC QN AUTO: 25 PG (ref 25.2–33.5)
MCHC RBC AUTO-ENTMCNC: 29.9 G/DL (ref 28.4–34.8)
MCV RBC AUTO: 83.6 FL (ref 82.6–102.9)
MONOCYTES # BLD: 10 % (ref 3–12)
NRBC AUTOMATED: 0 PER 100 WBC
PDW BLD-RTO: 16.6 % (ref 11.8–14.4)
PLATELET # BLD: 339 K/UL (ref 138–453)
PMV BLD AUTO: 8.9 FL (ref 8.1–13.5)
RBC # BLD: 3.6 M/UL (ref 4.21–5.77)
SEG NEUTROPHILS: 76 % (ref 36–65)
SEGMENTED NEUTROPHILS ABSOLUTE COUNT: 3.88 K/UL (ref 1.5–8.1)
VANCOMYCIN RANDOM: 20 UG/ML
WBC # BLD: 5.1 K/UL (ref 3.5–11.3)

## 2022-10-06 PROCEDURE — 97110 THERAPEUTIC EXERCISES: CPT

## 2022-10-06 PROCEDURE — 97167 OT EVAL HIGH COMPLEX 60 MIN: CPT

## 2022-10-06 PROCEDURE — 6370000000 HC RX 637 (ALT 250 FOR IP): Performed by: STUDENT IN AN ORGANIZED HEALTH CARE EDUCATION/TRAINING PROGRAM

## 2022-10-06 PROCEDURE — 2580000003 HC RX 258: Performed by: INTERNAL MEDICINE

## 2022-10-06 PROCEDURE — 6370000000 HC RX 637 (ALT 250 FOR IP): Performed by: NURSE PRACTITIONER

## 2022-10-06 PROCEDURE — 97116 GAIT TRAINING THERAPY: CPT

## 2022-10-06 PROCEDURE — 6360000002 HC RX W HCPCS: Performed by: INTERNAL MEDICINE

## 2022-10-06 PROCEDURE — 6360000002 HC RX W HCPCS: Performed by: STUDENT IN AN ORGANIZED HEALTH CARE EDUCATION/TRAINING PROGRAM

## 2022-10-06 PROCEDURE — 36415 COLL VENOUS BLD VENIPUNCTURE: CPT

## 2022-10-06 PROCEDURE — 2580000003 HC RX 258: Performed by: STUDENT IN AN ORGANIZED HEALTH CARE EDUCATION/TRAINING PROGRAM

## 2022-10-06 PROCEDURE — 85025 COMPLETE CBC W/AUTO DIFF WBC: CPT

## 2022-10-06 PROCEDURE — 83036 HEMOGLOBIN GLYCOSYLATED A1C: CPT

## 2022-10-06 PROCEDURE — 80202 ASSAY OF VANCOMYCIN: CPT

## 2022-10-06 PROCEDURE — 1200000000 HC SEMI PRIVATE

## 2022-10-06 PROCEDURE — 6360000002 HC RX W HCPCS: Performed by: NURSE PRACTITIONER

## 2022-10-06 PROCEDURE — 97163 PT EVAL HIGH COMPLEX 45 MIN: CPT

## 2022-10-06 PROCEDURE — 97535 SELF CARE MNGMENT TRAINING: CPT

## 2022-10-06 PROCEDURE — 99222 1ST HOSP IP/OBS MODERATE 55: CPT | Performed by: NURSE PRACTITIONER

## 2022-10-06 PROCEDURE — 82947 ASSAY GLUCOSE BLOOD QUANT: CPT

## 2022-10-06 PROCEDURE — 97530 THERAPEUTIC ACTIVITIES: CPT

## 2022-10-06 PROCEDURE — 99232 SBSQ HOSP IP/OBS MODERATE 35: CPT | Performed by: NURSE PRACTITIONER

## 2022-10-06 RX ORDER — FOLIC ACID 1 MG/1
1 TABLET ORAL DAILY
Status: DISCONTINUED | OUTPATIENT
Start: 2022-10-06 | End: 2022-10-07 | Stop reason: HOSPADM

## 2022-10-06 RX ORDER — VENLAFAXINE HYDROCHLORIDE 37.5 MG/1
37.5 CAPSULE, EXTENDED RELEASE ORAL
Status: DISCONTINUED | OUTPATIENT
Start: 2022-10-06 | End: 2022-10-07 | Stop reason: HOSPADM

## 2022-10-06 RX ORDER — INSULIN GLARGINE 100 [IU]/ML
30 INJECTION, SOLUTION SUBCUTANEOUS 2 TIMES DAILY
Status: DISCONTINUED | OUTPATIENT
Start: 2022-10-06 | End: 2022-10-07 | Stop reason: HOSPADM

## 2022-10-06 RX ORDER — GAUZE BANDAGE 2" X 2"
100 BANDAGE TOPICAL DAILY
Status: DISCONTINUED | OUTPATIENT
Start: 2022-10-06 | End: 2022-10-07 | Stop reason: HOSPADM

## 2022-10-06 RX ORDER — MORPHINE SULFATE 2 MG/ML
2 INJECTION, SOLUTION INTRAMUSCULAR; INTRAVENOUS ONCE
Status: COMPLETED | OUTPATIENT
Start: 2022-10-06 | End: 2022-10-06

## 2022-10-06 RX ADMIN — INSULIN GLARGINE 30 UNITS: 100 INJECTION, SOLUTION SUBCUTANEOUS at 01:34

## 2022-10-06 RX ADMIN — VENLAFAXINE HYDROCHLORIDE 37.5 MG: 37.5 CAPSULE, EXTENDED RELEASE ORAL at 08:04

## 2022-10-06 RX ADMIN — ACETAMINOPHEN 1000 MG: 500 TABLET ORAL at 06:43

## 2022-10-06 RX ADMIN — MORPHINE SULFATE 2 MG: 2 INJECTION, SOLUTION INTRAMUSCULAR; INTRAVENOUS at 01:35

## 2022-10-06 RX ADMIN — INSULIN LISPRO 6 UNITS: 100 INJECTION, SOLUTION INTRAVENOUS; SUBCUTANEOUS at 06:51

## 2022-10-06 RX ADMIN — KETOROLAC TROMETHAMINE 30 MG: 30 INJECTION, SOLUTION INTRAMUSCULAR at 08:12

## 2022-10-06 RX ADMIN — VANCOMYCIN HYDROCHLORIDE 2500 MG: 5 INJECTION, POWDER, LYOPHILIZED, FOR SOLUTION INTRAVENOUS at 01:50

## 2022-10-06 RX ADMIN — OXYCODONE HYDROCHLORIDE 5 MG: 5 TABLET ORAL at 13:32

## 2022-10-06 RX ADMIN — INSULIN LISPRO 2 UNITS: 100 INJECTION, SOLUTION INTRAVENOUS; SUBCUTANEOUS at 12:20

## 2022-10-06 RX ADMIN — OXYCODONE HYDROCHLORIDE 5 MG: 5 TABLET ORAL at 06:42

## 2022-10-06 RX ADMIN — FOLIC ACID 1 MG: 1 TABLET ORAL at 08:04

## 2022-10-06 RX ADMIN — SODIUM CHLORIDE, PRESERVATIVE FREE 10 ML: 5 INJECTION INTRAVENOUS at 08:12

## 2022-10-06 RX ADMIN — METFORMIN HYDROCHLORIDE 1000 MG: 500 TABLET ORAL at 16:24

## 2022-10-06 RX ADMIN — INSULIN LISPRO 4 UNITS: 100 INJECTION, SOLUTION INTRAVENOUS; SUBCUTANEOUS at 16:24

## 2022-10-06 RX ADMIN — ACETAMINOPHEN 1000 MG: 500 TABLET ORAL at 22:07

## 2022-10-06 RX ADMIN — METFORMIN HYDROCHLORIDE 1000 MG: 500 TABLET ORAL at 08:04

## 2022-10-06 RX ADMIN — ACETAMINOPHEN 1000 MG: 500 TABLET ORAL at 13:32

## 2022-10-06 RX ADMIN — INSULIN GLARGINE 30 UNITS: 100 INJECTION, SOLUTION SUBCUTANEOUS at 08:05

## 2022-10-06 RX ADMIN — APIXABAN 2.5 MG: 2.5 TABLET, FILM COATED ORAL at 08:04

## 2022-10-06 RX ADMIN — BISACODYL 5 MG: 5 TABLET, COATED ORAL at 08:04

## 2022-10-06 RX ADMIN — INSULIN GLARGINE 30 UNITS: 100 INJECTION, SOLUTION SUBCUTANEOUS at 22:04

## 2022-10-06 RX ADMIN — PREGABALIN 75 MG: 75 CAPSULE ORAL at 08:04

## 2022-10-06 RX ADMIN — ATORVASTATIN CALCIUM 40 MG: 40 TABLET, FILM COATED ORAL at 08:04

## 2022-10-06 RX ADMIN — CEFEPIME 2000 MG: 2 INJECTION, POWDER, FOR SOLUTION INTRAVENOUS at 19:47

## 2022-10-06 RX ADMIN — KETOROLAC TROMETHAMINE 30 MG: 30 INJECTION, SOLUTION INTRAMUSCULAR at 16:18

## 2022-10-06 RX ADMIN — TAMSULOSIN HYDROCHLORIDE 0.4 MG: 0.4 CAPSULE ORAL at 08:04

## 2022-10-06 RX ADMIN — SODIUM CHLORIDE: 9 INJECTION, SOLUTION INTRAVENOUS at 10:34

## 2022-10-06 RX ADMIN — THIAMINE HCL TAB 100 MG 100 MG: 100 TAB at 08:04

## 2022-10-06 RX ADMIN — OXYCODONE HYDROCHLORIDE 5 MG: 5 TABLET ORAL at 19:46

## 2022-10-06 RX ADMIN — CEFEPIME 2000 MG: 2 INJECTION, POWDER, FOR SOLUTION INTRAVENOUS at 08:11

## 2022-10-06 RX ADMIN — PREGABALIN 75 MG: 75 CAPSULE ORAL at 19:46

## 2022-10-06 RX ADMIN — KETOROLAC TROMETHAMINE 30 MG: 30 INJECTION, SOLUTION INTRAMUSCULAR at 01:36

## 2022-10-06 RX ADMIN — APIXABAN 2.5 MG: 2.5 TABLET, FILM COATED ORAL at 19:46

## 2022-10-06 RX ADMIN — VANCOMYCIN HYDROCHLORIDE 1250 MG: 5 INJECTION, POWDER, LYOPHILIZED, FOR SOLUTION INTRAVENOUS at 13:35

## 2022-10-06 ASSESSMENT — PAIN DESCRIPTION - ORIENTATION
ORIENTATION: RIGHT

## 2022-10-06 ASSESSMENT — PAIN DESCRIPTION - FREQUENCY
FREQUENCY: CONTINUOUS

## 2022-10-06 ASSESSMENT — PAIN DESCRIPTION - LOCATION
LOCATION: HIP;KNEE
LOCATION: HIP
LOCATION: LEG;HIP
LOCATION: HIP
LOCATION: HIP

## 2022-10-06 ASSESSMENT — ENCOUNTER SYMPTOMS
GASTROINTESTINAL NEGATIVE: 1
RESPIRATORY NEGATIVE: 1

## 2022-10-06 ASSESSMENT — PAIN DESCRIPTION - ONSET
ONSET: ON-GOING

## 2022-10-06 ASSESSMENT — PAIN - FUNCTIONAL ASSESSMENT
PAIN_FUNCTIONAL_ASSESSMENT: PREVENTS OR INTERFERES SOME ACTIVE ACTIVITIES AND ADLS

## 2022-10-06 ASSESSMENT — PAIN DESCRIPTION - DESCRIPTORS
DESCRIPTORS: ACHING
DESCRIPTORS: ACHING;DISCOMFORT
DESCRIPTORS: ACHING;SORE
DESCRIPTORS: ACHING
DESCRIPTORS: ACHING;SORE;DISCOMFORT;SHARP

## 2022-10-06 ASSESSMENT — PAIN DESCRIPTION - PAIN TYPE
TYPE: SURGICAL PAIN;CHRONIC PAIN

## 2022-10-06 ASSESSMENT — PAIN SCALES - GENERAL
PAINLEVEL_OUTOF10: 6
PAINLEVEL_OUTOF10: 5
PAINLEVEL_OUTOF10: 6
PAINLEVEL_OUTOF10: 5

## 2022-10-06 NOTE — PROGRESS NOTES
Occupational Therapy  Facility/Department: [facility] MED SURG  Occupational Therapy Initial Assessment    Name: Gordan Crigler  : 1960  MRN: [id number]  Date of Service: 10/6/2022    Discharge Recommendations:  Patient would benefit from continued therapy after discharge   Pt currently functioning below baseline. Recommend daily inpatient skilled therapy at time of discharge to maximize long term outcomes and prevent re-admission. Please refer to AM-PAC score for current level of function. RN reports patient is medically stable for therapy treatment this date. Chart reviewed prior to treatment and patient is agreeable for therapy. All lines intact and patient positioned comfortably at end of treatment. All patient needs addressed prior to ending therapy session. Patient Diagnosis(es): The primary encounter diagnosis was Post-op pain. 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 Past Medical History:  has a past medical history of Arthritis, CAD (coronary artery disease), Cancer ([address] Cervical radiculopathy, CHF (congestive heart failure) ([address] DM (diabetes mellitus) ([address] GERD (gastroesophageal reflux disease), Heart murmur, HTN (hypertension), Hyperlipidemia, Sleep apnea, Vision abnormalities, and Wears glasses. Past Surgical History:  has a past surgical history that includes Colonoscopy (2010); Knee arthroscopy; eye surgery (Bilateral, 2017); Vasectomy (); vitrectomy (Right, 2019); vitrectomy (Right, 3/28/2019); and Revision total hip arthroplasty (Right, 10/5/2022). Assessment   Performance deficits / Impairments: Decreased functional mobility ; Decreased ADL status; Decreased strength;Decreased safe awareness;Decreased endurance;Decreased balance;Decreased posture  Assessment: . Pt with deficits of strength, bed mobility, transfers,  balance, safety awareness and endurance this session,  & required 2 assist for SAFE functional mobility, & transfers, not able to tolerate ambulation away from bed this session. With current deficits, Pt HIGH risk for falls & requires continued OT to maximize independence with functional mobility, balance, safety awareness & activity tolerance.    Prognosis: Good  Decision Making: High Complexity  REQUIRES OT FOLLOW-UP: Yes  Activity Tolerance  Activity Tolerance: Patient Tolerated treatment well;Patient limited by fatigue  Activity Tolerance Comments: knee buckling        Plan   Occupational Therapy Plan  Times Per Week: 5-6x/week, 1-2x/day  Current Treatment Recommendations: Strengthening, Balance training, Functional mobility training, Endurance training, Equipment evaluation, education, & procurement, Safety education & training, Patient/Caregiver education & training, Self-Care / ADL, Positioning     Restrictions  Restrictions/Precautions  Restrictions/Precautions: Weight Bearing, General Precautions, Fall Risk  Required Braces or Orthoses?: Yes  Lower Extremity Weight Bearing Restrictions  Right Lower Extremity Weight Bearing: Weight Bearing As Tolerated  Required Braces or Orthoses  Right Lower Extremity Brace: Knee Immobilizer  Position Activity Restriction  Other position/activity restrictions: B thigh high stockings, Wound vac, IV    Subjective   General  Chart Reviewed: Yes  Patient assessed for rehabilitation services?: Yes  Family / Caregiver Present: Yes     Social/Functional History  Social/Functional History  Lives With: Alone  Type of Home: House (duplex, pt lives on first floor and friend lives upstairs)  Home Layout: One level  Home Access: Stairs to enter with rails, Stairs to enter without rails  Entrance Stairs - Number of Steps: 3 at curb+ 2 at sidewalk + 3 to enter house +3 more into actual living space  Bathroom Shower/Tub: Tub/Shower unit  [employer]: Handicap height  Bathroom Equipment: Grab bars in shower, Shower chair  Home Equipment: Jenn Rome, rolling, Walker, 4 wheeled  Has the patient had two or more falls in the past year or any fall with injury in the past year?: Yes (most recent 2 months ago; 2 in last year)  ADL Assistance: Independent  Homemaking Assistance: Needs assistance (friend helps with cleaning, cooking. Pt does light stuff and laundry)  Ambulation Assistance: Independent (occasionally using walker)  Transfer Assistance: Independent  Active : Yes  Mode of Transportation: Car  Occupation: Retired, On disability  Type of Occupation: , drove buses  Leisure & Hobbies: shoot pool       Objective   Heart Rate: 76  [address] [facility]: Monitor  BP: 134/83  BP Location: Right upper arm  Patient Position: Supine  MAP (Calculated): 100  Resp: 17  SpO2: 96 %  O2 Device: None (Room air)          Observation/Palpation  Posture: Fair  Observation: Right LE lacking terminal knee extension, resting position of 30 degrees of hip internal rotation. Safety Devices  Type of Devices: Bed alarm in place;Call light within reach;Gait belt; Chair alarm in place;Nurse notified; Left in chair;Patient at risk for falls  Gait  Overall Level of Assistance: Maximum assistance;Assist X2 (to take a couple steps bed to chair; pt is buckling with R knee even with immobilizer. Unable to walk away from bed.)  Interventions: Safety awareness training; Tactile cues; Verbal cues;Manual cues  Assistive Device: Walker, rolling  Toilet Transfers  Toilet Transfers Comments: Recommend staff use Viveca Pennant to transfer to bathroom if needed. Pt is unable to walk distance  AROM: Within functional limits  Strength:  Within functional limits  Coordination: Within functional limits  Tone: Normal  Sensation: Impaired  ADL  Feeding: Independent  Grooming: Stand by assistance;Setup  Grooming Skilled Clinical Factors: seated; unable to complete at standing level  UE Bathing: Minimal assistance  LE Bathing: Maximum assistance  UE Dressing: Minimal assistance  LE Dressing: Maximum assistance  LE Dressing Skilled Clinical Factors: max A to griffin underwear  Toileting: Maximum assistance        Bed mobility  Supine to Sit: Stand by assistance  Bed Mobility Comments: up to chair  Transfers  Sit to stand: Moderate assistance;2 Person assistance  Stand to sit: Moderate assistance;2 Person assistance  Transfer Comments: mod verbal cues for hand placement, RW safety. Pt unsteady immediately upon standing with sign quad weakness/buckling present. Knee immob used  Vision  Vision: Impaired  Vision Exceptions: Wears glasses at all times  Hearing  Hearing: Within functional limits  Cognition  Overall Cognitive Status: WFL  Orientation  Overall Orientation Status: Within Functional Limits  Perception  Overall Perceptual Status: WFL               Education Given To: Patient  Education Provided: Role of Therapy;Plan of Care;Home Exercise Program;Precautions; ADL Adaptive Strategies;Transfer Training;Energy Conservation; Fall Prevention Strategies; Family Education  Education Method: Demonstration;Verbal  Education Outcome: Continued education needed            AM-[facility] Inpatient Daily Activity Raw Score: 14 (10/06/22 1204)  AM-PAC Inpatient ADL T-Scale Score : 33.39 (10/06/22 1204)  ADL Inpatient CMS 0-100% Score: 59.67 (10/06/22 1204)  ADL Inpatient CMS G-Code Modifier : CK (10/06/22 1204)    Tinneti Score       Goals  Short Term Goals  Time Frame for Short Term Goals: by discharge, pt will  Short Term Goal 1: demo min A with ADL transfers with RW and approp DME with good safety  Short Term Goal 2: demo min A with functional mob in room distances with RW for ADL completion with good safety/pacing  Short Term Goal 3: demo min A with toileting routine with good safety, DME as needed  Short Term Goal 4: demo I with UB ADLs and min A LB ADLs with good safety, pacing, DME as needed  Short Term Goal 5: demo and verb good understanding of fall prevention techs, EC/WS techs, equip needs, B UE HEP, and d/c recommendations  Patient Goals   Patient goals : to go home when able       Therapy Time   Individual Concurrent Group Co-treatment   Time In 0909         Time Out 1012         Minutes 63          Treatment min: 48  Co-treatment with PT warranted secondary to decreased safety and independence requiring 2 skilled therapy professionals to address individual discipline's goals. OT addressing preparation for ADL transfer, sitting balance for increased ADL performance, sitting/activity tolerance, functional reaching, environmental safety/scanning, fall prevention, functional mobility for ADL transfers, ability to sequence and follow directions, bed mobility tech, and functional UE strength. Blanche Jacobs, OT    Pt seen for additional session in pm: [id number], 71 total  min treatment total.  Pt resting in bed. Able to complete sup to sit with min A and cues to use rails and UB. Pt  continue to report some numbness in R LE with diminished quad control but improved some from am. Pt able to stand with min A x 2, continuing to need mod A x 2 for taking steps at bedside and ~4-5 steps forward and backwards. Pt doing better, but still buckling with knee not fully stable to ambulate any further away from bed. Will continue to advance towards goals in subsequent sessions and progress ADLs.     Gricelda Andersen, OTR/L

## 2022-10-06 NOTE — CONSULTS
[facility] Pharmacokinetic Monitoring Service - Vancomycin     Nahomi Terry is a 60 y.o. male starting on vancomycin therapy for right-sided prosthetic hip joint infection. Pharmacy consulted by Dr. Elias Lees for monitoring and adjustment. Target Concentration: Goal AUC/LELA 400-600 mg*hr/L    Additional Antimicrobials: cefepime    Pertinent Laboratory Values: Wt Readings from Last 1 Encounters:   10/05/22 248 lb (112.5 kg)     Temp Readings from Last 1 Encounters:   10/05/22 98.2 °F (36.8 °C) (Oral)     Estimated Creatinine Clearance: 102 mL/min (based on SCr of 0.99 mg/dL). Recent Labs     10/05/22  2051   CREATININE 0.99     Procalcitonin:     Pertinent Cultures:  Culture Date Source Results   10/5/22 Hip tissue  Rare gram + cocci in pairs   MRSA Nasal Swab: N/A. Non-respiratory infection.     Plan:  Dosing recommendations based on Bayesian software  Start vancomycin 2500mg x 1, then 1250 mg q12hr  Anticipated AUC of 544 and trough concentration of 18 at steady state  Renal labs as indicated   Vancomycin concentration ordered for 10/6/22 @ 1800   Pharmacy will continue to monitor patient and adjust therapy as indicated    Thank you for the consult,  Quang Somers, [facility]  10/5/2022 10:49 PM

## 2022-10-06 NOTE — PROGRESS NOTES
Orthopedic Progress Note    Patient:  Gamal Jj, 60 y.o. male  YOB: 1960       Subjective:  Patient seen and examined. No complaints or concerns. Pain controlled on current regimen. No issues overnight. Denies fever, HA, CP, SOB, N/V. Tolerating PO intake. MaxA x2 for bed to chair mobility w/ PT. Patient feels he may still be experiencing effects of nerve block when describing right leg hip and thigh strength, sensation intact. Objective:   Vitals:    10/06/22 1158   BP: 134/83   Pulse: 76   Resp: 17   Temp: 98.4 °F (36.9 °C)   SpO2: 96%     Gen: NAD, cooperative    Cardiovascular: Regular rate    Respiratory: Symmetric chest rise. No accessory muscle use    MSK:  RLE: Prevena dressing on, intact, with adequate suction, 25mL serosanguinous fluid out since placement. Appropriately tender to palpation. Compartments soft. 2+ DP pulse. TA/EHL/FHL/GS motor intact, able to flex quadriceps though less than contralateral side. Deep and Superficial Peroneal/Saphenous/Sural/Plantar SILT.       Recent Labs     10/05/22  2051 10/06/22  0649   WBC  --  5.1   HGB  --  9.0*   HCT  --  30.1*   PLT  --  339   CREATININE 0.99  --       Anticoag: Eliquis  Abx: Cefepime, Vancomycin  See med rec for complete list    Impression/Plan: 60 y.o. male being seen for:    Infected right FANG s/p I&D, POD#1      - Plan to trend patient clinically  - WBAT RLE  - Maintain Prevena, please record output each shift, OK to discharge with Prevena and maintain till follow-up appointment  - Intra-op cultures: GPCP, GNR  - Follow ID recs: Discharge on IV abx therapy  - Diet OK  - Hb 10/6: 9.0  - Abx per ID recs  - DVT: Eliquis  - Pain controlled on current regimen  - Ice for pain and swelling  - PT/OT eval and treat  - OK for discharge once placement to nursing facility established  - Follow up w/ Dr. Cory Bassett on 10/19 after surgery    Electronically signed by Jocy Murillo DO, on 10/6/2022 at 3:02 PM.

## 2022-10-06 NOTE — PROGRESS NOTES
Comprehensive Nutrition Assessment    Type and Reason for Visit:  Initial, Wound, Positive Nutrition Screen (14-23 lb weight loss, decreased appetite, malnutrition score:3)    Nutrition Recommendations/Plan:   ADULT DIET; Regular; 4 carb choices (60 gm/meal)  Added Jossue BID to meal trays to aid in wound healing. If he doesn't like send Ensure High Protein BID. Continue at discharge  Monitor PO intakes, ONS acceptance, wound status, and labs      Malnutrition Assessment:  Malnutrition Status: At risk for malnutrition (Comment) (10/06/22 1500)    Context:  Chronic Illness     Findings of the 6 clinical characteristics of malnutrition:  Energy Intake:  Mild decrease in energy intake (Comment)  Weight Loss:  Mild weight loss (specify amount and time period)     Body Fat Loss:  Unable to assess     Muscle Mass Loss:  Unable to assess    Fluid Accumulation:  No significant fluid accumulation     Strength:  Not Performed    Nutrition Assessment:    Patient admission r/t Right-sided prosthetic hip joint infection. S/P right hip excisional debridement of the skin, subcutaneous tissue, muscle, fascia and debridement and irrigation of the right total hip arthroplasty infection with wound VAC application 01/7/6664. The patient has had multiple surgeries on the right hip secondary to infection: he has had several I&Ds after total hip replacement in 2019. He has also undergone explantation on 7/15/2021 with a cement spacer placed and then revision of the spacer on 10/18/2021 and also a subsequent I&D on 12/9/2021. He has hx: CAD, CHF, DM, GERD, HTN, HLD. Pt blood sugars 300 (H) -- RN changed diet to include CHO restriction. Pt reports # but he started taking metamucil to help his GI fx and has been losing weight. He reports this has been occuring for 2-3 months. He reports good appetite/intake. PO intakes 100% during admission. We discussed need for controlled blood sugars and higher protein intake.  Discussed wound healing and ONS reccs. He reports sometimes having boost shake at home. He agreed to try Jossue BID. Discussed if he doesn't like Jossue we will send Ensure HIgh Protein BID to meal trays. Also discussed food sources of protein and how he can order double serving size of protein. Will monitor PO intakes, ONS acceptance, wound status, and labs. Nutrition Related Findings:    S/P right hip excisional debridement of the skin, subcutaneous tissue, muscle, fascia and debridement and irrigation of the right total hip arthroplasty infection with wound VAC application 51/2/7485. No new labs, POC Glucose 300, 223 (H) GI, edema: WDL. Wound Type: Wound Vac, Surgical Incision       Current Nutrition Intake & Therapies:    Average Meal Intake: %  Average Supplements Intake: None Ordered  ADULT DIET; Regular; 4 carb choices (60 gm/meal)  ADULT ORAL NUTRITION SUPPLEMENT; Breakfast, Dinner; Wound Healing Oral Supplement    Anthropometric Measures:  Height: 6' 2\" (188 cm)  Ideal Body Weight (IBW): 190 lbs (86 kg)       Current Body Weight: 248 lb (112.5 kg), 130.5 % IBW. Weight Source: Standing Scale  Current BMI (kg/m2): 31.8  Usual Body Weight: 260 lb (117.9 kg)  % Weight Change (Calculated): -4.6  Weight Adjustment For: No Adjustment                 BMI Categories: Obese Class 1 (BMI 30.0-34. 9)    Estimated Daily Nutrient Needs:  Energy Requirements Based On: Kcal/kg  Weight Used for Energy Requirements: Current  Energy (kcal/day): 4936-2282 kcal (16-18 kcal/kg)  Weight Used for Protein Requirements: Current  Protein (g/day): 135-160 gm protein (1.2-1.4 g/kg) r/t wound healing     Fluid (ml/day):      Nutrition Diagnosis:   Increased nutrient needs related to increase demand for energy/nutrients as evidenced by wounds    Nutrition Interventions:   Food and/or Nutrient Delivery: Continue Current Diet, Start Oral Nutrition Supplement  Nutrition Education/Counseling: Education not indicated  Coordination of Nutrition Care: Continue to monitor while inpatient       Goals:     Goals: Meet at least 75% of estimated needs       Nutrition Monitoring and Evaluation:   Behavioral-Environmental Outcomes: None Identified  Food/Nutrient Intake Outcomes: Food and Nutrient Intake, Supplement Intake  Physical Signs/Symptoms Outcomes: Biochemical Data, Weight, Skin, Fluid Status or Edema    Discharge Planning:    Continue current diet, Continue Oral Nutrition Supplement     Fuad Jett RD, LD  Office Number: [phone]

## 2022-10-06 NOTE — CARE COORDINATION
Case Management Initial Discharge Plan  [place],         Readmission Risk              Risk of Unplanned Readmission:  11             Met with:patient to discuss discharge plans. Information verified: address, contacts, phone number, , insurance Yes  PCP: Jasmin Villalta MD  Date of last visit: one month ago    Insurance Provider: [facility] Medicare    Discharge Planning  Current Residence:     Living Arrangements:  Spouse/Significant Other   Home has one stories/6 stairs to climb  Support Systems:  Spouse/Significant Other, Family Members  Current Services PTA:    Supplier: none  Patient able to perform ADL's:Independent  DME used to aid ambulation prior to admission: walker/cane/during admissionTBD    Potential Assistance Needed:  N/A    Pharmacy: [facility]   Potential Assistance Purchasing Medications:     Does patient want to participate in local refill/ meds to beds program?  No    Patient agreeable to home care: TBD  [place] of choice provided:  yes      Type of Home Care Services:  None  Patient expects to be discharged to:       Prior SNF/Rehab Placement and Facility: No  Agreeable to SNF/Rehab: Yes  [place] of choice provided: yes   Evaluation: yes    Expected Discharge date: Follow Up Appointment: Best Day/ Time:      Transportation provider: [facility]  Transportation arrangements needed for discharge: Yes    Discharge Plan: Met with patient and girlfriend. Sue Rapp lives in one level home. There are 6 steps into his apartment. He has walker,cane, grab bars in the tub. DIscussed SNF vs home. He is considering SNF.  Provided [facility] ;2012 [facility]        Electronically signed by NEFTALI Barnard on 10/6/22 at 12:51 PM EDT

## 2022-10-06 NOTE — PROGRESS NOTES
[facility]  Progress Note    Allen Santillan  MRN: [id number]  Date: 10/6/2022  LOS: 1     Reason for F/U :   Right-sided prosthetic hip joint infection    Impression :   Right-sided prosthetic hip joint infection    Recommendations: The patient continues on IV vancomycin and cefepime empirically  He is status post right hip excisional debridement of the skin, subcutaneous tissue, muscle, fascia and debridement and irrigation of the right total hip arthroplasty infection with wound VAC application 12/6/0807  We will continue to follow surgical culture data and adjust therapy accordingly  The tentative plan will be for discharge on IV antimicrobial therapy    Infection Control Recommendations:   Universal precautions    Discharge Planning:   Estimated Length of IV antimicrobials: To be determined  Patient will need Midline Catheter Insertion/ PICC line Insertion: No  Patient will need: Home IV , [place],  SNF,  LTAC: Undetermined  Patient willneed outpatient wound care: No    Medical Decision making / Summary of Stay:   Allen Santillan is a 60y.o.-year-old male who was initially admitted on 10/5/2022. 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 The patient has undergone multiple surgical revision procedures in the past.     It is my understanding that he underwent a right prosthetic hip joint infection and this was complicated by a MSSA right prostatic hip joint infection for which he had hardware removed treated with cefazolin for 6 weeks and cefadroxil for maintenance thereafter but then he had reinfection in July 2021 underwent another joint explant and cultures grew out MSSA as well as Enterococcus faecalis for which I understand he was treated with ceftriaxone.   [Ceftriaxone has no enterococcal coverage]  The patient had recurrent infection and had an I&D done in 2021 and no cultures were sent at that time. He was given doxycycline sent to wound care and wound cultures did subsequently grow out Pseudomonas aeruginosa and ciprofloxacin was added to the antimicrobial regimen. The patient has had multiple surgeries on the right hip secondary to infection. He has had several I&D's on 2019, 2020, 2020 after a total hip replacement on 2019. He has also undergone explantation on 7/15/2021 with a cement spacer placed and then revision of the spacer on 10/18/2021 and also a subsequent I&D on 2021 as outlined above. The patient underwent an aspiration of the right hip and cultures have grown out Pseudomonas aeruginosa as well as coagulase-negative staph. The patient did recently get treated for prostate cancer and was seen in the office more recently with a enlarging soft tissue mass/infection in the right lateral hip. Patient was admitted today and underwent excisional debridement of the skin, subcutaneous tissue, muscle, fascia, and debridement and irrigation of the right total hip arthroplasty infection with application of a wound VAC and multiple cultures sent. I was asked to evaluate and help with antibiotic choice. Current evaluation:10/6/2022    BP (!) 155/91   Pulse 80   Temp 98.2 °F (36.8 °C) (Oral)   Resp 16   Ht 6' 2\" (1.88 m)   Wt 248 lb (112.5 kg)   SpO2 95%   BMI 31.84 kg/m²     Temperature Range: Temp: 98.2 °F (36.8 °C) Temp  Av.8 °F (36.6 °C)  Min: 96.9 °F (36.1 °C)  Max: 98.4 °F (36.9 °C)    The patient was seen and evaluated sitting up in the bed, he continues to have numbness in his right lower extremity  He does tell me that he was up with physical therapy yesterday and hopes that he is able to get up with them again today    Review of Systems   HENT: Negative. Respiratory: Negative. Cardiovascular: Negative. Gastrointestinal: Negative. Genitourinary: Negative. Musculoskeletal:         RLE numbness   Skin: Negative. Neurological: Negative. Psychiatric/Behavioral: Negative. Physical Examination :     Physical Exam  Constitutional:       General: He is not in acute distress. Appearance: Normal appearance. He is obese. HENT:      Head: Normocephalic and atraumatic. Pulmonary:      Effort: Pulmonary effort is normal. No respiratory distress. Abdominal:      General: Abdomen is flat. There is no distension. Palpations: Abdomen is soft. Musculoskeletal:      Comments: Right hip surgical incision with vac in place   Skin:     General: Skin is warm and dry. Neurological:      Mental Status: He is alert. Psychiatric:         Mood and Affect: Mood normal.         Behavior: Behavior normal.         Thought Content: Thought content normal.       Laboratory data:   I have independently reviewed the followinglabs:  CBC with Differential: No results for input(s): WBC, HGB, HCT, PLT, SEGSPCT, BANDSPCT, LYMPHOPCT, MONOPCT, EOSPCT in the last 72 hours. BMP:   Recent Labs     10/05/22  2051   CREATININE 0.99     Hepatic Function Panel: No results for input(s): PROT, LABALBU, BILIDIR, IBILI, BILITOT, ALKPHOS, ALT, AST in the last 72 hours. No results found for: PROCAL  Lab Results   Component Value Date/Time    CRP 34.7 08/26/2022 09:50 AM    CRP 22.1 03/14/2022 02:21 PM     Lab Results   Component Value Date    SEDRATE 104 (H) 08/26/2022         No results found for: DDIMER  Lab Results   Component Value Date/Time    FERRITIN 169 05/18/2022 08:08 AM     No results found for: LDH  No results found for: FIBRINOGEN    No results found for requested labs within last 30 days. No results found for: COVID19    No results for input(s): VANCOTROUGH in the last 72 hours.     Imaging Studies:   No new imaging    Cultures:     Culture, Anaerobic and Aerobic [[id number]] Collected: 10/05/22 1405   Order Status: Completed Specimen: Swab from Hip Updated: 10/05/22 2237    Specimen Description . HIP RIGHT    Direct Exam NO NEUTROPHILS SEEN     NO BACTERIA SEEN    Culture PENDING   Culture, Tissue [[id number]] (Abnormal) Collected: 10/05/22 1409   Order Status: Completed Specimen: Tissue from Hip Updated: 10/05/22 2134    Specimen Description . TISSUE RIGHT    Direct Exam NO NEUTROPHILS SEEN     RARE GRAM POSITIVE COCCI IN PAIRS Abnormal     Culture PENDING       Medications:      folic acid  1 mg Oral Daily    thiamine mononitrate  100 mg Oral Daily    insulin glargine  30 Units SubCUTAneous BID    venlafaxine  37.5 mg Oral Daily with breakfast    atorvastatin  40 mg Oral Daily    tamsulosin  0.4 mg Oral Daily    sodium chloride flush  5-40 mL IntraVENous 2 times per day    pregabalin  75 mg Oral BID    ketorolac  30 mg IntraVENous Q8H    acetaminophen  1,000 mg Oral 3 times per day    apixaban  2.5 mg Oral BID    bisacodyl  5 mg Oral Daily    metFORMIN  1,000 mg Oral BID WC    cefepime  2,000 mg IntraVENous Q12H    insulin lispro  0-8 Units SubCUTAneous TID WC    insulin lispro  0-4 Units SubCUTAneous Nightly    vancomycin (VANCOCIN) intermittent dosing (placeholder)   Other RX Placeholder    vancomycin  1,250 mg IntraVENous Q12H           [facility]  SHANTELLE Cox CNP  [employer] messaging  OFFICE: [phone]      Electronically signed by SHANTELLE Cox CNP on 10/6/2022 at 6:00 AM  Thank you for allowing us to participate in the care of this patient. Please call with questions. This note iscreated with the assistance of a speech recognition program.  While intending to generate a document that actually reflects the content of the visit, the document can still have some errors including those of syntax andsound a like substitutions which may escape proof reading. In such instances, actual meaning can be extrapolated by contextual diversion.

## 2022-10-06 NOTE — PLAN OF CARE
Problem: Chronic Conditions and Co-morbidities  Goal: Patient's chronic conditions and co-morbidity symptoms are monitored and maintained or improved  Outcome: Progressing  Flowsheets (Taken 10/6/2022 0840)  Care Plan - Patient's Chronic Conditions and Co-Morbidity Symptoms are Monitored and Maintained or Improved: Monitor and assess patient's chronic conditions and comorbid symptoms for stability, deterioration, or improvement     Problem: Discharge Planning  Goal: Discharge to home or other facility with appropriate resources  Outcome: Progressing  Flowsheets (Taken 10/6/2022 0840)  Discharge to home or other facility with appropriate resources:   Identify barriers to discharge with patient and caregiver   Arrange for needed discharge resources and transportation as appropriate   Identify discharge learning needs (meds, wound care, etc)   Refer to discharge planning if patient needs post-hospital services based on physician order or complex needs related to functional status, cognitive ability or social support system     Problem: Pain  Goal: Verbalizes/displays adequate comfort level or baseline comfort level  Outcome: Progressing     Problem: Safety - Adult  Goal: Free from fall injury  Outcome: Progressing     Problem: ABCDS Injury Assessment  Goal: Absence of physical injury  Outcome: Progressing     Problem: Nutrition Deficit:  Goal: Optimize nutritional status  Outcome: Progressing

## 2022-10-06 NOTE — DISCHARGE INSTR - COC
Continuity of Care Form    Patient Name: Angelica Ly   :  1960  MRN:  [id number]    [facility] date:  10/5/2022  Discharge date:  10/7/2022    Code Status Order: Full Code   Advance Directives:   Advance Care Flowsheet Documentation       Date/Time Healthcare Directive Type of Healthcare Directive Copy in [address] Agent's Name Healthcare Agent's Phone Number    10/05/22 1127 No, patient does not have an advance directive for healthcare treatment -- -- -- -- --            Admitting Physician:  Gopal Bella DO  PCP: Tomasa Barry MD    Discharging Nurse: [facility]/Room#: 2107/2107-01  Discharging Unit Phone Number: [phone]    Emergency Contact:   Extended Emergency Contact Information  Primary Emergency Contact: [facility] [address] Phone: [phone]  Mobile Phone: [phone]  Relation: Other  Secondary Emergency Contact: [facility], [address] [place] [address] Phone: [phone]  Mobile Phone: [phone]  Relation: Child    Past Surgical History:  Past Surgical History:   Procedure Laterality Date    COLONOSCOPY  2010    Dr. Jann Nicholas Bilateral 2017    CATARACT EXTRACTION WITH IOL    IR INS PICC VAD W SQ PORT GREATER THAN 5  10/7/2022    IR INS PICC VAD W SQ PORT GREATER THAN 5 10/7/2022 [facility] PROCEDURES    KNEE ARTHROSCOPY      REVISION TOTAL HIP ARTHROPLASTY Right 10/5/2022    RIGHT HIP EXCISIONAL DEBRIDEMENT SKIN TO SUBCUTANEOUS TISSUE AND FASCIA AND IRRIGATION performed by Gopal Bella DO at [address] [place] [address] Right 2019    VITRECTOMY Right 3/28/2019    VITRECTOMY 25 GAUGE,  IOL REPOSITION  (Liza Cherry) performed by Roz Morris MD at [address] [place] [address] History:   Immunization History   Administered Date(s) Administered    COVID-19, J&J, (age 18y+), IM, 0.5 mL 2021    COVID-19, MODERNA Booster BLUE border, (age 18y+), IM, 50mcg/0.25mL 2021 Zoster Live (Zostavax) 12/14/2010       Active Problems:  Patient Active Problem List   Diagnosis Code    DM (diabetes mellitus) ([facility] E11.9    HTN (hypertension) I10    ETOHism ([facility] F10.20    Hypertensive urgency I16.0    Abnormal ambulatory electrocardiogram R94.31    Abnormal ambulatory electrocardiography R94.31    Abnormal kidney function N28.9    Adiposity E66.9    Astigmatism H52.209    Atherosclerotic heart disease of native coronary artery without angina pectoris I25.10    Cervical radiculopathy M54.12    Chronic back pain M54.9, G89.29    Decreased cardiac output R09.89    Diabetic peripheral neuropathy ([facility] E11.42    Dislocated intraocular lens T85. 22XA    Disturbance in sleep behavior G47.9    Dizziness R42    Dyssomnia G47.9    Floaters H43.399    Impotence of organic origin N52.9    Left ventricular dysfunction I51.9    Low vision, both eyes H54.3    Myopia H52.10    Nuclear sclerosis of left eye H25.12    Obstructive sleep apnea syndrome G47.33    Palpitations R00.2    Personal history of other diseases of the digestive system Z87.19    Posterior vitreous detachment H43.819    Presbyopia H52.4    Primary osteoarthritis of right hip M16.11    Pseudophakia of right eye Z96.1    Repetitive intrusions of sleep G47.9    Sciatica M54.30    Tendonitis M77.9    Vitamin D deficiency E55.9    Vitreous opacities of right eye H43.391    Dislocated IOL (intraocular lens), anterior, right H27.121    Prostate CA (HCC) C61    Congestive heart failure (HCC) I50.9    Essential hypertension I10    Benign essential HTN I10    Diabetes mellitus ([facility] E11.9    Aftercare following right hip joint replacement surgery Z47.1, Z96.641    Infection of right prosthetic hip joint ([facility] T84.51XA    Benign localized prostatic hyperplasia with lower urinary tract symptoms (LUTS) N40.1    Post-op pain G89.18       Isolation/Infection:   Isolation            No Isolation          Patient Infection Status       None to display Nurse Assessment:  Last Vital Signs: BP (!) 152/92   Pulse 88   Temp 98.6 °F (37 °C) (Oral)   Resp 17   Ht 6' 2\" (1.88 m)   Wt 248 lb (112.5 kg)   SpO2 96%   BMI 31.84 kg/m²     Last documented pain score (0-10 scale): Pain Level: 6  Last Weight:   Wt Readings from Last 1 Encounters:   10/05/22 248 lb (112.5 kg)     Mental Status:  oriented and alert    IV Access:  - PICC - site  R Basilic, insertion date: 10/07/2022    Nursing Mobility/ADLs:  Walking   Assisted  Transfer  Assisted  Bathing  Assisted  Dressing  Assisted  Toileting  Assisted  Feeding  Independent  Med Admin  Independent  Med Delivery   whole    Wound Care Documentation and Therapy:  Negative Pressure Wound Therapy Hip Right (Active)   Wound Type Surgical 10/07/22 1534   Dressing Type Other (Comment) 10/07/22 1534   Canister changed? Yes 10/07/22 1350   Dressing Status Clean, dry & intact; Other (Comment) 10/07/22 1534   Drainage Amount Small 10/07/22 1534   Drainage Description Sanguinous 10/07/22 1230   Output (ml) 100 ml 10/07/22 1230   Xin-wound Assessment Ecchymosis 10/07/22 1534   Number of days: 2       Incision 10/05/22 Hip Right (Active)   Dressing Status Clean;Dry; Intact 10/07/22 1534   Dressing/Treatment Negative pressure wound therapy 10/07/22 1534   Incision Assessment Other (Comment) 10/07/22 1534   Drainage Amount None 10/07/22 1534   Xin-incision Assessment Intact; Ecchymosis 10/07/22 1534   Number of days: 2       Incision 03/28/19 Eye Right (Active)   Number of days: 1184        Elimination:  Continence: Bowel: Yes  Bladder: Yes  Urinary Catheter: None   Colostomy/Ileostomy/Ileal Conduit: No       Date of Last BM: 10/6/2022    Intake/Output Summary (Last 24 hours) at 10/7/2022 1845  Last data filed at 10/7/2022 1802  Gross per 24 hour   Intake 2751. 85 ml   Output 4790 ml   Net -2038.15 ml     I/O last 3 completed shifts: In: 3730.7 [P.O.:400; I.V.:2330.7;  IV Piggyback:1000]  Out: 3329 [Urine:5640; Drains:50]    Safety Concerns:     History of Falls (last 30 days) and At Risk for Falls    Impairments/Disabilities:      Right hip surgery - muscles are weak and knee is buckling    Nutrition Therapy:  Current Nutrition Therapy:   - Oral Diet:  Carb Control 4 carbs/meal (1800kcals/day)    Routes of Feeding: Oral  Liquids: No Restrictions  Daily Fluid Restriction: no  Last Modified Barium Swallow with Video (Video Swallowing Test): not done    Treatments at the Time of Hospital Discharge:   Respiratory Treatments:   Oxygen Therapy:  is not on home oxygen therapy.   Ventilator:    - No ventilator support    Rehab Therapies: Physical Therapy and Occupational Therapy  Weight Bearing Status/Restrictions: No weight bearing restrictions  Other Medical Equipment (for information only, NOT a DME order):  walker   Other Treatments:     Patient's personal belongings (please select all that are sent with patient):  Paul    RN SIGNATURE:  Electronically signed by Zoila Jones RN on 10/6/22 at 2:24 PM EDT    CASE MANAGEMENT/SOCIAL WORK SECTION    Inpatient Status Date: ***    Readmission Risk Assessment Score:  Readmission Risk              Risk of Unplanned Readmission:  10           Discharging to Facility/ Agency   Name: [facility]  Address:  [address] [place] [address]  Fax:[phone]    Dialysis Facility (if applicable)   Name:  Address:  Dialysis Schedule:  Phone:  Fax:    / signature: Electronically signed by Rollene Litten, MSW, LSW on 10/6/22 at 3:47 PM EDT    PHYSICIAN SECTION    Prognosis: Fair    Condition at Discharge: Stable    Rehab Potential (if transferring to Rehab): Good    Recommended Labs or Other Treatments After Discharge:   Check CBC, BMP, CRP and Vancomycin trough on Mondays  BUN, Creatinine, vanco trough on Thursdays  Schedule for follow-up visit in 4 weeks  Fax results to Zoila Jones RN FAX: [phone]      Physician Certification: I certify the above information and transfer of Godfrey Brunner  is necessary for the continuing treatment of the diagnosis listed and that he requires [place] for less 30 days.      Update Admission H&P: No change in H&P    PHYSICIAN SIGNATURE:  Electronically signed by Juan Stafford DO on 10/7/22 at 10:39 AM EDT

## 2022-10-06 NOTE — PROGRESS NOTES
Physical Therapy  Facility/Department: [facility] SURG  Physical Therapy Initial Assessment    Name: Lily Garrison  : 1960  MRN: [id number]  Date of Service: 10/6/2022    Discharge Recommendations:  Patient would benefit from continued therapy after discharge      HPI (per chart): Per general surgery NP note: I spent a great deal of time talking to the patient and his wife who was present during the entire evaluation about the complexity of the patient's situation. He has prostate cancer for which she is being treated as well as multiple surgeries to the right hip and a persistent chronic right hip infection. We discussed Girdlestone procedure with removal of implants and leaving the patient without a hip versus removal of implants and placement of a spacer versus aggressive debridement and potential femoral head and polyethylene exchange. I have also discussed the same options with the patient's infectious disease team.  It is felt that the patient, with his multiple comorbidities, is best served with debridement at this time. Patient Diagnosis(es): The primary encounter diagnosis was Post-op pain. 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 Past Medical History:  has a past medical history of Arthritis, CAD (coronary artery disease), Cancer ([address] Cervical radiculopathy, CHF (congestive heart failure) ([address] DM (diabetes mellitus) ([address] GERD (gastroesophageal reflux disease), Heart murmur, HTN (hypertension), Hyperlipidemia, Sleep apnea, Vision abnormalities, and Wears glasses. Past Surgical History:  has a past surgical history that includes Colonoscopy (2010); Knee arthroscopy; eye surgery (Bilateral, 2017); Vasectomy (); vitrectomy (Right, 2019); vitrectomy (Right, 3/28/2019); and Revision total hip arthroplasty (Right, 10/5/2022).     Assessment   Body Structures, Functions, Activity Limitations Requiring Skilled Therapeutic Intervention: Decreased functional mobility ; Decreased ROM; Decreased strength;Decreased endurance;Decreased safe awareness;Decreased sensation;Decreased balance  Assessment: Patient reports he was previously indep with RW, but demo hip weakness and and lacking hip ER. Patient required max assist x 2 to amb bed to chair, will attempt again in the afternoon to see of mobility improves as nerve block wears off. Patient will benefit from continued PT to improve strength, ROM, transfers, and gait.   Specific Instructions for Next Treatment: 1-2x/d  Therapy Prognosis: Good  Decision Making: High Complexity  Requires PT Follow-Up: Yes  Activity Tolerance  Activity Tolerance Comments: Limited by decreased sensation and decreased quad control     Plan   Physcial Therapy Plan  General Plan: 6-7 times per week  Specific Instructions for Next Treatment: 1-2x/d  Current Treatment Recommendations: Strengthening, ROM, Balance training, Functional mobility training, Transfer training, Endurance training, Gait training, Stair training, Home exercise program, Safety education & training, Patient/Caregiver education & training, Therapeutic activities  Safety Devices  Type of Devices: Bed alarm in place, Call light within reach, Gait belt, Chair alarm in place, Nurse notified, Left in chair, Patient at risk for falls     Restrictions  Restrictions/Precautions  Restrictions/Precautions: Weight Bearing, General Precautions, Fall Risk  Required Braces or Orthoses?: Yes  Lower Extremity Weight Bearing Restrictions  Right Lower Extremity Weight Bearing: Weight Bearing As Tolerated  Required Braces or Orthoses  Right Lower Extremity Brace: Knee Immobilizer  Position Activity Restriction  Other position/activity restrictions: B thigh high stockings, Wound vac, IV     Subjective   General  Chart Reviewed: Yes  Patient assessed for rehabilitation services?: Yes  Additional Pertinent Hx: CHF, DM, Prostate Ca, Etoh abuse  Response To Previous Treatment: Not applicable  Family / Caregiver Present: Yes  Diagnosis: I&D right hip  Follows Commands: Within Functional Limits  General Comment  Comments: Katie RN reports patient medically appropriate for PT, was still reporting decreased sensation from nerve block. Subjective  Subjective: Patient very pleasant and agreebale to PT. Social/Functional History  Social/Functional History  Lives With: Alone  Type of Home: House (duplex, pt lives on first floor and friend lives upstairs)  Home Layout: One level  Home Access: Stairs to enter with rails, Stairs to enter without rails  Entrance Stairs - Number of Steps: 3 at curb+ 2 at sidewalk + 3 to enter house +3 more into actual living space  Bathroom Shower/Tub: Tub/Shower unit  [employer]: Handicap height  Bathroom Equipment: Grab bars in shower, Shower chair  Home Equipment: [place] Star, [place], [place] Star, 4 wheeled  Has the patient had two or more falls in the past year or any fall with injury in the past year?: Yes (most recent 2 months ago; 2 in last year)  ADL Assistance: Independent  Homemaking Assistance: Needs assistance (friend helps with cleaning, cooking. Pt does light stuff and laundry)  Ambulation Assistance: Independent (occasionally using walker)  Transfer Assistance: Independent  Active : Yes  Mode of Transportation: Car  Occupation: Retired, On disability  Type of Occupation: , drove buses  [address]: shoot pool  [address]   Orientation  Overall Orientation Status: Within Functional Limits  Cognition  Overall Cognitive Status: Exceptions  Arousal/Alertness: Appropriate responses to stimuli  Following Commands:  Follows all commands without difficulty  Attention Span: Appears intact  Memory: Appears intact  Safety Judgement: Good awareness of safety precautions  Problem Solving: Assistance required to generate solutions;Assistance required to identify errors made;Assistance required to correct errors made;Assistance required to implement solutions;Decreased awareness of errors  Insights: Fully aware of deficits  Initiation: Does not require cues  Sequencing: Does not require cues     Objective   Heart Rate: 76  Heart Rate Source: Monitor  BP: 134/83  BP Location: Right upper arm  Patient Position: Supine  MAP (Calculated): 100  Resp: 17  SpO2: 96 %  O2 Device: None (Room air)     Observation/Palpation  Posture: Fair  Observation: Right LE lacking terminal knee extension, resting position of 30 degrees of hip internal rotation. AROM RLE (degrees)  RLE General AROM: hip demo 10 degrees hip abduction, hip resting position of 30 degrees IR (PT able to gently move leg into neutral rotation, but not bale to get any ER), hip flexion 0-90, knee 0-90, DF to 10 degrees. AROM LLE (degrees)  LLE AROM : WNL  Strength RLE  Comment: hip flex 3-/5, hip abduction 3-/5, knee flex 3+/5, knee ext 2-/5, DF 4+/5  Strength LLE  Comment: 5/5        Bed mobility  Supine to Sit: Stand by assistance  Scooting: Stand by assistance  Bed Mobility Comments: SBA for sup to sit to the left, may have more difficulty going to the right  Transfers  Sit to Stand: Moderate Assistance;2 Person Assistance  Stand to Sit: Moderate Assistance;2 Person Assistance  Bed to Chair: Maximum assistance;2 Person Assistance  Stand Pivot Transfers: Maximum Assistance;2 Person Assistance  Comment: Patient required verbal cues for hand placement, knee immoblizer on right due to quad weakness. Patient with rght knee buckling with knee immobilizer on, requiring max x 2 to turn to chair. Ambulation  Surface: Level tile  Device: Rolling Walker  Assistance: Maximum assistance;2 Person assistance  Quality of Gait: Patient with poor safety awareness in regards to knee buckling, but receptive to education. First 2 steps patient demo minor knee bucking, second 2 steps with severe knee buckling.   Gait Deviations: Slow [place]; Increased CRICKET; Decreased step length;Decreased step height;Shuffles;Decreased head and trunk rotation  Distance: 4 steps bed to chair     Balance  Sitting - Static: Good  Sitting - Dynamic: Good  Standing - Static: Fair (RW)  Standing - Dynamic: Poor;+ (RW)  Exercise Treatment: Gentle ROM / stretching to LLE to attempt to achieve neutral resting position        OutComes Score  Balance Score: 1 (10/06/22 1306)  Gait Score: 0 (10/06/22 1306)        Tinetti Total Score: 1 (10/06/22 1306)                                   AM-PAC Score  AM-PAC Inpatient Mobility Raw Score : 12 (10/06/22 1306)  AM-PAC Inpatient T-Scale Score : 35.33 (10/06/22 1306)  Mobility Inpatient CMS 0-100% Score: 68.66 (10/06/22 1306)  Mobility Inpatient CMS G-Code Modifier : CL (10/06/22 1306)          Tinneti Score  Balance Score: 1 (10/06/22 1306)  Gait Score: 0 (10/06/22 1306)  Tinetti Total Score: 1 (10/06/22 1306)  Tinetti Disability Index: 80-99% (10/06/22 1306)    Goals  Short Term Goals  Time Frame for Short Term Goals: 12 visits  Short Term Goal 1: Patient will be indep with bed mobility. Short Term Goal 2: Patient be mod assist with transfers. Short Term Goal 3: Patient will amb 40 feet with mod assist and RW. Short Term Goal 4: Patient will negotiate 10-12 steps with RW and mod assist.  Short Term Goal 5: Patient will tolerate 30 minutes of ther-ex and ther-act. Patient Goals   Patient Goals : Improve walking       Education  Patient Education  Education Given To: Patient  Education Provided: Role of Therapy;Plan of Care;Transfer Training  Education Provided Comments: Educated on positioning and safety  Education Method: Demonstration;Verbal  Barriers to Learning: None  Education Outcome: Verbalized understanding;Continued education needed    Co-treatment with OT warranted secondary to decreased safety and independence requiring 2 skilled therapy professionals to address individual discipline's goals.  PT addressing weight shifting prior to transfers and transfer training. Therapy Time   Individual Concurrent Group Co-treatment   Time In 0835         Time Out 0930         Minutes 55         Timed Code Treatment Minutes: 45 Minutes     Returned for afternoon tx 1401 to 1432 (31 minutes): Patient min assist for supine to sit to the right side of bed, sit to stand min x 2 with RW. Ambulation: side step 5 steps to the right and 5 steps to the left, 5 steps forward, and 5 steps back with RW and mod assist x 2. Continues to have decreased quad control with knee buckling, but some improvement for this morning. Patient continues to require knee immobilizer with transfers and gait. There-ex RLE: Seated LAQ and seated marches with manual assist to complete exercises and to maintain hip in neutral rotation by blocking knee medialy and blocking laterally. AAROM for hip abduction and ER to neutral x 10 reps. Supine hip abduction, heel slides, GS, QS, HS, and AP x 10 reps with manual assist to complete ROM and to maintain hip in neutral rotation. Patient positioned using blankets and pillows to maintain hip in neutral rotation and bed alarm on.   David Goncalves, PT

## 2022-10-06 NOTE — CARE COORDINATION
Social Work-Met with patient and his girlfriend to discuss dc options. Discussed short term SNF for rehab.  He will review [facility] provider list. Brenda Denney

## 2022-10-06 NOTE — CONSULTS
[facility]  Office: [address], DO, Curly Chang, DO, Lilibeth Sesay, DO, Joselyn Cheney, DO, Joaquin Briscoe MD, Parvez Alexis MD, Vinita Holley MD, Luvenia Duverney, MD,  Norberto Elliott MD, Gilbert Boston MD, Jame Fowler, DO, Jennifer Esquivel MD,  Yadira Liu MD, Yanira Cotton MD, Frances Scott DO, Joao Matos MD, Ranjit Pierson MD, Kaden Jewell MD, Mansoor Tran MD, Bree Arellano MD, Jarod White MD, Vandana Woods DO, Suzanne Mayberry MD, Connie Perez MD, Sammy Alexander, CNP,  Cortney Pedersen, CNP, Rahul Carrasquillo, CNP, Leigh Walden, CNP,  Jayla Shay, [facility], Fabio Cherry, CNP, Karen Durán, CNP, Amy Carroll, CNP, Keith Cruz, CNP, Avery Gtz, CNP, Laz Alejo PA-C, Ash Benavides, [facility], [place] , [facility], Charlie Pedraza, CNP, Nyla Venegas, CNP, Elaine Mars [facility] / HISTORY AND PHYSICAL EXAMINATION            Date:   10/6/2022  Patient name:  Waylon Apple  Date of admission:  10/5/2022 10:45 AM  MRN:   [id number]  Account:  [de-identified]  YOB: 1960  PCP:    Laina Wood MD  Room:   [facility]/2107-  Code Status:    Full Code    Physician Requesting Consult: Federica Carson DO    Reason for Consult: Medical management    Chief Complaint:     Inflammatory reaction related to internal prosthesis of right hip    History Obtained From:     patient, electronic medical record    History of Present Illness:     Patient admitted to the hospital for scheduled right hip incision and drainage and possible head and polyexchange with wound VAC placement related to right sided prosthetic hip joint infection. 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   According to the notes the patient has required multiple surgeries on the right hip secondary to infection. Other history includes CAD, CHF, diabetes, GERD, hypertension, hyperlipidemia, and CPAP with noncompliance        Past Medical History:     Past Medical History:   Diagnosis Date    Arthritis     CAD (coronary artery disease)     Cancer ([facility]     prostate    Cervical radiculopathy     CHF (congestive heart failure) ([facility]     DM (diabetes mellitus) ([facility] 2009    IDDM    GERD (gastroesophageal reflux disease) 2017    ON RX    Heart murmur 2013    ASYMPTOMATIC    HTN (hypertension) 06/29/2012    ON RX    Hyperlipidemia 06/29/2012    ON RX    Sleep apnea 2016    TRIED MACHINE COULD NOT TOLERATE    Vision abnormalities 2019    FLOATERS RIGHT EYE    Wears glasses         Past Surgical History:     Past Surgical History:   Procedure Laterality Date    COLONOSCOPY  07/23/2010    Dr. Sue Little Bilateral 2017    CATARACT EXTRACTION WITH IOL    KNEE ARTHROSCOPY      REVISION TOTAL HIP ARTHROPLASTY Right 10/5/2022    RIGHT HIP EXCISIONAL DEBRIDEMENT SKIN TO SUBCUTANEOUS TISSUE AND FASCIA AND IRRIGATION performed by Dian Rome DO at [address] [place] [address] [place] Right 03/28/2019    VITRECTOMY Right 3/28/2019    VITRECTOMY 25 GAUGE,  IOL REPOSITION  ([facility]) performed by Kamilah Norton MD at [place] [address]        Medications Prior to Admission:     Prior to Admission medications    Medication Sig Start Date End Date Taking? Authorizing Provider   oxyCODONE-acetaminophen (PERCOCET) 5-325 MG per tablet Take 1-2 tablets by mouth every 6 hours as needed for Pain for up to 7 days. 10/5/22 10/12/22 Yes Quentin Real MD   apixaban Cori Nae) 2.5 MG TABS tablet Take 1 tablet by mouth 2 times daily 10/5/22 11/9/22 Yes Quentin Real MD   Misc.  Devices MISC 1 PAIR OF DIABETIC SHOES (1 LEFT/ 1 RIGHT)  1-3 PAIRS OF INSERTS (LEFT/ RIGHT) 9/20/22   Ranjit Cohen DPM   pregabalin (LYRICA) 100 MG capsule TAKE 1 CAPSULE BY MOUTH TWICE DAILY. 9/7/22 12/6/22  Bernardo Barraza MD   TRULICITY 3 VU/1.6JA SOPN INJECT THE CONTENTS OF ONE SYRINGE (3MG) UNDER THE SKIN ONCE WEEKLY 9/7/22   Giulia Torres MD   metFORMIN (GLUCOPHAGE) 500 MG tablet TAKE TWO TABLETS BY MOUTH TWICE A DAY WITH MEALS 9/7/22   Ladan Shi MD   ketoconazole (NIZORAL) 2 % shampoo Use as wash to chest, back, neck, face, and scalp leaving on for 5 minutes prior to washing off once daily for 2 weeks then three times weekly 8/30/22   Nicole Naranjo MD   ketoconazole (NIZORAL) 2 % cream Apply daily to affected areas on face, scalp, neck, and chest 8/30/22   Jina Mcgarry MD   atorvastatin (LIPITOR) 40 MG tablet Take 1 tablet by mouth daily 8/17/22   Ramesh Sánchez MD   ibuprofen (ADVIL;MOTRIN) 400 MG tablet Take 1 tablet by mouth every 6 hours as needed for Pain 8/17/22   Chivo Maharaj MD   metoprolol tartrate (LOPRESSOR) 25 MG tablet TAKE ONE-HALF TABLET BY MOUTH TWO TIMES A DAY 8/12/22   Giulia Torres MD   JARDIANCE 25 MG tablet TAKE 1 TABLET BY MOUTH ONCE DAILY. 8/12/22   Giulia Torres MD   doxycycline hyclate (VIBRA-TABS) 100 MG tablet  7/12/22   Vitaliy Rodriguez MD   venlafaxine (EFFEXOR XR) 37.5 MG extended release capsule TAKE 1 CAPSULE BY MOUTH ONE TIME A DAY 7/15/22   Sarah Varghese MD   tamsulosin (FLOMAX) 0.4 mg capsule Take 1 capsule by mouth daily 5/24/22   Morgan Nicolas MD   LANTUS SOLOSTAR 100 UNIT/ML injection pen INJECT 42 UNITS INTO THE SKIN TWO TIMES A DAY 5/17/22   Nikolas Huggins MD   Continuous Blood Gluc Transmit (DEXCOM G6 TRANSMITTER) MISC Use in combination with new sensor every 10 days.  Transmitter lasts 3 months 4/14/22   Kenyatta Kumar, DO   Continuous Blood Gluc Sensor (DEXCOM G6 SENSOR) MISC Apply new sensor to abdomen every 10 days 4/14/22   Kenyatta Kumar, DO   Continuous Blood Gluc  (DEXCOM G6 ) MAMADOU Use as directed to monitor glucose continuously 4/14/22   Layvonne Runner Sussy Lomax,    ferrous sulfate (IRON 325) 325 (65 Fe) MG tablet Take 325 mg by mouth daily (with breakfast)     Historical Provider, MD   zinc 50 MG CAPS Take 1 capsule by mouth daily    Historical Provider, MD   aspirin EC 81 MG EC tablet Take 1 tablet by mouth daily 22   Bernardo Becerra MD   Insulin Pen Needle (B-D ULTRAFINE III SHORT PEN) 31G X 8 MM MISC 1 each by Does not apply route daily 22   Shelby Waters MD   vitamin D3 (CHOLECALCIFEROL) 25 MCG (1000 UT) TABS tablet Take 1 tablet by mouth daily 22   Bernardo Becerra MD   insulin lispro, 1 Unit Dial, (HUMALOG KWIKPEN) 100 UNIT/ML SOPN Inject 6 Units into the skin 3 times daily (before meals) 22   Bernardo eBcerra MD        Allergies:     Lisinopril, Melatonin, and Trazodone    Social History:     Tobacco:    reports that he quit smoking about 6 years ago. His smoking use included cigarettes. He has never used smokeless tobacco.  Alcohol:      reports current alcohol use. Drug Use:  reports no history of drug use. Family History:     Family History   Problem Relation Age of Onset    Diabetes Sister     Diabetes Brother     Cancer Brother        Review of Systems:     Positive and Negative as described in HPI. Review of Systems   Musculoskeletal:  Positive for arthralgias and myalgias. All other systems reviewed and are negative. Physical Exam:     BP (!) 154/98   Pulse 83   Temp 98.4 °F (36.9 °C) (Oral)   Resp 16   Ht 6' 2\" (1.88 m)   Wt 248 lb (112.5 kg)   SpO2 95%   BMI 31.84 kg/m²   Temp (24hrs), Av.7 °F (36.5 °C), Min:96.9 °F (36.1 °C), Max:98.4 °F (36.9 °C)    Recent Labs     10/05/22  1444 10/05/22  1539 10/05/22  1635 10/05/22  2033   POCGLU 78 92 127* 289*       Intake/Output Summary (Last 24 hours) at 10/6/2022 0126  Last data filed at 10/5/2022 1935  Gross per 24 hour   Intake 1865 ml   Output 1200 ml   Net 665 ml       Physical Exam  Vitals and nursing note reviewed.    Constitutional:       Appearance: Normal appearance. HENT:      Mouth/Throat:      Mouth: Mucous membranes are moist.   Eyes:      Extraocular Movements: Extraocular movements intact. Conjunctiva/sclera: Conjunctivae normal.   Cardiovascular:      Rate and Rhythm: Normal rate and regular rhythm. Pulses: Normal pulses. Heart sounds: Normal heart sounds. Pulmonary:      Effort: Pulmonary effort is normal.      Breath sounds: Normal breath sounds. Abdominal:      General: Bowel sounds are normal.      Palpations: Abdomen is soft. Musculoskeletal:         General: Tenderness present. Right hip: Tenderness present. Decreased range of motion. Skin:     General: Skin is warm. Capillary Refill: Capillary refill takes less than 2 seconds. Comments: Wound VAC to right thigh and hip   Neurological:      General: No focal deficit present. Mental Status: He is alert and oriented to person, place, and time. Psychiatric:         Mood and Affect: Mood normal.       Investigations:      Laboratory Testing:  Recent Results (from the past 24 hour(s))   POC Glucose Fingerstick    Collection Time: 10/05/22 11:23 AM   Result Value Ref Range    POC Glucose 108 75 - 110 mg/dL   Culture, Anaerobic and Aerobic    Collection Time: 10/05/22  2:05 PM    Specimen: Hip; Swab   Result Value Ref Range    Specimen Description . HIP RIGHT     Direct Exam NO NEUTROPHILS SEEN     Direct Exam NO BACTERIA SEEN     Culture PENDING    Culture, Tissue    Collection Time: 10/05/22  2:09 PM    Specimen: Hip; Tissue   Result Value Ref Range    Specimen Description . TISSUE RIGHT     Direct Exam NO NEUTROPHILS SEEN     Direct Exam RARE GRAM POSITIVE COCCI IN PAIRS (A)     Culture PENDING    POC Glucose Fingerstick    Collection Time: 10/05/22  2:44 PM   Result Value Ref Range    POC Glucose 78 75 - 110 mg/dL   POC Glucose Fingerstick    Collection Time: 10/05/22  3:39 PM   Result Value Ref Range    POC Glucose 92 75 - 110 mg/dL   POC Glucose Fingerstick    Collection Time: 10/05/22  4:35 PM   Result Value Ref Range    POC Glucose 127 (H) 75 - 110 mg/dL   POC Glucose Fingerstick    Collection Time: 10/05/22  8:33 PM   Result Value Ref Range    POC Glucose 289 (H) 75 - 110 mg/dL   Creatinine    Collection Time: 10/05/22  8:51 PM   Result Value Ref Range    Creatinine 0.99 0.70 - 1.20 mg/dL    Est, Glom Filt Rate >60 >60 mL/min/1.73m2       Imaging/Diagonstics:  No results found.     Assessment :      Hospital Problems             Last Modified POA    * (Principal) Aftercare following right hip joint replacement surgery 10/5/2022 Yes    Benign essential HTN 10/6/2022 Yes    Diabetes mellitus ([address] 10/6/2022 Yes    Infection of right prosthetic hip joint ([address] 10/5/2022 Yes    ETOHism ([address] 10/6/2022 Yes    Atherosclerotic heart disease of native coronary artery without angina pectoris 10/6/2022 Yes    Congestive heart failure ([address] 10/6/2022 Yes       Plan:     Hip I&D with wound VAC placement  Postop management per surgery  ID consulted-antibiotics per the recommendation  Started on Eliquis  Lyrica resumed    Alcohol abuse-patient stated he is cut way back  Monitor for withdrawal  Start folic acid and thiamine daily  Monitor for seizure and fall    History of diabetes  Resume Lantus at a reduced dose for now  Start ISS  Glucophage resumed  Jardiance on hold    History of CAD  Him home statin  Hold Lopressor for now related to hypotension    History of hypertension  Hold beta-blocker for now related to hypotension    History of hyperlipidemia  Home statin resumed    History of CHF  Per his home med rec he is not currently on diuretics  Echo in April showed an EF of 55% with evidence of diastolic dysfunction  Monitor for shortness of breath or fluid overload    History of BPH  Home Flomax resumed    Case management to assist with discharge planning      Consultations:   IP CONSULT TO INFECTIOUS DISEASES  IP CONSULT TO CASE MANAGEMENT  IP CONSULT TO SOCIAL WORK  PHARMACY TO DOSE VANCOMYCIN  IP CONSULT TO HOSPITALIST      SHANTELLE Tobias CNP  10/6/2022  1:26 AM    Copy sent to Dr. Ronen Roman MD

## 2022-10-06 NOTE — PLAN OF CARE
Problem: Chronic Conditions and Co-morbidities  Goal: Patient's chronic conditions and co-morbidity symptoms are monitored and maintained or improved  10/6/2022 0112 by Wale Yu RN  Outcome: Progressing     Problem: Discharge Planning  Goal: Discharge to home or other facility with appropriate resources  10/6/2022 0112 by Wale Yu RN  Outcome: Progressing     Problem: Discharge Planning  Goal: Discharge to home or other facility with appropriate resources  10/6/2022 0112 by Wale Yu RN  Outcome: Progressing     Problem: Pain  Goal: Verbalizes/displays adequate comfort level or baseline comfort level  10/6/2022 0112 by Wale Yu RN  Outcome: Progressing     Problem: Pain  Goal: Verbalizes/displays adequate comfort level or baseline comfort level  10/6/2022 0112 by Wale Yu RN  Outcome: Progressing     Problem: ABCDS Injury Assessment  Goal: Absence of physical injury  10/6/2022 0112 by Wale Yu RN  Outcome: Progressing     Problem: ABCDS Injury Assessment  Goal: Absence of physical injury  10/6/2022 0112 by Wale Yu RN  Outcome: Progressing     Problem: Safety - Adult  Goal: Free from fall injury  10/6/2022 0112 by Wale Yu RN  Outcome: Progressing

## 2022-10-06 NOTE — PROGRESS NOTES
Pt dangled on bedside about 15 min. Stood at side of bed with walker for assistance. Pt did not ambulate at this time as he stated his right knee area still felt a little numb and like it could buckle. Pt able to feel bottom of foot and toes on floor. Can wiggle toes, warm and pink, good cap refill.

## 2022-10-06 NOTE — CARE COORDINATION
Social work: spoke to pt and significant other/wife Ruby [phone]. Their sn[facility] choices are/ 1. [facility] and [facility]. Will need leona, Rx and discharge order for snf. Sasha gaxiola    Social work: ok for admission per [employer]. When pt ready. Will need leona, Rx and discharge order for snf. .tentatively setting up for 6 pm tomorrow transfer (if ready) to [address] [place] [address] at 6 pm via [facility]. Per RN pt will need pICC before discharge. Will follow tomorrow in the morning to see if pt will be ready for 6 pm . Hens started.   Sasha gaxiola

## 2022-10-07 ENCOUNTER — APPOINTMENT (OUTPATIENT)
Dept: INTERVENTIONAL RADIOLOGY/VASCULAR | Age: 62
DRG: 502 | End: 2022-10-07
Attending: ORTHOPAEDIC SURGERY
Payer: MEDICARE

## 2022-10-07 ENCOUNTER — APPOINTMENT (OUTPATIENT)
Dept: GENERAL RADIOLOGY | Age: 62
DRG: 502 | End: 2022-10-07
Attending: ORTHOPAEDIC SURGERY
Payer: MEDICARE

## 2022-10-07 VITALS
RESPIRATION RATE: 17 BRPM | DIASTOLIC BLOOD PRESSURE: 92 MMHG | TEMPERATURE: 98.6 F | WEIGHT: 248 LBS | BODY MASS INDEX: 31.83 KG/M2 | OXYGEN SATURATION: 96 % | SYSTOLIC BLOOD PRESSURE: 152 MMHG | HEIGHT: 74 IN | HEART RATE: 88 BPM

## 2022-10-07 LAB
CULTURE: ABNORMAL
CULTURE: ABNORMAL
DIRECT EXAM: ABNORMAL
DIRECT EXAM: ABNORMAL
GLUCOSE BLD-MCNC: 148 MG/DL (ref 75–110)
GLUCOSE BLD-MCNC: 167 MG/DL (ref 75–110)
GLUCOSE BLD-MCNC: 171 MG/DL (ref 75–110)
SPECIMEN DESCRIPTION: ABNORMAL

## 2022-10-07 PROCEDURE — APPSS45 APP SPLIT SHARED TIME 31-45 MINUTES: Performed by: NURSE PRACTITIONER

## 2022-10-07 PROCEDURE — C1751 CATH, INF, PER/CENT/MIDLINE: HCPCS

## 2022-10-07 PROCEDURE — 82947 ASSAY GLUCOSE BLOOD QUANT: CPT

## 2022-10-07 PROCEDURE — 2580000003 HC RX 258: Performed by: STUDENT IN AN ORGANIZED HEALTH CARE EDUCATION/TRAINING PROGRAM

## 2022-10-07 PROCEDURE — 99232 SBSQ HOSP IP/OBS MODERATE 35: CPT | Performed by: NURSE PRACTITIONER

## 2022-10-07 PROCEDURE — 02HV33Z INSERTION OF INFUSION DEVICE INTO SUPERIOR VENA CAVA, PERCUTANEOUS APPROACH: ICD-10-PCS | Performed by: INTERNAL MEDICINE

## 2022-10-07 PROCEDURE — 99232 SBSQ HOSP IP/OBS MODERATE 35: CPT | Performed by: INTERNAL MEDICINE

## 2022-10-07 PROCEDURE — 6370000000 HC RX 637 (ALT 250 FOR IP): Performed by: STUDENT IN AN ORGANIZED HEALTH CARE EDUCATION/TRAINING PROGRAM

## 2022-10-07 PROCEDURE — 6360000002 HC RX W HCPCS: Performed by: STUDENT IN AN ORGANIZED HEALTH CARE EDUCATION/TRAINING PROGRAM

## 2022-10-07 PROCEDURE — 2580000003 HC RX 258: Performed by: INTERNAL MEDICINE

## 2022-10-07 PROCEDURE — 97116 GAIT TRAINING THERAPY: CPT

## 2022-10-07 PROCEDURE — 36573 INSJ PICC RS&I 5 YR+: CPT

## 2022-10-07 PROCEDURE — 97535 SELF CARE MNGMENT TRAINING: CPT

## 2022-10-07 PROCEDURE — 97110 THERAPEUTIC EXERCISES: CPT

## 2022-10-07 PROCEDURE — 71045 X-RAY EXAM CHEST 1 VIEW: CPT

## 2022-10-07 PROCEDURE — 6360000002 HC RX W HCPCS: Performed by: INTERNAL MEDICINE

## 2022-10-07 PROCEDURE — 6370000000 HC RX 637 (ALT 250 FOR IP): Performed by: NURSE PRACTITIONER

## 2022-10-07 RX ADMIN — SODIUM CHLORIDE: 9 INJECTION, SOLUTION INTRAVENOUS at 00:24

## 2022-10-07 RX ADMIN — OXYCODONE HYDROCHLORIDE 5 MG: 5 TABLET ORAL at 14:39

## 2022-10-07 RX ADMIN — VANCOMYCIN HYDROCHLORIDE 1000 MG: 1 INJECTION, POWDER, LYOPHILIZED, FOR SOLUTION INTRAVENOUS at 02:23

## 2022-10-07 RX ADMIN — PREGABALIN 75 MG: 75 CAPSULE ORAL at 09:03

## 2022-10-07 RX ADMIN — APIXABAN 2.5 MG: 2.5 TABLET, FILM COATED ORAL at 09:03

## 2022-10-07 RX ADMIN — OXYCODONE HYDROCHLORIDE 5 MG: 5 TABLET ORAL at 02:23

## 2022-10-07 RX ADMIN — MEROPENEM 1000 MG: 1 INJECTION, POWDER, FOR SOLUTION INTRAVENOUS at 17:06

## 2022-10-07 RX ADMIN — TAMSULOSIN HYDROCHLORIDE 0.4 MG: 0.4 CAPSULE ORAL at 09:03

## 2022-10-07 RX ADMIN — SODIUM CHLORIDE, PRESERVATIVE FREE 10 ML: 5 INJECTION INTRAVENOUS at 09:03

## 2022-10-07 RX ADMIN — ACETAMINOPHEN 1000 MG: 500 TABLET ORAL at 05:50

## 2022-10-07 RX ADMIN — ATORVASTATIN CALCIUM 40 MG: 40 TABLET, FILM COATED ORAL at 09:03

## 2022-10-07 RX ADMIN — BISACODYL 5 MG: 5 TABLET, COATED ORAL at 09:03

## 2022-10-07 RX ADMIN — METFORMIN HYDROCHLORIDE 1000 MG: 500 TABLET ORAL at 17:03

## 2022-10-07 RX ADMIN — ACETAMINOPHEN 1000 MG: 500 TABLET ORAL at 14:40

## 2022-10-07 RX ADMIN — INSULIN GLARGINE 30 UNITS: 100 INJECTION, SOLUTION SUBCUTANEOUS at 09:04

## 2022-10-07 RX ADMIN — THIAMINE HCL TAB 100 MG 100 MG: 100 TAB at 09:03

## 2022-10-07 RX ADMIN — METFORMIN HYDROCHLORIDE 1000 MG: 500 TABLET ORAL at 09:03

## 2022-10-07 RX ADMIN — KETOROLAC TROMETHAMINE 30 MG: 30 INJECTION, SOLUTION INTRAMUSCULAR at 00:20

## 2022-10-07 RX ADMIN — KETOROLAC TROMETHAMINE 30 MG: 30 INJECTION, SOLUTION INTRAMUSCULAR at 09:03

## 2022-10-07 RX ADMIN — CEFEPIME 2000 MG: 2 INJECTION, POWDER, FOR SOLUTION INTRAVENOUS at 09:10

## 2022-10-07 RX ADMIN — VANCOMYCIN HYDROCHLORIDE 1000 MG: 1 INJECTION, POWDER, LYOPHILIZED, FOR SOLUTION INTRAVENOUS at 14:43

## 2022-10-07 RX ADMIN — SODIUM CHLORIDE: 9 INJECTION, SOLUTION INTRAVENOUS at 11:06

## 2022-10-07 RX ADMIN — FOLIC ACID 1 MG: 1 TABLET ORAL at 09:03

## 2022-10-07 RX ADMIN — VENLAFAXINE HYDROCHLORIDE 37.5 MG: 37.5 CAPSULE, EXTENDED RELEASE ORAL at 09:03

## 2022-10-07 ASSESSMENT — PAIN DESCRIPTION - ORIENTATION
ORIENTATION: RIGHT

## 2022-10-07 ASSESSMENT — PAIN DESCRIPTION - DESCRIPTORS
DESCRIPTORS: ACHING
DESCRIPTORS: SPASM
DESCRIPTORS: ACHING

## 2022-10-07 ASSESSMENT — PAIN DESCRIPTION - FREQUENCY
FREQUENCY: CONTINUOUS
FREQUENCY: CONTINUOUS

## 2022-10-07 ASSESSMENT — ENCOUNTER SYMPTOMS
RESPIRATORY NEGATIVE: 1
GASTROINTESTINAL NEGATIVE: 1
RESPIRATORY NEGATIVE: 1
GASTROINTESTINAL NEGATIVE: 1

## 2022-10-07 ASSESSMENT — PAIN DESCRIPTION - LOCATION
LOCATION: HIP
LOCATION: HIP
LOCATION: LEG
LOCATION: HIP
LOCATION: LEG

## 2022-10-07 ASSESSMENT — PAIN - FUNCTIONAL ASSESSMENT
PAIN_FUNCTIONAL_ASSESSMENT: PREVENTS OR INTERFERES SOME ACTIVE ACTIVITIES AND ADLS
PAIN_FUNCTIONAL_ASSESSMENT: PREVENTS OR INTERFERES SOME ACTIVE ACTIVITIES AND ADLS

## 2022-10-07 ASSESSMENT — PAIN SCALES - GENERAL
PAINLEVEL_OUTOF10: 5
PAINLEVEL_OUTOF10: 6
PAINLEVEL_OUTOF10: 6
PAINLEVEL_OUTOF10: 8
PAINLEVEL_OUTOF10: 6
PAINLEVEL_OUTOF10: 5
PAINLEVEL_OUTOF10: 8

## 2022-10-07 ASSESSMENT — PAIN DESCRIPTION - PAIN TYPE
TYPE: SURGICAL PAIN
TYPE: SURGICAL PAIN

## 2022-10-07 ASSESSMENT — PAIN DESCRIPTION - ONSET
ONSET: ON-GOING
ONSET: ON-GOING

## 2022-10-07 NOTE — PROGRESS NOTES
Physical Therapy  Facility/Department: [facility]  Rehabilitation Physical Therapy Treatment Note    NAME: Kathy Aguilar  : 1960 (67 y.o.)  MRN: [id number]  CODE STATUS: Full Code    Date of Service: 10/7/22   Pt currently functioning below baseline. Recommend daily inpatient skilled therapy at time of discharge to maximize long term outcomes and prevent re-admission. Please refer to AM-PAC score for current level of function. Restrictions:  Lower Extremity Weight Bearing Restrictions  Right Lower Extremity Weight Bearing: Weight Bearing As Tolerated  Required Braces or Orthoses  Right Lower Extremity Brace: Knee Immobilizer  Position Activity Restriction  Other position/activity restrictions: B thigh high stockings, Wound vac, IV     SUBJECTIVE  Subjective  Subjective: Patient up seated EOB upon therapists arrival this date. Patient agreeable to PT treatment. NANCY Edwards Erp states patient is appropriate for PT treatment this date. OBJECTIVE  Cognition  Overall Cognitive Status: Exceptions  Arousal/Alertness: Appropriate responses to stimuli  Following Commands: Follows all commands without difficulty  Attention Span: Appears intact  Memory: Appears intact  Safety Judgement: Good awareness of safety precautions  Problem Solving: Able to problem solve independently  Insights: Fully aware of deficits  Initiation: Does not require cues  Sequencing: Does not require cues  Cognition Comment: Very friendly and cooperative. Orientation  Overall Orientation Status: Within Functional Limits    Functional Mobility  Balance  Sitting Balance: Modified independent   Standing Balance: Minimal assistance  Standing Balance  Activity: Patient seated EOB working on core control and stability. Patient standing EOB with RW and MIN x2 for support and stability in initial sit to stand for stability and support with initial dizziness upon stance  Transfers  Surface:  To chair with arms;From bed  Additional Factors: Set-up; Verbal cues; Increased time to complete  Device: Walker  Sit to Stand  Assistance Level: Minimal assistance; Requires x 2 assistance  Stand to Sit  Assistance Level: Contact guard assist  Bed To/From Chair  Technique: Stand step  Assistance Level: Minimal assistance; Requires x 2 assistance  Skilled Clinical Factors: Patient requires a MIN x2 for stability, support, equipment/line negotiation. Stand Pivot  Assistance Level: Contact guard assist      Environmental Mobility  Ambulation  Surface: Level surface  Device: Rolling walker  Distance: 60 feet x1  Activity: Within Unit  Activity Comments: Slow steady gait pattern noted this date. Additional Factors: Set-up; Verbal cues; Increased time to complete  Assistance Level: Minimal assistance; Requires x 2 assistance  Gait Deviations: Slow fortino; Path deviations; Unsteady gait; Decreased heel strike right;Decreased heel strike left      Neuromuscular Education  NDT Treatment: Gait ;Lower extremity; Sitting;Standing      PT Exercises  A/AROM Exercises: Patient performs seated Marches, LAQ and glute sets 1 x10 reps  Circulation/Endurance Exercises: AP 1 set x20 reps  Static Sitting Balance Exercises: Marches and Weight shifts in stance x30 seconds respectively. ASSESSMENT/PROGRESS TOWARDS GOALS  [facility] Mob[facility] Raw Score  17   [facility] Inpatient T-Scale Score  42.13   Mobility Inpatient CMS 0-100% Score 50.57   Mobility Inpatient CMS G-Code Modifier  CK     Vital Signs  BP Location: Right upper arm  O2 Device: None (Room air)    Assessment  Assessment: Patient s/p FANG R, patient with progression towards STG this date. Patient with slow careful gait pattern requries a MIN x2 for stability and support and to maximize ambulation techniques this date.  Patient would benefit from continued skilled PT treatment to maximize return to PLOF  Activity Tolerance: Patient tolerated treatment well  Discharge Recommendations: Patient would benefit from continued therapy after discharge    Goals  Patient Goals   Patient Goals : Improve walking  Short Term Goals  Time Frame for Short Term Goals: 12 visits  Short Term Goal 1: Patient will be indep with bed mobility. Short Term Goal 2: Patient be mod assist with transfers. Short Term Goal 3: Patient will amb 40 feet with mod assist and RW. Short Term Goal 4: Patient will negotiate 10-12 steps with RW and mod assist.  Short Term Goal 5: Patient will tolerate 30 minutes of ther-ex and ther-act. PLAN OF CARE/SAFETY  Physcial Therapy Plan  General Plan: 6-7 times per week  Specific Instructions for Next Treatment: 1-2x/d  Current Treatment Recommendations: Strengthening;ROM;Balance training;Functional mobility training;Transfer training; Endurance training;Gait training;Stair training;Home exercise program;Safety education & training;Patient/Caregiver education & training; Therapeutic activities  Safety Devices  Type of Devices: Call light within reach;Gait belt; Chair alarm in place;Nurse notified; Left in chair;Patient at risk for falls;Sitter present    EDUCATION  Education  Education Given To: Patient  Education Provided: Plan of Care;Precautions;Transfer Training;Mobility Training  Education Method: Verbal  Barriers to Learning: None  Education Outcome: Verbalized understanding        Therapy Time   Individual Concurrent Group Co-treatment   Time In [address] [place] [address] [id number]   Time Out [id number]   Minutes 9     12     Co-treatment with OT warranted secondary to decreased safety and independence requiring 2 skilled therapy professionals to address individual discipline's goals. PT addressing pre gait trunk strengthening, weight shifting prior to transfers, transfer training, and postural control in sitting.      González Klein PTA, 10/07/22 at 12:29 PM

## 2022-10-07 NOTE — PLAN OF CARE
Problem: Chronic Conditions and Co-morbidities  Goal: Patient's chronic conditions and co-morbidity symptoms are monitored and maintained or improved  10/7/2022 0033 by Cyndi Farley RN  Outcome: Progressing  Flowsheets (Taken 10/7/2022 0033)  Care Plan - Patient's Chronic Conditions and Co-Morbidity Symptoms are Monitored and Maintained or Improved:   Monitor and assess patient's chronic conditions and comorbid symptoms for stability, deterioration, or improvement   Collaborate with multidisciplinary team to address chronic and comorbid conditions and prevent exacerbation or deterioration  10/6/2022 1549 by Derik Blount RN  Outcome: Progressing  Flowsheets (Taken 10/6/2022 0840)  Care Plan - Patient's Chronic Conditions and Co-Morbidity Symptoms are Monitored and Maintained or Improved: Monitor and assess patient's chronic conditions and comorbid symptoms for stability, deterioration, or improvement     Problem: Discharge Planning  Goal: Discharge to home or other facility with appropriate resources  10/7/2022 0033 by Cyndi Farley RN  Outcome: Progressing  Flowsheets (Taken 10/7/2022 0033)  Discharge to home or other facility with appropriate resources:   Identify barriers to discharge with patient and caregiver   Arrange for needed discharge resources and transportation as appropriate   Identify discharge learning needs (meds, wound care, etc)     Problem: Pain  Goal: Verbalizes/displays adequate comfort level or baseline comfort level  10/7/2022 0033 by Cyndi Farley RN  Outcome: Progressing  Flowsheets (Taken 10/7/2022 0033)  Verbalizes/displays adequate comfort level or baseline comfort level:   Encourage patient to monitor pain and request assistance   Assess pain using appropriate pain scale   Administer analgesics based on type and severity of pain and evaluate response   Implement non-pharmacological measures as appropriate and evaluate response     Problem: Safety - Adult  Goal: Free from fall injury  10/7/2022 0033 by Cyndi Farley RN  Outcome: Progressing  Flowsheets (Taken 10/7/2022 0033)  Free From Fall Injury:   Instruct family/caregiver on patient safety   Based on caregiver fall risk screen, instruct family/caregiver to ask for assistance with transferring infant if caregiver noted to have fall risk factors     Problem: ABCDS Injury Assessment  Goal: Absence of physical injury  10/7/2022 0033 by Cyndi Farley RN  Outcome: Progressing  Flowsheets (Taken 10/7/2022 0033)  Absence of Physical Injury: Implement safety measures based on patient assessment  10/6/2022 1549 by Derik Blount RN  Outcome: Progressing

## 2022-10-07 NOTE — CARE COORDINATION
Social work; ok with [facility] for pt to come today leaving here at 7 pm via [facility] ambulance. Report: [phone]  Fax: [phone]  Family here and aware if PICC line is needed and cannot be done today then the time or day could change. Await leona, Rx and discharge order for snf. Med Rec needs completed so leona can be printed and faxed to snf. Shahana gaxiola    Social work: Anita Butler MD is going to change antibiotic for this pt and he will need 6 weeks of treatment every 8 hours. Snf informed they are going to cost out the drug and study their staffing to see if they have enough RN workers to do the antibiotics every 8 hours. When med rec is complete will fax that over to snf [facility] to see if they can do it. Shahana gaxiola    If med rec is completed after social work gone home please fax leona to fax: [phone]. Call report [phone].  Shahana gaxiola

## 2022-10-07 NOTE — PLAN OF CARE
Problem: Chronic Conditions and Co-morbidities  Goal: Patient's chronic conditions and co-morbidity symptoms are monitored and maintained or improved  Outcome: Progressing  Flowsheets (Taken 10/7/2022 0900)  Care Plan - Patient's Chronic Conditions and Co-Morbidity Symptoms are Monitored and Maintained or Improved: Monitor and assess patient's chronic conditions and comorbid symptoms for stability, deterioration, or improvement     Problem: Discharge Planning  Goal: Discharge to home or other facility with appropriate resources  Outcome: Progressing  Flowsheets (Taken 10/7/2022 0900)  Discharge to home or other facility with appropriate resources:   Identify barriers to discharge with patient and caregiver   Arrange for needed discharge resources and transportation as appropriate   Identify discharge learning needs (meds, wound care, etc)   Refer to discharge planning if patient needs post-hospital services based on physician order or complex needs related to functional status, cognitive ability or social support system     Problem: Pain  Goal: Verbalizes/displays adequate comfort level or baseline comfort level  Outcome: Progressing     Problem: Safety - Adult  Goal: Free from fall injury  Outcome: Progressing     Problem: ABCDS Injury Assessment  Goal: Absence of physical injury  Outcome: Progressing     Problem: Nutrition Deficit:  Goal: Optimize nutritional status  Outcome: Progressing

## 2022-10-07 NOTE — PROGRESS NOTES
[facility]  Office: [address] [facility], DO, Marjan Jacob, DO, Susan Mralon, DO, Marcarloynnshanda Naeem Cheney, DO, Carrillo Bill MD, Lizz Mosley MD, Thien Barraza MD, Davis Fine MD,  Devon Song MD, Vanesa Self MD, Andrea Concepcion, DO, Kirit Franks MD,  Len Bearden MD, Rahul Thompson MD, Micaela Vazquez, DO, Julia Fletcher MD, Avril Avelar MD, Cecile Queen MD, Aleshia Rincon MD, Elvie Ojeda MD, Jamie Alexis MD, Michell Martel DO, Maria A Alonso MD, Janes Anderson MD, Fanny Almanzar, CNP,  Anay Wiseman, CNP, Nereida Voss, CNP, Queta Sales, CNP,  Park Garcia, [facility], Jeferson Jimenez, CNP, Tawanna Darling, CNP, Beau Stone, CNP, Robert Carbajal, CNP, India Hull, CNP, Humaira Lanza PA-C, Ottoniel Gomez, CNS, Lito Jean, [facility], Sundeep Barry, CNP, Kamilah Simmons, CNP, Gerardo Ford, [facility]    Progress Note    10/7/2022    8:54 AM    Name:   Alyssa Norris  MRN:     [id number]     Acct:      [de-identified]   Room:   [address] [place] [address] Day:  2  Admit Date:  10/5/2022 10:45 AM    PCP:   Vin Sharp MD  Code Status:  Full Code    Subjective:     C/C: Expected postoperative discomfort  Interval History Status: improved. Condition has improved. Patient reports that his range of motion and ambulatory ability have improved overnight. Postoperative pain is well controlled. Patient remains on IV antibiotics. Appreciate infectious disease recommendations on long-term antimicrobial coverage. Brief History:     10/6 - Patient admitted to the hospital for scheduled right hip incision and drainage and possible head and polyexchange with wound VAC placement related to right sided prosthetic hip joint infection.  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 According to the notes the patient has required multiple surgeries on the right hip secondary to infection. Other history includes CAD, CHF, diabetes, GERD, hypertension, hyperlipidemia, and CPAP with noncompliance    10/7 - Condition has improved. Patient reports that his range of motion and ambulatory ability have improved overnight. Postoperative pain is well controlled. Patient remains on IV antibiotics. Appreciate infectious disease recommendations on long-term antimicrobial coverage. Review of Systems:     Constitutional:  negative for chills, fevers, sweats  Respiratory:  negative for cough, dyspnea on exertion, shortness of breath, wheezing  Cardiovascular:  negative for chest pain, chest pressure/discomfort, lower extremity edema, palpitations  Gastrointestinal:  negative for abdominal pain, constipation, diarrhea, nausea, vomiting  Neurological:  negative for dizziness, headache    Medications: Allergies:     Allergies   Allergen Reactions    Lisinopril Swelling     Outer Neck swelling    Melatonin Swelling    Trazodone Swelling     Chest swells       Current Meds:   Scheduled Meds:    folic acid  1 mg Oral Daily    thiamine mononitrate  100 mg Oral Daily    insulin glargine  30 Units SubCUTAneous BID    venlafaxine  37.5 mg Oral Daily with breakfast    vancomycin  1,000 mg IntraVENous Q12H    atorvastatin  40 mg Oral Daily    tamsulosin  0.4 mg Oral Daily    sodium chloride flush  5-40 mL IntraVENous 2 times per day    pregabalin  75 mg Oral BID    ketorolac  30 mg IntraVENous Q8H    acetaminophen  1,000 mg Oral 3 times per day    apixaban  2.5 mg Oral BID    bisacodyl  5 mg Oral Daily    metFORMIN  1,000 mg Oral BID WC    cefepime  2,000 mg IntraVENous Q12H    insulin lispro  0-8 Units SubCUTAneous TID WC    insulin lispro  0-4 Units SubCUTAneous Nightly    vancomycin (VANCOCIN) intermittent dosing (placeholder)   Other RX Placeholder     Continuous Infusions: sodium chloride 100 mL/hr at 10/07/22 6793    sodium chloride      dextrose       PRN Meds: sodium chloride flush, sodium chloride, oxyCODONE, promethazine **OR** ondansetron, glucose, dextrose bolus **OR** dextrose bolus, glucagon (rDNA), dextrose    Data:     Past Medical History:   has a past medical history of Arthritis, CAD (coronary artery disease), Cancer ([facility] Cervical radiculopathy, CHF (congestive heart failure) ([facility] DM (diabetes mellitus) ([facility] GERD (gastroesophageal reflux disease), Heart murmur, HTN (hypertension), Hyperlipidemia, Sleep apnea, Vision abnormalities, and Wears glasses. Social History:   reports that he quit smoking about 6 years ago. His smoking use included cigarettes. He has never used smokeless tobacco. He reports current alcohol use. He reports that he does not use drugs. Family History:   Family History   Problem Relation Age of Onset    Diabetes Sister     Diabetes Brother     Cancer Brother        Vitals:  BP (!) 147/95   Pulse 78   Temp 98.6 °F (37 °C)   Resp 17   Ht 6' 2\" (1.88 m)   Wt 248 lb (112.5 kg)   SpO2 93%   BMI 31.84 kg/m²   Temp (24hrs), Av.9 °F (34.4 °C), Min:66.2 °F (19 °C), Max:98.8 °F (37.1 °C)    Recent Labs     10/06/22  0610 10/06/22  1117 10/06/22  1609 10/07/22  0701   POCGLU 300* 233* 269* 167*       I/O (24Hr):     Intake/Output Summary (Last 24 hours) at 10/7/2022 0854  Last data filed at 10/7/2022 0850  Gross per 24 hour   Intake 2606.26 ml   Output 4690 ml   Net -2083.74 ml       Labs:  Hematology:  Recent Labs     10/06/22  0649   WBC 5.1   RBC 3.60*   HGB 9.0*   HCT 30.1*   MCV 83.6   MCH 25.0*   MCHC 29.9   RDW 16.6*      MPV 8.9     Chemistry:  Recent Labs     10/05/22  2051   CREATININE 0.99   LABGLOM >60     Recent Labs     10/05/22  1635 10/05/22  2033 10/06/22  0610 10/06/22  0649 10/06/22  1117 10/06/22  1609 10/07/22  0701   LABA1C  --   --   --  8.4*  --   --   --    POCGLU 127* 289* 300*  --  233* 269* 167* ABG:No results found for: POCPH, PHART, PH, POCPCO2, KKL2GVR, PCO2, POCPO2, PO2ART, PO2, POCHCO3, HKG6ZUA, HCO3, NBEA, PBEA, BEART, BE, THGBART, THB, DKO7LUA, SCOS1BEE, L9FIBLWN, O2SAT, FIO2  Lab Results   Component Value Date/Time    SPECIAL NOT REPORTED 03/21/2012 10:50 AM    SPECIAL NOT REPORTED 03/21/2012 10:50 AM     Lab Results   Component Value Date/Time    CULTURE CULTURE IN PROGRESS 10/05/2022 02:09 PM    CULTURE PENDING 10/05/2022 02:09 PM       Radiology:  No results found. Physical Examination:        General appearance:  alert, cooperative and no distress  Mental Status:  oriented to person, place and time and normal affect  Lungs:  clear to auscultation bilaterally, normal effort  Heart:  regular rate and rhythm, no murmur  Abdomen:  soft, nontender, nondistended, normal bowel sounds, no masses, hepatomegaly, splenomegaly  Extremities:  no edema, redness, tenderness in the calves. Postoperative site is covered with a dressing that is not removed by this provider.   Surrounding tissues are free of signs of induration or hemorrhage  Skin:  no gross lesions, rashes, induration    Assessment:        Hospital Problems             Last Modified POA    * (Principal) Aftercare following right hip joint replacement surgery 10/5/2022 Yes    Benign essential HTN 10/6/2022 Yes    Diabetes mellitus ([address] 10/6/2022 Yes    Infection of right prosthetic hip joint ([address] 10/5/2022 Yes    Post-op pain 10/6/2022 Yes    ETOHism ([address] 10/6/2022 Yes    Atherosclerotic heart disease of native coronary artery without angina pectoris 10/6/2022 Yes    Congestive heart failure ([address] 10/6/2022 Yes       Plan:        Right hip prosthetic joint infection status post surgical revision  Postoperative pain control per primary  Infectious disease on board and currently managing antibiotic coverage, anticipate PICC line placement and outpatient IV antibiotics to continue  Anticipate discharge to acute rehab  CHF with hypertension and CAD  Continue home medication regimens as outlined in the orders  Limit IV fluid resuscitation  Alcoholism without acute withdrawal  Supportive care and education    SHANTELLE Reese - NP  10/7/2022  8:54 AM

## 2022-10-07 NOTE — PROGRESS NOTES
[address] [facility] Pharmacokinetic Monitoring Service - Vancomycin    Consulting Provider: Dr. Rupal Mario   Indication: prosthetic hip joint infection  Target Concentration: Goal AUC/LELA 400-600 mg*hr/L  Day of Therapy: 3  Additional Antimicrobials: Cefepime    Pertinent Laboratory Values: Wt Readings from Last 1 Encounters:   10/05/22 248 lb (112.5 kg)     Temp Readings from Last 1 Encounters:   10/06/22 (!) 66.2 °F (19 °C)     Estimated Creatinine Clearance: 102 mL/min (based on SCr of 0.99 mg/dL). Recent Labs     10/05/22  2051 10/06/22  0649   CREATININE 0.99  --    WBC  --  5.1         Pertinent Cultures:  Culture Date Source Results   10/5 hip Gram neg rods  Gram positive cocci in pairs   MRSA Nasal Swab: N/A. Non-respiratory infection. Recent vancomycin administrations                     vancomycin (VANCOCIN) 1,250 mg in dextrose 5 % 250 mL IVPB ()  Restarted 10/06/22 1410     1,250 mg New Bag  1335    vancomycin (VANCOCIN) 2,500 mg in dextrose 5 % 500 mL IVPB (mg) 2,500 mg New Bag 10/06/22 0150    vancomycin (VANCOCIN) injection (mg) 1,000 mg Given 10/05/22 1358                    Assessment:  Date/Time Current Dose Concentration Timing of Concentration (h) AUC   10/6 1250mg IV q 12 hrs 20 19:12 558   Note: Serum concentrations collected for AUC dosing may appear elevated if collected in close proximity to the dose administered, this is not necessarily an indication of toxicity    Plan:  Current dosing regimen is supra-therapeutic. AUC is fine, but trough of 18 ug/ml is too high since vancomycin will accumulate if patient to stay on it for prolonged therapy.     \"Decrease dose to 1000mg iv q 12 hours  for anticipated AUC = 450 and trough 14.7   Repeat vancomycin concentration ordered for 10/8 @ 0700   Pharmacy will continue to monitor patient and adjust therapy as indicated    Thank you for the consult,  Greg Stevenson, [facility]  10/7/2022 11:33 AM

## 2022-10-07 NOTE — PROGRESS NOTES
[address] [facility] Pharmacokinetic Monitoring Service - Vancomycin    Consulting Provider: Dr. Lady Oliva   Indication: prosthetic hip joint infection  Target Concentration: Goal AUC/LELA 400-600 mg*hr/L  Day of Therapy: 2  Additional Antimicrobials: Cefepime    Pertinent Laboratory Values: Wt Readings from Last 1 Encounters:   10/05/22 248 lb (112.5 kg)     Temp Readings from Last 1 Encounters:   10/06/22 (!) 66.2 °F (19 °C)     Estimated Creatinine Clearance: 102 mL/min (based on SCr of 0.99 mg/dL). Recent Labs     10/05/22  2051 10/06/22  0649   CREATININE 0.99  --    WBC  --  5.1         Pertinent Cultures:  Culture Date Source Results   10/5 hip Gram neg rods  Gram positive cocci in pairs   MRSA Nasal Swab: N/A. Non-respiratory infection. Recent vancomycin administrations                     vancomycin (VANCOCIN) 1,250 mg in dextrose 5 % 250 mL IVPB ()  Restarted 10/06/22 1410     1,250 mg New Bag  1335    vancomycin (VANCOCIN) 2,500 mg in dextrose 5 % 500 mL IVPB (mg) 2,500 mg New Bag 10/06/22 0150    vancomycin (VANCOCIN) injection (mg) 1,000 mg Given 10/05/22 1358                    Assessment:  Date/Time Current Dose Concentration Timing of Concentration (h) AUC   10/6 1250mg IV q 12 hrs 20 19:12 558   Note: Serum concentrations collected for AUC dosing may appear elevated if collected in close proximity to the dose administered, this is not necessarily an indication of toxicity    Plan:  Current dosing regimen is supra-therapeutic. AUC is fine, but trough of 18 ug/ml is too high since vancomycin will accumulate if patient to stay on it for prolonged therapy.    \"Decrease dose to 1000mg iv q 12 hours  Repeat vancomycin concentration ordered for 10/8 @ 0700   Pharmacy will continue to monitor patient and adjust therapy as indicated    Thank you for the consult,  BEBA Napier [facility]  10/6/2022 8:07 PM

## 2022-10-07 NOTE — PROGRESS NOTES
Orthopedic Progress Note    Patient:  Rosanna Tariq, 60 y.o. male  YOB: 1960       Subjective:  Patient seen and examined. No complaints or concerns. Pain controlled on current regimen. No issues overnight. Denies fever, HA, CP, SOB, N/V. Tolerating PO intake. Ambulated down luevano and back with walker w/ PT today. Objective:   Vitals:    10/07/22 0705   BP: (!) 147/95   Pulse: 78   Resp: 17   Temp: 98.6 °F (37 °C)   SpO2: 93%     Gen: NAD, cooperative    Cardiovascular: Regular rate    Respiratory: Symmetric chest rise. No accessory muscle use    MSK:  RLE: Prevena dressing on, intact, with adequate suction, 50mL serosanguinous fluid out since yesterday, 75mL total, reinforced with Tegaderms. Appropriately tender to palpation. Compartments soft. 2+ DP pulse. TA/EHL/FHL/GS motor intact, able to flex quadriceps though less than contralateral side. Deep and Superficial Peroneal/Saphenous/Sural/Plantar SILT, patient has neuropathy at baseline though still reports his thigh feels \"different\" since surgery, placed order for ice.       Recent Labs     10/05/22  2051 10/06/22  0649   WBC  --  5.1   HGB  --  9.0*   HCT  --  30.1*   PLT  --  339   CREATININE 0.99  --       Anticoag: Eliquis  Abx: Cefepime, Vancomycin  See med rec for complete list    Impression/Plan: 60 y.o. male being seen for:    Infected right FANG s/p I&D, POD#2      - Plan to trend patient clinically  - WBAT RLE  - Maintain Prevena, please record output each shift, OK to discharge with Karely Pagan and maintain till follow-up appointment, please change cannister prior to discharge  - Intra-op cultures: GPCP, Pseudomonas  - Follow ID recs: Discharge on IV abx therapy  - Diet OK  - Hb 10/6: 9.0  - Abx per ID recs  - DVT: Eliquis  - Pain controlled on current regimen  - Ice for pain and swelling  - PT/OT eval and treat  - OK for discharge once placement to nursing facility established  - Follow up w/ Dr. Preston Anaya on 10/19 after surgery    Electronically signed by Ric Nevarez DO on 10/7/2022 at 10:35 AM.

## 2022-10-07 NOTE — PROGRESS NOTES
Patient discharged via ambulance to [facility] with all his belongings in stable condition.

## 2022-10-07 NOTE — PROGRESS NOTES
[facility]  Progress Note    Gwyn Naqvi  MRN: [id number]  Date: 10/7/2022  LOS: 2     Reason for F/U :   Prosthetic hip joint infection    Impression :   Right-sided prosthetic hip joint infection-chronic  Status post aggressive debridement procedure with wound VAC placement  Culture with Pseudomonas aeruginosa isolated and gram stain also had gram-positive cocci which are yet to be identified    Recommendations: The patient will continue on intravenous antimicrobial therapy and given that the cefepime and Zosyn LELA's increased I will switch him to meropenem and continue vancomycin for now until the gram-positive organisms have been isolated  Plan is for discharge to a skilled nursing facility and the patient will have a PICC line placed today  He will need to follow-up with me in the office in 4 weeks  Plan is to continue antibiotics through November 18, 2022  He will need outpatient lab testing with CBC, BMP, CRP and Vancomycin trough on Mondays; BUN, Creatinine, vanco trough on Thursdays  Fax results to Lang Ramesh MD FAX: [phone]    Infection Control Recommendations:   Universal precautions    Discharge Planning:   Estimated Length of IV antimicrobials: November 18, 2022  Patient will need PICC line Insertion  Patient will need: SNF  Patient willneed outpatient wound care: Yes    Medical Decision making / Summary of Stay:   Gwyn Naqvi is a 60y.o.-year-old male who was initially admitted on 10/5/2022. 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   The patient has undergone multiple surgical revision procedures in the past.     It is my understanding that he underwent a right prosthetic hip joint infection and this was complicated by a MSSA right prostatic hip joint infection for which he distress. He is seen with his wife at bedside and the culture data/results have been discussed with him. The plan in terms of IV antibiotic therapy was also discussed with him. He is otherwise doing okay does have some pain. Review of Systems   Constitutional: Negative. HENT: Negative. Respiratory: Negative. Cardiovascular: Negative. Gastrointestinal: Negative. Genitourinary: Negative. Musculoskeletal: Negative. Skin:  Positive for wound. Neurological: Negative. Psychiatric/Behavioral: Negative. Physical Examination :     Physical Exam  Constitutional:       Appearance: He is well-developed. HENT:      Head: Normocephalic and atraumatic. Cardiovascular:      Rate and Rhythm: Normal rate. Heart sounds: Normal heart sounds. No friction rub. No gallop. Pulmonary:      Effort: Pulmonary effort is normal.      Breath sounds: Normal breath sounds. No wheezing. Abdominal:      General: Bowel sounds are normal.      Palpations: Abdomen is soft. There is no mass. Tenderness: There is no abdominal tenderness. Musculoskeletal:         General: Normal range of motion. Cervical back: Neck supple. Lymphadenopathy:      Cervical: No cervical adenopathy. Skin:     General: Skin is warm and dry. Comments: He has a wound VAC in place in the right lateral hip   Neurological:      Mental Status: He is alert and oriented to person, place, and time. Laboratory data:   I have independently reviewed the followinglabs:  CBC with Differential:   Recent Labs     10/06/22  0649   WBC 5.1   HGB 9.0*   HCT 30.1*      LYMPHOPCT 13*   MONOPCT 10     BMP:   Recent Labs     10/05/22  2051   CREATININE 0.99     Hepatic Function Panel: No results for input(s): PROT, LABALBU, BILIDIR, IBILI, BILITOT, ALKPHOS, ALT, AST in the last 72 hours.       No results found for: PROCAL  Lab Results   Component Value Date/Time    CRP 34.7 08/26/2022 09:50 AM    CRP 22.1 03/14/2022 02:21 PM     Lab Results   Component Value Date    SEDRATE 104 (H) 08/26/2022         No results found for: DDIMER  Lab Results   Component Value Date/Time    FERRITIN 169 05/18/2022 08:08 AM     No results found for: LDH  No results found for: FIBRINOGEN    No results found for requested labs within last 30 days. No results found for: COVID19    No results for input(s): VANCOTROUGH in the last 72 hours. Imaging Studies:   No new imaging    Cultures:     Culture, Tissue [[id number]] (Abnormal) Collected: 10/05/22 1409   Order Status: Completed Specimen: Tissue from Hip Updated: 10/07/22 1310    Specimen Description . TISSUE RIGHT    Direct Exam NO NEUTROPHILS SEEN     RARE GRAM POSITIVE COCCI IN PAIRS Abnormal     Culture GRAM NEGATIVE RODS LIGHT GROWTH Abnormal    Culture, Anaerobic and Aerobic [[id number]] (Abnormal)  Collected: 10/05/22 1405   Order Status: Completed Specimen: Swab from Hip Updated: 10/07/22 1017    Specimen Description . HIP RIGHT    Direct Exam NO NEUTROPHILS SEEN     NO BACTERIA SEEN    Culture PSEUDOMONAS AERUGINOSA LIGHT GROWTH Abnormal      No anaerobic organisms isolated at 2 days. Pseudomonas aeruginosa (1)    Antibiotic Interpretation Microscan Method Status    cefepime Sensitive 8 BACTERIAL SUSCEPTIBILITY PANEL LELA Preliminary    ciprofloxacin Sensitive <=0.25 BACTERIAL SUSCEPTIBILITY PANEL LELA Preliminary    gentamicin Sensitive 4 BACTERIAL SUSCEPTIBILITY PANEL LELA Preliminary    tobramycin Sensitive <=1 BACTERIAL SUSCEPTIBILITY PANEL LELA Preliminary    piperacillin-tazobactam Sensitive 8 BACTERIAL SUSCEPTIBILITY PANEL LELA Preliminary          Specimen Collected: 10/05/22 14:05 EDT Last Resulted: 10/07/22 10:17 EDT        Culture with Smear, Acid Fast Bacillius [[id number]] Collected: 10/05/22 1409   Order Status: Completed Specimen: Tissue from Hip Updated: 10/06/22 1218    Specimen Description . TISSUE RIGHT    Direct Exam NO ACID FAST BACILLI SEEN (CONCENTRATED SMEAR)    Culture PENDING   Culture, Fungus [[id number]] Collected: 10/05/22 1405   Order Status: Sent Specimen: Swab from Hip Updated: 10/06/22 0046   Culture, Fungus [[id number]] Collected: 10/05/22 1409   Order Status: Sent Specimen: Tissue from Hip Updated: 10/06/22 0046       Medications:      folic acid  1 mg Oral Daily    thiamine mononitrate  100 mg Oral Daily    insulin glargine  30 Units SubCUTAneous BID    venlafaxine  37.5 mg Oral Daily with breakfast    vancomycin  1,000 mg IntraVENous Q12H    atorvastatin  40 mg Oral Daily    tamsulosin  0.4 mg Oral Daily    sodium chloride flush  5-40 mL IntraVENous 2 times per day    pregabalin  75 mg Oral BID    acetaminophen  1,000 mg Oral 3 times per day    apixaban  2.5 mg Oral BID    bisacodyl  5 mg Oral Daily    metFORMIN  1,000 mg Oral BID WC    cefepime  2,000 mg IntraVENous Q12H    insulin lispro  0-8 Units SubCUTAneous TID WC    insulin lispro  0-4 Units SubCUTAneous Nightly    vancomycin (VANCOCIN) intermittent dosing (placeholder)   Other RX Placeholder           [facility]  Tiffany Samuel MD  [employer] messaging  OFFICE: [phone]      Electronically signed by Tiffany Samuel MD on 10/7/2022 at 3:21 PM  Thank you for allowing us to participate in the care of this patient. Please call with questions. This note iscreated with the assistance of a speech recognition program.  While intending to generate a document that actually reflects the content of the visit, the document can still have some errors including those of syntax andsound a like substitutions which may escape proof reading. In such instances, actual meaning can be extrapolated by contextual diversion.

## 2022-10-07 NOTE — PROGRESS NOTES
Occupational Therapy  Facility/Department: [facility]  Rehabilitation Occupational Therapy Daily Treatment Note    Date: 10/7/22  Patient Name: Guru Christensen       Room: 7-  MRN: [id number]  Account: [de-identified]   : 1960  (63 y.o.) Gender: male       Past Medical History:  has a past medical history of Arthritis, CAD (coronary artery disease), Cancer ([facility] Cervical radiculopathy, CHF (congestive heart failure) ([facility] DM (diabetes mellitus) ([facility] GERD (gastroesophageal reflux disease), Heart murmur, HTN (hypertension), Hyperlipidemia, Sleep apnea, Vision abnormalities, and Wears glasses. Past Surgical History:   has a past surgical history that includes Colonoscopy (2010); Knee arthroscopy; eye surgery (Bilateral, 2017); Vasectomy (); vitrectomy (Right, 2019); vitrectomy (Right, 3/28/2019); and Revision total hip arthroplasty (Right, 10/5/2022). Restrictions  Restrictions/Precautions: Weight Bearing;General Precautions; Fall Risk  Other position/activity restrictions: B thigh high stockings, Wound vac, IV  Right Lower Extremity Weight Bearing: Weight Bearing As Tolerated  Required Braces or Orthoses  Right Lower Extremity Brace: Knee Immobilizer  Required Braces or Orthoses?: Yes    Subjective  Subjective: \"I would love to take a shower or get washed up\". Restrictions/Precautions: Weight Bearing;General Precautions; Fall Risk       Objective     Cognition  Overall Cognitive Status: Exceptions  Arousal/Alertness: Appropriate responses to stimuli  Following Commands: Follows all commands without difficulty  Attention Span: Appears intact  Memory: Appears intact  Safety Judgement: Good awareness of safety precautions  Problem Solving: Able to problem solve independently  Insights: Fully aware of deficits  Initiation: Does not require cues  Sequencing: Does not require cues  Cognition Comment: Very friendly and cooperative.   Orientation  Overall Orientation Status: Within Functional Limits         ADL  Grooming/Oral Hygiene  Assistance Level: Set-up; Independent  Skilled Clinical Factors: Washing face while sitting up on side of bed. Upper Extremity Bathing  Assistance Level: Independent; Set-up  Skilled Clinical Factors: Pt. self initiated cleaning chest, underarms and arms. Lower Extremity Bathing  Assistance Level: Stand by assist;Set-up  Skilled Clinical Factors: SBA with use of RW while pt washed LE and private area with set up. Upper Extremity Dressing  Assistance Level: Independent; Set-up  Lower Extremity Dressing  Assistance Level: Stand by assist;Maximum assistance  Skilled Clinical Factors: Max assist to place underwear over feet but SBA to stand and pull up to waist.  Putting On/Taking Off Footwear  Assistance Level: Maximum assistance  Skilled Clinical Factors: Max assist with  socks. Functional Mobility  Device: Rolling walker  Activity:  (Across room and out to hallway)  Assistance Level: Minimal assistance; Requires x 2 assistance  Skilled Clinical Factors: Min assist x2 with use of RW d/t recent medication which was causing slight lightheadedness, multiple lines and equipment. Bed Mobility  Overall Assistance Level: Independent  Additional Factors: With handrails; Set-up; Verbal cues; Head of bed raised  Bridging  Assistance Level: Independent  Roll Left  Assistance Level: Independent  Roll Right  Assistance Level: Independent  Supine to Sit  Assistance Level: Stand by assist  Scooting  Assistance Level: Independent  Transfers  Surface: From bed; To chair with arms  Additional Factors: Set-up; Hand placement cues; With handrails;Verbal cues  Device: Walker  Sit to Stand  Assistance Level: Stand by assist  Stand to Sit  Assistance Level: Stand by assist         Assessment  Assessment  Assessment: Pt. has made gains towards goal improving from max assist x2 to min assist x2 for functional ADL transfers. Full ADLS completed while sitting up on EOB with I/SBA.  Pt. provided with recliner to sit up and out of bed to complete transfer with min assist x2. Skilled OT warranted to promote I/safety to return pt to prior living arrangement with assist as needed. Activity Tolerance: Patient tolerated treatment well  Discharge Recommendations: Patient would benefit from continued therapy after discharge  OT Equipment Recommendations  Equipment Needed: Yes  Mobility Devices: Vishnu Rye: Rolling  Other: sock aide, LH reacher. Safety Devices  Safety Devices in place: Yes  Type of devices: All fall risk precautions in place;Call light within reach; Left in chair;Gait belt;Nurse notified  Restraints  Initially in place: No    Patient Education  Education  Education Given To: Patient  Education Provided: Role of Therapy;Plan of Care; Mobility Training; Safety;Transfer Training;Equipment;ADL Function  Education Provided Comments: Pt. educated on AE to use for LE dressing to avoid bending to reach feet and complete dressing skills. Pt. also educated on home safety and proper use of RW to avoid falls. Pt. attentive and receptive to information. Education Method: Demonstration;Verbal  Barriers to Learning: None  Education Outcome: Verbalized understanding;Continued education needed    Plan  Occupational Therapy Plan  Times Per Week: 5-6x/week, 1-2x/day  Times Per Day: Once a day  Current Treatment Recommendations: Strengthening;Balance training;Functional mobility training; Endurance training;Equipment evaluation, education, & procurement; Safety education & training;Patient/Caregiver education & training;Self-Care / ADL;Positioning  Additional Comments: cont with stated POC    Goals  Patient Goals   Patient goals : to go home when able  Short Term Goals  Time Frame for Short Term Goals: by discharge, pt will  Short Term Goal 1: demo min A with ADL transfers with RW and approp DME with good safety  Short Term Goal 2: demo min A with functional mob in room distances with RW for ADL completion with good safety/pacing  Short Term Goal 3: demo min A with toileting routine with good safety, DME as needed  Short Term Goal 4: demo I with UB ADLs and min A LB ADLs with good safety, pacing, DME as needed  Short Term Goal 5: demo and verb good understanding of fall prevention techs, EC/WS techs, equip needs, B UE HEP, and d/c recommendations  Additional Goals?: No    AM-PAC Score        AM-PAC Inpatient Daily Activity Raw Score: 18 (10/07/22 1206)  AM-PAC Inpatient ADL T-Scale Score : 38.66 (10/07/22 1206)  ADL Inpatient CMS 0-100% Score: 46.65 (10/07/22 1206)  ADL Inpatient CMS G-Code Modifier : CK (10/07/22 1206)      Therapy Time   Individual Concurrent Group Co-treatment   Time In [id number]   Time Out 0920     0935   Minutes 34     15       Co-treatment with PT warranted secondary to decreased safety and independence requiring 2 skilled therapy professionals to address individual discipline's goals. OT addressing preparation for ADL transfer, sitting balance for increased ADL performance, sitting/activity tolerance, functional reaching, environmental safety/scanning, fall prevention, functional mobility for ADL transfers, ability to sequence and follow directions, bed mobility tech, and functional UE strength.      CARMINE Lewis Ala

## 2022-10-08 LAB
CULTURE: ABNORMAL
DIRECT EXAM: ABNORMAL
DIRECT EXAM: ABNORMAL
SPECIMEN DESCRIPTION: ABNORMAL

## 2022-10-10 DIAGNOSIS — Z79.4 TYPE 2 DIABETES MELLITUS WITH OTHER SPECIFIED COMPLICATION, WITH LONG-TERM CURRENT USE OF INSULIN (HCC): ICD-10-CM

## 2022-10-10 DIAGNOSIS — I10 ESSENTIAL HYPERTENSION: ICD-10-CM

## 2022-10-10 DIAGNOSIS — E11.69 TYPE 2 DIABETES MELLITUS WITH OTHER SPECIFIED COMPLICATION, WITH LONG-TERM CURRENT USE OF INSULIN (HCC): ICD-10-CM

## 2022-10-10 RX ORDER — EMPAGLIFLOZIN 25 MG/1
TABLET, FILM COATED ORAL
Qty: 30 TABLET | Refills: 1 | Status: SHIPPED | OUTPATIENT
Start: 2022-10-10

## 2022-10-10 NOTE — TELEPHONE ENCOUNTER
Otf [facility] pending for refill     Health Maintenance   Topic Date Due    Diabetic retinal exam  Never done    Diabetic foot exam  04/27/2019    Flu vaccine (1) 08/01/2022    DTaP/Tdap/Td vaccine (1 - Tdap) 06/17/2023 (Originally 5/23/1978)    Shingles vaccine (1 of 2) 06/17/2023 (Originally 2/8/2011)    COVID-19 Vaccine (3 - Rinku risk series) 05/28/2024 (Originally 1/25/2022)    Lipids  04/12/2023    Diabetic microalbuminuria test  04/13/2023    Depression Screen  07/06/2023    Annual Wellness Visit (AWV)  07/07/2023    Prostate Specific Antigen (PSA) Screening or Monitoring  07/20/2023    A1C test (Diabetic or Prediabetic)  10/06/2023    Colorectal Cancer Screen  07/16/2025    Pneumococcal 0-64 years Vaccine  Completed    Hepatitis C screen  Completed    HIV screen  Completed    Hepatitis A vaccine  Aged Out    Hib vaccine  Aged Out    Meningococcal (ACWY) vaccine  Aged Out             (applicable per patient's age: Cancer Screenings, Depression Screening, Fall Risk Screening, Immunizations)    Hemoglobin A1C (%)   Date Value   10/06/2022 8.4 (H)   08/17/2022 8.1   04/12/2022 8.3     Microalb/Crt.  Ratio (mcg/mg creat)   Date Value   04/13/2022 Can not be calculated     LDL Cholesterol (mg/dL)   Date Value   04/12/2022 52     AST (U/L)   Date Value   06/29/2022 17     ALT (U/L)   Date Value   06/29/2022 18     BUN (mg/dL)   Date Value   08/26/2022 16      (goal A1C is < 7)   (goal LDL is <100) need 30-50% reduction from baseline     BP Readings from Last 3 Encounters:   10/07/22 (!) 152/92   09/21/22 (!) 146/105   09/16/22 (!) 148/100    (goal /80)      All Future Testing planned in [facility]:  Lab Frequency Next Occurrence   Hemoglobin A1C Once 03/28/2023   PSA, Diagnostic Once 07/24/2022   Sedimentation Rate Once 04/27/2022   CBC with Auto Differential Once 04/27/2022   CBC with Auto Differential Once 07/06/2022   Comprehensive Metabolic Panel Once 52/24/5956   PSA, Diagnostic Once 01/20/2023   PSA, Diagnostic Once 10/23/2022   Testosterone Once 10/25/2022       Next Visit Date:  Future Appointments   Date Time Provider Liset Pierce   10/18/2022 11:00 AM Tyesha Heredia [facility]iat[facility]   10/19/2022  9:45 AM Sherry Busch PA-C [facility] ORT SP [facility]   10/19/2022  1:30 PM Kathryn Acosat DO [facility]   10/24/2022  2:10 PM Maria D Morelos MD [facility]   10/31/2022 11:00 AM Oc Arreguin MD  derm [facility]   12/19/2022 11:00 AM Breezy Terrazas DO Sports Med Cindy Candis   1/23/2023  2:45 PM SCHEDULE, [facility]            Patient Active Problem List:     DM (diabetes mellitus) ([address]     HTN (hypertension)     ETOHism ([address]     Hypertensive urgency     Abnormal ambulatory electrocardiogram     Abnormal ambulatory electrocardiography     Abnormal kidney function     Adiposity     Astigmatism     Atherosclerotic heart disease of native coronary artery without angina pectoris     Cervical radiculopathy     Chronic back pain     Decreased cardiac output     Diabetic peripheral neuropathy (HCC)     Dislocated intraocular lens     Disturbance in sleep behavior     Dizziness     Dyssomnia     Floaters     Impotence of organic origin     Left ventricular dysfunction     Low vision, both eyes     Myopia     Nuclear sclerosis of left eye     Obstructive sleep apnea syndrome     Palpitations     Personal history of other diseases of the digestive system     Posterior vitreous detachment     Presbyopia     Primary osteoarthritis of right hip     Pseudophakia of right eye     Repetitive intrusions of sleep     Sciatica     Tendonitis     Vitamin D deficiency     Vitreous opacities of right eye     Dislocated IOL (intraocular lens), anterior, right     Prostate CA ([address]     Congestive heart failure (HCC)     Essential hypertension     Benign essential HTN     Diabetes mellitus ([address]     Aftercare following right hip joint replacement surgery Infection of right prosthetic hip joint ([address]     Benign localized prostatic hyperplasia with lower urinary tract symptoms (LUTS)     Post-op pain

## 2022-10-11 ENCOUNTER — TELEPHONE (OUTPATIENT)
Dept: ORTHOPEDIC SURGERY | Age: 62
End: 2022-10-11

## 2022-10-11 ENCOUNTER — TELEPHONE (OUTPATIENT)
Dept: FAMILY MEDICINE CLINIC | Age: 62
End: 2022-10-11

## 2022-10-11 NOTE — TELEPHONE ENCOUNTER
[place] called back stating the family is concerned with pt having wound vac on for a long period of time with out being changing . [place] is requesting a called back with further explanation as to why pt must wait until the 19th.  [place] can be reached at [address] also stated she is off tomorrow but to please call and information pts nurse for tomorrow

## 2022-10-11 NOTE — TELEPHONE ENCOUNTER
Advised per Dr. Sandra Whitmore: Impression/Plan: 60 y.o. male being seen for:     Infected right FANG s/p I&D, POD#2        - Plan to trend patient clinically  - WBAT RLE  - Maintain Dala Folds, please record output each shift, OK to discharge with Dala Folds and maintain till follow-up appointment, please change cannister prior to discharge  - Intra-op cultures: GPCP, Pseudomonas  - Follow ID recs: Discharge on IV abx therapy  - Diet OK  - Hb 10/6: 9.0  - Abx per ID recs  - DVT: Eliquis  - Pain controlled on current regimen  - Ice for pain and swelling  - PT/OT eval and treat  - OK for discharge once placement to nursing facility established  - Follow up w/ Dr. Iram Nicole on 10/19 after surgery    Information given to [facility].

## 2022-10-11 NOTE — TELEPHONE ENCOUNTER
[facility] Transitions Initial Follow Up Call    Outreach made within 2 business days of discharge: Yes    Patient: Pamela Scherer   Patient : 1960   MRN: [id number]    Reason for Admission: right hip joint replacement surgery  Discharge Date: 10/07/22       Spoke with: patient    Discharge department/facility: [facility]    TCM Interactive Patient Contact:  Was patient able to fill all prescriptions: Yes  Was patient instructed to bring all medications to the follow-up visit: No: patient was d/c to snf  Is patient taking all medications as directed in the discharge summary?  Yes  Does patient understand their discharge instructions: Yes  Does patient have questions or concerns that need addressed prior to 7-14 day follow up office visit: no    Scheduled appointment with PCP within 7-14 days  Patient discharged to [facility]  Follow Up  Future Appointments   Date Time Provider Liset Pierce   10/18/2022 11:00 AM [facility]   10/19/2022  9:45 AM Sherry Busch PA-C [facility] OR[facility] SP [facility]   10/19/2022  1:30 PM Keily Cantu DO [facility]   10/24/2022  2:10 PM Tg Mcdaniels MD [facility]   10/31/2022 11:00 AM Franklin Allen MD  derm [facility]   2022 11:00 AM [place]   2023  2:45 PM SCHEDULE, LIOR VANG ONC NURSE LIOR Richard LPN

## 2022-10-11 NOTE — TELEPHONE ENCOUNTER
Pts wife called in stating pt is at rehab at [facility]. Pts wife has some concerns regarding pts wound vac and would like to speak to clinical staff

## 2022-10-19 ENCOUNTER — OFFICE VISIT (OUTPATIENT)
Dept: ORTHOPEDIC SURGERY | Age: 62
End: 2022-10-19

## 2022-10-19 VITALS — WEIGHT: 248 LBS | BODY MASS INDEX: 31.83 KG/M2 | HEIGHT: 74 IN

## 2022-10-19 DIAGNOSIS — T84.51XD INFECTION ASSOCIATED WITH INTERNAL RIGHT HIP PROSTHESIS, SUBSEQUENT ENCOUNTER: ICD-10-CM

## 2022-10-19 DIAGNOSIS — Z96.649 STATUS POST REVISION OF TOTAL HIP: Primary | ICD-10-CM

## 2022-10-19 PROCEDURE — 99024 POSTOP FOLLOW-UP VISIT: CPT | Performed by: PHYSICIAN ASSISTANT

## 2022-10-19 ASSESSMENT — ENCOUNTER SYMPTOMS
COUGH: 0
SHORTNESS OF BREATH: 0
VOMITING: 0
COLOR CHANGE: 0

## 2022-10-19 NOTE — LETTER
Eva Kolb PA-C  [facility] SPORTS MEDICINE  [address] [address] [place] [address] [place]  [address] [address]  Dept: [phone]  Dept Fax: [phone]  10/19/22    Patient: Kathy Aguilar  YOB: 1960    Dear Sadie Correa had the pleasure of seeing one of your patients, Darnell Fernandez today in the office. Below are the relevant portions of my assessment and plan of care. IMPRESSION:  1. Status post revision of total hip    2. Infection associated with internal right hip prosthesis, subsequent encounter        PLAN:  1.)  Please change wound dressing with ABD pads on the lateral aspect of the right hip 2-3 times per day or as needed if saturated. Monitor for redness surrounding incision, purulent drainage or wound dehiscence. Call our office immediately with any incisional questions. Keep sutures in place. 2.)  Patient may shower and let water run over the surgical incision but was instructed to pat the area dry and place a clean dressing on the lateral aspect of the right hip immediately after showering.    3.)  Continue Tylenol as needed for right hip discomfort. Continue Eliquis as prescribed. 4.)  Continue IV antibiotics as prescribed by infectious disease. 5.) Continue with PT/OT at facility for strengthening, balance and mobility and gait training. Follow up on 10/31/2022 at 1:30pm with Dr Francesco Gomez at [place] office location.            Sherry Busch PA-C

## 2022-10-19 NOTE — PROGRESS NOTES
[facility] ORTHOPEDICS AND SPORTS MEDICINE  [address] [address] [place] [address] [address] [address]  Dept: [phone]  Dept Fax: [phone]        Postoperative follow-up note    Subjective: Kaye Castillo is a 60y.o. year old male who presents to our office today for postoperative followup regarding his   1. Status post revision of total hip    2. Infection associated with internal right hip prosthesis, subsequent encounter        Chief Complaint   Patient presents with    Follow-up     R Hip       Date of Surgery: 10/5/2022    Kaye Castillo  is a 60y.o. year old male who presents to our office today for postoperative follow up after having undergone a right hip excisional debridement of skin, subcutaneous tissue, muscle, fascia and debridement and irrigation of a right total hip-arthroplasty infection with wound VAC application on 32/2/3998. The patient denies fevers, chills, nausea, vomiting, diarrhea. The patient is currently staying at [address] [place] [address] at [facility].    The patient is currently being followed by infectious disease and has a PICC line and is receiving meropenem and vancomycin daily. Patient was most recently evaluated by Dr Sil Selby MD (Infectious Disease) on 10/7/2022 and that is the current medication recommendations from infectious disease. Patient arrived with a wound VAC on the lateral aspect of the right hip. Patient notes that his biggest complaint is the wound VAC. He notes that it was changed once already since his surgery. He notes that he has been doing facility physical therapy and Occupational Therapy but feels as if he is strong enough not to have to complete daily exercises. Review of Systems   Constitutional:  Negative for activity change and fever. HENT:  Negative for sneezing.     Respiratory:  Negative for cough and shortness of breath. Cardiovascular:  Negative for chest pain. Gastrointestinal:  Negative for vomiting. Musculoskeletal:  Positive for arthralgias (Right hip). Negative for joint swelling and myalgias. Skin:  Negative for color change. Neurological:  Negative for weakness and numbness. Psychiatric/Behavioral:  Negative for sleep disturbance. Objective :   General: Ronnie Weinstein is a 60 y.o. male who is alert and oriented and sitting comfortably in our office. Ortho Exam  MS:   Patient arrived with a Prevena wound VAC on the lateral aspect of the right hip. After removal of the wound VAC evaluation of the right hip reveals a lateral incision closed with suture and no evidence of wound dehiscence appreciated. There is very minimal serosanguineous drainage noted from the middle portion of the incision (picture below). There is no significant erythema, skin warmth, new skin lesions or gross signs of infection noted to the lateral aspect of the right hip. Very mild tenderness noted surrounding the surgical incision otherwise the remainder of the right hip is nontender to palpation. Patient is able to stand with the assistance of a rolling walker and is able to ambulate with a very mild limp noted to the right lower extremity. He is able to complete a standing march with very mild weakness noted on the right lower extremity. The patient has full range of motion of the right knee and ankle without discomfort noted. Sensation is intact to light touch of the right lower extremity without focal deficits present. The skin is noted to be warm with brisk capillary fill distally on the right foot. Neuro: alert. oriented  Eyes: Extra-ocular muscles intact  Mouth: Oral mucosa moist. No perioral lesions  Pulm: Respirations unlabored and regular. Skin: warm, well perfused  Psych:   Patient has good fund of knowledge and displays understanging of exam, diagnosis, and plan.     Right Lateral Hip 10/19/2022:        Radiology:    XR HIP RIGHT (2-3 VIEWS)    Result Date: 10/19/2022  History: History of right total hip arthroplasty revision. Comparison: 3/14/2022. Findings: Low AP pelvis, AP right hip, crosstable lateral x-rays of the right hip obtained today in office shows a longstem revision right total hip arthroplasty in good position without signs of complication appreciated. Leg lengths are approximate and components are in good position without signs of loosening appreciated. Soft tissue edema noted on the lateral aspect of the right hip. Wound VAC tubing is appreciated. No evidence of fracture, subluxation, dislocation, radiopaque foreign body or radiopaque tumors noted within the right hip or pelvis. Impression: Revision right total hip arthroplasty in good position without complications appreciated. Assessment:      1. Status post revision of total hip    2. Infection associated with internal right hip prosthesis, subsequent encounter         Plan:      The patient is currently 2 weeks postoperative after undergoing a right hip excisional debridement of skin, subcutaneous tissue, muscle, fascia and debridement and irrigation of the right total hip arthroplasty infection with wound VAC application completed on 10/5/2022 by Dr. Cheryle Police, DO. Patient's wound VAC was removed from the lateral aspect of the right hip today in office. Soon years will remain in place until the patient's next appointment. Instructions were given to the skilled nursing facility as listed below:    1.)  Please change wound dressing with ABD pads on the lateral aspect of the right hip 2-3 times per day or as needed if saturated. Monitor for redness surrounding incision, purulent drainage or wound dehiscence. Call our office immediately with any incisional questions. Keep sutures in place.      2.)  Patient may shower and let water run over the surgical incision but was instructed to pat the area dry and place a clean dressing on the lateral aspect of the right hip immediately after showering.     3.)  Continue Tylenol as needed for right hip discomfort. Continue Eliquis as prescribed. 4.)  Continue IV antibiotics as prescribed by infectious disease. 5.) Continue with PT/OT at facility for strengthening, balance and mobility and gait training. Follow up on 10/31/2022 at 1:30pm with Dr Janelle Lynne at [facility] office location. The patient was given wound dressing materials and was instructed to call our office with any questions or concerns prior to his next appointment in 12 days with Dr. Janelle Lynne. The patient noted his understanding. Follow up: Return in about 12 days (around 10/31/2022) for post-operative follow up, re-evaluation with Dr Janelle Lynne in [facility]. No orders of the defined types were placed in this encounter. Orders Placed This Encounter   Procedures    XR HIP RIGHT (2-3 VIEWS)     Standing Status:   Future     Number of Occurrences:   1     Standing Expiration Date:   10/19/2023       This note is created with the assistance of a speech recognition program.  While intending to generate a document that actually reflects the content of the visit, the document can still have some errors including those of syntax and sound a like substitutions which may escape proof reading. In such instances, actual meaning can be extrapolated by contextual diversion.      Electronically signed by Zuleika Azevedo PA-C on 10/19/2022 at 3:38 PM

## 2022-10-20 NOTE — DISCHARGE SUMMARY
Orthopedic Surgery Discharge Summary    Patient Identification:   -Carmen Garcias is a 60 y.o. male. -:  1960  -MRN: [id number]     -Acct: [de-identified]   -Admit Date:  10/5/2022  -Discharge date and time: 10/7/2022  7:17 PM     Admitting Physician: Talon Hairston DO     Discharge Physician: Same    Admission Diagnoses: periprosthetic hip infection    Discharge Diagnoses: same    Indication for Admission: Xin-operative observation and pain control    Surgical Procedure: right hip excisional debridement of skin, subcutaneous tissue, muscle, fascia and debridement and irrigation of a right total hip-arthroplasty infection with wound VAC application     Admission Condition: fair    Discharge Condition: fair    Consults: see consults    Significant Diagnostic Studies: Standard xin-operative labs/imaging. Discharge medications:      Medication List        START taking these medications      apixaban 2.5 MG Tabs tablet  Commonly known as: Eliquis  Take 1 tablet by mouth 2 times daily     meropenem  infusion  Commonly known as: MERREM  Infuse 1,000 mg intravenously in the morning and 1,000 mg at noon and 1,000 mg in the evening. Through 2022 Compound per protocol. .     vancomycin  infusion  Commonly known as: VANCOCIN  Infuse 1,000 mg intravenously in the morning and 1,000 mg in the evening. Through 2022 Compound per protocol. .            CONTINUE taking these medications      aspirin EC 81 MG EC tablet  Take 1 tablet by mouth daily     atorvastatin 40 MG tablet  Commonly known as: Lipitor  Take 1 tablet by mouth daily     B-D ULTRAFINE III SHORT PEN 31G X 8 MM Misc  Generic drug: Insulin Pen Needle  1 each by Does not apply route daily     Dexcom G6  Jenny  Use as directed to monitor glucose continuously     Dexcom G6 Sensor Misc  Apply new sensor to abdomen every 10 days     Dexcom G6 Transmitter Misc  Use in combination with new sensor every 10 days.  Transmitter lasts 3 months doxycycline hyclate 100 MG tablet  Commonly known as: VIBRA-TABS     ferrous sulfate 325 (65 Fe) MG tablet  Commonly known as: IRON 325     ibuprofen 400 MG tablet  Commonly known as: ADVIL;MOTRIN  Take 1 tablet by mouth every 6 hours as needed for Pain     insulin lispro (1 Unit Dial) 100 UNIT/ML Sopn  Commonly known as: HumaLOG KwikPen  Inject 6 Units into the skin 3 times daily (before meals)     * ketoconazole 2 % shampoo  Commonly known as: NIZORAL  Use as wash to chest, back, neck, face, and scalp leaving on for 5 minutes prior to washing off once daily for 2 weeks then three times weekly     * ketoconazole 2 % cream  Commonly known as: NIZORAL  Apply daily to affected areas on face, scalp, neck, and chest     Lantus SoloStar 100 UNIT/ML injection pen  Generic drug: insulin glargine  INJECT 42 UNITS INTO THE SKIN TWO TIMES A DAY     metFORMIN 500 MG tablet  Commonly known as: GLUCOPHAGE  TAKE TWO TABLETS BY MOUTH TWICE A DAY WITH MEALS     metoprolol tartrate 25 MG tablet  Commonly known as: LOPRESSOR  TAKE ONE-HALF TABLET BY MOUTH TWO TIMES A DAY     Misc. Devices Misc  1 PAIR OF DIABETIC SHOES (1 LEFT/ 1 RIGHT)  1-3 PAIRS OF INSERTS (LEFT/ RIGHT)     pregabalin 100 MG capsule  Commonly known as: LYRICA  TAKE 1 CAPSULE BY MOUTH TWICE DAILY. tamsulosin 0.4 MG capsule  Commonly known as: FLOMAX  Take 1 capsule by mouth daily     Trulicity 3 GP/5.8NE Sopn  Generic drug: Dulaglutide  INJECT THE CONTENTS OF ONE SYRINGE (3MG) UNDER THE SKIN ONCE WEEKLY     venlafaxine 37.5 MG extended release capsule  Commonly known as: EFFEXOR XR  TAKE 1 CAPSULE BY MOUTH ONE TIME A DAY     vitamin D3 25 MCG (1000 UT) Tabs tablet  Commonly known as: CHOLECALCIFEROL  Take 1 tablet by mouth daily     zinc 50 MG Caps           * This list has 2 medication(s) that are the same as other medications prescribed for you. Read the directions carefully, and ask your doctor or other care provider to review them with you. STOP taking these medications      Jardiance 25 MG tablet  Generic drug: empagliflozin            ASK your doctor about these medications      oxyCODONE-acetaminophen 5-325 MG per tablet  Commonly known as: Percocet  Take 1-2 tablets by mouth every 6 hours as needed for Pain for up to 7 days. Ask about: Should I take this medication? Where to Get Your Medications        You can get these medications from any pharmacy    Bring a paper prescription for each of these medications  apixaban 2.5 MG Tabs tablet  meropenem  infusion  oxyCODONE-acetaminophen 5-325 MG per tablet  vancomycin  infusion         Hospital Course: ee progress notes for more details. Disposition: SNF/Home    Patient Instructions:      Activity: activity as tolerated  Diet: regular diet  Wound Care: maintain vac    Follow up with dr. Tresa De Souza in 7-10 days    Jhoana Potter MD

## 2022-10-24 ENCOUNTER — HOSPITAL ENCOUNTER (OUTPATIENT)
Age: 62
Setting detail: SPECIMEN
Discharge: HOME | End: 2022-10-24

## 2022-10-24 DIAGNOSIS — C61 PROSTATE CA (HCC): ICD-10-CM

## 2022-10-24 LAB
CULTURE: NORMAL
DIRECT EXAM: NORMAL
PROSTATE SPECIFIC ANTIGEN: 0.83 NG/ML
SPECIMEN DESCRIPTION: NORMAL

## 2022-10-28 ENCOUNTER — TELEPHONE (OUTPATIENT)
Dept: INFECTIOUS DISEASES | Age: 62
End: 2022-10-28

## 2022-10-28 NOTE — TELEPHONE ENCOUNTER
Nurse Berenice Fernandez called From [facility] to let us  know that plan of care is  twice a week for three weeks one a week for 5 weeks. Lab draws every Mon and thurs till  ABX is finished.

## 2022-10-31 ENCOUNTER — TELEPHONE (OUTPATIENT)
Dept: INFECTIOUS DISEASES | Age: 62
End: 2022-10-31

## 2022-10-31 ENCOUNTER — HOSPITAL ENCOUNTER (OUTPATIENT)
Dept: INTERVENTIONAL RADIOLOGY/VASCULAR | Age: 62
Discharge: HOME | End: 2022-11-02
Payer: MEDICARE

## 2022-10-31 ENCOUNTER — HOSPITAL ENCOUNTER (OUTPATIENT)
Dept: GENERAL RADIOLOGY | Age: 62
Discharge: HOME | End: 2022-11-02
Payer: MEDICARE

## 2022-10-31 ENCOUNTER — OFFICE VISIT (OUTPATIENT)
Dept: ORTHOPEDIC SURGERY | Age: 62
End: 2022-10-31

## 2022-10-31 VITALS — BODY MASS INDEX: 33.03 KG/M2 | HEIGHT: 74 IN | RESPIRATION RATE: 16 BRPM | WEIGHT: 257.4 LBS

## 2022-10-31 DIAGNOSIS — R52 PAIN: ICD-10-CM

## 2022-10-31 DIAGNOSIS — Z96.649 STATUS POST REVISION OF TOTAL HIP: ICD-10-CM

## 2022-10-31 DIAGNOSIS — T84.51XD INFECTION ASSOCIATED WITH INTERNAL RIGHT HIP PROSTHESIS, SUBSEQUENT ENCOUNTER: Primary | ICD-10-CM

## 2022-10-31 PROCEDURE — 36569 INSJ PICC 5 YR+ W/O IMAGING: CPT

## 2022-10-31 PROCEDURE — 71045 X-RAY EXAM CHEST 1 VIEW: CPT

## 2022-10-31 PROCEDURE — 99024 POSTOP FOLLOW-UP VISIT: CPT | Performed by: ORTHOPAEDIC SURGERY

## 2022-10-31 PROCEDURE — C1751 CATH, INF, PER/CENT/MIDLINE: HCPCS

## 2022-10-31 NOTE — TELEPHONE ENCOUNTER
Spoke with [employer] and she had already informed patient to go to [facility] and he had Picc Line replaced and is sending over labs to be done to [facility]

## 2022-10-31 NOTE — TELEPHONE ENCOUNTER
Marsha Angelucci called regarding Tangela Niraj PICC line. She was trying to draw blood and can't get any more blood out of it and cant get it to flush either and want to know what you would like her to do.

## 2022-11-01 ASSESSMENT — ENCOUNTER SYMPTOMS
ABDOMINAL PAIN: 0
ROS SKIN COMMENTS: NEGATIVE FOR RASH
EYE DISCHARGE: 0
SHORTNESS OF BREATH: 0

## 2022-11-02 NOTE — PROGRESS NOTES
[address] [place] [address] AND SPORTS MEDICINE  [address] [address]  Dept: [phone]  Dept Fax: [phone]        Postoperative follow-up note    Subjective: Angelica Ly is a 60y.o. year old male who presents to our office today for postoperative followup regarding his   1. Infection associated with internal right hip prosthesis, subsequent encounter    2. Status post revision of total hip    . Chief Complaint   Patient presents with    Post-Op Check     Right hip surgery 10/5/22     Pawan Varghese is a 67-year-old male who presents the office today for recheck. He underwent right hip excisional debridement of skin, subcutaneous tissue, muscle, fascia, and debridement irrigation right total hip arthroplasty infection with a wound vacuum application on 06/1/7074. He notes he has been improving and is happy with the results so far. He is having minimal drainage from the incision. His wife is present during the entire evaluation corroborates the above. Review of Systems   Constitutional:  Positive for activity change. Negative for fever. HENT:  Negative for dental problem. Eyes:  Negative for discharge. Respiratory:  Negative for shortness of breath. Cardiovascular:  Negative for chest pain. Gastrointestinal:  Negative for abdominal pain. Genitourinary: Negative. Musculoskeletal:  Positive for arthralgias. Skin:         Negative for rash   Neurological:  Positive for weakness. Psychiatric/Behavioral:  Negative for confusion. I have reviewed the CC, HPI, ROS, PMH, FHX, Social History, and if not present in this note, I have reviewed in the patient's chart. I agree with the documentation provided by other staff and have reviewed their documentation prior to providing my signature indicating agreement.     Objective :   General: Angelica Ly is a 60 y.o. male who is alert and oriented and sitting comfortably in our office. Ortho Exam  MS:   Hip incisions healing well without signs of infection. Minimal scant drainage is noted on the dressing but no active drainage from the hip is appreciated. Patient ambulates with minimal short weightbearing phase to the right lower extremity. No gross signs of infection right hip is appreciated. Motor, sensory, vascular examination of the right lower extremity is grossly intact without focal deficits. Neuro: alert. oriented  Eyes: Extra-ocular muscles intact  Mouth: Oral mucosa moist. No perioral lesions  Pulm: Respirations unlabored and regular. Skin: warm, well perfused  Psych:   Patient has good fund of knowledge and displays understanging of exam, diagnosis, and plan. Radiology: XR CHEST (SINGLE VIEW FRONTAL)    Result Date: 10/7/2022  EXAMINATION: ONE XRAY VIEW OF THE CHEST 10/7/2022 7:05 pm COMPARISON: 10/07/2022 HISTORY: ORDERING SYSTEM PROVIDED HISTORY: post PICC insertion TECHNOLOGIST PROVIDED HISTORY: post PICC insertion Reason for Exam: post PICC insertion FINDINGS: Cardiomediastinal silhouette appears unremarkable. No parenchymal opacities. No effusion or pneumothorax. No aggressive osseous lesion. Right shoulder arthroplasty. Radiographs numbered 1 demonstrate coiled right PICC in the axillary region with tip projecting over the subclavian vein. Radiographs numbered 2 demonstrate right PICC tip located at the cavoatrial junction. Right PICC tip terminates at the cavoatrial junction. No pneumothorax. XR HIP RIGHT (2-3 VIEWS)    Result Date: 10/26/2022  History: History of right total hip arthroplasty revision. Comparison: 3/14/2022. Findings: Low AP pelvis, AP right hip, crosstable lateral x-rays of the right hip obtained today in office shows a longstem revision right total hip arthroplasty in good position without signs of complication appreciated.   Leg lengths are approximate and components are in good position without signs of loosening appreciated. Soft tissue edema noted on the lateral aspect of the right hip. Wound VAC tubing is appreciated. No evidence of fracture, subluxation, dislocation, radiopaque foreign body or radiopaque tumors noted within the right hip or pelvis. Impression: Revision right total hip arthroplasty in good position without complications appreciated. XR CHEST PORTABLE    Result Date: 11/1/2022  EXAMINATION: ONE XRAY VIEW OF THE CHEST; US GUIDE NEEDLE PLACEMENT 10/31/2022 1:19 pm COMPARISON: October 7, 2022 HISTORY: ORDERING SYSTEM PROVIDED HISTORY: PICC line placement; dysfunctional PICC line TECHNOLOGIST PROVIDED HISTORY: picc line placement Reason for Exam: Picc line placement, AP UPRIGHT PORTABLE FINDINGS: AP portable chest radiograph obtained prior to PICC line exchange 2 ensure appropriate position. Repeat chest radiograph after catheter exchange demonstrates satisfactory right upper extremity PICC placement terminating in the right atrium. Moderate cardiomegaly appears unchanged. Lungs and costophrenic sulci are clear. No pneumothorax or subdiaphragmatic free air. Reversed total shoulder arthroplasty redemonstrated on the right. Successful exchange of the right upper extremity PICC. The catheter is ready for use. XR CHEST PORTABLE    Result Date: 11/1/2022  EXAMINATION: ONE XRAY VIEW OF THE CHEST; US GUIDE NEEDLE PLACEMENT 10/31/2022 1:19 pm COMPARISON: October 7, 2022 HISTORY: ORDERING SYSTEM PROVIDED HISTORY: PICC line placement; dysfunctional PICC line TECHNOLOGIST PROVIDED HISTORY: picc line placement Reason for Exam: Picc line placement, AP UPRIGHT PORTABLE FINDINGS: AP portable chest radiograph obtained prior to PICC line exchange 2 ensure appropriate position. Repeat chest radiograph after catheter exchange demonstrates satisfactory right upper extremity PICC placement terminating in the right atrium. Moderate cardiomegaly appears unchanged.   Lungs and costophrenic sulci are clear. No pneumothorax or subdiaphragmatic free air. Reversed total shoulder arthroplasty redemonstrated on the right. Successful exchange of the right upper extremity PICC. The catheter is ready for use. IR INSERT PICC VAD W SQ PORT >5 YEARS    Result Date: 10/7/2022  Radiology exam is complete. No Radiologist dictation. Please follow up with ordering provider. IR US GUIDED NEEDLE PLACEMENT    Result Date: 11/1/2022  EXAMINATION: ONE XRAY VIEW OF THE CHEST; US GUIDE NEEDLE PLACEMENT 10/31/2022 1:19 pm COMPARISON: October 7, 2022 HISTORY: ORDERING SYSTEM PROVIDED HISTORY: PICC line placement; dysfunctional PICC line TECHNOLOGIST PROVIDED HISTORY: picc line placement Reason for Exam: Picc line placement, AP UPRIGHT PORTABLE FINDINGS: AP portable chest radiograph obtained prior to PICC line exchange 2 ensure appropriate position. Repeat chest radiograph after catheter exchange demonstrates satisfactory right upper extremity PICC placement terminating in the right atrium. Moderate cardiomegaly appears unchanged. Lungs and costophrenic sulci are clear. No pneumothorax or subdiaphragmatic free air. Reversed total shoulder arthroplasty redemonstrated on the right. Successful exchange of the right upper extremity PICC. The catheter is ready for use. Assessment:      1. Infection associated with internal right hip prosthesis, subsequent encounter    2. Status post revision of total hip         Plan:      Plan to see the patient back in 2 weeks. Consideration for removal of sutures could be made then if his drainage completely stops. He is going to continue antibiotics as indicated by the infectious disease team.       Follow up: Return in about 2 weeks (around 11/14/2022), or for suture removal.    No orders of the defined types were placed in this encounter. No orders of the defined types were placed in this encounter.       This note is created with the assistance of a speech recognition program.  While intending to generate a document that actually reflects the content of the visit, the document can still have some errors including those of syntax and sound a like substitutions which may escape proof reading.   In such instances, actual meaning can be extrapolated by contextual diversion      Electronically signed by Sasha Espino DO, Connie Roan on 11/1/2022 at 8:51 PM

## 2022-11-04 ENCOUNTER — TELEPHONE (OUTPATIENT)
Dept: INFECTIOUS DISEASES | Age: 62
End: 2022-11-04

## 2022-11-04 NOTE — TELEPHONE ENCOUNTER
[phone] Hospital or Facility: [facility] car From: Leno Iniguez RE: Paul Krishnamurthy 1960 RM: na the ptn CBC that was drawn was clotted and wondering if can get that drawn on Monday because they are unsure if there is an RNthat can go out there today  Forwarded 11/04/2022 12:42 PM  11/4/2022 12:42 PM  This message was sent to me, but that phone number is a fax number, if you can reach these people just tell them thats fine that they can do the labs on Monday    LM for Crystal and informed.

## 2022-11-10 ENCOUNTER — HOSPITAL ENCOUNTER (OUTPATIENT)
Age: 62
Setting detail: SPECIMEN
Discharge: HOME | End: 2022-11-10

## 2022-11-10 DIAGNOSIS — C61 PROSTATE CA (HCC): Primary | ICD-10-CM

## 2022-11-11 ENCOUNTER — TELEPHONE (OUTPATIENT)
Dept: FAMILY MEDICINE CLINIC | Age: 62
End: 2022-11-11

## 2022-11-11 LAB
BUN BLDV-MCNC: 16 MG/DL (ref 8–23)
CREAT SERPL-MCNC: 0.92 MG/DL (ref 0.7–1.2)
GFR SERPL CREATININE-BSD FRML MDRD: >60 ML/MIN/1.73M2
VANCOMYCIN TROUGH: 15.6 UG/ML (ref 10–20)

## 2022-11-11 NOTE — TELEPHONE ENCOUNTER
Called patient to inform him that I spoke with Avelina Paredes at [address] [place] [address] and she states that the sensors were sent out and he should receive them by tomorrow, patient verbalizes understanding.

## 2022-11-11 NOTE — TELEPHONE ENCOUNTER
Patient called office stating he has no sensors or the transmitter for his Dexcom. He states he was getting them from [facility], but now [facility] has merged with another company, and now he can't get his supplies. Please advise.

## 2022-11-11 NOTE — TELEPHONE ENCOUNTER
Called patient and informed patient that I spoke with a representative at [facility] and they did ship out a transmitter for patient and working on sensors, informed patient that the rep is working on order and will call me back, at that time I will call patient and inform him of updates, patient verbalizes understanding.

## 2022-11-16 ENCOUNTER — OFFICE VISIT (OUTPATIENT)
Dept: ORTHOPEDIC SURGERY | Age: 62
End: 2022-11-16

## 2022-11-16 VITALS — BODY MASS INDEX: 32.98 KG/M2 | WEIGHT: 257 LBS | HEIGHT: 74 IN | RESPIRATION RATE: 12 BRPM

## 2022-11-16 DIAGNOSIS — T84.51XD INFECTION ASSOCIATED WITH INTERNAL RIGHT HIP PROSTHESIS, SUBSEQUENT ENCOUNTER: Primary | ICD-10-CM

## 2022-11-16 DIAGNOSIS — Z79.4 TYPE 2 DIABETES MELLITUS WITH OTHER SPECIFIED COMPLICATION, WITH LONG-TERM CURRENT USE OF INSULIN (HCC): ICD-10-CM

## 2022-11-16 DIAGNOSIS — I10 ESSENTIAL HYPERTENSION: ICD-10-CM

## 2022-11-16 DIAGNOSIS — E11.69 TYPE 2 DIABETES MELLITUS WITH OTHER SPECIFIED COMPLICATION, WITH LONG-TERM CURRENT USE OF INSULIN (HCC): ICD-10-CM

## 2022-11-16 PROCEDURE — 99024 POSTOP FOLLOW-UP VISIT: CPT | Performed by: PHYSICIAN ASSISTANT

## 2022-11-16 RX ORDER — DULAGLUTIDE 3 MG/.5ML
INJECTION, SOLUTION SUBCUTANEOUS
Qty: 2 ML | Refills: 0 | Status: SHIPPED | OUTPATIENT
Start: 2022-11-16

## 2022-11-16 ASSESSMENT — ENCOUNTER SYMPTOMS
COUGH: 0
CHEST TIGHTNESS: 0
ABDOMINAL PAIN: 0
RESPIRATORY NEGATIVE: 1
APNEA: 0
VOMITING: 0
SHORTNESS OF BREATH: 0
ABDOMINAL DISTENTION: 0
DIARRHEA: 0
COLOR CHANGE: 0
CONSTIPATION: 0
NAUSEA: 0

## 2022-11-16 NOTE — TELEPHONE ENCOUNTER
E-scribe request for med refill. Please review and e-scribe if applicable. Last Visit Date:  09/16/2022  Next Visit Date:  12/5/2022    Hemoglobin A1C (%)   Date Value   10/06/2022 8.4 (H)   08/17/2022 8.1   04/12/2022 8.3             ( goal A1C is < 7)   Microalb/Crt.  Ratio (mcg/mg creat)   Date Value   04/13/2022 Can not be calculated     LDL Cholesterol (mg/dL)   Date Value   04/12/2022 52       (goal LDL is <100)   AST (U/L)   Date Value   06/29/2022 17     ALT (U/L)   Date Value   06/29/2022 18     BUN (mg/dL)   Date Value   11/10/2022 16     BP Readings from Last 3 Encounters:   10/07/22 (!) 152/92   09/21/22 (!) 146/105   09/16/22 (!) 148/100          (goal 120/80)        Patient Active Problem List:     DM (diabetes mellitus) ([address]     HTN (hypertension)     ETOHism ([address]     Hypertensive urgency     Abnormal ambulatory electrocardiogram     Abnormal ambulatory electrocardiography     Abnormal kidney function     Adiposity     Astigmatism     Atherosclerotic heart disease of native coronary artery without angina pectoris     Cervical radiculopathy     Chronic back pain     Decreased cardiac output     Diabetic peripheral neuropathy (HCC)     Dislocated intraocular lens     Disturbance in sleep behavior     Dizziness     Dyssomnia     Floaters     Impotence of organic origin     Left ventricular dysfunction     Low vision, both eyes     Myopia     Nuclear sclerosis of left eye     Obstructive sleep apnea syndrome     Palpitations     Personal history of other diseases of the digestive system     Posterior vitreous detachment     Presbyopia     Primary osteoarthritis of right hip     Pseudophakia of right eye     Repetitive intrusions of sleep     Sciatica     Tendonitis     Vitamin D deficiency     Vitreous opacities of right eye     Dislocated IOL (intraocular lens), anterior, right     Prostate CA ([address]     Congestive heart failure (HCC)     Essential hypertension     Benign essential HTN     Diabetes mellitus ([address]     Aftercare following right hip joint replacement surgery     Infection of right prosthetic hip joint ([address]     Benign localized prostatic hyperplasia with lower urinary tract symptoms (LUTS)     Post-op pain      ----Imani Morrow

## 2022-11-16 NOTE — PROGRESS NOTES
[address] AND SPORTS MEDICINE  [address]  [facility] [address]  Dept: [phone]  Dept Fax: [phone]        Post Operative Follow Up    Subjective:     Chief Complaint   Patient presents with    Post-Op Check     R Hip I&D dos 10/5/22     Post Op Surgery:     The patient is here for a follow up after having a right hip excisional debridement of the skin, subcutaneous tissue, muscle, fascia and debridement irrigation right total hip arthroplasty infection with wound vacuum application on 93/2/4151. Therefore the patient is 6 weeks postop. Patient states they are feeling much better with no complaints but definitely wants the sutures out. Review of Systems   Constitutional:  Positive for activity change. Negative for appetite change, fatigue and fever. Respiratory: Negative. Negative for apnea, cough, chest tightness and shortness of breath. Cardiovascular: Negative. Negative for chest pain, palpitations and leg swelling. Gastrointestinal:  Negative for abdominal distention, abdominal pain, constipation, diarrhea, nausea and vomiting. Genitourinary:  Negative for difficulty urinating, dysuria and hematuria. Musculoskeletal:  Positive for arthralgias and gait problem. Negative for joint swelling and myalgias. Skin:  Negative for color change and rash. Neurological:  Negative for dizziness, weakness, numbness and headaches. Psychiatric/Behavioral:  Negative for sleep disturbance. I have reviewed the CC, HPI, ROS, PMH, FHX, Social History, and if not present in this note, I have reviewed in the patient's chart. I agree with the documentation provided by other staff and have reviewed their documentation prior to providing my signature indicating agreement. Vitals:   Resp 12   Ht 6' 2\" (1.88 m)   Wt 257 lb (116.6 kg)   BMI 33.00 kg/m²  Body mass index is 33 kg/m².   Physical Examination: Orthopedics:    GENERAL: Alert and oriented X3 in no acute distress. SKIN: Intact without lesions or ulcerations. Incision is healing well with no signs of infection. When removing sutures there was a partial retained suture in one of the middle incisions. We tried to retrieve it but were unsuccessful. In the first picture I am pointing at the incision where but where that occurred  NEURO: Intact to sensory and motor testing. VASC: Capillary refill is less than 3 seconds. Post Op Exam:    LOCATION: Right hip  SITE: Distal neurocirculatory status is intact. EXAM: Sensation is intact to light touch, there is full motor function of the extremity. ROM: 90 degrees of hip flexion    Radiology:   No results found. Assessment:     1. Infection associated with internal right hip prosthesis, subsequent encounter      Plan:   Post Op Treatment : Patient had the treatment regimen reviewed today 6 weeks status post right hip excisional debridement of skin, subcutaneous tissue, fascia, muscle and debridement and irrigation of the right total hip arthroplasty infection with a wound VAC application. I reviewed the films with the patient. During today's visit, the patient sutures but did have a partial retained suture that we were unable to find. That suture should make its way to the surface and if it does they can pull it out or they can come to the office and we will try to retrieve it. He will continue antibiotics per infectious disease. He has an appointment set up with Dr. Anderson Seymour in December for 4-week checkup. He will continue doing exercises on his own. The patient will call the office immediately with any problems that may arise. No orders of the defined types were placed in this encounter. No orders of the defined types were placed in this encounter.         Electronically signed by Karan Naranjo PA-C, on 11/16/2022 at 2:09 PM

## 2022-11-17 ENCOUNTER — OFFICE VISIT (OUTPATIENT)
Dept: INFECTIOUS DISEASES | Age: 62
End: 2022-11-17
Payer: MEDICARE

## 2022-11-17 ENCOUNTER — TELEPHONE (OUTPATIENT)
Dept: RADIATION ONCOLOGY | Age: 62
End: 2022-11-17

## 2022-11-17 VITALS
DIASTOLIC BLOOD PRESSURE: 100 MMHG | TEMPERATURE: 97.4 F | HEART RATE: 95 BPM | HEIGHT: 74 IN | BODY MASS INDEX: 32.85 KG/M2 | WEIGHT: 256 LBS | OXYGEN SATURATION: 96 % | SYSTOLIC BLOOD PRESSURE: 148 MMHG | RESPIRATION RATE: 15 BRPM

## 2022-11-17 DIAGNOSIS — B95.2 ENTEROCOCCUS FAECALIS INFECTION: ICD-10-CM

## 2022-11-17 DIAGNOSIS — T84.51XS INFECTION ASSOCIATED WITH INTERNAL RIGHT HIP PROSTHESIS, SEQUELA: Primary | ICD-10-CM

## 2022-11-17 DIAGNOSIS — A49.8 PSEUDOMONAS AERUGINOSA INFECTION: ICD-10-CM

## 2022-11-17 PROCEDURE — G8427 DOCREV CUR MEDS BY ELIG CLIN: HCPCS | Performed by: INTERNAL MEDICINE

## 2022-11-17 PROCEDURE — 3078F DIAST BP <80 MM HG: CPT | Performed by: INTERNAL MEDICINE

## 2022-11-17 PROCEDURE — 1036F TOBACCO NON-USER: CPT | Performed by: INTERNAL MEDICINE

## 2022-11-17 PROCEDURE — 3017F COLORECTAL CA SCREEN DOC REV: CPT | Performed by: INTERNAL MEDICINE

## 2022-11-17 PROCEDURE — G8484 FLU IMMUNIZE NO ADMIN: HCPCS | Performed by: INTERNAL MEDICINE

## 2022-11-17 PROCEDURE — 99214 OFFICE O/P EST MOD 30 MIN: CPT | Performed by: INTERNAL MEDICINE

## 2022-11-17 PROCEDURE — G8417 CALC BMI ABV UP PARAM F/U: HCPCS | Performed by: INTERNAL MEDICINE

## 2022-11-17 PROCEDURE — 3074F SYST BP LT 130 MM HG: CPT | Performed by: INTERNAL MEDICINE

## 2022-11-17 RX ORDER — VANCOMYCIN HYDROCHLORIDE 1 G/20ML
INJECTION, POWDER, LYOPHILIZED, FOR SOLUTION INTRAVENOUS
COMMUNITY
Start: 2022-11-15 | End: 2022-11-30

## 2022-11-17 RX ORDER — AMOXICILLIN 500 MG/1
500 CAPSULE ORAL 2 TIMES DAILY
Qty: 14 CAPSULE | Refills: 0 | Status: SHIPPED | OUTPATIENT
Start: 2022-11-17 | End: 2022-12-17

## 2022-11-17 RX ORDER — CIPROFLOXACIN 500 MG/1
500 TABLET, FILM COATED ORAL 2 TIMES DAILY
Qty: 60 TABLET | Refills: 2 | Status: SHIPPED | OUTPATIENT
Start: 2022-11-17 | End: 2022-12-17

## 2022-11-17 ASSESSMENT — ENCOUNTER SYMPTOMS
GASTROINTESTINAL NEGATIVE: 1
RESPIRATORY NEGATIVE: 1

## 2022-11-17 NOTE — PROGRESS NOTES
[facility]  Office Progress Note  Today's Date and Time: 11/17/2022, 10:27 AM    Impression:     1. Infection associated with internal right hip prosthesis, sequela    2. Pseudomonas aeruginosa infection    3. Enterococcus faecalis infection         Recommendations   The patient is completing a 6-week course of intravenous antimicrobial therapy with meropenem and vancomycin  The plan is to put him back on oral antibiotic therapy and I will prescribe ciprofloxacin and amoxicillin given the Pseudomonas and Enterococcus faecalis that was isolated from the surgical cultures  He will take these for at least 3 months if not prolonged course given the history of chronic infection  The plan will be to repeat CRP testing and for him to follow-up with me in 3 months  I did instruct him that if he has any issues or concerns that arise prior to our next visit he needs to reach out to me    I have ordered the following medications/ labs:  Orders Placed This Encounter   Procedures    C-Reactive Protein     Standing Status:   Future     Standing Expiration Date:   2/17/2023      Orders Placed This Encounter   Medications    ciprofloxacin (CIPRO) 500 MG tablet     Sig: Take 1 tablet by mouth 2 times daily     Dispense:  60 tablet     Refill:  2       Chief complaint/reason for consultation:     Chief Complaint   Patient presents with    Frequent Infections    Follow-Up from Hospital        History of Present Illness: Gay Grajeda is a 60y.o.-year-old male who I am seeing in follow-up and is known to me has a history of Pseudomonas aeruginosa, enterococcal, and MSSA prosthetic hip joint infection has been on oral suppressive therapy with ciprofloxacin and doxycycline. He presented to the office in September with evidence of soft tissue infection in the right lateral hip incision and the patient was subsequently hospitalized and underwent aggressive debridement with wound VAC placement.   The patient did have Pseudomonas aeruginosa and subsequently also grew Enterococcus faecalis from the surgical cultures. The patient was treated with intravenous antimicrobial therapy with meropenem and vancomycin and is completing a 6-week course of treatment tomorrow 11/18/2022    He did go to an extended care facility for 2 weeks and now has been at home and is here with his wife today. He has been tolerating the antimicrobial therapy well and does not have any untoward effects. He was recently seen by the orthopedic team had the stitches removed from his surgical incision which has healed very well. I have personally reviewedthe past medical history, medications, social history, and I have updated the database accordingly. Past Medical History:     Past Medical History:   Diagnosis Date    Arthritis     CAD (coronary artery disease)     Cancer ([facility] [address]     prostate    Cervical radiculopathy     CHF (congestive heart failure) ([facility])     DM (diabetes mellitus) ([facility] [address] 2009    IDDM    GERD (gastroesophageal reflux disease) 2017    ON RX    Heart murmur 2013    ASYMPTOMATIC    HTN (hypertension) 06/29/2012    ON RX    Hyperlipidemia 06/29/2012    ON RX    Sleep apnea 2016    TRIED MACHINE COULD NOT TOLERATE    Vision abnormalities 2019    FLOATERS RIGHT EYE    Wears glasses      Medications:     Current Outpatient Medications   Medication Sig Dispense Refill    ciprofloxacin (CIPRO) 500 MG tablet Take 1 tablet by mouth 2 times daily 60 tablet 2    TRULICITY 3 UZ/4.2CW SOPN INJECT THE CONTENTS OF ONE SYRINGE (3 MG) UNDER THE SKIN ONCE WEEKLY 2 mL 0    metoprolol tartrate (LOPRESSOR) 25 MG tablet TAKE ONE-HALF TABLET BY MOUTH TWICE DAILY. 30 tablet 2    JARDIANCE 25 MG tablet TAKE 1 TABLET BY MOUTH ONCE DAILY. 30 tablet 1    vancomycin (VANCOCIN) infusion Infuse 1,000 mg intravenously in the morning and 1,000 mg in the evening. Through 11/18/2022 Compound per protocol. . 84 g 0    meropenem (MERREM) infusion Infuse 1,000 mg intravenously in the morning and 1,000 mg at noon and 1,000 mg in the evening. Through 11/18/2022 Compound per protocol. . 1 each 0    apixaban (ELIQUIS) 2.5 MG TABS tablet Take 1 tablet by mouth 2 times daily 70 tablet 0    Misc. Devices MISC 1 PAIR OF DIABETIC SHOES (1 LEFT/ 1 RIGHT)  1-3 PAIRS OF INSERTS (LEFT/ RIGHT) 2 each 0    pregabalin (LYRICA) 100 MG capsule TAKE 1 CAPSULE BY MOUTH TWICE DAILY. 60 capsule 2    metFORMIN (GLUCOPHAGE) 500 MG tablet TAKE TWO TABLETS BY MOUTH TWICE A DAY WITH MEALS 60 tablet 3    ketoconazole (NIZORAL) 2 % shampoo Use as wash to chest, back, neck, face, and scalp leaving on for 5 minutes prior to washing off once daily for 2 weeks then three times weekly 120 mL 3    ketoconazole (NIZORAL) 2 % cream Apply daily to affected areas on face, scalp, neck, and chest 30 g 2    atorvastatin (LIPITOR) 40 MG tablet Take 1 tablet by mouth daily 30 tablet 3    ibuprofen (ADVIL;MOTRIN) 400 MG tablet Take 1 tablet by mouth every 6 hours as needed for Pain 120 tablet 1    venlafaxine (EFFEXOR XR) 37.5 MG extended release capsule TAKE 1 CAPSULE BY MOUTH ONE TIME A DAY 30 capsule 3    tamsulosin (FLOMAX) 0.4 mg capsule Take 1 capsule by mouth daily 90 capsule 1    LANTUS SOLOSTAR 100 UNIT/ML injection pen INJECT 42 UNITS INTO THE SKIN TWO TIMES A DAY 15 mL 3    Continuous Blood Gluc Transmit (DEXCOM G6 TRANSMITTER) MISC Use in combination with new sensor every 10 days.  Transmitter lasts 3 months 1 each 3    Continuous Blood Gluc Sensor (DEXCOM G6 SENSOR) MISC Apply new sensor to abdomen every 10 days 3 each 11    Continuous Blood Gluc  (DEXCOM G6 ) MAMADOU Use as directed to monitor glucose continuously 1 each 1    ferrous sulfate (IRON 325) 325 (65 Fe) MG tablet Take 325 mg by mouth daily (with breakfast)       zinc 50 MG CAPS Take 1 capsule by mouth daily      aspirin EC 81 MG EC tablet Take 1 tablet by mouth daily 90 tablet 1    Insulin Pen Needle (B-D ULTRAFINE III SHORT PEN) 31G X 8 MM MISC 1 each by Does not apply route daily 100 each 3    vitamin D3 (CHOLECALCIFEROL) 25 MCG (1000 UT) TABS tablet Take 1 tablet by mouth daily 90 tablet 1    insulin lispro, 1 Unit Dial, (HUMALOG KWIKPEN) 100 UNIT/ML SOPN Inject 6 Units into the skin 3 times daily (before meals) 3 pen 2    vancomycin (VANCOCIN) 1 g injection        Current Facility-Administered Medications   Medication Dose Route Frequency Provider Last Rate Last Admin    bupivacaine (MARCAINE) 0.25 % injection 5 mg  2 mL Intra-artICUlar Once Carlos Michael, DO        methylPREDNISolone acetate (DEPO-MEDROL) injection 80 mg  80 mg Intra-artICUlar Once Carlos Michael, DO          Allergies:   Lisinopril, Melatonin, and Trazodone     Review of Systems:   Review of Systems   Constitutional: Negative. HENT: Negative. Respiratory: Negative. Cardiovascular: Negative. Gastrointestinal: Negative. Genitourinary: Negative. Musculoskeletal: Negative. Neurological: Negative. Psychiatric/Behavioral: Negative. Physical Examination :   BP (!) 148/100 (Site: Left Upper Arm, Position: Sitting)   Pulse 95   Temp 97.4 °F (36.3 °C) (Temporal)   Resp 15   Ht 6' 2\" (1.88 m)   Wt 256 lb (116.1 kg)   SpO2 96%   BMI 32.87 kg/m²     Physical Exam  Constitutional:       Appearance: He is well-developed. HENT:      Head: Normocephalic and atraumatic. Cardiovascular:      Rate and Rhythm: Normal rate. Heart sounds: Normal heart sounds. No friction rub. No gallop. Pulmonary:      Effort: Pulmonary effort is normal.      Breath sounds: Normal breath sounds. No wheezing. Abdominal:      General: Bowel sounds are normal.      Palpations: Abdomen is soft. There is no mass. Tenderness: There is no abdominal tenderness. Musculoskeletal:         General: Normal range of motion. Cervical back: Normal range of motion and neck supple. Lymphadenopathy:      Cervical: No cervical adenopathy. Skin:     General: Skin is warm and dry. Neurological:      Mental Status: He is alert and oriented to person, place, and time.          Laboratory studies :  Medical Decision Making:   I have independently reviewed the following labs:  CBC with Differential:  Lab Results   Component Value Date/Time    WBC 5.1 10/06/2022 06:49 AM    WBC 6.3 08/26/2022 09:50 AM    HGB 9.0 10/06/2022 06:49 AM    HGB 10.8 08/26/2022 09:50 AM    HCT 30.1 10/06/2022 06:49 AM    HCT 33.7 08/26/2022 09:50 AM     10/06/2022 06:49 AM     08/26/2022 09:50 AM    LYMPHOPCT 13 10/06/2022 06:49 AM    LYMPHOPCT 14 08/26/2022 09:50 AM    MONOPCT 10 10/06/2022 06:49 AM    MONOPCT 9 08/26/2022 09:50 AM       BMP:  Lab Results   Component Value Date/Time     08/26/2022 09:50 AM     06/29/2022 10:37 AM    K 4.6 08/26/2022 09:50 AM    K 4.2 06/29/2022 10:37 AM     08/26/2022 09:50 AM     06/29/2022 10:37 AM    CO2 26 08/26/2022 09:50 AM    CO2 25 06/29/2022 10:37 AM    BUN 16 11/10/2022 06:40 PM    BUN 16 08/26/2022 09:50 AM    CREATININE 0.92 11/10/2022 06:40 PM    CREATININE 0.99 10/05/2022 08:51 PM    MG 2.0 08/16/2013 06:29 AM    MG 1.7 07/03/2012 09:20 AM       Hepatic Function Panel:   Lab Results   Component Value Date/Time    PROT 8.2 06/29/2022 10:37 AM    PROT 7.9 05/18/2022 08:08 AM    LABALBU 4.3 06/29/2022 10:37 AM    LABALBU 4.2 05/18/2022 08:08 AM    BILITOT 0.35 06/29/2022 10:37 AM    BILITOT 0.42 05/18/2022 08:08 AM    ALKPHOS 131 06/29/2022 10:37 AM    ALKPHOS 125 05/18/2022 08:08 AM    ALT 18 06/29/2022 10:37 AM    ALT 19 05/18/2022 08:08 AM    AST 17 06/29/2022 10:37 AM    AST 22 05/18/2022 08:08 AM       Lab Results   Component Value Date/Time    CRP 34.7 08/26/2022 09:50 AM    CRP 22.1 03/14/2022 02:21 PM     Lab Results   Component Value Date/Time    SEDRATE 104 08/26/2022 09:50 AM    SEDRATE 78 03/14/2022 02:21 PM         Imaging Studies:   No new imaging    Cultures:     Component 10/5/22 1409    Specimen Description . TISSUE RIGHT    Direct Exam NO NEUTROPHILS SEEN    Direct Exam RARE GRAM POSITIVE COCCI IN PAIRS Abnormal     Culture PSEUDOMONAS AERUGINOSA LIGHT GROWTH Abnormal     Culture ENTEROCOCCUS FAECALIS LIGHT GROWTH Abnormal     Culture No anaerobic organisms isolated at 5 days. Resulting Agency [facility]    Pseudomonas aeruginosa (1)    Antibiotic Interpretation Microscan Method Status    cefepime Sensitive 2 BACTERIAL SUSCEPTIBILITY PANEL LELA Final    ciprofloxacin Sensitive 1 BACTERIAL SUSCEPTIBILITY PANEL LELA Final    gentamicin Sensitive <=1 BACTERIAL SUSCEPTIBILITY PANEL LELA Final    tobramycin Sensitive <=1 BACTERIAL SUSCEPTIBILITY PANEL LELA Final    piperacillin-tazobactam Sensitive 8 BACTERIAL SUSCEPTIBILITY PANEL LELA Final      Enterococcus faecalis (2)    Antibiotic Interpretation Microscan Method Status    ampicillin Sensitive <=2 BACTERIAL SUSCEPTIBILITY PANEL LELA Final    vancomycin Sensitive 1 BACTERIAL SUSCEPTIBILITY PANEL LELA Final     Condensed View         Specimen Collected: 10/05/22 14:09 EDT Last Resulted: 10/08/22 10:31 EDT                Thank you for allowing us to participate in the care of this patient. Pleasecall with questions. Janis Robles MD  Perfect Serve messaging: [phone]    This note is created with the assistance of a speech recognition program.  While intending to generate a document that actually reflects the content ofthe visit, the document can still have some errors including those of syntax and sound a like substitutions which may escape proof reading. It such instances, actual meaning can be extrapolated by contextual diversion.

## 2022-11-17 NOTE — TELEPHONE ENCOUNTER
E-scribe request for med refills. Please review and e-scribe if applicable. Last Visit Date:  9/16/22  Next Visit Date:  12/5/2022    Hemoglobin A1C (%)   Date Value   10/06/2022 8.4 (H)   08/17/2022 8.1   04/12/2022 8.3             ( goal A1C is < 7)   Microalb/Crt.  Ratio (mcg/mg creat)   Date Value   04/13/2022 Can not be calculated     LDL Cholesterol (mg/dL)   Date Value   04/12/2022 52       (goal LDL is <100)   AST (U/L)   Date Value   06/29/2022 17     ALT (U/L)   Date Value   06/29/2022 18     BUN (mg/dL)   Date Value   11/10/2022 16     BP Readings from Last 3 Encounters:   11/17/22 (!) 148/100   10/07/22 (!) 152/92   09/21/22 (!) 146/105          (goal 120/80)        Patient Active Problem List:     DM (diabetes mellitus) ([address]     HTN (hypertension)     ETOHism ([address]     Hypertensive urgency     Abnormal ambulatory electrocardiogram     Abnormal ambulatory electrocardiography     Abnormal kidney function     Adiposity     Astigmatism     Atherosclerotic heart disease of native coronary artery without angina pectoris     Cervical radiculopathy     Chronic back pain     Decreased cardiac output     Diabetic peripheral neuropathy (HCC)     Dislocated intraocular lens     Disturbance in sleep behavior     Dizziness     Dyssomnia     Floaters     Impotence of organic origin     Left ventricular dysfunction     Low vision, both eyes     Myopia     Nuclear sclerosis of left eye     Obstructive sleep apnea syndrome     Palpitations     Personal history of other diseases of the digestive system     Posterior vitreous detachment     Presbyopia     Primary osteoarthritis of right hip     Pseudophakia of right eye     Repetitive intrusions of sleep     Sciatica     Tendonitis     Vitamin D deficiency     Vitreous opacities of right eye     Dislocated IOL (intraocular lens), anterior, right     Prostate CA ([address]     Congestive heart failure (HCC)     Essential hypertension     Benign essential HTN     Diabetes mellitus ([address]     Aftercare following right hip joint replacement surgery     Infection of right prosthetic hip joint ([address]     Benign localized prostatic hyperplasia with lower urinary tract symptoms (LUTS)     Post-op pain      ----Jenniffer Pickering

## 2022-11-17 NOTE — PATIENT INSTRUCTIONS
Bg Delaney [phone], spoke with Coleen to give verbal to remove PICC line after last dose of abx on 11/18 RAMÓN

## 2022-11-17 NOTE — TELEPHONE ENCOUNTER
Refill request sent to our office for flomax. Spoke with pt and confirmed he still needs to use it. Pt stated he stopped it for a while but is having trouble starting or stopping his stream.  Dr. Ottoniel Gallo signed for refill and form faxed back to [address]. Confirmed some appt's for pt as well while he was on the phone.

## 2022-11-18 RX ORDER — VENLAFAXINE HYDROCHLORIDE 37.5 MG/1
CAPSULE, EXTENDED RELEASE ORAL
Qty: 30 CAPSULE | Refills: 3 | Status: SHIPPED | OUTPATIENT
Start: 2022-11-18

## 2022-11-21 ENCOUNTER — TELEPHONE (OUTPATIENT)
Dept: RADIATION ONCOLOGY | Facility: MEDICAL CENTER | Age: 62
End: 2022-11-21

## 2022-11-21 NOTE — TELEPHONE ENCOUNTER
Refill request for Tamsulosin received form [facility] [address] Request completed by Ladonna Lloyd and faxed to Pharmacy at [phone] with confirmation of receipt received.

## 2022-11-22 NOTE — TELEPHONE ENCOUNTER
Last visit:   Last Med refill:   Does patient have enough medication for 72 hours: No:     Next Visit Date:  Future Appointments   Date Time Provider Liset Estradai   11/30/2022 11:45 AM Pete Ventura MD [facility]   12/5/2022 11:00 AM Bernardo Wayne MD [facility] FP TOLPP   12/19/2022 11:00 AM [address]   1/23/2023  2:45 PM SCHEDULE, [facility] RAD ONC NURSE [facility]   1/31/2023  1:30 PM Vinnie Dee MD  derm TOLPP   2/23/2023 10:30 AM [facility]       Health Maintenance   Topic Date Due    Diabetic retinal exam  Never done    Diabetic foot exam  04/27/2019    Flu vaccine (1) 08/01/2022    DTaP/Tdap/Td vaccine (1 - Tdap) 06/17/2023 (Originally 5/23/1979)    Shingles vaccine (1 of 2) 06/17/2023 (Originally 2/8/2011)    COVID-19 Vaccine (2 - Rinku risk series) 05/28/2024 (Originally 12/28/2021)    Lipids  04/12/2023    Diabetic microalbuminuria test  04/13/2023    Depression Screen  07/06/2023    Annual Wellness Visit (AWV)  07/07/2023    A1C test (Diabetic or Prediabetic)  10/06/2023    Prostate Specific Antigen (PSA) Screening or Monitoring  10/24/2023    Colorectal Cancer Screen  07/16/2025    Pneumococcal 0-64 years Vaccine  Completed    Hepatitis C screen  Completed    HIV screen  Completed    Hepatitis A vaccine  Aged Out    Hib vaccine  Aged Out    Meningococcal (ACWY) vaccine  Aged Out       Hemoglobin A1C (%)   Date Value   10/06/2022 8.4 (H)   08/17/2022 8.1   04/12/2022 8.3             ( goal A1C is < 7)   Microalb/Crt.  Ratio (mcg/mg creat)   Date Value   04/13/2022 Can not be calculated     LDL Cholesterol (mg/dL)   Date Value   04/12/2022 52   08/15/2013 94       (goal LDL is <100)   AST (U/L)   Date Value   06/29/2022 17     ALT (U/L)   Date Value   06/29/2022 18     BUN (mg/dL)   Date Value   11/10/2022 16     BP Readings from Last 3 Encounters:   11/17/22 (!) 148/100   10/07/22 (!) 152/92 09/21/22 (!) 146/105          (goal 120/80)    All Future Testing planned in [facility]  Lab Frequency Next Occurrence   Hemoglobin A1C Once 03/28/2023   PSA, Diagnostic Once 07/24/2022   Sedimentation Rate Once 04/27/2022   CBC with Auto Differential Once 04/27/2022   CBC with Auto Differential Once 07/06/2022   Comprehensive Metabolic Panel Once 85/51/7153   PSA, Diagnostic Once 10/23/2022   Testosterone Once 10/25/2022   PSA, Diagnostic Once 11/10/2022   C-Reactive Protein Once 12/17/2022               Patient Active Problem List:     DM (diabetes mellitus) ([address]     HTN (hypertension)     ETOHism ([address]     Hypertensive urgency     Abnormal ambulatory electrocardiogram     Abnormal ambulatory electrocardiography     Abnormal kidney function     Adiposity     Astigmatism     Atherosclerotic heart disease of native coronary artery without angina pectoris     Cervical radiculopathy     Chronic back pain     Decreased cardiac output     Diabetic peripheral neuropathy (HCC)     Dislocated intraocular lens     Disturbance in sleep behavior     Dizziness     Dyssomnia     Floaters     Impotence of organic origin     Left ventricular dysfunction     Low vision, both eyes     Myopia     Nuclear sclerosis of left eye     Obstructive sleep apnea syndrome     Palpitations     Personal history of other diseases of the digestive system     Posterior vitreous detachment     Presbyopia     Primary osteoarthritis of right hip     Pseudophakia of right eye     Repetitive intrusions of sleep     Sciatica     Tendonitis     Vitamin D deficiency     Vitreous opacities of right eye     Dislocated IOL (intraocular lens), anterior, right     Prostate CA ([address]     Congestive heart failure (HCC)     Essential hypertension     Benign essential HTN     Diabetes mellitus ([address]     Aftercare following right hip joint replacement surgery     Infection of right prosthetic hip joint ([address]     Benign localized prostatic hyperplasia with lower urinary tract symptoms (LUTS)     Post-op pain         Please address the medication refill and close the encounter. If I can be of assistance, please route to the applicable pool. Thank you.

## 2022-11-23 ENCOUNTER — HOSPITAL ENCOUNTER (OUTPATIENT)
Age: 62
Setting detail: SPECIMEN
Discharge: HOME | End: 2022-11-23

## 2022-11-23 DIAGNOSIS — T84.51XS INFECTION ASSOCIATED WITH INTERNAL RIGHT HIP PROSTHESIS, SEQUELA: ICD-10-CM

## 2022-11-23 LAB — C-REACTIVE PROTEIN: 5.8 MG/L (ref 0–5)

## 2022-11-29 ENCOUNTER — HOSPITAL ENCOUNTER (OUTPATIENT)
Age: 62
Setting detail: SPECIMEN
Discharge: HOME | End: 2022-11-29

## 2022-11-29 DIAGNOSIS — C61 PROSTATE CA (HCC): ICD-10-CM

## 2022-11-29 LAB
ABSOLUTE EOS #: 0.17 K/UL (ref 0–0.44)
ABSOLUTE IMMATURE GRANULOCYTE: <0.03 K/UL (ref 0–0.3)
ABSOLUTE LYMPH #: 1.28 K/UL (ref 1.1–3.7)
ABSOLUTE MONO #: 0.8 K/UL (ref 0.1–1.2)
ALBUMIN SERPL-MCNC: 4.4 G/DL (ref 3.5–5.2)
ALBUMIN/GLOBULIN RATIO: 1.2 (ref 1–2.5)
ALP BLD-CCNC: 151 U/L (ref 40–129)
ALT SERPL-CCNC: 18 U/L (ref 5–41)
ANION GAP SERPL CALCULATED.3IONS-SCNC: 17 MMOL/L (ref 9–17)
AST SERPL-CCNC: 20 U/L
BASOPHILS # BLD: 1 % (ref 0–2)
BASOPHILS ABSOLUTE: 0.05 K/UL (ref 0–0.2)
BILIRUB SERPL-MCNC: 0.4 MG/DL (ref 0.3–1.2)
BUN BLDV-MCNC: 14 MG/DL (ref 8–23)
CALCIUM SERPL-MCNC: 9.7 MG/DL (ref 8.6–10.4)
CHLORIDE BLD-SCNC: 104 MMOL/L (ref 98–107)
CO2: 23 MMOL/L (ref 20–31)
CREAT SERPL-MCNC: 1.22 MG/DL (ref 0.7–1.2)
EOSINOPHILS RELATIVE PERCENT: 4 % (ref 1–4)
GFR SERPL CREATININE-BSD FRML MDRD: >60 ML/MIN/1.73M2
GLUCOSE BLD-MCNC: 191 MG/DL (ref 70–99)
HCT VFR BLD CALC: 42.8 % (ref 40.7–50.3)
HEMOGLOBIN: 13 G/DL (ref 13–17)
IMMATURE GRANULOCYTES: 0 %
LYMPHOCYTES # BLD: 26 % (ref 24–43)
MCH RBC QN AUTO: 25.8 PG (ref 25.2–33.5)
MCHC RBC AUTO-ENTMCNC: 30.4 G/DL (ref 28.4–34.8)
MCV RBC AUTO: 85.1 FL (ref 82.6–102.9)
MONOCYTES # BLD: 16 % (ref 3–12)
NRBC AUTOMATED: 0 PER 100 WBC
PDW BLD-RTO: 16.5 % (ref 11.8–14.4)
PLATELET # BLD: 293 K/UL (ref 138–453)
PMV BLD AUTO: 11.1 FL (ref 8.1–13.5)
POTASSIUM SERPL-SCNC: 4.4 MMOL/L (ref 3.7–5.3)
PROSTATE SPECIFIC ANTIGEN: 0.77 NG/ML
RBC # BLD: 5.03 M/UL (ref 4.21–5.77)
RBC # BLD: ABNORMAL 10*6/UL
SEG NEUTROPHILS: 53 % (ref 36–65)
SEGMENTED NEUTROPHILS ABSOLUTE COUNT: 2.6 K/UL (ref 1.5–8.1)
SODIUM BLD-SCNC: 144 MMOL/L (ref 135–144)
TOTAL PROTEIN: 8 G/DL (ref 6.4–8.3)
WBC # BLD: 4.9 K/UL (ref 3.5–11.3)

## 2022-11-30 ENCOUNTER — TELEPHONE (OUTPATIENT)
Dept: ONCOLOGY | Age: 62
End: 2022-11-30

## 2022-11-30 ENCOUNTER — OFFICE VISIT (OUTPATIENT)
Dept: ONCOLOGY | Age: 62
End: 2022-11-30
Payer: MEDICARE

## 2022-11-30 VITALS
WEIGHT: 251.1 LBS | BODY MASS INDEX: 32.24 KG/M2 | DIASTOLIC BLOOD PRESSURE: 85 MMHG | TEMPERATURE: 97.3 F | HEART RATE: 87 BPM | RESPIRATION RATE: 16 BRPM | SYSTOLIC BLOOD PRESSURE: 128 MMHG

## 2022-11-30 DIAGNOSIS — C61 PROSTATE CA (HCC): Primary | ICD-10-CM

## 2022-11-30 PROCEDURE — G8417 CALC BMI ABV UP PARAM F/U: HCPCS | Performed by: INTERNAL MEDICINE

## 2022-11-30 PROCEDURE — G8484 FLU IMMUNIZE NO ADMIN: HCPCS | Performed by: INTERNAL MEDICINE

## 2022-11-30 PROCEDURE — 99211 OFF/OP EST MAY X REQ PHY/QHP: CPT | Performed by: INTERNAL MEDICINE

## 2022-11-30 PROCEDURE — G8427 DOCREV CUR MEDS BY ELIG CLIN: HCPCS | Performed by: INTERNAL MEDICINE

## 2022-11-30 PROCEDURE — 3074F SYST BP LT 130 MM HG: CPT | Performed by: INTERNAL MEDICINE

## 2022-11-30 PROCEDURE — 99214 OFFICE O/P EST MOD 30 MIN: CPT | Performed by: INTERNAL MEDICINE

## 2022-11-30 PROCEDURE — 1036F TOBACCO NON-USER: CPT | Performed by: INTERNAL MEDICINE

## 2022-11-30 PROCEDURE — 3017F COLORECTAL CA SCREEN DOC REV: CPT | Performed by: INTERNAL MEDICINE

## 2022-11-30 PROCEDURE — 3078F DIAST BP <80 MM HG: CPT | Performed by: INTERNAL MEDICINE

## 2022-11-30 NOTE — TELEPHONE ENCOUNTER
MANJEET ARRIVES AMBULATORY FOR MD VISIT  DR Savage Masterson IN TO SEE PATIENT  ORDERS RECEIVED  RV 4 MONTHS WITH LABS  LABS  CMP PSA 03/22/23, ORDERS GIVEN TO PT  MD VISIT 03/29/23 @11:15AM  AVS PRINTED AND GIVEN TO PATIENT WITH INSTRUCTIONS  PATIENT DISCHARGED AMBULATORY

## 2022-12-05 ENCOUNTER — OFFICE VISIT (OUTPATIENT)
Dept: FAMILY MEDICINE CLINIC | Age: 62
End: 2022-12-05
Payer: MEDICARE

## 2022-12-05 VITALS
WEIGHT: 255 LBS | SYSTOLIC BLOOD PRESSURE: 150 MMHG | BODY MASS INDEX: 32.74 KG/M2 | DIASTOLIC BLOOD PRESSURE: 90 MMHG | HEART RATE: 102 BPM

## 2022-12-05 DIAGNOSIS — M86.9 OSTEOMYELITIS OF RIGHT FEMUR, UNSPECIFIED TYPE (HCC): ICD-10-CM

## 2022-12-05 DIAGNOSIS — I73.9 PAD (PERIPHERAL ARTERY DISEASE) (HCC): ICD-10-CM

## 2022-12-05 DIAGNOSIS — E11.69 TYPE 2 DIABETES MELLITUS WITH OTHER SPECIFIED COMPLICATION, WITH LONG-TERM CURRENT USE OF INSULIN (HCC): Primary | ICD-10-CM

## 2022-12-05 DIAGNOSIS — M25.552 LEFT HIP PAIN: ICD-10-CM

## 2022-12-05 DIAGNOSIS — Z79.4 TYPE 2 DIABETES MELLITUS WITH OTHER SPECIFIED COMPLICATION, WITH LONG-TERM CURRENT USE OF INSULIN (HCC): Primary | ICD-10-CM

## 2022-12-05 PROCEDURE — 3078F DIAST BP <80 MM HG: CPT | Performed by: STUDENT IN AN ORGANIZED HEALTH CARE EDUCATION/TRAINING PROGRAM

## 2022-12-05 PROCEDURE — G8417 CALC BMI ABV UP PARAM F/U: HCPCS | Performed by: STUDENT IN AN ORGANIZED HEALTH CARE EDUCATION/TRAINING PROGRAM

## 2022-12-05 PROCEDURE — 3074F SYST BP LT 130 MM HG: CPT | Performed by: STUDENT IN AN ORGANIZED HEALTH CARE EDUCATION/TRAINING PROGRAM

## 2022-12-05 PROCEDURE — 3052F HG A1C>EQUAL 8.0%<EQUAL 9.0%: CPT | Performed by: STUDENT IN AN ORGANIZED HEALTH CARE EDUCATION/TRAINING PROGRAM

## 2022-12-05 PROCEDURE — 99213 OFFICE O/P EST LOW 20 MIN: CPT | Performed by: STUDENT IN AN ORGANIZED HEALTH CARE EDUCATION/TRAINING PROGRAM

## 2022-12-05 PROCEDURE — 1036F TOBACCO NON-USER: CPT | Performed by: STUDENT IN AN ORGANIZED HEALTH CARE EDUCATION/TRAINING PROGRAM

## 2022-12-05 PROCEDURE — G8427 DOCREV CUR MEDS BY ELIG CLIN: HCPCS | Performed by: STUDENT IN AN ORGANIZED HEALTH CARE EDUCATION/TRAINING PROGRAM

## 2022-12-05 PROCEDURE — 2022F DILAT RTA XM EVC RTNOPTHY: CPT | Performed by: STUDENT IN AN ORGANIZED HEALTH CARE EDUCATION/TRAINING PROGRAM

## 2022-12-05 PROCEDURE — G8484 FLU IMMUNIZE NO ADMIN: HCPCS | Performed by: STUDENT IN AN ORGANIZED HEALTH CARE EDUCATION/TRAINING PROGRAM

## 2022-12-05 PROCEDURE — 3017F COLORECTAL CA SCREEN DOC REV: CPT | Performed by: STUDENT IN AN ORGANIZED HEALTH CARE EDUCATION/TRAINING PROGRAM

## 2022-12-05 NOTE — PROGRESS NOTES
Attending Physician Statement  I have discussed the care of Lamar Ventura 60 y.o. male, including pertinent history and exam findings, with the resident Dr. Juventino Cortez MD.    History and Exam:   Chief Complaint   Patient presents with    Other     Has concerns about back pain, also  not being able to get down and up off the floor       Diabetes     Too soon for A1C per insurance        Past Medical History:   Diagnosis Date    Arthritis     CAD (coronary artery disease)     Cancer ([facility] [address]     prostate    Cervical radiculopathy     CHF (congestive heart failure) ([facility] [address]     DM (diabetes mellitus) ([facility] [address] 2009    IDDM    GERD (gastroesophageal reflux disease) 2017    ON RX    Heart murmur 2013    ASYMPTOMATIC    HTN (hypertension) 06/29/2012    ON RX    Hyperlipidemia 06/29/2012    ON RX    Sleep apnea 2016    TRIED MACHINE COULD NOT TOLERATE    Vision abnormalities 2019    FLOATERS RIGHT EYE    Wears glasses      Allergies   Allergen Reactions    Lisinopril Swelling     Outer Neck swelling    Melatonin Swelling    Trazodone Swelling     Chest swells      I have seen and examined the patient and the key elements of the encounter have been performed by me.   BP Readings from Last 3 Encounters:   12/05/22 (!) 150/90   11/30/22 128/85   11/17/22 (!) 148/100     BP (!) 150/90   Pulse (!) 102   Wt 255 lb (115.7 kg)   BMI 32.74 kg/m²   Lab Results   Component Value Date    WBC 4.9 11/29/2022    HGB 13.0 11/29/2022    HCT 42.8 11/29/2022     11/29/2022    CHOL 123 04/12/2022    TRIG 111 04/12/2022    HDL 49 04/12/2022    ALT 18 11/29/2022    AST 20 11/29/2022     11/29/2022    K 4.4 11/29/2022     11/29/2022    CREATININE 1.22 (H) 11/29/2022    BUN 14 11/29/2022    CO2 23 11/29/2022    PSA 0.77 11/29/2022    INR 1.0 08/15/2013    LABA1C 8.4 (H) 10/06/2022    LABMICR Can not be calculated 04/13/2022     Lab Results   Component Value Date    LABPROT 7.6 07/03/2012    LABALBU 4.4 11/29/2022     Lab Results   Component Value Date    IRON 51 (L) 05/18/2022    TIBC 247 (L) 05/18/2022    FERRITIN 169 05/18/2022     Lab Results   Component Value Date    LDLCHOLESTEROL 52 04/12/2022     I agree with the assessment, plan and the diagnosis of    Diagnosis Orders   1. Type 2 diabetes mellitus with other specified complication, with long-term current use of insulin ([facility] [address]        2. PAD (peripheral artery disease) ([facility]        3. Osteomyelitis of right femur, unspecified type [facility])  e [facility]      4. Left hip pain  XR HIP LEFT (2-3 VIEWS)       . I agree with orders as documented by the resident. More than 25 minutes spent  in face to face encounter with the patient and more than half in counseling. Patient's questions were answered. Patient Voiced understanding to the counseling. Return in about 3 months (around 3/5/2023).    (GC Modifier)-Dr. Brian Mera MD

## 2022-12-05 NOTE — PROGRESS NOTES
6 Ebonie Jolley [facility] Medicine Residency Program - Outpatient Note      Subjective: Kaye Castillo is a 60 y.o. male  presented to the office on 12/05/22 with complaints of: Left hip pain    Left Hip Pain  Onset: 4 days ago  Mechanism: Unknown  Characteristic: Dull and aching  Duration: on and off  Worsening/Improving: same  Aggravating factors: getting up and bending  Relieving factors: Tylenol  Associated symptoms: no fever, saddle anaesthesia, fecal/urinary incontinence  Radiation: none  Intervention tried: tylenol   Direct trauma/fall/injury: none  Weakness/numbess/tingling: yes  Night pain: None  Diabetes/Thyroid disease: DM            Review of systems (Except what is mentioned in the HPI)  CONSTITUTIONAL: Negative  RESPIRATORY: Negative  CARDIOVASCULAR: Negative  GASTROINTESTINAL: Negative  GENITOURINARY: Negative   MUSCULOSKELETAL: Negative  NEUROLOGICAL: Negative  BEHAVIOR/PSYCH: Negative      Objective:      Vitals:    12/05/22 1059   BP: (!) 150/90   Pulse: (!) 102       General Appearance - Alert and oriented x 3  HEENT - No obvious deformity  Lungs - Bilateral good air entry , no wheezes or rales  Cardiovascular - Regular rate and rhythm. No murmur  Abdomen - Soft and nontender  Neurologic - No new focal motor or sensory deficits  Skin - No bruising or bleeding on exposed skin area  MSK -point tenderness on iliac fossa, no CVA tenderness  Psych - normal affect       Assessment:        ICD-10-CM    1. Type 2 diabetes mellitus with other specified complication, with long-term current use of insulin ([facility])  E11.69     Z79.4       2. PAD (peripheral artery disease) ([facility])  I73.9       3. Osteomyelitis of right femur, unspecified type [facility])  M86.9 [address] Pain Management      4. Left hip pain  M25.552 XR HIP LEFT (2-3 VIEWS)          Plan:    Left hip pain likely secondary to her lumbar radiculopathy/neuropathy.   Patient do have history of chronic osteomyelitis on the right side, previous x-ray of the pelvis showed degenerative changes that might be contributing. Counseled the patient that he should be expecting osteoarthritic pain, patient might have CKD stage IV, will try to avoid any NSAID as of this time. Will refer patient to pain management for further assessment. Counseled the patient that he can use Tylenol 4 times a day but to let the provider know if he is using more frequently. Refer patient to physical therapy also. Return in about 3 months (around 3/5/2023). Requested Prescriptions      No prescriptions requested or ordered in this encounter       There are no discontinued medications. Jerold Claude received counseling on the following healthy behaviors: nutrition, exercise and medication adherence    Discussed use, benefit, and side effects of prescribed medications. Barriers to medication compliance addressed. All patient questions answered. Pt voiced understanding. Disclaimer: Some oral of this note was transcribed using voice-recognition software. This may cause typographical errors occasionally. Although all effort is made to fix these errors, please do not hesitate to contact our office if there Siomara Pin concern with the understanding of this note.     Bernardo Fisher  PGY-3  Family Medicine     12/5/2022  12:19 PM

## 2022-12-07 ENCOUNTER — HOSPITAL ENCOUNTER (OUTPATIENT)
Dept: GENERAL RADIOLOGY | Age: 62
Discharge: HOME | End: 2022-12-09
Payer: MEDICARE

## 2022-12-07 ENCOUNTER — HOSPITAL ENCOUNTER (OUTPATIENT)
Age: 62
Discharge: HOME | End: 2022-12-09
Payer: MEDICARE

## 2022-12-07 ENCOUNTER — TELEPHONE (OUTPATIENT)
Dept: FAMILY MEDICINE CLINIC | Age: 62
End: 2022-12-07

## 2022-12-07 DIAGNOSIS — M25.552 LEFT HIP PAIN: ICD-10-CM

## 2022-12-07 PROCEDURE — 73502 X-RAY EXAM HIP UNI 2-3 VIEWS: CPT

## 2022-12-07 NOTE — TELEPHONE ENCOUNTER
Radiology calling because they received an xray order for the patients left hip and the patient states it should be for his right hip. They need a new order placed. Please advise. Radiology can be reached at [phone] when order is corrected.

## 2022-12-13 DIAGNOSIS — Z79.4 TYPE 2 DIABETES MELLITUS WITH OTHER SPECIFIED COMPLICATION, WITH LONG-TERM CURRENT USE OF INSULIN (HCC): ICD-10-CM

## 2022-12-13 DIAGNOSIS — E11.69 TYPE 2 DIABETES MELLITUS WITH OTHER SPECIFIED COMPLICATION, WITH LONG-TERM CURRENT USE OF INSULIN (HCC): ICD-10-CM

## 2022-12-13 DIAGNOSIS — I10 ESSENTIAL HYPERTENSION: ICD-10-CM

## 2022-12-13 RX ORDER — DULAGLUTIDE 3 MG/.5ML
INJECTION, SOLUTION SUBCUTANEOUS
Qty: 2 ML | Refills: 0 | Status: SHIPPED | OUTPATIENT
Start: 2022-12-13

## 2022-12-13 RX ORDER — APIXABAN 2.5 MG/1
TABLET, FILM COATED ORAL
Qty: 90 TABLET | Refills: 1 | Status: SHIPPED | OUTPATIENT
Start: 2022-12-13

## 2022-12-13 RX ORDER — EMPAGLIFLOZIN 25 MG/1
TABLET, FILM COATED ORAL
Qty: 30 TABLET | Refills: 1 | OUTPATIENT
Start: 2022-12-13

## 2022-12-13 RX ORDER — ATORVASTATIN CALCIUM 40 MG/1
TABLET, FILM COATED ORAL
Qty: 30 TABLET | Refills: 3 | Status: SHIPPED | OUTPATIENT
Start: 2022-12-13

## 2022-12-13 NOTE — TELEPHONE ENCOUNTER
Atorvastatin pending refill      Health Maintenance   Topic Date Due    Diabetic retinal exam  Never done    Diabetic foot exam  04/27/2019    Flu vaccine (1) 08/01/2022    DTaP/Tdap/Td vaccine (1 - Tdap) 06/17/2023 (Originally 5/[birth year]/1978)    Shingles vaccine (1 of 2) 06/17/2023 (Originally 2/8/2011)    COVID-19 Vaccine (2 - Rinku risk series) 05/28/2024 (Originally 12/28/2021)    Lipids  04/12/2023    Diabetic microalbuminuria test  04/13/2023    Depression Screen  07/06/2023    Annual Wellness Visit (AWV)  07/07/2023    A1C test (Diabetic or Prediabetic)  10/06/2023    Prostate Specific Antigen (PSA) Screening or Monitoring  11/29/2023    Colorectal Cancer Screen  07/16/2025    Pneumococcal 0-64 years Vaccine  Completed    Hepatitis C screen  Completed    HIV screen  Completed    Hepatitis A vaccine  Aged Out    Hib vaccine  Aged Out    Meningococcal (ACWY) vaccine  Aged Out             (applicable per patient's age: Cancer Screenings, Depression Screening, Fall Risk Screening, Immunizations)    Hemoglobin A1C (%)   Date Value   10/06/2022 8.4 (H)   08/17/2022 8.1   04/12/2022 8.3     Microalb/Crt.  Ratio (mcg/mg creat)   Date Value   04/13/2022 Can not be calculated     LDL Cholesterol (mg/dL)   Date Value   04/12/2022 52     AST (U/L)   Date Value   11/29/2022 20     ALT (U/L)   Date Value   11/29/2022 18     BUN (mg/dL)   Date Value   11/29/2022 14      (goal A1C is < 7)   (goal LDL is <100) need 30-50% reduction from baseline     BP Readings from Last 3 Encounters:   12/05/22 (!) 150/90   11/30/22 128/85   11/17/22 (!) 148/100    (goal /80)      All Future Testing planned in [facility]:  Lab Frequency Next Occurrence   Hemoglobin A1C Once 03/28/2023   PSA, Diagnostic Once 07/24/2022   Sedimentation Rate Once 04/27/2022   CBC with Auto Differential Once 04/27/2022   PSA, Diagnostic Once 10/23/2022   Testosterone Once 10/25/2022   PSA, Diagnostic Once 11/30/2022   Comprehensive Metabolic Panel Once 11/30/2022       Next Visit Date:  Future Appointments   Date Time Provider Liset Estradai   12/19/2022 11:00 AM [address]   12/19/2022  2:30 PM Trent Rule, PT [facility]   1/3/2023  9:15 AM Annie Davis, DO [address]   1/23/2023  2:45 PM SCHEDULE, STVZ [facility] RAD ONC NURSE [facility]   1/31/2023  1:30 PM Vin Young MD  derm [facility]   2/23/2023 10:30 AM Yrn Leary MD INFT DISEASE [facility]   3/29/2023 11:15 AM Danna Irby MD [facility]            Patient Active Problem List:     DM (diabetes mellitus) ([address]     HTN (hypertension)     ETOHism ([address]     Hypertensive urgency     Abnormal ambulatory electrocardiogram     Abnormal ambulatory electrocardiography     Abnormal kidney function     Adiposity     Astigmatism     Atherosclerotic heart disease of native coronary artery without angina pectoris     Cervical radiculopathy     Chronic back pain     Decreased cardiac output     Diabetic peripheral neuropathy (HCC)     Dislocated intraocular lens     Disturbance in sleep behavior     Dizziness     Dyssomnia     Floaters     Impotence of organic origin     Left ventricular dysfunction     Low vision, both eyes     Myopia     Nuclear sclerosis of left eye     Obstructive sleep apnea syndrome     Palpitations     Personal history of other diseases of the digestive system     Posterior vitreous detachment     Presbyopia     Primary osteoarthritis of right hip     Pseudophakia of right eye     Repetitive intrusions of sleep     Sciatica     Tendonitis     Vitamin D deficiency     Vitreous opacities of right eye     Dislocated IOL (intraocular lens), anterior, right     Prostate CA (HCC)     Congestive heart failure (HCC)     Essential hypertension     Benign essential HTN     Diabetes mellitus ([address]     Aftercare following right hip joint replacement surgery     [facility]     Benign localized prostatic hyperplasia with lower urinary tract symptoms (LUTS)     Post-op pain

## 2022-12-13 NOTE — TELEPHONE ENCOUNTER
Lipitor pending for refill     Health Maintenance   Topic Date Due    Diabetic retinal exam  Never done    Diabetic foot exam  04/27/2019    Flu vaccine (1) 08/01/2022    DTaP/Tdap/Td vaccine (1 - Tdap) 06/17/2023 (Originally [birth year]/1978)    Shingles vaccine (1 of 2) 06/17/2023 (Originally 2/8/2011)    COVID-19 Vaccine (2 - Rinku risk series) 05/28/2024 (Originally 12/28/2021)    Lipids  04/12/2023    Diabetic microalbuminuria test  04/13/2023    Depression Screen  07/06/2023    Annual Wellness Visit (AWV)  07/07/2023    A1C test (Diabetic or Prediabetic)  10/06/2023    Prostate Specific Antigen (PSA) Screening or Monitoring  11/29/2023    Colorectal Cancer Screen  07/16/2025    Pneumococcal 0-64 years Vaccine  Completed    Hepatitis C screen  Completed    HIV screen  Completed    Hepatitis A vaccine  Aged Out    Hib vaccine  Aged Out    Meningococcal (ACWY) vaccine  Aged Out             (applicable per patient's age: Cancer Screenings, Depression Screening, Fall Risk Screening, Immunizations)    Hemoglobin A1C (%)   Date Value   10/06/2022 8.4 (H)   08/17/2022 8.1   04/12/2022 8.3     Microalb/Crt.  Ratio (mcg/mg creat)   Date Value   04/13/2022 Can not be calculated     LDL Cholesterol (mg/dL)   Date Value   04/12/2022 52     AST (U/L)   Date Value   11/29/2022 20     ALT (U/L)   Date Value   11/29/2022 18     BUN (mg/dL)   Date Value   11/29/2022 14      (goal A1C is < 7)   (goal LDL is <100) need 30-50% reduction from baseline     BP Readings from Last 3 Encounters:   12/05/22 (!) 150/90   11/30/22 128/85   11/17/22 (!) 148/100    (goal /80)      All Future Testing planned in [facility]:  Lab Frequency Next Occurrence   Hemoglobin A1C Once 03/28/2023   PSA, Diagnostic Once 07/24/2022   Sedimentation Rate Once 04/27/2022   CBC with Auto Differential Once 04/27/2022   PSA, Diagnostic Once 10/23/2022   Testosterone Once 10/25/2022   PSA, Diagnostic Once 11/30/2022   Comprehensive Metabolic Panel Once 82/33/5113 Next Visit Date:  Future Appointments   Date Time Provider Liset Estradai   12/19/2022 11:00 AM La Carrera, DO Sports [facility] [address]   12/19/2022  2:30 PM Chante Osman, PT [facility]   1/3/2023  9:15 AM Nimo Resendiz DO [address]   1/23/2023  2:45 PM SCHEDULE, STVZ PBURG RAD ONC NURSE [facility]   1/31/2023  1:30 PM Vanesa Zuñiga MD  derm [facility]   2/23/2023 10:30 [facility]   3/29/2023 11:15 AM Kavitha Luna MD [facility]            Patient Active Problem List:     DM (diabetes mellitus) ([address]     HTN (hypertension)     ETOHism ([address]     Hypertensive urgency     Abnormal ambulatory electrocardiogram     Abnormal ambulatory electrocardiography     Abnormal kidney function     Adiposity     Astigmatism     Atherosclerotic heart disease of native coronary artery without angina pectoris     Cervical radiculopathy     Chronic back pain     Decreased cardiac output     Diabetic peripheral neuropathy (HCC)     Dislocated intraocular lens     Disturbance in sleep behavior     Dizziness     Dyssomnia     Floaters     Impotence of organic origin     Left ventricular dysfunction     Low vision, both eyes     Myopia     Nuclear sclerosis of left eye     Obstructive sleep apnea syndrome     Palpitations     Personal history of other diseases of the digestive system     Posterior vitreous detachment     Presbyopia     Primary osteoarthritis of right hip     Pseudophakia of right eye     Repetitive intrusions of sleep     Sciatica     Tendonitis     Vitamin D deficiency     Vitreous opacities of right eye     Dislocated IOL (intraocular lens), anterior, right     Prostate CA (HCC)     Congestive heart failure (HCC)     Essential hypertension     Benign essential HTN     Diabetes mellitus ([address]     Aftercare following right hip joint replacement surgery     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

## 2022-12-13 NOTE — TELEPHONE ENCOUNTER
Eliquis pending refill        Health Maintenance   Topic Date Due    Diabetic retinal exam  Never done    Diabetic foot exam  04/27/2019    Flu vaccine (1) 08/01/2022    DTaP/Tdap/Td vaccine (1 - Tdap) 06/17/2023 (Originally 5/23/1979)    Shingles vaccine (1 of 2) 06/17/2023 (Originally 2/8/2011)    COVID-19 Vaccine (2 - Rinku risk series) 05/28/2024 (Originally 12/28/2021)    Lipids  04/12/2023    Diabetic microalbuminuria test  04/13/2023    Depression Screen  07/06/2023    Annual Wellness Visit (AWV)  07/07/2023    A1C test (Diabetic or Prediabetic)  10/06/2023    Prostate Specific Antigen (PSA) Screening or Monitoring  11/29/2023    Colorectal Cancer Screen  07/16/2025    Pneumococcal 0-64 years Vaccine  Completed    Hepatitis C screen  Completed    HIV screen  Completed    Hepatitis A vaccine  Aged Out    Hib vaccine  Aged Out    Meningococcal (ACWY) vaccine  Aged Out             (applicable per patient's age: Cancer Screenings, Depression Screening, Fall Risk Screening, Immunizations)    Hemoglobin A1C (%)   Date Value   10/06/2022 8.4 (H)   08/17/2022 8.1   04/12/2022 8.3     Microalb/Crt.  Ratio (mcg/mg creat)   Date Value   04/13/2022 Can not be calculated     LDL Cholesterol (mg/dL)   Date Value   04/12/2022 52     AST (U/L)   Date Value   11/29/2022 20     ALT (U/L)   Date Value   11/29/2022 18     BUN (mg/dL)   Date Value   11/29/2022 14      (goal A1C is < 7)   (goal LDL is <100) need 30-50% reduction from baseline     BP Readings from Last 3 Encounters:   12/05/22 (!) 150/90   11/30/22 128/85   11/17/22 (!) 148/100    (goal /80)      All Future Testing planned in [facility]:  Lab Frequency Next Occurrence   Hemoglobin A1C Once 03/28/2023   PSA, Diagnostic Once 07/24/2022   Sedimentation Rate Once 04/27/2022   CBC with Auto Differential Once 04/27/2022   PSA, Diagnostic Once 10/23/2022   Testosterone Once 10/25/2022   PSA, Diagnostic Once 11/30/2022   Comprehensive Metabolic Panel Once 93/93/3550 Next Visit Date:  Future Appointments   Date Time Provider Liset Pierce   12/19/2022 11:00 AM Audie Kumar DO Sports Med [address]   12/19/2022  2:30 PM Ulises Henriquez, PT [facility]   1/3/2023  9:15 AM Gianni Omalley DO [address]   1/23/2023  2:45 PM SCHEDULE, STVZ PB[facility]G RAD ONC NURSE [facility]   1/31/2023  1:30 PM Nicole Naranjo MD  derm [facility]   2/23/2023 10:30 AM Adrian Gambino MD INFT DISEASE [facility]   3/29/2023 11:15 AM Sarah Varghese MD [facility]            Patient Active Problem List:     DM (diabetes mellitus) ([address]     HTN (hypertension)     ETOHism ([address]     Hypertensive urgency     Abnormal ambulatory electrocardiogram     Abnormal ambulatory electrocardiography     Abnormal kidney function     Adiposity     Astigmatism     Atherosclerotic heart disease of native coronary artery without angina pectoris     Cervical radiculopathy     Chronic back pain     Decreased cardiac output     Diabetic peripheral neuropathy (HCC)     Dislocated intraocular lens     Disturbance in sleep behavior     Dizziness     Dyssomnia     Floaters     Impotence of organic origin     Left ventricular dysfunction     Low vision, both eyes     Myopia     Nuclear sclerosis of left eye     Obstructive sleep apnea syndrome     Palpitations     Personal history of other diseases of the digestive system     Posterior vitreous detachment     Presbyopia     Primary osteoarthritis of right hip     Pseudophakia of right eye     Repetitive intrusions of sleep     Sciatica     Tendonitis     Vitamin D deficiency     Vitreous opacities of right eye     Dislocated IOL (intraocular lens), anterior, right     Prostate CA (HCC)     Congestive heart failure (HCC)     Essential hypertension     Benign essential HTN     Diabetes mellitus ([address]     Aftercare following right hip joint replacement surgery     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

## 2022-12-13 NOTE — TELEPHONE ENCOUNTER
Jodi [place] pending for refill     Health Maintenance   Topic Date Due    Diabetic retinal exam  Never done    Diabetic foot exam  04/27/2019    Flu vaccine (1) 08/01/2022    DTaP/Tdap/Td vaccine (1 - Tdap) 06/17/2023 (Originally 5/23/1979)    Shingles vaccine (1 of 2) 06/17/2023 (Originally 2/8/2011)    COVID-19 Vaccine (2 - Rinku risk series) 05/28/2024 (Originally 12/28/2021)    Lipids  04/12/2023    Diabetic microalbuminuria test  04/13/2023    Depression Screen  07/06/2023    Annual Wellness Visit (AWV)  07/07/2023    A1C test (Diabetic or Prediabetic)  10/06/2023    Prostate Specific Antigen (PSA) Screening or Monitoring  11/29/2023    Colorectal Cancer Screen  07/16/2025    Pneumococcal 0-64 years Vaccine  Completed    Hepatitis C screen  Completed    HIV screen  Completed    Hepatitis A vaccine  Aged Out    Hib vaccine  Aged Out    Meningococcal (ACWY) vaccine  Aged Out             (applicable per patient's age: Cancer Screenings, Depression Screening, Fall Risk Screening, Immunizations)    Hemoglobin A1C (%)   Date Value   10/06/2022 8.4 (H)   08/17/2022 8.1   04/12/2022 8.3     Microalb/Crt.  Ratio (mcg/mg creat)   Date Value   04/13/2022 Can not be calculated     LDL Cholesterol (mg/dL)   Date Value   04/12/2022 52     AST (U/L)   Date Value   11/29/2022 20     ALT (U/L)   Date Value   11/29/2022 18     BUN (mg/dL)   Date Value   11/29/2022 14      (goal A1C is < 7)   (goal LDL is <100) need 30-50% reduction from baseline     BP Readings from Last 3 Encounters:   12/05/22 (!) 150/90   11/30/22 128/85   11/17/22 (!) 148/100    (goal /80)      All Future Testing planned in [facility]:  Lab Frequency Next Occurrence   Hemoglobin A1C Once 03/28/2023   PSA, Diagnostic Once 07/24/2022   Sedimentation Rate Once 04/27/2022   CBC with Auto Differential Once 04/27/2022   PSA, Diagnostic Once 10/23/2022   Testosterone Once 10/25/2022   PSA, Diagnostic Once 11/30/2022   Comprehensive Metabolic Panel Once 11/30/2022       Next Visit Date:  Future Appointments   Date Time Provider Liset Hareisti   12/19/2022 11:00 AM [address]   12/19/2022  2:30 PM Gisele Medina, PT [facility]   1/3/2023  9:15 AM DO Julia Joya   1/23/2023  2:45 PM SCHEDULE, STVZ PBURG RAD ONC NURSE [facility]   1/31/2023  1:30 PM Girish Vazquez MD  derm [facility]   2/23/2023 10:30 AM Cranford Dubin, MD INFT DISEASE [facility]   3/29/2023 11:15 AM Kali Lopez MD [facility]            Patient Active Problem List:     DM (diabetes mellitus) ([address]     HTN (hypertension)     ETOHism [facility]     Hypertensive urgency     Abnormal ambulatory electrocardiogram     Abnormal ambulatory electrocardiography     Abnormal kidney function     Adiposity     Astigmatism     Atherosclerotic heart disease of native coronary artery without angina pectoris     Cervical radiculopathy     Chronic back pain     Decreased cardiac output     Diabetic peripheral neuropathy (HCC)     Dislocated intraocular lens     Disturbance in sleep behavior     Dizziness     Dyssomnia     Floaters     Impotence of organic origin     Left ventricular dysfunction     Low vision, both eyes     Myopia     Nuclear sclerosis of left eye     Obstructive sleep apnea syndrome     Palpitations     Personal history of other diseases of the digestive system     Posterior vitreous detachment     Presbyopia     Primary osteoarthritis of right hip     Pseudophakia of right eye     Repetitive intrusions of sleep     Sciatica     Tendonitis     Vitamin D deficiency     Vitreous opacities of right eye     Dislocated IOL (intraocular lens), anterior, right     Prostate CA (HCC)     Congestive heart failure (HCC)     Essential hypertension     Benign essential HTN     Diabetes mellitus ([address]     Aftercare following right hip joint replacement surgery     [facility]     Benign localized prostatic hyperplasia with lower urinary tract symptoms (LUTS)     Post-op pain

## 2022-12-13 NOTE — TELEPHONE ENCOUNTER
Trulicity pending refill      Health Maintenance   Topic Date Due    Diabetic retinal exam  Never done    Diabetic foot exam  04/27/2019    Flu vaccine (1) 08/01/2022    DTaP/Tdap/Td vaccine (1 - Tdap) 06/17/2023 (Originally 5/23/1979)    Shingles vaccine (1 of 2) 06/17/2023 (Originally 2/8/2011)    COVID-19 Vaccine (2 - Rinku risk series) 05/28/2024 (Originally 12/28/2021)    Lipids  04/12/2023    Diabetic microalbuminuria test  04/13/2023    Depression Screen  07/06/2023    Annual Wellness Visit (AWV)  07/07/2023    A1C test (Diabetic or Prediabetic)  10/06/2023    Prostate Specific Antigen (PSA) Screening or Monitoring  11/29/2023    Colorectal Cancer Screen  07/16/2025    Pneumococcal 0-64 years Vaccine  Completed    Hepatitis C screen  Completed    HIV screen  Completed    Hepatitis A vaccine  Aged Out    Hib vaccine  Aged Out    Meningococcal (ACWY) vaccine  Aged Out             (applicable per patient's age: Cancer Screenings, Depression Screening, Fall Risk Screening, Immunizations)    Hemoglobin A1C (%)   Date Value   10/06/2022 8.4 (H)   08/17/2022 8.1   04/12/2022 8.3     Microalb/Crt.  Ratio (mcg/mg creat)   Date Value   04/13/2022 Can not be calculated     LDL Cholesterol (mg/dL)   Date Value   04/12/2022 52     AST (U/L)   Date Value   11/29/2022 20     ALT (U/L)   Date Value   11/29/2022 18     BUN (mg/dL)   Date Value   11/29/2022 14      (goal A1C is < 7)   (goal LDL is <100) need 30-50% reduction from baseline     BP Readings from Last 3 Encounters:   12/05/22 (!) 150/90   11/30/22 128/85   11/17/22 (!) 148/100    (goal /80)      All Future Testing planned in [facility]:  Lab Frequency Next Occurrence   Hemoglobin A1C Once 03/28/2023   PSA, Diagnostic Once 07/24/2022   Sedimentation Rate Once 04/27/2022   CBC with Auto Differential Once 04/27/2022   PSA, Diagnostic Once 10/23/2022   Testosterone Once 10/25/2022   PSA, Diagnostic Once 11/30/2022   Comprehensive Metabolic Panel Once 89/89/3895 Next Visit Date:  Future Appointments   Date Time Provider Liset Estradai   12/19/2022 11:00 AM Daisy Kahn DO Sports Med [address]ne [facility]   12/19/2022  2:30 PM Ember Aguilar, PT [facility]   1/3/2023  9:15 AM Fatuma Simms DO [address]   1/23/2023  2:45 PM SCHEDULE, STVZ PBURG RAD ONC NURSE [facility]   1/31/2023  1:30 PM Felisa Jarrett MD  derm [facility]   2/23/2023 10:30 AM [facility]   3/29/2023 11:15 AM Tom S[facility]chez [facility]            Patient Active Problem List:     DM (diabetes mellitus) ([address]     HTN (hypertension)     ETOHism ([address]     Hypertensive urgency     Abnormal ambulatory electrocardiogram     Abnormal ambulatory electrocardiography     Abnormal kidney function     Adiposity     Astigmatism     Atherosclerotic heart disease of native coronary artery without angina pectoris     Cervical radiculopathy     Chronic back pain     Decreased cardiac output     Diabetic peripheral neuropathy (HCC)     Dislocated intraocular lens     Disturbance in sleep behavior     Dizziness     Dyssomnia     Floaters     Impotence of organic origin     Left ventricular dysfunction     Low vision, both eyes     Myopia     Nuclear sclerosis of left eye     Obstructive sleep apnea syndrome     Palpitations     Personal history of other diseases of the digestive system     Posterior vitreous detachment     Presbyopia     Primary osteoarthritis of right hip     Pseudophakia of right eye     Repetitive intrusions of sleep     Sciatica     Tendonitis     Vitamin D deficiency     Vitreous opacities of right eye     Dislocated IOL (intraocular lens), anterior, right     Prostate CA (HCC)     Congestive heart failure (HCC)     Essential hypertension     Benign essential HTN     Diabetes mellitus ([address]     Aftercare following right hip joint replacement surgery     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

## 2022-12-13 NOTE — TELEPHONE ENCOUNTER
[place] pending refill        Health Maintenance   Topic Date Due    Diabetic retinal exam  Never done    Diabetic foot exam  04/27/2019    Flu vaccine (1) 08/01/2022    DTaP/Tdap/Td vaccine (1 - Tdap) 06/17/2023 (Originally 5/23/1979)    Shingles vaccine (1 of 2) 06/17/2023 (Originally 2/8/2011)    COVID-19 Vaccine (2 - Rinku risk series) 05/28/2024 (Originally 12/28/2021)    Lipids  04/12/2023    Diabetic microalbuminuria test  04/13/2023    Depression Screen  07/06/2023    Annual Wellness Visit (AWV)  07/07/2023    A1C test (Diabetic or Prediabetic)  10/06/2023    Prostate Specific Antigen (PSA) Screening or Monitoring  11/29/2023    Colorectal Cancer Screen  07/16/2025    Pneumococcal 0-64 years Vaccine  Completed    Hepatitis C screen  Completed    HIV screen  Completed    Hepatitis A vaccine  Aged Out    Hib vaccine  Aged Out    Meningococcal (ACWY) vaccine  Aged Out             (applicable per patient's age: Cancer Screenings, Depression Screening, Fall Risk Screening, Immunizations)    Hemoglobin A1C (%)   Date Value   10/06/2022 8.4 (H)   08/17/2022 8.1   04/12/2022 8.3     Microalb/Crt.  Ratio (mcg/mg creat)   Date Value   04/13/2022 Can not be calculated     LDL Cholesterol (mg/dL)   Date Value   04/12/2022 52     AST (U/L)   Date Value   11/29/2022 20     ALT (U/L)   Date Value   11/29/2022 18     BUN (mg/dL)   Date Value   11/29/2022 14      (goal A1C is < 7)   (goal LDL is <100) need 30-50% reduction from baseline     BP Readings from Last 3 Encounters:   12/05/22 (!) 150/90   11/30/22 128/85   11/17/22 (!) 148/100    (goal /80)      All Future Testing planned in [facility]:  Lab Frequency Next Occurrence   Hemoglobin A1C Once 03/28/2023   PSA, Diagnostic Once 07/24/2022   Sedimentation Rate Once 04/27/2022   CBC with Auto Differential Once 04/27/2022   PSA, Diagnostic Once 10/23/2022   Testosterone Once 10/25/2022   PSA, Diagnostic Once 11/30/2022   Comprehensive Metabolic Panel Once 11/30/2022       Next Visit Date:  Future Appointments   Date Time Provider Liset Kari   12/19/2022 11:00 AM [address]   12/19/2022  2:30 PM Vania Diego, PT [facility]   1/3/2023  9:15 AM Misha Watt DO [address]   1/23/2023  2:45 PM SCHEDULE, [facility] RAD ONC NURSE [facility] [place] [facility]   1/31/2023  1:30 PM Edel Reddy MD mh derm [facility]   2/23/2023 10:30 AM Akbar Rowley MD INFT DISEASE [facility]   3/29/2023 11:15 AM Nate Welch MD [facility]            Patient Active Problem List:     DM (diabetes mellitus) ([address]     HTN (hypertension)     ETOHism ([address]     Hypertensive urgency     Abnormal ambulatory electrocardiogram     Abnormal ambulatory electrocardiography     Abnormal kidney function     Adiposity     Astigmatism     Atherosclerotic heart disease of native coronary artery without angina pectoris     Cervical radiculopathy     Chronic back pain     Decreased cardiac output     Diabetic peripheral neuropathy (HCC)     Dislocated intraocular lens     Disturbance in sleep behavior     Dizziness     Dyssomnia     Floaters     Impotence of organic origin     Left ventricular dysfunction     Low vision, both eyes     Myopia     Nuclear sclerosis of left eye     Obstructive sleep apnea syndrome     Palpitations     Personal history of other diseases of the digestive system     Posterior vitreous detachment     Presbyopia     Primary osteoarthritis of right hip     Pseudophakia of right eye     Repetitive intrusions of sleep     Sciatica     Tendonitis     Vitamin D deficiency     Vitreous opacities of right eye     Dislocated IOL (intraocular lens), anterior, right     Prostate CA (HCC)     Congestive heart failure (HCC)     Essential hypertension     Benign essential HTN     Diabetes mellitus ([address]     Aftercare following right hip joint replacement surgery     Infection of right prosthetic hip joint ([facility]     Benign localized prostatic hyperplasia with lower urinary tract symptoms (LUTS)     Post-op pain

## 2022-12-13 NOTE — TELEPHONE ENCOUNTER
[facility] pending for refill     Health Maintenance   Topic Date Due    Diabetic retinal exam  Never done    Diabetic foot exam  04/27/2019    Flu vaccine (1) 08/01/2022    DTaP/Tdap/Td vaccine (1 - Tdap) 06/17/2023 (Originally [birth year]/1978)    Shingles vaccine (1 of 2) 06/17/2023 (Originally 2/8/2011)    COVID-19 Vaccine (2 - Rinku risk series) 05/28/2024 (Originally 12/28/2021)    Lipids  04/12/2023    Diabetic microalbuminuria test  04/13/2023    Depression Screen  07/06/2023    Annual Wellness Visit (AWV)  07/07/2023    A1C test (Diabetic or Prediabetic)  10/06/2023    Prostate Specific Antigen (PSA) Screening or Monitoring  11/29/2023    Colorectal Cancer Screen  07/16/2025    Pneumococcal 0-64 years Vaccine  Completed    Hepatitis C screen  Completed    HIV screen  Completed    Hepatitis A vaccine  Aged Out    Hib vaccine  Aged Out    Meningococcal (ACWY) vaccine  Aged Out             (applicable per patient's age: Cancer Screenings, Depression Screening, Fall Risk Screening, Immunizations)    Hemoglobin A1C (%)   Date Value   10/06/2022 8.4 (H)   08/17/2022 8.1   04/12/2022 8.3     Microalb/Crt.  Ratio (mcg/mg creat)   Date Value   04/13/2022 Can not be calculated     LDL Cholesterol (mg/dL)   Date Value   04/12/2022 52     AST (U/L)   Date Value   11/29/2022 20     ALT (U/L)   Date Value   11/29/2022 18     BUN (mg/dL)   Date Value   11/29/2022 14      (goal A1C is < 7)   (goal LDL is <100) need 30-50% reduction from baseline     BP Readings from Last 3 Encounters:   12/05/22 (!) 150/90   11/30/22 128/85   11/17/22 (!) 148/100    (goal /80)      All Future Testing planned in [facility]:  Lab Frequency Next Occurrence   Hemoglobin A1C Once 03/28/2023   PSA, Diagnostic Once 07/24/2022   Sedimentation Rate Once 04/27/2022   CBC with Auto Differential Once 04/27/2022   PSA, Diagnostic Once 10/23/2022   Testosterone Once 10/25/2022   PSA, Diagnostic Once 11/30/2022   Comprehensive Metabolic Panel Once 27/70/0757 Next Visit Date:  Future Appointments   Date Time Provider Liset Pierce   12/19/2022 11:00 AM Sejal Prince, DO [facility]   12/19/2022  2:30 PM Catrachita Paris, PT [facility]   1/3/2023  9:15 AM Aric Gonzalez DO [address]   1/23/2023  2:45 PM SCHEDULE, [facility] RAD ONC NURSE [facility] [place] [facility]   1/31/2023  1:30 PM Giovanni Lew MD  derm [facility]   2/23/2023 10:30 AM Kailey Reveles MD INFT DISEASE [facility]   3/29/2023 11:15 AM Kody Streeter MD [facility]            Patient Active Problem List:     DM (diabetes mellitus) ([address]     HTN (hypertension)     ETOHism ([address]     Hypertensive urgency     Abnormal ambulatory electrocardiogram     Abnormal ambulatory electrocardiography     Abnormal kidney function     Adiposity     Astigmatism     Atherosclerotic heart disease of native coronary artery without angina pectoris     Cervical radiculopathy     Chronic back pain     Decreased cardiac output     Diabetic peripheral neuropathy (HCC)     Dislocated intraocular lens     Disturbance in sleep behavior     Dizziness     Dyssomnia     Floaters     Impotence of organic origin     Left ventricular dysfunction     Low vision, both eyes     Myopia     Nuclear sclerosis of left eye     Obstructive sleep apnea syndrome     Palpitations     Personal history of other diseases of the digestive system     Posterior vitreous detachment     Presbyopia     Primary osteoarthritis of right hip     Pseudophakia of right eye     Repetitive intrusions of sleep     Sciatica     Tendonitis     Vitamin D deficiency     Vitreous opacities of right eye     Dislocated IOL (intraocular lens), anterior, right     Prostate CA (HCC)     Congestive heart failure (HCC)     Essential hypertension     Benign essential HTN     Diabetes mellitus ([address]     Aftercare following right hip joint replacement surgery     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

## 2022-12-15 ENCOUNTER — NURSE ONLY (OUTPATIENT)
Dept: FAMILY MEDICINE CLINIC | Age: 62
End: 2022-12-15
Payer: MEDICARE

## 2022-12-15 DIAGNOSIS — I10 ESSENTIAL HYPERTENSION: ICD-10-CM

## 2022-12-15 DIAGNOSIS — Z79.4 TYPE 2 DIABETES MELLITUS WITH OTHER SPECIFIED COMPLICATION, WITH LONG-TERM CURRENT USE OF INSULIN (HCC): ICD-10-CM

## 2022-12-15 DIAGNOSIS — Z23 ENCOUNTER FOR IMMUNIZATION: ICD-10-CM

## 2022-12-15 DIAGNOSIS — Z00.00 HEALTH CARE MAINTENANCE: Primary | ICD-10-CM

## 2022-12-15 DIAGNOSIS — E11.69 TYPE 2 DIABETES MELLITUS WITH OTHER SPECIFIED COMPLICATION, WITH LONG-TERM CURRENT USE OF INSULIN (HCC): ICD-10-CM

## 2022-12-15 PROCEDURE — G0008 ADMIN INFLUENZA VIRUS VAC: HCPCS

## 2022-12-15 NOTE — PROGRESS NOTES
Patient here for flu vaccine in right deltoid, patient tolerated without complaints of pain or discomfort.

## 2022-12-16 RX ORDER — EMPAGLIFLOZIN 25 MG/1
TABLET, FILM COATED ORAL
Qty: 30 TABLET | Refills: 1 | OUTPATIENT
Start: 2022-12-16

## 2022-12-19 ENCOUNTER — HOSPITAL ENCOUNTER (OUTPATIENT)
Dept: PHYSICAL THERAPY | Age: 62
Setting detail: THERAPIES SERIES
Discharge: HOME | End: 2022-12-19
Payer: MEDICARE

## 2022-12-19 ENCOUNTER — OFFICE VISIT (OUTPATIENT)
Dept: ORTHOPEDIC SURGERY | Age: 62
End: 2022-12-19
Payer: MEDICARE

## 2022-12-19 VITALS — RESPIRATION RATE: 16 BRPM | WEIGHT: 253 LBS | HEIGHT: 74 IN | BODY MASS INDEX: 32.47 KG/M2 | OXYGEN SATURATION: 100 %

## 2022-12-19 DIAGNOSIS — M75.102 ROTATOR CUFF TEAR ARTHROPATHY OF LEFT SHOULDER: Primary | ICD-10-CM

## 2022-12-19 DIAGNOSIS — M19.012 PRIMARY OSTEOARTHRITIS OF LEFT SHOULDER: ICD-10-CM

## 2022-12-19 DIAGNOSIS — M25.511 RIGHT SHOULDER PAIN, UNSPECIFIED CHRONICITY: Primary | ICD-10-CM

## 2022-12-19 DIAGNOSIS — M12.812 ROTATOR CUFF TEAR ARTHROPATHY OF LEFT SHOULDER: Primary | ICD-10-CM

## 2022-12-19 DIAGNOSIS — Z96.611 S/P REVERSE TOTAL SHOULDER ARTHROPLASTY, RIGHT: ICD-10-CM

## 2022-12-19 DIAGNOSIS — T84.51XD INFECTION ASSOCIATED WITH INTERNAL RIGHT HIP PROSTHESIS, SUBSEQUENT ENCOUNTER: ICD-10-CM

## 2022-12-19 PROCEDURE — 1036F TOBACCO NON-USER: CPT | Performed by: ORTHOPAEDIC SURGERY

## 2022-12-19 PROCEDURE — 20610 DRAIN/INJ JOINT/BURSA W/O US: CPT | Performed by: ORTHOPAEDIC SURGERY

## 2022-12-19 PROCEDURE — 97110 THERAPEUTIC EXERCISES: CPT

## 2022-12-19 PROCEDURE — 3017F COLORECTAL CA SCREEN DOC REV: CPT | Performed by: ORTHOPAEDIC SURGERY

## 2022-12-19 PROCEDURE — G8417 CALC BMI ABV UP PARAM F/U: HCPCS | Performed by: ORTHOPAEDIC SURGERY

## 2022-12-19 PROCEDURE — 99214 OFFICE O/P EST MOD 30 MIN: CPT | Performed by: ORTHOPAEDIC SURGERY

## 2022-12-19 PROCEDURE — 97161 PT EVAL LOW COMPLEX 20 MIN: CPT

## 2022-12-19 PROCEDURE — G8482 FLU IMMUNIZE ORDER/ADMIN: HCPCS | Performed by: ORTHOPAEDIC SURGERY

## 2022-12-19 PROCEDURE — G8427 DOCREV CUR MEDS BY ELIG CLIN: HCPCS | Performed by: ORTHOPAEDIC SURGERY

## 2022-12-19 RX ORDER — METHYLPREDNISOLONE ACETATE 80 MG/ML
80 INJECTION, SUSPENSION INTRA-ARTICULAR; INTRALESIONAL; INTRAMUSCULAR; SOFT TISSUE ONCE
Status: COMPLETED | OUTPATIENT
Start: 2022-12-19 | End: 2022-12-19

## 2022-12-19 RX ORDER — BUPIVACAINE HYDROCHLORIDE 2.5 MG/ML
2 INJECTION, SOLUTION INFILTRATION; PERINEURAL ONCE
Status: COMPLETED | OUTPATIENT
Start: 2022-12-19 | End: 2022-12-19

## 2022-12-19 RX ORDER — PREGABALIN 100 MG/1
CAPSULE ORAL
Qty: 60 CAPSULE | Refills: 2 | Status: SHIPPED | OUTPATIENT
Start: 2022-12-19 | End: 2023-02-17

## 2022-12-19 RX ADMIN — BUPIVACAINE HYDROCHLORIDE 5 MG: 2.5 INJECTION, SOLUTION INFILTRATION; PERINEURAL at 14:28

## 2022-12-19 RX ADMIN — METHYLPREDNISOLONE ACETATE 80 MG: 80 INJECTION, SUSPENSION INTRA-ARTICULAR; INTRALESIONAL; INTRAMUSCULAR; SOFT TISSUE at 14:28

## 2022-12-19 ASSESSMENT — ENCOUNTER SYMPTOMS
ABDOMINAL PAIN: 0
EYE DISCHARGE: 0
ROS SKIN COMMENTS: NEGATIVE FOR RASH
SHORTNESS OF BREATH: 0

## 2022-12-19 NOTE — PROGRESS NOTES
[address] [place] [address] AND SPORTS MEDICINE  [facility] [address]  Dept: [phone]  Dept Fax: [phone]        Ambulatory Follow Up      Subjective: Nahomi Terry is a 60y.o. year old male who presents to our office today for routine followup regarding his   1. Rotator cuff tear arthropathy of left shoulder    2. Primary osteoarthritis of left shoulder    3. S/P reverse total shoulder arthroplasty, right    4. Infection associated with internal right hip prosthesis, subsequent encounter    . Chief Complaint   Patient presents with    Hip Pain     Right       HPI  Massiel Diaz is a 28-year-old male who presents the office today for recheck of the multiple things. He underwent right hip incision, irrigation, debridement on 10/5/2022. He notes that the hip feels good and he is doing well so far. He remains on antibiotics but he has some confusion about whether he should be continuing the antibiotics when this current prescription runs out. His wife who is present during the entire evaluation notes that the patient is supposed to continue antibiotics chronically for chronic suppression and they will be calling the infectious disease doctor for refill. He also notes right shoulder pain chronic in nature. He has had a previous history of right reverse total shoulder arthroplasty by Dr. Maxx Choudhury and he states \"I never felt that my shoulder was right\" indicating that his shoulder never really got a whole lot better after surgery. He is also here for left shoulder pain that is persistent. He underwent a left corticosteroid injection shoulder on 8/16/2022 and noted 80% improvement in his symptoms for 2 months and that his symptoms started to worsen again over the last 2 months. He denies any significant trauma to the left shoulder and notes more of a chronic pain to the left shoulder.   He denies fevers, chills, nausea, diarrhea. Review of Systems   Constitutional:  Positive for activity change. Negative for fever. HENT:  Negative for dental problem. Eyes:  Negative for discharge. Respiratory:  Negative for shortness of breath. Cardiovascular:  Negative for chest pain. Gastrointestinal:  Negative for abdominal pain. Genitourinary: Negative. Musculoskeletal:  Positive for arthralgias. Skin:         Negative for rash   Neurological:  Positive for weakness. Psychiatric/Behavioral:  Negative for confusion. I have reviewed the CC, HPI, ROS, PMH, FHX, Social History, and if not present in this note, I have reviewed in the patient's chart. I agree with the documentation provided by other staff and have reviewed their documentation prior to providing my signature indicating agreement. Objective :   Resp 16   Ht 6' 2\" (1.88 m)   Wt 253 lb (114.8 kg)   SpO2 100%   BMI 32.48 kg/m²  Body mass index is 32.48 kg/m². General: Rosanna Tariq is a 60 y.o. male who is alert and oriented and sitting comfortably in our office. Ortho Exam  MS: Right hip incisions healing well without signs of infection. Patient ambulates with minimal short weightbearing phase no antalgic gait to the right lower extremity. No gross signs of infection right hip is appreciated. Motor, sensory, vascular examination right lower extremity is grossly intact without focal deficits. Right shoulder incision reveals no gross signs of infection. Active range of motion of the right shoulder is 140 degrees of forward elevation and AB duction. No gross instability of the right shoulder is appreciated. Patient has full range of motion of the cervical spine, right elbow right wrist and fingers. Motor, sensory, vascular examination of right upper extremity is grossly intact without focal deficits. Evaluation of left shoulder reveals no significant shoulder girdle muscle atrophy.   Active range of motion of the left shoulder is 140 degrees flexion and 120 degrees AB duction. Rotator cuff strength appears to be preserved. No gross instability of the left shoulder is noted. Motor, sensory, vascular examination of the left upper extremity is grossly intact without focal deficits. Neuro: alert and oriented to person and place. Eyes: Extra-ocular muscles intact  Mouth: Oral mucosa moist. No perioral lesions  Pulm: Respirations unlabored and regular. Symmetric chest excursion without outward deformity is noted. Skin: warm, well perfused  Psych:   Patient has good fund of knowledge and displays understanging of exam, diagnosis, and plan. Radiology: XR SHOULDER RIGHT (MIN 2 VIEWS)    Result Date: 12/19/2022  History: Right reverse total shoulder arthroplasty with pain Findings: AP, scapular Y, axial view x-rays of the right shoulder done in the office today shows lucency around the superior aspect of the glenosphere and glenoid baseplate with no gross signs of loosening of the fixation hardware. No further evidence of fracture, subluxation, dislocation, radiopaque foreign body, radiopaque tumors noted. Impression: Lucency surrounding glenoid hardware right shoulder reverse arthroplasty as described above    XR HIP LEFT (2-3 VIEWS)    Result Date: 12/9/2022  EXAMINATION: TWO XRAY VIEWS OF THE LEFT HIP 12/7/2022 2:33 pm COMPARISON: 10/26/2021 HISTORY: ORDERING SYSTEM PROVIDED HISTORY: Left hip pain TECHNOLOGIST PROVIDED HISTORY: r/o fracture FINDINGS: There is no evidence of acute fracture. There is normal alignment. No acute joint abnormality. No focal osseous lesion. No focal soft tissue abnormality. Left hip degenerative changes. No acute osseous abnormality. Degenerative changes seen in the left hip with superior acetabular spurring       SHOULDER INJECTION PROCEDURE NOTE:  The patient was identified. The left shoulder was confirmed with the patient.   After a sterile prep with Betadine the shoulder  was injected using a posterior approach to the glenohumeral space with a mixture of 2 mL of 0.25% Marcaine and 80 mg of Depo-Medrol. Patient tolerated the procedure well without post injection complications. I instructed the patient to call our office immediately if they have any swelling or increased pain at the injection site. Assessment:      1. Rotator cuff tear arthropathy of left shoulder    2. Primary osteoarthritis of left shoulder    3. S/P reverse total shoulder arthroplasty, right    4. Infection associated with internal right hip prosthesis, subsequent encounter         Plan:      CT scan for the right shoulder will be ordered to look at the radiolucencies around the glenoid baseplate which may be consistent with aseptic loosening versus bony resorption. I am going to asked that the patient be seen by our resident shoulder specialist Dr. Agus Duran for further evaluation and treatment. As far as the right hip goes I am going to see the patient back in 3 months for repeat clinical examination or sooner as necessary. Patient tolerated a left shoulder glenohumeral injection today without complications. Follow up:No follow-ups on file.     Orders Placed This Encounter   Medications    methylPREDNISolone acetate (DEPO-MEDROL) injection 80 mg    bupivacaine (MARCAINE) 0.25 % injection 5 mg         Orders Placed This Encounter   Procedures    CT SHOULDER RIGHT WO CONTRAST     Standing Status:   Future     Standing Expiration Date:   12/19/2023    Gavin Cisneros MD, Orthopedic Surgery ([facility]     Referral Priority:   Routine     Referral Type:   Eval and Treat     Referral Reason:   Specialty Services Required     Referred to Provider:   Vivian Harrignton MD     Requested Specialty:   Orthopedic Surgery     Number of Visits Requested:   1       This note is created with the assistance of a speech recognition program.  While intending to generate a document that actually reflects the content of the

## 2022-12-19 NOTE — CONSULTS
CAD (coronary artery disease)     Cancer (HCC)     prostate    Cervical radiculopathy     CHF (congestive heart failure) ([facility]     DM (diabetes mellitus) ([facility] 2009    IDDM    GERD (gastroesophageal reflux disease) 2017    ON RX    Heart murmur 2013    ASYMPTOMATIC    HTN (hypertension) 06/29/2012    ON RX    Hyperlipidemia 06/29/2012    ON RX    Sleep apnea 2016    TRIED MACHINE COULD NOT TOLERATE    Vision abnormalities 2019    FLOATERS RIGHT EYE    Wears glasses        Medications: [x] Refer to full medical record [] None [] Other:  Allergies:      [x] Refer to full medical record  [] None [] Other:    Tests: [x] X-Ray:10/19/22 [] MRI:  [] Other:     Impression: Revision right total hip arthroplasty in good position without    complications appreciated.      Function:    Patient lives with: Alone   In what type of home []  One story   [x] Two story Duplex   [] Split level   Number of stairs to enter 6   With handrail on the [x]  Right to enter   [] Left to enter   Bathroom has a []  Tub only  [x] Tub/shower combo   [] Walk in shower    []  Grab bars   Washing machine is on [x]  Main level   [] Second level   [] Basement     ADL/IADL [x] Previously independent with all [x] Currently independent with all Who currently assists the patient with task    [] Previously independent with all except: [] Currently independent with all except:    Bathing  [] Assist [] Assist    Dress/grooming [] Assist [] Assist    Transfer/mobility [] Assist [] Assist    Feeding [] Assist [] Assist    Toileting [] Assist [] Assist    Driving [] Assist [] Assist    Housekeeping [] Assist [] Assist    Grocery shop/meal prep [] Assist [] Assist      Gait Prior level of function Current level of function    [x] Independent  [] Assist [x] Independent  [] Assist   Device: [] Independent [] Independent    [] Straight Cane [] Quad cane [] Straight Cane [] Quad cane    [] Standard walker [] Rolling walker   [] 4 wheeled walker [] Standard walker [] Rolling walker   [] 4 wheeled walker    [] Wheelchair [] Wheelchair     Working:  [] Full-time  [] Part-time  [] Off d/t condition  [] Retired  [] Disability  [x] N/A  Job/Employer:    Pain:  [x] Yes  [] No Location: R hip  Pain Rating: (0-10 scale) 8/10  Pain altered Tx:  [] Yes  [x] No  Action:    Objective: p = pain, L = Lacks      ROM  ° A/P STRENGTH    Left Right Left Right   Hip Flexion  WNL 70 5/5 4-/5   Ext    4-/5   Abd WNL 40 5/5 4-/5   Add    4-/5   ER WNL 30 5/5 4-/5   IR WNL 30 5/5 4-/5   Knee Flex       Ext        Ankle DF       PF        Inversion       Eversion           OBSERVATION Comments   Posture No Deficit    Joint Alignment No Deficit    Gait Slow shuffling gait speed, trendelenburg    Palpation Mild tenderness quadriceps and hip flexor    Edema No Deficit    Neurological No Deficit      Assessment: Patient demonstrates ongoing R hip pain, weakness, ROM and mobility deficit following R hip I&D on 10/5/22. Patient will benefit from physical therapy to improve R hip ROM, strength, and restore normal gait pattern. Problem list, as detailed above:   [x] ? Pain     [x] ? ROM    [x] ? Strength  [x] ? Flexibility:   [x] ? Function: LEFS 28% functional  [x] ? Balance  [] Edema  [] Postural Deviations  [] Gait Deviations  [] Other      STG: (to be met in 10 treatments)  ? Pain:5/10 R hip pain with ambulation. ? ROM: R hip flexion to 90 degrees to improve stair climbing. ? Strength:4/5 R hip flexion and abduction to improve hip stability. ? Function:LEFS score to 40% functional or better to improve ADLs. Independent with Home Exercise Programs    LTG: (to be met in 18 treatments)  Patient can tolerate greater than 20 minutes of standing activity. Patient can climb 22 8 inch steps to improve ability to climb to duplex 2nd level. Patient goals: Restore R hip strength and function.      Rehab Potential:  [x] Good  [] Fair  [] Poor   Suggested Professional Referral:  [x] No  [] Yes:  Barriers to Goal Achievement[de-identified]  [x] No  [] Yes:  Domestic Concerns:  [x] No  [] Yes:    Pt. Education:  [x] Plans/Goals, Risks/Benefits discussed  [x] Home exercise program: See chart below  Method of Education: [x] Verbal  [x] Demo  [x] Written  Comprehension of Education:  [x] Verbalizes understanding. [x] Demonstrates understanding. [x] Needs Review. [] Demonstrates/verbalizes understanding of HEP/Ed previously given. Treatment Plan:  [x] Therapeutic Exercise   98027  [x] Vasopneumatic cold with compression  03497    [x] Therapeutic Activity  75577 [x] Cold/hotpack    [] Gait Training   07397 [] Lumbar/Cervical Traction  [id number]   [x] Neuromuscular Re-education  [id number] [] Electrical Stimulation Unattended  31364   [x] Manual Therapy    61172 [] Electrical Stimulation Attended  [id number]   [] Iontophoresis: 4 mg/mL Dexamethasone Sodium Phosphate  mAmin  74830 []  Medication allergies reviewed for use of   Dexamethasone Sodium Phosphate 4mg/ml with iontophoresis treatments. Pt is not allergic. Frequency:  2 x/week for 18 visits    Todays Treatment:  Precautions:  Modalities:   Exercises:  Access Code: [email or web address]  URL: [email or web address]  Date: 12/19/2022  Prepared by: Denise Goncalves    Exercises, gave blue resistance band   Supine Active Straight Leg Raise - 1 x daily - 7 x weekly - 3 sets - 10 reps  Supine Bridge - 1 x daily - 7 x weekly - 3 sets - 10 reps  Hooklying Clamshell with Resistance - 1 x daily - 7 x weekly - 3 sets - 10 reps  Sidelying Hip Abduction - 1 x daily - 7 x weekly - 3 sets - 10 reps  Seated Knee Extension with Resistance - 1 x daily - 7 x weekly - 3 sets - 10 reps  Standing Hip Extension with Counter Support - 1 x daily - 7 x weekly - 3 sets - 10 reps  Standing Hip Abduction with Counter Support - 1 x daily - 7 x weekly - 3 sets - 10 reps    Specific Instructions for next treatment[de-identified] R hip ROM and strengthening, gait and mobility training       Evaluation Complexity:  History (Personal factors, comorbidities) [] 0 [x] 1-2 [] 3+   Exam (limitations, restrictions) [] 1-2 [x] 3 [] 4+   Clinical presentation (progression) [x] Stable [] Evolving  [] Unstable   Decision Making [x] Low [] Moderate [] High    [x] Low Complexity [] Moderate Complexity [] High Complexity       Treatment Charges: Mins Units   [x] Evaluation       [x]  Low       []  Moderate       []  High 25 1   []  Modalities     [x]  Ther Exercise 15 1   []  Manual Therapy     []  Ther Activities     []  Aquatics     []  Vasocompression     []  Other       TOTAL TREATMENT TIME: 40    Time in:1430     Time out:1510    Electronically signed by: Clifton Lanza PT        Physician Signature:________________________________Date:__________________  By signing above or cosigning this note, I have reviewed this plan of care and certify a need for medically necessary rehabilitation services.      *PLEASE SIGN ABOVE AND FAX BACK ALL PAGES*

## 2022-12-20 DIAGNOSIS — E11.69 TYPE 2 DIABETES MELLITUS WITH OTHER SPECIFIED COMPLICATION, WITH LONG-TERM CURRENT USE OF INSULIN (HCC): ICD-10-CM

## 2022-12-20 DIAGNOSIS — Z79.4 TYPE 2 DIABETES MELLITUS WITH OTHER SPECIFIED COMPLICATION, WITH LONG-TERM CURRENT USE OF INSULIN (HCC): ICD-10-CM

## 2022-12-20 DIAGNOSIS — I10 ESSENTIAL HYPERTENSION: ICD-10-CM

## 2022-12-22 ENCOUNTER — HOSPITAL ENCOUNTER (OUTPATIENT)
Dept: PHYSICAL THERAPY | Age: 62
Setting detail: THERAPIES SERIES
Discharge: HOME | End: 2022-12-22
Payer: MEDICARE

## 2022-12-22 PROCEDURE — 97110 THERAPEUTIC EXERCISES: CPT

## 2022-12-22 NOTE — FLOWSHEET NOTE
[x] [facility] TWELVESTEP [facility] &  Therapy  [address]  [phone]  F: [phone] [] [address]  [facility]   [address]  P: [phone]  F: [phone] [] [address]  [address]  P: [phone]  F: [phone] [] [address]  P: [phone]  F: [phone] [] [address]  [facility]   [address]   [place]: [phone]  F: [phone]      Physical Therapy Daily Treatment Note    Date:  2022  Patient Name:  Carmen Garcias    :  1960  MRN: [id number]  Physician: Conor Law MD                         Insurance: [facility] Medicare / Medicaid, [facility]  Medical Diagnosis: Osteomyelitis of right femur, unspecified type M86.9                Rehab Codes: M25.551, M25.651, R26.89  Onset Date:  10/5/22                                Next 's appt: Jackson for shoulders, TBD, Shelia Mustache for R hip 3 months     Visit# / total visits:     Cancels/No Shows: 0/0    Subjective:    Pain:  [x] Yes  [] No   Location: R hip            Pain Rating: (0-10 scale) 4/10  Pain altered Tx:  [] Yes  [x] No  Action:  Comments: Patient arrives stating hip is \"always hurting\", but reports lower pain numeric. Reports has \"not had time\" to address HEP since initial evaluation.     Objective:  Modalities:   Precautions:  Exercises:  Exercise Reps/ Time Weight/ Level Comments   Scifit 6m L2          Standing      Gastroc stretch 3x20\"     3 way hip 15x     HS curls 15x ea     Lateral steps            seated      HS stretch 3x20\"     LAQ 15x ea           Supine        LTR 5x5\"     Bridges 15x     SLR 15x     Clamshell 2x10 Blue          Sidelying      Hip abduction 15x     Clamshell  15x Blue          Prone      Quad stretch 3x20\"  Green alicia- therapist assist as needed   Other:    Treatment Charges: Mins Units   []  Modalities     [x]  Ther Exercise 45 3   []  Manual Therapy     []  Ther Activities     []  Aquatics     []  Vasocompression     []  Other     Total Treatment time 45 3       Assessment: [x] Progressing toward goals. Good tolerance to therapeutic exercise program as noted above with no considerable increased pain or discomfort throughout. Patient notes increased stretch through R>L hip during LTR, encouraged continued completion at home. Demonstrates significant lateral hip weakness in R hip, however with good carryover following cues for activation and proper technique. Patient states minimal fatigue and exhaustion post treatment, denies increased pain. [] No change. [] Other:  [x] Patient would continue to benefit from skilled physical therapy services in order to: to improve R hip ROM, strength, and restore normal gait pattern    STG: (to be met in 10 treatments)  ? Pain:5/10 R hip pain with ambulation. ? ROM: R hip flexion to 90 degrees to improve stair climbing. ? Strength:4/5 R hip flexion and abduction to improve hip stability. ? Function:LEFS score to 40% functional or better to improve ADLs. Independent with Home Exercise Programs     LTG: (to be met in 18 treatments)  Patient can tolerate greater than 20 minutes of standing activity. Patient can climb 22 8 inch steps to improve ability to climb to duplex 2nd level. Patient goals: Restore R hip strength and function. Pt. Education:  [x] Yes  [] No  [x] Reviewed Prior HEP/Ed  Method of Education: [x] Verbal  [x] Demo  [] Written  Comprehension of Education:  [x] Verbalizes understanding. [x] Demonstrates understanding. [] Needs review. [x] Demonstrates/verbalizes HEP/Ed previously given. Plan: [x] Continue current frequency toward long and short term goals.     [x] Specific Instructions for subsequent treatments: EOB hip flexor stretch Frequency:  2 x/week for 18 visits      Time In: 304 AM            Time Out: 6360 AM    Electronically signed by:  Pk Anderson PTA

## 2022-12-27 ENCOUNTER — HOSPITAL ENCOUNTER (OUTPATIENT)
Dept: PHYSICAL THERAPY | Age: 62
Setting detail: THERAPIES SERIES
Discharge: HOME | End: 2022-12-27
Payer: MEDICARE

## 2022-12-27 PROCEDURE — 97110 THERAPEUTIC EXERCISES: CPT

## 2022-12-27 NOTE — FLOWSHEET NOTE
[x] [facility] &  Therapy  [address]  [phone]  F: [phone] [] [address]  [facility]   [address]  P: [phone]  F: [phone] [] [address]  [address]  P: [phone]  F: [phone] [] [address]  P: [phone]  F: [phone] [] [address]  [facility]   [address]   [place]: [phone]  F: [phone]      Physical Therapy Daily Treatment Note    Date:  2022  Patient Name:  Alyssa Norris    :  1960  MRN: [id number]  Physician: Vin Sharp MD                         Insurance: [facility] Medicare / Medicaid, N  Medical Diagnosis: Osteomyelitis of right femur, unspecified type M86.9                Rehab Codes: M25.551, M25.651, R26.89  Onset Date:  10/5/22                                Next 's appt: Jackson for shoulders, TBD, Bevin Ahumada for R hip 3 months     Visit# / total visits: 3/18    Cancels/No Shows: 0/0    Subjective:    Pain:  [x] Yes  [] No   Location: R hip            Pain Rating: (0-10 scale) 3/10  Pain altered Tx:  [] Yes  [x] No  Action:  Comments:  Reports hip is tender all the time  especially to touch and hip becomes painful with increased activity. Objective:  Modalities:   Precautions:   R FANG hx w/ infections/irrigations/debridement. Exercises:  Exercise   R hip Reps/ Time Weight/ Level Comments   Scifit 6m L2.5    Cybex bike ADD           Standing      Gastroc stretch 3x20\"  wedge   3 way hip 2x10  Romero, incr. Sets    HS curls 15x ea A     Step ups 2x10 6\" Added    Lateral step ups. 2x10 4\" Added    SLS  add                 seated      HS stretch 3x20\"  Stool.    LAQ 15x ea 2 lbs Added wt    HIP ABD 10x lime Ed for HEP Supine        LTR 5x10\"     SAQ 15x 2 lbs Added wt  12/27   Bridges 15x     SLR 15x A    Clamshell 2x10 Blue Arom 12/27                      Sidelying      Hip abduction 15x AA    Clamshell  2x10 Blue Incr. Sets, AROM 12/27   Reverse clamshells 2x10  ADDED 12/27         Prone      HS curls 5x A/AA Weak hip Stability, ADDED 12/27   Iso hip IR/ER 6x5\"  ADDED 12/27   Quad stretch 3x20\"  Green rope- therapist assist as needed   Other:    Treatment Charges: Mins Units   []  Modalities     [x]  Ther Exercise  50 3   []  Manual Therapy     []  Ther Activities     []  Aquatics     []  Vasocompression     []  Other     Total Treatment time 50 3       Assessment: [x] Progressing toward goals progressed CKC and OKC exercises a charted for strengthening R hip. Education on technique and purpose of each exercise. Weak External rotators/decreased hip stability. [] No change. [] Other:  [x] Patient would continue to benefit from skilled physical therapy services in order to: to improve R hip ROM, strength, and restore normal gait pattern    STG: (to be met in 10 treatments)  ? Pain:5/10 R hip pain with ambulation. ? ROM: R hip flexion to 90 degrees to improve stair climbing. ? Strength:4/5 R hip flexion and abduction to improve hip stability. ? Function:LEFS score to 40% functional or better to improve ADLs. Independent with Home Exercise Programs     LTG: (to be met in 18 treatments)  Patient can tolerate greater than 20 minutes of standing activity. Patient can climb 22 8 inch steps to improve ability to climb to duplex 2nd level. Patient goals: Restore R hip strength and function. Pt. Education:  [x] Yes  [] No  [x] Reviewed Prior HEP/Ed  Method of Education: [x] Verbal  [x] Demo  [] Written  Comprehension of Education:  [x] Verbalizes understanding. [x] Demonstrates understanding. [] Needs review. [x] Demonstrates/verbalizes HEP/Ed previously given.      Plan: [x] Continue current frequency toward long and short term goals.     [x] Specific Instructions for subsequent treatments: EOB hip flexor stretch     Frequency:  2 x/week for 18 visits      Time In:   1100          Time Out:  1200    Electronically signed by:  Kyleigh Zamora PTA

## 2022-12-29 ENCOUNTER — HOSPITAL ENCOUNTER (OUTPATIENT)
Dept: PHYSICAL THERAPY | Age: 62
Setting detail: THERAPIES SERIES
Discharge: HOME | End: 2022-12-29
Payer: MEDICARE

## 2022-12-29 PROCEDURE — 97110 THERAPEUTIC EXERCISES: CPT

## 2022-12-29 NOTE — FLOWSHEET NOTE
[x] [facility]) - [facility] TWELVESTEP [facility] &  Therapy  [address]  [phone]  F: [phone] [] [address]  [place] [address]   [address]  P: [phone]  F: [phone] [] [place] &  Therapy  [address]  P: [phone]  F: [phone] [] [address]  P: [phone]  F: [phone] [] [address]  [facility]   [address]   [place]: [phone]  F: [phone]      Physical Therapy Daily Treatment Note    Date:  2022  Patient Name:  Rama Paredes    :  1960  MRN: [id number]  Physician: Joey Briseno MD                         Insurance: [facility] Medicare / Medicaid, N  Medical Diagnosis: Osteomyelitis of right femur, unspecified type M86.9                Rehab Codes: M25.551, M25.651, R26.89  Onset Date:  10/5/22                                Next 's appt: Jackson for shoulders, TBD, Beverly Nolan for R hip 3 months     Visit# / total visits:     Cancels/No Shows: 0/0    Subjective:    Pain:  [x] Yes  [] No   Location: R hip            Pain Rating: (0-10 scale) 5/10  Pain altered Tx:  [] Yes  [x] No  Action:  Comments:  Patient arrives with increased antalgic, unsteady gait this date. Reports bilateral LE, cornel feet are bothering him most today. States has not been keeping up with HEP as frequently as he should. Objective:  Modalities:   Precautions:   R FANG hx w/ infections/irrigations/debridement. Exercises:  Exercise   R hip Reps/ Time Weight/ Level Comments   Scifit 6m L3    Cybex bike ADD           Standing      Gastroc stretch 3x20\"  wedge   3 way hip 2x10  Romero   HS curls 15x ea A    Step ups 2x10 6\"    Lateral step ups. 2x10 4\"    SLS  add                 seated      HS stretch 3x20\"  Stool.    LAQ 15x ea 2 lbs HIP ABD 10x lime Ed for HEP         Supine        LTR 5x10\"     SAQ 15x 2 lbs    Bridges 15x     SLR 15x 2lb Added weight 12/28   Clamshell 2x10 Blue Arom 12/27               Sidelying      Hip abduction 15x AA AROM 12/28   Clamshell  2x10 Blue    Reverse clamshells 2x10  ADDED 12/27         Prone   Held 12/28   HS curls 5x A/AA Weak hip Stability, ADDED 12/27   Iso hip IR/ER 6x5\"  ADDED 12/27   Quad stretch 3x20\"  Green rope- therapist assist as needed   Other:    Treatment Charges: Mins Units   []  Modalities     [x]  Ther Exercise  50 3   []  Manual Therapy     []  Ther Activities     []  Aquatics     []  Vasocompression     []  Other     Total Treatment time 50 3       Assessment: [] Progressing toward goals. [] No change. [x] Other: Minimal progressions this date due to increased pain/symptoms noted at arrival. Patient with overall good recall for exercise progression, verbal cueing for proper technique cornel upright posture throughout with good carryover. Improved activity tolerance with only one seated rest break required during standing exercises. Added weight to SLR for increased quad strength with patient demonstrating good tolerance and technique. Educated on importance of HEP completion with good verbal understanding. [x] Patient would continue to benefit from skilled physical therapy services in order to: to improve R hip ROM, strength, and restore normal gait pattern    STG: (to be met in 10 treatments)  ? Pain:5/10 R hip pain with ambulation. ? ROM: R hip flexion to 90 degrees to improve stair climbing. ? Strength:4/5 R hip flexion and abduction to improve hip stability. ? Function:LEFS score to 40% functional or better to improve ADLs. Independent with Home Exercise Programs     LTG: (to be met in 18 treatments)  Patient can tolerate greater than 20 minutes of standing activity. Patient can climb 22 8 inch steps to improve ability to climb to duplex 2nd level.       Patient goals: Restore R hip strength and function. Pt. Education:  [x] Yes  [] No  [x] Reviewed Prior HEP/Ed  Method of Education: [x] Verbal  [x] Demo  [] Written  Comprehension of Education:  [x] Verbalizes understanding. [x] Demonstrates understanding. [] Needs review. [x] Demonstrates/verbalizes HEP/Ed previously given. Plan: [x] Continue current frequency toward long and short term goals.     [x] Specific Instructions for subsequent treatments: upright bike warmup, SLS, resume prone    Frequency:  2 x/week for 18 visits      Time In:   1020 am         Time Out:  1120 am    Electronically signed by:  Starr Mazariegos PTA

## 2023-01-03 ENCOUNTER — HOSPITAL ENCOUNTER (OUTPATIENT)
Dept: PAIN MANAGEMENT | Age: 63
Discharge: HOME | End: 2023-01-03
Payer: MEDICARE

## 2023-01-03 ENCOUNTER — HOSPITAL ENCOUNTER (OUTPATIENT)
Dept: PHYSICAL THERAPY | Age: 63
Setting detail: THERAPIES SERIES
Discharge: HOME | End: 2023-01-03
Payer: MEDICARE

## 2023-01-03 VITALS
HEART RATE: 88 BPM | HEIGHT: 74 IN | OXYGEN SATURATION: 98 % | SYSTOLIC BLOOD PRESSURE: 137 MMHG | WEIGHT: 253 LBS | DIASTOLIC BLOOD PRESSURE: 96 MMHG | BODY MASS INDEX: 32.47 KG/M2

## 2023-01-03 DIAGNOSIS — G89.29 CHRONIC BILATERAL LOW BACK PAIN WITH BILATERAL SCIATICA: Primary | ICD-10-CM

## 2023-01-03 DIAGNOSIS — M54.16 LUMBAR RADICULOPATHY: ICD-10-CM

## 2023-01-03 DIAGNOSIS — G62.9 NEUROPATHY: ICD-10-CM

## 2023-01-03 DIAGNOSIS — M54.41 CHRONIC BILATERAL LOW BACK PAIN WITH BILATERAL SCIATICA: Primary | ICD-10-CM

## 2023-01-03 DIAGNOSIS — M54.42 CHRONIC BILATERAL LOW BACK PAIN WITH BILATERAL SCIATICA: Primary | ICD-10-CM

## 2023-01-03 DIAGNOSIS — M25.559 HIP PAIN: ICD-10-CM

## 2023-01-03 PROCEDURE — 99204 OFFICE O/P NEW MOD 45 MIN: CPT | Performed by: STUDENT IN AN ORGANIZED HEALTH CARE EDUCATION/TRAINING PROGRAM

## 2023-01-03 PROCEDURE — 97110 THERAPEUTIC EXERCISES: CPT

## 2023-01-03 PROCEDURE — 99203 OFFICE O/P NEW LOW 30 MIN: CPT

## 2023-01-03 ASSESSMENT — PAIN DESCRIPTION - PAIN TYPE: TYPE: CHRONIC PAIN

## 2023-01-03 ASSESSMENT — PAIN DESCRIPTION - FREQUENCY: FREQUENCY: INTERMITTENT

## 2023-01-03 ASSESSMENT — PAIN DESCRIPTION - LOCATION: LOCATION: HIP

## 2023-01-03 ASSESSMENT — PAIN DESCRIPTION - DESCRIPTORS: DESCRIPTORS: ACHING

## 2023-01-03 ASSESSMENT — PAIN SCALES - GENERAL: PAINLEVEL_OUTOF10: 5

## 2023-01-03 ASSESSMENT — PAIN DESCRIPTION - ONSET: ONSET: AWAKENED FROM SLEEP

## 2023-01-03 NOTE — FLOWSHEET NOTE
[x] [facility]) [facility] TWELVESTEP [facility] &  Therapy  [address]  [phone]  F: [phone] [] [address]  [facility]   [address]  P: [phone]  F: [phone] [] [place] &  Therapy  [address]  P: [phone]  F: [phone] [] [address]  P: [phone]  F: [phone] [] [address]  [facility]   [address]   [place]: [phone]  F: [phone]      Physical Therapy Daily Treatment Note    Date:  1/3/2023  Patient Name:  Michelle Loving    :  1960  MRN: [id number]  Physician: Carlotta Gagnon MD                         Insurance: [facility] Medicare / Medicaid, N  Medical Diagnosis: Osteomyelitis of right femur, unspecified type M86.9                Rehab Codes: M25.551, M25.651, R26.89  Onset Date:  10/5/22                                Next 's appt: Jackson for shoulders, TBD, Genia  for R hip 3 months     Visit# / total visits:     Cancels/No Shows: 0/0    Subjective:    Pain:  [x] Yes  [] No   Location: R hip            Pain Rating: (0-10 scale) 6/10  Pain altered Tx:  [] Yes  [x] No  Action:  Comments:  Patient arrives to therapy with increased pain in the whole body. Both legs, knees, feet and arms. Most pain and weakness in B knees. Notes R hip is getting better. Pain in not effected by weather. Objective:  Modalities:   Precautions:  R FANG hx w/ infections/irrigations/debridement. Exercises:  Exercise   R hip Reps/ Time Weight/ Level Comments   Scifit 6m L3    Cybex bike ADD           Standing      Gastroc stretch 3x20\"  wedge   3 way hip 2x10  Romero   HS curls 2x10 A    Step ups 2x10 6\"    Lateral step ups. 2x10 4\"    SLS  2x10\"  Added 1.3.23               Seated      HS stretch 3x20\"  Stool.    LAQ 15x ea 2 lbs    Hip Abd 10x lime Ed for HEP   Hip add 10x           Supine        LTR 5x10\"        Bridges 20x     SLR 15x 2lb Added weight 12/28   Clamshell 2x10 Blue Arom 12/27               Sidelying      Hip abduction 15x AA AROM 12/28   Clamshell  2x10 Blue    Reverse clamshells 2x10  ADDED 12/27         Prone   Held 12/28   HS curls 5x A/AA Weak hip Stability, ADDED 12/27   Iso hip IR/ER 6x5\"  ADDED 12/27   Quad stretch 3x20\"  Green rope- therapist assist as needed   Other:    Treatment Charges: Mins Units   []  Modalities     [x]  Ther Exercise 55 4   []  Manual Therapy     []  Ther Activities     []  Aquatics     []  Vasocompression     []  Other     Total Treatment time 55 4       Assessment: [x] Progressing toward goals. Completed exercise log with fair tolerance due to general fatigue, requiring rest breaks. Added SLS to improve standing tolerance. Weak hip abd with difficulty performing hip abd and clams. Mod tactile cueing for hip compensation. [] No change. [] Other:   [x] Patient would continue to benefit from skilled physical therapy services in order to: to improve R hip ROM, strength, and restore normal gait pattern    STG: (to be met in 10 treatments)  ? Pain:5/10 R hip pain with ambulation. ? ROM: R hip flexion to 90 degrees to improve stair climbing. ? Strength:4/5 R hip flexion and abduction to improve hip stability. ? Function:LEFS score to 40% functional or better to improve ADLs. Independent with Home Exercise Programs     LTG: (to be met in 18 treatments)  Patient can tolerate greater than 20 minutes of standing activity. Patient can climb 22 8 inch steps to improve ability to climb to duplex 2nd level. Patient goals: Restore R hip strength and function. Pt. Education:  [x] Yes  [] No  [x] Reviewed Prior HEP/Ed  Method of Education: [x] Verbal  [x] Demo  [] Written  Comprehension of Education:  [x] Verbalizes understanding.   [x] Demonstrates understanding. [] Needs review. [x] Demonstrates/verbalizes HEP/Ed previously given. Plan: [x] Continue current frequency toward long and short term goals.     [x] Specific Instructions for subsequent treatments: upright bike warmup, SLS, resume prone    Frequency:  2 x/week for 18 visits      Time In:   1300 pm         Time Out:  1400 pm    Electronically signed by:  Jacquelyn Barrow PTA

## 2023-01-03 NOTE — PROGRESS NOTES
Chronic Pain Clinic Note     Encounter Date: 1/3/2023     SUBJECTIVE:  Chief Complaint   Patient presents with    Hip Pain    New Patient       History of Present Illness:    Marek Torres is a 60 y.o. male who presents with hip pain    Medication Refill: N/A    Current Complaints of Pain:   Location: hip   Radiation: yes  Severity: moderate  Pain Numerical Score -    Average: 5     Highest: 10  Lowest: 1  Character/Quality: Complains of pain that is aching  Timing: Keeps awake at night, Intermittent  Associated symptoms: none  Numbness: yes  Weakness: no  Exacerbating factors: walking  Alleviating factors: medication & propping up leg  Length of time pain has been present: Started a few years ago  Inciting event/injury: yes surgery  Bowel/Bladder incontinence: no  Falls: yes  Physical Therapy: yes    History of Interventions:   Surgery: No previous lumbar/cervical surgeries  Injections: None    Imaging:    No new imaging      Past Medical History:   Diagnosis Date    Arthritis     CAD (coronary artery disease)     Cancer (HCC)     prostate    Cervical radiculopathy     CHF (congestive heart failure) (HCC)     DM (diabetes mellitus) ([facility] 2009    IDDM    GERD (gastroesophageal reflux disease) 2017    ON RX    Heart murmur 2013    ASYMPTOMATIC    HTN (hypertension) 06/29/2012    ON RX    Hyperlipidemia 06/29/2012    ON RX    Sleep apnea 2016    TRIED MACHINE COULD NOT TOLERATE    Vision abnormalities 2019    FLOATERS RIGHT EYE    Wears glasses        Past Surgical History:   Procedure Laterality Date    COLONOSCOPY  07/23/2010    Dr. Feliciano [facility] Bilateral 2017    CATARACT EXTRACTION WITH IOL    IR INS PICC VAD W SQ PORT GREATER THAN 5  10/7/2022    IR INS PICC VAD W SQ PORT GREATER THAN 5 10/7/2022 [facility]ED[facility]    KNEE ARTHROSCOPY      REVISION TOTAL HIP ARTHROPLASTY Right 10/5/2022    RIGHT HIP EXCISIONAL DEBRIDEMENT SKIN TO SUBCUTANEOUS TISSUE AND FASCIA AND IRRIGATION performed by Qiana Perez Daniela Yoo DO at [address] [place] [address] [place] Right 2019    VITRECTOMY Right 3/28/2019    VITRECTOMY 25 GAUGE,  IOL REPOSITION  (Owen Fritz) performed by Antony Goel MD at [place] History   Problem Relation Age of Onset    Diabetes Sister     Diabetes Brother     Cancer Brother        Social History     Socioeconomic History    Marital status: Single     Spouse name: Not on file    Number of children: Not on file    Years of education: Not on file    Highest education level: Not on file   Occupational History    Not on file   Tobacco Use    Smoking status: Former     Packs/day: 0.00     Years: 15.00     Pack years: 0.00     Types: Cigarettes     Quit date: 3/27/2016     Years since quittin.7    Smokeless tobacco: Never   Vaping Use    Vaping Use: Never used   Substance and Sexual Activity    Alcohol use: Yes     Comment: h/o ETOH-he reports he isn't currenty drinking as much    Drug use: No    Sexual activity: Yes   Other Topics Concern    Not on file   Social History Narrative    Not on file     Social Determinants of Health     Financial Resource Strain: Low Risk     Difficulty of Paying Living Expenses: Not hard at all   Food Insecurity: No Food Insecurity    Worried About Running Out of Food in the Last Year: Never true    [address] [place] [address] Place of Food in the Last Year: Never true   Transportation Needs: Not on file   Physical Activity: Inactive    Days of Exercise per Week: 0 days    Minutes of Exercise per Session: 0 min   Stress: Not on file   Social Connections: Not on file   Intimate Partner Violence: Not on file   Housing Stability: Not on file       Medications & Allergies:   Current Outpatient Medications   Medication Instructions    aspirin EC 81 mg, Oral, DAILY    atorvastatin (LIPITOR) 40 MG tablet TAKE 1 TABLET BY MOUTH ONE TIME A DAY    Continuous Blood Gluc  (DEXCOM G6 ) MAMADOU Use as directed to monitor glucose continuously    Continuous Blood Gluc Sensor (DEXCOM G6 SENSOR) MISC Apply new sensor to abdomen every 10 days    Continuous Blood Gluc Transmit (DEXCOM G6 TRANSMITTER) MISC Use in combination with new sensor every 10 days. Transmitter lasts 3 months    ELIQUIS 2.5 MG TABS tablet TAKE 1 TABLET BY MOUTH 2 TIMES A DAY    empagliflozin (JARDIANCE) 25 MG tablet TAKE 1 TABLET BY MOUTH ONCE DAILY. ferrous sulfate (IRON 325) 325 mg, Oral, DAILY WITH BREAKFAST    ibuprofen (ADVIL;MOTRIN) 400 mg, Oral, EVERY 6 HOURS PRN    insulin lispro (1 Unit Dial) (HUMALOG KWIKPEN) 6 Units, SubCUTAneous, 3 TIMES DAILY BEFORE MEALS    Insulin Pen Needle (B-D ULTRAFINE III SHORT PEN) 31G X 8 MM MISC 1 each, Does not apply, DAILY    ketoconazole (NIZORAL) 2 % cream Apply daily to affected areas on face, scalp, neck, and chest    ketoconazole (NIZORAL) 2 % shampoo Use as wash to chest, back, neck, face, and scalp leaving on for 5 minutes prior to washing off once daily for 2 weeks then three times weekly    LANTUS SOLOSTAR 100 UNIT/ML injection pen INJECT 42 UNITS INTO THE SKIN TWO TIMES A DAY    metFORMIN (GLUCOPHAGE) 500 MG tablet TAKE TWO TABLETS BY MOUTH TWICE A DAY WITH MEALS    metoprolol tartrate (LOPRESSOR) 25 MG tablet TAKE ONE-HALF TABLET BY MOUTH TWICE DAILY. Misc. Devices MISC 1 PAIR OF DIABETIC SHOES (1 LEFT/ 1 RIGHT)<BR>1-3 PAIRS OF INSERTS (LEFT/ RIGHT)    pregabalin (LYRICA) 100 MG capsule TAKE 1 CAPSULE BY MOUTH 2 TIMES A DAY    tamsulosin (FLOMAX) 0.4 mg, Oral, DAILY    TRULICITY 3 LF/4.5MW SOPN INJECT THE CONTENTS OF ONE SYRINGE (3MG) UNDER THE SKIN ONCE WEEKLY    venlafaxine (EFFEXOR XR) 37.5 MG extended release capsule TAKE 1 CAPSULE BY MOUTH ONCE DAILY.     vitamin D3 (CHOLECALCIFEROL) 1,000 Units, Oral, DAILY    zinc 50 MG CAPS 1 capsule, Oral, DAILY       Allergies   Allergen Reactions    Lisinopril Swelling     Outer Neck swelling    Melatonin Swelling    Trazodone Swelling     Chest swells       Review of Systems:   Constitutional: negative for weight changes or fevers  Cardiovascular: negative for chest pain, palpitations, irregular heart beat  Respiratory: negative for dyspnea, cough, wheezing  Gastrointestinal: negative for constipation, diarrhea, nausea  Genitourinary: negative for bowel/bladder incontinence   Musculoskeletal: positive for low back pain  Neurological: positive for radicular leg pain, leg weakness or numbness/tingling  Behavioral/Psych: negative for anxiety/depression   Hematological: negative for abnormal bleeding, anticoagulation use or antiplatelet use  All other systems reviewed and are negative    OBJECTIVE:    Vitals:    01/03/23 0912   BP: (!) 137/96   Pulse: 88   SpO2: 98%       PHYSICAL EXAM    GENERAL: No acute distress, pleasant, well-appearing  HEENT: Normocephalic, atraumatic, Pupils equal and round  CARDIOVASCULAR: Well perfused, No peripheral cyanosis  PULMONARY: Good chest wall excursion, breathing unlabored  PSYCH: Appropriate affect and insight, non-pressured speech  SKIN: No rashes or lesions  MUSCULOSKELETAL:  Inspection: The back and extremities are symmetric and aligned. Muscle bulk is normal in appearance. Palpation: There is tenderness to palpation along the lumbar paraspinal musculature bilaterally  Lumbar range of motion is full  NEUROMUSCULAR:  Patient ambulates unassisted  Gait is antalgic  Sensation to light touch is intact and lower extremities besides bilateral feet  Strength is full bilaterally  No ankle clonus    Special Tests:  Lumbar facet loading is positive bilaterally  Seated straight leg raise is positive on right    DIAGNOSIS:    ICD-10-CM    1. Chronic bilateral low back pain with bilateral sciatica  M54.42 XR LUMBAR SPINE (2-3 VIEWS)    M54.41 MRI LUMBAR SPINE W WO CONTRAST    G89.29       2. Lumbar radiculopathy  M54.16       3. Neuropathy  G62.9       4.  Hip pain  M25.559            ASSESSMENT:    Bethany Bonilla is a 60 y.o.male who presents with chronic low back pain, hip pain and bilateral leg pain. To review, patient has a history of diabetes and peripheral neuropathy as well as chronic hip pain and low back pain. He is also on chronic anticoagulation therapy. He underwent debridement irrigation of a right total hip arthroplasty infection with wound VAC application on 90/4/6896 and has been following with infectious disease as well as orthopedic surgery. The patient's history and physical examination are consistent with possible lumbar radiculopathy with superimposed neuropathy. Patient reports pain shooting down into his right leg, and due to his history of previous prostate cancer now in remission as well as osteomyelitis of his right hip, I will order a lumbar x-ray as well as lumbar MRI with and without contrast.     Neurologically, it appears the patient has full strength and reduced sensation in feet. There is no evidence of myelopathy on examination. There are no red flags in the patient's history. The patient has failed conservative measures including outpatient physical therapy, greater than 3 medications for pain relief, a self-directed therapy program, as well as activity modification all within the last 6 weeks over 3 months. The patient's pain has been causing worsening quality of life and function. PLAN:  Medications: For nonopioid therapy, the following medications were prescribed:    -Continue medication prescribed by primary care physician  -Consider NSAID in future    Opioid therapy:  -Not indicated    Interventions:  -None    Imaging:  -Ordered lumbar x-ray  -Ordered lumbar MRI with and without contrast    Behavioral Therapies:  -Continue daily stretching and home exercise program    Referrals:  -Continue follow-up with infectious disease, orthopedic surgery and physical therapy    Follow-up Plan:  -After imaging    Patient was offered intervention where appropriate. Multi-modal Pain Therapy:   The patient was explicitly considered for multimodal and interdisciplinary therapy. Non-opioid and non-pharmacological opportunities to enhance analgesia and quality of life have been and will continue to be pursued.     Tommy Viramontes, DO  Interventional Pain Management/PM&R   [facility]    Orders Placed This Encounter    XR LUMBAR SPINE (2-3 VIEWS)     Standing Status:   Future     Standing Expiration Date:   1/3/2024    MRI LUMBAR SPINE W WO CONTRAST     Standing Status:   Future     Standing Expiration Date:   1/3/2024     Order Specific Question:   STAT Creatinine as needed:     Answer:   No     Order Specific Question:   Reason for exam:     Answer:   Histoyr of prostate cancer, recent osteomyelitis

## 2023-01-04 ENCOUNTER — HOSPITAL ENCOUNTER (OUTPATIENT)
Dept: GENERAL RADIOLOGY | Age: 63
Discharge: HOME | End: 2023-01-06
Payer: MEDICARE

## 2023-01-04 ENCOUNTER — HOSPITAL ENCOUNTER (OUTPATIENT)
Age: 63
Discharge: HOME | End: 2023-01-06
Payer: MEDICARE

## 2023-01-04 DIAGNOSIS — G89.29 CHRONIC BILATERAL LOW BACK PAIN WITH BILATERAL SCIATICA: ICD-10-CM

## 2023-01-04 DIAGNOSIS — M54.42 CHRONIC BILATERAL LOW BACK PAIN WITH BILATERAL SCIATICA: ICD-10-CM

## 2023-01-04 DIAGNOSIS — M54.41 CHRONIC BILATERAL LOW BACK PAIN WITH BILATERAL SCIATICA: ICD-10-CM

## 2023-01-04 PROCEDURE — 72100 X-RAY EXAM L-S SPINE 2/3 VWS: CPT

## 2023-01-06 ENCOUNTER — HOSPITAL ENCOUNTER (OUTPATIENT)
Dept: PHYSICAL THERAPY | Age: 63
Setting detail: THERAPIES SERIES
Discharge: HOME | End: 2023-01-06
Payer: MEDICARE

## 2023-01-06 PROCEDURE — 97110 THERAPEUTIC EXERCISES: CPT

## 2023-01-06 NOTE — FLOWSHEET NOTE
[x] [facility]) - [facility]  [address]  [phone]  F: [phone] [] [address]  [facility]   [address]  P: [phone]  F: [phone] [] [place] &  Therapy  [address]  P: [phone]  F: [phone] [] [address]  P: [phone]  F: [phone] [] [address]  [facility]   [address]   [place]: [phone]  F: [phone]      Physical Therapy Daily Treatment Note    Date:  2023  Patient Name:  Deng Duggan    :  1960  MRN: [id number]  Physician: Jagruti Fung MD                         Insurance: [facility] Medicare / Medicaid, N  Medical Diagnosis: Osteomyelitis of right femur, unspecified type M86.9                Rehab Codes: M25.551, M25.651, R26.89  Onset Date:  10/5/22                                Next 's appt: Jackson for shoulders, TBD, Dianaaliya Amanda for R hip 3 months   Visit# / total visits:     Cancels/No Shows: 0/0    Subjective:    Pain:  [x] Yes  [] No   Location: R hip            Pain Rating: (0-10 scale) 6/10  Pain altered Tx:  [] Yes  [x] No  Action:  Comments:   Arrival without device, mild limp present/observed. Reports he is very tired not sleeping well, reports sarah LE pain (neuropathy) Addressing HEP. Objective:  Modalities:   Precautions:  R FANG hx w/ infections/irrigations/debridement. THX approx 3 yrs ago per pt.     Exercises:  Exercise   R hip Reps/ Time Weight/ Level Comments   Scifit  L3    Cybex bike  6 mins L2 Seat, 10,  6\" step to get on bike, Added 1/6   Gait SBQC cane 150 ftx2  VC for sequencing, trial for distance walking 1/6         Standing      Gastroc stretch 3x20\"  wedge    Hip abd, hip ext 2x10 1.5 lbs Sarah, added wt 1/6   Hip flexion 2x10 A Step ups 2x10 6\" Fwd, lateral, incr. Lateral height 1/6   SLS  3x2-10\"  Fingers on HR. Seated      HS stretch 3x20\"  Stool. LAQ 15x ea 2 lbs 1.5 lbs 1/6   Hip Abd 2x10 blue  Incr. Resistance 1/6   Hip add 15x5\"  Ball    Hip IR/ER 15x A          Supine        LTR       Heel slides  20x 2 lbs Added 1/6   Bridges 20x  L leg on bolster, R LE incr. Load. SAQ 20x 3 lbs Incr. Wt 1/6   SLR 15x 2lb     Clamshell 2x10 Blue                 Sidelying      Hip abduction 1  AA     Clamshell  2x10 Blue    Reverse clamshells 2x10 2 lbs           Prone    Held all prone 1/6 due to time constraints     HS curls 5x A/AA Weak hip Stability,     Iso hip IR/ER 6x5\"      Quad stretch 3x20\"  Green rope- therapist assist as needed   Other: Recommended pt address some distance walking in safe environment  w/ his cane several times a week. Treatment Charges: Mins Units   []  Modalities     [x]  Ther Exercise  55 4   []  Manual Therapy     []  Ther Activities     []  Aquatics     []  Vasocompression     []  Other     Total Treatment time 55 4       Assessment: [x] Progressing toward goals many progressions addressed this session for strengthening multiple muscle groups  as charted. Educated pt on correct use of SBQC for distance walking with pt demonstrating good understanding. No complaints with progressions. Encourage pt to continue with HEP, other comment as listed above and will progress written HEP next session. [] No change. [] Other:   [x] Patient would continue to benefit from skilled physical therapy services in order to: to improve R hip ROM, strength, and restore normal gait pattern    STG: (to be met in 10 treatments)  ? Pain:5/10 R hip pain with ambulation. ? ROM: R hip flexion to 90 degrees to improve stair climbing. ? Strength:4/5 R hip flexion and abduction to improve hip stability. ? Function:LEFS score to 40% functional or better to improve ADLs.    Independent with Home Exercise Programs     LTG: (to be met in 18 treatments)  Patient can tolerate greater than 20 minutes of standing activity. Patient can climb 22 8 inch steps to improve ability to climb to duplex 2nd level. Patient goals: Restore R hip strength and function. Pt. Education:  [x] Yes  [] No  [x] Reviewed Prior HEP/Ed  Method of Education: [x] Verbal  [x] Demo  [] Written  Comprehension of Education:  [x] Verbalizes understanding. [x] Demonstrates understanding. [] Needs review. [x] Demonstrates/verbalizes HEP/Ed previously given. Access Code: [email or web address]  URL: [email or web address]  Date: 12/19/2022  Prepared by: Eldon Medina     Exercises, gave blue resistance band   Supine Active Straight Leg Raise - 1 x daily - 7 x weekly - 3 sets - 10 reps  Supine Bridge - 1 x daily - 7 x weekly - 3 sets - 10 reps  Hooklying Clamshell with Resistance - 1 x daily - 7 x weekly - 3 sets - 10 reps  Sidelying Hip Abduction - 1 x daily - 7 x weekly - 3 sets - 10 reps  Seated Knee Extension with Resistance - 1 x daily - 7 x weekly - 3 sets - 10 reps  Standing Hip Extension with Counter Support - 1 x daily - 7 x weekly - 3 sets - 10 reps  Standing Hip Abduction with Counter Support - 1 x daily - 7 x weekly - 3 sets - 10 reps    Plan: [x] Continue current frequency toward long and short term goals.     [x] Specific Instructions for subsequent treatments: upright bike warmup, SLS, resume prone    Frequency:  2 x/week for 18 visits      Time In: 1100         Time Out:   6084    Electronically signed by:  Alka Sorenson PTA

## 2023-01-09 ENCOUNTER — HOSPITAL ENCOUNTER (OUTPATIENT)
Dept: PHYSICAL THERAPY | Age: 63
Setting detail: THERAPIES SERIES
Discharge: HOME | End: 2023-01-09
Payer: MEDICARE

## 2023-01-09 PROCEDURE — 97110 THERAPEUTIC EXERCISES: CPT

## 2023-01-09 NOTE — FLOWSHEET NOTE
[x] [facility]) [facility] &  Therapy  [address]  [phone]  F: [phone] [] [address]  [facility]   [address]  P: [phone]  F: [phone] [] [place] &  Therapy  [address]  P: [phone]  F: [phone] [] [address]  P: [phone]  F: [phone] [] [address]  [facility]   [address]   [place]: [phone]  F: [phone]      Physical Therapy Daily Treatment Note    Date:  2023  Patient Name:  Katey Stephenson    :  1960  MRN: [id number]  Physician: Eva Hernandez MD                         Insurance: [facility] Medicare / Medicaid, N  Medical Diagnosis: Osteomyelitis of right femur, unspecified type M86.9                Rehab Codes: M25.551, M25.651, R26.89  Onset Date:  10/5/22                                Next 's appt: [facility] for shoulders, TBD, Anderson Seymour for R hip 3 months   Visit# / total visits:     Cancels/No Shows: 0/0    Subjective:    Pain:  [x] Yes  [] No   Location: R hip            Pain Rating: (0-10 scale) Sore/tight/10  Pain altered Tx:  [] Yes  [x] No  Action:  Comments:    No complaint from prior session. Mild limp observed. Objective:  Modalities:   Precautions:  R FANG hx w/ infections/irrigations/debridement. HPI:  Patient had a history of a right total hip arthroplasty performed on 2019 and subsequently underwent several perioperative I&D's on 2019, 2020, for 2020. He had persistent infection and underwent an explant on 7/15/2021 followed by a stage II revision on 10/18/2021 he also underwent a subsequent I&D on 2021. He underwent right hip incision, irrigation, debridement on 10/5/2022.     Exercises:  Exercise   R hip Reps/ Time Weight/ Level Comments   Cybex bike  6 mins L3 Seat, 10,  6\" step to get on bike,     Gait SBQC cane 150 ftx2  VC for sequencing    gait x  VC for toe off/knee flexion and heel strike. Standing      Gastroc stretch 3x20\"  wedge   Heel raises 20x 2 lbs Added 1/9   Hip abd, hip ext 2x10ea 2.0 lbs Romero,    Hip flexion/marching 2x10 2 lbs    HS curls  2x10 2 lbs Added 1/9   Step ups 15x 2 lbs/6\" Fwd, lateral,       SLS  3x2-10\"  Fingers on HR. HELD 1/9   TG squats  2x10 L35 Added 1/9   L ckc  Tband 3 way hip  1x12 lime Added 1/9         Seated      HS stretch 3x30\"  Stool. LAQ 2x10ea 3 lbs 1.5 lbs     HS curls 2x10 blue Added 1/9   Hip add 15x5\"  Ball    Hip IR/ER 15x  lime Added resistance 1/9, weak ER. Iso hip ER 1x15  Extended time, pt w/ difficulties due to weakness. (Feels hip adductors vs hip ER)-trial added 1/9   Supine        LTR       Heel slides  20x 3 lbs     Bridges 20x   L leg on bolster, R LE incr. Load. SAQ 20x 3 lbs     SLR 2x10 2lb     Clamshell 2x10 Blue                 Sidelying      Hip abduction 1x10 AA  Held 1/9   Clamshell  2x10 lime    Reverse clamshells 2x10 2 lbs           Prone        HS curls 5x A Weak hip Stability,     Iso hip IR/ER 5x5\"   Progress reps next session   Quad stretch 3x20\"  Green rope- therapist assist as needed   Other:        Treatment Charges: Mins Units   []  Modalities     [x]  Ther Exercise 64 4   []  Manual Therapy     []  Ther Activities     []  Aquatics     []  Vasocompression     []  Other     Total Treatment time 64 4       Assessment: [x] Progressing toward goals  addressed many progressions for strengthening and stability as listed in chart. Education to us single UE support with CKC exercises as little as possible. Weak hip external rotators, extended time on strengthening hip ER this date. [] No change.      [] Other:   [x] Patient would continue to benefit from skilled physical therapy services in order to: to improve R hip ROM, strength, and restore normal gait pattern    STG: (to be met in 10 treatments)  ? Pain:5/10 R hip pain with ambulation. ? ROM: R hip flexion to 90 degrees to improve stair climbing. ? Strength:4/5 R hip flexion and abduction to improve hip stability. ? Function:LEFS score to 40% functional or better to improve ADLs. Independent with Home Exercise Programs     LTG: (to be met in 18 treatments)  Patient can tolerate greater than 20 minutes of standing activity. Patient can climb 22 8 inch steps to improve ability to climb to duplex 2nd level. Patient goals: Restore R hip strength and function. Pt. Education:  [x] Yes  [] No  [x] Reviewed Prior HEP/Ed  Method of Education: [x] Verbal  [x] Demo  [] Written  Comprehension of Education:  [x] Verbalizes understanding. [x] Demonstrates understanding. [] Needs review. [x] Demonstrates/verbalizes HEP/Ed previously given. Access Code: [email or web address]  URL: [email or web address]  Date: 12/19/2022  Prepared by: Miladys Corral, gave blue resistance band   Supine Active Straight Leg Raise - 1 x daily - 7 x weekly - 3 sets - 10 reps  Supine Bridge - 1 x daily - 7 x weekly - 3 sets - 10 reps  Hooklying Clamshell with Resistance - 1 x daily - 7 x weekly - 3 sets - 10 reps  Sidelying Hip Abduction - 1 x daily - 7 x weekly - 3 sets - 10 reps  Seated Knee Extension with Resistance - 1 x daily - 7 x weekly - 3 sets - 10 reps  Standing Hip Extension with Counter Support - 1 x daily - 7 x weekly - 3 sets - 10 reps  Standing Hip Abduction with Counter Support - 1 x daily - 7 x weekly - 3 sets - 10 reps    Plan: [x] Continue current frequency toward long and short term goals.     [x] Specific Instructions for subsequent treatments: Progress hip strength multiple muscle group,  SLS, resume prone    Frequency:  2 x/week for 18 visits      Time In:  1000      Time Out:   3754     Electronically signed by:  Dane Durham PTA

## 2023-01-11 DIAGNOSIS — E11.69 TYPE 2 DIABETES MELLITUS WITH OTHER SPECIFIED COMPLICATION, WITH LONG-TERM CURRENT USE OF INSULIN (HCC): ICD-10-CM

## 2023-01-11 DIAGNOSIS — Z79.4 TYPE 2 DIABETES MELLITUS WITH OTHER SPECIFIED COMPLICATION, WITH LONG-TERM CURRENT USE OF INSULIN (HCC): ICD-10-CM

## 2023-01-11 NOTE — TELEPHONE ENCOUNTER
E-scribe request for med refill. Please review and e-scribe if applicable. Last Visit Date:  12/05/2022  Next Visit Date:  Visit date not found    Hemoglobin A1C (%)   Date Value   10/06/2022 8.4 (H)   08/17/2022 8.1   04/12/2022 8.3             ( goal A1C is < 7)   Microalb/Crt.  Ratio (mcg/mg creat)   Date Value   04/13/2022 Can not be calculated     LDL Cholesterol (mg/dL)   Date Value   04/12/2022 52       (goal LDL is <100)   AST (U/L)   Date Value   11/29/2022 20     ALT (U/L)   Date Value   11/29/2022 18     BUN (mg/dL)   Date Value   11/29/2022 14     BP Readings from Last 3 Encounters:   01/03/23 (!) 137/96   12/05/22 (!) 150/90   11/30/22 128/85          (goal 120/80)        Patient Active Problem List:     DM (diabetes mellitus) ([facility]     HTN (hypertension)     ETOHism ([facility]     Hypertensive urgency     Abnormal ambulatory electrocardiogram     Abnormal ambulatory electrocardiography     Abnormal kidney function     Adiposity     Astigmatism     Atherosclerotic heart disease of native coronary artery without angina pectoris     Cervical radiculopathy     Chronic back pain     Decreased cardiac output     Diabetic peripheral neuropathy (HCC)     Dislocated intraocular lens     Disturbance in sleep behavior     Dizziness     Dyssomnia     Floaters     Impotence of organic origin     Left ventricular dysfunction     Low vision, both eyes     Myopia     Nuclear sclerosis of left eye     Obstructive sleep apnea syndrome     Palpitations     Personal history of other diseases of the digestive system     Posterior vitreous detachment     Presbyopia     Primary osteoarthritis of right hip     Pseudophakia of right eye     Repetitive intrusions of sleep     Sciatica     Tendonitis     Vitamin D deficiency     Vitreous opacities of right eye     Dislocated IOL (intraocular lens), anterior, right     Prostate CA (HCC)     Congestive heart failure (HCC)     Essential hypertension     Benign essential HTN Diabetes mellitus ([facility]     Aftercare following right hip joint replacement surgery     Infection of right prosthetic hip joint ([facility]     Benign localized prostatic hyperplasia with lower urinary tract symptoms (LUTS)     Post-op pain     Hip pain     Lumbar radiculopathy     Neuropathy      ----Mercedez Hayes

## 2023-01-13 ENCOUNTER — HOSPITAL ENCOUNTER (OUTPATIENT)
Dept: PHYSICAL THERAPY | Age: 63
Setting detail: THERAPIES SERIES
Discharge: HOME | End: 2023-01-13
Payer: MEDICARE

## 2023-01-13 PROCEDURE — 97110 THERAPEUTIC EXERCISES: CPT

## 2023-01-13 NOTE — FLOWSHEET NOTE
[x] [facility]) [facility]  [address]  [phone]  F: [phone] [] [address]  [facility]   [address]  P: [phone]  F: [phone] [] [place] &  Therapy  [address]  P: [phone]  F: [phone] [] [address]  P: [phone]  F: [phone] [] [address]  [place]   [address]   [place]: [phone]  F: [phone]      Physical Therapy Daily Treatment Note    Date:  2023  Patient Name:  Angelica Ly    :  1960  MRN: [id number]  Physician: Tomasa Barry MD                         Insurance: [facility] Medicare / Medicaid, [facility]  Medical Diagnosis: Osteomyelitis of right femur, unspecified type M86.9                Rehab Codes: M25.551, M25.651, R26.89  Onset Date:  10/5/22                                Next 's appt: Jackson for shoulders, TBD, Shadia Marcial for R hip 3 months   Visit# / total visits:     Cancels/No Shows: 0/0    Subjective:    Pain:  [] Yes  [x] No   Location: R hip            Pain Rating: (0-10 scale) Sore/tight/10  Pain altered Tx:  [] Yes  [x] No  Action:  Comments:   R hip with IR. Without new complaints. Addressing HEP as able \"mostly bed exercises\"      Objective:  Modalities:   Precautions:  R FANG hx w/ infections/irrigations/debridement. HPI:  Patient had a history of a right total hip arthroplasty performed on 2019 and subsequently underwent several perioperative I&D's on 2019, 2020, for 2020. He had persistent infection and underwent an explant on 7/15/2021 followed by a stage II revision on 10/18/2021 he also underwent a subsequent I&D on 2021.    He underwent right hip incision, irrigation, debridement on 10/5/2022. Exercises:  Exercise   R hip Reps/ Time Weight/ Level Comments 1/13/2023  Completed   \"X\"   Cybex bike  6 mins L3 Seat, 10,  6\" step to get on bike,   x   Gait SBQC cane    VC for sequencing     Gait no device 90ft  90 ft --  5 lbs  Vc fo toe off,  demo decreased time with swing thru. [place] carry, added 1/13 x  x   Standing       Gastroc stretch 3x20\"  wedge x   Heel raises 20x 2 lbs   x   Hip abd, hip ext 2x10ea 2.0 lbs Romero, 15x ea 1/13 x   Hip flexion/marching 2x10 2 lbs  x   HS curls  2x10 2 lbs      Step ups 15x 2 lbs/6\" Fwd, lateral,     x   SLS  3x2-10\"  Fingers on HR.  x   TG squats  2x10 L35   x   L ckc  Tband 3 way hip  1x15 lime Incr. reps x          Seated       HS stretch 3x30\"  Stool. x   LAQ 2x10ea 3 lbs       HS curls 2x10 blue   x   Hip add 15x5\"  Ball  x   Hip IR/ER 15x  lime Added resistance  x          Supine         LTR        Heel slides  20x 3 lbs      Bridges 20x   L leg on bolster, R LE incr. Load. x   SAQ 20x 3 lbs   x   SLR 2x10 2lbs   x   Clamshell 2x10 Blue                    Sidelying       Hip abduction 1x10 AA   x   Clamshell  2x10 lime     Reverse clamshells 2x10 2 lbs             Prone         HS curls 5x A Weak hip Stability,      Iso hip IR/ER 5x5\"   Progress reps next session    Quad stretch 3x20\"  Green rope- therapist assist as needed    Other:  1/13/23 Rotating exercises due to volume. Pt required a pause sitting rest break after every three standing exercises. Treatment Charges: Mins Units   []  Modalities     [x]  Ther Exercise  55 4   []  Manual Therapy     []  Ther Activities     []  Aquatics     []  Vasocompression     []  Other     Total Treatment time 55 4       Assessment: [x] Progressing toward goals  increased reps with tband CKC 3 way hip for stability and endurance for R hip. Demonstrates slow progress with SLS stability. Added farmers carry for hip endurance and  gluteal strengthening. [] No change.      [] Other:   [x] Patient would continue to benefit from skilled physical therapy services in order to: to improve R hip ROM, strength, and restore normal gait pattern    STG: (to be met in 10 treatments)  ? Pain:5/10 R hip pain with ambulation. ? ROM: R hip flexion to 90 degrees to improve stair climbing. ? Strength:4/5 R hip flexion and abduction to improve hip stability. ? Function:LEFS score to 40% functional or better to improve ADLs. Independent with Home Exercise Programs     LTG: (to be met in 18 treatments)  Patient can tolerate greater than 20 minutes of standing activity. Patient can climb 22 8 inch steps to improve ability to climb to duplex 2nd level. Patient goals: Restore R hip strength and function. Pt. Education:  [x] Yes  [] No  [x] Reviewed Prior HEP/Ed  Method of Education: [x] Verbal  [x] Demo  [] Written  Comprehension of Education:  [x] Verbalizes understanding. [x] Demonstrates understanding. [] Needs review. [x] Demonstrates/verbalizes HEP/Ed previously given. Access Code: [email or web address]  URL: [email or web address]  Date: 12/19/2022  Prepared by: Polly Nathan     Exercises, gave blue resistance band   Supine Active Straight Leg Raise - 1 x daily - 7 x weekly - 3 sets - 10 reps  Supine Bridge - 1 x daily - 7 x weekly - 3 sets - 10 reps  Hooklying Clamshell with Resistance - 1 x daily - 7 x weekly - 3 sets - 10 reps  Sidelying Hip Abduction - 1 x daily - 7 x weekly - 3 sets - 10 reps  Seated Knee Extension with Resistance - 1 x daily - 7 x weekly - 3 sets - 10 reps  Standing Hip Extension with Counter Support - 1 x daily - 7 x weekly - 3 sets - 10 reps  Standing Hip Abduction with Counter Support - 1 x daily - 7 x weekly - 3 sets - 10 reps    Plan: [x] Continue current frequency toward long and short term goals.     [x] Specific Instructions for subsequent treatments: Progress hip strength multiple muscle group,  SLS, resume prone    Frequency:  2 x/week for 18 visits      Time In: 1007      Time Out: 1120    Electronically signed by:  Markus Foley, PTA

## 2023-01-16 ENCOUNTER — OFFICE VISIT (OUTPATIENT)
Dept: ORTHOPEDIC SURGERY | Age: 63
End: 2023-01-16
Payer: MEDICARE

## 2023-01-16 ENCOUNTER — HOSPITAL ENCOUNTER (OUTPATIENT)
Dept: PHYSICAL THERAPY | Age: 63
Setting detail: THERAPIES SERIES
End: 2023-01-16
Payer: MEDICARE

## 2023-01-16 VITALS — BODY MASS INDEX: 32.47 KG/M2 | HEIGHT: 74 IN | WEIGHT: 253 LBS | RESPIRATION RATE: 16 BRPM

## 2023-01-16 DIAGNOSIS — M25.511 RIGHT SHOULDER PAIN, UNSPECIFIED CHRONICITY: Primary | ICD-10-CM

## 2023-01-16 PROCEDURE — 99214 OFFICE O/P EST MOD 30 MIN: CPT | Performed by: ORTHOPAEDIC SURGERY

## 2023-01-16 PROCEDURE — G8427 DOCREV CUR MEDS BY ELIG CLIN: HCPCS | Performed by: ORTHOPAEDIC SURGERY

## 2023-01-16 PROCEDURE — 1036F TOBACCO NON-USER: CPT | Performed by: ORTHOPAEDIC SURGERY

## 2023-01-16 PROCEDURE — 3017F COLORECTAL CA SCREEN DOC REV: CPT | Performed by: ORTHOPAEDIC SURGERY

## 2023-01-16 PROCEDURE — G8417 CALC BMI ABV UP PARAM F/U: HCPCS | Performed by: ORTHOPAEDIC SURGERY

## 2023-01-16 PROCEDURE — G8482 FLU IMMUNIZE ORDER/ADMIN: HCPCS | Performed by: ORTHOPAEDIC SURGERY

## 2023-01-16 NOTE — PROGRESS NOTES
Orthopedic Shoulder Encounter Note     Chief complaint: right shoulder pain    HPI: Mireille Sumner is a 60 y.o.  right-hand dominant male who presents for evaluation of his right shoulder. He indicates that he has been having some pain in the shoulder for quite some time. He used to be a patient of Dr. Arturo [address] and received what appears to have been an arthroscopic procedure and then subsequently had a reverse shoulder replacement done in 2021. He states that his shoulder has not been \"right\" and he has been dealing with pain ever since that is usually related to activity and movement. No pain at rest.  He has also noticed some limitation in function after the reverse shoulder replacement. He denies having any fevers, chills, sweats or any constitutional symptoms. His pain is localized to the anterior aspect of the shoulder. Previous treatment:    NSAIDs: Ibuprofen    Physical Therapy: No    Injections: Left shoulder cortisone injections on 8/16/2022 and 12/19/2022 by Dr. Tangela Mack    Surgeries: Right shoulder reverse shoulder arthroplasty by Dr. [address]uro [address] in 2021 with a right shoulder arthroscopic procedure performed again by Dr. Arturo [address] prior to this (procedure not otherwise specified). Review of Systems:   Constitutional: Negative for fever, chills, sweats. Pain Score:   8  Neurological: Negative for headache, numbness, or weakness. Musculoskeletal: As noted in HPI     Past Medical History  Ariel Montanez  has a past medical history of Arthritis, CAD (coronary artery disease), Cancer ([facility] [address] Cervical radiculopathy, CHF (congestive heart failure) ([address] DM (diabetes mellitus) ([facility] [address] GERD (gastroesophageal reflux disease), Heart murmur, HTN (hypertension), Hyperlipidemia, Sleep apnea, Vision abnormalities, and Wears glasses. Past Surgical History  Ariel Montanez  has a past surgical history that includes Colonoscopy (07/23/2010); Knee arthroscopy; eye surgery (Bilateral, 2017);  Vasectomy (1999); vitrectomy (Right, 03/28/2019); vitrectomy (Right, 3/28/2019); Revision total hip arthroplasty (Right, 10/5/2022); and IR INSERT PICC VAD W SQ PORT >5 YEARS (10/7/2022). Current Medications  Current Outpatient Medications   Medication Sig Dispense Refill    metFORMIN (GLUCOPHAGE) 500 MG tablet TAKE 2 TABLETS BY MOUTH 2 TIMES A DAY WITH MEALS 120 tablet 3    empagliflozin (JARDIANCE) 25 MG tablet TAKE 1 TABLET BY MOUTH ONCE DAILY. 30 tablet 1    pregabalin (LYRICA) 100 MG capsule TAKE 1 CAPSULE BY MOUTH 2 TIMES A DAY 60 capsule 2    TRULICITY 3 WY/6.8ZM SOPN INJECT THE CONTENTS OF ONE SYRINGE (3MG) UNDER THE SKIN ONCE WEEKLY 2 mL 0    ELIQUIS 2.5 MG TABS tablet TAKE 1 TABLET BY MOUTH 2 TIMES A DAY 90 tablet 1    atorvastatin (LIPITOR) 40 MG tablet TAKE 1 TABLET BY MOUTH ONE TIME A DAY 30 tablet 3    venlafaxine (EFFEXOR XR) 37.5 MG extended release capsule TAKE 1 CAPSULE BY MOUTH ONCE DAILY. 30 capsule 3    metoprolol tartrate (LOPRESSOR) 25 MG tablet TAKE ONE-HALF TABLET BY MOUTH TWICE DAILY. 30 tablet 2    Misc. Devices MISC 1 PAIR OF DIABETIC SHOES (1 LEFT/ 1 RIGHT)  1-3 PAIRS OF INSERTS (LEFT/ RIGHT) 2 each 0    ketoconazole (NIZORAL) 2 % shampoo Use as wash to chest, back, neck, face, and scalp leaving on for 5 minutes prior to washing off once daily for 2 weeks then three times weekly 120 mL 3    ketoconazole (NIZORAL) 2 % cream Apply daily to affected areas on face, scalp, neck, and chest 30 g 2    ibuprofen (ADVIL;MOTRIN) 400 MG tablet Take 1 tablet by mouth every 6 hours as needed for Pain 120 tablet 1    tamsulosin (FLOMAX) 0.4 mg capsule Take 1 capsule by mouth daily 90 capsule 1    LANTUS SOLOSTAR 100 UNIT/ML injection pen INJECT 42 UNITS INTO THE SKIN TWO TIMES A DAY 15 mL 3    Continuous Blood Gluc Transmit (DEXCOM G6 TRANSMITTER) MISC Use in combination with new sensor every 10 days.  Transmitter lasts 3 months 1 each 3    Continuous Blood Gluc Sensor (DEXCOM G6 SENSOR) MISC Apply new sensor to abdomen every 10 days 3 each 11    Continuous Blood Gluc  (DEXCOM G6 ) MAMADOU Use as directed to monitor glucose continuously 1 each 1    ferrous sulfate (IRON 325) 325 (65 Fe) MG tablet Take 325 mg by mouth daily (with breakfast)       zinc 50 MG CAPS Take 1 capsule by mouth daily      aspirin EC 81 MG EC tablet Take 1 tablet by mouth daily 90 tablet 1    Insulin Pen Needle (B-D ULTRAFINE III SHORT PEN) 31G X 8 MM MISC 1 each by Does not apply route daily 100 each 3    vitamin D3 (CHOLECALCIFEROL) 25 MCG (1000 UT) TABS tablet Take 1 tablet by mouth daily 90 tablet 1    insulin lispro, 1 Unit Dial, (HUMALOG KWIKPEN) 100 UNIT/ML SOPN Inject 6 Units into the skin 3 times daily (before meals) 3 pen 2     Current Facility-Administered Medications   Medication Dose Route Frequency Provider Last Rate Last Admin    bupivacaine (MARCAINE) 0.25 % injection 5 mg  2 mL Intra-artICUlar Once Shayna Dessert, DO        methylPREDNISolone acetate (DEPO-MEDROL) injection 80 mg  80 mg Intra-artICUlar Once Shayna Dessert, DO           Allergies  Allergies have been reviewed. Anthony Arevalo is allergic to lisinopril, melatonin, and trazodone. Social History  Anthony Arevalo  reports that he quit smoking about 6 years ago. His smoking use included cigarettes. He has never used smokeless tobacco. He reports current alcohol use. He reports that he does not use drugs. Family History  Ryder's family history includes Cancer in his brother; Diabetes in his brother and sister. Physical Exam:     Resp 16   Ht 6' 2\" (1.88 m)   Wt 253 lb (114.8 kg)   BMI 32.48 kg/m²    Constitutional: Patient is oriented to person, place, and time. Patient appears well-developed and well nourished. Mental Status/psychiatric: Behavior is normal. Thought content normal.  HENT: Negative otherwise noted  Head: Normocephalic and Atraumatic  Nose: Normal  Respiratory/Cardio: Effort normal. No respiratory distress.     Shoulder:    Skin: Skin is warm and dry; no swelling or obvious muscular atrophy. Arthroscopic portal incisions and an anterior oblique incisional scar noted over the right shoulder. Vasculature: 2+ radial pulses bilaterally  Neuro: Sensation grossly intact to light touch diffusely  Tenderness: Mildly tender to palpation over the anterior aspect of the right shoulder    ROM: (Degrees)    Right   A P   Left   A P    Elevation  120 125   Elevation  125   Abduction  105 125   Abduction  135    ER   50 70   ER   50   IR   Buttock   IR   L4   90 abd/ER      90 abd/ER     90 abd/IR      90 abd/IR     Crepitation  No    Crepitation No  Dyskenesia  No    Dyskenesia No      Muscle strength:    Right       Left    Deltoid   5    Deltoid   5  Supraspinatus  5    Supraspinatus  5  ER   5    ER   5  IR   5    IR   5    Special tests    Right   Rotator Cuff    Left    n   Painful arc    n   n   Pain with ER    n    n   Neer's     n    n   Hawkin's    n    n   Drop Arm    n  n   Lift off/Belly Press   n  n   ER Lag    n          AC Joint  n   AC tenderness   n  n   Cross-chest adduction  n       Labrum/biceps    n   Calhoun's    n   n   Biceps sheer    n      y   Speed's/Yergason's   n    y   Tenderness Biceps Groove  n    n   Minh's    n         Instability  n   Ant Apprehension   n    n   Post Apprehension   n    n   Ant Load shift    n    n   Post Load shift   n   n   Sulcus     n  n   Generalized Laxity   n  n   Relocation test   n  n   Crank test     n  n   Jose A-superior escape  n       Imaging:  X-rays of the right shoulder completed on 12/19/2022 were reviewed independently demonstrating a reverse shoulder arthroplasty in acceptable alignment. No obvious fracture, dislocation or subluxation noted. No obvious loosening noted. Impression/Plan:     Raghav Alejandro is a 60 y.o. old male with a painful right reverse shoulder arthroplasty. I had a discussion with the patient today about possible etiologies.   It does not appear to be infected. Mechanically it appears to be in acceptable alignment and there is no evidence of loosening. As noted above he had a prior arthroscopic procedure before having the reverse shoulder replacement. We had a discussion about the indications for reverse shoulder arthroplasty. If he had an irreparable rotator cuff tear with arthritis or pseudoparalysis then a reverse shoulder arthroplasty would have been a good indication. If however he had an irreparable rotator cuff tear with relatively good function then a reverse shoulder replacement may produce the symptoms/\"limitations\" that he is currently experiencing. At this time I recommended that he keep up with his CT scan ordered by Dr. Tamra Gunter to assess for any loosening. Lab work was also initiated by way of a CBC, ESR and CRP as a preliminary work-up for infection. Finally I encouraged patient to obtain his medical records from Dr. Naz Figueroa so that I can review what led to the decision of him having his surgery and what exactly was done. This note is created with the assistance of a speech recognition program.  While intending to generate adocument that actually reflects the content of the visit, the document can still have some errors including those of syntax and sound a like substitutions which may escape proof reading. It such instances, actual meaningcan be extrapolated by contextual diversion.     NA = Not assessed  RTC = Rotator cuff  RCT = Rotator cuff tear  ER = External rotation  IR = Internal rotation  AC = Acromioclavicular  GH = Glenohumeral  n = No  y = Yes

## 2023-01-18 NOTE — PROGRESS NOTES
Dermatology Patient Note  [facility], [facility] DERMATOLOGY  [address]  [address]  [facility] [address]  Dept: [phone]  Dept Fax: [phone]      VISITDATE: 1/31/2023   REFERRING PROVIDER: No ref. provider found      Ryder Ha is a 62 y.o. male  who presents today in the office for:    Follow-up (Pt states his scalp face and neck are clearing. Pt is still using the ketoconazole cream and shampoo ) and Medication Refill (Ketoconazole cream 2% ketoconazole shampoo 2% )      HISTORY OF PRESENT ILLNESS:  Patient presents for 5 month follow up on seborrheic dermatitis and tinea versicolor. He was prescribed ketoconazole shampoo, ketoconazole cream. Today patient states that he is much better.     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  ==== 02/24/2021==== significant improvement with the 0.8 mg tamsulosin once daily--PVR currently 45 cc--patient satisfied--will continue this regimen  ====1/27/21==== 6 month history of gradually worsening lower urinary tract symptoms--will increase his tamsulosin to 0.8 mg once daily and follow-up with him in 2-3 weeks with repeat PVR/TANISHA--briefly discussed potential to add finasteride to his regimen and certainly will discuss need  for biopsy as well as his current symptoms with Dr. Maria G Guerra after next visit      Prostate [facility]) 03/28/2022    Congestive heart failure ([facility] [address] 03/28/2022    Essential hypertension 03/28/2022    Vitreous opacities of right eye 03/28/2019    Dislocated IOL (intraocular lens), anterior, right 03/28/2019    Tendonitis 09/25/2017     Overview:   Right forearm       Dislocated intraocular lens 04/21/2017    Primary osteoarthritis of right hip 03/01/2017    Abnormal ambulatory electrocardiography 11/21/2016    Disturbance in sleep behavior 11/21/2016    Dizziness 11/21/2016    Abnormal ambulatory electrocardiogram 09/02/2016    Abnormal kidney function 09/02/2016    Adiposity 09/02/2016    Atherosclerotic heart disease of native coronary artery without angina pectoris 09/02/2016    Cervical radiculopathy 09/02/2016    Chronic back pain 09/02/2016    Decreased cardiac output 09/02/2016    Diabetic peripheral neuropathy ([facility] [address] 09/02/2016    Dyssomnia 09/02/2016    Impotence of organic origin 09/02/2016     Overview:   +++++++++  11/22/2016 ALLSCRIPTS SUMMARY +++++++++++   First evaluated in October 2016. Four year history of erectile dysfunction. Has diabetes and hypertension. Denies nitrate use. Augustus questionnaire 10/10. Buck score 6 equals severe erectile dysfunction.  ========= 11/22/2016  =========== total testosterone normal.  Failed Cialis 20 milligrams. Free slightly low.  =====11/15/17====Patient will start 20/30/1 TriMix therapy-he was instructed how to titrate and will call with any concerns/questions    Last Assessment & Plan:   Discussed vacuum erection device as well as penile injection therapy. Certainly I do not feel that were to the point of any type of prosthesis. Patient does give insulin injections already to himself. We discussed the risk of penile injection therapy. The does have the highest chance for success rate this point. Patient willing to proceed. Set this up.   Return to clinic thereafter      Left ventricular dysfunction 09/02/2016    Low vision, both eyes 09/02/2016    Obstructive sleep apnea syndrome 09/02/2016    Palpitations 09/02/2016    Personal history of other diseases of the digestive system 09/02/2016    Repetitive intrusions of sleep 09/02/2016    Sciatica 09/02/2016    Vitamin D deficiency 09/02/2016    Astigmatism 07/27/2016    Myopia 07/27/2016    Nuclear sclerosis of left eye 07/27/2016    Presbyopia 07/27/2016    Pseudophakia of right eye 07/27/2016    Floaters 07/08/2016    Posterior vitreous detachment 07/08/2016    Hypertensive urgency 08/15/2013    ETOHism ([facility] [address] 07/02/2012    DM (diabetes mellitus) ([facility] [address] 06/29/2012    HTN (hypertension) 06/29/2012       CURRENT MEDICATIONS:   Current Outpatient Medications   Medication Sig Dispense Refill    ciprofloxacin (CIPRO) 500 MG tablet       ketoconazole (NIZORAL) 2 % cream Apply daily to affected areas on face, scalp, neck, and chest 30 g 2    ketoconazole (NIZORAL) 2 % shampoo Use as wash to chest, back, neck, face, and scalp leaving on for 5 minutes prior to washing off once daily for 2 weeks then three times weekly 120 mL 11    gabapentin (NEURONTIN) 100 MG capsule Take 1 capsule by mouth 2 times daily for 30 days. 60 capsule 1    metFORMIN (GLUCOPHAGE) 500 MG tablet TAKE 2 TABLETS BY MOUTH 2 TIMES A DAY WITH MEALS 120 tablet 3    empagliflozin (JARDIANCE) 25 MG tablet TAKE 1 TABLET BY MOUTH ONCE DAILY. 30 tablet 1    pregabalin (LYRICA) 100 MG capsule TAKE 1 CAPSULE BY MOUTH 2 TIMES A DAY 60 capsule 2    TRULICITY 3 RAMÓN/3.4DB SOPN INJECT THE CONTENTS OF ONE SYRINGE (3MG) UNDER THE SKIN ONCE WEEKLY 2 mL 0    ELIQUIS 2.5 MG TABS tablet TAKE 1 TABLET BY MOUTH 2 TIMES A DAY 90 tablet 1    atorvastatin (LIPITOR) 40 MG tablet TAKE 1 TABLET BY MOUTH ONE TIME A DAY 30 tablet 3    venlafaxine (EFFEXOR XR) 37.5 MG extended release capsule TAKE 1 CAPSULE BY MOUTH ONCE DAILY.  30 capsule 3    metoprolol tartrate (LOPRESSOR) 25 MG tablet TAKE ONE-HALF TABLET BY MOUTH TWICE DAILY. 30 tablet 2    Misc. Devices MISC 1 PAIR OF DIABETIC SHOES (1 LEFT/ 1 RIGHT)  1-3 PAIRS OF INSERTS (LEFT/ RIGHT) 2 each 0    tamsulosin (FLOMAX) 0.4 mg capsule Take 1 capsule by mouth daily 90 capsule 1    LANTUS SOLOSTAR 100 UNIT/ML injection pen INJECT 42 UNITS INTO THE SKIN TWO TIMES A DAY 15 mL 3    Continuous Blood Gluc Transmit (DEXCOM G6 TRANSMITTER) MISC Use in combination with new sensor every 10 days.  Transmitter lasts 3 months 1 each 3    Continuous Blood Gluc Sensor (DEXCOM G6 SENSOR) MISC Apply new sensor to abdomen every 10 days 3 each 11    Continuous Blood Gluc  (DEXCOM G6 ) MAMADOU Use as directed to monitor glucose continuously 1 each 1    ferrous sulfate (IRON 325) 325 (65 Fe) MG tablet Take 325 mg by mouth daily (with breakfast)       zinc 50 MG CAPS Take 1 capsule by mouth daily      aspirin EC 81 MG EC tablet Take 1 tablet by mouth daily 90 tablet 1    Insulin Pen Needle (B-D ULTRAFINE III SHORT PEN) 31G X 8 MM MISC 1 each by Does not apply route daily 100 each 3    vitamin D3 (CHOLECALCIFEROL) 25 MCG (1000 UT) TABS tablet Take 1 tablet by mouth daily 90 tablet 1    insulin lispro, 1 Unit Dial, (HUMALOG KWIKPEN) 100 UNIT/ML SOPN Inject 6 Units into the skin 3 times daily (before meals) 3 pen 2    ibuprofen (ADVIL;MOTRIN) 400 MG tablet Take 1 tablet by mouth every 6 hours as needed for Pain (Patient not taking: Reported on 1/31/2023) 120 tablet 1     Current Facility-Administered Medications   Medication Dose Route Frequency Provider Last Rate Last Admin    bupivacaine (MARCAINE) 0.25 % injection 5 mg  2 mL Intra-artICUlar Once Sherolyn Lesches, DO        methylPREDNISolone acetate (DEPO-MEDROL) injection 80 mg  80 mg Intra-artICUlar Once Sherolyn Lesches, DO           ALLERGIES:   Allergies   Allergen Reactions    Lisinopril Swelling     Outer Neck swelling    Melatonin Swelling    Trazodone Swelling     Chest swells       SOCIAL HISTORY:  Social History     Tobacco Use    Smoking status: Former     Packs/day: 0.00     Years: 15.00     Pack years: 0.00     Types: Cigarettes     Quit date: 3/27/2016     Years since quittin.8    Smokeless tobacco: Never   Substance Use Topics    Alcohol use: Yes     Comment: h/o ETOH-he reports he isn't currenty drinking as much       Pertinent ROS:  Review of Systems  Skin: Denies any new changing, growing or bleeding lesions or rashes except as described in the HPI   Constitutional: Denies fevers, chills, and malaise. PHYSICAL EXAM:   BP (!) 147/108   Pulse (!) 106   Temp 97.3 °F (36.3 °C) (Temporal)   Ht 6' 2\" (1.88 m)   Wt 259 lb 3.2 oz (117.6 kg)   SpO2 95%   BMI 33.28 kg/m²     The patient is generally well appearing, well nourished, alert and conversational. Affect is normal.    Cutaneous Exam:  Physical Exam  Face and neck only was examined. Facial covering was removed during examination. Diagnoses/exam findings/medical history pertinent to this visit are listed below:    Assessment:   Diagnosis Orders   1. Seborrheic dermatitis  ketoconazole (NIZORAL) 2 % cream    ketoconazole (NIZORAL) 2 % shampoo      2.  Tinea versicolor  ketoconazole (NIZORAL) 2 % cream    ketoconazole (NIZORAL) 2 % shampoo           Plan:  Seborrheic dermatitis  - stable chronic illness   - continue ketoconazole shampoo x3-4 weekly - can use on scalp, beard, face, chest    Tinea versicolor  - stable chronic illness   - continue ketoconazole shampoo x3-4 weekly for maintenance    RTC prn    Future Appointments   Date Time Provider Liset Pierce   2023 11:00 AM Deanna Vaca PTA [facility]   2/3/2023 11:45 AM Deanna Vaca PTA [facility]   2/15/2023 10:30 AM DO Julia Fontaine   2023  1:00 PM Liang Mooney DPM [facility]   2023 10:30 AM Sil Selby MD INFT DISEASE [facility]   3/29/2023 11:15 AM Trey Fletcher MD [facility]   7/25/2023  2:00 PM SCHEDULE, [facility] NURSE [facility]         Patient Instructions   - continue ketoconazole shampoo x3-4 weekly      I, Hank Mcgowan, personally scribed the services dictated to me by Dr. Ronen Ross in this documentation. I, Dr. Ronen Ross, personally performed the services described in this documentation, as scribed by Hank Mcgowan in my presence, and it is both accurate and complete.     Electronically signed by Tia Marino MD on 1/31/2023 at 8:01 PM

## 2023-01-20 ENCOUNTER — HOSPITAL ENCOUNTER (OUTPATIENT)
Dept: PHYSICAL THERAPY | Age: 63
Setting detail: THERAPIES SERIES
Discharge: HOME | End: 2023-01-20
Payer: MEDICARE

## 2023-01-20 NOTE — FLOWSHEET NOTE
[x] [facility] [facility] - [facility] &  Therapy  [address]    [phone]  F: [phone]   [] [address]  [facility] [address]   [address]  P: [phone]  F: [phone]  [] [address] &  Therapy  [address]  P: [phone]  F: [phone] [] [address]  P: [phone]  F: [phone]  [] [address]   [address]   [place]: [phone]  F: [phone]   [] [address]  [place]: [phone]   F: [phone]     Physical Therapy Cancel/No Show note    Date: 2023  Patient: Fabian Felipe  : 1960  MRN: [id number]    Cancels/No Shows to date:     For today's appointment patient:    [x]  Cancelled    [] Rescheduled appointment    [] No-show     Reason given by patient:    [x]  Patient ill    []  Conflicting appointment    [] No transportation      [] Conflict with work    [] No reason given    [] Weather related    [] COVID-19    [] Other:      Comments:        [x] Next appointment was confirmed    Electronically signed by: Clifford Donovan PTA

## 2023-01-23 ENCOUNTER — HOSPITAL ENCOUNTER (OUTPATIENT)
Dept: RADIATION ONCOLOGY | Age: 63
Discharge: HOME | End: 2023-01-23
Payer: MEDICARE

## 2023-01-23 VITALS
HEART RATE: 92 BPM | BODY MASS INDEX: 33.1 KG/M2 | OXYGEN SATURATION: 98 % | RESPIRATION RATE: 16 BRPM | WEIGHT: 257.8 LBS | DIASTOLIC BLOOD PRESSURE: 87 MMHG | TEMPERATURE: 97.9 F | SYSTOLIC BLOOD PRESSURE: 125 MMHG

## 2023-01-23 DIAGNOSIS — C61 PROSTATE CA (HCC): Primary | ICD-10-CM

## 2023-01-23 PROCEDURE — 99212 OFFICE O/P EST SF 10 MIN: CPT | Performed by: RADIOLOGY

## 2023-01-23 RX ORDER — GABAPENTIN 100 MG/1
100 CAPSULE ORAL 2 TIMES DAILY
Qty: 60 CAPSULE | Refills: 1 | Status: SHIPPED | OUTPATIENT
Start: 2023-01-23 | End: 2023-02-22

## 2023-01-23 NOTE — PROGRESS NOTES
Russ Salinasler  1/23/2023  2:46 PM      Vitals:    01/23/23 1435   BP: 125/87   Pulse: 92   Resp: 16   Temp: 97.9 °F (36.6 °C)   SpO2: 98%    :     Pain Assessment: None - Denies Pain          Wt Readings from Last 1 Encounters:   01/23/23 257 lb 12.8 oz (116.9 kg)                Current Outpatient Medications:     metFORMIN (GLUCOPHAGE) 500 MG tablet, TAKE 2 TABLETS BY MOUTH 2 TIMES A DAY WITH MEALS, Disp: 120 tablet, Rfl: 3    empagliflozin (JARDIANCE) 25 MG tablet, TAKE 1 TABLET BY MOUTH ONCE DAILY. , Disp: 30 tablet, Rfl: 1    pregabalin (LYRICA) 100 MG capsule, TAKE 1 CAPSULE BY MOUTH 2 TIMES A DAY, Disp: 60 capsule, Rfl: 2    TRULICITY 3 EM/8.3MI SOPN, INJECT THE CONTENTS OF ONE SYRINGE (3MG) UNDER THE SKIN ONCE WEEKLY, Disp: 2 mL, Rfl: 0    ELIQUIS 2.5 MG TABS tablet, TAKE 1 TABLET BY MOUTH 2 TIMES A DAY, Disp: 90 tablet, Rfl: 1    atorvastatin (LIPITOR) 40 MG tablet, TAKE 1 TABLET BY MOUTH ONE TIME A DAY, Disp: 30 tablet, Rfl: 3    venlafaxine (EFFEXOR XR) 37.5 MG extended release capsule, TAKE 1 CAPSULE BY MOUTH ONCE DAILY. , Disp: 30 capsule, Rfl: 3    metoprolol tartrate (LOPRESSOR) 25 MG tablet, TAKE ONE-HALF TABLET BY MOUTH TWICE DAILY. , Disp: 30 tablet, Rfl: 2    Misc.  Devices MISC, 1 PAIR OF DIABETIC SHOES (1 LEFT/ 1 RIGHT) 1-3 PAIRS OF INSERTS (LEFT/ RIGHT), Disp: 2 each, Rfl: 0    ketoconazole (NIZORAL) 2 % shampoo, Use as wash to chest, back, neck, face, and scalp leaving on for 5 minutes prior to washing off once daily for 2 weeks then three times weekly, Disp: 120 mL, Rfl: 3    ketoconazole (NIZORAL) 2 % cream, Apply daily to affected areas on face, scalp, neck, and chest, Disp: 30 g, Rfl: 2    ibuprofen (ADVIL;MOTRIN) 400 MG tablet, Take 1 tablet by mouth every 6 hours as needed for Pain, Disp: 120 tablet, Rfl: 1    tamsulosin (FLOMAX) 0.4 mg capsule, Take 1 capsule by mouth daily, Disp: 90 capsule, Rfl: 1    LANTUS SOLOSTAR 100 UNIT/ML injection pen, INJECT 42 UNITS INTO THE SKIN TWO TIMES A DAY, Disp: 15 mL, Rfl: 3    Continuous Blood Gluc Transmit (DEXCOM G6 TRANSMITTER) MISC, Use in combination with new sensor every 10 days.  Transmitter lasts 3 months, Disp: 1 each, Rfl: 3    Continuous Blood Gluc Sensor (DEXCOM G6 SENSOR) MISC, Apply new sensor to abdomen every 10 days, Disp: 3 each, Rfl: 11    Continuous Blood Gluc  (DEXCOM G6 ) MAMADOU, Use as directed to monitor glucose continuously, Disp: 1 each, Rfl: 1    ferrous sulfate (IRON 325) 325 (65 Fe) MG tablet, Take 325 mg by mouth daily (with breakfast) , Disp: , Rfl:     zinc 50 MG CAPS, Take 1 capsule by mouth daily, Disp: , Rfl:     aspirin EC 81 MG EC tablet, Take 1 tablet by mouth daily, Disp: 90 tablet, Rfl: 1    Insulin Pen Needle (B-D ULTRAFINE III SHORT PEN) 31G X 8 MM MISC, 1 each by Does not apply route daily, Disp: 100 each, Rfl: 3    vitamin D3 (CHOLECALCIFEROL) 25 MCG (1000 UT) TABS tablet, Take 1 tablet by mouth daily, Disp: 90 tablet, Rfl: 1    insulin lispro, 1 Unit Dial, (HUMALOG KWIKPEN) 100 UNIT/ML SOPN, Inject 6 Units into the skin 3 times daily (before meals), Disp: 3 pen, Rfl: 2    Current Facility-Administered Medications:     bupivacaine (MARCAINE) 0.25 % injection 5 mg, 2 mL, Intra-artICUlar, Once, Breezy Horne DO    methylPREDNISolone acetate (DEPO-MEDROL) injection 80 mg, 80 mg, Intra-artICUlar, Once, Julisa Crocker DO    Immunizations:    Influenza status:    [x]   Current   []   Patient declined    Pneumococcal status:  []   Current  [x]   Patient declined  Covid status:   [x]  Dose #1:                     [x]  Dose #2:     booster              []   Patient declined    Smoking Status:    [] Smoker - PPD:   [x] Nonsmoker - Quit Date:   2016            [] Never a smoker      Cancer Screening:  Colonoscopy   [x] Current       [] Not current   [] Not current, but scheduled   [] NA  Mammogram   [] Current       [] Not current   [] Not current, but scheduled   [x] NA  Prostate           [x] Current       [] Not current   [] Not current, but scheduled   [] NA  PAP/Pelvic      [] Current       [] Not current   [] Not current, but scheduled   [x] NA  Skin                 [] Current       [x] Not current   [] Not current, but scheduled   [] NA     Hormone:  Lupron []   Last dose given:           Next dose due:   Eligard []   Last dose given:           Next dose due:   Aromatase Inhibitors []   Medication name: January was last given. Client unsure of what kind. N/A:  [x]           FALLS RISK SCREEN  Instructions:  Assess the patient and enter the appropriate indicators that are present for fall risk identification. Total the numbers entered and assign a fall risk score from Table 2.  Reassess patient at a minimum every 12 weeks or with status change. Assessment   Date  1/23/2023     1. Mental Ability: confusion/cognitively impaired 0     2. Elimination Issues: incontinence, frequency 0       3. Ambulatory: use of assistive devices (walker, cane, off-loading devices),        attached to equipment (IV pole, oxygen) 0     4. Sensory Limitations: dizziness, vertigo, impaired vision 0     5. Age less than 65        0     6. Age 72 or greater 0     7. Medication: diuretics, strong analgesics, hypnotics, sedatives,        antihypertensive agents 0   8. Falls:  recent history of falls within the last 3 months (not to include slipping or        tripping) 0   TOTAL 0    If score of 4 or greater was education given? No           TABLE 2   Risk Score Risk Level Plan of Care   0-3 Little or  No Risk 1. Provide assistance as indicated for ambulation activities  2. Reorient confused/cognitively impaired patient  3. Chair/bed in low position, stretcher/bed with siderails up except when performing patient care activities  5.   Educate patient/family/caregiver on falls prevention  6.  Reassess in 12 weeks or with any noted change in patient condition which places them at a risk for a fall   4-6 Moderate Risk 1.  Provide assistance as indicated for ambulation activities  2. Reorient confused/cognitively impaired patient  3. Chair/bed in low position, stretcher/bed with siderails up except when performing patient care activities  4. Educate patient/family/caregiver on falls prevention     7 or   Higher High Risk 1. Place patient in easily observable treatment room  2. Patient attended at all times by family member or staff  3. Provide assistance as indicated for ambulation activities  4. Reorient confused/cognitively impaired patient  5. Chair/bed in low position, stretcher/bed with siderails up except when performing patient care activities  6. Educate patient/family/caregiver on falls prevention         PLAN: Patient is seen today in follow up from radiation therapy for prostate cancer. He arrives ambulatory with steady gait. Denies any complaints. Hip still healing from surgery. PSA stable. AUA Assessment form completed by patient. Dr. Rakel Flores examined patient. He will follow up in 6 months with PSA level.         Graciela Acosta RN

## 2023-01-24 ENCOUNTER — HOSPITAL ENCOUNTER (OUTPATIENT)
Dept: MRI IMAGING | Age: 63
Discharge: HOME | End: 2023-01-26
Payer: MEDICARE

## 2023-01-24 ENCOUNTER — HOSPITAL ENCOUNTER (OUTPATIENT)
Dept: PHYSICAL THERAPY | Age: 63
Setting detail: THERAPIES SERIES
Discharge: HOME | End: 2023-01-24
Payer: MEDICARE

## 2023-01-24 DIAGNOSIS — M54.42 CHRONIC BILATERAL LOW BACK PAIN WITH BILATERAL SCIATICA: ICD-10-CM

## 2023-01-24 DIAGNOSIS — G89.29 CHRONIC BILATERAL LOW BACK PAIN WITH BILATERAL SCIATICA: ICD-10-CM

## 2023-01-24 DIAGNOSIS — M54.41 CHRONIC BILATERAL LOW BACK PAIN WITH BILATERAL SCIATICA: ICD-10-CM

## 2023-01-24 LAB
EGFR, POC: >60 ML/MIN/1.73M2
POC CREATININE: 1.02 MG/DL (ref 0.51–1.19)

## 2023-01-24 PROCEDURE — 2580000003 HC RX 258: Performed by: STUDENT IN AN ORGANIZED HEALTH CARE EDUCATION/TRAINING PROGRAM

## 2023-01-24 PROCEDURE — 6360000004 HC RX CONTRAST MEDICATION: Performed by: STUDENT IN AN ORGANIZED HEALTH CARE EDUCATION/TRAINING PROGRAM

## 2023-01-24 PROCEDURE — 82565 ASSAY OF CREATININE: CPT

## 2023-01-24 PROCEDURE — A9579 GAD-BASE MR CONTRAST NOS,1ML: HCPCS | Performed by: STUDENT IN AN ORGANIZED HEALTH CARE EDUCATION/TRAINING PROGRAM

## 2023-01-24 PROCEDURE — 72158 MRI LUMBAR SPINE W/O & W/DYE: CPT

## 2023-01-24 PROCEDURE — 97110 THERAPEUTIC EXERCISES: CPT

## 2023-01-24 RX ORDER — SODIUM CHLORIDE 0.9 % (FLUSH) 0.9 %
10 SYRINGE (ML) INJECTION PRN
Status: DISCONTINUED | OUTPATIENT
Start: 2023-01-24 | End: 2023-01-27 | Stop reason: HOSPADM

## 2023-01-24 RX ADMIN — GADOTERIDOL 20 ML: 279.3 INJECTION, SOLUTION INTRAVENOUS at 08:51

## 2023-01-24 RX ADMIN — SODIUM CHLORIDE, PRESERVATIVE FREE 10 ML: 5 INJECTION INTRAVENOUS at 08:51

## 2023-01-24 NOTE — PROGRESS NOTES
[address]            Radiation Oncology          [address] [facility], [address]        Melissa Calle: [phone]        F: [phone]       [email or web address]           Date of Service: 2023     Location:  [facility]  Cas Bowen,   [address] [facility], [place]   [phone]       RADIATION ONCOLOGY FOLLOW UP NOTE    Patient ID:   Sarahi Cortez  : 1960   MRN: [id number]    DIAGNOSIS:  Unfavorable intermediate risk prostate adenocarcinoma (cT1c, [place] 4+3, PSA 5.11) s/p prostate biopsy on 3/30/2021, ADT with leuprolide 45 mg on 2021 and 2022,  SpaceOAR and fiducial placement on 2021 at [address] -s/p 70Gy in 28fx 22 @ [facility] HISTORY:   Sarahi Cortez is a 60 y.o.. male with a history of unfavorable intermediate risk prostate cancer treated with ADT and completed radiation therapy at the [facility] radiation oncology center. Patient comes in today for follow-up visit approximately 7 months after completion of his radiation. Overall he is doing well noting improvement in in symptoms related to his radiation treatments. Patient did have a septic right hip prosthesis and required surgery. Patient follows with orthopedics and is going through physical therapy. He does also note some pain in his left shoulder. Patient does note however that he has pain in his right hip that radiates down to his foot. He describes it as being sharp pain. He does use Percocet and Lyrica for pain. Patient however states that it is bothersome. He does get steroid injections for his other joint pain. He denies any issues with headaches, dizziness, chest pain, shortness of breath, abdominal pain, nausea, diarrhea, weight loss, or fatigue. Patient's last PSA on 2022 was continued to decline and is now down to 0.77.       AUA Score: 6    MEDICATIONS:    Current Outpatient Medications: gabapentin (NEURONTIN) 100 MG capsule, Take 1 capsule by mouth 2 times daily for 30 days. , Disp: 60 capsule, Rfl: 1    metFORMIN (GLUCOPHAGE) 500 MG tablet, TAKE 2 TABLETS BY MOUTH 2 TIMES A DAY WITH MEALS, Disp: 120 tablet, Rfl: 3    empagliflozin (JARDIANCE) 25 MG tablet, TAKE 1 TABLET BY MOUTH ONCE DAILY. , Disp: 30 tablet, Rfl: 1    pregabalin (LYRICA) 100 MG capsule, TAKE 1 CAPSULE BY MOUTH 2 TIMES A DAY, Disp: 60 capsule, Rfl: 2    TRULICITY 3 IC/0.5YX SOPN, INJECT THE CONTENTS OF ONE SYRINGE (3MG) UNDER THE SKIN ONCE WEEKLY, Disp: 2 mL, Rfl: 0    ELIQUIS 2.5 MG TABS tablet, TAKE 1 TABLET BY MOUTH 2 TIMES A DAY, Disp: 90 tablet, Rfl: 1    atorvastatin (LIPITOR) 40 MG tablet, TAKE 1 TABLET BY MOUTH ONE TIME A DAY, Disp: 30 tablet, Rfl: 3    venlafaxine (EFFEXOR XR) 37.5 MG extended release capsule, TAKE 1 CAPSULE BY MOUTH ONCE DAILY. , Disp: 30 capsule, Rfl: 3    metoprolol tartrate (LOPRESSOR) 25 MG tablet, TAKE ONE-HALF TABLET BY MOUTH TWICE DAILY. , Disp: 30 tablet, Rfl: 2    Misc. Devices MISC, 1 PAIR OF DIABETIC SHOES (1 LEFT/ 1 RIGHT) 1-3 PAIRS OF INSERTS (LEFT/ RIGHT), Disp: 2 each, Rfl: 0    ketoconazole (NIZORAL) 2 % shampoo, Use as wash to chest, back, neck, face, and scalp leaving on for 5 minutes prior to washing off once daily for 2 weeks then three times weekly, Disp: 120 mL, Rfl: 3    ketoconazole (NIZORAL) 2 % cream, Apply daily to affected areas on face, scalp, neck, and chest, Disp: 30 g, Rfl: 2    ibuprofen (ADVIL;MOTRIN) 400 MG tablet, Take 1 tablet by mouth every 6 hours as needed for Pain, Disp: 120 tablet, Rfl: 1    tamsulosin (FLOMAX) 0.4 mg capsule, Take 1 capsule by mouth daily, Disp: 90 capsule, Rfl: 1    LANTUS SOLOSTAR 100 UNIT/ML injection pen, INJECT 42 UNITS INTO THE SKIN TWO TIMES A DAY, Disp: 15 mL, Rfl: 3    Continuous Blood Gluc Transmit (DEXCOM G6 TRANSMITTER) MISC, Use in combination with new sensor every 10 days.  Transmitter lasts 3 months, Disp: 1 each, Rfl: 3    Continuous Blood Gluc Sensor (DEXCOM G6 SENSOR) MISC, Apply new sensor to abdomen every 10 days, Disp: 3 each, Rfl: 11    Continuous Blood Gluc  (DEXCOM G6 ) MAMADOU, Use as directed to monitor glucose continuously, Disp: 1 each, Rfl: 1    ferrous sulfate (IRON 325) 325 (65 Fe) MG tablet, Take 325 mg by mouth daily (with breakfast) , Disp: , Rfl:     zinc 50 MG CAPS, Take 1 capsule by mouth daily, Disp: , Rfl:     aspirin EC 81 MG EC tablet, Take 1 tablet by mouth daily, Disp: 90 tablet, Rfl: 1    Insulin Pen Needle (B-D ULTRAFINE III SHORT PEN) 31G X 8 MM MISC, 1 each by Does not apply route daily, Disp: 100 each, Rfl: 3    vitamin D3 (CHOLECALCIFEROL) 25 MCG (1000 UT) TABS tablet, Take 1 tablet by mouth daily, Disp: 90 tablet, Rfl: 1    insulin lispro, 1 Unit Dial, (HUMALOG KWIKPEN) 100 UNIT/ML SOPN, Inject 6 Units into the skin 3 times daily (before meals), Disp: 3 pen, Rfl: 2    Current Facility-Administered Medications:     bupivacaine (MARCAINE) 0.25 % injection 5 mg, 2 mL, Intra-artICUlar, Once, Breezy Stevens DO    methylPREDNISolone acetate (DEPO-MEDROL) injection 80 mg, 80 mg, Intra-artICUlar, Once, John Trevizo DO    Facility-Administered Medications Ordered in Other Encounters:     sodium chloride flush 0.9 % injection 10 mL, 10 mL, IntraVENous, PRN, Jose Reddy DO, 10 mL at 23 4026    ALLERGIES:  Allergies   Allergen Reactions    Lisinopril Swelling     Outer Neck swelling    Melatonin Swelling    Trazodone Swelling     Chest swells         REVIEW OF SYSTEMS:    A full 14 point review of systems was performed and assessed and found to be negative except as noted above.       PHYSICAL EXAMINATION:    CHAPERONE: Family/friend/companieon Present    ECO Asymptomatic    VITAL SIGNS: /87   Pulse 92   Temp 97.9 °F (36.6 °C) (Temporal)   Resp 16   Wt 257 lb 12.8 oz (116.9 kg)   SpO2 98%   BMI 33.10 kg/m²   GENERAL:  General appearance is that of a well-nourished, well-developed in no apparent distress. HEENT: Normocephalic, atraumatic, EOMI, moist mucosa, no erythema. NECK:  No adenopathy or a palpable thyroid mass, trachea is midline. LYMPHATICS: No cervical, supraclavicular, or axillary adenopathy. HEART:  Normal rate and regular rhythm, normal heart sounds. LUNGS:  Pulmonary effort normal. Normal breath sounds. ABDOMEN:  Soft, nontender, non distended. EXTREMITIES:  No edema. No calf tenderness. MSK: (+) R Hip pain. No spinal tenderness. Normal ROM. NEUROLOGICAL: Alert and oriented. Strength and sensation intact bilaterally. No focal deficits. PSYCH: Mood normal, behavior normal.  SKIN: No erythema, no desquamation. RECTAL: Deferred    LABS:  WBC   Date Value Ref Range Status   11/29/2022 4.9 3.5 - 11.3 k/uL Final     Segs Absolute   Date Value Ref Range Status   11/29/2022 2.60 1.50 - 8.10 k/uL Final     Hemoglobin   Date Value Ref Range Status   11/29/2022 13.0 13.0 - 17.0 g/dL Final     Platelets   Date Value Ref Range Status   11/29/2022 293 138 - 453 k/uL Final     No results found for: , CEA,   PSA   Date Value Ref Range Status   11/29/2022 0.77 <4.1 ng/mL Final     Comment:     The Roche \"ECLIA\" assay is used. Results obtained with different assay methods cannot be   used interchangeably. 10/24/2022 0.83 <4.1 ng/mL Final     Comment:     The Roche \"ECLIA\" assay is used. Results obtained with different assay methods cannot be   used interchangeably. 07/20/2022 0.89 <4.1 ng/mL Final     Comment:     The Roche \"ECLIA\" assay is used. Results obtained with different assay methods cannot be   used interchangeably. 06/29/2022 1.23 <4.1 ng/mL Final     Comment:     The Roche \"ECLIA\" assay is used. Results obtained with different assay methods cannot be   used interchangeably. 05/18/2022 5.12 (H) <4.1 ng/mL Final     Comment:     The Roche \"ECLIA\" assay is used.   Results obtained with different assay methods cannot be   used interchangeably. IMAGING:   none      ASSESSMENT AND PLAN:  Shabbir Archuleta is a 60 y.o. male with a diagnosis of a unfavorable intermediate prostate cancer treated with ADT and radiation therapy completing 7 months ago. Patient comes in today for follow-up visit and is overall doing well. We encouraged him to continue close follow-up with orthopedics regarding his hip and shoulder issues. We will recommend a additional medicine to help with his nerve pain called gabapentin. Patient is advised to however maintain close follow-up with his primary doctor and urology for continued care. Patient will be recommended to get a repeat PSA drawn in 6 months for continued surveillance. Patient is recommended to come back in 6 months for another follow up visit and exam, or can call to come see us sooner if symptoms change. Patient was in agreement with my recommendations. All questions were answered to their satisfaction. Patient was advised to contact us anytime should they have any questions or concerns. Electronically signed by Paty Adame MD on 1/23/2023 at 6:36 PM        Medications Prescribed:   New Prescriptions    GABAPENTIN (NEURONTIN) 100 MG CAPSULE    Take 1 capsule by mouth 2 times daily for 30 days. Orders:   Orders Placed This Encounter   Procedures    PSA, Diagnostic       CC:  Patient Care Team:  Ye Chen MD as PCP - General (Emergency Medicine)  Marek Brenner MD as Consulting Physician (Infectious Diseases)  Shirin Akins MD as Consulting Physician (Radiation Oncology)  Rey Coppola DPM as Physician (Podiatry)  Johnie Truong MD as Consulting Physician (Hematology and Oncology)  Bruno Pool DO as Consulting Physician (Hospitalist)  Bruno Pool DO as Consulting Physician (Hospitalist)     Total time spent on this case on the day of encounter is 30 minutes.  This time includes combination of one or more of the following - review of necessary tests, review of pertinent medical records from the EMR, performing medically appropriate examination and evaluation, counseling and educating the patient/family/caregiver, ordering necessary medical tests, procedures etc., documenting the clinical information in the electronic medical record, care coordination, referring and communicating with other health care providers and interpretation of results independently. This note is created with the assistance of a speech recognition program.  While intending to generate a document that actually reflects the content of the visit, the document can still have some errors including those of syntax and sound a like substitutions which may escape proof reading. It such instances, actual meaning can be extrapolated by contextual diversion.

## 2023-01-24 NOTE — FLOWSHEET NOTE
[x] [facility]) [facility] &  Therapy  [address]  [phone]  F: [phone] [] [address]  [address]   [address]  P: [phone]  F: [phone] [] [address]  [address]  P: [phone]  F: [phone] [] [address]  P: [phone]  F: [phone] [] [address]  [facility]   [address]   [place]: [phone]  F: [phone]      Physical Therapy Daily Treatment Note    Date:  2023  Patient Name:  Verito Terry    :  1960  MRN: [id number]  Physician: Estanislao Nissen, MD                         Insurance: [facility] Medicare / Medicaid, N  Medical Diagnosis: Osteomyelitis of right femur, unspecified type M86.9                Rehab Codes: M25.551, M25.651, R26.89  Onset Date:  10/5/22                                Next 's appt: [facility] for shoulders, TBD, [facility] skilled nursing for R hip 3 months   Visit# / total visits:  (Correct visit #23)   Cancels/No Shows: 1/0    Subjective:    Pain:  [] Yes  [x] No   Location: R hip            Pain Rating: (0-10 scale) 3/10 R hip   Pain altered Tx:  [] Yes  [x] No  Action:  Comments:  Reports not feeling real good, cold. Requested to lessen program today. States sarah hip are very sore. Had MRI for back today. Reports N/T in sarah feet. Objective:  Modalities:   Precautions:  R FANG hx w/ infections/irrigations/debridement. HPI:  Patient had a history of a right total hip arthroplasty performed on 2019 and subsequently underwent several perioperative I&D's on 2019, 2020, for 2020. He had persistent infection and underwent an explant on 7/15/2021 followed by a stage II revision on 10/18/2021 he also underwent a subsequent I&D on 2021. He underwent right hip incision, irrigation, debridement on 10/5/2022. Exercises:  Exercise   R hip Reps/ Time Weight/ Level Comments 1/24/2023  Completed   \"X\"   Cybex bike  5mins L3 Seat, 10,  6\" step to get on bike,   x   Gait no device 90ft  90 ft --  5 lbs  Vc fo toe off,  demo decreased time with swing thru. [place] carry, added 1/13 x  x   Standing       Gastroc stretch 3x20\"  wedge x   Heel raises 20x 2 lbs   x   Hip abd, hip ext 2x10ea 2 lbs   x   Hip flexion/marching 2x10 2 lbs  x   HS curls  2x10 2 lbs      Step ups 15xea 2 lbs/6\" Fwd, lateral    x   SLS  3x2-10\"  Fingers on HR. TG squats  2x10 L35      L ckc  Tband 3 way hip  1x15 lime   x          Seated       HS stretch 3x30\"  Stool. x   LAQ 2x10ea 3 lbs 15x 1/24 x   HS curls 2x10 blue 15x 1/24 x   Hip add 15x5\"  Ball     Hip IR/ER 15x  lime Added resistance     Hip IR/ER iso 2x10   x   Supine         LTR        Heel slides  20x 3 lbs      Bridges 20x   L leg on bolster, R LE incr. Load. x   SAQ 20x 3 lbs   x   SLR 2x10 2lbs   x   Clamshell 2x10 Blue                    Sidelying       Hip abduction 1x10 AA   x   Clamshell  2x10 lime     Reverse clamshells 2x10 2 lbs             Prone         GEOVANNY lying 3 mins   x   GEOVANNY 1 mins 2x   x   HS curls 10x 1lbs Weak hip Stability, added wt 1/24  x   Iso hip IR/ER 5x5\"   Weak ER.  x   Quad stretch 3x20\"  Green rope- therapist assist as needed    Other: 1/24/23: held many exercises due to subjective request. Pt required two short sitting rest breaks with standing exercises. Treatment Charges: Mins Units   []  Modalities     [x]  Ther Exercise  44 3   []  Manual Therapy     []  Ther Activities     []  Aquatics     []  Vasocompression     []  Other     Total Treatment time 44 3       Assessment: [x] Progressing toward goals  Continue with strengthening and stability of R hip. Max vc to decrease UE support with tband R LE stability exercises. Prone exercises resolved sarah LE N/T quickly.   Weak hip External rotators. [] No change. [] Other:   [x] Patient would continue to benefit from skilled physical therapy services in order to: to improve R hip ROM, strength, and restore normal gait pattern    STG: (to be met in 10 treatments)  ? Pain:5/10 R hip pain with ambulation. ? ROM: R hip flexion to 90 degrees to improve stair climbing. ? Strength:4/5 R hip flexion and abduction to improve hip stability. ? Function:LEFS score to 40% functional or better to improve ADLs. Independent with Home Exercise Programs     LTG: (to be met in 18 treatments)  Patient can tolerate greater than 20 minutes of standing activity. Patient can climb 22 8 inch steps to improve ability to climb to duplex 2nd level. Patient goals: Restore R hip strength and function. Pt. Education:  [x] Yes  [] No  [x] Reviewed Prior HEP/Ed  Method of Education: [x] Verbal  [x] Demo  [] Written  Comprehension of Education:  [x] Verbalizes understanding. [x] Demonstrates understanding. [] Needs review. [x] Demonstrates/verbalizes HEP/Ed previously given. Access Code: [email or web address]  URL: [email or web address]  Date: 12/19/2022  Prepared by: Chung George     Exercises, gave blue resistance band   Supine Active Straight Leg Raise - 1 x daily - 7 x weekly - 3 sets - 10 reps  Supine Bridge - 1 x daily - 7 x weekly - 3 sets - 10 reps  Hooklying Clamshell with Resistance - 1 x daily - 7 x weekly - 3 sets - 10 reps  Sidelying Hip Abduction - 1 x daily - 7 x weekly - 3 sets - 10 reps  Seated Knee Extension with Resistance - 1 x daily - 7 x weekly - 3 sets - 10 reps  Standing Hip Extension with Counter Support - 1 x daily - 7 x weekly - 3 sets - 10 reps  Standing Hip Abduction with Counter Support - 1 x daily - 7 x weekly - 3 sets - 10 reps    Plan: [x] Continue current frequency toward long and short term goals.     [x] Specific Instructions for subsequent treatments: Progress hip strength multiple muscle group,  SLS, resume prone    Frequency:  2 x/week for 18 visits      Time In:   1056   Time Out:  1150     Electronically signed by:  Clifford Donovan PTA

## 2023-01-27 ENCOUNTER — HOSPITAL ENCOUNTER (OUTPATIENT)
Dept: PHYSICAL THERAPY | Age: 63
Setting detail: THERAPIES SERIES
Discharge: HOME | End: 2023-01-27
Payer: MEDICARE

## 2023-01-27 PROCEDURE — 97110 THERAPEUTIC EXERCISES: CPT

## 2023-01-27 NOTE — FLOWSHEET NOTE
[x] [facility]) [facility] &  Therapy  [address]  [phone]  F: [phone] [] [address]  [facility]   [address]  P: [phone]  F: [phone] [] [address]  [address]  P: [phone]  F: [phone] [] [address]  P: [phone]  F: [phone] [] [address]  [facility]   [address]   [place]: [phone]  F: [phone]      Physical Therapy Daily Treatment Note    Date:  2023  Patient Name:  Ra Clay    :  1960  MRN: [id number]  Physician: Beau Moffett MD                         Insurance: [facility] Medicare / Medicaid, [facility]  Medical Diagnosis: Osteomyelitis of right femur, unspecified type M86.9                Rehab Codes: M25.551, M25.651, R26.89  Onset Date:  10/5/22                                Next 's appt: [facility] for shoulders, TBD, [facility] for R hip 3 months   Visit# / total visits: 10/18 (Correct visit #23)   Cancels/No Shows: 1/0    Subjective:    Pain:  [] Yes  [x] No   Location: R hip            Pain Rating: (0-10 scale) 3/10 R hip   Pain altered Tx:  [] Yes  [x] No  Action:  Comments:   Reports his neuropathy is bothering him more today,feet > sarah legs. Mild limp with short step length and height noted. Objective:  Modalities:   Precautions:  R FANG hx w/ infections/irrigations/debridement. HPI:  Patient had a history of a right total hip arthroplasty performed on 2019 and subsequently underwent several perioperative I&D's on 2019, 2020, for 2020. He had persistent infection and underwent an explant on 7/15/2021 followed by a stage II revision on 10/18/2021 he also underwent a subsequent I&D on 2021.    He underwent right hip incision, irrigation, debridement on 10/5/2022. Exercises:  Exercise   R hip Reps/ Time Weight/ Level Comments 1/27/2023  Completed   \"X\"   Cybex bike  5mins L3 Seat, 10    x                  Standing   Incr. Wt with 3 way hip 1/27    Gastroc stretch 3x20\"  wedge x   Heel raises 20x 3 lbs   x   Hip abd, hip ext 2x10ea 3 lbs   x   Hip flexion/marching 2x10 3lbs  x   HS curls  2x10 3lbs   x   Step ups 15xea 3 lbs/6\" Fwd, lateral    x   SLS  3x2-10\"  Fingers on HR.  x   TG squats  2x10 L35   x   L ckc  Tband 3 way hip  1x15 Blue   Incr. Resistance 1/27 x          Seated       HS stretch 3x30\"  Stool. x   LAQ 2x10ea 3 lbs  Incr. wt x   HS curls 2x10 blue   x   Hip add 15x5\"  Ball     Hip IR/ER 15x blue Incr. resistance  1/27 x   Hip IR/ER iso 2x10  1x10 1/27 x   Supine         LTR        Heel slides  20x 3 lbs      Bridges 20x   L leg on bolster, R LE incr. Load. SAQ 20x 3 lbs      SLR 2x10 2lbs Incr. Wt 1/27 x   Clamshell 2x10 Blue                    Sidelying       Hip abduction 1x10 AA      Clamshell  2x10 lime  x   Reverse clamshells 2x10 2 lbs             Prone         GEOVANNY lying 3 mins       GEOVANNY 1 mins 2x      HS curls 10x 1lbs Weak hip Stability, added wt 1/24     Iso hip IR/ER 5x5\"   Weak ER. Quad stretch 3x20\"  Green rope- therapist assist as needed    Other:  Rotating exercises due pt pacing, pt complaints and time allotment. Treatment Charges: Mins Units   []  Modalities     [x]  Ther Exercise  51 3   []  Manual Therapy     []  Ther Activities     []  Aquatics     []  Vasocompression     []  Other     Total Treatment time 51 3       Assessment: [x] Progressing toward goals increased wt and resistance with standing 3 way hip and with mat exercises for strengthening and endurance. Pt required one less sitting rest break with standing program.      [] No change. [x] Other: No progress with R LE SLS time, encouraged pt to address with HEP. HIP ER weak.    [x] Patient would continue to benefit from skilled physical therapy services in order to: to improve R hip ROM, strength, and restore normal gait pattern    STG: (to be met in 10 treatments)  ? Pain:5/10 R hip pain with ambulation. ? ROM: R hip flexion to 90 degrees to improve stair climbing. ? Strength:4/5 R hip flexion and abduction to improve hip stability. ? Function:LEFS score to 40% functional or better to improve ADLs. Independent with Home Exercise Programs     LTG: (to be met in 18 treatments)  Patient can tolerate greater than 20 minutes of standing activity. Patient can climb 22 8 inch steps to improve ability to climb to duplex 2nd level. Patient goals: Restore R hip strength and function. Pt. Education:  [x] Yes  [] No  [x] Reviewed Prior HEP/Ed  Method of Education: [x] Verbal  [x] Demo  [] Written  Comprehension of Education:  [x] Verbalizes understanding. [x] Demonstrates understanding. [] Needs review. [x] Demonstrates/verbalizes HEP/Ed previously given. Access Code: [email or web address]  URL: [email or web address]  Date: 12/19/2022  Prepared by: Stuart Bennett     Exercises, gave blue resistance band   Supine Active Straight Leg Raise - 1 x daily - 7 x weekly - 3 sets - 10 reps  Supine Bridge - 1 x daily - 7 x weekly - 3 sets - 10 reps  Hooklying Clamshell with Resistance - 1 x daily - 7 x weekly - 3 sets - 10 reps  Sidelying Hip Abduction - 1 x daily - 7 x weekly - 3 sets - 10 reps  Seated Knee Extension with Resistance - 1 x daily - 7 x weekly - 3 sets - 10 reps  Standing Hip Extension with Counter Support - 1 x daily - 7 x weekly - 3 sets - 10 reps  Standing Hip Abduction with Counter Support - 1 x daily - 7 x weekly - 3 sets - 10 reps    Plan: [x] Continue current frequency toward long and short term goals.     [x] Specific Instructions for subsequent treatments: Progress hip strength multiple muscle group,  SLS, resume prone    Frequency:  2 x/week for 18 visits      Time In:   3808  Time Out:   9464    Electronically signed by:  Emily Adorno, DAILY

## 2023-01-30 NOTE — PROGRESS NOTES
Radiation Oncology On-Treatment Visit:     Fraction # 24 / 28 (60 Gy)    Diagnosis:  Mr. Indiana Iqbal is a 60 y.o. male with unfavorable intermediate risk prostate adenocarcinoma (cT1c, [place] 4+3, PSA 5.11) s/p prostate biopsy on 3/30/2021, ADT with leuprolide 45 mg on 2021 and 2022,  SpaceOAR and fiducial placement on 2021 at [address] [place] [address] St was to treat prostate with definitive radiation around 2021, but patient had a septic joint from a right total hip replacement done in , with multiple revision surgeries and I&Ds to the hip. He is currently followed by infectious diseases and is on chronic antibiotics. Treatment course: Definitive radiation to the prostate    Progress note:  Mr. [place] Iqbal was seen and evaluated. Urinating normally. Normal bowel movements. Physical exam:   VITAL SIGNS: There were no vitals taken for this visit. ECO Asymptomatic   CONSTITUTIONAL: Well developed, well nourished male. Alert and oriented x3. No acute distress. HEENT: Head normocephalic and atraumatic. Extraocular movements intact. NEUROLOGIC EXAM: Cranial nerves II through XII grossly intact. No focal neurologic deficit. Speech is fluent. Gait and posture are steady. PSYCHIATRIC:  Appropriate mood and affect for his clinical situation. Plan:  · Continue radiation therapy as planned. · Incomplete emptying of urine: Continue flomax 0.4 mg QHS. · Smell in urine - UA was negative. · I have checked the imaging performed to date, which confirm appropriate positioning. .

## 2023-01-31 ENCOUNTER — OFFICE VISIT (OUTPATIENT)
Dept: DERMATOLOGY | Age: 63
End: 2023-01-31
Payer: MEDICARE

## 2023-01-31 VITALS
TEMPERATURE: 97.3 F | OXYGEN SATURATION: 95 % | HEART RATE: 106 BPM | SYSTOLIC BLOOD PRESSURE: 147 MMHG | HEIGHT: 74 IN | BODY MASS INDEX: 33.26 KG/M2 | DIASTOLIC BLOOD PRESSURE: 108 MMHG | WEIGHT: 259.2 LBS

## 2023-01-31 DIAGNOSIS — B36.0 TINEA VERSICOLOR: ICD-10-CM

## 2023-01-31 DIAGNOSIS — L21.9 SEBORRHEIC DERMATITIS: ICD-10-CM

## 2023-01-31 PROCEDURE — G8427 DOCREV CUR MEDS BY ELIG CLIN: HCPCS | Performed by: DERMATOLOGY

## 2023-01-31 PROCEDURE — 1036F TOBACCO NON-USER: CPT | Performed by: DERMATOLOGY

## 2023-01-31 PROCEDURE — G8482 FLU IMMUNIZE ORDER/ADMIN: HCPCS | Performed by: DERMATOLOGY

## 2023-01-31 PROCEDURE — 3077F SYST BP >= 140 MM HG: CPT | Performed by: DERMATOLOGY

## 2023-01-31 PROCEDURE — 3080F DIAST BP >= 90 MM HG: CPT | Performed by: DERMATOLOGY

## 2023-01-31 PROCEDURE — G8417 CALC BMI ABV UP PARAM F/U: HCPCS | Performed by: DERMATOLOGY

## 2023-01-31 PROCEDURE — 3017F COLORECTAL CA SCREEN DOC REV: CPT | Performed by: DERMATOLOGY

## 2023-01-31 PROCEDURE — 99213 OFFICE O/P EST LOW 20 MIN: CPT | Performed by: DERMATOLOGY

## 2023-01-31 RX ORDER — KETOCONAZOLE 20 MG/G
CREAM TOPICAL
Qty: 30 G | Refills: 2 | Status: SHIPPED | OUTPATIENT
Start: 2023-01-31

## 2023-01-31 RX ORDER — CIPROFLOXACIN 500 MG/1
TABLET, FILM COATED ORAL
COMMUNITY
Start: 2023-01-23

## 2023-01-31 RX ORDER — KETOCONAZOLE 20 MG/ML
SHAMPOO TOPICAL
Qty: 120 ML | Refills: 11 | Status: SHIPPED | OUTPATIENT
Start: 2023-01-31

## 2023-02-01 ENCOUNTER — HOSPITAL ENCOUNTER (OUTPATIENT)
Dept: PHYSICAL THERAPY | Age: 63
Setting detail: THERAPIES SERIES
Discharge: HOME | End: 2023-02-01
Payer: MEDICARE

## 2023-02-01 PROCEDURE — 97110 THERAPEUTIC EXERCISES: CPT

## 2023-02-01 NOTE — FLOWSHEET NOTE
[x] [facility]) [facility] &  Therapy  [address]  [phone]  F: [phone] [] [address]  [address]   [address]  P: [phone]  F: [phone] [] [place] &  Therapy  [address]  P: [phone]  F: [phone] [] [address]  P: [phone]  F: [phone] [] [address]  [facility]   [address]   [place]: [phone]  F: [phone]      Physical Therapy Daily Treatment Note    Date:  2023  Patient Name:  Guru Christensen    :  1960  MRN: [id number]  Physician: Jen Magdaleno MD                         Insurance: [facility] Medicare / Medicaid, [facility]  Medical Diagnosis: Osteomyelitis of right femur, unspecified type M86.9                Rehab Codes: M25.551, M25.651, R26.89  Onset Date:  10/5/22                                Next 's appt: Jackson for shoulders, TBD, Ranjit Star for R hip 3 months   Visit# / total visits:  (Correct visit #23)   Cancels/No Shows: 1/0    Subjective:    Pain:  [] Yes  [x] No   Location: R hip            Pain Rating: (0-10 scale) 3/10 R hip   Pain altered Tx:  [] Yes  [x] No  Action:  Comments:    Reports again his neuropathy in sarah legs/feet are  really bothering him. Noted gait: decreased step length and height with  very limited sarah knee flexion. Objective:  Modalities:   Precautions:  R FANG hx w/ infections/irrigations/debridement. HPI:  Patient had a history of a right total hip arthroplasty performed on 2019 and subsequently underwent several perioperative I&D's on 2019, 2020, for 2020.   He had persistent infection and underwent an explant on 7/15/2021 followed by a stage II revision on 10/18/2021 he also underwent a subsequent I&D on 12/9/2021. He underwent right hip incision, irrigation, debridement on 10/5/2022. Exercises:  Exercise   R hip Reps/ Time Weight/ Level Comments 2/1/2023  Completed   \"X\"   Cybex bike  5mins L3 Seat, 10    x                  Standing   Incr. Wt with 3 way hip 1/27    Gastroc stretch 3x20\"  wedge    Heel raises 20x 3 lbs   x   3 way hip sraah. 2x10ea 3 lbs   x   Hip flexion/marching 2x10 3lbs  x   HS curls  2x10 3lbs   x   Step ups 15xea 3 lbs/8\"          6\" Fwd,  Lateral   x  x   SLS  3x2-10\"  Fingers on HR. TG squats  2x15 L35   x   L ckc  Tband 3 way hip  1x15 Blue               Seated       HS stretch 3x30\"  Stool. x   LAQ 2x10ea 3 lbs  2/1 15x x   HS curls 2x10 blue  x   Hip add 15x5\"  Ball     Hip IR/ER 15x  lime       Hip IR/ER iso 2x10       Supine         LTR        Heel slides  20x 3 lbs      Bridges 20x   L leg on bolster, R LE incr. Load. SAQ 20x 3 lbs      SLR 2x10 2lbs Incr. Wt 1/27    Clamshell 3x10 lime   x                 Sidelying       Hip abduction 1x10 AA      Clamshell  2x10 lime  x   Reverse clamshells 2x10 2 lbs             Prone         GEOVANNY lying 3 mins    x   GEOVANNY 1 mins      Gluteal set 10x5\"   x   HS curls 10x 2lbs Weak hip Stability, manual assist to decr. Hip ER x   Iso hip IR/ER R Le 5x5\"   Weak ER.  x   Quad stretch 3x20\"  Green rope- therapist assist as needed    Other:  Rotating exercises due pt pacing, pt complaints, reassessment and  time allotment. Treatment Charges: Mins Units   []  Modalities     [x]  Ther Exercise  50 3   []  Manual Therapy     []  Ther Activities     []  Aquatics     []  Vasocompression     []  Other     Total Treatment time 50 3       Assessment: [x] Progressing toward goals  continue with CKC/OKC strengthening for R LE as charted. Pts neuropathy/sciatica pain decreases standing tolerance with prone resolving pain. [x] Other: LOB 1x posteriorly with self recovery demonstrated. Weak hip external rotators.    [x] Patient would continue to benefit from skilled physical therapy services in order to: to improve R hip ROM, strength, and restore normal gait pattern    STG: (to be met in 10 treatments)  ? Pain:5/10 R hip pain with ambulation. 2/1/23: MET  ? ROM: R hip flexion to 90 degrees to improve stair climbing. 2/1/23:MET  ? Strength:4/5 R hip flexion and abduction to improve hip stability. -ck next session. ? Function:LEFS score to 40% functional or better to improve ADLs. 2/1/23:MET, 64% functional.   Independent with Home Exercise Programs 2/1/23: Progress      LTG: (to be met in 18 treatments)  Patient can tolerate greater than 20 minutes of standing activity. Patient can climb 22 8 inch steps to improve ability to climb to duplex 2nd level. Patient goals: Restore R hip strength and function. Pt. Education:  [x] Yes  [] No  [x] Reviewed Prior HEP/Ed  Method of Education: [x] Verbal  [x] Demo  [] Written  Comprehension of Education:  [x] Verbalizes understanding. [x] Demonstrates understanding. [] Needs review. [x] Demonstrates/verbalizes HEP/Ed previously given. Access Code: [email or web address]  URL: [email or web address]  Date: 12/19/2022  Prepared by: Sadie Mcintosh     Exercises, gave blue resistance band   Supine Active Straight Leg Raise - 1 x daily - 7 x weekly - 3 sets - 10 reps  Supine Bridge - 1 x daily - 7 x weekly - 3 sets - 10 reps  Hooklying Clamshell with Resistance - 1 x daily - 7 x weekly - 3 sets - 10 reps  Sidelying Hip Abduction - 1 x daily - 7 x weekly - 3 sets - 10 reps  Seated Knee Extension with Resistance - 1 x daily - 7 x weekly - 3 sets - 10 reps  Standing Hip Extension with Counter Support - 1 x daily - 7 x weekly - 3 sets - 10 reps  Standing Hip Abduction with Counter Support - 1 x daily - 7 x weekly - 3 sets - 10 reps    Plan: [x] Continue current frequency toward long and short term goals.     [x] Specific Instructions for subsequent treatments: Progress hip strength multiple muscle group,  SLS, resume prone    Frequency:  2 x/week for 18 visits      Time In:   1100  Time Out:   1455    Electronically signed by:  Kristina Miller PTA

## 2023-02-03 ENCOUNTER — HOSPITAL ENCOUNTER (OUTPATIENT)
Dept: PHYSICAL THERAPY | Age: 63
Setting detail: THERAPIES SERIES
Discharge: HOME | End: 2023-02-03
Payer: MEDICARE

## 2023-02-03 NOTE — FLOWSHEET NOTE
[x] [facility]) - [facility] &  Therapy  [address]    [phone]  F: [phone]   [] [address]   [address]  P: [phone]  F: [phone]  [] [address] [facility] &  Therapy  [address] [place] [address]  P: [phone]  F: [phone] [] [address]  P: [phone]  F: [phone]  [] [address] [facility]   [address]   [place]: [phone]  F: [phone]   [] [place] [id number] [facility] [address]  [place]: [phone]   F: [phone]     Physical Therapy Cancel/No Show note    Date: 2/3/2023  Patient: Abel Cui  : 1960  MRN: [id number]    Cancels/No Shows to date:     For today's appointment patient:    [x]  Cancelled    [] Rescheduled appointment    [] No-show     Reason given by patient:    [x]  Patient ill    []  Conflicting appointment    [] No transportation      [] Conflict with work    [] No reason given    [] Weather related    [] COVID-19    [] Other:      Comments:        [x] Next appointment was confirmed    Electronically signed by: Malcolm Alva PTA

## 2023-02-06 ENCOUNTER — HOSPITAL ENCOUNTER (OUTPATIENT)
Dept: PHYSICAL THERAPY | Age: 63
Setting detail: THERAPIES SERIES
Discharge: HOME | End: 2023-02-06
Payer: MEDICARE

## 2023-02-06 NOTE — FLOWSHEET NOTE
[x] [address]  [address]    [phone]  F: [phone]          Physical Therapy Cancel/No Show note    Date: 2023  Patient: Familia Ordoñez  : 1960  MRN: [id number]    Cancels/No Shows to date:     For today's appointment patient:    [x]  Cancelled    [] Rescheduled appointment    [] No-show     Reason given by patient:    []  Patient ill    []  Conflicting appointment    [] No transportation      [] Conflict with work    [] No reason given    [] Weather related    [] [id number]    [x] Other:      Comments:  Pt cancelled  & , reports he is too weak, will call us after this week.         [] Next appointment was confirmed    Electronically signed by: Rivera Hector PT

## 2023-02-07 DIAGNOSIS — I10 ESSENTIAL HYPERTENSION: ICD-10-CM

## 2023-02-07 DIAGNOSIS — E11.69 TYPE 2 DIABETES MELLITUS WITH OTHER SPECIFIED COMPLICATION, WITH LONG-TERM CURRENT USE OF INSULIN (HCC): ICD-10-CM

## 2023-02-07 DIAGNOSIS — Z79.4 TYPE 2 DIABETES MELLITUS WITH OTHER SPECIFIED COMPLICATION, WITH LONG-TERM CURRENT USE OF INSULIN (HCC): ICD-10-CM

## 2023-02-07 RX ORDER — DULAGLUTIDE 3 MG/.5ML
INJECTION, SOLUTION SUBCUTANEOUS
Qty: 2 ML | Refills: 0 | Status: SHIPPED | OUTPATIENT
Start: 2023-02-07

## 2023-02-09 ENCOUNTER — HOSPITAL ENCOUNTER (OUTPATIENT)
Dept: PHYSICAL THERAPY | Age: 63
Setting detail: THERAPIES SERIES
Discharge: HOME | End: 2023-02-09
Payer: MEDICARE

## 2023-02-15 ENCOUNTER — HOSPITAL ENCOUNTER (OUTPATIENT)
Dept: PAIN MANAGEMENT | Age: 63
Discharge: HOME | End: 2023-02-15
Payer: MEDICARE

## 2023-02-15 VITALS
DIASTOLIC BLOOD PRESSURE: 94 MMHG | SYSTOLIC BLOOD PRESSURE: 138 MMHG | OXYGEN SATURATION: 98 % | TEMPERATURE: 97.3 F | HEIGHT: 74 IN | HEART RATE: 97 BPM | WEIGHT: 245 LBS | BODY MASS INDEX: 31.44 KG/M2 | RESPIRATION RATE: 20 BRPM

## 2023-02-15 DIAGNOSIS — G89.29 CHRONIC BILATERAL LOW BACK PAIN WITH BILATERAL SCIATICA: Primary | ICD-10-CM

## 2023-02-15 DIAGNOSIS — M54.41 CHRONIC BILATERAL LOW BACK PAIN WITH BILATERAL SCIATICA: Primary | ICD-10-CM

## 2023-02-15 DIAGNOSIS — M54.42 CHRONIC BILATERAL LOW BACK PAIN WITH BILATERAL SCIATICA: Primary | ICD-10-CM

## 2023-02-15 DIAGNOSIS — G62.9 NEUROPATHY: ICD-10-CM

## 2023-02-15 DIAGNOSIS — M54.16 LUMBAR RADICULOPATHY: ICD-10-CM

## 2023-02-15 DIAGNOSIS — M25.559 HIP PAIN: ICD-10-CM

## 2023-02-15 PROCEDURE — 99213 OFFICE O/P EST LOW 20 MIN: CPT

## 2023-02-15 PROCEDURE — 99214 OFFICE O/P EST MOD 30 MIN: CPT | Performed by: STUDENT IN AN ORGANIZED HEALTH CARE EDUCATION/TRAINING PROGRAM

## 2023-02-15 NOTE — PROGRESS NOTES
Chronic Pain Clinic Note     Encounter Date: 2/15/2023     SUBJECTIVE:  Chief Complaint   Patient presents with    Hip Pain       History of Present Illness: Adam Teresa is a 60 y.o. male who presents with hip pain    Medication Refill: N/A    Current Complaints of Pain:   Location: hip   Radiation: yes  Severity: moderate  Pain Numerical Score -    Average: 10     Highest: 10  Lowest: 3  Character/Quality: Complains of pain that is aching  Timing: Keeps awake at night, Intermittent  Associated symptoms: none  Numbness: yes  Weakness: yes  Exacerbating factors: walking  Alleviating factors: medication & propping up leg  Length of time pain has been present: Started a few years ago  Inciting event/injury: yes surgery  Bowel/Bladder incontinence:   Falls: pt stated no falls, but has to use his surroundings to keep him steady   Physical Therapy: yes    History of Interventions:   Surgery: No previous lumbar/cervical surgeries  Injections: None    Imaging:    Lumbar MRI 1/24/2023    FINDINGS:   BONES/ALIGNMENT: There is normal alignment of the spine. Chronic anterior   wedging of T12 and L1 with 5-10% height loss. The vertebral body heights are   otherwise maintained. The bone marrow signal appears unremarkable. SPINAL CORD:  The conus terminates normally. Mild degenerative enhancement   of inter spinous ligament from L1-L2 to L5-S1. SOFT TISSUES: No abnormal enhancement is seen of the lumbar spine. No   paraspinal mass identified. L1-L2: Mild loss of disc signal.  Grade 1 retrolisthesis with disc bulging. Mild bilateral facet arthropathy. Mild bilateral neural foraminal narrowing. L2-L3: There is no significant disc protrusion, spinal canal stenosis or   neural foraminal narrowing. L3-L4: Mild loss of disc signal.  Grade 1 retrolisthesis with disc bulging. Moderate bilateral set arthropathy and ligamentum flavum thickening.    Findings resulting in severe canal stenosis and moderate bilateral neural   foraminal narrowing       L4-L5: Mild loss of disc signal.  5 mm disc bulging effaces the anterior   thecal sac moderate bilateral set arthropathy and ligamentum flavum   thickening. Findings resulting in severe canal stenosis and moderate   bilateral neural foraminal narrowing. L5-S1: Mild loss of disc signal.  Grade 1 anterolisthesis with 3 mm disc   bulging effaces the anterior thecal sac. Moderate bilateral set arthropathy. Findings result in mild canal stenosis and severe bilateral neural foraminal   narrowing.          Past Medical History:   Diagnosis Date    Arthritis     CAD (coronary artery disease)     Cancer ([facility] [address]     prostate    Cervical radiculopathy     CHF (congestive heart failure) ([facility])     DM (diabetes mellitus) ([facility] [address] 2009    IDDM    GERD (gastroesophageal reflux disease) 2017    ON RX    Heart murmur 2013    ASYMPTOMATIC    HTN (hypertension) 06/29/2012    ON RX    Hyperlipidemia 06/29/2012    ON RX    Sleep apnea 2016    TRIED MACHINE COULD NOT TOLERATE    Vision abnormalities 2019    FLOATERS RIGHT EYE    Wears glasses        Past Surgical History:   Procedure Laterality Date    COLONOSCOPY  07/23/2010    Dr. Desai Coma Bilateral 2017    CATARACT EXTRACTION WITH IOL    IR INS PICC VAD W SQ PORT GREATER THAN 5  10/7/2022    IR INS PICC VAD W SQ PORT GREATER THAN 5 10/7/2022 [facility]    KNEE ARTHROSCOPY      REVISION TOTAL HIP ARTHROPLASTY Right 10/5/2022    RIGHT HIP EXCISIONAL DEBRIDEMENT SKIN TO SUBCUTANEOUS TISSUE AND FASCIA AND IRRIGATION performed by Roma Escudero DO at [address] [place] [address] Right 03/28/2019    VITRECTOMY Right 3/28/2019    VITRECTOMY 25 GAUGE,  IOL REPOSITION  ([facility]) performed by Esa Mendez MD at [facility] History   Problem Relation Age of Onset    Diabetes Sister     Diabetes Brother     Cancer Brother        Social History     Socioeconomic History Marital status: Single     Spouse name: Not on file    Number of children: Not on file    Years of education: Not on file    Highest education level: Not on file   Occupational History    Not on file   Tobacco Use    Smoking status: Former     Packs/day: 0.00     Years: 15.00     Pack years: 0.00     Types: Cigarettes     Quit date: 3/27/2016     Years since quittin.8    Smokeless tobacco: Never   Vaping Use    Vaping Use: Never used   Substance and Sexual Activity    Alcohol use: Yes     Comment: h/o ETOH-he reports he isn't currenty drinking as much    Drug use: No    Sexual activity: Yes   Other Topics Concern    Not on file   Social History Narrative    Not on file     Social Determinants of Health     Financial Resource Strain: Low Risk     Difficulty of Paying Living Expenses: Not hard at all   Food Insecurity: No Food Insecurity    Worried About Running Out of Food in the Last Year: Never true    Ran Out of Food in the Last Year: Never true   Transportation Needs: Not on file   Physical Activity: Inactive    Days of Exercise per Week: 0 days    Minutes of Exercise per Session: 0 min   Stress: Not on file   Social Connections: Not on file   Intimate Partner Violence: Not on file   Housing Stability: Not on file       Medications & Allergies:   Current Outpatient Medications   Medication Instructions    aspirin EC 81 mg, Oral, DAILY    atorvastatin (LIPITOR) 40 MG tablet TAKE 1 TABLET BY MOUTH ONE TIME A DAY    ciprofloxacin (CIPRO) 500 MG tablet No dose, route, or frequency recorded. Continuous Blood Gluc  (DEXCOM G6 ) MAMADOU Use as directed to monitor glucose continuously    Continuous Blood Gluc Sensor (DEXCOM G6 SENSOR) MISC Apply new sensor to abdomen every 10 days    Continuous Blood Gluc Transmit (DEXCOM G6 TRANSMITTER) MISC Use in combination with new sensor every 10 days.  Transmitter lasts 3 months    ELIQUIS 2.5 MG TABS tablet TAKE 1 TABLET BY MOUTH 2 TIMES A DAY    empagliflozin (JARDIANCE) 25 MG tablet TAKE 1 TABLET BY MOUTH ONCE DAILY. ferrous sulfate (IRON 325) 325 mg, Oral, DAILY WITH BREAKFAST    gabapentin (NEURONTIN) 100 mg, Oral, 2 TIMES DAILY    ibuprofen (ADVIL;MOTRIN) 400 mg, Oral, EVERY 6 HOURS PRN    insulin lispro (1 Unit Dial) (HUMALOG KWIKPEN) 6 Units, SubCUTAneous, 3 TIMES DAILY BEFORE MEALS    Insulin Pen Needle (B-D ULTRAFINE III SHORT PEN) 31G X 8 MM MISC 1 each, Does not apply, DAILY    ketoconazole (NIZORAL) 2 % cream Apply daily to affected areas on face, scalp, neck, and chest    ketoconazole (NIZORAL) 2 % shampoo Use as wash to chest, back, neck, face, and scalp leaving on for 5 minutes prior to washing off once daily for 2 weeks then three times weekly    LANTUS SOLOSTAR 100 UNIT/ML injection pen INJECT 42 UNITS INTO THE SKIN TWO TIMES A DAY    metFORMIN (GLUCOPHAGE) 500 MG tablet TAKE 2 TABLETS BY MOUTH 2 TIMES A DAY WITH MEALS    metoprolol tartrate (LOPRESSOR) 25 MG tablet TAKE ONE-HALF TABLET BY MOUTH TWICE DAILY. Misc. Devices MISC 1 PAIR OF DIABETIC SHOES (1 LEFT/ 1 RIGHT)<BR>1-3 PAIRS OF INSERTS (LEFT/ RIGHT)    pregabalin (LYRICA) 100 MG capsule TAKE 1 CAPSULE BY MOUTH 2 TIMES A DAY    tamsulosin (FLOMAX) 0.4 mg, Oral, DAILY    TRULICITY 3 RV/4.6DY SOPN INJECT THE CONTENTS OF ONE SYRINGE (3MG) UNDER THE SKIN ONCE WEEKLY    venlafaxine (EFFEXOR XR) 37.5 MG extended release capsule TAKE 1 CAPSULE BY MOUTH ONCE DAILY.     vitamin D3 (CHOLECALCIFEROL) 1,000 Units, Oral, DAILY    zinc 50 MG CAPS 1 capsule, Oral, DAILY       Allergies   Allergen Reactions    Lisinopril Swelling     Outer Neck swelling    Melatonin Swelling    Trazodone Swelling     Chest swells       Review of Systems:   Constitutional: negative for weight changes or fevers  Cardiovascular: negative for chest pain, palpitations, irregular heart beat  Respiratory: negative for dyspnea, cough, wheezing  Gastrointestinal: negative for constipation, diarrhea, nausea  Genitourinary: negative for bowel/bladder incontinence   Musculoskeletal: positive for low back pain  Neurological: positive for radicular leg pain, leg weakness or numbness/tingling  Behavioral/Psych: negative for anxiety/depression   Hematological: negative for abnormal bleeding, anticoagulation use or antiplatelet use  All other systems reviewed and are negative    OBJECTIVE:    Vitals:    02/15/23 1000   BP: (!) 138/94   Pulse: 97   Resp: 20   Temp: 97.3 °F (36.3 °C)   SpO2: 98%         PHYSICAL EXAM    GENERAL: No acute distress, pleasant, well-appearing  HEENT: Normocephalic, atraumatic, Pupils equal and round  CARDIOVASCULAR: Well perfused, No peripheral cyanosis  PULMONARY: Good chest wall excursion, breathing unlabored  PSYCH: Appropriate affect and insight, non-pressured speech  SKIN: No rashes or lesions  MUSCULOSKELETAL:  Inspection: The back and extremities are symmetric and aligned. Muscle bulk is normal in appearance. Palpation: There is tenderness to palpation along the lumbar paraspinal musculature bilaterally  Lumbar range of motion is full  NEUROMUSCULAR:  Patient ambulates unassisted  Gait is antalgic  Sensation to light touch is intact and lower extremities besides bilateral feet  Strength is full bilaterally  No ankle clonus    Special Tests:  Lumbar facet loading is positive bilaterally  Seated straight leg raise is positive on right    DIAGNOSIS:    ICD-10-CM    1. Chronic bilateral low back pain with bilateral sciatica  M54.42     M54.41     G89.29       2. Lumbar radiculopathy  M54.16       3. Neuropathy  G62.9       4. Hip pain  M25.559              ASSESSMENT:    Sonu Schuster is a 60 y.o.male who presents with chronic low back pain, hip pain and bilateral leg pain. To review, patient has a history of diabetes and peripheral neuropathy as well as chronic hip pain and low back pain. He is also on chronic anticoagulation therapy.   He underwent debridement irrigation of a right total hip arthroplasty infection with wound VAC application on 54/7/6204 and has been following with infectious disease as well as orthopedic surgery. The patient's history and physical examination are consistent with lumbar radiculopathy as the patient has pain starting in the low back radiating down into the right leg. Patient has positive neural tension signs on examination with a positive seated straight leg raise. Additionally the lumbar MRI on 1/24/2023 reveals multilevel degenerative changes with severe canal stenosis at L4-5. The patient is a candidate for a lumbar epidural steroid injection however he is still on chronic antibiotics due to osteomyelitis. He does follow-up with infectious disease in 8 days. I will reach out to them about their future game plan as this will determine next steps in treatment. Neurologically, it appears the patient has full strength and reduced sensation in feet. There is no evidence of myelopathy on examination. There are no red flags in the patient's history. The patient has failed conservative measures including outpatient physical therapy, greater than 3 medications for pain relief, a self-directed therapy program, as well as activity modification all within the last 6 weeks over 3 months. The patient's pain has been causing worsening quality of life and function. PLAN:  Medications: For nonopioid therapy, the following medications were prescribed:    -Continue medication prescribed by primary care physician  -Consider NSAID in future    Opioid therapy:  -Not indicated    Interventions:  -Consider lumbar epidural in future    Imaging:  -Reviewed lumbar x-ray and MRI with patient in room    Behavioral Therapies:  -Continue daily stretching and home exercise program    Referrals:  -Continue follow-up with infectious disease, orthopedic surgery and physical therapy    Follow-up Plan:  -About 2 weeks    Patient was offered intervention where appropriate. Multi-modal Pain Therapy:   The patient was explicitly considered for multimodal and interdisciplinary therapy. Non-opioid and non-pharmacological opportunities to enhance analgesia and quality of life have been and will continue to be pursued. Susanne Real DO  Interventional Pain Management/PM&R   [facility]    No orders of the defined types were placed in this encounter.

## 2023-02-17 ENCOUNTER — TELEPHONE (OUTPATIENT)
Dept: FAMILY MEDICINE CLINIC | Age: 63
End: 2023-02-17

## 2023-02-17 RX ORDER — CIPROFLOXACIN 500 MG/1
TABLET, FILM COATED ORAL
Qty: 60 TABLET | Refills: 0 | Status: SHIPPED | OUTPATIENT
Start: 2023-02-17

## 2023-02-17 NOTE — TELEPHONE ENCOUNTER
LAST VISIT: 11/17/22  NEXT VISIT: 2/23/23    Per last dictation:     Recommendations   The patient is completing a 6-week course of intravenous antimicrobial therapy with meropenem and vancomycin  The plan is to put him back on oral antibiotic therapy and I will prescribe ciprofloxacin and amoxicillin given the Pseudomonas and Enterococcus faecalis that was isolated from the surgical cultures  He will take these for at least 3 months if not prolonged course given the history of chronic infection  The plan will be to repeat CRP testing and for him to follow-up with me in 3 months  I did instruct him that if he has any issues or concerns that arise prior to our next visit he needs to reach out to me    Please sign for refill if ok. Thank you.

## 2023-02-17 NOTE — TELEPHONE ENCOUNTER
----- Message from Alexandra Thomas sent at 2/17/2023 11:43 AM EST -----  Subject: Medication Problem    Medication: Continuous Blood Gluc Transmit (DEXCOM G6 TRANSMITTER) MISC  Dosage: Dexcom G6  Ordering Provider: silvio    Question/Problem: Patient states he wants a prescription for the [facility]   that is coming out soon.  He wanted the prescription sent to the pharmacy      [facility] [phone]    ---------------------------------------------------------------------------  --------------  [address]  [phone]; OK to leave message on voicemail  ---------------------------------------------------------------------------  --------------    SCRIPT ANSWERS  Relationship to Patient: Self

## 2023-02-21 ENCOUNTER — OFFICE VISIT (OUTPATIENT)
Dept: PODIATRY | Age: 63
End: 2023-02-21

## 2023-02-21 VITALS — BODY MASS INDEX: 31.44 KG/M2 | HEIGHT: 74 IN | WEIGHT: 245 LBS

## 2023-02-21 DIAGNOSIS — I73.9 PVD (PERIPHERAL VASCULAR DISEASE) (HCC): ICD-10-CM

## 2023-02-21 DIAGNOSIS — E11.51 TYPE II DIABETES MELLITUS WITH PERIPHERAL CIRCULATORY DISORDER (HCC): Primary | ICD-10-CM

## 2023-02-21 DIAGNOSIS — M79.671 PAIN IN BOTH FEET: ICD-10-CM

## 2023-02-21 DIAGNOSIS — M79.672 PAIN IN BOTH FEET: ICD-10-CM

## 2023-02-21 NOTE — PROGRESS NOTES
[address] PODIATRY [facility]  [address] [address]  Dept: [phone]  Dept Fax: [phone]    DIABETIC PROGRESS NOTE  Date of patient's visit: 2/21/2023  Patient's Name:  Guru Christensen YOB: 1960            Patient Care Team:  Jen Magdaleno MD as PCP - General (Emergency Medicine)  Noah Sow MD as Consulting Physician (Infectious Diseases)  Richard Brito MD as Consulting Physician (Hematology and Oncology)  Marlon Ellsworth DO as Consulting Physician (Hospitalist)  Marlon Ellsworth DO as Consulting Physician (Hospitalist)  Steven Galloway DPM as Physician (Podiatry)          Chief Complaint   Patient presents with    Diabetes     Diabetic foot care    Peripheral Neuropathy     Bilateral feet       Subjective: Guru Christensen comes to clinic for Diabetes (Diabetic foot care) and Peripheral Neuropathy (Bilateral feet)    he is a diabetic and states that he is doing well. Pt currently has complaint of thickened, elongated nails that they cannot manage by themselves. Pt's primary care physician is Jen Magdaleno MD last seen 12/05/2023   Pt's last blood sugar was 190 currently. Lab Results   Component Value Date    LABA1C 8.4 (H) 10/06/2022      Complains of numbness in the feet bilat.   Past Medical History:   Diagnosis Date    Arthritis     CAD (coronary artery disease)     Cancer ([facility] [address]     prostate    Cervical radiculopathy     CHF (congestive heart failure) (HCC)     DM (diabetes mellitus) ([facility] [address] 2009    IDDM    GERD (gastroesophageal reflux disease) 2017    ON RX    Heart murmur 2013    ASYMPTOMATIC    HTN (hypertension) 06/29/2012    ON RX    Hyperlipidemia 06/29/2012    ON RX    Sleep apnea 2016    TRIED MACHINE COULD NOT TOLERATE    Vision abnormalities 2019    FLOATERS RIGHT EYE    Wears glasses        Allergies   Allergen Reactions    Lisinopril Swelling     Outer Neck swelling    Melatonin Swelling    Trazodone Swelling     Chest swells     Current Outpatient Medications on File Prior to Visit   Medication Sig Dispense Refill    ciprofloxacin (CIPRO) 500 MG tablet TAKE 1 TABLET BY MOUTH TWICE DAILY. 60 tablet 0    empagliflozin (JARDIANCE) 25 MG tablet TAKE 1 TABLET BY MOUTH ONCE DAILY. 30 tablet 1    metoprolol tartrate (LOPRESSOR) 25 MG tablet TAKE ONE-HALF TABLET BY MOUTH TWICE DAILY. 30 tablet 2    TRULICITY 3 ML/1.9VL SOPN INJECT THE CONTENTS OF ONE SYRINGE (3MG) UNDER THE SKIN ONCE WEEKLY 2 mL 0    ketoconazole (NIZORAL) 2 % cream Apply daily to affected areas on face, scalp, neck, and chest 30 g 2    ketoconazole (NIZORAL) 2 % shampoo Use as wash to chest, back, neck, face, and scalp leaving on for 5 minutes prior to washing off once daily for 2 weeks then three times weekly 120 mL 11    gabapentin (NEURONTIN) 100 MG capsule Take 1 capsule by mouth 2 times daily for 30 days. 60 capsule 1    metFORMIN (GLUCOPHAGE) 500 MG tablet TAKE 2 TABLETS BY MOUTH 2 TIMES A DAY WITH MEALS 120 tablet 3    ELIQUIS 2.5 MG TABS tablet TAKE 1 TABLET BY MOUTH 2 TIMES A DAY 90 tablet 1    atorvastatin (LIPITOR) 40 MG tablet TAKE 1 TABLET BY MOUTH ONE TIME A DAY 30 tablet 3    venlafaxine (EFFEXOR XR) 37.5 MG extended release capsule TAKE 1 CAPSULE BY MOUTH ONCE DAILY. 30 capsule 3    Misc. Devices MISC 1 PAIR OF DIABETIC SHOES (1 LEFT/ 1 RIGHT)  1-3 PAIRS OF INSERTS (LEFT/ RIGHT) 2 each 0    tamsulosin (FLOMAX) 0.4 mg capsule Take 1 capsule by mouth daily 90 capsule 1    LANTUS SOLOSTAR 100 UNIT/ML injection pen INJECT 42 UNITS INTO THE SKIN TWO TIMES A DAY 15 mL 3    Continuous Blood Gluc Transmit (DEXCOM G6 TRANSMITTER) MISC Use in combination with new sensor every 10 days.  Transmitter lasts 3 months 1 each 3    Continuous Blood Gluc Sensor (DEXCOM G6 SENSOR) MISC Apply new sensor to abdomen every 10 days 3 each 11    Continuous Blood Gluc  (DEXCOM G6 ) MAMADOU Use as directed to monitor glucose continuously 1 each 1    ferrous sulfate (IRON 325) 325 (65 Fe) MG tablet Take 325 mg by mouth daily (with breakfast)       zinc 50 MG CAPS Take 1 capsule by mouth daily      aspirin EC 81 MG EC tablet Take 1 tablet by mouth daily 90 tablet 1    Insulin Pen Needle (B-D ULTRAFINE III SHORT PEN) 31G X 8 MM MISC 1 each by Does not apply route daily 100 each 3    vitamin D3 (CHOLECALCIFEROL) 25 MCG (1000 UT) TABS tablet Take 1 tablet by mouth daily 90 tablet 1    insulin lispro, 1 Unit Dial, (HUMALOG KWIKPEN) 100 UNIT/ML SOPN Inject 6 Units into the skin 3 times daily (before meals) 3 pen 2    pregabalin (LYRICA) 100 MG capsule TAKE 1 CAPSULE BY MOUTH 2 TIMES A DAY 60 capsule 2    ibuprofen (ADVIL;MOTRIN) 400 MG tablet Take 1 tablet by mouth every 6 hours as needed for Pain (Patient not taking: No sig reported) 120 tablet 1     Current Facility-Administered Medications on File Prior to Visit   Medication Dose Route Frequency Provider Last Rate Last Admin    bupivacaine (MARCAINE) 0.25 % injection 5 mg  2 mL Intra-artICUlar Once Lonita Marika, DO        methylPREDNISolone acetate (DEPO-MEDROL) injection 80 mg  80 mg Intra-artICUlar Once Lonita Marika, DO           Review of Systems    Review of Systems:   History obtained from chart review and the patient  General ROS: negative for - chills, fatigue, fever, night sweats or weight gain  Constitutional: Negative for chills, diaphoresis, fatigue, fever and unexpected weight change. Musculoskeletal: Positive for arthralgias, gait problem and joint swelling. Neurological ROS: negative for - behavioral changes, confusion, headaches or seizures. Negative for weakness and numbness. Dermatological ROS: negative for - mole changes, rash  Cardiovascular: Negative for leg swelling. Gastrointestinal: Negative for constipation, diarrhea, nausea and vomiting. Objective:  Dermatologic Exam:  Skin lesion/ulceration Absent .    Skin No rashes or nodules noted. .   Skin is thin, with flaky sloughing skin as well as decreased hair growth to the lower leg  Small red hemosiderin deposits seen dorsal foot   Musculoskeletal:     1st MPJ ROM decreased, Bilateral.  Muscle strength 5/5, Bilateral.  Pain present upon palpation of toenails 1-5, Bilateral. decreased medial longitudinal arch, Bilateral.  Ankle ROM decreased,Bilateral.    Dorsally contracted digits present digits 2, Bilateral.     Vascular: DP pulses 1/4 bilateral.  PT pulses 0/4 bilateral.   CFT <5 seconds, Bilateral.  Hair growth absent to the level of the digits, Bilateral.  Edema present, Bilateral.  Varicosities absent, Bilateral. Erythema absent, Bilateral    Neurological: Sensation diminshed to light touch to level of digits, Bilateral.  Protective sensation intact 6/10 sites via 5.07/10g Gilbert-Stehpen Monofilament, Bilateral.  negative Tinel's, Bilateral.  negative Valleix sign, Bilateral.      Integument: Warm, dry, supple, Bilateral.  Open lesion absent, Bilateral.  Interdigital maceration absent to web spaces 4, Bilateral.  Nails 1-5 left and 1-5 right thickened > 3.0 mm, dystrophic and crumbly, discolored with subungual debris. Fissures absent, Bilateral.   General: AAO x 3 in NAD.     Derm  Toenail Description  Sites of Onychomycosis Involvement (Check affected area)  [x] [x] [x] [x] [x] [x] [x] [x] [x] [x]  5 4 3 2 1 1 2 3 4 5                          Right                                        Left    Thickness  [x] [x] [x] [x] [x] [x] [x] [x] [x] [x]  5 4 3 2 1 1 2 3 4 5                         Right                                        Left    Dystrophic Changes   [x] [x] [x] [x] [x] [x] [x] [x] [x] [x]  5 4 3 2 1 1 2 3 4 5                         Right                                        Left    Color   [x] [x] [x] [x] [x] [x] [x] [x] [x] [x]  5 4 3 2 1 1 2 3 4 5                          Right                                        Left    Incurvation/Ingrowin [] [] [] [] [] [] [] [] [] []  5 4 3 2 1 1 2 3 4 5                         Right                                        Left    Inflammation/Pain   [x] [x] [x] [x] [x] [x] [x] [x] [x] [x]  5 4 3 2 1 1 2 3 4 5                         Right                                        Left        Visual inspection:  Deformity: hammertoe deformity sarah feet  amputation: absent  Skin lesions: absent  Edema: right- 2+ pitting edema, left- 2+ pitting edema    Sensory exam:  Monofilament sensation: abnormal - 6/10 via SW 5.07/10g monofilament to the plantar foot bilateral feet    Pulses: abnormal - 1/4 dorsalis pedis pulse and 0/4 Posterior tibial pulse,   Pinprick: Impaired  Proprioception: Impaired  Vibration (128 Hz): Impaired       DM with PVD       [x]Yes    []No      Assessment:  60 y.o. male with:   Diagnosis Orders   1. Type II diabetes mellitus with peripheral circulatory disorder (HCC)   DIABETES FOOT EXAM    AZ DEBRIDEMENT NAIL ANY METHOD 6/>      2. PVD (peripheral vascular disease) (HCC)   DIABETES FOOT EXAM    AZ DEBRIDEMENT NAIL ANY METHOD 6/>      3. Pain in both feet   DIABETES FOOT EXAM    AZ DEBRIDEMENT NAIL ANY METHOD 6/>              Q7   []Yes    []No                Q8   [x]Yes    []No                     Q9   []Yes    []No    Plan:   Pt was evaluated and examined. Patient was given personalized discharge instructions. Nails 1-10 were debrided sharply in length and thickness with a nipper and , without incident. Pt will follow up in 9 weeks or sooner if any problems arise. Diagnosis was discussed with the pt and all of their questions were answered in detail. Proper foot hygiene and care was discussed with the pt. Informed patient on proper diabetic foot care and importance of tight glycemic control. Patient to check feet daily and contact the office with any questions/problems/concerns.    Other comorbidity noted and will be managed by PCP.  2/21/2023    Electronically signed by Francisca Delgado Enrique Cardoza on 2/21/2023 at 1:02 PM  2/21/2023

## 2023-02-22 NOTE — DISCHARGE SUMMARY
[x] [facility]) - [facility] TWELVESTEP [facility]  Therapy  [address]  [phone]  F: [phone] [] [address]  [address]   [address]  P: [phone]  F: [phone] [] [address] &  Therapy  [address]  P: [phone]  F: [phone] [] [address]  P: [phone]  F: [phone] [] [address]  [facility]   [address]   [facility] Blind: [phone]  F: [phone]      Physical Therapy Discharge Note    Date: 2023      Patient: Naren Membreno  : 1960  MRN: [id number]    Physician: Rickie Yu MD                         Insurance: [facility] Medicare / Medicaid, [facility]  Medical Diagnosis: Osteomyelitis of right femur, unspecified type M86.9                Rehab Codes: M25.551, M25.651, R26.89  Onset Date:  10/5/22                                Next 's appt: Jackson for shoulders, TBD, Romina Rob for R hip 3 months   Visit# / total visits:       (Correct visit #23)             Cancels/No Shows:   Date of initial visit: 22                Date of final visit: 2/3/23    Subjective:  22  Pain:  [] Yes  [x] No   Location: R hip            Pain Rating: (0-10 scale) 3/10 R hip   Pain altered Tx:  [] Yes  [x] No  Action:  Comments:  Reports again his neuropathy in sarah legs/feet are  really bothering him. Noted gait: decreased step length and height with very limited sarah knee flexion. Objective: MICHAEL  Test Measurements:  Function:    Assessment:MICHAEL  STG: (to be met in 10 treatments)  ? Pain:5/10 R hip pain with ambulation. 23: MET  ? ROM: R hip flexion to 90 degrees to improve stair climbing. 23:MET  ? Strength:4/5 R hip flexion and abduction to improve hip stability. -ck next session. ?  Function:LEFS score to 40% functional or better to improve ADLs. 2/1/23:MET, 64% functional.   Independent with Home Exercise Programs 2/1/23: Progress      LTG: (to be met in 18 treatments)  Patient can tolerate greater than 20 minutes of standing activity. Patient can climb 22 8 inch steps to improve ability to climb to duplex 2nd level. Treatment to Date:  [x] Therapeutic Exercise    [] Modalities:  [] Therapeutic Activity    [] Ultrasound  [] Electrical Stimulation  [] Gait Training     [] Massage       [] Lumbar/Cervical Traction  [] Neuromuscular Re-education [] Cold/hotpack [] Iontophoresis: 4 mg/mL  [x] Instruction in Home Exercise Program                     Dexamethasone Sodium  [] Manual Therapy             Phosphate 40-80 mAmin  [] Aquatic Therapy                   [] Vasocompression/    [] Other:             Game Ready    Discharge Status:     [] Pt recovered from conditions. Treatment goals were met. [] Pt received maximum benefit. No further therapy indicated at this time. [] Pt to continue exercise/home instructions independently. [] Therapy interrupted due to:    [x] Pt has 2 or more no shows/cancels, is discontinued per our policy. [] Pt has completed prescribed number of treatment sessions. [x] Other: LAST CONTACT: 2/6/23 Pt cancelled 2/6 & 2/9, reports he is too weak, will call us after this week. Electronically signed by Catrachita Paris PT on 2/22/2023 at 2:21 PM      If you have any questions or concerns, please don't hesitate to call.   Thank you for your referral.

## 2023-03-07 DIAGNOSIS — Z79.4 TYPE 2 DIABETES MELLITUS WITH OTHER SPECIFIED COMPLICATION, WITH LONG-TERM CURRENT USE OF INSULIN (HCC): ICD-10-CM

## 2023-03-07 DIAGNOSIS — E11.69 TYPE 2 DIABETES MELLITUS WITH OTHER SPECIFIED COMPLICATION, WITH LONG-TERM CURRENT USE OF INSULIN (HCC): ICD-10-CM

## 2023-03-07 RX ORDER — INSULIN GLARGINE 100 [IU]/ML
INJECTION, SOLUTION SUBCUTANEOUS
Qty: 15 ML | Refills: 3 | Status: SHIPPED | OUTPATIENT
Start: 2023-03-07

## 2023-03-07 RX ORDER — VENLAFAXINE HYDROCHLORIDE 37.5 MG/1
CAPSULE, EXTENDED RELEASE ORAL
Qty: 30 CAPSULE | Refills: 3 | Status: SHIPPED | OUTPATIENT
Start: 2023-03-07

## 2023-03-07 RX ORDER — DULAGLUTIDE 3 MG/.5ML
INJECTION, SOLUTION SUBCUTANEOUS
Qty: 2 ML | Refills: 0 | Status: SHIPPED | OUTPATIENT
Start: 2023-03-07

## 2023-03-07 RX ORDER — PREGABALIN 100 MG/1
CAPSULE ORAL
Qty: 60 CAPSULE | Refills: 2 | Status: SHIPPED | OUTPATIENT
Start: 2023-03-07 | End: 2023-06-05

## 2023-03-07 RX ORDER — APIXABAN 2.5 MG/1
TABLET, FILM COATED ORAL
Qty: 90 TABLET | Refills: 1 | Status: SHIPPED | OUTPATIENT
Start: 2023-03-07

## 2023-03-07 NOTE — TELEPHONE ENCOUNTER
E-scribe request for med refills. Please review and e-scribe if applicable. Writer called patient and scheduled a follow up. Last Visit Date:  12/5/22  Next Visit Date:  3/15/23    Hemoglobin A1C (%)   Date Value   10/06/2022 8.4 (H)   08/17/2022 8.1   04/12/2022 8.3             ( goal A1C is < 7)   Microalb/Crt.  Ratio (mcg/mg creat)   Date Value   04/13/2022 Can not be calculated     LDL Cholesterol (mg/dL)   Date Value   04/12/2022 52       (goal LDL is <100)   AST (U/L)   Date Value   11/29/2022 20     ALT (U/L)   Date Value   11/29/2022 18     BUN (mg/dL)   Date Value   11/29/2022 14     BP Readings from Last 3 Encounters:   02/15/23 (!) 138/94   01/31/23 (!) 147/108   01/23/23 125/87          (goal 120/80)        Patient Active Problem List:     DM (diabetes mellitus) ([address]     HTN (hypertension)     ETOHism (HCC)     Hypertensive urgency     Abnormal ambulatory electrocardiogram     Abnormal ambulatory electrocardiography     Abnormal kidney function     Adiposity     Astigmatism     Atherosclerotic heart disease of native coronary artery without angina pectoris     Cervical radiculopathy     Chronic back pain     Decreased cardiac output     Diabetic peripheral neuropathy (HCC)     Dislocated intraocular lens     Disturbance in sleep behavior     Dizziness     Dyssomnia     Floaters     Impotence of organic origin     Left ventricular dysfunction     Low vision, both eyes     Myopia     Nuclear sclerosis of left eye     Obstructive sleep apnea syndrome     Palpitations     Personal history of other diseases of the digestive system     Posterior vitreous detachment     Presbyopia     Primary osteoarthritis of right hip     Pseudophakia of right eye     Repetitive intrusions of sleep     Sciatica     Tendonitis     Vitamin D deficiency     Vitreous opacities of right eye     Dislocated IOL (intraocular lens), anterior, right     Prostate CA ([address]     Congestive heart failure ([address]     Essential hypertension     Benign essential HTN     Diabetes mellitus ([facility] [address]     Aftercare following right hip joint replacement surgery     Infection of right prosthetic hip joint ([facility] [address]     Benign localized prostatic hyperplasia with lower urinary tract symptoms (LUTS)     Post-op pain     Hip pain     Lumbar radiculopathy     Neuropathy      ----Roel Daniel

## 2023-03-07 NOTE — TELEPHONE ENCOUNTER
E-scribe request for med refills. Please review and e-scribe if applicable. Last Visit Date:  12/5/23  Next Visit Date:  3/7/2023    Hemoglobin A1C (%)   Date Value   10/06/2022 8.4 (H)   08/17/2022 8.1   04/12/2022 8.3             ( goal A1C is < 7)   Microalb/Crt.  Ratio (mcg/mg creat)   Date Value   04/13/2022 Can not be calculated     LDL Cholesterol (mg/dL)   Date Value   04/12/2022 52       (goal LDL is <100)   AST (U/L)   Date Value   11/29/2022 20     ALT (U/L)   Date Value   11/29/2022 18     BUN (mg/dL)   Date Value   11/29/2022 14     BP Readings from Last 3 Encounters:   02/15/23 (!) 138/94   01/31/23 (!) 147/108   01/23/23 125/87          (goal 120/80)        Patient Active Problem List:     DM (diabetes mellitus) ([facility] [address]     HTN (hypertension)     ETOHism (HCC)     Hypertensive urgency     Abnormal ambulatory electrocardiogram     Abnormal ambulatory electrocardiography     Abnormal kidney function     Adiposity     Astigmatism     Atherosclerotic heart disease of native coronary artery without angina pectoris     Cervical radiculopathy     Chronic back pain     Decreased cardiac output     Diabetic peripheral neuropathy (HCC)     Dislocated intraocular lens     Disturbance in sleep behavior     Dizziness     Dyssomnia     Floaters     Impotence of organic origin     Left ventricular dysfunction     Low vision, both eyes     Myopia     Nuclear sclerosis of left eye     Obstructive sleep apnea syndrome     Palpitations     Personal history of other diseases of the digestive system     Posterior vitreous detachment     Presbyopia     Primary osteoarthritis of right hip     Pseudophakia of right eye     Repetitive intrusions of sleep     Sciatica     Tendonitis     Vitamin D deficiency     Vitreous opacities of right eye     Dislocated IOL (intraocular lens), anterior, right     Prostate CA (HCC)     Congestive heart failure (HCC)     Essential hypertension     Benign essential HTN     Diabetes mellitus [facility])     Aftercare following right hip joint replacement surgery     Infection of right prosthetic hip joint ([address]     Benign localized prostatic hyperplasia with lower urinary tract symptoms (LUTS)     Post-op pain     Hip pain     Lumbar radiculopathy     Neuropathy      ----Gavin Fernandez

## 2023-03-07 NOTE — TELEPHONE ENCOUNTER
E-scribe request for med refills. Please review and e-scribe if applicable. Last Visit Date:  12/5/22  Next Visit Date:  3/7/2023    Hemoglobin A1C (%)   Date Value   10/06/2022 8.4 (H)   08/17/2022 8.1   04/12/2022 8.3             ( goal A1C is < 7)   Microalb/Crt.  Ratio (mcg/mg creat)   Date Value   04/13/2022 Can not be calculated     LDL Cholesterol (mg/dL)   Date Value   04/12/2022 52       (goal LDL is <100)   AST (U/L)   Date Value   11/29/2022 20     ALT (U/L)   Date Value   11/29/2022 18     BUN (mg/dL)   Date Value   11/29/2022 14     BP Readings from Last 3 Encounters:   02/15/23 (!) 138/94   01/31/23 (!) 147/108   01/23/23 125/87          (goal 120/80)        Patient Active Problem List:     DM (diabetes mellitus) ([facility] [address]     HTN (hypertension)     ETOHism (HCC)     Hypertensive urgency     Abnormal ambulatory electrocardiogram     Abnormal ambulatory electrocardiography     Abnormal kidney function     Adiposity     Astigmatism     Atherosclerotic heart disease of native coronary artery without angina pectoris     Cervical radiculopathy     Chronic back pain     Decreased cardiac output     Diabetic peripheral neuropathy (HCC)     Dislocated intraocular lens     Disturbance in sleep behavior     Dizziness     Dyssomnia     Floaters     Impotence of organic origin     Left ventricular dysfunction     Low vision, both eyes     Myopia     Nuclear sclerosis of left eye     Obstructive sleep apnea syndrome     Palpitations     Personal history of other diseases of the digestive system     Posterior vitreous detachment     Presbyopia     Primary osteoarthritis of right hip     Pseudophakia of right eye     Repetitive intrusions of sleep     Sciatica     Tendonitis     Vitamin D deficiency     Vitreous opacities of right eye     Dislocated IOL (intraocular lens), anterior, right     Prostate CA (HCC)     Congestive heart failure (HCC)     Essential hypertension     Benign essential HTN     Diabetes mellitus [facility])     Aftercare following right hip joint replacement surgery     Infection of right prosthetic hip joint ([address]     Benign localized prostatic hyperplasia with lower urinary tract symptoms (LUTS)     Post-op pain     Hip pain     Lumbar radiculopathy     Neuropathy      ----Sarahy Mchugh

## 2023-03-07 NOTE — TELEPHONE ENCOUNTER
E-scribe request for med refills. Please review and e-scribe if applicable. Last Visit Date:  12/5/22  Next Visit Date:  3/7/2023    Hemoglobin A1C (%)   Date Value   10/06/2022 8.4 (H)   08/17/2022 8.1   04/12/2022 8.3             ( goal A1C is < 7)   Microalb/Crt.  Ratio (mcg/mg creat)   Date Value   04/13/2022 Can not be calculated     LDL Cholesterol (mg/dL)   Date Value   04/12/2022 52       (goal LDL is <100)   AST (U/L)   Date Value   11/29/2022 20     ALT (U/L)   Date Value   11/29/2022 18     BUN (mg/dL)   Date Value   11/29/2022 14     BP Readings from Last 3 Encounters:   02/15/23 (!) 138/94   01/31/23 (!) 147/108   01/23/23 125/87          (goal 120/80)        Patient Active Problem List:     DM (diabetes mellitus) ([facility] [address]     HTN (hypertension)     ETOHism (HCC)     Hypertensive urgency     Abnormal ambulatory electrocardiogram     Abnormal ambulatory electrocardiography     Abnormal kidney function     Adiposity     Astigmatism     Atherosclerotic heart disease of native coronary artery without angina pectoris     Cervical radiculopathy     Chronic back pain     Decreased cardiac output     Diabetic peripheral neuropathy (HCC)     Dislocated intraocular lens     Disturbance in sleep behavior     Dizziness     Dyssomnia     Floaters     Impotence of organic origin     Left ventricular dysfunction     Low vision, both eyes     Myopia     Nuclear sclerosis of left eye     Obstructive sleep apnea syndrome     Palpitations     Personal history of other diseases of the digestive system     Posterior vitreous detachment     Presbyopia     Primary osteoarthritis of right hip     Pseudophakia of right eye     Repetitive intrusions of sleep     Sciatica     Tendonitis     Vitamin D deficiency     Vitreous opacities of right eye     Dislocated IOL (intraocular lens), anterior, right     Prostate CA (HCC)     Congestive heart failure (HCC)     Essential hypertension     Benign essential HTN     Diabetes mellitus [facility])     Aftercare following right hip joint replacement surgery     Infection of right prosthetic hip joint ([address]     Benign localized prostatic hyperplasia with lower urinary tract symptoms (LUTS)     Post-op pain     Hip pain     Lumbar radiculopathy     Neuropathy      ----Enmanuel Felipe

## 2023-03-09 DIAGNOSIS — E11.69 TYPE 2 DIABETES MELLITUS WITH OTHER SPECIFIED COMPLICATION, WITH LONG-TERM CURRENT USE OF INSULIN (HCC): ICD-10-CM

## 2023-03-09 DIAGNOSIS — Z79.4 TYPE 2 DIABETES MELLITUS WITH OTHER SPECIFIED COMPLICATION, WITH LONG-TERM CURRENT USE OF INSULIN (HCC): ICD-10-CM

## 2023-03-09 DIAGNOSIS — N28.9 ABNORMAL KIDNEY FUNCTION: ICD-10-CM

## 2023-03-09 DIAGNOSIS — I10 BENIGN ESSENTIAL HTN: Primary | ICD-10-CM

## 2023-03-09 RX ORDER — PREGABALIN 100 MG/1
CAPSULE ORAL
Qty: 60 CAPSULE | Refills: 2 | OUTPATIENT
Start: 2023-03-09 | End: 2023-06-07

## 2023-03-13 DIAGNOSIS — E11.69 TYPE 2 DIABETES MELLITUS WITH OTHER SPECIFIED COMPLICATION, WITH LONG-TERM CURRENT USE OF INSULIN (HCC): ICD-10-CM

## 2023-03-13 DIAGNOSIS — Z79.4 TYPE 2 DIABETES MELLITUS WITH OTHER SPECIFIED COMPLICATION, WITH LONG-TERM CURRENT USE OF INSULIN (HCC): ICD-10-CM

## 2023-03-13 RX ORDER — GABAPENTIN 100 MG/1
100 CAPSULE ORAL 2 TIMES DAILY
Qty: 60 CAPSULE | Refills: 1 | OUTPATIENT
Start: 2023-03-13 | End: 2023-04-12

## 2023-03-13 NOTE — TELEPHONE ENCOUNTER
Pharmacy contacting office because they did not receive refill that was sent on 3/7/2320. Please resend.

## 2023-03-14 ENCOUNTER — TELEPHONE (OUTPATIENT)
Dept: FAMILY MEDICINE CLINIC | Age: 63
End: 2023-03-14

## 2023-03-14 RX ORDER — PREGABALIN 100 MG/1
CAPSULE ORAL
Qty: 60 CAPSULE | Refills: 2 | Status: SHIPPED | OUTPATIENT
Start: 2023-03-14 | End: 2023-05-14

## 2023-03-15 ENCOUNTER — OFFICE VISIT (OUTPATIENT)
Dept: FAMILY MEDICINE CLINIC | Age: 63
End: 2023-03-15
Payer: MEDICARE

## 2023-03-15 VITALS — DIASTOLIC BLOOD PRESSURE: 94 MMHG | SYSTOLIC BLOOD PRESSURE: 143 MMHG | HEART RATE: 98 BPM

## 2023-03-15 DIAGNOSIS — E11.69 TYPE 2 DIABETES MELLITUS WITH OTHER SPECIFIED COMPLICATION, WITH LONG-TERM CURRENT USE OF INSULIN (HCC): Primary | ICD-10-CM

## 2023-03-15 DIAGNOSIS — Z79.4 TYPE 2 DIABETES MELLITUS WITH OTHER SPECIFIED COMPLICATION, WITH LONG-TERM CURRENT USE OF INSULIN (HCC): Primary | ICD-10-CM

## 2023-03-15 LAB — HBA1C MFR BLD: 7.5 %

## 2023-03-15 PROCEDURE — 3017F COLORECTAL CA SCREEN DOC REV: CPT | Performed by: FAMILY MEDICINE

## 2023-03-15 PROCEDURE — G8482 FLU IMMUNIZE ORDER/ADMIN: HCPCS | Performed by: FAMILY MEDICINE

## 2023-03-15 PROCEDURE — 2022F DILAT RTA XM EVC RTNOPTHY: CPT | Performed by: FAMILY MEDICINE

## 2023-03-15 PROCEDURE — 1036F TOBACCO NON-USER: CPT | Performed by: FAMILY MEDICINE

## 2023-03-15 PROCEDURE — 83036 HEMOGLOBIN GLYCOSYLATED A1C: CPT | Performed by: STUDENT IN AN ORGANIZED HEALTH CARE EDUCATION/TRAINING PROGRAM

## 2023-03-15 PROCEDURE — G8427 DOCREV CUR MEDS BY ELIG CLIN: HCPCS | Performed by: FAMILY MEDICINE

## 2023-03-15 PROCEDURE — 3051F HG A1C>EQUAL 7.0%<8.0%: CPT | Performed by: STUDENT IN AN ORGANIZED HEALTH CARE EDUCATION/TRAINING PROGRAM

## 2023-03-15 PROCEDURE — 3078F DIAST BP <80 MM HG: CPT | Performed by: STUDENT IN AN ORGANIZED HEALTH CARE EDUCATION/TRAINING PROGRAM

## 2023-03-15 PROCEDURE — G8417 CALC BMI ABV UP PARAM F/U: HCPCS | Performed by: FAMILY MEDICINE

## 2023-03-15 PROCEDURE — 99213 OFFICE O/P EST LOW 20 MIN: CPT | Performed by: STUDENT IN AN ORGANIZED HEALTH CARE EDUCATION/TRAINING PROGRAM

## 2023-03-15 PROCEDURE — 3074F SYST BP LT 130 MM HG: CPT | Performed by: STUDENT IN AN ORGANIZED HEALTH CARE EDUCATION/TRAINING PROGRAM

## 2023-03-15 RX ORDER — BLOOD-GLUCOSE SENSOR
EACH MISCELLANEOUS
Qty: 2 EACH | Refills: 5 | Status: SHIPPED | OUTPATIENT
Start: 2023-03-15

## 2023-03-15 RX ORDER — BLOOD-GLUCOSE,RECEIVER,CONT
EACH MISCELLANEOUS
Qty: 2 EACH | Refills: 1 | Status: SHIPPED | OUTPATIENT
Start: 2023-03-15

## 2023-03-15 SDOH — ECONOMIC STABILITY: INCOME INSECURITY: HOW HARD IS IT FOR YOU TO PAY FOR THE VERY BASICS LIKE FOOD, HOUSING, MEDICAL CARE, AND HEATING?: NOT HARD AT ALL

## 2023-03-15 SDOH — ECONOMIC STABILITY: FOOD INSECURITY: WITHIN THE PAST 12 MONTHS, YOU WORRIED THAT YOUR FOOD WOULD RUN OUT BEFORE YOU GOT MONEY TO BUY MORE.: NEVER TRUE

## 2023-03-15 SDOH — ECONOMIC STABILITY: HOUSING INSECURITY
IN THE LAST 12 MONTHS, WAS THERE A TIME WHEN YOU DID NOT HAVE A STEADY PLACE TO SLEEP OR SLEPT IN A SHELTER (INCLUDING NOW)?: NO

## 2023-03-15 SDOH — ECONOMIC STABILITY: FOOD INSECURITY: WITHIN THE PAST 12 MONTHS, THE FOOD YOU BOUGHT JUST DIDN'T LAST AND YOU DIDN'T HAVE MONEY TO GET MORE.: NEVER TRUE

## 2023-03-15 ASSESSMENT — PATIENT HEALTH QUESTIONNAIRE - PHQ9
2. FEELING DOWN, DEPRESSED OR HOPELESS: 0
SUM OF ALL RESPONSES TO PHQ9 QUESTIONS 1 & 2: 0
SUM OF ALL RESPONSES TO PHQ QUESTIONS 1-9: 0
1. LITTLE INTEREST OR PLEASURE IN DOING THINGS: 0
SUM OF ALL RESPONSES TO PHQ QUESTIONS 1-9: 0

## 2023-03-15 NOTE — PROGRESS NOTES
6 [facility] Medicine Residency Program - Outpatient Note      Subjective: Adam Worthington is a 60 y.o. 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 Cc: DM2  Type I/II/Unknown: 2  HbA1c: 8.4---> 7.5 (T)  Insulin dependant: Yes, 42 units BID, Lispro 8 unit pre meal  Blood sugar numbers at home: 210 on dexcom  Hypoglycemic episodes after last visit: None  Hyperglycemia or hospital admission: None  Current medication: Metformin 500 mg BID, Jardiance, Trulicity,   Urine Micro: Ordered  Lipid Profile: ordered  Foot exam: Established with podiatry  Eye Exam: encourage to do every year  Neuropathy: Yes  Gastroparesis: No  CVA/CAD/CKD: No  BP at goal: No         Underlying comorbidty ACC/AHA NICE   Established atherosclerotic cardiovascular disease* <130/80 <140/90   Heart failure <130/80 <140/90   Diabetes mellitus <130/80 <140/90   Chronic kidney disease <130/80 <140/90   High cardiovascular risk <130/80 <140/90   Older adults? <130/80 <140/90   No comorbidity <130/80 <140/90               Review of systems (Except what is mentioned in the HPI)  CONSTITUTIONAL: Negative  RESPIRATORY: Negative  CARDIOVASCULAR: Negative  GASTROINTESTINAL: Negative  GENITOURINARY: Negative   MUSCULOSKELETAL: Negative  NEUROLOGICAL: Negative  BEHAVIOR/PSYCH: Negative      Objective:      Vitals:    03/15/23 1500   BP: (!) 143/94   Pulse: 98       General Appearance - Alert and oriented x 3  HEENT - No obvious deformity  Lungs - Bilateral good air entry , no wheezes or rales  Cardiovascular - Regular rate and rhythm.  No murmur  Abdomen - Soft and nontender  Neurologic - No new focal motor or sensory deficits  Skin - No bruising or bleeding on exposed skin area  MSK - No new joint/bone pains Psych - normal affect       Assessment:        ICD-10-CM    1. Type 2 diabetes mellitus with other specified complication, with long-term current use of insulin (HCC)  E11.69 POCT glycosylated hemoglobin (Hb A1C)    Z79.4 Comprehensive Metabolic Panel     CBC with Auto Differential     Lipid Panel     Microalbumin, Ur     Continuous Blood Gluc  (DEXCOM G7 ) MAMADOU     Continuous Blood Gluc Sensor (DEXCOM G7 SENSOR) MISC          Plan:    Type 2 diabetes mellitus, hemoglobin A1c as 7.5, will continue current regimen, long-term plan probably will be discontinuing Jardiance and increase metformin to 1000 twice daily if patient can tolerate. Patient already is maximized on Trulicity. Dexcom 7 ordered during this encounter. Essential hypertension, asymptomatic, not at goal.  Advised the patient to bring all the medication on the next encounter for better medication reconciliation. Per previous documentation orthopedic did mention that patient should be on anticoagulation for 35 days, note from of physician assistant from the office mention about continuing Eliquis, advised the patient to go see orthopedics of further continuing this medication, writer also reach to Dr. Afsaneh Rodgers for further recommendation. Please see response below. Addendum  Gery Closs, MD Dr. Christen Rong is out of the office. From an orthopedic standpoint he no longer needs to be on the Eliquis 2.5 mg twice daily. His surgery was on 10/5/2022 and only needed to be on it for 45 days. Thank you   Yasmany Simental PA-C     No history of atrial fibrillation, PAD, PE, on previous documentation. We will discontinue Eliquis. Return in about 4 weeks (around 4/12/2023).        Requested Prescriptions     Signed Prescriptions Disp Refills    Continuous Blood Gluc  (DEXCOM G7 ) MAMADOU 2 each 1     Sig: Use as directed    Continuous Blood Gluc Sensor (DEXCOM G7 SENSOR) MISC 2 each 5 Sig: Use as directed       Medications Discontinued During This Encounter   Medication Reason    ibuprofen (ADVIL;MOTRIN) 400 MG tablet Therapy completed    ketoconazole (NIZORAL) 2 % cream LIST CLEANUP    gabapentin (NEURONTIN) 100 MG capsule Therapy completed    ketoconazole (NIZORAL) 2 % shampoo Therapy completed       Adriana Valderrama received counseling on the following healthy behaviors: nutrition, exercise and medication adherence    Discussed use, benefit, and side effects of prescribed medications. Barriers to medication compliance addressed. All patient questions answered. Pt voiced understanding. Disclaimer: Some oral of this note was transcribed using voice-recognition software. This may cause typographical errors occasionally. Although all effort is made to fix these errors, please do not hesitate to contact our office if there Letty Garcia concern with the understanding of this note.     Bernardo Maldonado  PGY-3  Family Medicine     3/15/2023  4:15 PM

## 2023-03-15 NOTE — PROGRESS NOTES
Attending Physician Statement  I have discussed the care of Ryder Ha, 62 y.o. male,including pertinent history and exam findings,  with the resident Bernardo Johnson MD.  History:  Chief Complaint   Patient presents with    Diabetes    Pain     Nerve pain in hands and feet, wakes up sick in the mornings        I have reviewed the key elements of the encounter with the resident. Examination was done by resident as documented in residents note.  BP Readings from Last 3 Encounters:   03/15/23 (!) 143/94   02/15/23 (!) 138/94   01/31/23 (!) 147/108     BP (!) 143/94   Pulse 98   Lab Results   Component Value Date    WBC 4.9 11/29/2022    HGB 13.0 11/29/2022    HCT 42.8 11/29/2022     11/29/2022    CHOL 123 04/12/2022    TRIG 111 04/12/2022    HDL 49 04/12/2022    ALT 18 11/29/2022    AST 20 11/29/2022     11/29/2022    K 4.4 11/29/2022     11/29/2022    CREATININE 1.02 01/24/2023    BUN 14 11/29/2022    CO2 23 11/29/2022    PSA 0.77 11/29/2022    INR 1.0 08/15/2013    LABA1C 7.5 03/15/2023    LABMICR Can not be calculated 04/13/2022     Lab Results   Component Value Date    CALCIUM 9.7 11/29/2022     Lab Results   Component Value Date    LDLCHOLESTEROL 52 04/12/2022     I agree with the assessment, plan and diagnosis of    Diagnosis Orders   1. Type 2 diabetes mellitus with other specified complication, with long-term current use of insulin (HCC)  POCT glycosylated hemoglobin (Hb A1C)    Comprehensive Metabolic Panel    CBC with Auto Differential    Lipid Panel    Microalbumin, Ur    Continuous Blood Gluc  (DEXCOM G7 ) MAMADOU    Continuous Blood Gluc Sensor (DEXCOM G7 SENSOR) MISC        I agree with  orders as documented by the resident.  Recommendations: Agree with resident assessment and plan.  We will reach out to other related services for patient's Eliquis regarding need to continue medication.  We will revisit this in close follow-up.  Return in about 4 weeks (around  4/12/2023).    (Zainab Orourke ) Dr. Chika Dooley MD

## 2023-03-16 ENCOUNTER — TELEPHONE (OUTPATIENT)
Dept: INFUSION THERAPY | Facility: MEDICAL CENTER | Age: 63
End: 2023-03-16

## 2023-03-16 NOTE — TELEPHONE ENCOUNTER
FAX REQ FOR GABAPENTIN REFILL RECEIVED    FAXED BACK TO [facility] (F) [phone] WITH NOTE THAT DR Chin Maria MD AND PT NOT SEEN PER DR Arsalan Barnes SINCE 11/30/23. FAXED WITH CONFIRMATION.

## 2023-03-22 ENCOUNTER — HOSPITAL ENCOUNTER (OUTPATIENT)
Age: 63
Setting detail: SPECIMEN
Discharge: HOME | End: 2023-03-22

## 2023-03-22 DIAGNOSIS — E11.69 TYPE 2 DIABETES MELLITUS WITH OTHER SPECIFIED COMPLICATION, WITH LONG-TERM CURRENT USE OF INSULIN (HCC): ICD-10-CM

## 2023-03-22 DIAGNOSIS — Z79.4 TYPE 2 DIABETES MELLITUS WITH OTHER SPECIFIED COMPLICATION, WITH LONG-TERM CURRENT USE OF INSULIN (HCC): ICD-10-CM

## 2023-03-22 DIAGNOSIS — C61 PROSTATE CA (HCC): ICD-10-CM

## 2023-03-22 LAB
ABSOLUTE EOS #: 0.14 K/UL (ref 0–0.44)
ABSOLUTE IMMATURE GRANULOCYTE: <0.03 K/UL (ref 0–0.3)
ABSOLUTE LYMPH #: 2.12 K/UL (ref 1.1–3.7)
ABSOLUTE MONO #: 0.65 K/UL (ref 0.1–1.2)
ALBUMIN SERPL-MCNC: 4.1 G/DL (ref 3.5–5.2)
ALBUMIN/GLOBULIN RATIO: 1 (ref 1–2.5)
ALP SERPL-CCNC: 118 U/L (ref 40–129)
ALT SERPL-CCNC: 17 U/L (ref 5–41)
ANION GAP SERPL CALCULATED.3IONS-SCNC: 14 MMOL/L (ref 9–17)
AST SERPL-CCNC: 22 U/L
BASOPHILS # BLD: 1 % (ref 0–2)
BASOPHILS ABSOLUTE: 0.05 K/UL (ref 0–0.2)
BILIRUB SERPL-MCNC: 0.3 MG/DL (ref 0.3–1.2)
BUN SERPL-MCNC: 11 MG/DL (ref 8–23)
CALCIUM SERPL-MCNC: 9.7 MG/DL (ref 8.6–10.4)
CHLORIDE SERPL-SCNC: 102 MMOL/L (ref 98–107)
CHOLEST SERPL-MCNC: 100 MG/DL
CHOLESTEROL/HDL RATIO: 2.4
CO2 SERPL-SCNC: 25 MMOL/L (ref 20–31)
CREAT SERPL-MCNC: 0.97 MG/DL (ref 0.7–1.2)
CREATININE URINE: 108 MG/DL (ref 39–259)
EOSINOPHILS RELATIVE PERCENT: 2 % (ref 1–4)
GFR SERPL CREATININE-BSD FRML MDRD: >60 ML/MIN/1.73M2
GLUCOSE SERPL-MCNC: 136 MG/DL (ref 70–99)
HCT VFR BLD AUTO: 45.6 % (ref 40.7–50.3)
HDLC SERPL-MCNC: 42 MG/DL
HGB BLD-MCNC: 13.3 G/DL (ref 13–17)
IMMATURE GRANULOCYTES: 0 %
LDLC SERPL CALC-MCNC: 30 MG/DL (ref 0–130)
LYMPHOCYTES # BLD: 33 % (ref 24–43)
MCH RBC QN AUTO: 25.6 PG (ref 25.2–33.5)
MCHC RBC AUTO-ENTMCNC: 29.2 G/DL (ref 28.4–34.8)
MCV RBC AUTO: 87.9 FL (ref 82.6–102.9)
MICROALBUMIN/CREAT 24H UR: 95 MG/L
MICROALBUMIN/CREAT UR-RTO: 88 MCG/MG CREAT
MONOCYTES # BLD: 10 % (ref 3–12)
NRBC AUTOMATED: 0 PER 100 WBC
PDW BLD-RTO: 18.1 % (ref 11.8–14.4)
PLATELET # BLD AUTO: 346 K/UL (ref 138–453)
PMV BLD AUTO: 9.7 FL (ref 8.1–13.5)
POTASSIUM SERPL-SCNC: 4.5 MMOL/L (ref 3.7–5.3)
PROSTATE SPECIFIC ANTIGEN: 0.79 NG/ML
PROT SERPL-MCNC: 8.1 G/DL (ref 6.4–8.3)
RBC # BLD: 5.19 M/UL (ref 4.21–5.77)
RBC # BLD: ABNORMAL 10*6/UL
SEG NEUTROPHILS: 54 % (ref 36–65)
SEGMENTED NEUTROPHILS ABSOLUTE COUNT: 3.47 K/UL (ref 1.5–8.1)
SODIUM SERPL-SCNC: 141 MMOL/L (ref 135–144)
TRIGL SERPL-MCNC: 139 MG/DL
WBC # BLD AUTO: 6.4 K/UL (ref 3.5–11.3)

## 2023-03-23 ENCOUNTER — OFFICE VISIT (OUTPATIENT)
Dept: INFECTIOUS DISEASES | Age: 63
End: 2023-03-23

## 2023-03-23 VITALS
BODY MASS INDEX: 32.6 KG/M2 | DIASTOLIC BLOOD PRESSURE: 95 MMHG | WEIGHT: 254 LBS | SYSTOLIC BLOOD PRESSURE: 145 MMHG | HEIGHT: 74 IN | HEART RATE: 113 BPM

## 2023-03-23 DIAGNOSIS — B95.2 ENTEROCOCCUS FAECALIS INFECTION: ICD-10-CM

## 2023-03-23 DIAGNOSIS — T84.51XS INFECTION ASSOCIATED WITH INTERNAL RIGHT HIP PROSTHESIS, SEQUELA: Primary | ICD-10-CM

## 2023-03-23 DIAGNOSIS — A49.8 PSEUDOMONAS AERUGINOSA INFECTION: ICD-10-CM

## 2023-03-23 RX ORDER — CIPROFLOXACIN 500 MG/1
500 TABLET, FILM COATED ORAL 2 TIMES DAILY
Qty: 60 TABLET | Refills: 3 | Status: SHIPPED | OUTPATIENT
Start: 2023-03-23 | End: 2023-06-21

## 2023-03-23 RX ORDER — AMOXICILLIN 500 MG/1
500 CAPSULE ORAL 2 TIMES DAILY
Qty: 180 CAPSULE | Refills: 0 | Status: SHIPPED | OUTPATIENT
Start: 2023-03-23 | End: 2023-06-21

## 2023-03-23 ASSESSMENT — ENCOUNTER SYMPTOMS
GASTROINTESTINAL NEGATIVE: 1
RESPIRATORY NEGATIVE: 1

## 2023-03-23 NOTE — PROGRESS NOTES
with intravenous antimicrobial therapy with meropenem and vancomycin and completed a 6-week course of therapy 11/18/2022     The patient has been on oral suppressive antimicrobial therapy with ciprofloxacin and amoxicillin and comes in today for follow-up and he is here with his wife. He does not report any subjective fevers or chills, no abdominal pain nausea vomiting or diarrhea and he has been tolerating the antibiotic therapy well. I have personally reviewedthe past medical history, medications, social history, and I have updated the database accordingly. Past Medical History:     Past Medical History:   Diagnosis Date    Arthritis     CAD (coronary artery disease)     Cancer ([facility] [address]     prostate    Cervical radiculopathy     CHF (congestive heart failure) ([facility])     DM (diabetes mellitus) ([facility] [address] 2009    IDDM    GERD (gastroesophageal reflux disease) 2017    ON RX    Heart murmur 2013    ASYMPTOMATIC    HTN (hypertension) 06/29/2012    ON RX    Hyperlipidemia 06/29/2012    ON RX    Neuropathy     Sleep apnea 2016    TRIED MACHINE COULD NOT TOLERATE    Vision abnormalities 2019    FLOATERS RIGHT EYE    Wears glasses      Medications:     Current Outpatient Medications   Medication Sig Dispense Refill    ciprofloxacin (CIPRO) 500 MG tablet Take 1 tablet by mouth 2 times daily 60 tablet 3    amoxicillin (AMOXIL) 500 MG capsule Take 1 capsule by mouth 2 times daily 180 capsule 0    Continuous Blood Gluc  (DEXCOM G7 ) MAMADOU Use as directed 2 each 1    Continuous Blood Gluc Sensor (DEXCOM G7 SENSOR) MISC Use as directed 2 each 5    pregabalin (LYRICA) 100 MG capsule TAKE 1 CAPSULE BY MOUTH TWICE DAILY.  60 capsule 2    LANTUS SOLOSTAR 100 UNIT/ML injection pen INJECT 42 UNITS BELOW THE SKIN TWICE DAILY 15 mL 3    TRULICITY 3 II/2.4HQ SOPN INJECT THE CONTENTS OF ONE SYRINGE (3MG) INTO THE SKIN ONCE WEEKLY 2 mL 0    venlafaxine (EFFEXOR XR) 37.5 MG extended release capsule TAKE ONE CAPSULE BY MOUTH ONE

## 2023-03-24 ENCOUNTER — OFFICE VISIT (OUTPATIENT)
Dept: ONCOLOGY | Age: 63
End: 2023-03-24
Payer: MEDICARE

## 2023-03-24 ENCOUNTER — TELEPHONE (OUTPATIENT)
Dept: ONCOLOGY | Age: 63
End: 2023-03-24

## 2023-03-24 VITALS
DIASTOLIC BLOOD PRESSURE: 91 MMHG | BODY MASS INDEX: 32.82 KG/M2 | TEMPERATURE: 97.1 F | WEIGHT: 255.6 LBS | RESPIRATION RATE: 18 BRPM | SYSTOLIC BLOOD PRESSURE: 121 MMHG | HEART RATE: 107 BPM

## 2023-03-24 DIAGNOSIS — C61 PROSTATE CA (HCC): Primary | ICD-10-CM

## 2023-03-24 PROCEDURE — G8417 CALC BMI ABV UP PARAM F/U: HCPCS | Performed by: INTERNAL MEDICINE

## 2023-03-24 PROCEDURE — 99211 OFF/OP EST MAY X REQ PHY/QHP: CPT | Performed by: INTERNAL MEDICINE

## 2023-03-24 PROCEDURE — 99214 OFFICE O/P EST MOD 30 MIN: CPT | Performed by: INTERNAL MEDICINE

## 2023-03-24 PROCEDURE — 1036F TOBACCO NON-USER: CPT | Performed by: INTERNAL MEDICINE

## 2023-03-24 PROCEDURE — 3074F SYST BP LT 130 MM HG: CPT | Performed by: INTERNAL MEDICINE

## 2023-03-24 PROCEDURE — 3017F COLORECTAL CA SCREEN DOC REV: CPT | Performed by: INTERNAL MEDICINE

## 2023-03-24 PROCEDURE — 3080F DIAST BP >= 90 MM HG: CPT | Performed by: INTERNAL MEDICINE

## 2023-03-24 PROCEDURE — G8482 FLU IMMUNIZE ORDER/ADMIN: HCPCS | Performed by: INTERNAL MEDICINE

## 2023-03-24 PROCEDURE — G8427 DOCREV CUR MEDS BY ELIG CLIN: HCPCS | Performed by: INTERNAL MEDICINE

## 2023-03-24 NOTE — PROGRESS NOTES
foraminal narrowing. At L4-L5, severe canal stenosis and moderate bilateral neural foraminal  narrowing. At L5-S1, mild canal stenosis and severe bilateral neural foraminal narrowing. Mild degenerative enhancement of inter spinous ligament from L1-L2 to L5-S1. RECOMMENDATIONS:  Unavailable     ASSESSMENT:    Anthony Yancey is a 60 y.o. male with a diagnosis ofAA unfavorable intermediate risk prostate cancer, clinical stage T1 cN0 M0, Octavio 4+3 + 7, initial PSA 5.11.,  Diagnosed in March 2021  I reviewed laboratory, outside records, prior records, discussed diagnosis, prognosis treatment recommendations. I reviewed the significance and goals of care. Patient did not want surgery and wanted radiation therapy at that time and was evaluated by radiation oncology at [facility].  He was recommended definitive external beam radiation therapy along with androgen suppression therapy and has received 2 doses of Lupron shot 45 mg starting in July 2021. Patient received placement of fiducial marker, SpaceOAR on 8/13/2021. Patient had right hip revision on 10/18/21 for his ongoing hip prosthesis infection therefore his radiation was delayed. Now he has completed radiation therapy on 6/20/2022. During today's visit, the patient and the family had a number of reasonable questions which were answered to their satisfaction. They verbalized understanding of the information provided and they agreed to proceed as outlined above. PLAN:   I reviewed her recent lab work, discussed diagnosis and treatment recommendations   PSA stable 1.7   Clinically doing well and denies any abdominal pain nausea vomiting   Return to clinic in 4 months with labs prior         Dedrick Morales MD  Hematologist/Medical Oncologist    On this date 3/24/23  I have spent 40 minutes reviewing previous notes, test results and face to face with the patient discussing the diagnosis and importance of compliance with the treatment plan.

## 2023-03-24 NOTE — TELEPHONE ENCOUNTER
MANJEET ARRIVES AMBULATORY FOR MD VISIT  DR Hever Peter IN TO SEE PATIENT  ORDERS RECEIVED  RV 4 MONTHS WITH LABS PRIOR  LABS CDP CMP PSA 07/19/23, ORDERS GIVEN TO PT  MD VISIT 07/26/23  @11AM  AVS PRINTED AND GIVEN TO PATIENT WITH INSTRUCTIONS  PATIENT DISCHARGED AMBULATORY

## 2023-04-06 DIAGNOSIS — E11.69 TYPE 2 DIABETES MELLITUS WITH OTHER SPECIFIED COMPLICATION, WITH LONG-TERM CURRENT USE OF INSULIN (HCC): ICD-10-CM

## 2023-04-06 DIAGNOSIS — Z79.4 TYPE 2 DIABETES MELLITUS WITH OTHER SPECIFIED COMPLICATION, WITH LONG-TERM CURRENT USE OF INSULIN (HCC): ICD-10-CM

## 2023-04-06 RX ORDER — DULAGLUTIDE 3 MG/.5ML
INJECTION, SOLUTION SUBCUTANEOUS
Qty: 2 ML | Refills: 0 | Status: SHIPPED | OUTPATIENT
Start: 2023-04-06

## 2023-04-06 RX ORDER — ATORVASTATIN CALCIUM 40 MG/1
TABLET, FILM COATED ORAL
Qty: 30 TABLET | Refills: 3 | Status: SHIPPED | OUTPATIENT
Start: 2023-04-06

## 2023-04-06 RX ORDER — PEN NEEDLE, DIABETIC 31 GX5/16"
NEEDLE, DISPOSABLE MISCELLANEOUS
Qty: 100 EACH | Refills: 3 | Status: SHIPPED | OUTPATIENT
Start: 2023-04-06

## 2023-04-06 NOTE — TELEPHONE ENCOUNTER
[facility])     Aftercare following right hip joint replacement surgery     Infection of right prosthetic hip joint ([address]     Benign localized prostatic hyperplasia with lower urinary tract symptoms (LUTS)     Post-op pain     Hip pain     Lumbar radiculopathy     Neuropathy      ----JF

## 2023-04-06 NOTE — TELEPHONE ENCOUNTER
Diabetes mellitus ([facility] [address]     Aftercare following right hip joint replacement surgery     Infection of right prosthetic hip joint ([facility] [address]     Benign localized prostatic hyperplasia with lower urinary tract symptoms (LUTS)     Post-op pain     Hip pain     Lumbar radiculopathy     Neuropathy      ----JF

## 2023-04-06 NOTE — TELEPHONE ENCOUNTER
E-scribe request for B-D UF PEN NEED 5/16\" 31G (8MM). Please review and e-scribe if applicable. Last Visit Date:  3/15/2023  Next Visit Date:  4/6/2023    Hemoglobin A1C (%)   Date Value   03/15/2023 7.5   10/06/2022 8.4 (H)   08/17/2022 8.1             ( goal A1C is < 7)   Microalb/Crt.  Ratio (mcg/mg creat)   Date Value   03/22/2023 88 (H)     LDL Cholesterol (mg/dL)   Date Value   03/22/2023 30       (goal LDL is <100)   AST (U/L)   Date Value   03/22/2023 22     ALT (U/L)   Date Value   03/22/2023 17     BUN (mg/dL)   Date Value   03/22/2023 11     BP Readings from Last 3 Encounters:   03/24/23 (!) 121/91   03/23/23 (!) 145/95   03/15/23 (!) 143/94          (goal 120/80)        Patient Active Problem List:     DM (diabetes mellitus) ([facility] [address]     HTN (hypertension)     ETOHism (HCC)     Hypertensive urgency     Abnormal ambulatory electrocardiogram     Abnormal ambulatory electrocardiography     Abnormal kidney function     Adiposity     Astigmatism     Atherosclerotic heart disease of native coronary artery without angina pectoris     Cervical radiculopathy     Chronic back pain     Decreased cardiac output     Diabetic peripheral neuropathy (HCC)     Dislocated intraocular lens     Disturbance in sleep behavior     Dizziness     Dyssomnia     Floaters     Impotence of organic origin     Left ventricular dysfunction     Low vision, both eyes     Myopia     Nuclear sclerosis of left eye     Obstructive sleep apnea syndrome     Palpitations     Personal history of other diseases of the digestive system     Posterior vitreous detachment     Presbyopia     Primary osteoarthritis of right hip     Pseudophakia of right eye     Repetitive intrusions of sleep     Sciatica     Tendonitis     Vitamin D deficiency     Vitreous opacities of right eye     Dislocated IOL (intraocular lens), anterior, right     Prostate CA (HCC)     Congestive heart failure (HCC)     Essential hypertension     Benign essential HTN     Diabetes

## 2023-04-11 DIAGNOSIS — Z79.4 TYPE 2 DIABETES MELLITUS WITH OTHER SPECIFIED COMPLICATION, WITH LONG-TERM CURRENT USE OF INSULIN (HCC): ICD-10-CM

## 2023-04-11 DIAGNOSIS — I10 ESSENTIAL HYPERTENSION: ICD-10-CM

## 2023-04-11 DIAGNOSIS — E11.69 TYPE 2 DIABETES MELLITUS WITH OTHER SPECIFIED COMPLICATION, WITH LONG-TERM CURRENT USE OF INSULIN (HCC): ICD-10-CM

## 2023-04-12 PROBLEM — I50.9 CONGESTIVE HEART FAILURE (HCC): Status: RESOLVED | Noted: 2022-03-28 | Resolved: 2023-04-12

## 2023-04-27 ENCOUNTER — OFFICE VISIT (OUTPATIENT)
Dept: PODIATRY | Age: 63
End: 2023-04-27
Payer: MEDICARE

## 2023-04-27 VITALS — WEIGHT: 255.4 LBS | HEIGHT: 74 IN | BODY MASS INDEX: 32.78 KG/M2

## 2023-04-27 DIAGNOSIS — M79.672 PAIN IN BOTH FEET: ICD-10-CM

## 2023-04-27 DIAGNOSIS — I73.9 PVD (PERIPHERAL VASCULAR DISEASE) (HCC): ICD-10-CM

## 2023-04-27 DIAGNOSIS — M79.671 PAIN IN BOTH FEET: ICD-10-CM

## 2023-04-27 DIAGNOSIS — M54.31 SCIATICA OF RIGHT SIDE: ICD-10-CM

## 2023-04-27 DIAGNOSIS — I73.9 INTERMITTENT CLAUDICATION (HCC): ICD-10-CM

## 2023-04-27 DIAGNOSIS — E11.51 TYPE II DIABETES MELLITUS WITH PERIPHERAL CIRCULATORY DISORDER (HCC): Primary | ICD-10-CM

## 2023-04-27 DIAGNOSIS — M51.16 RADICULOPATHY DUE TO LUMBAR INTERVERTEBRAL DISC DISORDER: ICD-10-CM

## 2023-04-27 PROCEDURE — 99999 PR OFFICE/OUTPT VISIT,PROCEDURE ONLY: CPT | Performed by: PODIATRIST

## 2023-04-27 PROCEDURE — 11721 DEBRIDE NAIL 6 OR MORE: CPT | Performed by: PODIATRIST

## 2023-04-27 RX ORDER — EMPAGLIFLOZIN 25 MG/1
TABLET, FILM COATED ORAL
COMMUNITY
Start: 2023-04-12

## 2023-05-05 ENCOUNTER — HOSPITAL ENCOUNTER (OUTPATIENT)
Age: 63
Setting detail: SPECIMEN
Discharge: HOME | End: 2023-05-05

## 2023-05-05 ENCOUNTER — OFFICE VISIT (OUTPATIENT)
Dept: FAMILY MEDICINE CLINIC | Age: 63
End: 2023-05-05
Payer: MEDICARE

## 2023-05-05 VITALS
HEART RATE: 88 BPM | SYSTOLIC BLOOD PRESSURE: 150 MMHG | HEIGHT: 74 IN | DIASTOLIC BLOOD PRESSURE: 101 MMHG | WEIGHT: 255.8 LBS | BODY MASS INDEX: 32.83 KG/M2

## 2023-05-05 DIAGNOSIS — L82.1 SEBORRHEIC KERATOSES: ICD-10-CM

## 2023-05-05 DIAGNOSIS — I10 ESSENTIAL HYPERTENSION: Primary | ICD-10-CM

## 2023-05-05 DIAGNOSIS — G62.9 NEUROPATHY: ICD-10-CM

## 2023-05-05 DIAGNOSIS — C61 PROSTATE CA (HCC): ICD-10-CM

## 2023-05-05 DIAGNOSIS — E11.40 TYPE 2 DIABETES MELLITUS WITH DIABETIC NEUROPATHY, UNSPECIFIED WHETHER LONG TERM INSULIN USE (HCC): ICD-10-CM

## 2023-05-05 LAB
FOLATE SERPL-MCNC: >20 NG/ML
VIT B12 SERPL-MCNC: 627 PG/ML (ref 232–1245)

## 2023-05-05 PROCEDURE — 3051F HG A1C>EQUAL 7.0%<8.0%: CPT | Performed by: STUDENT IN AN ORGANIZED HEALTH CARE EDUCATION/TRAINING PROGRAM

## 2023-05-05 PROCEDURE — 3017F COLORECTAL CA SCREEN DOC REV: CPT | Performed by: STUDENT IN AN ORGANIZED HEALTH CARE EDUCATION/TRAINING PROGRAM

## 2023-05-05 PROCEDURE — 3074F SYST BP LT 130 MM HG: CPT | Performed by: STUDENT IN AN ORGANIZED HEALTH CARE EDUCATION/TRAINING PROGRAM

## 2023-05-05 PROCEDURE — G8427 DOCREV CUR MEDS BY ELIG CLIN: HCPCS | Performed by: STUDENT IN AN ORGANIZED HEALTH CARE EDUCATION/TRAINING PROGRAM

## 2023-05-05 PROCEDURE — 2022F DILAT RTA XM EVC RTNOPTHY: CPT | Performed by: STUDENT IN AN ORGANIZED HEALTH CARE EDUCATION/TRAINING PROGRAM

## 2023-05-05 PROCEDURE — 99211 OFF/OP EST MAY X REQ PHY/QHP: CPT | Performed by: STUDENT IN AN ORGANIZED HEALTH CARE EDUCATION/TRAINING PROGRAM

## 2023-05-05 PROCEDURE — 3078F DIAST BP <80 MM HG: CPT | Performed by: STUDENT IN AN ORGANIZED HEALTH CARE EDUCATION/TRAINING PROGRAM

## 2023-05-05 PROCEDURE — G8417 CALC BMI ABV UP PARAM F/U: HCPCS | Performed by: STUDENT IN AN ORGANIZED HEALTH CARE EDUCATION/TRAINING PROGRAM

## 2023-05-05 PROCEDURE — 1036F TOBACCO NON-USER: CPT | Performed by: STUDENT IN AN ORGANIZED HEALTH CARE EDUCATION/TRAINING PROGRAM

## 2023-05-05 PROCEDURE — 99213 OFFICE O/P EST LOW 20 MIN: CPT | Performed by: STUDENT IN AN ORGANIZED HEALTH CARE EDUCATION/TRAINING PROGRAM

## 2023-05-05 RX ORDER — CARVEDILOL 12.5 MG/1
12.5 TABLET ORAL 2 TIMES DAILY
Qty: 60 TABLET | Refills: 3 | Status: SHIPPED | OUTPATIENT
Start: 2023-05-05

## 2023-05-05 RX ORDER — LOSARTAN POTASSIUM 50 MG/1
50 TABLET ORAL DAILY
Qty: 30 TABLET | Refills: 3 | Status: SHIPPED | OUTPATIENT
Start: 2023-05-05

## 2023-05-10 ENCOUNTER — TELEPHONE (OUTPATIENT)
Dept: GASTROENTEROLOGY | Age: 63
End: 2023-05-10

## 2023-05-10 NOTE — TELEPHONE ENCOUNTER
Pt called back, and I explained to the pt that the ref that was sent to us was incorrect. Writer asked pt if he knew why he needed to be seen for GI. Pt did not know. Writer advised pt to call his PCP and ask what they would like the pt to be seen for, and to please send correct ref. Pt thanked writer. Writer adivsed pt to call us back when new ref is sent if needed.

## 2023-05-10 NOTE — TELEPHONE ENCOUNTER
Pt called and LVM to artur an appt. Pt has a ref in here, but the diagnosis is not for GI. Writer called pt and LVM to call office. Pt has never been seen in the office.

## 2023-06-21 ENCOUNTER — APPOINTMENT (OUTPATIENT)
Dept: GENERAL RADIOLOGY | Age: 63
DRG: 559 | End: 2023-06-21
Payer: MEDICARE

## 2023-06-21 ENCOUNTER — HOSPITAL ENCOUNTER (INPATIENT)
Age: 63
LOS: 1 days | Discharge: LEFT BEFORE DISCHARGE | DRG: 559 | End: 2023-06-21
Attending: EMERGENCY MEDICINE | Admitting: STUDENT IN AN ORGANIZED HEALTH CARE EDUCATION/TRAINING PROGRAM
Payer: MEDICARE

## 2023-06-21 ENCOUNTER — APPOINTMENT (OUTPATIENT)
Dept: CT IMAGING | Age: 63
DRG: 559 | End: 2023-06-21
Payer: MEDICARE

## 2023-06-21 VITALS
RESPIRATION RATE: 26 BRPM | OXYGEN SATURATION: 96 % | SYSTOLIC BLOOD PRESSURE: 182 MMHG | WEIGHT: 261.02 LBS | BODY MASS INDEX: 33.51 KG/M2 | HEART RATE: 89 BPM | TEMPERATURE: 99.8 F | DIASTOLIC BLOOD PRESSURE: 111 MMHG

## 2023-06-21 DIAGNOSIS — T84.59XD: Primary | ICD-10-CM

## 2023-06-21 DIAGNOSIS — Z96.649: Primary | ICD-10-CM

## 2023-06-21 PROBLEM — Z79.4 TYPE 2 DIABETES MELLITUS WITH HYPERGLYCEMIA, WITH LONG-TERM CURRENT USE OF INSULIN (HCC): Status: ACTIVE | Noted: 2022-07-06

## 2023-06-21 PROBLEM — E11.65 TYPE 2 DIABETES MELLITUS WITH HYPERGLYCEMIA, WITH LONG-TERM CURRENT USE OF INSULIN (HCC): Status: ACTIVE | Noted: 2022-07-06

## 2023-06-21 PROBLEM — A41.9 SEPSIS (HCC): Status: ACTIVE | Noted: 2023-06-21

## 2023-06-21 LAB
ALBUMIN SERPL-MCNC: 3.5 G/DL (ref 3.5–5.2)
ALBUMIN/GLOB SERPL: 0.9 {RATIO} (ref 1–2.5)
ALP SERPL-CCNC: 127 U/L (ref 40–129)
ALT SERPL-CCNC: 57 U/L (ref 5–41)
ANION GAP SERPL CALCULATED.3IONS-SCNC: 10 MMOL/L (ref 9–17)
AST SERPL-CCNC: 32 U/L
BASOPHILS # BLD: 0.03 K/UL (ref 0–0.2)
BASOPHILS NFR BLD: 0 % (ref 0–2)
BILIRUB DIRECT SERPL-MCNC: 0.2 MG/DL
BILIRUB INDIRECT SERPL-MCNC: 0.3 MG/DL (ref 0–1)
BILIRUB SERPL-MCNC: 0.5 MG/DL (ref 0.3–1.2)
BILIRUB UR QL STRIP: NEGATIVE
BUN SERPL-MCNC: 12 MG/DL (ref 8–23)
CALCIUM SERPL-MCNC: 9 MG/DL (ref 8.6–10.4)
CASTS #/AREA URNS LPF: ABNORMAL /LPF (ref 0–8)
CHLORIDE SERPL-SCNC: 99 MMOL/L (ref 98–107)
CLARITY UR: CLEAR
CO2 SERPL-SCNC: 27 MMOL/L (ref 20–31)
COLOR UR: YELLOW
CREAT SERPL-MCNC: 0.93 MG/DL (ref 0.7–1.2)
CRP SERPL HS-MCNC: 40.4 MG/L (ref 0–5)
EOSINOPHIL # BLD: 0.06 K/UL (ref 0–0.44)
EOSINOPHILS RELATIVE PERCENT: 1 % (ref 1–4)
EPI CELLS #/AREA URNS HPF: ABNORMAL /HPF (ref 0–5)
ERYTHROCYTE [DISTWIDTH] IN BLOOD BY AUTOMATED COUNT: 15.9 % (ref 11.8–14.4)
ERYTHROCYTE [SEDIMENTATION RATE] IN BLOOD BY WESTERGREN METHOD: 58 MM/HR (ref 0–20)
GFR SERPL CREATININE-BSD FRML MDRD: >60 ML/MIN/1.73M2
GLUCOSE SERPL-MCNC: 193 MG/DL (ref 70–99)
GLUCOSE UR STRIP-MCNC: NEGATIVE MG/DL
HCT VFR BLD AUTO: 38.9 % (ref 40.7–50.3)
HGB BLD-MCNC: 12.3 G/DL (ref 13–17)
HGB UR QL STRIP.AUTO: ABNORMAL
IMM GRANULOCYTES # BLD AUTO: <0.03 K/UL (ref 0–0.3)
IMM GRANULOCYTES NFR BLD: 0 %
INR PPP: 1
KETONES UR STRIP-MCNC: NEGATIVE MG/DL
LACTIC ACID, SEPSIS WHOLE BLOOD: 2.1 MMOL/L (ref 0.5–1.9)
LACTIC ACID, SEPSIS WHOLE BLOOD: 2.4 MMOL/L (ref 0.5–1.9)
LEUKOCYTE ESTERASE UR QL STRIP: NEGATIVE
LYMPHOCYTES # BLD: 17 % (ref 24–43)
LYMPHOCYTES NFR BLD: 1.47 K/UL (ref 1.1–3.7)
MCH RBC QN AUTO: 26.7 PG (ref 25.2–33.5)
MCHC RBC AUTO-ENTMCNC: 31.6 G/DL (ref 28.4–34.8)
MCV RBC AUTO: 84.6 FL (ref 82.6–102.9)
MONOCYTES NFR BLD: 0.96 K/UL (ref 0.1–1.2)
MONOCYTES NFR BLD: 11 % (ref 3–12)
NEUTROPHILS NFR BLD: 71 % (ref 36–65)
NEUTS SEG NFR BLD: 5.96 K/UL (ref 1.5–8.1)
NITRITE UR QL STRIP: NEGATIVE
NRBC AUTOMATED: 0 PER 100 WBC
PARTIAL THROMBOPLASTIN TIME: 27.9 SEC (ref 23–36.5)
PH UR STRIP: 8 [PH] (ref 5–8)
PLATELET # BLD AUTO: 224 K/UL (ref 138–453)
PMV BLD AUTO: 9.7 FL (ref 8.1–13.5)
POTASSIUM SERPL-SCNC: 4.1 MMOL/L (ref 3.7–5.3)
PROT SERPL-MCNC: 7.2 G/DL (ref 6.4–8.3)
PROT UR STRIP-MCNC: ABNORMAL MG/DL
PROTHROMBIN TIME: 13.2 SEC (ref 11.7–14.9)
RBC # BLD AUTO: 4.6 M/UL (ref 4.21–5.77)
RBC # BLD: ABNORMAL 10*6/UL
RBC #/AREA URNS HPF: ABNORMAL /HPF (ref 0–4)
SARS-COV-2 RDRP RESP QL NAA+PROBE: NOT DETECTED
SODIUM SERPL-SCNC: 136 MMOL/L (ref 135–144)
SP GR UR STRIP: 1.03 (ref 1–1.03)
SPECIMEN DESCRIPTION: NORMAL
UROBILINOGEN UR STRIP-ACNC: NORMAL
WBC #/AREA URNS HPF: ABNORMAL /HPF (ref 0–5)
WBC OTHER # BLD: 8.5 K/UL (ref 3.5–11.3)

## 2023-06-21 PROCEDURE — 99285 EMERGENCY DEPT VISIT HI MDM: CPT

## 2023-06-21 PROCEDURE — 81001 URINALYSIS AUTO W/SCOPE: CPT

## 2023-06-21 PROCEDURE — 85730 THROMBOPLASTIN TIME PARTIAL: CPT

## 2023-06-21 PROCEDURE — 71045 X-RAY EXAM CHEST 1 VIEW: CPT

## 2023-06-21 PROCEDURE — 87040 BLOOD CULTURE FOR BACTERIA: CPT

## 2023-06-21 PROCEDURE — 73502 X-RAY EXAM HIP UNI 2-3 VIEWS: CPT

## 2023-06-21 PROCEDURE — 6370000000 HC RX 637 (ALT 250 FOR IP): Performed by: EMERGENCY MEDICINE

## 2023-06-21 PROCEDURE — 85610 PROTHROMBIN TIME: CPT

## 2023-06-21 PROCEDURE — 73702 CT LWR EXTREMITY W/O&W/DYE: CPT

## 2023-06-21 PROCEDURE — 80076 HEPATIC FUNCTION PANEL: CPT

## 2023-06-21 PROCEDURE — 83605 ASSAY OF LACTIC ACID: CPT

## 2023-06-21 PROCEDURE — 1200000000 HC SEMI PRIVATE

## 2023-06-21 PROCEDURE — 2580000003 HC RX 258: Performed by: EMERGENCY MEDICINE

## 2023-06-21 PROCEDURE — 6360000004 HC RX CONTRAST MEDICATION: Performed by: EMERGENCY MEDICINE

## 2023-06-21 PROCEDURE — 85652 RBC SED RATE AUTOMATED: CPT

## 2023-06-21 PROCEDURE — 87635 SARS-COV-2 COVID-19 AMP PRB: CPT

## 2023-06-21 PROCEDURE — 86140 C-REACTIVE PROTEIN: CPT

## 2023-06-21 PROCEDURE — 80048 BASIC METABOLIC PNL TOTAL CA: CPT

## 2023-06-21 PROCEDURE — 99223 1ST HOSP IP/OBS HIGH 75: CPT | Performed by: INTERNAL MEDICINE

## 2023-06-21 PROCEDURE — 85027 COMPLETE CBC AUTOMATED: CPT

## 2023-06-21 RX ORDER — ACETAMINOPHEN 325 MG/1
650 TABLET ORAL EVERY 6 HOURS PRN
Status: CANCELLED | OUTPATIENT
Start: 2023-06-21

## 2023-06-21 RX ORDER — INSULIN LISPRO 100 [IU]/ML
0-8 INJECTION, SOLUTION INTRAVENOUS; SUBCUTANEOUS
Status: CANCELLED | OUTPATIENT
Start: 2023-06-21

## 2023-06-21 RX ORDER — SODIUM CHLORIDE 0.9 % (FLUSH) 0.9 %
5-40 SYRINGE (ML) INJECTION EVERY 12 HOURS SCHEDULED
Status: CANCELLED | OUTPATIENT
Start: 2023-06-21

## 2023-06-21 RX ORDER — POTASSIUM CHLORIDE 20 MEQ/1
40 TABLET, EXTENDED RELEASE ORAL PRN
Status: CANCELLED | OUTPATIENT
Start: 2023-06-21

## 2023-06-21 RX ORDER — SODIUM CHLORIDE 9 MG/ML
INJECTION, SOLUTION INTRAVENOUS CONTINUOUS
Status: CANCELLED | OUTPATIENT
Start: 2023-06-21

## 2023-06-21 RX ORDER — TAMSULOSIN HYDROCHLORIDE 0.4 MG/1
0.4 CAPSULE ORAL DAILY
Status: CANCELLED | OUTPATIENT
Start: 2023-06-21

## 2023-06-21 RX ORDER — VENLAFAXINE HYDROCHLORIDE 37.5 MG/1
37.5 CAPSULE, EXTENDED RELEASE ORAL
Status: CANCELLED | OUTPATIENT
Start: 2023-06-22

## 2023-06-21 RX ORDER — ATORVASTATIN CALCIUM 80 MG/1
40 TABLET, FILM COATED ORAL DAILY
Status: CANCELLED | OUTPATIENT
Start: 2023-06-21

## 2023-06-21 RX ORDER — ONDANSETRON 4 MG/1
4 TABLET, ORALLY DISINTEGRATING ORAL EVERY 8 HOURS PRN
Status: CANCELLED | OUTPATIENT
Start: 2023-06-21

## 2023-06-21 RX ORDER — MAGNESIUM SULFATE 1 G/100ML
1000 INJECTION INTRAVENOUS PRN
Status: CANCELLED | OUTPATIENT
Start: 2023-06-21

## 2023-06-21 RX ORDER — HYDROCODONE BITARTRATE AND ACETAMINOPHEN 5; 325 MG/1; MG/1
1 TABLET ORAL ONCE
Status: COMPLETED | OUTPATIENT
Start: 2023-06-21 | End: 2023-06-21

## 2023-06-21 RX ORDER — INSULIN LISPRO 100 [IU]/ML
0-4 INJECTION, SOLUTION INTRAVENOUS; SUBCUTANEOUS NIGHTLY
Status: CANCELLED | OUTPATIENT
Start: 2023-06-21

## 2023-06-21 RX ORDER — 0.9 % SODIUM CHLORIDE 0.9 %
1000 INTRAVENOUS SOLUTION INTRAVENOUS ONCE
Status: COMPLETED | OUTPATIENT
Start: 2023-06-21 | End: 2023-06-21

## 2023-06-21 RX ORDER — SODIUM CHLORIDE 9 MG/ML
INJECTION, SOLUTION INTRAVENOUS PRN
Status: CANCELLED | OUTPATIENT
Start: 2023-06-21

## 2023-06-21 RX ORDER — ONDANSETRON 2 MG/ML
4 INJECTION INTRAMUSCULAR; INTRAVENOUS EVERY 6 HOURS PRN
Status: CANCELLED | OUTPATIENT
Start: 2023-06-21

## 2023-06-21 RX ORDER — POLYETHYLENE GLYCOL 3350 17 G/17G
17 POWDER, FOR SOLUTION ORAL DAILY PRN
Status: CANCELLED | OUTPATIENT
Start: 2023-06-21

## 2023-06-21 RX ORDER — ASPIRIN 81 MG/1
81 TABLET ORAL DAILY
Status: CANCELLED | OUTPATIENT
Start: 2023-06-21

## 2023-06-21 RX ORDER — ACETAMINOPHEN 650 MG/1
650 SUPPOSITORY RECTAL EVERY 6 HOURS PRN
Status: CANCELLED | OUTPATIENT
Start: 2023-06-21

## 2023-06-21 RX ORDER — CARVEDILOL 12.5 MG/1
12.5 TABLET ORAL 2 TIMES DAILY
Status: CANCELLED | OUTPATIENT
Start: 2023-06-21

## 2023-06-21 RX ORDER — POTASSIUM CHLORIDE 7.45 MG/ML
10 INJECTION INTRAVENOUS PRN
Status: CANCELLED | OUTPATIENT
Start: 2023-06-21

## 2023-06-21 RX ORDER — ENOXAPARIN SODIUM 100 MG/ML
30 INJECTION SUBCUTANEOUS 2 TIMES DAILY
Status: CANCELLED | OUTPATIENT
Start: 2023-06-21

## 2023-06-21 RX ORDER — SODIUM CHLORIDE 0.9 % (FLUSH) 0.9 %
10 SYRINGE (ML) INJECTION PRN
Status: CANCELLED | OUTPATIENT
Start: 2023-06-21

## 2023-06-21 RX ORDER — LOSARTAN POTASSIUM 50 MG/1
50 TABLET ORAL DAILY
Status: CANCELLED | OUTPATIENT
Start: 2023-06-21

## 2023-06-21 RX ADMIN — HYDROCODONE BITARTRATE AND ACETAMINOPHEN 1 TABLET: 5; 325 TABLET ORAL at 11:30

## 2023-06-21 RX ADMIN — SODIUM CHLORIDE 1000 ML: 9 INJECTION, SOLUTION INTRAVENOUS at 14:52

## 2023-06-21 RX ADMIN — IOPAMIDOL 75 ML: 755 INJECTION, SOLUTION INTRAVENOUS at 15:38

## 2023-06-21 ASSESSMENT — ENCOUNTER SYMPTOMS
PHOTOPHOBIA: 0
ABDOMINAL PAIN: 0
COUGH: 0
SHORTNESS OF BREATH: 0
SORE THROAT: 0
NAUSEA: 0
BACK PAIN: 0
RHINORRHEA: 0
DIARRHEA: 0
VOMITING: 0

## 2023-06-21 ASSESSMENT — PAIN - FUNCTIONAL ASSESSMENT: PAIN_FUNCTIONAL_ASSESSMENT: 0-10

## 2023-06-21 ASSESSMENT — PAIN SCALES - GENERAL: PAINLEVEL_OUTOF10: 10

## 2023-06-21 NOTE — ED NOTES
Attempted report to [facility] RN, writer informed that RN \"stepped off the floor\". Will call back.      Afia Rand RN  06/21/23 5610
Attempted report to [facility] RN, writer placed on hold for 5 minutes with no return. Writer calling back.      Francesco Andres RN  06/21/23 0314
I faxed the [facility] from 10-. tess Johns at [facility] on 06-.
Labeled blood specimens sent to lab via tube system.     [] Lavender   [] on ice   [x] Blue   [] Green/yellow  [x] Green/black [] on ice  [] Pink  [] Red  [] Yellow  [] on ice  [x] Blood Cultures      Nick Queen RN  06/21/23 7079
Labeled urine specimen sent to lab via tube system.     Urine Sample   []  Clean catch   [] Straight cath   [x] Urine voided   []  Indwelling catheter   []  Suprapubic catheter     Diomedes Galeano RN  06/21/23 2652
Need last well visit fax no# [phone] Attn: Gladys Batres
Pt given meal tray. Ortho at bedside.      Mahnaz Pollard, NANCY  06/21/23 0733
Pt reported that he was not staying and left ED at this time.      Tej Castro, NANCY  06/21/23 2831
Report called to [facility] RN, all questions answered.      Nick Queen RN  06/21/23 2925
Writer came out of another room and pt ambulating out of room with walker. Pt stating he is leaving and does not wish to stay. Pt requested writer remove his IV after being educated he cannot leave with IV in place. Writer Ch Served admitting service, waiting on response.      Nick Queen RN  06/21/23 0646
X-ray at bedside      Micheline Bingham RN  06/21/23 9740
No, Impaired Mobility: Ambulates or transfers with assistive devices or assistance;  Unable to ambulate or transer.: No    Diagnosis:  DISPOSITION Admitted 06/21/2023 03:36:18 PM   Final diagnoses:   Chronic infection of prosthetic hip, subsequent encounter        Code Status: Full Code     Consults:  []  Hospitalist  Completed  [] yes [] no  [x]  Medicine  Completed  [x] yes [] No  []  Cardiology  Completed  [] yes [] No  []  GI   Completed  [] yes [] No  []  Neurology  Completed  [] yes [] No  []  Nephrology Completed  [] yes [] No  []  Vascular  Completed  [] yes [] No   []  Surgery  Completed  [] yes [] No   []  Urology  Completed  [] yes [] No   []  Plastics  Completed  [] yes [] No   []  ENT  Completed  [] yes [] No   [x]  Other: Ortho, ID Completed  [x] yes [] No    Consults:  IP CONSULT TO ORTHOPEDIC SURGERY  IP CONSULT TO INTERNAL MEDICINE  IP CONSULT TO INFECTIOUS DISEASES     Treatment Team:   Treatment Team: Attending Provider: Annette Tyler DO; Consulting Physician: Johanne Vasquez DO; Registered Nurse: Nick Queen RN; Consulting Physician: Stephenie Orourke MD; Resident: Blaire Thapa DO; Resident: Roger Gardiner DO; Resident: Leticia Leon DO; Resident: Estrellita Hall DO; Resident: Priya Natarajan MD; Resident: Jen Zavala DO; Resident: Pauline Higginbotham DO; Resident: Jad Henley DO; Resident: Rivera Clark DO; Resident: Mg Jara DO; Resident: Clementine Irvin DO; Resident: Guzman Gomez DO; Resident: Phillip Jones DO; Resident: Dani Hart DO; Resident: Jah Chan DO; Consulting Physician: Traci Elmore MD    Treatment:              Skin Assessment:        Pain Score:  Pain Assessment  Pain Assessment: 0-10  Pain Level: 10      SOCIAL HISTORY       Social History     Socioeconomic History    Marital status: Single     Spouse name: None    Number of children: None    Years of education: None    Highest education level: None   Tobacco Use    Smoking

## 2023-06-21 NOTE — DISCHARGE INSTRUCTIONS
Orthopaedic Instructions:  -Weight bearing status: Weight bearing as tolerated with the right leg  -Always work on toe motion (to non-injured toes)  to decrease swelling.  -Ice (20 minutes on and off 1 hour) to reduce swelling and throbbing pain.  -Call the office or come to Emergency Room if signs of infection appear (hot, swollen, red, draining pus, fever)  -Take medications as prescribed.  -Wean off narcotics (percocet/norco) as soon as possible. Do not take tylenol if still taking narcotics.  -Follow up with Dr. Farideh Osman in his office on  discharge . Call [phone]  to schedule/confirm.

## 2023-06-21 NOTE — ED PROVIDER NOTES
20 - 31 mmol/L    Anion Gap 10 9 - 17 mmol/L   Sedimentation Rate   Result Value Ref Range    Sed Rate 58 (H) 0 - 20 mm/Hr   C-Reactive Protein   Result Value Ref Range    CRP 40.4 (H) 0.0 - 5.0 mg/L   Lactate, Sepsis   Result Value Ref Range    Lactic Acid, Sepsis, Whole Blood 2.1 (H) 0.5 - 1.9 mmol/L   Lactate, Sepsis   Result Value Ref Range    Lactic Acid, Sepsis, Whole Blood 2.4 (H) 0.5 - 1.9 mmol/L   Protime-INR   Result Value Ref Range    Protime 13.2 11.7 - 14.9 sec    INR 1.0    APTT   Result Value Ref Range    PTT 27.9 23.0 - 36.5 sec       Elevated inflammatory markers, elevated lactic acid, covid negative    EMERGENCY DEPARTMENT COURSE:   Vitals:    Vitals:    06/21/23 1400 06/21/23 1454 06/21/23 1500 06/21/23 1515   BP: (!) 154/107 (!) 164/104 (!) 163/105 (!) 159/107   Pulse: 91 92 91 96   Resp: 24 19 26    Temp:       TempSrc:       SpO2:       Weight:         -------------------------  BP: (!) 159/107, Temp: 99.8 °F (37.7 °C), Pulse: 96, Respirations: 26        CONSULTS:  Orthopedic surgery  2:13 PM EDT  Orthopedic surgery resident at bedside. Concern for infected prosthetic hip. Internal medicine  Dr Riana Padilla agrees to admit the patient. PROCEDURES:  None    FINAL IMPRESSION      1. Chronic infection of prosthetic hip, subsequent encounter          DISPOSITION/PLAN   DISPOSITION Decision To Admit 06/21/2023 03:34:23 PM      CONDITION ON DISPOSITION:   Stable     PATIENT REFERRED TO:  No follow-up provider specified.     DISCHARGE MEDICATIONS:  New Prescriptions    No medications on file       (Please note that portions of this note were completed with a voicerecognition program.  Efforts were made to edit the dictations but occasionally words are mis-transcribed.)    Barry Corona MD  Attending Emergency Medicine Physician        Barry Corona MD  06/21/23 8691

## 2023-06-21 NOTE — CONSULTS
Sural/saphenous/SPN/DPN/plantar nerve distribution SILT. Patient has no groin pain with log roll maneuver. The foot is warm and well-perfused with BCR    Labs:  Recent Labs     06/21/23  1140   WBC 8.5   HGB 12.3*   HCT 38.9*      INR 1.0      K 4.1   BUN 12   CREATININE 0.93   GLUCOSE 193*   SEDRATE 58*   CRP 40.4*        Imaging:   Right hip Radiograph  Comparison: 10/19/2023  Findings: 2 radiographic views of the right hip AP, lateral demonstrating retained orthopedic hardware in the form of a right total hip arthroplasty. No acute fracture or dislocation, there is new heterotopic ossification that is not visualized on previous radiographs from October 2022. There is mild lucency around the acetabular cup with superior lateral acetabular cup uncovering which is previously visualized in radiographs from October 2022. No evidence of acute hardware failure. Assessment/Plan: 58 y.o. male being seen for:    Right Hip Chronic Joint Infection     - Mr. Leonides Almodovar is seen and evaluated in the emergency department. We had extensive discussion regarding his current clinical state. He is aware of his chronic infection in his right hip and the importance of taking his oral antibiotics. I reiterated this. He has an elevated sepsis score and is currently being worked up for sources of sepsis. It is likely that his right hip is contributing. We discussed potential treatment options for his right hip. At this point in time the best option for the patient is two-stage revision with antibiotic spacer application. We discussed that with removal of implants and spacer application would make him minimally weightbearing to his right lower extremity for period of time. He would then undergo a second procedure for insertion of his definitive implants. This procedure can be scheduled and performed on an outpatient basis.     -We will obtain a CT of the right hip with metal suppression to evaluate for fluid
Prevention measures reviewed  All prior entries were reviewed  Reviewed Administration of medications as ordered  Established Prognosis: [address]  Discharge planning reviewed  Reviewed need for follow up as outpatient.     Nika Campoverde MD.      Pager: [phone] - Office: [phone]

## 2023-06-21 NOTE — H&P
[facility]  Office: [address], DO, Vicente Beck, DO, Claudene Craver, DO, Yunier Avendano Blood, DO, Linda Rolle MD, Carmen Owen MD, Selma Uriarte MD, Koby Ramirez MD,  Elvis Roper MD, Anna Soto MD, Norm Marino, DO, Marcos Mcgee MD,  Yonatan Celaya MD, Dinesh Valdes MD, Georgia Banda DO, Brooks Lujan MD, Ferdinand Gilbert MD, Marcellus Fitzpatrick, DO, Bailey Cm MD, Jana Olivares MD, Miri Green MD, Olivia Haider MD,  Brayan Calles, DO, Mallory Damon MD,  Charlie Kirkpatrick, CNP,  Quin Pitts, CNP, Estelita Akers, CNP, Melanie Marte, CNP,  Rosemary Vance, DNP, Juanjo Sheehan, CNP, Missael Leone, CNP, Fuad Martinez, CNP, Tom Akins, CNP, Tony Lobato, CNP, Zuleyka Irizarry PA-C, Madeline Terry, CNS, Mahesh Watts, CNP, Romayne Motes, CNP         [facility]   [address] [facility]    HISTORY AND PHYSICAL EXAMINATION            Date:   6/21/2023  Patient name:  Fela Ford  Date of admission:  6/21/2023 10:23 AM  MRN:   [id number]  Account:  [de-identified]  YOB: 1960  PCP:    Vladimir Li MD  Room:   47PED/PED  Code Status:    Prior      History Obtained From:     patient, electronic medical record    History of Present Illness: Fela Ford is a 58 y.o. male who presents to the hospital for evaluation of right hip pain. Pt has a history of right total hip arthroplasty. Surgery was complicated by infection. He was seen by infectious disease and underwent surgical debridement and wound vac placement. He was supposed to be on chronic antibiotics for suppressive therapy however may not have been taking it. Today patient says hes having pain in both legs that's been going on for the last 6 months however is coming in today because the pain Is not bearable and limiting his ability to ambulate.  Pain is described as aching and radiates to his left/right buttock and radiates

## 2023-06-21 NOTE — ED TRIAGE NOTES
Pt arrived to ED alert and oriented X4. Pt states \"I have had leg and arm pain that started yesterday. \"   Pt denies any activities that could have caused his pain. Pt denies any n/v/d . Pt states his feet have started to swell. It began a couple days ago. Pt also explains \"I have prostate cancer that they diagnosed me with about 6 months ago. \"   Pt resting comfortably and in no acute distress. Respirations even and nonlabored. Patient denies any needs at this time. Bed in lowest position. Call light within reach. Will continue to monitor.

## 2023-06-22 ENCOUNTER — CARE COORDINATION (OUTPATIENT)
Dept: CASE MANAGEMENT | Age: 63
End: 2023-06-22

## 2023-06-22 NOTE — CARE COORDINATION
Care Transitions Outreach Attempt #1  Initial 24 hour call    Call within 2 business days of discharge: Yes   Attempted to reach patient for transitions of care follow up. Unable to reach patient. HIPAA compliant message left on  requesting a return call. Patient: Trude Graft Patient : 1960 MRN: [id number]    Last Discharge [address]       Date Complaint Diagnosis Description Type Department Provider    23 Arm Pain; Leg Pain Chronic infection of prosthetic hip, subsequent encounter ED (ELOPEMENT) [facility] ED Mary Jo Graham DO; Cami Post. .. Was this an external facility discharge?  No Discharge Facility: [facility]    Noted following upcoming appointments from discharge chart review:   [address] follow up appointment(s):   Future Appointments   Date Time Provider Lis[facility] Pierce   2023  3:30 PM DO Neeta Winchester  [facility]   2023  2:45 PM JOURDAN Ibanez Podiatry [facility]   2023  2:00 PM SCHEDULE, [facility] RAD ONC NURSE [facility] RON [place]   2023 11:00 AM Kerwin Salazar MD [facility]   2023  9:00 AM Sandra Levy MD INFT DISEASE [facility]   2023  2:30 PM Faisal Samuels PA-C  derm  [address] [facility]  Care Transition Nurse  [phone]

## 2023-06-23 ENCOUNTER — CARE COORDINATION (OUTPATIENT)
Dept: CASE MANAGEMENT | Age: 63
End: 2023-06-23

## 2023-06-23 NOTE — CARE COORDINATION
Care Transitions Outreach Attempt #2    Call within 2 business days of discharge: Yes   Attempt #2 to reach patient for transitions of care follow up. Unable to reach patient. Left message requesting call back. Patient: Neftaly Reid Patient : 1960 MRN: [id number]    Last Discharge [address]       Date Complaint Diagnosis Description Type Department Provider    23 Arm Pain; Leg Pain Chronic infection of prosthetic hip, subsequent encounter ED (ELOPEMENT) [facility] ED Orion Nick DO; Hebert Guardado. .. Was this an external facility discharge? No Discharge Facility: [facility]    Noted following upcoming appointments from discharge chart review:   [facility] follow up appointment(s):   Future Appointments   Date Time Provider Liset Pierce   2023  3:30 PM Sara Lim DO [facility]  [facility]   2023  2:45 PM [facility]ik DPM [facility]   2023  2:00 PM SCHEDULE, [facility] PB[facility]G RAD ONC NURSE [facility]   2023 11:00 AM Bettye Aranda MD [facility]   2023  9:00 AM Yulia Kumar MD INFT DISEASE [facility]   2023  2:30 PM Iker Walsh PA-C  derm [facility]     Sofya Adler RN BSN [facility]  Care Transitions Nurse  [phone]   [email or web address]

## 2023-06-25 PROBLEM — Z96.649: Status: ACTIVE | Noted: 2023-06-25

## 2023-06-25 PROBLEM — T84.59XD: Status: ACTIVE | Noted: 2023-06-25

## 2023-06-25 LAB
MICROORGANISM SPEC CULT: NORMAL
MICROORGANISM SPEC CULT: NORMAL
SERVICE CMNT-IMP: NORMAL
SERVICE CMNT-IMP: NORMAL
SPECIMEN DESCRIPTION: NORMAL
SPECIMEN DESCRIPTION: NORMAL

## 2023-06-28 ENCOUNTER — OFFICE VISIT (OUTPATIENT)
Dept: FAMILY MEDICINE CLINIC | Age: 63
End: 2023-06-28
Payer: MEDICARE

## 2023-06-28 VITALS
WEIGHT: 252.6 LBS | BODY MASS INDEX: 32.42 KG/M2 | SYSTOLIC BLOOD PRESSURE: 120 MMHG | OXYGEN SATURATION: 97 % | HEART RATE: 92 BPM | DIASTOLIC BLOOD PRESSURE: 81 MMHG | HEIGHT: 74 IN

## 2023-06-28 DIAGNOSIS — Z79.4 TYPE 2 DIABETES MELLITUS WITH OTHER SPECIFIED COMPLICATION, WITH LONG-TERM CURRENT USE OF INSULIN (HCC): ICD-10-CM

## 2023-06-28 DIAGNOSIS — E11.69 TYPE 2 DIABETES MELLITUS WITH OTHER SPECIFIED COMPLICATION, WITH LONG-TERM CURRENT USE OF INSULIN (HCC): ICD-10-CM

## 2023-06-28 DIAGNOSIS — T84.51XS INFECTION ASSOCIATED WITH INTERNAL RIGHT HIP PROSTHESIS, SEQUELA: ICD-10-CM

## 2023-06-28 DIAGNOSIS — E11.40 TYPE 2 DIABETES MELLITUS WITH DIABETIC NEUROPATHY, UNSPECIFIED WHETHER LONG TERM INSULIN USE (HCC): Primary | ICD-10-CM

## 2023-06-28 LAB — HBA1C MFR BLD: 8.5 %

## 2023-06-28 PROCEDURE — 83037 HB GLYCOSYLATED A1C HOME DEV: CPT

## 2023-06-28 RX ORDER — FOLIC ACID 1 MG/1
1 TABLET ORAL DAILY
Qty: 90 TABLET | Refills: 1 | Status: SHIPPED | OUTPATIENT
Start: 2023-06-28

## 2023-06-28 RX ORDER — DULAGLUTIDE 3 MG/.5ML
INJECTION, SOLUTION SUBCUTANEOUS
Qty: 2 ML | Refills: 5 | Status: CANCELLED | OUTPATIENT
Start: 2023-06-28

## 2023-06-28 ASSESSMENT — ENCOUNTER SYMPTOMS
NAUSEA: 0
SHORTNESS OF BREATH: 0
ABDOMINAL PAIN: 0
COUGH: 0
RHINORRHEA: 0
BACK PAIN: 0
SORE THROAT: 0
DIARRHEA: 0

## 2023-06-30 ENCOUNTER — TELEPHONE (OUTPATIENT)
Dept: PHARMACY | Facility: CLINIC | Age: 63
End: 2023-06-30

## 2023-07-05 NOTE — TELEPHONE ENCOUNTER
Reached patient and scheduled 7/6/23 at 9:30AM    [facility] free: [phone]     For Pharmacy Admin Tracking Only    Program: Estella in place:  No  Recommendation Provided To: Patient/Caregiver: 1 via Telephone  Intervention Detail: Scheduled Appointment  Intervention Accepted By: Patient/Caregiver: 1  Gap Closed?: Yes   Time Spent (min): 15

## 2023-07-06 ENCOUNTER — TELEPHONE (OUTPATIENT)
Dept: PHARMACY | Facility: CLINIC | Age: 63
End: 2023-07-06

## 2023-07-06 ENCOUNTER — OFFICE VISIT (OUTPATIENT)
Dept: PODIATRY | Age: 63
End: 2023-07-06
Payer: MEDICARE

## 2023-07-06 VITALS — WEIGHT: 252 LBS | HEIGHT: 74 IN | BODY MASS INDEX: 32.34 KG/M2

## 2023-07-06 DIAGNOSIS — M20.41 HAMMER TOES OF BOTH FEET: ICD-10-CM

## 2023-07-06 DIAGNOSIS — L85.3 XEROSIS OF SKIN: ICD-10-CM

## 2023-07-06 DIAGNOSIS — M54.31 SCIATICA OF RIGHT SIDE: ICD-10-CM

## 2023-07-06 DIAGNOSIS — Z79.4 TYPE 2 DIABETES MELLITUS WITH OTHER SPECIFIED COMPLICATION, WITH LONG-TERM CURRENT USE OF INSULIN (HCC): ICD-10-CM

## 2023-07-06 DIAGNOSIS — M79.672 PAIN IN BOTH FEET: ICD-10-CM

## 2023-07-06 DIAGNOSIS — I73.9 PVD (PERIPHERAL VASCULAR DISEASE) (HCC): ICD-10-CM

## 2023-07-06 DIAGNOSIS — I73.9 INTERMITTENT CLAUDICATION (HCC): ICD-10-CM

## 2023-07-06 DIAGNOSIS — M51.16 RADICULOPATHY DUE TO LUMBAR INTERVERTEBRAL DISC DISORDER: ICD-10-CM

## 2023-07-06 DIAGNOSIS — E11.51 TYPE II DIABETES MELLITUS WITH PERIPHERAL CIRCULATORY DISORDER (HCC): Primary | ICD-10-CM

## 2023-07-06 DIAGNOSIS — B35.1 DERMATOPHYTOSIS OF NAIL: ICD-10-CM

## 2023-07-06 DIAGNOSIS — M79.671 PAIN IN BOTH FEET: ICD-10-CM

## 2023-07-06 DIAGNOSIS — M20.42 HAMMER TOES OF BOTH FEET: ICD-10-CM

## 2023-07-06 DIAGNOSIS — E11.69 TYPE 2 DIABETES MELLITUS WITH OTHER SPECIFIED COMPLICATION, WITH LONG-TERM CURRENT USE OF INSULIN (HCC): ICD-10-CM

## 2023-07-06 PROCEDURE — G8427 DOCREV CUR MEDS BY ELIG CLIN: HCPCS | Performed by: PODIATRIST

## 2023-07-06 PROCEDURE — 3052F HG A1C>EQUAL 8.0%<EQUAL 9.0%: CPT | Performed by: PODIATRIST

## 2023-07-06 PROCEDURE — 2022F DILAT RTA XM EVC RTNOPTHY: CPT | Performed by: PODIATRIST

## 2023-07-06 PROCEDURE — 1111F DSCHRG MED/CURRENT MED MERGE: CPT | Performed by: PODIATRIST

## 2023-07-06 PROCEDURE — 11721 DEBRIDE NAIL 6 OR MORE: CPT | Performed by: PODIATRIST

## 2023-07-06 PROCEDURE — 99213 OFFICE O/P EST LOW 20 MIN: CPT | Performed by: PODIATRIST

## 2023-07-06 PROCEDURE — G8417 CALC BMI ABV UP PARAM F/U: HCPCS | Performed by: PODIATRIST

## 2023-07-06 PROCEDURE — 1036F TOBACCO NON-USER: CPT | Performed by: PODIATRIST

## 2023-07-06 PROCEDURE — 3017F COLORECTAL CA SCREEN DOC REV: CPT | Performed by: PODIATRIST

## 2023-07-06 NOTE — TELEPHONE ENCOUNTER
CLINICAL PHARMACY NOTE - Medication Review  Patient outreach to review medications - Spoke with patient. SUBJECTIVE/OBJECTIVE:   Patrick Glass is a 58 y.o. male referred to a clinical pharmacy specialist by provider. After appt was scheduled I realized that the referral was actually meant for a pharmacist in the clinic to view/interpret dexcom report. Pt is scheduled to meet with her on 7/14. I still called patient today to briefly check in    Medications:  Current Outpatient Medications   Medication Instructions    amoxicillin (AMOXIL) 500 mg, Oral, 2 TIMES DAILY    aspirin EC 81 mg, Oral, DAILY    atorvastatin (LIPITOR) 40 MG tablet TAKE ONE TABLET BY MOUTH ONE TIME A DAY    B-D ULTRAFINE III SHORT PEN 31G X 8 MM MISC USE AS DIRECTED DAILY    carvedilol (COREG) 12.5 mg, Oral, 2 TIMES DAILY    Continuous Blood Gluc  (DEXCOM G7 ) MAMADOU Use as directed    Continuous Blood Gluc Sensor (DEXCOM G7 SENSOR) MISC Use as directed    Dulaglutide (TRULICITY) 3 ZJ/2.5KQ SOPN INJECT THE CONTENTS OF 1 SYRINGE (3MG) INTO THE SKIN ONCE WEEKLY    folic acid (FOLVITE) 1 mg, Oral, DAILY    insulin lispro (1 Unit Dial) (HUMALOG KWIKPEN) 6 Units, SubCUTAneous, 3 TIMES DAILY BEFORE MEALS, Medium Dose Sliding Scale Glucose: Dose; (If <139: No Insulin)  (140-199: 2 Units)  (200-249: 4 Units)  (250-299: 6 Units)  (300-349: 8 Units)  (350-400: 10 Units)  (Above 400: 12 Units)    JARDIANCE 25 MG tablet No dose, route, or frequency recorded. Lantus SoloStar 50 Units, SubCUTAneous, 2 TIMES DAILY    losartan (COZAAR) 50 mg, Oral, DAILY    metFORMIN (GLUCOPHAGE) 1,000 mg, Oral, 2 TIMES DAILY WITH MEALS    pregabalin (LYRICA) 100 MG capsule TAKE 1 CAPSULE BY MOUTH TWICE DAILY.     tamsulosin (FLOMAX) 0.4 mg, Oral, DAILY    venlafaxine (EFFEXOR XR) 37.5 MG extended release capsule TAKE ONE CAPSULE BY MOUTH ONE TIME A DAY    vitamin D3 (CHOLECALCIFEROL) 1,000 Units, Oral, DAILY    zinc 50 MG CAPS 1 capsule, Oral, DAILY

## 2023-07-07 DIAGNOSIS — T84.51XS INFECTION ASSOCIATED WITH INTERNAL RIGHT HIP PROSTHESIS, SEQUELA: ICD-10-CM

## 2023-07-07 DIAGNOSIS — B95.2 ENTEROCOCCUS FAECALIS INFECTION: ICD-10-CM

## 2023-07-07 NOTE — TELEPHONE ENCOUNTER
E-scribe request for med refills. Please review and e-scribe if applicable. Last Visit Date:  6/28/23  Next Visit Date:  7/11/2023    Hemoglobin A1C (%)   Date Value   06/28/2023 8.5   03/15/2023 7.5   10/06/2022 8.4 (H)             ( goal A1C is < 7)   Microalb/Crt.  Ratio (mcg/mg creat)   Date Value   03/22/2023 88 (H)     LDL Cholesterol (mg/dL)   Date Value   03/22/2023 30       (goal LDL is <100)   AST (U/L)   Date Value   06/21/2023 32     ALT (U/L)   Date Value   06/21/2023 57 (H)     BUN (mg/dL)   Date Value   06/21/2023 12     BP Readings from Last 3 Encounters:   06/28/23 120/81   06/21/23 (!) 182/111   05/05/23 (!) 150/101          (goal 120/80)        Patient Active Problem List:     DM (diabetes mellitus) ([address])     HTN (hypertension)     ETOHism ([address])     Hypertensive urgency     Abnormal ambulatory electrocardiogram     Abnormal ambulatory electrocardiography     Abnormal kidney function     Adiposity     Astigmatism     Atherosclerotic heart disease of native coronary artery without angina pectoris     Cervical radiculopathy     Chronic back pain     Decreased cardiac output     Diabetic peripheral neuropathy (HCC)     Dislocated intraocular lens     Disturbance in sleep behavior     Dizziness     Dyssomnia     Floaters     Impotence of organic origin     Left ventricular dysfunction     Low vision, both eyes     Myopia     Nuclear sclerosis of left eye     Obstructive sleep apnea syndrome     Palpitations     Personal history of other diseases of the digestive system     Posterior vitreous detachment     Presbyopia     Primary osteoarthritis of right hip     Pseudophakia of right eye     Repetitive intrusions of sleep     Sciatica     Tendonitis     Vitamin D deficiency     Vitreous opacities of right eye     Dislocated IOL (intraocular lens), anterior, right     Prostate CA ([address])     Essential hypertension     Benign essential HTN     Type 2 diabetes mellitus with hyperglycemia, with long-term

## 2023-07-10 RX ORDER — CIPROFLOXACIN 500 MG/1
TABLET, FILM COATED ORAL
Qty: 60 TABLET | Refills: 1 | Status: SHIPPED | OUTPATIENT
Start: 2023-07-10

## 2023-07-11 ENCOUNTER — TELEPHONE (OUTPATIENT)
Dept: FAMILY MEDICINE CLINIC | Age: 63
End: 2023-07-11

## 2023-07-11 ENCOUNTER — OFFICE VISIT (OUTPATIENT)
Dept: FAMILY MEDICINE CLINIC | Age: 63
End: 2023-07-11
Payer: MEDICARE

## 2023-07-11 VITALS
WEIGHT: 250 LBS | BODY MASS INDEX: 32.1 KG/M2 | HEART RATE: 123 BPM | DIASTOLIC BLOOD PRESSURE: 104 MMHG | SYSTOLIC BLOOD PRESSURE: 165 MMHG

## 2023-07-11 DIAGNOSIS — Z79.4 TYPE 2 DIABETES MELLITUS WITH OTHER SPECIFIED COMPLICATION, WITH LONG-TERM CURRENT USE OF INSULIN (HCC): Primary | ICD-10-CM

## 2023-07-11 DIAGNOSIS — E11.69 TYPE 2 DIABETES MELLITUS WITH OTHER SPECIFIED COMPLICATION, WITH LONG-TERM CURRENT USE OF INSULIN (HCC): Primary | ICD-10-CM

## 2023-07-11 DIAGNOSIS — I10 ESSENTIAL HYPERTENSION: ICD-10-CM

## 2023-07-11 DIAGNOSIS — R06.2 WHEEZING: ICD-10-CM

## 2023-07-11 PROCEDURE — 1111F DSCHRG MED/CURRENT MED MERGE: CPT

## 2023-07-11 PROCEDURE — 3017F COLORECTAL CA SCREEN DOC REV: CPT

## 2023-07-11 PROCEDURE — G8417 CALC BMI ABV UP PARAM F/U: HCPCS

## 2023-07-11 PROCEDURE — 3052F HG A1C>EQUAL 8.0%<EQUAL 9.0%: CPT

## 2023-07-11 PROCEDURE — 2022F DILAT RTA XM EVC RTNOPTHY: CPT

## 2023-07-11 PROCEDURE — 99213 OFFICE O/P EST LOW 20 MIN: CPT

## 2023-07-11 PROCEDURE — G8427 DOCREV CUR MEDS BY ELIG CLIN: HCPCS

## 2023-07-11 PROCEDURE — 3080F DIAST BP >= 90 MM HG: CPT

## 2023-07-11 PROCEDURE — 1036F TOBACCO NON-USER: CPT

## 2023-07-11 PROCEDURE — 3077F SYST BP >= 140 MM HG: CPT

## 2023-07-11 RX ORDER — AMOXICILLIN 500 MG/1
CAPSULE ORAL
Qty: 60 CAPSULE | Refills: 0 | OUTPATIENT
Start: 2023-07-11

## 2023-07-11 RX ORDER — EMPAGLIFLOZIN 25 MG/1
25 TABLET, FILM COATED ORAL DAILY
Qty: 30 TABLET | Refills: 5 | Status: SHIPPED | OUTPATIENT
Start: 2023-07-11

## 2023-07-11 RX ORDER — INSULIN GLARGINE 100 [IU]/ML
45 INJECTION, SOLUTION SUBCUTANEOUS 2 TIMES DAILY
Qty: 15 ML | Refills: 3 | Status: SHIPPED | OUTPATIENT
Start: 2023-07-11

## 2023-07-11 RX ORDER — LOSARTAN POTASSIUM 50 MG/1
100 TABLET ORAL DAILY
Qty: 30 TABLET | Refills: 3 | Status: SHIPPED | OUTPATIENT
Start: 2023-07-11

## 2023-07-11 RX ORDER — INSULIN GLARGINE 100 [IU]/ML
50 INJECTION, SOLUTION SUBCUTANEOUS 2 TIMES DAILY
Qty: 15 ML | Refills: 3 | Status: SHIPPED | OUTPATIENT
Start: 2023-07-11

## 2023-07-11 ASSESSMENT — ENCOUNTER SYMPTOMS
SHORTNESS OF BREATH: 0
COUGH: 0
ABDOMINAL PAIN: 0
NAUSEA: 0
RHINORRHEA: 0
DIARRHEA: 0
BACK PAIN: 0
SORE THROAT: 0

## 2023-07-11 NOTE — PROGRESS NOTES
Attending Physician Statement  I have discussed the care of Leila Washington, including pertinent history and exam findings,  with the resident. I have reviewed the key elements of all parts of the encounter with the resident. I agree with the assessment, plan and orders as documented by the resident.   (Kari Cabezas)    Noah Carroll MD

## 2023-07-11 NOTE — PROGRESS NOTES
[facility] Residency Program - Outpatient Note      Subjective: Lashay Devries is a 58 y.o. male with  has a past medical history of Arthritis, CAD (coronary artery disease), Cancer ([address]), Cervical radiculopathy, CHF (congestive heart failure) ([address]), DM (diabetes mellitus) ([address]), GERD (gastroesophageal reflux disease), Heart murmur, HTN (hypertension), Hyperlipidemia, Neuropathy, Sleep apnea, Vision abnormalities, and Wears glasses. Presented to the office today for:  Chief Complaint   Patient presents with    2 Week Follow-Up    Hypertension           Diabetes       HPI    59-year-old male presenting today for follow-up for type 2 diabetes  -Patient has been having hypoglycemic episodes in the 60s and hypoglycemic episodes that go as high as 380  -States that he gets extremely shaky and diaphoretic with hypoglycemic episodes and has presyncope with hypoglycemic episodes  -Seen by pharmacist at the clinic and the [facility] results showed that his hypoglycemic episodes went down as 48 and as high as 390  -We will add Jardiance 25 mg to his regimen and decrease his insulin Lantus from 50 to 45 units to avoid hypoglycemic episodes  -Patient states that he has been eating fast food every other day and lunchmeat deli and advised patient to cut back on on the fast food  -Advised patient that if he has hypoglycemic episodes he will have tremors, will become diaphoretic and to have a cookie or any kind of sweets to avoid hypoglycemia    HTN  -In the office patient's blood pressure went up as 165/104  -Increase patient's losartan to 100 mg  -We will have patient return to clinic in 4 weeks to recheck blood pressure and type 2 diabetes and as well as being seen by pharmacist for the Dexcom readings      Review of Systems   Constitutional:  Negative for chills and fever. HENT:  Negative for rhinorrhea and sore throat. Eyes:  Negative for visual disturbance.    Respiratory:

## 2023-07-12 NOTE — TELEPHONE ENCOUNTER
Medication Management Service ([facility])  [facility]  [phone]     CLINICAL PHARMACY NOTE:      Met with patient during PCP appointment. Downloaded clarity report. Reviewed with PCP. Noted that patient had not been receiving Jardiance in adherence packaging. Plan will be to restart and then decrease insulin glargine to 45 units twice daily due glucose variability noted as being in the upper range at 35.1% along with reported lows. Plan for Med [facility] to meet with patient prior to next appointment on 08/15/2023. Plan to download Clarity report and gain better understanding of overall management of DM. Will also need to clarify if patient is using rapid acting insulin. Tony Joseph, Pharm. D., [address] [place] [address] Medication Management Service  [phone]  7/12/2023  5:46 PM      =========================================================    For Pharmacy Admin Tracking Only    Program: Medical Group  CPA in place:  Yes-need to have patient sign consent  Time Spent (min): 20

## 2023-07-17 ENCOUNTER — HOSPITAL ENCOUNTER (OUTPATIENT)
Age: 63
Setting detail: SPECIMEN
Discharge: HOME | End: 2023-07-17

## 2023-07-17 DIAGNOSIS — C61 PROSTATE CA (HCC): ICD-10-CM

## 2023-07-17 LAB
ALBUMIN SERPL-MCNC: 4.2 G/DL (ref 3.5–5.2)
ALBUMIN/GLOB SERPL: 1.1 {RATIO} (ref 1–2.5)
ALP SERPL-CCNC: 135 U/L (ref 40–129)
ALT SERPL-CCNC: 20 U/L (ref 5–41)
ANION GAP SERPL CALCULATED.3IONS-SCNC: 14 MMOL/L (ref 9–17)
AST SERPL-CCNC: 20 U/L
BASOPHILS # BLD: <0.03 K/UL (ref 0–0.2)
BASOPHILS NFR BLD: 1 % (ref 0–2)
BILIRUB SERPL-MCNC: 0.5 MG/DL (ref 0.3–1.2)
BUN SERPL-MCNC: 16 MG/DL (ref 8–23)
CALCIUM SERPL-MCNC: 9.6 MG/DL (ref 8.6–10.4)
CHLORIDE SERPL-SCNC: 103 MMOL/L (ref 98–107)
CO2 SERPL-SCNC: 24 MMOL/L (ref 20–31)
CREAT SERPL-MCNC: 1.1 MG/DL (ref 0.7–1.2)
EOSINOPHIL # BLD: 0.15 K/UL (ref 0–0.44)
EOSINOPHILS RELATIVE PERCENT: 3 % (ref 1–4)
ERYTHROCYTE [DISTWIDTH] IN BLOOD BY AUTOMATED COUNT: 15.9 % (ref 11.8–14.4)
GFR SERPL CREATININE-BSD FRML MDRD: >60 ML/MIN/1.73M2
GLUCOSE SERPL-MCNC: 249 MG/DL (ref 70–99)
HCT VFR BLD AUTO: 40 % (ref 40.7–50.3)
HGB BLD-MCNC: 12.7 G/DL (ref 13–17)
IMM GRANULOCYTES # BLD AUTO: <0.03 K/UL (ref 0–0.3)
IMM GRANULOCYTES NFR BLD: 0 %
LYMPHOCYTES # BLD: 33 % (ref 24–43)
LYMPHOCYTES NFR BLD: 1.44 K/UL (ref 1.1–3.7)
MCH RBC QN AUTO: 27.3 PG (ref 25.2–33.5)
MCHC RBC AUTO-ENTMCNC: 31.8 G/DL (ref 28.4–34.8)
MCV RBC AUTO: 85.8 FL (ref 82.6–102.9)
MONOCYTES NFR BLD: 0.56 K/UL (ref 0.1–1.2)
MONOCYTES NFR BLD: 13 % (ref 3–12)
NEUTROPHILS NFR BLD: 50 % (ref 36–65)
NEUTS SEG NFR BLD: 2.21 K/UL (ref 1.5–8.1)
NRBC BLD-RTO: 0 PER 100 WBC
PLATELET # BLD AUTO: 217 K/UL (ref 138–453)
PMV BLD AUTO: 10.6 FL (ref 8.1–13.5)
POTASSIUM SERPL-SCNC: 4.8 MMOL/L (ref 3.7–5.3)
PROT SERPL-MCNC: 8.1 G/DL (ref 6.4–8.3)
PSA SERPL-MCNC: 0.58 NG/ML
RBC # BLD AUTO: 4.66 M/UL (ref 4.21–5.77)
RBC # BLD: ABNORMAL 10*6/UL
SODIUM SERPL-SCNC: 141 MMOL/L (ref 135–144)
WBC OTHER # BLD: 4.4 K/UL (ref 3.5–11.3)

## 2023-07-18 ENCOUNTER — HOSPITAL ENCOUNTER (OUTPATIENT)
Dept: RADIATION ONCOLOGY | Age: 63
Discharge: HOME | End: 2023-07-18
Payer: MEDICARE

## 2023-07-18 VITALS
WEIGHT: 257 LBS | DIASTOLIC BLOOD PRESSURE: 90 MMHG | HEART RATE: 91 BPM | OXYGEN SATURATION: 97 % | RESPIRATION RATE: 16 BRPM | SYSTOLIC BLOOD PRESSURE: 144 MMHG | TEMPERATURE: 98 F | BODY MASS INDEX: 33 KG/M2

## 2023-07-18 PROCEDURE — 99212 OFFICE O/P EST SF 10 MIN: CPT | Performed by: RADIOLOGY

## 2023-07-18 ASSESSMENT — PAIN DESCRIPTION - ORIENTATION: ORIENTATION: RIGHT

## 2023-07-18 ASSESSMENT — PAIN DESCRIPTION - LOCATION: LOCATION: HIP

## 2023-07-18 ASSESSMENT — PAIN SCALES - GENERAL: PAINLEVEL_OUTOF10: 4

## 2023-07-18 NOTE — PROGRESS NOTES
[facility]            Radiation Oncology          [facility], [address] [facility] Jury: [phone]        F: [phone]       [email or web address]           Date of Service: 2023     [facility]:  [address] [facility],   [facility] [place], [address] [place] [address]   [phone]       RADIATION ONCOLOGY FOLLOW UP NOTE    Patient ID:   Becky Fishman  : 1960   MRN: [id number]    DIAGNOSIS:  Unfavorable intermediate risk prostate adenocarcinoma (cT1c, Octavio 4+3, PSA 5.11) s/p prostate biopsy on 3/30/2021, ADT with leuprolide 45 mg on 2021 and 2022,  SpaceOAR and fiducial placement on 2021 at [facility]. -s/p 70Gy in 28fx 22 @ [address] [facility] HISTORY:   Becky Fishman is a 58 y.o.. male with severe of unfavorable intermediate risk prostate adenocarcinoma treated with external beam radiation therapy and short-term ADT. He received a total dose of 70 Gray completed on 2022. He presents today for a routine follow-up. He is doing well. He stated that he was having burning with urination few months ago, but this issue had resolved. His AUA score is 9. He denies bleeding with urination or with bowel movement. He reports erectile dysfunction. AUA Score: 9    MEDICATIONS:    Current Outpatient Medications:     LANTUS SOLOSTAR 100 UNIT/ML injection pen, INJECT 50 UNITS INTO THE SKIN 2 TIMES DAILY, Disp: 15 mL, Rfl: 3    JARDIANCE 25 MG tablet, Take 1 tablet by mouth daily, Disp: 30 tablet, Rfl: 5    insulin glargine (LANTUS SOLOSTAR) 100 UNIT/ML injection pen, Inject 45 Units into the skin 2 times daily, Disp: 15 mL, Rfl: 3    losartan (COZAAR) 50 MG tablet, Take 2 tablets by mouth daily, Disp: 30 tablet, Rfl: 3    ciprofloxacin (CIPRO) 500 MG tablet, TAKE 1 TABLET BY MOUTH TWICE DAILY. , Disp: 60 tablet, Rfl: 1    folic acid (FOLVITE) 1 MG tablet, Take 1 tablet by mouth daily, Disp: 90 tablet, Rfl:

## 2023-07-25 ENCOUNTER — APPOINTMENT (OUTPATIENT)
Dept: RADIATION ONCOLOGY | Age: 63
End: 2023-07-25
Payer: MEDICARE

## 2023-07-26 ENCOUNTER — HOSPITAL ENCOUNTER (OUTPATIENT)
Dept: PULMONOLOGY | Age: 63
Discharge: HOME | End: 2023-07-26
Payer: MEDICARE

## 2023-07-26 ENCOUNTER — OFFICE VISIT (OUTPATIENT)
Dept: ONCOLOGY | Age: 63
End: 2023-07-26
Payer: MEDICARE

## 2023-07-26 ENCOUNTER — TELEPHONE (OUTPATIENT)
Dept: ONCOLOGY | Age: 63
End: 2023-07-26

## 2023-07-26 VITALS
DIASTOLIC BLOOD PRESSURE: 104 MMHG | WEIGHT: 256.1 LBS | RESPIRATION RATE: 18 BRPM | HEART RATE: 101 BPM | BODY MASS INDEX: 32.88 KG/M2 | SYSTOLIC BLOOD PRESSURE: 147 MMHG | TEMPERATURE: 96.5 F

## 2023-07-26 DIAGNOSIS — R06.2 WHEEZING: ICD-10-CM

## 2023-07-26 DIAGNOSIS — C61 PROSTATE CA (HCC): Primary | ICD-10-CM

## 2023-07-26 PROCEDURE — 94640 AIRWAY INHALATION TREATMENT: CPT

## 2023-07-26 PROCEDURE — 3077F SYST BP >= 140 MM HG: CPT | Performed by: INTERNAL MEDICINE

## 2023-07-26 PROCEDURE — G8427 DOCREV CUR MEDS BY ELIG CLIN: HCPCS | Performed by: INTERNAL MEDICINE

## 2023-07-26 PROCEDURE — 3017F COLORECTAL CA SCREEN DOC REV: CPT | Performed by: INTERNAL MEDICINE

## 2023-07-26 PROCEDURE — 94664 DEMO&/EVAL PT USE INHALER: CPT

## 2023-07-26 PROCEDURE — 1036F TOBACCO NON-USER: CPT | Performed by: INTERNAL MEDICINE

## 2023-07-26 PROCEDURE — G8417 CALC BMI ABV UP PARAM F/U: HCPCS | Performed by: INTERNAL MEDICINE

## 2023-07-26 PROCEDURE — 94060 EVALUATION OF WHEEZING: CPT

## 2023-07-26 PROCEDURE — 99211 OFF/OP EST MAY X REQ PHY/QHP: CPT | Performed by: INTERNAL MEDICINE

## 2023-07-26 PROCEDURE — 94726 PLETHYSMOGRAPHY LUNG VOLUMES: CPT

## 2023-07-26 PROCEDURE — 94729 DIFFUSING CAPACITY: CPT

## 2023-07-26 PROCEDURE — 3080F DIAST BP >= 90 MM HG: CPT | Performed by: INTERNAL MEDICINE

## 2023-07-26 PROCEDURE — 99214 OFFICE O/P EST MOD 30 MIN: CPT | Performed by: INTERNAL MEDICINE

## 2023-07-26 NOTE — PROGRESS NOTES
minutes reviewing previous notes, test results and face to face with the patient discussing the diagnosis and importance of compliance with the treatment plan. Greater than 50% of that time was spent face-to-face with the patient in counseling and coordinating her care. This note is created with the assistance of a speech recognition program.  While intending to generate a document that actually reflects the content of the visit, the document can still have some errors including those of syntax and sound a like substitutions which may escape proof reading. It such instances, actual meaning can be extrapolated by contextual diversion.

## 2023-07-26 NOTE — TELEPHONE ENCOUNTER
MANJEET HERE FOR MD VISIT  RV 6 MTHS W/ LABS PRIOR  LABS CMP PSA ORDERS GIVEN TO PT ON EXIT TO BE DONE BEFORE RV 1/17/24  MD VISIT 1/17/24 @ 1PM  AVS PRINTED W/ INSTRUCTIONS AND GIVEN TO PT ON EXIT

## 2023-07-27 NOTE — PROCEDURES
[address] [place] [address], [address] [place] [address]                               PULMONARY FUNCTION    PATIENT NAME: Zan Hodgkins                   :        1960  MED REC NO:   [id number]                             ROOM:  ACCOUNT NO:   [de-identified]                           ADMIT DATE: 2023  PROVIDER:     Juancarlos Mg    DATE OF PROCEDURE:  2023    Spirometry does not meet the criteria for obstruction and has an FEV1 of  2.29 liters and FVC of 2.99 liters. The post-bronchodilator study does not show significant change and has  an FEV1 of 2.53 liters and FVC of 3.19 liters. Lung volumes show total lung capacity that is mildly decreased at 77%  predicted. Diffusion capacity is normal at 123% predicted. Airway resistance is mildly decreased at 76% predicted. Specific conductance is increased at 150% predicted. Flow-volume loop shows no obstructive ventilatory defect. IMPRESSION:  The patient has normal pulmonary function test with an FEV1  of 2.53 liters and FVC of 3.19 liters.       Juancarlos Mg    D: 2023 15:24:32       T: 2023 15:28:10     [email or web address]  Job#: [id number]     Doc#: [id number]    CC:

## 2023-08-01 DIAGNOSIS — E11.69 TYPE 2 DIABETES MELLITUS WITH OTHER SPECIFIED COMPLICATION, WITH LONG-TERM CURRENT USE OF INSULIN (HCC): ICD-10-CM

## 2023-08-01 DIAGNOSIS — Z79.4 TYPE 2 DIABETES MELLITUS WITH OTHER SPECIFIED COMPLICATION, WITH LONG-TERM CURRENT USE OF INSULIN (HCC): ICD-10-CM

## 2023-08-01 RX ORDER — ACYCLOVIR 400 MG/1
TABLET ORAL
Qty: 2 EACH | Refills: 5 | Status: SHIPPED | OUTPATIENT
Start: 2023-08-01

## 2023-08-01 NOTE — TELEPHONE ENCOUNTER
Last visit: 07/11/2023  Last Med refill: 03/15/2023  Does patient have enough medication for 72 hours: No:     Next Visit Date:  Future Appointments   Date Time Provider [address]   8/2/2023  9:00 AM Gerardo Jolly MD INFT DISEASE [facility]   8/15/2023  1:30 PM Corine Díaz, [facility]   8/15/2023  2:40 PM Tima Sheets,  [facility]   8/16/2023  2:30 PM Moira Poe PA-C  derm [facility]   9/14/2023  1:15 PM Luiza Silvestre DPM [facility]   1/17/2024  1:00 PM Karlene Alexandra MD [facility]   1/23/2024  2:00 PM SCHEDULE, ST[facility]Z PBURG RAD ONC NURSE [facility] Maintenance   Topic Date Due    Diabetic retinal exam  Never done    DTaP/Tdap/Td vaccine (1 - Tdap) Never done    Shingles vaccine (1 of 2) 02/08/2011    COVID-19 Vaccine (2 - Rinku risk series) 12/15/2022    Flu vaccine (1) 08/01/2023    Annual Wellness Visit (AWV)  07/07/2023    Depression Monitoring  03/15/2024    Diabetic Alb to Cr ratio (uACR) test  03/22/2024    Lipids  03/22/2024    A1C test (Diabetic or Prediabetic)  06/28/2024    Diabetic foot exam  07/12/2024    GFR test (Diabetes, CKD 3-4, OR last GFR 15-59)  07/17/2024    Prostate Specific Antigen (PSA) Screening or Monitoring  07/17/2024    Colorectal Cancer Screen  07/16/2025    Pneumococcal 0-64 years Vaccine  Completed    Hepatitis C screen  Completed    HIV screen  Completed    Hepatitis A vaccine  Aged Out    Hib vaccine  Aged Out    Meningococcal (ACWY) vaccine  Aged Out    Depression Screen  Discontinued       Hemoglobin A1C (%)   Date Value   06/28/2023 8.5   03/15/2023 7.5   10/06/2022 8.4 (H)             ( goal A1C is < 7)   No components found for: LABMICR  LDL Cholesterol (mg/dL)   Date Value   03/22/2023 30   04/12/2022 52       (goal LDL is <100)   AST (U/L)   Date Value   07/17/2023 20     ALT (U/L)   Date Value   07/17/2023 20     BUN (mg/dL)   Date Value   07/17/2023 16     BP Readings from Last 3

## 2023-08-02 ENCOUNTER — OFFICE VISIT (OUTPATIENT)
Dept: INFECTIOUS DISEASES | Age: 63
End: 2023-08-02
Payer: MEDICARE

## 2023-08-02 VITALS
DIASTOLIC BLOOD PRESSURE: 89 MMHG | BODY MASS INDEX: 32.85 KG/M2 | TEMPERATURE: 97 F | WEIGHT: 256 LBS | HEART RATE: 67 BPM | SYSTOLIC BLOOD PRESSURE: 125 MMHG | HEIGHT: 74 IN

## 2023-08-02 DIAGNOSIS — T84.51XS INFECTION ASSOCIATED WITH INTERNAL RIGHT HIP PROSTHESIS, SEQUELA: Primary | ICD-10-CM

## 2023-08-02 DIAGNOSIS — A49.8 PSEUDOMONAS AERUGINOSA INFECTION: ICD-10-CM

## 2023-08-02 DIAGNOSIS — B95.2 ENTEROCOCCUS FAECALIS INFECTION: ICD-10-CM

## 2023-08-02 PROCEDURE — 99214 OFFICE O/P EST MOD 30 MIN: CPT | Performed by: INTERNAL MEDICINE

## 2023-08-02 PROCEDURE — G8427 DOCREV CUR MEDS BY ELIG CLIN: HCPCS | Performed by: INTERNAL MEDICINE

## 2023-08-02 PROCEDURE — G8417 CALC BMI ABV UP PARAM F/U: HCPCS | Performed by: INTERNAL MEDICINE

## 2023-08-02 PROCEDURE — 3079F DIAST BP 80-89 MM HG: CPT | Performed by: INTERNAL MEDICINE

## 2023-08-02 PROCEDURE — 3017F COLORECTAL CA SCREEN DOC REV: CPT | Performed by: INTERNAL MEDICINE

## 2023-08-02 PROCEDURE — 1036F TOBACCO NON-USER: CPT | Performed by: INTERNAL MEDICINE

## 2023-08-02 PROCEDURE — 3074F SYST BP LT 130 MM HG: CPT | Performed by: INTERNAL MEDICINE

## 2023-08-02 RX ORDER — CIPROFLOXACIN 500 MG/1
500 TABLET, FILM COATED ORAL 2 TIMES DAILY
Qty: 180 TABLET | Refills: 0 | Status: SHIPPED | OUTPATIENT
Start: 2023-08-02 | End: 2023-10-31

## 2023-08-02 RX ORDER — AMOXICILLIN 500 MG/1
500 CAPSULE ORAL 2 TIMES DAILY
Qty: 180 CAPSULE | Refills: 0 | Status: SHIPPED | OUTPATIENT
Start: 2023-08-02 | End: 2023-10-31

## 2023-08-02 ASSESSMENT — ENCOUNTER SYMPTOMS
RESPIRATORY NEGATIVE: 1
GASTROINTESTINAL NEGATIVE: 1

## 2023-08-02 NOTE — PROGRESS NOTES
While intending to generate a document that actually reflects the content ofthe visit, the document can still have some errors including those of syntax and sound a like substitutions which may escape proof reading. It such instances, actual meaning can be extrapolated by contextual diversion.

## 2023-08-29 ENCOUNTER — OFFICE VISIT (OUTPATIENT)
Dept: FAMILY MEDICINE CLINIC | Age: 63
End: 2023-08-29
Payer: MEDICARE

## 2023-08-29 VITALS
SYSTOLIC BLOOD PRESSURE: 117 MMHG | BODY MASS INDEX: 32.73 KG/M2 | HEIGHT: 74 IN | HEART RATE: 97 BPM | WEIGHT: 255 LBS | DIASTOLIC BLOOD PRESSURE: 75 MMHG

## 2023-08-29 DIAGNOSIS — Z79.4 TYPE 2 DIABETES MELLITUS WITH OTHER SPECIFIED COMPLICATION, WITH LONG-TERM CURRENT USE OF INSULIN (HCC): Primary | ICD-10-CM

## 2023-08-29 DIAGNOSIS — I10 ESSENTIAL HYPERTENSION: ICD-10-CM

## 2023-08-29 DIAGNOSIS — E11.69 TYPE 2 DIABETES MELLITUS WITH OTHER SPECIFIED COMPLICATION, WITH LONG-TERM CURRENT USE OF INSULIN (HCC): Primary | ICD-10-CM

## 2023-08-29 LAB — HBA1C MFR BLD: 7.9 %

## 2023-08-29 PROCEDURE — G8417 CALC BMI ABV UP PARAM F/U: HCPCS

## 2023-08-29 PROCEDURE — 1036F TOBACCO NON-USER: CPT

## 2023-08-29 PROCEDURE — 83036 HEMOGLOBIN GLYCOSYLATED A1C: CPT

## 2023-08-29 PROCEDURE — 99213 OFFICE O/P EST LOW 20 MIN: CPT

## 2023-08-29 PROCEDURE — 3051F HG A1C>EQUAL 7.0%<8.0%: CPT

## 2023-08-29 PROCEDURE — 3078F DIAST BP <80 MM HG: CPT

## 2023-08-29 PROCEDURE — 3074F SYST BP LT 130 MM HG: CPT

## 2023-08-29 PROCEDURE — 2022F DILAT RTA XM EVC RTNOPTHY: CPT

## 2023-08-29 PROCEDURE — 3017F COLORECTAL CA SCREEN DOC REV: CPT

## 2023-08-29 PROCEDURE — G8427 DOCREV CUR MEDS BY ELIG CLIN: HCPCS

## 2023-08-29 ASSESSMENT — PATIENT HEALTH QUESTIONNAIRE - PHQ9
7. TROUBLE CONCENTRATING ON THINGS, SUCH AS READING THE NEWSPAPER OR WATCHING TELEVISION: 1
8. MOVING OR SPEAKING SO SLOWLY THAT OTHER PEOPLE COULD HAVE NOTICED. OR THE OPPOSITE, BEING SO FIGETY OR RESTLESS THAT YOU HAVE BEEN MOVING AROUND A LOT MORE THAN USUAL: 1
6. FEELING BAD ABOUT YOURSELF - OR THAT YOU ARE A FAILURE OR HAVE LET YOURSELF OR YOUR FAMILY DOWN: 1
SUM OF ALL RESPONSES TO PHQ QUESTIONS 1-9: 7
SUM OF ALL RESPONSES TO PHQ9 QUESTIONS 1 & 2: 2
SUM OF ALL RESPONSES TO PHQ QUESTIONS 1-9: 7
4. FEELING TIRED OR HAVING LITTLE ENERGY: 2
1. LITTLE INTEREST OR PLEASURE IN DOING THINGS: 1
2. FEELING DOWN, DEPRESSED OR HOPELESS: 1
9. THOUGHTS THAT YOU WOULD BE BETTER OFF DEAD, OR OF HURTING YOURSELF: 0
3. TROUBLE FALLING OR STAYING ASLEEP: 0
SUM OF ALL RESPONSES TO PHQ QUESTIONS 1-9: 7
10. IF YOU CHECKED OFF ANY PROBLEMS, HOW DIFFICULT HAVE THESE PROBLEMS MADE IT FOR YOU TO DO YOUR WORK, TAKE CARE OF THINGS AT HOME, OR GET ALONG WITH OTHER PEOPLE: 1
5. POOR APPETITE OR OVEREATING: 0
SUM OF ALL RESPONSES TO PHQ QUESTIONS 1-9: 7

## 2023-08-29 ASSESSMENT — ENCOUNTER SYMPTOMS
SHORTNESS OF BREATH: 0
RHINORRHEA: 0
DIARRHEA: 0
ABDOMINAL PAIN: 0
COUGH: 0
SORE THROAT: 0
BACK PAIN: 0
NAUSEA: 0

## 2023-08-29 NOTE — PATIENT INSTRUCTIONS
Thank you for letting us take care of you today. We hope all your questions were addressed. If a question was overlooked or something else comes to mind after you return home, please contact a member of your Care Team listed below. Your Care Team at [facility] [employer] is Team #1  Aretha Holman, Faculty Advisor  Valerie Long, Resident Physician  Jah Aguirre, Resident Physician  Carlotta Valdivia, Resident Physician  Ysabel Palencia, [facility], [address] [facility], [address] [facility], [address]  [facility], [address] [facility], [facility], [facility], [facility] Michelle,   Zoie Tejada, [address] [facility] (Clinical Pharmacist)     Office phone number: [phone]    If you need to get in right away due to illness, please be advised we have \"Same Day\" appointments available Monday-Friday. Please call us at [phone] option #3 to schedule your \"Same Day\" appointment.

## 2023-08-29 NOTE — PROGRESS NOTES
Attending Physician Statement  I have discussed the care of Jose Dominguez, including pertinent history and exam findings,  with the resident. I have reviewed the key elements of all parts of the encounter with the resident. I agree with the assessment, plan and orders as documented by the resident.   (Tia Delgado)    Nicolas Mathew MD
Visit Information    Have you changed or started any medications since your last visit including any over-the-counter medicines, vitamins, or herbal medicines? no   Are you having any side effects from any of your medications? -  no  Have you stopped taking any of your medications? Is so, why? -  no    Have you seen any other physician or provider since your last visit? No  Have you had any other diagnostic tests since your last visit? No  Have you been seen in the emergency room and/or had an admission to a hospital since we last saw you? No  Have you had your routine dental cleaning in the past 6 months? no    Have you activated your [employer] account? If not, what are your barriers?  No:      Patient Care Team:  Toby Aiken DO as PCP - General (Family Medicine)  Shayan Jordan MD as Consulting Physician (Infectious Diseases)  Ivone Bruno MD as Consulting Physician (Hematology and Oncology)  Jonathan Young DO as Consulting Physician (Hospitalist)  Jonathan Young DO as Consulting Physician (Hospitalist)  Femi Soni DPM as Physician (Podiatry)    Medical History Review  Past Medical, Family, and Social History reviewed and does not contribute to the patient presenting condition    Health Maintenance   Topic Date Due    Diabetic retinal exam  Never done    DTaP/Tdap/Td vaccine (1 - Tdap) Never done    Shingles vaccine (1 of 2) 02/08/2011    COVID-19 Vaccine (2 - Rinku risk series) 12/15/2022    Annual Wellness Visit (AWV)  07/07/2023    Flu vaccine (1) 08/01/2023    Depression Monitoring  03/15/2024    Diabetic Alb to Cr ratio (uACR) test  03/22/2024    Lipids  03/22/2024    A1C test (Diabetic or Prediabetic)  06/28/2024    Diabetic foot exam  07/12/2024    GFR test (Diabetes, CKD 3-4, OR last GFR 15-59)  07/17/2024    Prostate Specific Antigen (PSA) Screening or Monitoring  07/17/2024    Colorectal Cancer Screen  07/16/2025    Pneumococcal 0-64 years Vaccine  Completed    Hepatitis C
Readings from Last 3 Encounters:   08/29/23 117/75   08/02/23 125/89   07/26/23 (!) 147/104       Physical Exam  Vitals reviewed. Constitutional:       General: He is not in acute distress. Appearance: Normal appearance. Cardiovascular:      Rate and Rhythm: Normal rate and regular rhythm. Pulses: Normal pulses. Heart sounds: Normal heart sounds. No murmur heard. Pulmonary:      Effort: Pulmonary effort is normal.      Breath sounds: Normal breath sounds. Abdominal:      General: Bowel sounds are normal.      Palpations: Abdomen is soft. Tenderness: There is no abdominal tenderness. Musculoskeletal:      Cervical back: Normal range of motion. Right lower leg: No edema. Left lower leg: No edema. Neurological:      Mental Status: He is alert. Psychiatric:         Mood and Affect: Mood normal.       Lab Results   Component Value Date    WBC 4.4 07/17/2023    HGB 12.7 (L) 07/17/2023    HCT 40.0 (L) 07/17/2023     07/17/2023    CHOL 100 03/22/2023    TRIG 139 03/22/2023    HDL 42 03/22/2023    ALT 20 07/17/2023    AST 20 07/17/2023     07/17/2023    K 4.8 07/17/2023     07/17/2023    CREATININE 1.1 07/17/2023    BUN 16 07/17/2023    CO2 24 07/17/2023    PSA 0.58 07/17/2023    INR 1.0 06/21/2023    LABA1C 7.9 08/29/2023     Lab Results   Component Value Date    CALCIUM 9.6 07/17/2023     Lab Results   Component Value Date    LDLCHOLESTEROL 30 03/22/2023       Assessment and Plan:    1. Type 2 diabetes mellitus with other specified complication, with long-term current use of insulin (HCC)  - POCT glycosylated hemoglobin (Hb A1C) today is 7.9 down from 8.5   -Continue on metformin, insulin as well as Trulicity  -Added Jardiance to patient's bubble pack    2.  Essential hypertension  -Well-controlled  -Keep patient on Coreg and losartan          Requested Prescriptions      No prescriptions requested or ordered in this encounter       There are no discontinued

## 2023-09-01 DIAGNOSIS — I10 ESSENTIAL HYPERTENSION: ICD-10-CM

## 2023-09-01 NOTE — TELEPHONE ENCOUNTER
147/104          (goal 120/80)    All Future Testing planned in [facility]  Lab Frequency Next Occurrence   CT SHOULDER RIGHT WO CONTRAST Once 12/19/2022   CT SHOULDER RIGHT WO CONTRAST Once 01/16/2023   Sedimentation Rate Once 01/16/2023   Comprehensive Metabolic Panel Once 88/37/0941   PSA, Diagnostic Once 07/26/2023   CBC Once 74/22/4707   Basic Metabolic Panel Once 27/49/7248   C-Reactive Protein Once 09/25/2023               Patient Active Problem List:     DM (diabetes mellitus) ([address])     HTN (hypertension)     ETOHism ([address])     Hypertensive urgency     Abnormal ambulatory electrocardiogram     Abnormal ambulatory electrocardiography     Abnormal kidney function     Adiposity     Astigmatism     Atherosclerotic heart disease of native coronary artery without angina pectoris     Cervical radiculopathy     Chronic back pain     Decreased cardiac output     Diabetic peripheral neuropathy (HCC)     Dislocated intraocular lens     Disturbance in sleep behavior     Dizziness     Dyssomnia     Floaters     Impotence of organic origin     Left ventricular dysfunction     Low vision, both eyes     Myopia     Nuclear sclerosis of left eye     Obstructive sleep apnea syndrome     Palpitations     Personal history of other diseases of the digestive system     Posterior vitreous detachment     Presbyopia     Primary osteoarthritis of right hip     Pseudophakia of right eye     Repetitive intrusions of sleep     Sciatica     Tendonitis     Vitamin D deficiency     Vitreous opacities of right eye     Dislocated IOL (intraocular lens), anterior, right     Prostate CA ([address])     Essential hypertension     Benign essential HTN     Type 2 diabetes mellitus with hyperglycemia, with long-term current use of insulin ([address])     Aftercare following right hip joint replacement surgery     Infection of right prosthetic hip joint ([address])     Benign localized prostatic hyperplasia with lower urinary tract symptoms (LUTS)     Post-op pain

## 2023-09-05 RX ORDER — CARVEDILOL 12.5 MG/1
12.5 TABLET ORAL 2 TIMES DAILY
Qty: 60 TABLET | Refills: 3 | Status: SHIPPED | OUTPATIENT
Start: 2023-09-05

## 2023-09-08 ENCOUNTER — PHARMACY VISIT (OUTPATIENT)
Dept: FAMILY MEDICINE CLINIC | Age: 63
End: 2023-09-08

## 2023-09-08 VITALS — WEIGHT: 262.2 LBS | BODY MASS INDEX: 33.66 KG/M2

## 2023-09-08 DIAGNOSIS — Z79.899 MEDICATION MANAGEMENT: Primary | ICD-10-CM

## 2023-09-08 NOTE — PROGRESS NOTES
Previous Diabetes Medications (Why stopped):   Kendrick Rowley filled on 03/08/2022    Medication Adherence/Cost/Adverse Events:   Concern for adherence-mentioned taking up to 30 units of rapid acting insulin (humalog)    Blood Glucose Monitoring System: Dexcom G7     Previous Home Glucose Readings: none available    Current Home Glucose Readings:   Patient utilized Dexcom G7 to monitor glucose. Unable to download Clarity report due to Firewall. Able to obtain some information from patient's . Data from 14 days   Ambulatory Glucose Profile Report     Glucose Statistics and Targets:     Goal Patient's Value per AGP Report Interpretation of patient's value compared to goal     Average Glucose Target Glucose Range:   70 - 180 mg/dL for patients with T1DM or T2DM 185 mg/dL Not at goal.       Snapshot:  Average Glucose:     Goal Patient's Value per AGP Report Interpretation of patient's value compared to goal     % of time above Target Glucose Range   < 25% for patients with T1DM or T2DM 50 % Not at goal.     % of time in Target Glucose Range   > 70% for patients with T1DM or T2DM 46 % Not at goal.     % of time below Target Glucose Range   < 4% for patients with T1DM or T2DM 4 % Not at goal. Noting that 3% of the time he is very low. Diabetes Goals: Using ADA Standards of Care     Goal A1c:  Less than 6.5 %   Fasting Blood Sugars:  80-130mg/dL  Postprandial glucose:  Less than 180mg/dL     A1C MONITORING  Lab Results   Component Value Date    LABA1C 7.9 08/29/2023    LABA1C 8.5 06/28/2023    LABA1C 7.5 03/15/2023       RENAL MONITORING  Lab Results   Component Value Date/Time    MICROALBUR 95 03/22/2023 11:00 AM    LABCREA 108.0 03/22/2023 11:00 AM     CrCl cannot be calculated (Unknown ideal weight.).       CHOLESTEROL MANAGEMENT  Lab Results   Component Value Date    CHOL 100 03/22/2023    TRIG 139 03/22/2023    HDL 42 03/22/2023    LDLCHOLESTEROL 30 03/22/2023     ALT   Date Value Ref Range Status

## 2023-09-08 NOTE — PATIENT INSTRUCTIONS
Insulin lispro-Take 6 units when you eat a meal.    Check your Trulicity pens   3mg vs 1.5mg. Should be taking 3mg weekly.

## 2023-09-22 ENCOUNTER — PHARMACY VISIT (OUTPATIENT)
Dept: FAMILY MEDICINE CLINIC | Age: 63
End: 2023-09-22

## 2023-09-22 VITALS — BODY MASS INDEX: 33.23 KG/M2 | WEIGHT: 258.8 LBS

## 2023-09-22 DIAGNOSIS — Z79.899 MEDICATION MANAGEMENT: Primary | ICD-10-CM

## 2023-09-22 DIAGNOSIS — E11.69 TYPE 2 DIABETES MELLITUS WITH OTHER SPECIFIED COMPLICATION, WITH LONG-TERM CURRENT USE OF INSULIN (HCC): ICD-10-CM

## 2023-09-22 DIAGNOSIS — Z79.4 TYPE 2 DIABETES MELLITUS WITH OTHER SPECIFIED COMPLICATION, WITH LONG-TERM CURRENT USE OF INSULIN (HCC): ICD-10-CM

## 2023-09-22 RX ORDER — INSULIN GLARGINE 100 [IU]/ML
41 INJECTION, SOLUTION SUBCUTANEOUS 2 TIMES DAILY
Qty: 15 ML | Refills: 3
Start: 2023-09-22

## 2023-09-22 NOTE — PROGRESS NOTES
Medication Management Service  [address]    Patrick Glass is a 58 y.o. male that presents for a follow-up diabetes mellitus office visit with Medication Management Service per referral from Dr. Arnoldo Webb DO  for Diabetes Management under Collaborative Practice Agreement with A1c goal < 6.5 %. Patient PMH includes HTN, DM2, BPH, ETOH, ASCVD, LV dysfunction, . Complications from DM Include ASCVD. Subjective/Objective     New Problems/Changes since last visit: At last visit, there was confusion on what strength of medications patient was taking. Patient confirmed that he was taking Trulicity 3 mg weekly and reports he has been on that dose \"Forever\". He confirmed he received his bubble pack and that it included Jardiance 25 mg once daily. Patient reports only taking humalog 6 units with meals. Patient reports his insurance will be changing to [address] [place] [address] in October. LIFESTYLE  Diet:  Whenever he can he will eat something. Progressive Soup is go to meal.  Chicken noodle and creamy chicken. Tries to get light variety. Likes fruit. Eats fruit parfait. Will use for treating low blood glucose readings. Will skip lunch depending on how hungry  Drinks: milk with cereal. Beer or wine on occasion. Drinks water with pills. About 16oz. Reports drinking 16 ounces of water at least 3x per day. Physical Activity:   Not really active. Not riding bike as often. Needs to get back to it. WEIGHT  Weight: 117.4 kg  BMI: 33.23 kg/m2    DIABETES MANAGEMENT    Current DM Medications: Jardiance 25 mg once daily, Lantus 45 units twice daily, Humalog 6 units before meals, Metformin 1000 mg twice daily, Trulicity 3 mg weekly (Sunday)    Previous Diabetes Medications (Why stopped): Glipizide    Medication Adherence/Cost/Adverse Events: Patient confirmed several times he only takes humalog with meals and will skip if he doesn't eat. He is only using 6 units.  There was at least one

## 2023-09-29 DIAGNOSIS — Z79.4 TYPE 2 DIABETES MELLITUS WITH OTHER SPECIFIED COMPLICATION, WITH LONG-TERM CURRENT USE OF INSULIN (HCC): ICD-10-CM

## 2023-09-29 DIAGNOSIS — E11.69 TYPE 2 DIABETES MELLITUS WITH OTHER SPECIFIED COMPLICATION, WITH LONG-TERM CURRENT USE OF INSULIN (HCC): ICD-10-CM

## 2023-09-29 RX ORDER — INSULIN GLARGINE 100 [IU]/ML
41 INJECTION, SOLUTION SUBCUTANEOUS 2 TIMES DAILY
Qty: 15 ML | Refills: 3 | Status: SHIPPED | OUTPATIENT
Start: 2023-09-29

## 2023-09-29 NOTE — TELEPHONE ENCOUNTER
Last visit: 08/29/2023  Last Med refill: 04/12/2023  Does patient have enough medication for 72 hours: No:     Next Visit Date:  Future Appointments   Date Time Provider [address]   10/6/2023 11:00 AM Hanynila Queen, [address] [facility]   10/10/2023  9:45 AM JOURDAN Esquivel Podiatry [facility]   11/2/2023 10:30 AM Parth Hui MD INFT DISEASE [facility]   12/5/2023  1:20 PM DO Osmani Gonzalezy FP [facility]   1/17/2024  1:00 PM [facility]   1/23/2024  2:00 PM SCHEDULE, LIOR PBURG RAD ONC NURSE [facility] [address] [facility] Maintenance   Topic Date Due    Diabetic retinal exam  Never done    DTaP/Tdap/Td vaccine (1 - Tdap) Never done    Shingles vaccine (1 of 2) 02/08/2011    Hepatitis B vaccine (1 of 3 - Risk 3-dose series) Never done    COVID-19 Vaccine (3 - Rinku risk series) 02/09/2023    Annual Wellness Visit (AWV)  07/07/2023    Flu vaccine (1) 08/01/2023    Pneumococcal 0-64 years Vaccine (2 - PCV) 06/11/2024 (Originally 12/19/2019)    Diabetic Alb to Cr ratio (uACR) test  03/22/2024    Lipids  03/22/2024    Diabetic foot exam  07/12/2024    GFR test (Diabetes, CKD 3-4, OR last GFR 15-59)  07/17/2024    Prostate Specific Antigen (PSA) Screening or Monitoring  07/17/2024    A1C test (Diabetic or Prediabetic)  08/29/2024    Depression Monitoring  08/29/2024    Colorectal Cancer Screen  07/16/2025    Hepatitis C screen  Completed    HIV screen  Completed    Hepatitis A vaccine  Aged Out    Hib vaccine  Aged Out    Meningococcal (ACWY) vaccine  Aged Out    Depression Screen  Discontinued       Hemoglobin A1C (%)   Date Value   08/29/2023 7.9   06/28/2023 8.5   03/15/2023 7.5             ( goal A1C is < 7)   No components found for: \"LABMICR\"  LDL Cholesterol (mg/dL)   Date Value   03/22/2023 30   04/12/2022 52       (goal LDL is <100)   AST (U/L)   Date Value   07/17/2023 20     ALT (U/L)   Date Value   07/17/2023 20     BUN (mg/dL)   Date Value

## 2023-09-29 NOTE — TELEPHONE ENCOUNTER
Last visit: [id number]  Last Med refill: 09/22/2023  Does patient have enough medication for 72 hours: No:     Pharmacy states that since patient's medication has changed he will need a new script for medication . He is having his bubble pack filled . Please advise .  Thank you    Next Visit Date:  Future Appointments   Date Time Provider [address]   10/6/2023 11:00 AM Jenaro Germanon, [address] [facility]   10/10/2023  9:45 AM JOURDAN Rivero Podiatry [facility]   11/2/2023 10:30 AM [facility]   12/5/2023  1:20 PM DO Neeta Harper FP [facility]   1/17/2024  1:00 PM Len Mac MD [address] [facility]   1/23/2024  2:00 PM SCHEDULE, LIOR PBURG RAD ONC NURSE [facility] [address] [facility] Maintenance   Topic Date Due    Diabetic retinal exam  Never done    DTaP/Tdap/Td vaccine (1 - Tdap) Never done    Shingles vaccine (1 of 2) 02/08/2011    Hepatitis B vaccine (1 of 3 - Risk 3-dose series) Never done    COVID-19 Vaccine (3 - Rinku risk series) 02/09/2023    Annual Wellness Visit (AWV)  07/07/2023    Flu vaccine (1) 08/01/2023    Pneumococcal 0-64 years Vaccine (2 - PCV) 06/11/2024 (Originally 12/19/2019)    Diabetic Alb to Cr ratio (uACR) test  03/22/2024    Lipids  03/22/2024    Diabetic foot exam  07/12/2024    GFR test (Diabetes, CKD 3-4, OR last GFR 15-59)  07/17/2024    Prostate Specific Antigen (PSA) Screening or Monitoring  07/17/2024    A1C test (Diabetic or Prediabetic)  08/29/2024    Depression Monitoring  08/29/2024    Colorectal Cancer Screen  07/16/2025    Hepatitis C screen  Completed    HIV screen  Completed    Hepatitis A vaccine  Aged Out    Hib vaccine  Aged Out    Meningococcal (ACWY) vaccine  Aged Out    Depression Screen  Discontinued       Hemoglobin A1C (%)   Date Value   08/29/2023 7.9   06/28/2023 8.5   03/15/2023 7.5             ( goal A1C is < 7)   No components found for: \"LABMICR\"  LDL Cholesterol (mg/dL)   Date Value

## 2023-10-03 ENCOUNTER — TELEPHONE (OUTPATIENT)
Dept: FAMILY MEDICINE CLINIC | Age: 63
End: 2023-10-03

## 2023-10-03 RX ORDER — PEN NEEDLE, DIABETIC 31 GX5/16"
NEEDLE, DISPOSABLE MISCELLANEOUS
Qty: 100 EACH | Refills: 3 | Status: SHIPPED | OUTPATIENT
Start: 2023-10-03

## 2023-10-03 NOTE — TELEPHONE ENCOUNTER
Lady contacted office and stated with patient insurance [employer] [facility] which is not listed as patient insurance in regards to dexcom. Writer informed the lady not listed as the insurance. Patient would need to updated information.

## 2023-10-17 ENCOUNTER — TELEPHONE (OUTPATIENT)
Dept: FAMILY MEDICINE CLINIC | Age: 63
End: 2023-10-17

## 2023-10-17 NOTE — TELEPHONE ENCOUNTER
Medication Management Service ([facility])  [facility]  [phone]     CLINICAL PHARMACY NOTE:  Telephone    Called patient to reschedule missed pharmacy appointment. Patient reports taht he changed primary care providers to an outside PCP. He does not need to be rescheduled at this time as he will no longer follow with this office. Justo Garza, Pharm. D., PGY2 Ambulatory Care Resident  10/17/23

## 2023-10-31 DIAGNOSIS — A49.8 ENTEROCOCCUS FAECALIS INFECTION: ICD-10-CM

## 2023-10-31 DIAGNOSIS — I10 ESSENTIAL HYPERTENSION: ICD-10-CM

## 2023-10-31 RX ORDER — AMOXICILLIN 500 MG/1
500 CAPSULE ORAL 2 TIMES DAILY
Qty: 180 CAPSULE | Refills: 0 | Status: SHIPPED | OUTPATIENT
Start: 2023-10-31

## 2023-10-31 NOTE — TELEPHONE ENCOUNTER
Last visit: 8/29/23  Last Med refill: 7/11/23  Does patient have enough medication for 72 hours: No:     Next Visit Date:  Future Appointments   Date Time Provider [address]   11/2/2023 10:30 AM Fredrick Kapadia MD INFT DISEASE [facility]   12/5/2023  1:20 PM [facility] Blank [facility]   1/17/2024  1:00 PM Gregg Trujillo MD [address] [facility]   1/23/2024  2:00 PM SCHEDULE, STVZ PBURG RAD ONC NURSE [facility] [address] [facility] Maintenance   Topic Date Due    Diabetic retinal exam  Never done    DTaP/Tdap/Td vaccine (1 - Tdap) Never done    Shingles vaccine (1 of 2) 02/08/2011    Hepatitis B vaccine (1 of 3 - Risk 3-dose series) Never done    COVID-19 Vaccine (3 - Rinku risk series) 02/09/2023    Flu vaccine (1) 08/01/2023    Pneumococcal 0-64 years Vaccine (2 - PCV) 06/11/2024 (Originally 12/19/2019)    Diabetic Alb to Cr ratio (uACR) test  03/22/2024    Lipids  03/22/2024    Diabetic foot exam  07/12/2024    GFR test (Diabetes, CKD 3-4, OR last GFR 15-59)  07/17/2024    Prostate Specific Antigen (PSA) Screening or Monitoring  07/17/2024    A1C test (Diabetic or Prediabetic)  08/29/2024    Depression Monitoring  08/29/2024    Colorectal Cancer Screen  07/16/2025    Hepatitis C screen  Completed    HIV screen  Completed    Hepatitis A vaccine  Aged Out    Hib vaccine  Aged Out    Meningococcal (ACWY) vaccine  Aged Out    Depression Screen  Discontinued       Hemoglobin A1C (%)   Date Value   08/29/2023 7.9   06/28/2023 8.5   03/15/2023 7.5             ( goal A1C is < 7)   No components found for: \"LABMICR\"  LDL Cholesterol (mg/dL)   Date Value   03/22/2023 30   04/12/2022 52       (goal LDL is <100)   AST (U/L)   Date Value   07/17/2023 20     ALT (U/L)   Date Value   07/17/2023 20     BUN (mg/dL)   Date Value   07/17/2023 16     BP Readings from Last 3 Encounters:   08/29/23 117/75   08/02/23 125/89   07/26/23 (!) 147/104          (goal 120/80)    All Future Testing planned in

## 2023-10-31 NOTE — TELEPHONE ENCOUNTER
LAST VISIT: 8/2/23  NEXT VISIT: 11/2/23    Per last dictation patient to continue this medication. Please sign for refill if ok. Thank you.

## 2023-11-02 NOTE — TELEPHONE ENCOUNTER
Sending this high priority because its been a few days, and patients blister pack is going out this afternoon - patients pharmacy is calling

## 2023-11-03 ENCOUNTER — HOSPITAL ENCOUNTER (OUTPATIENT)
Age: 63
Setting detail: SPECIMEN
Discharge: HOME | End: 2023-11-03

## 2023-11-03 DIAGNOSIS — A49.8 PSEUDOMONAS AERUGINOSA INFECTION: ICD-10-CM

## 2023-11-03 DIAGNOSIS — A49.8 ENTEROCOCCUS FAECALIS INFECTION: ICD-10-CM

## 2023-11-03 DIAGNOSIS — T84.51XS INFECTION ASSOCIATED WITH INTERNAL RIGHT HIP PROSTHESIS, SEQUELA: ICD-10-CM

## 2023-11-03 LAB
ANION GAP SERPL CALCULATED.3IONS-SCNC: 10 MMOL/L (ref 9–17)
BUN SERPL-MCNC: 13 MG/DL (ref 8–23)
CALCIUM SERPL-MCNC: 9.7 MG/DL (ref 8.6–10.4)
CHLORIDE SERPL-SCNC: 104 MMOL/L (ref 98–107)
CO2 SERPL-SCNC: 28 MMOL/L (ref 20–31)
CREAT SERPL-MCNC: 1 MG/DL (ref 0.7–1.2)
CRP SERPL HS-MCNC: 7 MG/L (ref 0–5)
ERYTHROCYTE [DISTWIDTH] IN BLOOD BY AUTOMATED COUNT: 14.8 % (ref 11.8–14.4)
GFR SERPL CREATININE-BSD FRML MDRD: >60 ML/MIN/1.73M2
GLUCOSE SERPL-MCNC: 262 MG/DL (ref 70–99)
HCT VFR BLD AUTO: 44.7 % (ref 40.7–50.3)
HGB BLD-MCNC: 13.9 G/DL (ref 13–17)
MCH RBC QN AUTO: 27.8 PG (ref 25.2–33.5)
MCHC RBC AUTO-ENTMCNC: 31.1 G/DL (ref 28.4–34.8)
MCV RBC AUTO: 89.4 FL (ref 82.6–102.9)
NRBC BLD-RTO: 0 PER 100 WBC
PLATELET # BLD AUTO: 272 K/UL (ref 138–453)
PMV BLD AUTO: 10.1 FL (ref 8.1–13.5)
POTASSIUM SERPL-SCNC: 4.4 MMOL/L (ref 3.7–5.3)
RBC # BLD AUTO: 5 M/UL (ref 4.21–5.77)
SODIUM SERPL-SCNC: 142 MMOL/L (ref 135–144)
WBC OTHER # BLD: 4.9 K/UL (ref 3.5–11.3)

## 2023-11-03 RX ORDER — LOSARTAN POTASSIUM 50 MG/1
TABLET ORAL
Qty: 30 TABLET | Refills: 3 | Status: SHIPPED | OUTPATIENT
Start: 2023-11-03

## 2023-11-16 ENCOUNTER — OFFICE VISIT (OUTPATIENT)
Dept: INFECTIOUS DISEASES | Age: 63
End: 2023-11-16
Payer: MEDICARE

## 2023-11-16 VITALS
HEIGHT: 74 IN | SYSTOLIC BLOOD PRESSURE: 119 MMHG | TEMPERATURE: 98 F | WEIGHT: 261.2 LBS | HEART RATE: 84 BPM | BODY MASS INDEX: 33.52 KG/M2 | DIASTOLIC BLOOD PRESSURE: 80 MMHG

## 2023-11-16 DIAGNOSIS — T84.51XS INFECTION ASSOCIATED WITH INTERNAL RIGHT HIP PROSTHESIS, SEQUELA: Primary | ICD-10-CM

## 2023-11-16 DIAGNOSIS — A49.8 PSEUDOMONAS AERUGINOSA INFECTION: ICD-10-CM

## 2023-11-16 DIAGNOSIS — A49.8 ENTEROCOCCUS FAECALIS INFECTION: ICD-10-CM

## 2023-11-16 PROCEDURE — 3074F SYST BP LT 130 MM HG: CPT | Performed by: INTERNAL MEDICINE

## 2023-11-16 PROCEDURE — 99213 OFFICE O/P EST LOW 20 MIN: CPT | Performed by: INTERNAL MEDICINE

## 2023-11-16 PROCEDURE — 3079F DIAST BP 80-89 MM HG: CPT | Performed by: INTERNAL MEDICINE

## 2023-11-16 RX ORDER — CIPROFLOXACIN 500 MG/1
500 TABLET, FILM COATED ORAL 2 TIMES DAILY
Qty: 60 TABLET | Refills: 3 | Status: SHIPPED | OUTPATIENT
Start: 2023-11-16 | End: 2023-11-16

## 2023-11-16 RX ORDER — CIPROFLOXACIN 500 MG/1
500 TABLET, FILM COATED ORAL 2 TIMES DAILY
Qty: 60 TABLET | Refills: 3 | COMMUNITY
Start: 2023-10-04 | End: 2023-11-16 | Stop reason: SDUPTHER

## 2023-11-16 RX ORDER — AMOXICILLIN 500 MG/1
500 CAPSULE ORAL 2 TIMES DAILY
Qty: 180 CAPSULE | Refills: 0 | Status: SHIPPED | OUTPATIENT
Start: 2023-11-16

## 2023-11-16 RX ORDER — CIPROFLOXACIN 500 MG/1
500 TABLET, FILM COATED ORAL 2 TIMES DAILY
Qty: 60 TABLET | Refills: 3 | Status: SHIPPED | OUTPATIENT
Start: 2023-11-16 | End: 2024-03-14

## 2023-11-16 RX ORDER — AMOXICILLIN 500 MG/1
500 CAPSULE ORAL 2 TIMES DAILY
Qty: 180 CAPSULE | Refills: 0 | Status: SHIPPED | OUTPATIENT
Start: 2023-11-16 | End: 2023-11-16

## 2023-11-16 ASSESSMENT — ENCOUNTER SYMPTOMS
GASTROINTESTINAL NEGATIVE: 1
RESPIRATORY NEGATIVE: 1

## 2023-11-16 NOTE — PROGRESS NOTES
[facility]  Office Progress Note  Today's Date and Time: 11/16/2023, 9:40 AM    Impression:     1. Infection associated with internal right hip prosthesis, sequela    2. Enterococcus faecalis infection    3. Pseudomonas aeruginosa infection         Recommendations   The patient continues on oral suppressive therapy with ciprofloxacin and amoxicillin for the Pseudomonas/enterococcal right respiratory tract infection  The inflammatory markers overall actually improved  However he still does have pain in the right hip we did discuss that this is secondary to the residual infection and that prosthetic joint  The patient will follow-up with me in 1 year but I did tell him that if he has increasing pain, soft tissue swelling, fevers or chills or any concern of worsening infection he should return to our office    I have ordered the following medications/ labs:  No orders of the defined types were placed in this encounter. Orders Placed This Encounter   Medications    ciprofloxacin (CIPRO) 500 MG tablet     Sig: Take 1 tablet by mouth in the morning and at bedtime     Dispense:  60 tablet     Refill:  3    amoxicillin (AMOXIL) 500 MG capsule     Sig: Take 1 capsule by mouth 2 times daily     Dispense:  180 capsule     Refill:  0       Chief complaint/reason for consultation:     Chief Complaint   Patient presents with    Frequent Infections     3 mo f/u         History of Present Illness: Loni Poole is a 58y.o.-year-old male who I am seeing in follow-up and has a longstanding history of right posterior hip joint infection with Pseudomonas and Enterococcus. The patient has undergone multiple debridement procedures most recently in September 2022 and did receive IV antibiotic therapy that point in time but has been on oral suppressive therapy with amoxicillin and ciprofloxacin since that time.     The patient comes in today for follow-up tells that he is otherwise doing okay still does have

## 2023-11-17 DIAGNOSIS — Z79.4 TYPE 2 DIABETES MELLITUS WITH OTHER SPECIFIED COMPLICATION, WITH LONG-TERM CURRENT USE OF INSULIN (HCC): ICD-10-CM

## 2023-11-17 DIAGNOSIS — E11.69 TYPE 2 DIABETES MELLITUS WITH OTHER SPECIFIED COMPLICATION, WITH LONG-TERM CURRENT USE OF INSULIN (HCC): ICD-10-CM

## 2023-11-17 RX ORDER — ACYCLOVIR 400 MG/1
TABLET ORAL
Qty: 2 EACH | Refills: 3 | Status: SHIPPED | OUTPATIENT
Start: 2023-11-17

## 2023-11-17 NOTE — TELEPHONE ENCOUNTER
hip joint replacement surgery     Infection of right prosthetic hip joint ([address])     Benign localized prostatic hyperplasia with lower urinary tract symptoms (LUTS)     Post-op pain     Hip pain     Lumbar radiculopathy     Neuropathy     Seborrheic keratoses     Sepsis ([address])     Chronic infection of prosthetic hip, subsequent encounter     Wheezing      ----JF

## 2023-11-20 DIAGNOSIS — Z79.4 TYPE 2 DIABETES MELLITUS WITH OTHER SPECIFIED COMPLICATION, WITH LONG-TERM CURRENT USE OF INSULIN (HCC): ICD-10-CM

## 2023-11-20 DIAGNOSIS — E11.69 TYPE 2 DIABETES MELLITUS WITH OTHER SPECIFIED COMPLICATION, WITH LONG-TERM CURRENT USE OF INSULIN (HCC): ICD-10-CM

## 2023-11-21 RX ORDER — INSULIN GLARGINE 100 [IU]/ML
41 INJECTION, SOLUTION SUBCUTANEOUS 2 TIMES DAILY
Qty: 15 ML | Refills: 3 | Status: SHIPPED | OUTPATIENT
Start: 2023-11-21

## 2023-12-20 DIAGNOSIS — N40.0 BENIGN PROSTATIC HYPERPLASIA WITHOUT LOWER URINARY TRACT SYMPTOMS: ICD-10-CM

## 2023-12-20 NOTE — TELEPHONE ENCOUNTER
Please address the medication refill and close the encounter. If I can be of assistance, please route to the applicable pool. Thank you.       Last visit: 8-29-23  Last Med refill: 4-12-23  Does patient have enough medication for 72 hours: No:     Next Visit Date:  Future Appointments   Date Time Provider [address]   1/17/2024  1:00 PM Jagruti Ricardo MD [address] [facility]   1/23/2024  2:00 PM SCHEDULE, STVMAKENZIE PBURG RAD ONC NURSE [facility]   11/13/2024 10:45 AM Garnette Shone, MD INFT DISEASE [address] Maintenance   Topic Date Due    Diabetic retinal exam  Never done    DTaP/Tdap/Td vaccine (1 - Tdap) Never done    Shingles vaccine (1 of 2) 02/08/2011    Respiratory Syncytial Virus (RSV) Pregnant or age 61 yrs+ (1 - 1-dose 60+ series) Never done    Flu vaccine (1) 08/01/2023    COVID-19 Vaccine (3 - 2023-24 season) 09/01/2023    Annual Wellness Visit (AWV)  11/02/2023    Pneumococcal 0-64 years Vaccine (2 - PCV) 06/11/2024 (Originally 12/19/2019)    Diabetic Alb to Cr ratio (uACR) test  03/22/2024    Lipids  03/22/2024    Diabetic foot exam  07/12/2024    Prostate Specific Antigen (PSA) Screening or Monitoring  07/17/2024    A1C test (Diabetic or Prediabetic)  08/29/2024    Depression Monitoring  08/29/2024    GFR test (Diabetes, CKD 3-4, OR last GFR 15-59)  11/03/2024    Colorectal Cancer Screen  07/16/2025    Hepatitis C screen  Completed    HIV screen  Completed    Hepatitis A vaccine  Aged Out    Hepatitis B vaccine  Aged Out    Hib vaccine  Aged Out    Polio vaccine  Aged Out    Meningococcal (ACWY) vaccine  Aged Out    Depression Screen  Discontinued       Hemoglobin A1C (%)   Date Value   08/29/2023 7.9   06/28/2023 8.5   03/15/2023 7.5             ( goal A1C is < 7)   No components found for: \"LABMICR\"  LDL Cholesterol (mg/dL)   Date Value   03/22/2023 30   04/12/2022 52       (goal LDL is <100)   AST (U/L)   Date Value   07/17/2023 20     ALT (U/L)   Date

## 2023-12-26 RX ORDER — TAMSULOSIN HYDROCHLORIDE 0.4 MG/1
0.4 CAPSULE ORAL DAILY
Qty: 90 CAPSULE | Refills: 1 | Status: SHIPPED | OUTPATIENT
Start: 2023-12-26

## 2023-12-28 ENCOUNTER — TELEPHONE (OUTPATIENT)
Dept: FAMILY MEDICINE CLINIC | Age: 63
End: 2023-12-28

## 2023-12-28 NOTE — TELEPHONE ENCOUNTER
Last visit: 08/29/2023  Last Med refill: 12/26/2023  Does patient have enough medication for 72 hours: No:     Patient's medication needs to be sent to [facility] , I have selected that pharmacy , they would also like to have a 90 day supply. Please advise and thank you     Next Visit Date:  Future Appointments   Date Time Provider Department Center   1/17/2024  1:00 PM Dedrick Perez MD [facility]   1/23/2024  2:00 PM SCHEDULE, LIOR DONALD RAD ONC NURSE [facility]   11/13/2024 10:45 AM Stacey Ware MD INFT DISEASE [facility]       Health Maintenance   Topic Date Due    Diabetic retinal exam  Never done    DTaP/Tdap/Td vaccine (1 - Tdap) Never done    Shingles vaccine (1 of 2) 02/08/2011    Respiratory Syncytial Virus (RSV) Pregnant or age 60 yrs+ (1 - 1-dose 60+ series) Never done    Flu vaccine (1) 08/01/2023    COVID-19 Vaccine (3 - 2023-24 season) 09/01/2023    Annual Wellness Visit (AWV)  11/02/2023    Pneumococcal 0-64 years Vaccine (2 - PCV) 06/11/2024 (Originally 12/19/2019)    Diabetic Alb to Cr ratio (uACR) test  03/22/2024    Lipids  03/22/2024    Diabetic foot exam  07/12/2024    Prostate Specific Antigen (PSA) Screening or Monitoring  07/17/2024    A1C test (Diabetic or Prediabetic)  08/29/2024    Depression Monitoring  08/29/2024    GFR test (Diabetes, CKD 3-4, OR last GFR 15-59)  11/03/2024    Colorectal Cancer Screen  07/16/2025    Hepatitis C screen  Completed    HIV screen  Completed    Hepatitis A vaccine  Aged Out    Hepatitis B vaccine  Aged Out    Hib vaccine  Aged Out    Polio vaccine  Aged Out    Meningococcal (ACWY) vaccine  Aged Out    Depression Screen  Discontinued       Hemoglobin A1C (%)   Date Value   08/29/2023 7.9   06/28/2023 8.5   03/15/2023 7.5             ( goal A1C is < 7)   No components found for: \"LABMICR\"  LDL Cholesterol (mg/dL)   Date Value   03/22/2023 30   04/12/2022 52       (goal LDL is <100)   AST (U/L)   Date Value   07/17/2023 20

## 2024-01-08 ENCOUNTER — OFFICE VISIT (OUTPATIENT)
Dept: ORTHOPEDIC SURGERY | Age: 64
End: 2024-01-08

## 2024-01-08 VITALS — WEIGHT: 261 LBS | BODY MASS INDEX: 33.5 KG/M2 | HEIGHT: 74 IN

## 2024-01-08 DIAGNOSIS — M19.012 PRIMARY OSTEOARTHRITIS OF LEFT SHOULDER: ICD-10-CM

## 2024-01-08 DIAGNOSIS — R52 PAIN: Primary | ICD-10-CM

## 2024-01-08 RX ORDER — METHYLPREDNISOLONE ACETATE 40 MG/ML
40 INJECTION, SUSPENSION INTRA-ARTICULAR; INTRALESIONAL; INTRAMUSCULAR; SOFT TISSUE ONCE
Status: SHIPPED | OUTPATIENT
Start: 2024-01-08

## 2024-01-08 RX ORDER — LIDOCAINE HYDROCHLORIDE 10 MG/ML
5 INJECTION, SOLUTION INFILTRATION; PERINEURAL ONCE
Status: SHIPPED | OUTPATIENT
Start: 2024-01-08

## 2024-01-08 NOTE — PROGRESS NOTES
Chief Complaint   Patient presents with    New Patient     B/L shoulder pain   This 63-year-old patient is seen here complaining of pain in both the shoulders.    The patient does have a long involved history.  He has had a total hip implant which had worked out infection.  Fortunately it is under control and he has not had any further seepage or drainage and is on suppressive antibiotic therapy.    The patient has had a right reverse total shoulder arthroplasty.  He says he has continued to have pain in the shoulder.  Patient has had corticosteroid injection in the left shoulder for a diagnosis of osteoarthritis of the shoulder.  He had good response to this.    The patient continues to complain of pain in the shoulders.  The pain is located anteriorly.  There is no radiation of pain no paresthesia associated with this.    Examination: Right hip examination shows no draining sinus.  He was also concerned about the darkness of the skin and I told him that this is not going to go away and is not symptomatic problem.  His hip motion is excellent.  Examination of both the shoulders show that he really has good range of motion.  He is well within the functional range.  He also has excellent internal rotation bilaterally.    X-rays: Further x-rays of the right shoulder were taken and they show no change in compared with the previous x-ray.  The left shoulder's x-rays show some degenerative changes in the glenohumeral joint and AC joint.  There is no subluxation of the joint.    Diagnosis: #1 status post right reverse shoulder arthroplasty.  #2 osteoarthritis left shoulder.  #3 PJI right total hip.    Treatment: I had a very lengthy discussion with him to begin that both the shoulders are in the functional range.  The patient is now living by himself.  He used to live with  but she has decided to part company with him after 13 years.  The reason for her parting is because of his chronic alcoholism.  He drinks all

## 2024-01-12 DIAGNOSIS — Z79.4 TYPE 2 DIABETES MELLITUS WITH OTHER SPECIFIED COMPLICATION, WITH LONG-TERM CURRENT USE OF INSULIN (HCC): ICD-10-CM

## 2024-01-12 DIAGNOSIS — E11.69 TYPE 2 DIABETES MELLITUS WITH OTHER SPECIFIED COMPLICATION, WITH LONG-TERM CURRENT USE OF INSULIN (HCC): ICD-10-CM

## 2024-01-12 DIAGNOSIS — I10 ESSENTIAL HYPERTENSION: ICD-10-CM

## 2024-01-12 NOTE — TELEPHONE ENCOUNTER
11/03/2023 13     BP Readings from Last 3 Encounters:   11/16/23 119/80   08/29/23 117/75   08/02/23 125/89          (goal 120/80)    All Future Testing planned in [facility]  Lab Frequency Next Occurrence   CT SHOULDER RIGHT WO CONTRAST Once 01/16/2023   Sedimentation Rate Once 01/16/2023   Comprehensive Metabolic Panel Once 07/26/2023   PSA, Diagnostic Once 07/26/2023               Patient Active Problem List:     DM (diabetes mellitus) (HCC)     HTN (hypertension)     ETOHism (HCC)     Hypertensive urgency     Abnormal ambulatory electrocardiogram     Abnormal ambulatory electrocardiography     Abnormal kidney function     Adiposity     Astigmatism     Atherosclerotic heart disease of native coronary artery without angina pectoris     Cervical radiculopathy     Chronic back pain     Decreased cardiac output     Diabetic peripheral neuropathy (HCC)     Dislocated intraocular lens     Disturbance in sleep behavior     Dizziness     Dyssomnia     Floaters     Impotence of organic origin     Left ventricular dysfunction     Low vision, both eyes     Myopia     Nuclear sclerosis of left eye     Obstructive sleep apnea syndrome     Palpitations     Personal history of other diseases of the digestive system     Posterior vitreous detachment     Presbyopia     Primary osteoarthritis of right hip     Pseudophakia of right eye     Repetitive intrusions of sleep     Sciatica     Tendonitis     Vitamin D deficiency     Vitreous opacities of right eye     Dislocated IOL (intraocular lens), anterior, right     Prostate CA (HCC)     Essential hypertension     Benign essential HTN     Type 2 diabetes mellitus with hyperglycemia, with long-term current use of insulin ([facility])     Aftercare following right hip joint replacement surgery     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     Hip pain     Lumbar radiculopathy

## 2024-01-16 DIAGNOSIS — C61 PROSTATE CA (HCC): Primary | ICD-10-CM

## 2024-01-16 RX ORDER — LOSARTAN POTASSIUM 50 MG/1
TABLET ORAL
Qty: 30 TABLET | Refills: 3 | Status: SHIPPED | OUTPATIENT
Start: 2024-01-16

## 2024-01-16 RX ORDER — ACYCLOVIR 400 MG/1
TABLET ORAL
Qty: 2 EACH | Refills: 3 | Status: SHIPPED | OUTPATIENT
Start: 2024-01-16

## 2024-01-16 RX ORDER — CARVEDILOL 12.5 MG/1
12.5 TABLET ORAL 2 TIMES DAILY
Qty: 60 TABLET | Refills: 3 | Status: SHIPPED | OUTPATIENT
Start: 2024-01-16

## 2024-01-17 ENCOUNTER — OFFICE VISIT (OUTPATIENT)
Dept: ONCOLOGY | Age: 64
End: 2024-01-17
Payer: MEDICARE

## 2024-01-17 ENCOUNTER — TELEPHONE (OUTPATIENT)
Dept: ONCOLOGY | Age: 64
End: 2024-01-17

## 2024-01-17 VITALS
RESPIRATION RATE: 18 BRPM | HEART RATE: 83 BPM | SYSTOLIC BLOOD PRESSURE: 120 MMHG | WEIGHT: 243.3 LBS | BODY MASS INDEX: 31.24 KG/M2 | TEMPERATURE: 97.1 F | DIASTOLIC BLOOD PRESSURE: 85 MMHG

## 2024-01-17 DIAGNOSIS — C61 PROSTATE CA (HCC): Primary | ICD-10-CM

## 2024-01-17 PROCEDURE — 99214 OFFICE O/P EST MOD 30 MIN: CPT | Performed by: INTERNAL MEDICINE

## 2024-01-17 PROCEDURE — 3079F DIAST BP 80-89 MM HG: CPT | Performed by: INTERNAL MEDICINE

## 2024-01-17 PROCEDURE — 99211 OFF/OP EST MAY X REQ PHY/QHP: CPT

## 2024-01-17 PROCEDURE — 3074F SYST BP LT 130 MM HG: CPT | Performed by: INTERNAL MEDICINE

## 2024-01-17 NOTE — PROGRESS NOTES
diagnosis treatment recommendations   Will follow the lab results from today   If labs are stable we will see him in 6 months with labs prior       Dedrick Perez MD  Hematologist/Medical Oncologist    On this date 1/17/24  I have spent 40 minutes reviewing previous notes, test results and face to face with the patient discussing the diagnosis and importance of compliance with the treatment plan. Greater than 50% of that time was spent face-to-face with the patient in counseling and coordinating her care.        This note is created with the assistance of a speech recognition program.  While intending to generate a document that actually reflects the content of the visit, the document can still have some errors including those of syntax and sound a like substitutions which may escape proof reading.  It such instances, actual meaning can be extrapolated by contextual diversion.

## 2024-01-17 NOTE — TELEPHONE ENCOUNTER
MANJEET HERE FOR MD VISIT  RTC in 6 months with labs  LAB ORDERS GIVEN TO PT ON EXIT  MD VISIT 7/17/24 @ 1PM  AVS PRINTED W/ INSTRUCTIONS AND GIVEN  TO PT ON EXIT

## 2024-01-23 ENCOUNTER — HOSPITAL ENCOUNTER (OUTPATIENT)
Age: 64
Discharge: HOME | End: 2024-01-23
Payer: MEDICARE

## 2024-01-23 ENCOUNTER — HOSPITAL ENCOUNTER (OUTPATIENT)
Dept: RADIATION ONCOLOGY | Age: 64
Discharge: HOME | End: 2024-01-23
Payer: MEDICARE

## 2024-01-23 VITALS
SYSTOLIC BLOOD PRESSURE: 125 MMHG | DIASTOLIC BLOOD PRESSURE: 77 MMHG | OXYGEN SATURATION: 99 % | RESPIRATION RATE: 16 BRPM | TEMPERATURE: 98 F | HEART RATE: 67 BPM

## 2024-01-23 DIAGNOSIS — C61 PROSTATE CA (HCC): Primary | ICD-10-CM

## 2024-01-23 DIAGNOSIS — C61 PROSTATE CA (HCC): ICD-10-CM

## 2024-01-23 LAB — PSA SERPL-MCNC: 0.75 NG/ML

## 2024-01-23 PROCEDURE — 84153 ASSAY OF PSA TOTAL: CPT

## 2024-01-23 PROCEDURE — 99212 OFFICE O/P EST SF 10 MIN: CPT | Performed by: RADIOLOGY

## 2024-01-23 PROCEDURE — 36415 COLL VENOUS BLD VENIPUNCTURE: CPT

## 2024-01-23 NOTE — PROGRESS NOTES
[facility]            Radiation Oncology          [address] [place] [address]        O: [phone]        F: [phone]       [email or web address]           Date of Service: 2024     Location:  [facility] Radiation Oncology,   [address] [place] [id number]   [phone]       RADIATION ONCOLOGY FOLLOW UP NOTE    Patient ID:   Ryder Ha  : 1960   MRN: [id number]    DIAGNOSIS:  Unfavorable intermediate risk prostate adenocarcinoma (cT1c, Octavio 4+3, PSA 5.11) s/p prostate biopsy on 3/30/2021, ADT with leuprolide 45 mg on 2021 and 2022,  SpaceOAR and fiducial placement on 2021 at [facility]. -s/p 70Gy in 28fx 22 @ [facility]       INTERVAL HISTORY:   Ryder Ha is a 63 y.o.. male with severe of unfavorable intermediate risk prostate adenocarcinoma treated with external beam radiation therapy and short-term ADT.  He received a total dose of 70 Gray completed on 2022.  He presents today for a routine follow-up.   Patient is doing well overall.  He has mild dysuria, nocturia approximately 2-3 times at night, mild urgency and hesitancy.  Denies incontinence or hematuria.  Denies hematochezia.  He reports erectile dysfunction.    MEDICATIONS:    Current Outpatient Medications:     losartan (COZAAR) 50 MG tablet, TAKE 2 TABLETS BY MOUTH ONCE A DAY, Disp: 30 tablet, Rfl: 3    Continuous Blood Gluc Sensor (DEXCOM G7 SENSOR) MISC, USE AS DIRECTED WHEN TESTING BLOOD GLUCOSE DAILY. CHANGE SENSOR EVERY 10 DAYS, Disp: 2 each, Rfl: 3    carvedilol (COREG) 12.5 MG tablet, TAKE 1 TABLET BY MOUTH TWICE DAILY., Disp: 60 tablet, Rfl: 3    tamsulosin (FLOMAX) 0.4 MG capsule, Take 1 capsule by mouth daily, Disp: 90 capsule, Rfl: 1    amLODIPine (NORVASC) 10 MG tablet, Take 1 tablet by mouth daily, Disp: , Rfl:     insulin glargine (LANTUS SOLOSTAR) 100 UNIT/ML injection pen, Inject 41 Units into the skin 2

## 2024-01-23 NOTE — PROGRESS NOTES
Ryder COTTER Leland  1/23/2024  2:16 PM      Vitals:    01/23/24 1411   BP: 125/77   Pulse: 67   Resp: 16   Temp: 98 °F (36.7 °C)   SpO2: 99%    :     Pain Assessment: None - Denies Pain          Wt Readings from Last 1 Encounters:   01/17/24 110.4 kg (243 lb 4.8 oz)                Current Outpatient Medications:     losartan (COZAAR) 50 MG tablet, TAKE 2 TABLETS BY MOUTH ONCE A DAY, Disp: 30 tablet, Rfl: 3    Continuous Blood Gluc Sensor (DEXCOM G7 SENSOR) MISC, USE AS DIRECTED WHEN TESTING BLOOD GLUCOSE DAILY. CHANGE SENSOR EVERY 10 DAYS, Disp: 2 each, Rfl: 3    carvedilol (COREG) 12.5 MG tablet, TAKE 1 TABLET BY MOUTH TWICE DAILY., Disp: 60 tablet, Rfl: 3    tamsulosin (FLOMAX) 0.4 MG capsule, Take 1 capsule by mouth daily, Disp: 90 capsule, Rfl: 1    amLODIPine (NORVASC) 10 MG tablet, Take 1 tablet by mouth daily, Disp: , Rfl:     insulin glargine (LANTUS SOLOSTAR) 100 UNIT/ML injection pen, Inject 41 Units into the skin 2 times daily, Disp: 15 mL, Rfl: 3    amoxicillin (AMOXIL) 500 MG capsule, Take 1 capsule by mouth 2 times daily, Disp: 180 capsule, Rfl: 0    ciprofloxacin (CIPRO) 500 MG tablet, Take 1 tablet by mouth in the morning and at bedtime, Disp: 60 tablet, Rfl: 3    metFORMIN (GLUCOPHAGE) 1000 MG tablet, Take 1 tablet by mouth 2 times daily (with meals), Disp: 180 tablet, Rfl: 1    Insulin Pen Needle (B-D ULTRAFINE III SHORT PEN) 31G X 8 MM MISC, USE AS DIRECTED DAILY, Disp: 100 each, Rfl: 3    JARDIANCE 25 MG tablet, Take 1 tablet by mouth daily, Disp: 30 tablet, Rfl: 5    folic acid (FOLVITE) 1 MG tablet, Take 1 tablet by mouth daily, Disp: 90 tablet, Rfl: 1    Dulaglutide (TRULICITY) 3 MG/0.5ML SOPN, INJECT THE CONTENTS OF 1 SYRINGE (3MG) INTO THE SKIN ONCE WEEKLY, Disp: 2 mL, Rfl: 5    insulin lispro, 1 Unit Dial, (HUMALOG KWIKPEN) 100 UNIT/ML SOPN, Inject 6 Units into the skin 3 times daily (before meals) Medium Dose Sliding Scale Glucose: Dose; (If <139: No Insulin)  (140-199: 2 Units)  (200-249: 4

## 2024-03-11 DIAGNOSIS — Z79.4 TYPE 2 DIABETES MELLITUS WITH OTHER SPECIFIED COMPLICATION, WITH LONG-TERM CURRENT USE OF INSULIN (HCC): ICD-10-CM

## 2024-03-11 DIAGNOSIS — I10 ESSENTIAL HYPERTENSION: ICD-10-CM

## 2024-03-11 DIAGNOSIS — E11.69 TYPE 2 DIABETES MELLITUS WITH OTHER SPECIFIED COMPLICATION, WITH LONG-TERM CURRENT USE OF INSULIN (HCC): ICD-10-CM

## 2024-03-11 NOTE — TELEPHONE ENCOUNTER
Last visit: 8/29/23  Last Med refill: 7/11/23  Does patient have enough medication for 72 hours: No: Patient needs an appt    Next Visit Date:  Future Appointments   Date Time Provider Department Center   4/8/2024 11:00 AM Keenan Gillette MD ORTHO SPECIA [facility]   7/17/2024  1:00 PM Dedrick Perez MD SV [facility]   7/23/2024  2:15 PM SCHEDULE, STVZ PBURG RAD ONC NURSE [facility] [place]   11/13/2024 10:45 AM Stacey Waer MD INFT DISEASE [facility]       Health Maintenance   Topic Date Due    Diabetic retinal exam  Never done    DTaP/Tdap/Td vaccine (1 - Tdap) Never done    Shingles vaccine (1 of 2) 02/08/2011    Respiratory Syncytial Virus (RSV) Pregnant or age 60 yrs+ (1 - 1-dose 60+ series) Never done    Flu vaccine (1) 08/01/2023    COVID-19 Vaccine (3 - 2023-24 season) 09/01/2023    Annual Wellness Visit (Medicare Advantage)  01/01/2024    Diabetic Alb to Cr ratio (uACR) test  03/22/2024    Lipids  03/22/2024    Pneumococcal 0-64 years Vaccine (2 - PCV) 06/11/2024 (Originally 12/19/2019)    Diabetic foot exam  07/12/2024    A1C test (Diabetic or Prediabetic)  08/29/2024    Depression Monitoring  08/29/2024    GFR test (Diabetes, CKD 3-4, OR last GFR 15-59)  11/03/2024    Prostate Specific Antigen (PSA) Screening or Monitoring  01/23/2025    Colorectal Cancer Screen  07/16/2025    Hepatitis C screen  Completed    HIV screen  Completed    Hepatitis A vaccine  Aged Out    Hepatitis B vaccine  Aged Out    Hib vaccine  Aged Out    Polio vaccine  Aged Out    Meningococcal (ACWY) vaccine  Aged Out    Depression Screen  Discontinued       Hemoglobin A1C (%)   Date Value   08/29/2023 7.9   06/28/2023 8.5   03/15/2023 7.5             ( goal A1C is < 7)   No components found for: \"LABMICR\"  LDL Cholesterol (mg/dL)   Date Value   03/22/2023 30   04/12/2022 52       (goal LDL is <100)   AST (U/L)   Date Value   07/17/2023 20     ALT (U/L)   Date Value   07/17/2023 20     BUN (mg/dL)   Date Value

## 2024-03-12 RX ORDER — LOSARTAN POTASSIUM 50 MG/1
TABLET ORAL
Qty: 30 TABLET | Refills: 3 | Status: SHIPPED | OUTPATIENT
Start: 2024-03-12

## 2024-03-12 RX ORDER — EMPAGLIFLOZIN 25 MG/1
25 TABLET, FILM COATED ORAL DAILY
Qty: 30 TABLET | Refills: 5 | Status: SHIPPED | OUTPATIENT
Start: 2024-03-12

## 2024-03-29 DIAGNOSIS — A49.8 PSEUDOMONAS AERUGINOSA INFECTION: ICD-10-CM

## 2024-03-29 DIAGNOSIS — A49.8 ENTEROCOCCUS FAECALIS INFECTION: ICD-10-CM

## 2024-03-29 RX ORDER — CIPROFLOXACIN 500 MG/1
500 TABLET, FILM COATED ORAL 2 TIMES DAILY
Qty: 180 TABLET | Refills: 2 | Status: SHIPPED | OUTPATIENT
Start: 2024-03-29 | End: 2024-12-24

## 2024-03-29 RX ORDER — AMOXICILLIN 500 MG/1
500 CAPSULE ORAL 2 TIMES DAILY
Qty: 180 CAPSULE | Refills: 2 | Status: SHIPPED | OUTPATIENT
Start: 2024-03-29

## 2024-03-29 NOTE — TELEPHONE ENCOUNTER
LAST VISIT: 11/16/23  NEXT VISIT: 11/13/24    Per last dictation patient is on these medications for suppressive therapy. Please sign for refills if ok. Thank you.

## 2024-04-01 DIAGNOSIS — Z79.4 TYPE 2 DIABETES MELLITUS WITH OTHER SPECIFIED COMPLICATION, WITH LONG-TERM CURRENT USE OF INSULIN (HCC): ICD-10-CM

## 2024-04-01 DIAGNOSIS — E11.69 TYPE 2 DIABETES MELLITUS WITH OTHER SPECIFIED COMPLICATION, WITH LONG-TERM CURRENT USE OF INSULIN (HCC): ICD-10-CM

## 2024-04-01 RX ORDER — ACYCLOVIR 400 MG/1
TABLET ORAL
Qty: 2 EACH | Refills: 3 | OUTPATIENT
Start: 2024-04-01

## 2024-04-03 DIAGNOSIS — E11.69 TYPE 2 DIABETES MELLITUS WITH OTHER SPECIFIED COMPLICATION, WITH LONG-TERM CURRENT USE OF INSULIN (HCC): ICD-10-CM

## 2024-04-03 DIAGNOSIS — Z79.4 TYPE 2 DIABETES MELLITUS WITH OTHER SPECIFIED COMPLICATION, WITH LONG-TERM CURRENT USE OF INSULIN (HCC): ICD-10-CM

## 2024-04-03 RX ORDER — ACYCLOVIR 400 MG/1
TABLET ORAL
Qty: 2 EACH | Refills: 3 | OUTPATIENT
Start: 2024-04-03

## 2024-04-08 ENCOUNTER — TELEPHONE (OUTPATIENT)
Dept: FAMILY MEDICINE CLINIC | Age: 64
End: 2024-04-08

## 2024-04-08 DIAGNOSIS — F32.0 CURRENT MILD EPISODE OF MAJOR DEPRESSIVE DISORDER WITHOUT PRIOR EPISODE (HCC): ICD-10-CM

## 2024-04-08 DIAGNOSIS — I10 ESSENTIAL HYPERTENSION: ICD-10-CM

## 2024-04-08 DIAGNOSIS — N40.0 BENIGN PROSTATIC HYPERPLASIA WITHOUT LOWER URINARY TRACT SYMPTOMS: ICD-10-CM

## 2024-04-08 NOTE — TELEPHONE ENCOUNTER
[facility] contacted office in regards to medication refills, writer informed not listed as pharmacy will need to have patient update with office.

## 2024-04-08 NOTE — TELEPHONE ENCOUNTER
Last visit: 09/22/2023  Last Med refill: 01/16/2024  Does patient have enough medication for 72 hours: No:     Next Visit Date:  Future Appointments   Date Time Provider Department Center   4/9/2024 10:20 AM Keenan Gillette MD ORTHO SPECIA [facility]   7/17/2024  1:00 PM Dedrick Perez MD SV [facility]   7/23/2024  2:15 PM SCHEDULE, STVZ PBURG RAD ONC NURSE [facility]   11/13/2024 10:45 AM Stacey Ware MD INFT DISEASE [facility]       Health Maintenance   Topic Date Due    Diabetic retinal exam  Never done    DTaP/Tdap/Td vaccine (1 - Tdap) Never done    Shingles vaccine (1 of 2) 02/08/2011    Respiratory Syncytial Virus (RSV) Pregnant or age 60 yrs+ (1 - 1-dose 60+ series) Never done    COVID-19 Vaccine (3 - 2023-24 season) 09/01/2023    Annual Wellness Visit (Medicare Advantage)  01/01/2024    Diabetic Alb to Cr ratio (uACR) test  03/22/2024    Lipids  03/22/2024    Pneumococcal 0-64 years Vaccine (2 of 2 - PCV) 06/11/2024 (Originally 12/19/2019)    Diabetic foot exam  07/12/2024    Flu vaccine (Season Ended) 08/01/2024    A1C test (Diabetic or Prediabetic)  08/29/2024    Depression Monitoring  08/29/2024    GFR test (Diabetes, CKD 3-4, OR last GFR 15-59)  11/03/2024    Prostate Specific Antigen (PSA) Screening or Monitoring  01/23/2025    Colorectal Cancer Screen  07/16/2025    Hepatitis C screen  Completed    HIV screen  Completed    Hepatitis A vaccine  Aged Out    Hepatitis B vaccine  Aged Out    Hib vaccine  Aged Out    Polio vaccine  Aged Out    Meningococcal (ACWY) vaccine  Aged Out    Depression Screen  Discontinued       Hemoglobin A1C (%)   Date Value   08/29/2023 7.9   06/28/2023 8.5   03/15/2023 7.5             ( goal A1C is < 7)   No components found for: \"LABMICR\"  LDL Cholesterol (mg/dL)   Date Value   03/22/2023 30   04/12/2022 52       (goal LDL is <100)   AST (U/L)   Date Value   07/17/2023 20     ALT (U/L)   Date Value   07/17/2023 20     BUN (mg/dL)   Date Value

## 2024-04-09 ENCOUNTER — OFFICE VISIT (OUTPATIENT)
Dept: ORTHOPEDIC SURGERY | Age: 64
End: 2024-04-09
Payer: MEDICARE

## 2024-04-09 VITALS — WEIGHT: 251 LBS | BODY MASS INDEX: 32.21 KG/M2 | HEIGHT: 74 IN

## 2024-04-09 DIAGNOSIS — M19.012 PRIMARY OSTEOARTHRITIS OF LEFT SHOULDER: Primary | ICD-10-CM

## 2024-04-09 PROCEDURE — 20610 DRAIN/INJ JOINT/BURSA W/O US: CPT | Performed by: ORTHOPAEDIC SURGERY

## 2024-04-09 PROCEDURE — 99213 OFFICE O/P EST LOW 20 MIN: CPT | Performed by: ORTHOPAEDIC SURGERY

## 2024-04-09 RX ORDER — LIDOCAINE HYDROCHLORIDE 10 MG/ML
5 INJECTION, SOLUTION INFILTRATION; PERINEURAL ONCE
Status: COMPLETED | OUTPATIENT
Start: 2024-04-09 | End: 2024-04-09

## 2024-04-09 RX ORDER — METHYLPREDNISOLONE ACETATE 40 MG/ML
40 INJECTION, SUSPENSION INTRA-ARTICULAR; INTRALESIONAL; INTRAMUSCULAR; SOFT TISSUE ONCE
Status: COMPLETED | OUTPATIENT
Start: 2024-04-09 | End: 2024-04-09

## 2024-04-09 RX ADMIN — METHYLPREDNISOLONE ACETATE 40 MG: 40 INJECTION, SUSPENSION INTRA-ARTICULAR; INTRALESIONAL; INTRAMUSCULAR; SOFT TISSUE at 10:43

## 2024-04-09 RX ADMIN — LIDOCAINE HYDROCHLORIDE 5 ML: 10 INJECTION, SOLUTION INFILTRATION; PERINEURAL at 10:43

## 2024-04-09 NOTE — PROGRESS NOTES
This patient who had undergone a right reverse total shoulder arthroplasty is seen here today complaining of pain in the left shoulder.  She also has some pain in the right shoulder.  The patient has had corticosteroid injection in the left shoulder with good results.    Examination: Cervical spine examination shows that he has excellent flexion extension and rotation but there is tight in lateral flexion to left and right without any pain radiating to the shoulder.    Examination of the right shoulder shows excellent full range of motion.  The left shoulder also shows excellent motion but he does have some impingement signs both in abduction and flexion.  However he continues to complain of pain inside the joint.  On internal rotation his hand comes to the SI joint.  Both his abduction and flexion are about 220 degrees.    Diagnosis: Symptomatic primary osteoarthritis left shoulder.    Treatment: Under sterile condition I injected 40 mg Depo-Medrol and 5 cc of 1% plain lidocaine through lateral portal with excellent response.  See him as needed

## 2024-04-10 NOTE — TELEPHONE ENCOUNTER
Writer spoke to patient per patient does not want [facility] as his pharmacy. Staying with [facility] pharmacy that is listed on his file.

## 2024-04-10 NOTE — TELEPHONE ENCOUNTER
Writer called the patient no answer left message asking if the patient has changed  to [facility] for new pharmacy.

## 2024-04-10 NOTE — TELEPHONE ENCOUNTER
Writer spoke to patient per patient does not want [facility] as his pharmacy. Staying with [facility] pharmacy that is listed on his file.

## 2024-04-13 RX ORDER — VENLAFAXINE HYDROCHLORIDE 37.5 MG/1
CAPSULE, EXTENDED RELEASE ORAL
Qty: 90 CAPSULE | Refills: 3 | Status: SHIPPED | OUTPATIENT
Start: 2024-04-13

## 2024-04-13 RX ORDER — TAMSULOSIN HYDROCHLORIDE 0.4 MG/1
0.4 CAPSULE ORAL DAILY
Qty: 90 CAPSULE | Refills: 1 | Status: SHIPPED | OUTPATIENT
Start: 2024-04-13

## 2024-04-13 RX ORDER — CARVEDILOL 12.5 MG/1
12.5 TABLET ORAL 2 TIMES DAILY
Qty: 60 TABLET | Refills: 3 | Status: SHIPPED | OUTPATIENT
Start: 2024-04-13

## 2024-04-25 ENCOUNTER — HOSPITAL ENCOUNTER (EMERGENCY)
Age: 64
Discharge: LEFT BEFORE DISCHARGE | End: 2024-04-25
Attending: EMERGENCY MEDICINE | Admitting: FAMILY MEDICINE
Payer: MEDICARE

## 2024-04-25 ENCOUNTER — APPOINTMENT (OUTPATIENT)
Dept: GENERAL RADIOLOGY | Age: 64
End: 2024-04-25
Payer: MEDICARE

## 2024-04-25 ENCOUNTER — APPOINTMENT (OUTPATIENT)
Dept: CT IMAGING | Age: 64
End: 2024-04-25
Payer: MEDICARE

## 2024-04-25 VITALS
OXYGEN SATURATION: 94 % | TEMPERATURE: 100.3 F | HEART RATE: 99 BPM | SYSTOLIC BLOOD PRESSURE: 118 MMHG | RESPIRATION RATE: 17 BRPM | DIASTOLIC BLOOD PRESSURE: 98 MMHG

## 2024-04-25 DIAGNOSIS — R29.6 FREQUENT FALLS: Primary | ICD-10-CM

## 2024-04-25 DIAGNOSIS — M25.551 RIGHT HIP PAIN: ICD-10-CM

## 2024-04-25 DIAGNOSIS — R79.89 ELEVATED TROPONIN: ICD-10-CM

## 2024-04-25 LAB
ANION GAP SERPL CALCULATED.3IONS-SCNC: 9 MMOL/L (ref 9–16)
BASOPHILS # BLD: 0.04 K/UL (ref 0–0.2)
BASOPHILS NFR BLD: 1 % (ref 0–2)
BNP SERPL-MCNC: 249 PG/ML (ref 0–300)
BUN SERPL-MCNC: 17 MG/DL (ref 8–23)
CALCIUM SERPL-MCNC: 9 MG/DL (ref 8.6–10.4)
CHLORIDE SERPL-SCNC: 103 MMOL/L (ref 98–107)
CO2 SERPL-SCNC: 27 MMOL/L (ref 20–31)
CREAT SERPL-MCNC: 1 MG/DL (ref 0.7–1.2)
EOSINOPHIL # BLD: 0.18 K/UL (ref 0–0.44)
EOSINOPHILS RELATIVE PERCENT: 3 % (ref 1–4)
ERYTHROCYTE [DISTWIDTH] IN BLOOD BY AUTOMATED COUNT: 14.9 % (ref 11.8–14.4)
GFR SERPL CREATININE-BSD FRML MDRD: 83 ML/MIN/1.73M2
GLUCOSE SERPL-MCNC: 97 MG/DL (ref 74–99)
HCT VFR BLD AUTO: 40.8 % (ref 40.7–50.3)
HGB BLD-MCNC: 12.7 G/DL (ref 13–17)
IMM GRANULOCYTES # BLD AUTO: <0.03 K/UL (ref 0–0.3)
IMM GRANULOCYTES NFR BLD: 0 %
LYMPHOCYTES NFR BLD: 1.54 K/UL (ref 1.1–3.7)
LYMPHOCYTES RELATIVE PERCENT: 25 % (ref 24–43)
MCH RBC QN AUTO: 26.4 PG (ref 25.2–33.5)
MCHC RBC AUTO-ENTMCNC: 31.1 G/DL (ref 28.4–34.8)
MCV RBC AUTO: 84.8 FL (ref 82.6–102.9)
MONOCYTES NFR BLD: 0.93 K/UL (ref 0.1–1.2)
MONOCYTES NFR BLD: 15 % (ref 3–12)
NEUTROPHILS NFR BLD: 56 % (ref 36–65)
NEUTS SEG NFR BLD: 3.58 K/UL (ref 1.5–8.1)
NRBC BLD-RTO: 0 PER 100 WBC
PLATELET # BLD AUTO: 286 K/UL (ref 138–453)
PMV BLD AUTO: 9.5 FL (ref 8.1–13.5)
POTASSIUM SERPL-SCNC: 3.8 MMOL/L (ref 3.7–5.3)
RBC # BLD AUTO: 4.81 M/UL (ref 4.21–5.77)
RBC # BLD: ABNORMAL 10*6/UL
SODIUM SERPL-SCNC: 139 MMOL/L (ref 136–145)
TROPONIN I SERPL HS-MCNC: 23 NG/L (ref 0–22)
TROPONIN I SERPL HS-MCNC: 25 NG/L (ref 0–22)
WBC OTHER # BLD: 6.3 K/UL (ref 3.5–11.3)

## 2024-04-25 PROCEDURE — 73502 X-RAY EXAM HIP UNI 2-3 VIEWS: CPT

## 2024-04-25 PROCEDURE — 6360000002 HC RX W HCPCS: Performed by: STUDENT IN AN ORGANIZED HEALTH CARE EDUCATION/TRAINING PROGRAM

## 2024-04-25 PROCEDURE — 71045 X-RAY EXAM CHEST 1 VIEW: CPT

## 2024-04-25 PROCEDURE — 96374 THER/PROPH/DIAG INJ IV PUSH: CPT | Performed by: EMERGENCY MEDICINE

## 2024-04-25 PROCEDURE — 85025 COMPLETE CBC W/AUTO DIFF WBC: CPT

## 2024-04-25 PROCEDURE — 83880 ASSAY OF NATRIURETIC PEPTIDE: CPT

## 2024-04-25 PROCEDURE — 93005 ELECTROCARDIOGRAM TRACING: CPT | Performed by: STUDENT IN AN ORGANIZED HEALTH CARE EDUCATION/TRAINING PROGRAM

## 2024-04-25 PROCEDURE — 96374 THER/PROPH/DIAG INJ IV PUSH: CPT | Performed by: FAMILY MEDICINE

## 2024-04-25 PROCEDURE — 70450 CT HEAD/BRAIN W/O DYE: CPT

## 2024-04-25 PROCEDURE — 99285 EMERGENCY DEPT VISIT HI MDM: CPT | Performed by: FAMILY MEDICINE

## 2024-04-25 PROCEDURE — 84484 ASSAY OF TROPONIN QUANT: CPT

## 2024-04-25 PROCEDURE — 1200000000 HC SEMI PRIVATE

## 2024-04-25 PROCEDURE — 99285 EMERGENCY DEPT VISIT HI MDM: CPT | Performed by: EMERGENCY MEDICINE

## 2024-04-25 PROCEDURE — 80048 BASIC METABOLIC PNL TOTAL CA: CPT

## 2024-04-25 RX ORDER — POLYETHYLENE GLYCOL 3350 17 G/17G
17 POWDER, FOR SOLUTION ORAL DAILY PRN
Status: CANCELLED | OUTPATIENT
Start: 2024-04-25

## 2024-04-25 RX ORDER — SODIUM CHLORIDE 0.9 % (FLUSH) 0.9 %
5-40 SYRINGE (ML) INJECTION EVERY 12 HOURS SCHEDULED
Status: CANCELLED | OUTPATIENT
Start: 2024-04-25

## 2024-04-25 RX ORDER — POTASSIUM CHLORIDE 20 MEQ/1
40 TABLET, EXTENDED RELEASE ORAL PRN
Status: CANCELLED | OUTPATIENT
Start: 2024-04-25

## 2024-04-25 RX ORDER — POTASSIUM CHLORIDE 7.45 MG/ML
10 INJECTION INTRAVENOUS PRN
Status: CANCELLED | OUTPATIENT
Start: 2024-04-25

## 2024-04-25 RX ORDER — ONDANSETRON 2 MG/ML
4 INJECTION INTRAMUSCULAR; INTRAVENOUS EVERY 6 HOURS PRN
Status: CANCELLED | OUTPATIENT
Start: 2024-04-25

## 2024-04-25 RX ORDER — ACETAMINOPHEN 650 MG/1
650 SUPPOSITORY RECTAL EVERY 6 HOURS PRN
Status: CANCELLED | OUTPATIENT
Start: 2024-04-25

## 2024-04-25 RX ORDER — SODIUM CHLORIDE 0.9 % (FLUSH) 0.9 %
5-40 SYRINGE (ML) INJECTION PRN
Status: CANCELLED | OUTPATIENT
Start: 2024-04-25

## 2024-04-25 RX ORDER — MAGNESIUM SULFATE IN WATER 40 MG/ML
2000 INJECTION, SOLUTION INTRAVENOUS PRN
Status: CANCELLED | OUTPATIENT
Start: 2024-04-25

## 2024-04-25 RX ORDER — ENOXAPARIN SODIUM 100 MG/ML
40 INJECTION SUBCUTANEOUS DAILY
Status: CANCELLED | OUTPATIENT
Start: 2024-04-25

## 2024-04-25 RX ORDER — SODIUM CHLORIDE 9 MG/ML
INJECTION, SOLUTION INTRAVENOUS PRN
Status: CANCELLED | OUTPATIENT
Start: 2024-04-25

## 2024-04-25 RX ORDER — ACETAMINOPHEN 325 MG/1
650 TABLET ORAL EVERY 6 HOURS PRN
Status: CANCELLED | OUTPATIENT
Start: 2024-04-25

## 2024-04-25 RX ORDER — KETOROLAC TROMETHAMINE 30 MG/ML
30 INJECTION, SOLUTION INTRAMUSCULAR; INTRAVENOUS ONCE
Status: COMPLETED | OUTPATIENT
Start: 2024-04-25 | End: 2024-04-25

## 2024-04-25 RX ORDER — ONDANSETRON 4 MG/1
4 TABLET, ORALLY DISINTEGRATING ORAL EVERY 8 HOURS PRN
Status: CANCELLED | OUTPATIENT
Start: 2024-04-25

## 2024-04-25 RX ADMIN — KETOROLAC TROMETHAMINE 30 MG: 30 INJECTION, SOLUTION INTRAMUSCULAR; INTRAVENOUS at 09:37

## 2024-04-25 ASSESSMENT — PAIN SCALES - GENERAL: PAINLEVEL_OUTOF10: 8

## 2024-04-25 ASSESSMENT — ENCOUNTER SYMPTOMS
RHINORRHEA: 0
NAUSEA: 0
SORE THROAT: 0
VOMITING: 0
BACK PAIN: 0
ABDOMINAL PAIN: 0
CONSTIPATION: 0
SHORTNESS OF BREATH: 0
DIARRHEA: 0

## 2024-04-25 ASSESSMENT — PAIN DESCRIPTION - ORIENTATION: ORIENTATION: RIGHT

## 2024-04-25 ASSESSMENT — PAIN DESCRIPTION - LOCATION: LOCATION: HIP

## 2024-04-25 ASSESSMENT — PAIN - FUNCTIONAL ASSESSMENT: PAIN_FUNCTIONAL_ASSESSMENT: 0-10

## 2024-04-25 NOTE — ED PROVIDER NOTES
nourished, well developed, appears stated age, no acute distress, nontoxic in appearance  Eyes: PERRL, EOMI, no conjunctival injection  HENT: NCAT, Neck supple without meningismus.  No midline C-spine tenderness.  Full active and passive range of motion of cervical spine without discomfort.  CV: RRR, Warm, well-perfused extremities.  No appreciable swelling to bilateral lower extremities.  RESP: CTAB, Unlabored respiratory effort  GI: soft, non-tender, non-distended, no masses  MSK: No gross deformities appreciated  Neuro: Alert and oriented x 4, GCS 15. CNs II-XII grossly intact. Sensation and motor function of extremities grossly intact.    DDX/DIAGNOSTIC RESULTS / EMERGENCY DEPARTMENT COURSE / MDM     Medical Decision Making  63-year-old male presents emergency department with hip pain, dizziness, headache, frequent falls, leg swelling.  Hip pains been present since hip surgery 3 years ago.  Increasing frequency and falls at home due to dizziness and weakness.  Denies any is head.  No LOC.  No headache or neck pain.  Patient lives home alone.  Ambulates with a cane.  Denies chest pain, shortness of breath, back pain, abdominal pain.  No nausea or vomiting.  Patient is afebrile.  Vital signs stable.  Physical examination is nonfocal.  Concern for ACS/MI, pneumonia, pneumothorax, electrolyte abnormality, vertigo, syncope, intracranial mass or bleed, osseous abnormality of the right hip.  Will get lab work and imaging.  Will treat symptoms.  Will reevaluate    Amount and/or Complexity of Data Reviewed  Labs: ordered. Decision-making details documented in ED Course.  Radiology: ordered. Decision-making details documented in ED Course.  ECG/medicine tests: ordered.    Risk  Prescription drug management.        EKG  Rhythm: normal sinus   Rate: tachycardia  Axis: left  Ectopy: premature atrial contraction  Conduction: normal  ST Segments: no acute change  T Waves: no acute change  Q Waves: nonspecific    EKG   made to edit the dictations but occasionally words are mis-transcribed.)

## 2024-04-25 NOTE — ED PROVIDER NOTES
[facility] ED     Emergency Department     Faculty Attestation        I performed a history and physical examination of the patient and discussed management with the resident. I reviewed the resident’s note and agree with the documented findings and plan of care. Any areas of disagreement are noted on the chart. I was personally present for the key portions of any procedures. I have documented in the chart those procedures where I was not present during the key portions. I have reviewed the emergency nurses triage note. I agree with the chief complaint, past medical history, past surgical history, allergies, medications, social and family history as documented unless otherwise noted below.    For mid-level providers such as nurse practitioners as well as physicians assistants:    I have personally seen and evaluated the patient.    I find the patient's history and physical exam are consistent with NP/PA documentation.  I agree with the care provided, treatment rendered, disposition, & follow-up plan.     Additional findings are as noted.    Vital Signs: /70   Pulse (!) 103   Temp 100.3 °F (37.9 °C) (Oral)   Resp 18   SpO2 97%   PCP:  Jass Glez DO    Pertinent Comments:           Critical Care  None          Feliz Ludwig MD    Attending Emergency Medicine Physician            Mike Ludwig MD  04/25/24 0919

## 2024-04-25 NOTE — PLAN OF CARE
Ryder Ha is a 63-year-old male who presents with right hip pain, bilateral lower leg pain, dizziness, complaints of recurrent falls.  Upon further history taking, patient states the pain has been there for the last 3 years ever since he had hip replacement surgery.  Patient denies any acute changes in his health apart from some flulike symptoms that was going on for the past week.  However he denies having any cough, chest pain, shortness of breath, difficulty breathing with exertion.  Patient only states that the dizziness occurs when getting up from a standing position and that none of his falls have resulted in him losing consciousness or hitting his head, rather the falls have been because his daughter's dog tugs on the patient.    Constitut pulses.      Heart sounds: Normal heart sounds. No murmur heard.  Pulmonary: ional:       General: He is not in acute distress.     Appearance: Normal appearance.   Cardiovascular:      Rate and Rhythm: Normal rate and regular rhythm.      Pulses: Normal     Effort: Pulmonary effort is normal.      Breath sounds: Normal breath sounds. No wheezing, rhonchi or rales.   Abdominal:      General: Abdomen is flat. Bowel sounds are normal.      Palpations: Abdomen is soft.   Musculoskeletal:      Right lower leg: No edema.      Left lower leg: No edema.   Neurological:      General: No focal motor or sensory deficit present.  No cranial nerve deficits.  Positive Romberg sign.      Mental Status: He is alert and oriented to person, place, and time.     Assessment & Plan  -CT head showed no acute intracranial abnormality  -Hip x-rays show stable appearance of right hip prosthesis and no evidence of hardware complication  -Patient denied any red flag signs including lower extremity numbness, fecal/urine incontinence, severe back pain.  -Initial troponin was 25, with a following reading of 23.  Patient denied any chest pain, shortness of breath, palpitations and denied any  history of cardiac disease.  -Was recommended to get admitted for cardiac stress test, which patient initially agreed to however later changed his mind and said he would rather go home.  -Risks and benefits were all explained to the patient including the possibility of death and the patient agreed and signed the AMA form on return home with follow-up in the clinic.  -Patient was also provided with ER precautions to return including worsening numbness, incontinence, difficulty breathing, exertional chest pain, dizziness, loss of consciousness.    Patient left home AMA

## 2024-04-25 NOTE — ED TRIAGE NOTES
Pt to ed c/o right hip pain, and bilateral swelling. Per pt he had a right hip replacement 3 years ago and has had been ever since. Pt states he has been having recent falls at home due to weakness and swelling in legs. Pt states he has had a headache as well. Pt states he is supposed to be on lifelong antibiotics for his hip and he is out of them. Pt states he has not hit head or loc during falls. Pt states he feels like he cannot take care of himself at home alone anymore. Pt states he is attempting to get his daughter to move in with him. Pt is a poor historian. Pt denies chest pain, sob.    Pt is alert and oriented x4. Pt in gown, on full cardiac monitor. EKG done. Iv placed, labs drawn. Call light in reach. Will continue with plan of care.

## 2024-04-26 ENCOUNTER — TELEPHONE (OUTPATIENT)
Dept: ORTHOPEDIC SURGERY | Age: 64
End: 2024-04-26

## 2024-04-26 LAB
EKG ATRIAL RATE: 100 BPM
EKG P AXIS: 48 DEGREES
EKG P-R INTERVAL: 218 MS
EKG Q-T INTERVAL: 356 MS
EKG QRS DURATION: 90 MS
EKG QTC CALCULATION (BAZETT): 459 MS
EKG R AXIS: -29 DEGREES
EKG T AXIS: 21 DEGREES
EKG VENTRICULAR RATE: 100 BPM

## 2024-04-26 PROCEDURE — 93010 ELECTROCARDIOGRAM REPORT: CPT | Performed by: INTERNAL MEDICINE

## 2024-04-26 NOTE — TELEPHONE ENCOUNTER
Patient was called 04/26/24 to be scheduled with Dr. Gillette for hip pain. We received a referral for this patient to be seen.

## 2024-05-02 ENCOUNTER — TELEPHONE (OUTPATIENT)
Dept: INFECTIOUS DISEASES | Age: 64
End: 2024-05-02

## 2024-05-02 DIAGNOSIS — T84.59XD: ICD-10-CM

## 2024-05-02 DIAGNOSIS — R29.6 FALLS FREQUENTLY: ICD-10-CM

## 2024-05-02 DIAGNOSIS — I51.9 LEFT VENTRICULAR DYSFUNCTION: ICD-10-CM

## 2024-05-02 DIAGNOSIS — I50.32 CHRONIC HEART FAILURE WITH PRESERVED EJECTION FRACTION (HCC): ICD-10-CM

## 2024-05-02 DIAGNOSIS — M25.551 CHRONIC RIGHT HIP PAIN: Primary | ICD-10-CM

## 2024-05-02 DIAGNOSIS — M25.451 HIP SWELLING, RIGHT: ICD-10-CM

## 2024-05-02 DIAGNOSIS — G89.29 CHRONIC RIGHT HIP PAIN: Primary | ICD-10-CM

## 2024-05-02 DIAGNOSIS — Z96.649: ICD-10-CM

## 2024-05-02 DIAGNOSIS — R79.89 ELEVATED TROPONIN: ICD-10-CM

## 2024-05-02 DIAGNOSIS — T84.51XD INFECTION ASSOCIATED WITH INTERNAL RIGHT HIP PROSTHESIS, SUBSEQUENT ENCOUNTER: ICD-10-CM

## 2024-05-02 DIAGNOSIS — I25.10 ATHEROSCLEROSIS OF NATIVE CORONARY ARTERY WITHOUT ANGINA PECTORIS, UNSPECIFIED WHETHER NATIVE OR TRANSPLANTED HEART: ICD-10-CM

## 2024-05-02 DIAGNOSIS — I25.10 CORONARY ARTERY DISEASE WITHOUT ANGINA PECTORIS, UNSPECIFIED VESSEL OR LESION TYPE, UNSPECIFIED WHETHER NATIVE OR TRANSPLANTED HEART: ICD-10-CM

## 2024-05-02 DIAGNOSIS — M16.11 PRIMARY OSTEOARTHRITIS OF RIGHT HIP: ICD-10-CM

## 2024-05-02 NOTE — PROGRESS NOTES
See Plan of Care note 4/25/2024.  MA team to notify patient of imaging ordered including MRI and Stress test as well as referral to Cardiology.  Patient also to be scheduled as soon as possible in clinic for ER follow after leaving against medical advice.

## 2024-05-02 NOTE — TELEPHONE ENCOUNTER
New PCP called, states patient asked for a refill on his suppressive therapies and hasn't had it in a while. Appears patient changed PCP and pharmacy and we were not aware.     I pended his scripts to go to his new pharmacy, please sign off on it.

## 2024-05-03 RX ORDER — AMOXICILLIN 500 MG/1
500 CAPSULE ORAL 2 TIMES DAILY
Qty: 60 CAPSULE | Refills: 11 | Status: SHIPPED | OUTPATIENT
Start: 2024-05-03 | End: 2025-04-28

## 2024-05-03 RX ORDER — CIPROFLOXACIN 500 MG/1
500 TABLET, FILM COATED ORAL 2 TIMES DAILY
Qty: 60 TABLET | Refills: 11 | Status: SHIPPED | OUTPATIENT
Start: 2024-05-03 | End: 2025-04-28

## 2024-05-06 ENCOUNTER — HOSPITAL ENCOUNTER (OUTPATIENT)
Age: 64
Discharge: HOME | End: 2024-05-06
Payer: MEDICARE

## 2024-05-06 ENCOUNTER — OFFICE VISIT (OUTPATIENT)
Dept: ORTHOPEDIC SURGERY | Age: 64
End: 2024-05-06
Payer: MEDICARE

## 2024-05-06 VITALS — HEIGHT: 74 IN | WEIGHT: 248 LBS | BODY MASS INDEX: 31.83 KG/M2

## 2024-05-06 DIAGNOSIS — T84.50XD INFECTION OF PROSTHETIC JOINT, SUBSEQUENT ENCOUNTER: ICD-10-CM

## 2024-05-06 DIAGNOSIS — T84.50XD INFECTION OF PROSTHETIC JOINT, SUBSEQUENT ENCOUNTER: Primary | ICD-10-CM

## 2024-05-06 LAB
CRP SERPL HS-MCNC: 20.5 MG/L (ref 0–5)
ERYTHROCYTE [SEDIMENTATION RATE] IN BLOOD BY PHOTOMETRIC METHOD: 55 MM/HR (ref 0–20)

## 2024-05-06 PROCEDURE — 85652 RBC SED RATE AUTOMATED: CPT

## 2024-05-06 PROCEDURE — 86140 C-REACTIVE PROTEIN: CPT

## 2024-05-06 PROCEDURE — 99213 OFFICE O/P EST LOW 20 MIN: CPT | Performed by: ORTHOPAEDIC SURGERY

## 2024-05-06 PROCEDURE — 36415 COLL VENOUS BLD VENIPUNCTURE: CPT

## 2024-05-06 RX ORDER — HYDROCODONE BITARTRATE AND ACETAMINOPHEN 5; 325 MG/1; MG/1
1 TABLET ORAL EVERY 6 HOURS PRN
Qty: 56 TABLET | Refills: 0 | Status: SHIPPED | OUTPATIENT
Start: 2024-05-06 | End: 2024-05-06 | Stop reason: SDUPTHER

## 2024-05-06 RX ORDER — HYDROCODONE BITARTRATE AND ACETAMINOPHEN 5; 325 MG/1; MG/1
1 TABLET ORAL EVERY 6 HOURS PRN
Qty: 56 TABLET | Refills: 0 | Status: SHIPPED | OUTPATIENT
Start: 2024-05-06 | End: 2024-05-20

## 2024-05-06 NOTE — PROGRESS NOTES
This patient is being seen here today complaining of bubble on the right hip incision and pain.    As far as the shoulders are concerned he says he is doing fine he has no problem at all.    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  This was done by Dr. Abdalla.  He is so this bubble coming up over the incision and therefore try to return to see  with the patient states that he would not see him.  He then tried to see Dr. Gunn who also could not see him.    Examination: Both the shoulders show excellent range of motion.  As for the right hip there is a large blister in the middle of the incision with some oozing.    I reviewed his x-rays and he has a revision arthroplasty on the right side.    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    Treatment: I discussed with the patient that I do not do revision arthroplasty of the hips.  I will discuss with one of my colleagues on Wednesday and then get back with him.  In the meantime I have sent him for basic blood work.  Prescribed pain medication as well.

## 2024-05-15 ENCOUNTER — TELEPHONE (OUTPATIENT)
Dept: FAMILY MEDICINE CLINIC | Age: 64
End: 2024-05-15

## 2024-05-15 NOTE — TELEPHONE ENCOUNTER
Writer reached out to the patient per previous encounter, left a vm for patient to call the office back to inform us if he is seeing another physician or if he is still coming to this office, also to discuss previous encounter.

## 2024-05-15 NOTE — TELEPHONE ENCOUNTER
----- Message from Jc Kohler MD sent at 5/15/2024  2:27 PM EDT -----  Regarding: FW: Follow up appointment needed  Venkatesh Barboza,     If you could attempt again to schedule patient with clinic or confirm if patient is established with a new provider, I would appreciate it.  Per ID notes patient may have a new PCP.  If patient does have a new PCP, if you could please document that in a telephone note along with all the orders we placed for him to complete is still recommended (e.g. MRI R. Hip, Stress test, etc.).  Please let me know if any questions or concerns.     Jc Reed     ----- Message -----  From: Tamra Ziegler MA  Sent: 4/26/2024   4:16 PM EDT  To: Jc Kohler MD  Subject: RE: Follow up appointment needed                 Writer reached out to the patient provided information regarding the orders, was not able to schedule patient in the office there is no available appointments with Dr. Glez per patient she is the only one he wants to see.  ----- Message -----  From: Jc Kohler MD  Sent: 4/25/2024   5:26 PM EDT  To: Tamra Ziegler MA  Subject: Follow up appointment needed                     Venkatesh Barboza,     If you could please contact patient for follow up with Dr. Newton ENGEL after leaving ER for follow up R. Hip pain and dizziness, I would appreciate it.  Patient briefly seen in the ER on 4/25/2024 and patient left AMA, stress test and MRI R. Hip should be pending as well.  Please let me know if any questions or concerns.      Jc Reed

## 2024-05-16 NOTE — TELEPHONE ENCOUNTER
Writer spoke with the patient, patient did confirm that he was seeing another physician. Patient also stated if he did the testing that was ordered from this office his physician would not see the results the office is private.

## 2024-06-06 NOTE — PROGRESS NOTES
DIABETES and HYPERTENSION visit    BP Readings from Last 3 Encounters:   08/15/22 (!) 131/95   07/25/22 100/70   07/20/22 (!) 130/94        Hemoglobin A1C (%)   Date Value   04/12/2022 8.3   03/28/2022 8.3   08/15/2013 7.8 (H)     Microalb/Crt. Ratio (mcg/mg creat)   Date Value   04/13/2022 Can not be calculated     LDL Cholesterol (mg/dL)   Date Value   04/12/2022 52     HDL (mg/dL)   Date Value   04/12/2022 49     BUN (mg/dL)   Date Value   06/29/2022 13     Creatinine (mg/dL)   Date Value   06/29/2022 0.92     Glucose (mg/dL)   Date Value   06/29/2022 199 (H)            Have you changed or started any medications since your last visit including any over-the-counter medicines, vitamins, or herbal medicines? no   Have you stopped taking any of your medications? Is so, why? -  no  Are you having any side effects from any of your medications? - no    Have you seen any other physician or provider since your last visit? yes - Oncology , ortho , urology   Have you had any other diagnostic tests since your last visit? yes - xr-lt Shoulder    Have you been seen in the emergency room and/or had an admission in a  hospital since we last saw you?  yes - [facility]    Have you had your routine dental cleaning in the past 6 months?  no     Have you had your annual diabetic retinal (eye) exam? No   (ensure copy of exam is in the chart)    Do you have an active MyChart account? If no, what is the barrier?   No: declined     Patient Care Team:  Danica Mueller MD as PCP - General (Emergency Medicine)  Benny Parnell RN as Nurse Navigator (Oncology)  Anders Booth MD as Consulting Physician (Infectious Diseases)  Chivo Gonzalez MD as Consulting Physician (Radiation Oncology)  Katherine Pham DPM as Physician (Podiatry)  Lynda Lindsey MD as Consulting Physician (Hematology and Oncology)    Medical History Review  Past Medical, Family, and Social History reviewed and does not contribute to the patient presenting condition    Health Maintenance   Topic Date Due    Diabetic retinal exam  Never done    Diabetic foot exam  04/27/2019    DTaP/Tdap/Td vaccine (1 - Tdap) 06/17/2023 (Originally 5/23/1979)    Shingles vaccine (1 of 2) 06/17/2023 (Originally 2/8/2011)    COVID-19 Vaccine (3 - Rinku risk series) 05/28/2024 (Originally 1/25/2022)    Pneumococcal 0-64 years Vaccine (2 - PCV) 06/11/2024 (Originally 12/19/2019)    Flu vaccine (1) 09/01/2022    A1C test (Diabetic or Prediabetic)  04/12/2023    Lipids  04/12/2023    Diabetic microalbuminuria test  04/13/2023    Depression Screen  07/06/2023    Annual Wellness Visit (AWV)  07/07/2023    Prostate Specific Antigen (PSA) Screening or Monitoring  07/20/2023    Colorectal Cancer Screen  07/16/2025    Hepatitis C screen  Completed    HIV screen  Completed    Hepatitis A vaccine  Aged Out    Hib vaccine  Aged Out    Meningococcal (ACWY) vaccine  Aged Out General Sunscreen Counseling: I recommended a broad spectrum sunscreen with a SPF of 30 or higher.  I explained that SPF 30 sunscreens block approximately 97 percent of the sun's harmful rays.  Sunscreens should be applied at least 15 minutes prior to expected sun exposure and then every 2 hours after that as long as sun exposure continues. If swimming or exercising sunscreen should be reapplied every 45 minutes to an hour after getting wet or sweating.  One ounce, or the equivalent of a shot glass full of sunscreen, is adequate to protect the skin not covered by a bathing suit. I also recommended a lip balm with a sunscreen as well. Sun protective clothing can be used in lieu of sunscreen but must be worn the entire time you are exposed to the sun's rays. Detail Level: Detailed

## 2024-07-16 DIAGNOSIS — C61 PROSTATE CA (HCC): Primary | ICD-10-CM

## 2024-07-17 ENCOUNTER — TELEPHONE (OUTPATIENT)
Dept: ONCOLOGY | Age: 64
End: 2024-07-17

## 2024-07-17 ENCOUNTER — OFFICE VISIT (OUTPATIENT)
Dept: ONCOLOGY | Age: 64
End: 2024-07-17
Payer: MEDICARE

## 2024-07-17 VITALS
BODY MASS INDEX: 31.94 KG/M2 | DIASTOLIC BLOOD PRESSURE: 83 MMHG | TEMPERATURE: 97.6 F | HEART RATE: 91 BPM | WEIGHT: 248.9 LBS | SYSTOLIC BLOOD PRESSURE: 113 MMHG

## 2024-07-17 DIAGNOSIS — C61 PROSTATE CA (HCC): Primary | ICD-10-CM

## 2024-07-17 PROCEDURE — 99211 OFF/OP EST MAY X REQ PHY/QHP: CPT

## 2024-07-17 PROCEDURE — 3074F SYST BP LT 130 MM HG: CPT | Performed by: INTERNAL MEDICINE

## 2024-07-17 PROCEDURE — 1123F ACP DISCUSS/DSCN MKR DOCD: CPT | Performed by: INTERNAL MEDICINE

## 2024-07-17 PROCEDURE — 99211 OFF/OP EST MAY X REQ PHY/QHP: CPT | Performed by: INTERNAL MEDICINE

## 2024-07-17 PROCEDURE — 3079F DIAST BP 80-89 MM HG: CPT | Performed by: INTERNAL MEDICINE

## 2024-07-17 PROCEDURE — 99214 OFFICE O/P EST MOD 30 MIN: CPT | Performed by: INTERNAL MEDICINE

## 2024-07-17 NOTE — PROGRESS NOTES
PSA in February was 1.8.    Interval history:  Patient is returning for follow visit and to discuss lab results and further recommendations.  Clinically he is doing well and denies any new chest pain shortness of breath.  He is having shoulder pain and back pain and following with orthopedic surgery.      His recent PSA showed slight decrease to 0.3.        is visit patient's allergy, social, medical, surgical history and medications were reviewed and updated.      Past Medical History:   Diagnosis Date    Arthritis     CAD (coronary artery disease)     Cancer (HCC)     prostate    Cervical radiculopathy     CHF (congestive heart failure) (HCC)     DM (diabetes mellitus) (HCC) 2009    IDDM    GERD (gastroesophageal reflux disease) 2017    ON RX    Heart murmur 2013    ASYMPTOMATIC    HTN (hypertension) 06/29/2012    ON RX    Hyperlipidemia 06/29/2012    ON RX    Neuropathy     Sleep apnea 2016    TRIED MACHINE COULD NOT TOLERATE    Vision abnormalities 2019    FLOATERS RIGHT EYE    Wears glasses        Past Surgical History:   Procedure Laterality Date    COLONOSCOPY  07/23/2010    Dr. Lucas    EYE SURGERY Bilateral 2017    CATARACT EXTRACTION WITH IOL    IR INS PICC VAD W SQ PORT GREATER THAN 5  10/7/2022    IR INS PICC VAD W SQ PORT GREATER THAN 5 10/7/2022 [facility] SPECIAL PROCEDURES    KNEE ARTHROSCOPY      REVISION TOTAL HIP ARTHROPLASTY Right 10/5/2022    RIGHT HIP EXCISIONAL DEBRIDEMENT SKIN TO SUBCUTANEOUS TISSUE AND FASCIA AND IRRIGATION performed by Breezy Gunn DO at [facility] OR    VASECTOMY  1999    VITRECTOMY Right 03/28/2019    VITRECTOMY Right 3/28/2019    VITRECTOMY 25 GAUGE,  IOL REPOSITION  (SINSKY HOOK) performed by Robel Trotter MD at [facility] OR       Allergies   Allergen Reactions    Lisinopril Swelling     Outer Neck swelling    Melatonin Swelling    Trazodone Swelling     Chest swells       Current Outpatient Medications   Medication Sig Dispense Refill    amoxicillin (AMOXIL) 500 MG

## 2024-07-17 NOTE — TELEPHONE ENCOUNTER
MANJEET HERE FOR MD VISIT  RV 6 MTHS W/ LABS  LABS TO BE DONE BEFORE RV 1/2025  ORDERS GIVEN TO PT ON EXIT  MD VISIT 1/2025  AVS PRINTED W/ INSTRUCTIONS AND GIVEN  TO PT ON EXIT

## 2024-08-26 ENCOUNTER — HOSPITAL ENCOUNTER (OUTPATIENT)
Dept: DIABETES SERVICES | Age: 64
Setting detail: THERAPIES SERIES
Discharge: HOME | End: 2024-08-26
Payer: MEDICARE

## 2024-08-26 VITALS — BODY MASS INDEX: 31.89 KG/M2 | WEIGHT: 248.46 LBS

## 2024-08-26 PROCEDURE — G0108 DIAB MANAGE TRN  PER INDIV: HCPCS

## 2024-08-26 SDOH — ECONOMIC STABILITY: FOOD INSECURITY: ADDITIONAL INFORMATION: YES

## 2024-08-26 ASSESSMENT — PROBLEM AREAS IN DIABETES QUESTIONNAIRE (PAID)
PAID-5 TOTAL SCORE: 9
FEELING SCARED WHEN YOU THINK ABOUT LIVING WITH DIABETES: MINOR PROBLEM
COPING WITH COMPLICATIONS OF DIABETES: MODERATE PROBLEM
FEELING THAT DIABETES IS TAKING UP TOO MUCH OF YOUR MENTAL AND PHYSICAL ENERGY EVERY DAY: MODERATE PROBLEM
WORRYING ABOUT THE FUTURE AND THE POSSIBILITY OF SERIOUS COMPLICATIONS: MODERATE PROBLEM
FEELING DEPRESSED WHEN YOU THINK ABOUT LIVING WITH DIABETES: MODERATE PROBLEM

## 2024-08-26 NOTE — PROGRESS NOTES
Diabetes Self- Management Education Program Assessment -   Also see Diabetic Screening  Patient, Ryder Ha,  here for diabetes self-management education  visit/ assessment.   Today's visit was in an individual setting.    MEDICAL HISTORY:  Past Medical History:   Diagnosis Date    Arthritis     CAD (coronary artery disease)     Cancer (HCC)     prostate    Cervical radiculopathy     CHF (congestive heart failure) (HCC)     DM (diabetes mellitus) ([facility]) 2009    IDDM    GERD (gastroesophageal reflux disease) 2017    ON RX    Heart murmur 2013    ASYMPTOMATIC    HTN (hypertension) 06/29/2012    ON RX    Hyperlipidemia 06/29/2012    ON RX    Neuropathy     Sleep apnea 2016    TRIED MACHINE COULD NOT TOLERATE    Vision abnormalities 2019    FLOATERS RIGHT EYE    Wears glasses      Family History   Problem Relation Age of Onset    Diabetes Sister     Diabetes Brother     Cancer Brother      Lisinopril, Melatonin, and Trazodone   Immunization History   Administered Date(s) Administered    COVID-19, J&J, (age 18y+), IM, 0.5 mL 05/18/2021    COVID-19, MODERNA Bivalent, (age 12y+), IM, 50 mcg/0.5 mL 12/15/2022    Influenza Virus Vaccine 12/08/2015, 12/08/2015, 12/09/2015, 12/19/2018, 10/14/2019    Influenza, AFLURIA, FLUZONE, (age 6-35 mo), IM, Trivalent MDV, 0.25mL 12/19/2018    Influenza, AFLURIA, FLUZONE, (age4 y+), IM, Trivalent MDV, 0.5mL 10/14/2019    Influenza, FLUARIX, FLULAVAL, FLUZONE (age 6 mo+) and AFLURIA, (age 3 y+), Quadv PF, 0.5mL 12/15/2022    Pneumococcal, PPSV23, PNEUMOVAX 23, (age 2y+), SC/IM, 0.5mL 12/19/2018    Zoster Live (Zostavax) 12/14/2010     Current Medications  Current Outpatient Medications   Medication Sig Dispense Refill    amoxicillin (AMOXIL) 500 MG capsule Take 1 capsule by mouth 2 times daily 60 capsule 11    ciprofloxacin (CIPRO) 500 MG tablet Take 1 tablet by mouth 2 times daily (Patient not taking: Reported on 7/17/2024) 60 tablet 11    carvedilol (COREG) 12.5 MG tablet Take 1  Education Referrals:      [] [place] Tobacco Quit information sheet and 1800 QUIT NOW , [phone]      [] Dental care - [facility] care of [facility]     [] [facility] link  phone number - for information and referral to [facility]  Clinically  Integrated Network - EYE, FOOT, CARDIAC, WOUND, WEIGHT MANAGEMENT        []Other  CATY WHITE, NANCY WHITE RN

## 2024-11-03 ENCOUNTER — HOSPITAL ENCOUNTER (EMERGENCY)
Age: 64
Discharge: HOME | End: 2024-11-04
Attending: EMERGENCY MEDICINE
Payer: MEDICARE
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
## 2024-11-03 LAB — GLUCOSE BLD-MCNC: 364 MG/DL (ref 75–110)

## 2024-11-03 PROCEDURE — 99284 EMERGENCY DEPT VISIT MOD MDM: CPT

## 2024-11-03 PROCEDURE — 82947 ASSAY GLUCOSE BLOOD QUANT: CPT

## 2024-11-04 ENCOUNTER — APPOINTMENT (OUTPATIENT)
Dept: CT IMAGING | Age: 64
End: 2024-11-04
Payer: MEDICARE

## 2024-11-04 VITALS
SYSTOLIC BLOOD PRESSURE: 153 MMHG | DIASTOLIC BLOOD PRESSURE: 94 MMHG | TEMPERATURE: 98.7 F | RESPIRATION RATE: 18 BRPM | OXYGEN SATURATION: 96 % | HEART RATE: 90 BPM

## 2024-11-04 PROCEDURE — 70450 CT HEAD/BRAIN W/O DYE: CPT

## 2024-11-04 RX ORDER — IBUPROFEN 200 MG
400 TABLET ORAL EVERY 8 HOURS PRN
Qty: 30 TABLET | Refills: 0 | Status: SHIPPED | OUTPATIENT
Start: 2024-11-04

## 2024-11-04 NOTE — ED PROVIDER NOTES
[facility] ED  Emergency Department Encounter  Emergency Medicine Resident     Pt Name:Ryder Ha  MRN: [id number]  Birthdate 1960  Date of evaluation: 11/3/24  PCP:  Jamal Weems APRN - NP  Note Started: 11:33 PM EST      CHIEF COMPLAINT       Chief Complaint   Patient presents with    Fall       HISTORY OF PRESENT ILLNESS  (Location/Symptom, Timing/Onset, Context/Setting, Quality, Duration, Modifying Factors, Severity.)      Ryder Ha is a 64 y.o. male who presents with head pain after undergoing a mechanical fall.  Patient reports that he was outdoors, slipped on some wet leaves, fell backwards hitting his head.  He does endorse a temporary loss of consciousness, which he reports only last for few moments.  Patient does endorse being on Whitney 81 mg once a day.  ROS is negative for headaches, fever, shortness of breath, chest pain/tightness, nausea/vomiting, urinary/bowel issues.  Patient denies any changes in vision or hearing.  Patient denies any light sensitivity at this time    PAST MEDICAL / SURGICAL / SOCIAL / FAMILY HISTORY      has a past medical history of Arthritis, CAD (coronary artery disease), Cancer (HCC), Cervical radiculopathy, CHF (congestive heart failure) ([facility]), DM (diabetes mellitus) (HCC), GERD (gastroesophageal reflux disease), Heart murmur, HTN (hypertension), Hyperlipidemia, Neuropathy, Sleep apnea, Vision abnormalities, and Wears glasses.       has a past surgical history that includes Colonoscopy (07/23/2010); Knee arthroscopy; eye surgery (Bilateral, 2017); Vasectomy (1999); vitrectomy (Right, 03/28/2019); vitrectomy (Right, 3/28/2019); Revision total hip arthroplasty (Right, 10/5/2022); and IR INSERT PICC VAD W SQ PORT >5 YEARS (10/7/2022).      Social History     Socioeconomic History    Marital status: Single     Spouse name: Not on file    Number of children: Not on file    Years of education: Not on file    Highest education level: Not on

## 2024-11-04 NOTE — ED PROVIDER NOTES
[facility]     Emergency Department     Faculty Attestation    I performed a history and physical examination of the patient and discussed management with the resident. I reviewed the resident’s note and agree with the documented findings and plan of care. Any areas of disagreement are noted on the chart. I was personally present for the key portions of any procedures. I have documented in the chart those procedures where I was not present during the key portions. I have reviewed the emergency nurses triage note. I agree with the chief complaint, past medical history, past surgical history, allergies, medications, social and family history as documented unless otherwise noted below. Documentation of the HPI, Physical Exam and Medical Decision Making performed by medical students or scribes is based on my personal performance of the HPI, PE and MDM. For Physician Assistant/ Nurse Practitioner cases/documentation I have personally evaluated this patient and have completed at least one if not all key elements of the E/M (history, physical exam, and MDM). Additional findings are as noted.    Vital signs:   Vitals:    11/03/24 2310   BP:    Pulse: 90   Resp:    SpO2:       S/p fall on steps. He thinks he either caught his foot on the step or slipped on wet leaves. He fell backward and struck his head on concrete. +LOC. No vomiting. No neck, back, or abdominal pain. Contusion noted to occipital scalp. Plan for a CT head.             Kaela Finely M.D,  Attending Emergency  Physician            Kaela Finley MD  11/03/24 [id number]
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
## 2024-11-04 NOTE — DISCHARGE INSTRUCTIONS
You were seen in the ED after a fall. We performed a head CT, which did not reveal anything concerning. We recommend that you follow up with your primary care physician regarding next steps. Please return to the ED with severe headache, fever, shortness of breath, nausea/vomiting.

## 2024-11-04 NOTE — ED PROVIDER NOTES
[facility] ED  Emergency Department Encounter  Emergency Medicine Resident     Pt Name:Ryder Ha  MRN: [id number]  Birthdate 1960  Date of evaluation: 11/3/24  PCP:  Jamal Weems APRN - NP  Note Started: 10:57 PM EST      CHIEF COMPLAINT       No chief complaint on file.      HISTORY OF PRESENT ILLNESS  (Location/Symptom, Timing/Onset, Context/Setting, Quality, Duration, Modifying Factors, Severity.)      Ryder Ha is a 64 y.o. male who presents with ***    PAST MEDICAL / SURGICAL / SOCIAL / FAMILY HISTORY      has a past medical history of Arthritis, CAD (coronary artery disease), Cancer (HCC), Cervical radiculopathy, CHF (congestive heart failure) (HCC), DM (diabetes mellitus) (HCC), GERD (gastroesophageal reflux disease), Heart murmur, HTN (hypertension), Hyperlipidemia, Neuropathy, Sleep apnea, Vision abnormalities, and Wears glasses.  ***     has a past surgical history that includes Colonoscopy (2010); Knee arthroscopy; eye surgery (Bilateral, 2017); Vasectomy (); vitrectomy (Right, 2019); vitrectomy (Right, 3/28/2019); Revision total hip arthroplasty (Right, 10/5/2022); and IR INSERT PICC VAD W SQ PORT >5 YEARS (10/7/2022).  ***    Social History     Socioeconomic History   • Marital status: Single     Spouse name: Not on file   • Number of children: Not on file   • Years of education: Not on file   • Highest education level: Not on file   Occupational History   • Not on file   Tobacco Use   • Smoking status: Former     Current packs/day: 0.00     Types: Cigarettes     Quit date: 3/27/2001     Years since quittin.6   • Smokeless tobacco: Never   Vaping Use   • Vaping status: Never Used   Substance and Sexual Activity   • Alcohol use: Yes     Comment: h/o ETOH-he reports he isn't currenty drinking as much   • Drug use: No   • Sexual activity: Yes   Other Topics Concern   • Not on file   Social History Narrative   • Not on file     Social

## 2025-01-15 ENCOUNTER — HOSPITAL ENCOUNTER (OUTPATIENT)
Age: 65
Discharge: HOME | End: 2025-01-15
Payer: MEDICARE

## 2025-01-15 ENCOUNTER — OFFICE VISIT (OUTPATIENT)
Dept: ONCOLOGY | Age: 65
End: 2025-01-15
Payer: MEDICARE

## 2025-01-15 ENCOUNTER — TELEPHONE (OUTPATIENT)
Dept: ONCOLOGY | Age: 65
End: 2025-01-15

## 2025-01-15 VITALS
HEART RATE: 111 BPM | BODY MASS INDEX: 30.92 KG/M2 | SYSTOLIC BLOOD PRESSURE: 151 MMHG | TEMPERATURE: 97.1 F | DIASTOLIC BLOOD PRESSURE: 107 MMHG | RESPIRATION RATE: 18 BRPM | WEIGHT: 240.9 LBS

## 2025-01-15 DIAGNOSIS — M25.551 RIGHT HIP PAIN: Primary | ICD-10-CM

## 2025-01-15 DIAGNOSIS — C61 PROSTATE CA (HCC): ICD-10-CM

## 2025-01-15 LAB
ALBUMIN SERPL-MCNC: 4 G/DL (ref 3.5–5.2)
ALBUMIN/GLOB SERPL: 1 {RATIO} (ref 1–2.5)
ALP SERPL-CCNC: 157 U/L (ref 40–129)
ALT SERPL-CCNC: 16 U/L (ref 10–50)
ANION GAP SERPL CALCULATED.3IONS-SCNC: 10 MMOL/L (ref 9–16)
AST SERPL-CCNC: 15 U/L (ref 10–50)
BASOPHILS # BLD: 0.04 K/UL (ref 0–0.2)
BASOPHILS NFR BLD: 1 % (ref 0–2)
BILIRUB SERPL-MCNC: 0.6 MG/DL (ref 0–1.2)
BUN SERPL-MCNC: 10 MG/DL (ref 8–23)
CALCIUM SERPL-MCNC: 9.5 MG/DL (ref 8.8–10.2)
CHLORIDE SERPL-SCNC: 101 MMOL/L (ref 98–107)
CO2 SERPL-SCNC: 27 MMOL/L (ref 20–31)
CREAT SERPL-MCNC: 1.2 MG/DL (ref 0.7–1.2)
EOSINOPHIL # BLD: 0.11 K/UL (ref 0–0.44)
EOSINOPHILS RELATIVE PERCENT: 2 % (ref 1–4)
ERYTHROCYTE [DISTWIDTH] IN BLOOD BY AUTOMATED COUNT: 14.9 % (ref 11.8–14.4)
GFR, ESTIMATED: 68 ML/MIN/1.73M2
GLUCOSE SERPL-MCNC: 266 MG/DL (ref 82–115)
HCT VFR BLD AUTO: 44.6 % (ref 40.7–50.3)
HGB BLD-MCNC: 14.3 G/DL (ref 13–17)
IMM GRANULOCYTES # BLD AUTO: 0.01 K/UL (ref 0–0.3)
IMM GRANULOCYTES NFR BLD: 0 %
LYMPHOCYTES NFR BLD: 1.77 K/UL (ref 1.1–3.7)
LYMPHOCYTES RELATIVE PERCENT: 34 % (ref 24–43)
MCH RBC QN AUTO: 27.2 PG (ref 25.2–33.5)
MCHC RBC AUTO-ENTMCNC: 32.1 G/DL (ref 28.4–34.8)
MCV RBC AUTO: 85 FL (ref 82.6–102.9)
MONOCYTES NFR BLD: 0.66 K/UL (ref 0.1–1.2)
MONOCYTES NFR BLD: 13 % (ref 3–12)
NEUTROPHILS NFR BLD: 50 % (ref 36–65)
NEUTS SEG NFR BLD: 2.7 K/UL (ref 1.5–8.1)
NRBC BLD-RTO: 0 PER 100 WBC
PLATELET # BLD AUTO: 266 K/UL (ref 138–453)
PMV BLD AUTO: 9.9 FL (ref 8.1–13.5)
POTASSIUM SERPL-SCNC: 4 MMOL/L (ref 3.7–5.3)
PROT SERPL-MCNC: 7.9 G/DL (ref 6.6–8.7)
PSA SERPL-MCNC: 0.24 NG/ML (ref 0–4)
RBC # BLD AUTO: 5.25 M/UL (ref 4.21–5.77)
RBC # BLD: ABNORMAL 10*6/UL
SODIUM SERPL-SCNC: 138 MMOL/L (ref 136–145)
WBC OTHER # BLD: 5.3 K/UL (ref 3.5–11.3)

## 2025-01-15 PROCEDURE — 1123F ACP DISCUSS/DSCN MKR DOCD: CPT | Performed by: INTERNAL MEDICINE

## 2025-01-15 PROCEDURE — 99211 OFF/OP EST MAY X REQ PHY/QHP: CPT | Performed by: INTERNAL MEDICINE

## 2025-01-15 PROCEDURE — 84153 ASSAY OF PSA TOTAL: CPT

## 2025-01-15 PROCEDURE — 3077F SYST BP >= 140 MM HG: CPT | Performed by: INTERNAL MEDICINE

## 2025-01-15 PROCEDURE — 36415 COLL VENOUS BLD VENIPUNCTURE: CPT

## 2025-01-15 PROCEDURE — 99214 OFFICE O/P EST MOD 30 MIN: CPT | Performed by: INTERNAL MEDICINE

## 2025-01-15 PROCEDURE — 3080F DIAST BP >= 90 MM HG: CPT | Performed by: INTERNAL MEDICINE

## 2025-01-15 PROCEDURE — 85025 COMPLETE CBC W/AUTO DIFF WBC: CPT

## 2025-01-15 PROCEDURE — 80053 COMPREHEN METABOLIC PANEL: CPT

## 2025-01-15 NOTE — TELEPHONE ENCOUNTER
MANJEET HERE FOR MD VISIT  Labs today  MR right Hip  RTCafter MR  LABS DONE ON EXIT  MRI HIP IS ON 1/21/25 @ 7PM AT [facility] ARRIVAL @ 6:30PM  MD VISIT 1/29/25 @ 11:30AM  AVS PRINTED W/ INSTRUCTIONS AND GIVEN TO PT ON EXIT

## 2025-01-15 NOTE — PROGRESS NOTES
with the patient discussing the diagnosis and importance of compliance with the treatment plan. Greater than 50% of that time was spent face-to-face with the patient in counseling and coordinating her care.        This note is created with the assistance of a speech recognition program.  While intending to generate a document that actually reflects the content of the visit, the document can still have some errors including those of syntax and sound a like substitutions which may escape proof reading.  It such instances, actual meaning can be extrapolated by contextual diversion.      Cc  Jamal Weems, APRN - NP

## 2025-01-21 ENCOUNTER — HOSPITAL ENCOUNTER (OUTPATIENT)
Dept: MRI IMAGING | Age: 65
Discharge: HOME | End: 2025-01-23
Attending: INTERNAL MEDICINE
Payer: MEDICARE

## 2025-01-21 DIAGNOSIS — M25.551 RIGHT HIP PAIN: ICD-10-CM

## 2025-01-21 DIAGNOSIS — C61 PROSTATE CA (HCC): ICD-10-CM

## 2025-01-21 PROCEDURE — 73721 MRI JNT OF LWR EXTRE W/O DYE: CPT

## 2025-02-12 ENCOUNTER — OFFICE VISIT (OUTPATIENT)
Dept: ONCOLOGY | Age: 65
End: 2025-02-12
Payer: MEDICARE

## 2025-02-12 ENCOUNTER — TELEPHONE (OUTPATIENT)
Dept: ONCOLOGY | Age: 65
End: 2025-02-12

## 2025-02-12 VITALS
WEIGHT: 239.4 LBS | DIASTOLIC BLOOD PRESSURE: 82 MMHG | BODY MASS INDEX: 30.72 KG/M2 | SYSTOLIC BLOOD PRESSURE: 134 MMHG | RESPIRATION RATE: 18 BRPM | TEMPERATURE: 97.6 F | HEART RATE: 109 BPM

## 2025-02-12 DIAGNOSIS — C61 PROSTATE CA (HCC): Primary | ICD-10-CM

## 2025-02-12 PROCEDURE — 99211 OFF/OP EST MAY X REQ PHY/QHP: CPT | Performed by: INTERNAL MEDICINE

## 2025-02-12 NOTE — TELEPHONE ENCOUNTER
MANJEET HERE FOR FOLLOW UP   RTC in 6 months w labs  LABS ORDERED: PSA, CBC   MD VISIT: 8/13/25 @ 3PM   LABS PRINTED AND GIVEN ON EXIT   AVS PRINTED AND GIVEN ON EXIT

## 2025-02-12 NOTE — PROGRESS NOTES
negative for sore mouth, sore throat, hoarseness and voice change   Respiratory: negative for cough , sputum, dyspnea, wheezing, hemoptysis, chest pain   Cardiovascular: negative for chest pain, dyspnea, palpitations, orthopnea, PND   Gastrointestinal: negative for nausea, vomiting, diarrhea, constipation, abdominal pain, Dysphagia, hematemesis and hematochezia   Genitourinary: negative for frequency, dysuria, nocturia, urinary incontinence, and hematuria   Integument: negative for rash, skin lesions, bruises.   Hematologic/Lymphatic: negative for easy bruising, bleeding, lymphadenopathy, petechiae and swelling/edema   Endocrine: negative for heat or cold intolerance, tremor, weight changes, change in bowel habits and hair loss   Musculoskeletal: negative for myalgias, arthralgias, pain, joint swelling,and bone pain   Neurological: negative for headaches, dizziness, seizures, weakness, numbness       OBJECTIVE:         Vitals:    02/12/25 1507   BP: 134/82   Pulse: (!) 109   Resp: 18   Temp: 97.6 °F (36.4 °C)       PHYSICAL EXAM:   General appearance - well appearing, no in pain or distress   Mental status - alert and cooperative   Eyes - pupils equal and reactive, extraocular eye movements intact   Ears - bilateral TM's and external ear canals normal   Mouth - mucous membranes moist, pharynx normal without lesions   Neck - supple, no significant adenopathy   Lymphatics - no palpable lymphadenopathy, no hepatosplenomegaly   Chest - clear to auscultation, no wheezes, rales or rhonchi, symmetric air entry   Heart - normal rate, regular rhythm, normal S1, S2, no murmurs, rubs, clicks or gallops   Abdomen - soft, nontender, nondistended, no masses or organomegaly   Neurological - alert, oriented, normal speech, no focal findings or movement disorder noted   Musculoskeletal - no joint tenderness, deformity or swelling   Extremities - peripheral pulses normal, no pedal edema, no clubbing or cyanosis   Skin - normal

## 2025-04-03 DIAGNOSIS — N40.0 BENIGN PROSTATIC HYPERPLASIA WITHOUT LOWER URINARY TRACT SYMPTOMS: ICD-10-CM

## 2025-04-03 DIAGNOSIS — I10 ESSENTIAL HYPERTENSION: ICD-10-CM

## 2025-04-04 RX ORDER — AMOXICILLIN 500 MG/1
TABLET, FILM COATED ORAL
Qty: 180 TABLET | Refills: 0 | OUTPATIENT
Start: 2025-04-04

## 2025-04-04 RX ORDER — ATORVASTATIN CALCIUM 20 MG/1
TABLET, FILM COATED ORAL
Qty: 90 TABLET | Refills: 0 | OUTPATIENT
Start: 2025-04-04

## 2025-04-04 RX ORDER — GLIPIZIDE 10 MG/1
TABLET ORAL
Qty: 90 TABLET | Refills: 0 | OUTPATIENT
Start: 2025-04-04

## 2025-04-04 RX ORDER — TAMSULOSIN HYDROCHLORIDE 0.4 MG/1
CAPSULE ORAL
Qty: 90 CAPSULE | Refills: 0 | OUTPATIENT
Start: 2025-04-04

## 2025-04-04 RX ORDER — LOSARTAN POTASSIUM 50 MG/1
TABLET ORAL
Qty: 90 TABLET | Refills: 0 | OUTPATIENT
Start: 2025-04-04

## 2025-04-04 RX ORDER — MEMANTINE HYDROCHLORIDE 5 MG/1
TABLET ORAL
Qty: 90 TABLET | Refills: 0 | OUTPATIENT
Start: 2025-04-04

## 2025-04-04 RX ORDER — VENLAFAXINE 75 MG/1
TABLET ORAL
Qty: 90 TABLET | Refills: 0 | OUTPATIENT
Start: 2025-04-04

## 2025-04-04 RX ORDER — AMLODIPINE BESYLATE 10 MG/1
TABLET ORAL
Qty: 90 TABLET | Refills: 0 | OUTPATIENT
Start: 2025-04-04

## 2025-04-04 RX ORDER — CARVEDILOL 12.5 MG/1
TABLET ORAL
Qty: 90 TABLET | Refills: 0 | OUTPATIENT
Start: 2025-04-04

## 2025-05-14 ENCOUNTER — TELEPHONE (OUTPATIENT)
Age: 65
End: 2025-05-14

## 2025-05-14 NOTE — TELEPHONE ENCOUNTER
Writer reached out to the patient to reschedule the appointment today at 2:30pm, patient's phone went straight to  and  was full no way of leaving a message. Appointment will be cancelled at this time.

## 2025-05-19 ENCOUNTER — HOSPITAL ENCOUNTER (OUTPATIENT)
Age: 65
Setting detail: SPECIMEN
Discharge: HOME | End: 2025-05-19

## 2025-05-19 ENCOUNTER — CARE COORDINATION (OUTPATIENT)
Dept: CARE COORDINATION | Age: 65
End: 2025-05-19

## 2025-05-19 ENCOUNTER — OFFICE VISIT (OUTPATIENT)
Age: 65
End: 2025-05-19
Payer: MEDICARE

## 2025-05-19 VITALS
TEMPERATURE: 98.6 F | HEART RATE: 100 BPM | BODY MASS INDEX: 30.67 KG/M2 | WEIGHT: 239 LBS | DIASTOLIC BLOOD PRESSURE: 91 MMHG | SYSTOLIC BLOOD PRESSURE: 140 MMHG | OXYGEN SATURATION: 97 % | HEIGHT: 74 IN

## 2025-05-19 DIAGNOSIS — Z79.4 TYPE 2 DIABETES MELLITUS WITH OTHER SPECIFIED COMPLICATION, WITH LONG-TERM CURRENT USE OF INSULIN (HCC): Primary | ICD-10-CM

## 2025-05-19 DIAGNOSIS — E11.69 TYPE 2 DIABETES MELLITUS WITH OTHER SPECIFIED COMPLICATION, WITH LONG-TERM CURRENT USE OF INSULIN (HCC): Primary | ICD-10-CM

## 2025-05-19 DIAGNOSIS — T84.84XA PAIN DUE TO RIGHT HIP JOINT PROSTHESIS, INITIAL ENCOUNTER: ICD-10-CM

## 2025-05-19 DIAGNOSIS — I10 ESSENTIAL HYPERTENSION: ICD-10-CM

## 2025-05-19 DIAGNOSIS — M25.551 CHRONIC RIGHT HIP PAIN: ICD-10-CM

## 2025-05-19 DIAGNOSIS — Z96.641 PAIN DUE TO RIGHT HIP JOINT PROSTHESIS, INITIAL ENCOUNTER: ICD-10-CM

## 2025-05-19 DIAGNOSIS — Z91.81 AT HIGH RISK FOR FALLS: ICD-10-CM

## 2025-05-19 DIAGNOSIS — C61 PROSTATE CA (HCC): ICD-10-CM

## 2025-05-19 DIAGNOSIS — R41.3 MEMORY DEFICIT: ICD-10-CM

## 2025-05-19 DIAGNOSIS — E11.69 TYPE 2 DIABETES MELLITUS WITH OTHER SPECIFIED COMPLICATION, WITH LONG-TERM CURRENT USE OF INSULIN (HCC): ICD-10-CM

## 2025-05-19 DIAGNOSIS — Z79.4 TYPE 2 DIABETES MELLITUS WITH OTHER SPECIFIED COMPLICATION, WITH LONG-TERM CURRENT USE OF INSULIN (HCC): ICD-10-CM

## 2025-05-19 DIAGNOSIS — Z13.31 DEPRESSION SCREENING: ICD-10-CM

## 2025-05-19 DIAGNOSIS — G89.29 CHRONIC RIGHT HIP PAIN: ICD-10-CM

## 2025-05-19 DIAGNOSIS — N52.9 ERECTILE DYSFUNCTION, UNSPECIFIED ERECTILE DYSFUNCTION TYPE: ICD-10-CM

## 2025-05-19 DIAGNOSIS — E11.42 DIABETIC PERIPHERAL NEUROPATHY (HCC): ICD-10-CM

## 2025-05-19 DIAGNOSIS — N18.2 CKD (CHRONIC KIDNEY DISEASE) STAGE 2, GFR 60-89 ML/MIN: ICD-10-CM

## 2025-05-19 LAB
CHOLEST SERPL-MCNC: 161 MG/DL (ref 0–199)
CHOLESTEROL/HDL RATIO: 3.6
HBA1C MFR BLD: 8.9 %
HDLC SERPL-MCNC: 45 MG/DL
LDLC SERPL CALC-MCNC: 87 MG/DL (ref 0–100)
TRIGL SERPL-MCNC: 147 MG/DL
VLDLC SERPL CALC-MCNC: 29 MG/DL (ref 1–30)

## 2025-05-19 PROCEDURE — G8427 DOCREV CUR MEDS BY ELIG CLIN: HCPCS | Performed by: STUDENT IN AN ORGANIZED HEALTH CARE EDUCATION/TRAINING PROGRAM

## 2025-05-19 PROCEDURE — 3052F HG A1C>EQUAL 8.0%<EQUAL 9.0%: CPT | Performed by: STUDENT IN AN ORGANIZED HEALTH CARE EDUCATION/TRAINING PROGRAM

## 2025-05-19 PROCEDURE — G8417 CALC BMI ABV UP PARAM F/U: HCPCS | Performed by: STUDENT IN AN ORGANIZED HEALTH CARE EDUCATION/TRAINING PROGRAM

## 2025-05-19 PROCEDURE — 83036 HEMOGLOBIN GLYCOSYLATED A1C: CPT | Performed by: STUDENT IN AN ORGANIZED HEALTH CARE EDUCATION/TRAINING PROGRAM

## 2025-05-19 PROCEDURE — 3079F DIAST BP 80-89 MM HG: CPT | Performed by: STUDENT IN AN ORGANIZED HEALTH CARE EDUCATION/TRAINING PROGRAM

## 2025-05-19 PROCEDURE — 1123F ACP DISCUSS/DSCN MKR DOCD: CPT | Performed by: STUDENT IN AN ORGANIZED HEALTH CARE EDUCATION/TRAINING PROGRAM

## 2025-05-19 PROCEDURE — 3017F COLORECTAL CA SCREEN DOC REV: CPT | Performed by: STUDENT IN AN ORGANIZED HEALTH CARE EDUCATION/TRAINING PROGRAM

## 2025-05-19 PROCEDURE — 1036F TOBACCO NON-USER: CPT | Performed by: STUDENT IN AN ORGANIZED HEALTH CARE EDUCATION/TRAINING PROGRAM

## 2025-05-19 PROCEDURE — 99204 OFFICE O/P NEW MOD 45 MIN: CPT | Performed by: STUDENT IN AN ORGANIZED HEALTH CARE EDUCATION/TRAINING PROGRAM

## 2025-05-19 PROCEDURE — 3077F SYST BP >= 140 MM HG: CPT | Performed by: STUDENT IN AN ORGANIZED HEALTH CARE EDUCATION/TRAINING PROGRAM

## 2025-05-19 PROCEDURE — 2022F DILAT RTA XM EVC RTNOPTHY: CPT | Performed by: STUDENT IN AN ORGANIZED HEALTH CARE EDUCATION/TRAINING PROGRAM

## 2025-05-19 RX ORDER — MEMANTINE HYDROCHLORIDE 5 MG/1
5 TABLET ORAL DAILY
Qty: 90 TABLET | Refills: 1 | Status: SHIPPED | OUTPATIENT
Start: 2025-05-19

## 2025-05-19 RX ORDER — EMPAGLIFLOZIN 25 MG/1
25 TABLET, FILM COATED ORAL DAILY
Qty: 30 TABLET | Refills: 5 | Status: SHIPPED | OUTPATIENT
Start: 2025-05-19

## 2025-05-19 RX ORDER — INSULIN LISPRO 100 [IU]/ML
6 INJECTION, SOLUTION INTRAVENOUS; SUBCUTANEOUS
Qty: 3 ML | Refills: 5 | Status: SHIPPED | OUTPATIENT
Start: 2025-05-19

## 2025-05-19 RX ORDER — INSULIN GLARGINE 100 [IU]/ML
41 INJECTION, SOLUTION SUBCUTANEOUS 2 TIMES DAILY
Qty: 15 ML | Refills: 3 | Status: SHIPPED | OUTPATIENT
Start: 2025-05-19

## 2025-05-19 RX ORDER — PROCHLORPERAZINE 25 MG/1
SUPPOSITORY RECTAL
COMMUNITY

## 2025-05-19 RX ORDER — ACYCLOVIR 400 MG/1
TABLET ORAL
Qty: 2 EACH | Refills: 3 | Status: SHIPPED | OUTPATIENT
Start: 2025-05-19

## 2025-05-19 RX ORDER — CARVEDILOL 12.5 MG/1
12.5 TABLET ORAL 2 TIMES DAILY
Qty: 60 TABLET | Refills: 3 | Status: SHIPPED | OUTPATIENT
Start: 2025-05-19

## 2025-05-19 RX ORDER — LOSARTAN POTASSIUM 50 MG/1
50 TABLET ORAL DAILY
Qty: 90 TABLET | Refills: 1 | Status: SHIPPED | OUTPATIENT
Start: 2025-05-19

## 2025-05-19 RX ORDER — ATORVASTATIN CALCIUM 40 MG/1
TABLET, FILM COATED ORAL
Qty: 90 TABLET | Refills: 5 | Status: SHIPPED | OUTPATIENT
Start: 2025-05-19

## 2025-05-19 RX ORDER — ASPIRIN 81 MG/1
81 TABLET, COATED ORAL DAILY
Qty: 90 TABLET | Refills: 5 | Status: SHIPPED | OUTPATIENT
Start: 2025-05-19

## 2025-05-19 RX ORDER — PEN NEEDLE, DIABETIC 31 GX5/16"
NEEDLE, DISPOSABLE MISCELLANEOUS
Qty: 100 EACH | Refills: 5 | Status: SHIPPED | OUTPATIENT
Start: 2025-05-19

## 2025-05-19 SDOH — ECONOMIC STABILITY: FOOD INSECURITY: WITHIN THE PAST 12 MONTHS, THE FOOD YOU BOUGHT JUST DIDN'T LAST AND YOU DIDN'T HAVE MONEY TO GET MORE.: NEVER TRUE

## 2025-05-19 SDOH — ECONOMIC STABILITY: FOOD INSECURITY: WITHIN THE PAST 12 MONTHS, YOU WORRIED THAT YOUR FOOD WOULD RUN OUT BEFORE YOU GOT MONEY TO BUY MORE.: NEVER TRUE

## 2025-05-19 ASSESSMENT — PATIENT HEALTH QUESTIONNAIRE - PHQ9
1. LITTLE INTEREST OR PLEASURE IN DOING THINGS: SEVERAL DAYS
SUM OF ALL RESPONSES TO PHQ QUESTIONS 1-9: 0
2. FEELING DOWN, DEPRESSED OR HOPELESS: SEVERAL DAYS
1. LITTLE INTEREST OR PLEASURE IN DOING THINGS: NOT AT ALL
SUM OF ALL RESPONSES TO PHQ QUESTIONS 1-9: 2
SUM OF ALL RESPONSES TO PHQ QUESTIONS 1-9: 0
SUM OF ALL RESPONSES TO PHQ QUESTIONS 1-9: 2
SUM OF ALL RESPONSES TO PHQ QUESTIONS 1-9: 2
SUM OF ALL RESPONSES TO PHQ QUESTIONS 1-9: 0
SUM OF ALL RESPONSES TO PHQ QUESTIONS 1-9: 2
SUM OF ALL RESPONSES TO PHQ QUESTIONS 1-9: 0
2. FEELING DOWN, DEPRESSED OR HOPELESS: NOT AT ALL

## 2025-05-19 ASSESSMENT — ENCOUNTER SYMPTOMS
SHORTNESS OF BREATH: 0
ABDOMINAL PAIN: 0
BACK PAIN: 0
COUGH: 0
CHEST TIGHTNESS: 0
ABDOMINAL DISTENTION: 0

## 2025-05-19 NOTE — PROGRESS NOTES
Ryder Ha (:  1960) is a 64 y.o. male, New patient, here for evaluation of the following chief complaint(s):  No chief complaint on file.         Assessment & Plan  1. Diabetes Mellitus.  - His A1c is elevated at 8.9, indicating poorly controlled diabetes.  - He will restart metformin and continue his current insulin regimen of 41 units twice a day.  - A prescription for Dexcom sensors will be provided to monitor his blood glucose levels.  - He will be started on Mounjaro if covered by insurance; otherwise, alternative medications will be considered.    2. Hypertension.  - He is not currently taking any blood pressure medication.  - Blood pressure management will be initiated with Cozaar.  - Regular monitoring of blood pressure will be necessary.  - Follow-up to assess the effectiveness of the medication.    3. Chronic Right Hip Pain.  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  - Physical exam findings indicate chronic pain and visible deformity.  - Referral to Physical Medicine and Rehabilitation (PM&R) will be made for further evaluation and management.  - Continued monitoring of pain levels and functional status.    4. Prostate Cancer.  - History of prostate cancer treated with radiation and surgery.  - He will continue to follow up with his urologist for periodic checks.  - Recent PSA levels were normal.  - Continued monitoring of PSA levels and symptoms.    5. Depression.  - Reports symptoms of depression, including feeling down and hopeless several days a week.  - Screening for depression indicates several days of low mood and lack of interest.  - Referral to a counselor will be made for further evaluation and management.  - Discussion about the benefits of counseling and group sessions.    6. Neuropathy.  - Neuropathy in fingers and toes, previously managed with Lyrica.  - Neurology referral     7. Sleep Apnea.  - Diagnosed with

## 2025-05-19 NOTE — CARE COORDINATION
Referral received for ambulatory care management from PCP.  [facility] introduction letter mailed to Patient.  Writer plans to follow up with Patient in 1 - 2 weeks.

## 2025-05-19 NOTE — PATIENT INSTRUCTIONS
Living Program [phone] ext: [phone]  Service-Intake - HIV Testing www.arcohio.org    Food for Thought [employer] Food Pantries ([phone] [phone]  Service-Intake www.feedtoledo.org    [facility] Food Pantry [phone]  Administrative N/A   [employer] [facility] Community Meals [phone]  Service-Intake http://www.Intergeneraciones ServiciostheBrookstone.Simtrol    [facility] Katya's Corner Cafe [phone]  Service-Intake - Katya's Corner Cafe N/A   [facility] Pantry and Resource Center [phone]  Service-Intake nightMedfield State HospitalalesRxRevuharvest.Nitinol Devices & Components    [employer] Charities Food Pantry [phone]  Service-Intake www.bpgomvgun54.Simtrol    [facility] Second Helpings [phone]  Administrative factAct-On Software.Simtrol    [facility] Food Ministry Food Ministry [phone] 917-4289  Toll Free www.Perk Dynamics    [facility] People Food Pantry [phone] 474-7592  Service-Intake N/A   [facility] Dinner [phone] [phone]  Service-Intake www.MakerCraft     [facility] Cupboard [phone] [phone]  Service-Intake N/A   [facility] Morning Blessings [phone]  Administrative N/A   [facility]  [facility] Feed Your Neighbor [phone]  Service-Intake www.lssnwo.Nitinol Devices & Components    [facility]arsh [facility] [employer] for the Poor Food Pantry [phone]  Service-Intake www.kitchenforthepoor.org/   [facility] [place] Food Pantry  [phone] Voice N/A   [facility] Emergency Food and Hygiene Pantry [phone]  Administrative www.friendlyMount Carmel Health Systemledo.org    [facility] [facility] Food Pantry [phone] [phone]  Service-Intake   [facility] Food Pantry [phone] 776-3408  Service-Intake www.[facility].tv    [facility] Soup Kitchen / Food Pantry / Homeless Kit [phone] [phone]  Service-Intake www.helpinghandstoledo.org    [facility] Out[facility] Center Food

## 2025-05-20 ENCOUNTER — HOSPITAL ENCOUNTER (OUTPATIENT)
Age: 65
Setting detail: SPECIMEN
Discharge: HOME | End: 2025-05-20

## 2025-05-20 DIAGNOSIS — M25.551 CHRONIC RIGHT HIP PAIN: Primary | ICD-10-CM

## 2025-05-20 DIAGNOSIS — E11.69 TYPE 2 DIABETES MELLITUS WITH OTHER SPECIFIED COMPLICATION, WITH LONG-TERM CURRENT USE OF INSULIN (HCC): ICD-10-CM

## 2025-05-20 DIAGNOSIS — Z79.4 TYPE 2 DIABETES MELLITUS WITH OTHER SPECIFIED COMPLICATION, WITH LONG-TERM CURRENT USE OF INSULIN (HCC): ICD-10-CM

## 2025-05-20 DIAGNOSIS — G89.29 CHRONIC RIGHT HIP PAIN: Primary | ICD-10-CM

## 2025-05-20 LAB
CREAT UR-MCNC: 131 MG/DL (ref 39–259)
MICROALBUMIN UR-MCNC: 787 MG/L (ref 0–20)
MICROALBUMIN/CREAT UR-RTO: 601 MCG/MG CREAT (ref 0–17)

## 2025-05-22 ENCOUNTER — TELEPHONE (OUTPATIENT)
Age: 65
End: 2025-05-22

## 2025-05-22 ENCOUNTER — HOSPITAL ENCOUNTER (EMERGENCY)
Age: 65
Discharge: HOME | End: 2025-05-22
Attending: EMERGENCY MEDICINE
Payer: MEDICARE

## 2025-05-22 VITALS
RESPIRATION RATE: 18 BRPM | TEMPERATURE: 98.6 F | WEIGHT: 249.12 LBS | OXYGEN SATURATION: 97 % | DIASTOLIC BLOOD PRESSURE: 104 MMHG | BODY MASS INDEX: 31.97 KG/M2 | SYSTOLIC BLOOD PRESSURE: 164 MMHG | HEART RATE: 78 BPM

## 2025-05-22 DIAGNOSIS — E11.65 HYPERGLYCEMIA DUE TO DIABETES MELLITUS (HCC): Primary | ICD-10-CM

## 2025-05-22 LAB
ANION GAP SERPL CALCULATED.3IONS-SCNC: 11 MMOL/L (ref 9–16)
BUN SERPL-MCNC: 14 MG/DL (ref 8–23)
CALCIUM SERPL-MCNC: 9.1 MG/DL (ref 8.6–10.4)
CHLORIDE SERPL-SCNC: 100 MMOL/L (ref 98–107)
CO2 SERPL-SCNC: 24 MMOL/L (ref 20–31)
CREAT SERPL-MCNC: 1.1 MG/DL (ref 0.7–1.2)
GFR, ESTIMATED: 74 ML/MIN/1.73M2
GLUCOSE BLD-MCNC: 259 MG/DL (ref 75–110)
GLUCOSE BLD-MCNC: 403 MG/DL (ref 74–100)
GLUCOSE SERPL-MCNC: 367 MG/DL (ref 74–99)
HCO3 VENOUS: 41.6 MMOL/L (ref 22–29)
O2 SAT, VEN: 75.8 % (ref 60–85)
PCO2 VENOUS: 49.2 MM HG (ref 41–51)
PH VENOUS: 7.54 (ref 7.32–7.43)
PO2 VENOUS: 36.8 MM HG (ref 30–50)
POSITIVE BASE EXCESS, VEN: 16.1 MMOL/L (ref 0–3)
POTASSIUM SERPL-SCNC: 4.2 MMOL/L (ref 3.7–5.3)
SODIUM SERPL-SCNC: 135 MMOL/L (ref 136–145)

## 2025-05-22 PROCEDURE — 99284 EMERGENCY DEPT VISIT MOD MDM: CPT | Performed by: EMERGENCY MEDICINE

## 2025-05-22 PROCEDURE — 82803 BLOOD GASES ANY COMBINATION: CPT

## 2025-05-22 PROCEDURE — 80048 BASIC METABOLIC PNL TOTAL CA: CPT

## 2025-05-22 PROCEDURE — 2580000003 HC RX 258: Performed by: STUDENT IN AN ORGANIZED HEALTH CARE EDUCATION/TRAINING PROGRAM

## 2025-05-22 PROCEDURE — 96360 HYDRATION IV INFUSION INIT: CPT | Performed by: EMERGENCY MEDICINE

## 2025-05-22 PROCEDURE — 82947 ASSAY GLUCOSE BLOOD QUANT: CPT

## 2025-05-22 RX ORDER — 0.9 % SODIUM CHLORIDE 0.9 %
500 INTRAVENOUS SOLUTION INTRAVENOUS ONCE
Status: COMPLETED | OUTPATIENT
Start: 2025-05-22 | End: 2025-05-22

## 2025-05-22 RX ADMIN — SODIUM CHLORIDE 500 ML: 0.9 INJECTION, SOLUTION INTRAVENOUS at 11:13

## 2025-05-22 NOTE — ED NOTES
Patient to ED for hyperglycemia. Patient states he is on his last Decon and insulin and needs a prescription for more. Patient states his blood sugar was over 400 this morning. Denies any symptoms. Patient alert and oriented x4, talking in complete sentences. Respirations even and unlabored. Patient placed on continuous cardiac monitoring, BP cuff, and pulse ox. EKG obtained, blood work obtained. Call light in reach, all needs met at this time

## 2025-05-22 NOTE — TELEPHONE ENCOUNTER
#1 CALL   WRITER CALLED PT TO RESCHEDULE F/U APPT W/ DR. STEVENSON. PT DID NOT ANSWER & WRITER WAS UNABLE TO LEAVE A VM DUE TO LINE JUST BEEPING.

## 2025-05-22 NOTE — ED NOTES
SW consulted to meet with patient who is stating he cannot pay for his meds.  SW reviewed patient's insurance which is [employer] Medicare.  Patient states he had [facility] insurance and they covered all his scripts.  Patient's source of income in SSI and he states his income is limited.  Patient was asked if he has Medicare Part D, the drug program for Medicare, and patient showed SW his insurance card and it looks like he does.  SW explained it's likely his [facility] plan was Medicaid, which covers most scripts at no co-pay, but when he turned 64-yo his primary insurance coverage becomes Medicare.  It was further explained to patient that he will always have co-pays with Medicare and there is no program to provide on-going assistance that SW is aware of. SW suggested that patient look at the benefits of each managed care Medicare plan before choosing one.  He was also given a Good Rx card that may reduce some costs of certain scripts.  Patient was also told to speak with his physician about prescribing cheaper options.  Patient voices understanding.  NEFTALI Stanley

## 2025-05-22 NOTE — DISCHARGE INSTRUCTIONS
You are seen in the emergency department today due to concern for elevated blood glucose levels.  Your glucose levels were elevated however there are no complications that require emergent intervention or admission to the hospital.  You were provided resources from social work to help with affording medications.  We recommend that you follow-up with your family doctor and review different medications and management modalities for your diabetes that will be financially feasible for you.    If you develop any fevers, chills, visual blurring, headache, numbness, weakness, tingling, chest pain or any other urgent health concerns, return to the emergency department immediately for reassessment.    If you are unable to obtain your home medications, or your glucose levels remain persistently elevated, return to the emergency department immediately.

## 2025-05-22 NOTE — ED PROVIDER NOTES
[facility] EMERGENCY DEPARTMENT  eMERGENCY dEPARTMENT eNCOUnter   Attending Attestation     Pt Name: Ryder Ha  MRN: [id number]  Birthdate 1960  Date of evaluation: 5/22/25       Ryder Ha is a 64 y.o. male who presents with Hyperglycemia      11:40 AM EDT      History:   Patient has elevated blood sugars.  Patient states that he recently got a new Dexcom system and they are having issues with this.  The issues are that he does not have the money to pay for his typical medications because they switched him from Medicaid to Medicare.  Exam: Heart rate and rhythm are regular.  Lungs are clear to auscultation bilaterally.  Abdomen is soft, nontender.  Patient is awake and alert and acting appropriate.    Patient has no way to continue correcting his glucose at home glucose of 367, will attempt to get him the appropriate assistance.  Recommend he return if his blood sugar continues to rise.  I do not feel that treating his blood sugar right now is effective because he does not have medications to take at home.  Patient wanted to focus on diet and to return if things tend to go up.  Patient understands.    I performed a history and physical examination of the patient and discussed management with the resident. I reviewed the resident’s note and agree with the documented findings and plan of care. Any areas of disagreement are noted on the chart. I was personally present for the key portions of any procedures. I have documented in the chart those procedures where I was not present during the key portions. I have personally reviewed all images and agree with the resident's interpretation. I have reviewed the emergency nurses triage note. I agree with the chief complaint, past medical history, past surgical history, allergies, medications, social and family history as documented unless otherwise noted below. Documentation of the HPI, Physical Exam and Medical Decision Making performed by medical 
167/112 otherwise unremarkable vital signs.  Here with concern for elevated blood glucose levels.  Plan to obtain point-of-care blood gas and chemistry; obtain BMP formally.  I have reached out to social work to assess his insurance coverage and if there are any resources that can be provided.  Reassess.    11:20 AM  Labs reviewed.    pH 7.53, formal glucose is 367  CO2 is 24 with no anion gap.  Normal renal function    I had social work assessed the patient.  It appears that prior to the age of 64 he was on a Medicaid plan that was providing 100% coverage for his medications but now that he is 64 he is on Medicare which he will have to pay co-pays for.  He was provided several good Rx cards.  We recommended coordinating closely with his family physician to help navigate the financial constraints that he may face in the future.    Recommended PCP follow-up.  Return to the emergency department if there is any urgent health concerns otherwise.           PROCEDURES:      CONSULTS:  None    CRITICAL CARE:  There was significant risk of life threatening deterioration of patient's condition requiring my direct management. Critical care time minutes, excluding any documented procedures.    FINAL IMPRESSION      1. Hyperglycemia due to diabetes mellitus (HCC)          DISPOSITION / PLAN     DISPOSITION Decision To Discharge 05/22/2025 11:21:59 AM   DISPOSITION CONDITION Stable           PATIENT REFERRED TO:  Archana Huerta,   [address] [facility] [address]  [phone]          [facility] Emergency Department  [address] [facility] [address]  [phone]    As needed      DISCHARGE MEDICATIONS:  New Prescriptions    No medications on file       Oc Lora MD  Emergency Medicine Resident    (Please note that portions of this note were completed with a voice recognition program.  Efforts were made to edit the dictations but occasionally words are mis-transcribed.)

## 2025-05-23 ENCOUNTER — CARE COORDINATION (OUTPATIENT)
Dept: CARE COORDINATION | Age: 65
End: 2025-05-23

## 2025-05-23 ENCOUNTER — TELEPHONE (OUTPATIENT)
Dept: PHARMACY | Facility: CLINIC | Age: 65
End: 2025-05-23

## 2025-05-23 DIAGNOSIS — I10 ESSENTIAL HYPERTENSION: ICD-10-CM

## 2025-05-23 DIAGNOSIS — E11.69 TYPE 2 DIABETES MELLITUS WITH OTHER SPECIFIED COMPLICATION, WITH LONG-TERM CURRENT USE OF INSULIN (HCC): Primary | ICD-10-CM

## 2025-05-23 DIAGNOSIS — Z79.4 TYPE 2 DIABETES MELLITUS WITH OTHER SPECIFIED COMPLICATION, WITH LONG-TERM CURRENT USE OF INSULIN (HCC): Primary | ICD-10-CM

## 2025-05-23 RX ORDER — GLIPIZIDE 2.5 MG/1
2.5 TABLET, EXTENDED RELEASE ORAL DAILY
Qty: 90 TABLET | Refills: 0 | Status: SHIPPED | OUTPATIENT
Start: 2025-05-23

## 2025-05-23 RX ORDER — HYDROCHLOROTHIAZIDE 25 MG/1
25 TABLET ORAL EVERY MORNING
Qty: 90 TABLET | Refills: 0 | Status: SHIPPED | OUTPATIENT
Start: 2025-05-23

## 2025-05-23 SDOH — SOCIAL STABILITY: SOCIAL NETWORK: HOW OFTEN DO YOU ATTEND CHURCH OR RELIGIOUS SERVICES?: 1 TO 4 TIMES PER YEAR

## 2025-05-23 SDOH — ECONOMIC STABILITY: HOUSING INSECURITY: IN THE LAST 12 MONTHS, WAS THERE A TIME WHEN YOU WERE NOT ABLE TO PAY THE MORTGAGE OR RENT ON TIME?: NO

## 2025-05-23 SDOH — HEALTH STABILITY: MENTAL HEALTH: HOW OFTEN DO YOU HAVE A DRINK CONTAINING ALCOHOL?: 2-3 TIMES A WEEK

## 2025-05-23 SDOH — SOCIAL STABILITY: SOCIAL NETWORK
DO YOU BELONG TO ANY CLUBS OR ORGANIZATIONS SUCH AS CHURCH GROUPS, UNIONS, FRATERNAL OR ATHLETIC GROUPS, OR SCHOOL GROUPS?: YES

## 2025-05-23 SDOH — SOCIAL STABILITY: SOCIAL NETWORK: IN A TYPICAL WEEK, HOW MANY TIMES DO YOU TALK ON THE PHONE WITH FAMILY, FRIENDS, OR NEIGHBORS?: TWICE A WEEK

## 2025-05-23 SDOH — HEALTH STABILITY: MENTAL HEALTH

## 2025-05-23 SDOH — HEALTH STABILITY: PHYSICAL HEALTH: ON AVERAGE, HOW MANY DAYS PER WEEK DO YOU ENGAGE IN MODERATE TO STRENUOUS EXERCISE (LIKE A BRISK WALK)?: 3 DAYS

## 2025-05-23 SDOH — ECONOMIC STABILITY: FOOD INSECURITY: HOW HARD IS IT FOR YOU TO PAY FOR THE VERY BASICS LIKE FOOD, HOUSING, MEDICAL CARE, AND HEATING?: HARD

## 2025-05-23 SDOH — HEALTH STABILITY: PHYSICAL HEALTH: ON AVERAGE, HOW MANY MINUTES DO YOU ENGAGE IN EXERCISE AT THIS LEVEL?: 60 MIN

## 2025-05-23 SDOH — SOCIAL STABILITY: SOCIAL NETWORK: HOW OFTEN DO YOU ATTEND MEETINGS OF THE CLUBS OR ORGANIZATIONS YOU BELONG TO?: MORE THAN 4 TIMES PER YEAR

## 2025-05-23 SDOH — HEALTH STABILITY: MENTAL HEALTH
DO YOU FEEL STRESS - TENSE, RESTLESS, NERVOUS, OR ANXIOUS, OR UNABLE TO SLEEP AT NIGHT BECAUSE YOUR MIND IS TROUBLED ALL THE TIME - THESE DAYS?: VERY MUCH

## 2025-05-23 SDOH — SOCIAL STABILITY: SOCIAL NETWORK: HOW OFTEN DO YOU GET TOGETHER WITH FRIENDS OR RELATIVES?: TWICE A WEEK

## 2025-05-23 SDOH — SOCIAL STABILITY: SOCIAL INSECURITY: ARE YOU MARRIED, WIDOWED, DIVORCED, SEPARATED, NEVER MARRIED, OR LIVING WITH A PARTNER?: DIVORCED

## 2025-05-23 NOTE — TELEPHONE ENCOUNTER
CLINICAL PHARMACY NOTE - Medication Review    Ryder Ha is a 65 y.o. male referred to a clinical pharmacy specialist by care coordination for immediate assistance with prescriptions. Pt had multiple BP and BG lowering prescriptions sent in to [facility] pharmacy for pickup after a Dr. Sheikh.  Per pt when he went to get them, he was quoted a total of ~$270 and said that he immediately left and went home without getting anything. When asked if he asked for a breakdown he said no, that he just left.    Since it was a local [facility] pharmacy I was able to see and confirm prices and provide patient with a  breakdown.  Dodie Altamirano already able to get assistance for Jardiance, carvedilol and losartan. All are $0  Aspirin- $2.32  Atorvastatin- $0  Lantus- $35  Humalog- $70, this is because it is technically between a 30-60day supply based on how it is written  Memantine- $10.56  Metformin- $0  Mounjaro- $47  Pen needles- $7.86    Discussed options with patient in detail.  At the time I recommended getting all of the prescriptions if able, but if not to consider holding off on the Mounjaro while still getting everything else. At this point he said that he would get to the pharmacy by EOD.  Of note, Dodie Altamirano planning on starting PAP application for both insulins.  This was relayed to pt just before ending call    After speaking with patient, his provider reached out to care coordinator and sent Glipizide and HCTZ in to the pharmacy.  Both are processed for $0  Provider was ok with him not using the insulin and mounjaro for the time being.      Multiple attempts made by care coordination, SW and myself to reach pt unsuccessful.  Noted at the end of day Friday that nothing had been picked up at pharmacy.     Will continue to follow    Thank you,  CINDY Nichols, PharmD, BCACP  Ambulatory Care Clinical Pharmacist- [facility] Clinical Pharmacy  Department, toll free: [phone]

## 2025-05-23 NOTE — CARE COORDINATION
I was unable to reach the patient and he has not called me back. I will close this for now and if the patient reaches out to me in the future I will assist him as needed.

## 2025-05-23 NOTE — CARE COORDINATION
Ambulatory Care Coordination Note     2025 8:56 AM     Patient Current Location:  Home: [address] [place] [id number]     This patient was received as a referral from Provider.    ACM contacted the patient by telephone. Verified name and  with patient as identifiers. Provided introduction to self, and explanation of the ACM role.   Patient accepted care management services at this time.        ACM: Moody Chase RN     Challenges to be reviewed by the provider   Additional needs identified to be addressed with provider Yes  medications- Patient was seen in the ED yesterday for Hyperglycemia.  He doesn't have any medications               Method of communication with provider: staff message.    Utilization: Has the patient been seen in the ED since your last call? Yes,   Discharge Date: 25   Discharge Facility: [facility]  Reason for ED Visit: Hyperglycemia  Visit Diagnosis: Hyperglycemia    Number of ED visits in the last 6 months: 1      Do you have any ongoing symptoms? Yes,    Current symptoms: my symptoms have improved. and continued hyperglycemia.  BG is 251 this morning.    Did you call your PCP prior to going to the ED? Yes, advised to go to the ED.    Review of Discharge Instructions:   [x] AVS discharge instructions  [] Right Care, Right Place, Right Time document  [] Medication changes  [x] Follow up appointments  [] Referral follow up   [x] Referral submitted to VALERIA Barrios SS for financial assistance with medications; [facility] Pharmacy Team, and [facility] social work team.      Care Summary Note:     Writer phoned Patient for ED follow up.  Writer explained ambulatory care management to patient.  He is in agreement for AC.  [facility] enrollment questions completed.    Patient informed Writer that his BG was 251 before breakfast this morning.  He states he ate a bowl of cereal.  Patient does NOT have any of his routine medications.  He does not have insulin.

## 2025-05-23 NOTE — TELEPHONE ENCOUNTER
CLINICAL PHARMACY NOTE - Population [facility] Pharmacy Referral    Patient can be scheduled with:  Team Schedule- General Referrals, etc.    Received a referral from: Care Coordinator to discuss patient’s medications. Called patient to schedule a time to speak with a pharmacist over the telephone.       Dodie Altamirano Cereda, RN; P s Clinical Pharmacy Clinical Staff  I was able to get the patient a [employer] kaia that picks up his copays on Jardiance, Losartan and Carvediol. I can work on PAP applications for Humalog and Basaglar but I don't have a quick fix for those as to get him some now. Mounjaro has no assistance program currently.    [facility].   [facility] Clinical   [facility] Clinical Pharmacy  Toll free: [phone] Option 1

## 2025-05-23 NOTE — CARE COORDINATION
Ambulatory Care Coordination Note     2025 5:08 PM     Patient Current Location:  [place]     Dr. Huerta  contacted the ACM by telephone. Verified name and  with  Dr. Huerta  as identifiers.         ACM: Moody Chase RN     Challenges to be reviewed by the provider   Additional needs identified to be addressed with provider No  medications- Update received from Dr. Huerta               Method of communication with provider: phone.    Utilization: Has the patient been seen in the ED since your last call? no    Care Summary Note:     Dr. Huerta phoned Writer to discuss the status of Patient's medications.  Writer routed messages to Dr. Huerta from [facility]\" [facility] regarding assistance provider regarding medications.  Writer routed message from VALERIA Barrios,  regarding financial assistance given for medications.    Dr. Huerta discussed medication plan to control Patient's blood glucose and blood pressure while we are working to get medications for Patient.  He informed Writer that he will send in prescriptions for Glipizide 2.5 mg daily.  Patient is to take medication with breakfast in the morning.  He is to retest his blood glucose 2 hours after his breakfast.  If his blood glucose is still elevated, he will need to take a second pill.    Dr. Huerta states Metformin medication is affordable.  Patient should  the Metformin Rx and take it as ordered.    Dr. Huerta phoned in a prescription for Hydrochlorothiazide to manage Patient's hypertension until he is able to get his routinely ordered medication.    Writer attempted to contact Patient a few times without success.  His voice mail is not set up.    Offered patient enrollment in the Remote Patient Monitoring (RPM) program for in-home monitoring: Deferred at this time because not discussed; will discuss at next outreach.     Assessments Completed:   No changes since last call    Medications Reviewed:   Completed during a previous call

## 2025-05-23 NOTE — CARE COORDINATION
received a call from VALERIA Uriostegui regarding a priority referral.  Moody noted that Ryder was in the ER and discharged.  Per note Ryder had Medicaid and now only has Medicare.  Ryder now has co-pays and to purchase medications its around $270.       reached out to Beryl Gaxiola, manager of inpatient social service team.   Beryl informed  that they are only able to assist with medications cost if the individual does not have insurance. Beryl reports they are unable to pay for co-pays.     called and spoke with Ryder. Ryder reports that he spoke with Dodie and is able to get some assistance with medications.  Ryder noted that he spoke to someone who also encouraged him to go back to the pharmacy to get the breakdown cost of each medications and purchase what Ryder can afford.  Ryder noted that he will note be able to purchase medications till the 1st.   inquired if Ryder was able to borrow funds from family or charge medications until he get his check?  Ryder denied.      contacted Leah at [facility].  Leah review Ryder's case.  Leah noted that Ryder is over income for both Medicaid and Extra Help.       attempted to contact Ryder.  No answer and VM not setup.     did inform VALERIA Uriostegui.    Plan:   Ryder will go to pharmacy and review cost of medications and purchase what he can afford.   Ryder will work with Dodie on assistance programs for medications.    will follow up with Ryder regarding other social needs.

## 2025-05-23 NOTE — TELEPHONE ENCOUNTER
----- Message from RN Moody RIVERA RN sent at 5/23/2025  9:10 AM EDT -----  Regarding: Urgent referral, Patient does not have any medications, was seen in ED yesterday  Good morning,    I plan to enroll Patient in [facility] today.  He was seen in the ED yesterday for hyperglycemia.  He informed me that he is out of all of his medications.  His insurance switched over to Medicare once he turned 66 yo.  He is unable to afford the cost of his medications.  He states the medications cost around $270.00.  He needs insulin and Cardiac medications.  Patient has Dexcom monitor with no sensor.    I plan to refer Patient to the [facility] Social work team also.    I included Dodie on this message because she attempts to assist Patient's with medication cost.    Your assistance with this Patient would be greatly appreciated.    Thank you,    Moody

## 2025-05-23 NOTE — CARE COORDINATION
I was able to get the patient a [employer] kaia that will pay for his Jardiance, Carvediol, and Losartan. I called the pharmacy will with the billing information. I attempted to call the patient but no answer or voicemail.      Dodie Altamirano [facility]  CC   Medication Assistance  [place], and [facility]    (P) [phone]  (F) [phone]

## 2025-05-27 NOTE — TELEPHONE ENCOUNTER
Spoke with patient briefly. He is going to go to the pharmacy now and  everything that is no cost. This includes:  - Glipizide, HCTZ, metformin, Losartan, Jardiance, Atorvastatin, Carvedilol    Pt was going to leave right away to try to make it prior to 5pm closing. Will check back tomorrow and provider more detailed info on each med since .  Will also need to look in to Dexcom steven Nichols, [place], [facility]  Ambulatory Care Clinical Pharmacist- [facility] Clinical Pharmacy  Department, toll free: [phone]

## 2025-05-28 ENCOUNTER — CARE COORDINATION (OUTPATIENT)
Dept: CARE COORDINATION | Age: 65
End: 2025-05-28

## 2025-05-28 NOTE — CARE COORDINATION
Ambulatory Care Coordination Note     2025 4:11 PM     Patient Current Location:  Home: [address] [place] [address]     ACM contacted the patient by telephone. Verified name and  with patient as identifiers.       ACM: Moody Chase RN     Challenges to be reviewed by the provider   Additional needs identified to be addressed with provider No                 Method of communication with provider: staff message.    Utilization: Has the patient been seen in the ED since your last call? no    Care Summary Note:     Message received from Patient yesterday evening expressing his thanks for the assistance Writer provided.    Writer returned call to Patient today.  He was outside cutting the grass.  He spoke with Writer briefly.  He states he picked up the free medications.  He was unable to name the medications at this time because he was outside.  He states he plans to  the other medications on Alona 3, 2025.  Writer question if Patient tested his blood glucose today.  He states he will have to purchase more test strips.      Writer plans to return call to Writer tomorrow.      Offered patient enrollment in the Remote Patient Monitoring (RPM) program for in-home monitoring: Yes, but did not enroll at this time: Not discussed today .     Assessments Completed:   General Assessment    Do you have any symptoms that are causing concern?: No          Medications Reviewed:   Not completed during this call:      Advance Care Planning:   Not reviewed during this call     Care Planning:   Not completed during this call    PCP/Specialist follow up:   Future Appointments         Provider Specialty Dept Phone    6/3/2025 11:00 AM Jass Glez DO Family Medicine [phone]    2025 1:50 PM Reno Welch MD Urology [phone]    7/10/2025 12:30 PM [facility], RESIDENT D Neurology [phone]    2025 3:00 PM Dedrick Perez MD Oncology [phone]            Follow Up:

## 2025-05-29 ENCOUNTER — CARE COORDINATION (OUTPATIENT)
Dept: CARE COORDINATION | Age: 65
End: 2025-05-29

## 2025-05-29 NOTE — CARE COORDINATION
Per ACM request, ACP mailed to patient w/ pt letter and educational material ED vs UC handout, Walk in clinic hand out, and diabetes zone tool.

## 2025-05-29 NOTE — CARE COORDINATION
Ambulatory Care Coordination Note     2025 4:43 PM     Patient Current Location:  Home: [address] [place] [address]     ACM contacted the patient by telephone. Verified name and  with patient as identifiers.       ACM: Moody Chase RN     Challenges to be reviewed by the provider   Additional needs identified to be addressed with provider Yes  medications- Patient does not have Humalog and Mounjaro medications.  He states he can't afford these meds.  Patient request a referral to an Ortho Provider.  He continues to have right hip pain.  He has an abnormal right hip MRI report.                Method of communication with provider: staff message.    Utilization: Has the patient been seen in the ED since your last call? no    Care Summary Note:     Writer phoned Patient for ACM update.      Patient's medications were reviewed.  Patient has the majority of medications ordered.  He does not have Humalog and Mounjaro.  Patient took 10 units of Lantus this morning.  Writer informed patient his Sig is:  inject 41 units twice daily.  Writer recommend Patient takes 41 units tonight.  Writer request Patient read the Sig on the box.  Patient informed Writer that he threw the box away and kept the insulin pens which do not have a sig on them.  Patient states he is taking so many new medicine for his diabetes.  Writer discussed the action of Metformin and Jardiance with Patient.  Writer request Patient's blood glucose readings today.  He has not tested his blood sugar.  The importance of testing his blood sugar before meals and at bedtime discussed with Patient.  His blood glucose was 395.  Writer discussed s/s of hyperglycemia with Patient such as excessive thirst and urination.  Writer Recommend Patient take his Lantus at 9 am and 9 pm.  Writer recommend he takes 41 units tonight.  Writer will update the COLEEN Juarez.  Patient would benefit from diabetes education.    Patient is aware he has an upcoming

## 2025-05-30 DIAGNOSIS — E11.40 TYPE 2 DIABETES MELLITUS WITH DIABETIC NEUROPATHY, UNSPECIFIED WHETHER LONG TERM INSULIN USE (HCC): Primary | ICD-10-CM

## 2025-05-30 DIAGNOSIS — I10 ESSENTIAL HYPERTENSION: ICD-10-CM

## 2025-05-30 NOTE — PROGRESS NOTES
Remote Patient Monitoring Treatment Plan    Received request from AC/Moody Mccormack, RN   to order remote patient monitoring for in home monitoring of Diabetes; Condition managed by PCP.  HTN; Condition managed by PCP.  and order completed.     Patient will be monitoring blood pressure   glucose.      Patient will engage in Remote Patient Monitoring each day to develop the skills necessary for self management.       RPM Care Team Responsibilities:   Alerts will be reviewed daily and addressed within 2-4 hours during operational hours (Monday -Friday 9 am-4 pm)  Alert response and intervention documented in patient medical record  Alert response escalated to PCP per protocol and documented in patient medical record  Patient monitored over approximately  days  Discharge from program based on self-management readiness    See care coordination encounters for additional details.

## 2025-06-02 ENCOUNTER — CLINICAL DOCUMENTATION (OUTPATIENT)
Dept: PHYSICAL MEDICINE AND REHAB | Age: 65
End: 2025-06-02

## 2025-06-02 ENCOUNTER — CARE COORDINATION (OUTPATIENT)
Dept: CARE COORDINATION | Age: 65
End: 2025-06-02

## 2025-06-02 ENCOUNTER — CARE COORDINATION (OUTPATIENT)
Dept: PRIMARY CARE CLINIC | Age: 65
End: 2025-06-02

## 2025-06-02 ENCOUNTER — HOSPITAL ENCOUNTER (OUTPATIENT)
Age: 65
Setting detail: SPECIMEN
Discharge: HOME | End: 2025-06-02

## 2025-06-02 DIAGNOSIS — C61 PROSTATE CA (HCC): ICD-10-CM

## 2025-06-02 LAB
BASOPHILS # BLD: 0.04 K/UL (ref 0–0.2)
BASOPHILS NFR BLD: 1 % (ref 0–2)
EOSINOPHIL # BLD: 0.07 K/UL (ref 0–0.44)
EOSINOPHILS RELATIVE PERCENT: 2 % (ref 1–4)
ERYTHROCYTE [DISTWIDTH] IN BLOOD BY AUTOMATED COUNT: 15.6 % (ref 11.8–14.4)
HCT VFR BLD AUTO: 45.8 % (ref 40.7–50.3)
HGB BLD-MCNC: 14.3 G/DL (ref 13–17)
IMM GRANULOCYTES # BLD AUTO: <0.03 K/UL (ref 0–0.3)
IMM GRANULOCYTES NFR BLD: 0 %
LYMPHOCYTES NFR BLD: 1.34 K/UL (ref 1.1–3.7)
LYMPHOCYTES RELATIVE PERCENT: 30 % (ref 24–43)
MCH RBC QN AUTO: 27 PG (ref 25.2–33.5)
MCHC RBC AUTO-ENTMCNC: 31.2 G/DL (ref 28.4–34.8)
MCV RBC AUTO: 86.6 FL (ref 82.6–102.9)
MONOCYTES NFR BLD: 0.4 K/UL (ref 0.1–1.2)
MONOCYTES NFR BLD: 9 % (ref 3–12)
NEUTROPHILS NFR BLD: 58 % (ref 36–65)
NEUTS SEG NFR BLD: 2.55 K/UL (ref 1.5–8.1)
NRBC BLD-RTO: 0 PER 100 WBC
PLATELET # BLD AUTO: 321 K/UL (ref 138–453)
PMV BLD AUTO: 10.5 FL (ref 8.1–13.5)
PSA SERPL-MCNC: 0.41 NG/ML (ref 0–4)
RBC # BLD AUTO: 5.29 M/UL (ref 4.21–5.77)
RBC # BLD: ABNORMAL 10*6/UL
WBC OTHER # BLD: 4.4 K/UL (ref 3.5–11.3)

## 2025-06-02 NOTE — PROGRESS NOTES
Patient was referred to PM&R by Dr Huerta. Dr Duncan reviewed visit note and thinks orthopedics maybe a better visit for us. Dr Huerta placed order for Dr Gunn's office.

## 2025-06-02 NOTE — CARE COORDINATION
Referral from [facility], Moody Chase, RN-  Patient has had diabetes for many years. He does not follow a diet. He would benefit from diet education. His last HbA1C was 8.9%     Attempted to reach patient today for nutrition CC consult.  Unable to leave a message, no voicemail.  Will attempt to reach again within 1-2 weeks.  NIKITA Stauffer

## 2025-06-02 NOTE — CARE COORDINATION
Remote Patient Kit Ordering Note      Date/Time:  6/2/2025 9:38 AM      [facility] placed phone call to patient/family today to notify of RPM kit order; patient/family was  unavailable. The phone rings many times , then ends .    [] [facility] patient shipping address  [x] Patient will receive package over the next 1-3 business days. Someone 21 years or older must be present to sign for UPS delivery.  [] HRS will contact patient within 24 hours, an HRS  will call the patient directly: If the patient does not answer, HRS will follow up with the clinical team notifying them about the unsuccessful attempt to contact the patient. HRS will make three call attempts to the patient.Provide patient with [facility] Virtual install number is: [phone].  [] The RPM Nurse will contact patient once equipment is active to welcome them to the program.                                                         [] Hours of RPM monitoring - Monday-Friday 5547-7457; encourage patient to get vitals entered by Noon each day to have the alert addressed same day.  [x][facility] mailed RPM Patient flyer to patient.                      ACM made aware the RPM kit has been ordered.

## 2025-06-02 NOTE — TELEPHONE ENCOUNTER
Attempting to reach patient today to discuss medications that he picked up last week in more detail, or at least set up a time to do this.      Phone continued to ring, unable to reach or leave a message. Will continue to try reaching him.    CINDY Nichols, PharmD, [facility]  Ambulatory Care Clinical Pharmacist- [facility] Clinical Pharmacy  Department, toll free: [phone]

## 2025-06-03 ENCOUNTER — OFFICE VISIT (OUTPATIENT)
Age: 65
End: 2025-06-03
Payer: MEDICARE

## 2025-06-03 ENCOUNTER — CARE COORDINATION (OUTPATIENT)
Dept: CARE COORDINATION | Age: 65
End: 2025-06-03

## 2025-06-03 VITALS
SYSTOLIC BLOOD PRESSURE: 92 MMHG | HEIGHT: 74 IN | WEIGHT: 238.8 LBS | HEART RATE: 99 BPM | TEMPERATURE: 98.5 F | DIASTOLIC BLOOD PRESSURE: 78 MMHG | BODY MASS INDEX: 30.65 KG/M2 | OXYGEN SATURATION: 96 %

## 2025-06-03 DIAGNOSIS — Z79.4 TYPE 2 DIABETES MELLITUS WITH OTHER SPECIFIED COMPLICATION, WITH LONG-TERM CURRENT USE OF INSULIN (HCC): ICD-10-CM

## 2025-06-03 DIAGNOSIS — I10 ESSENTIAL HYPERTENSION: ICD-10-CM

## 2025-06-03 DIAGNOSIS — N64.89 BREAST ASYMMETRY: ICD-10-CM

## 2025-06-03 DIAGNOSIS — Z23 IMMUNIZATION DUE: Primary | ICD-10-CM

## 2025-06-03 DIAGNOSIS — D22.9 MULTIPLE BENIGN MELANOCYTIC NEVI: ICD-10-CM

## 2025-06-03 DIAGNOSIS — E11.69 TYPE 2 DIABETES MELLITUS WITH OTHER SPECIFIED COMPLICATION, WITH LONG-TERM CURRENT USE OF INSULIN (HCC): ICD-10-CM

## 2025-06-03 DIAGNOSIS — R41.3 MEMORY DEFICIT: ICD-10-CM

## 2025-06-03 DIAGNOSIS — F17.200 SMOKER: ICD-10-CM

## 2025-06-03 PROCEDURE — 90677 PCV20 VACCINE IM: CPT | Performed by: FAMILY MEDICINE

## 2025-06-03 PROCEDURE — 90715 TDAP VACCINE 7 YRS/> IM: CPT | Performed by: FAMILY MEDICINE

## 2025-06-03 PROCEDURE — G0009 ADMIN PNEUMOCOCCAL VACCINE: HCPCS | Performed by: FAMILY MEDICINE

## 2025-06-03 PROCEDURE — 90471 IMMUNIZATION ADMIN: CPT | Performed by: FAMILY MEDICINE

## 2025-06-03 RX ORDER — ATORVASTATIN CALCIUM 40 MG/1
TABLET, FILM COATED ORAL
Qty: 90 TABLET | Refills: 5 | Status: SHIPPED | OUTPATIENT
Start: 2025-06-03

## 2025-06-03 RX ORDER — DULAGLUTIDE 4.5 MG/.5ML
INJECTION, SOLUTION SUBCUTANEOUS
COMMUNITY
Start: 2025-02-27

## 2025-06-03 RX ORDER — CARVEDILOL 12.5 MG/1
12.5 TABLET ORAL 2 TIMES DAILY
Qty: 60 TABLET | Refills: 3 | Status: SHIPPED | OUTPATIENT
Start: 2025-06-03

## 2025-06-03 RX ORDER — PEN NEEDLE, DIABETIC 31 GX5/16"
NEEDLE, DISPOSABLE MISCELLANEOUS
Qty: 100 EACH | Refills: 5 | Status: SHIPPED | OUTPATIENT
Start: 2025-06-03

## 2025-06-03 RX ORDER — GLUCOSAM/CHON-MSM1/C/MANG/BOSW 500-416.6
TABLET ORAL
COMMUNITY
Start: 2025-03-03

## 2025-06-03 RX ORDER — LOSARTAN POTASSIUM 50 MG/1
50 TABLET ORAL DAILY
Qty: 90 TABLET | Refills: 1 | Status: SHIPPED | OUTPATIENT
Start: 2025-06-03 | End: 2025-06-10

## 2025-06-03 RX ORDER — HYDROCHLOROTHIAZIDE 25 MG/1
25 TABLET ORAL EVERY MORNING
Qty: 90 TABLET | Refills: 0 | Status: CANCELLED | OUTPATIENT
Start: 2025-06-03

## 2025-06-03 RX ORDER — EMPAGLIFLOZIN 25 MG/1
25 TABLET, FILM COATED ORAL DAILY
Qty: 30 TABLET | Refills: 5 | Status: SHIPPED | OUTPATIENT
Start: 2025-06-03

## 2025-06-03 RX ORDER — GLIPIZIDE 2.5 MG/1
2.5 TABLET, EXTENDED RELEASE ORAL DAILY
Qty: 90 TABLET | Refills: 0 | Status: CANCELLED | OUTPATIENT
Start: 2025-06-03

## 2025-06-03 RX ORDER — MEMANTINE HYDROCHLORIDE 5 MG/1
5 TABLET ORAL DAILY
Qty: 90 TABLET | Refills: 1 | Status: SHIPPED | OUTPATIENT
Start: 2025-06-03

## 2025-06-03 RX ORDER — INSULIN LISPRO 100 [IU]/ML
6 INJECTION, SOLUTION INTRAVENOUS; SUBCUTANEOUS
Qty: 3 ML | Refills: 5 | Status: SHIPPED | OUTPATIENT
Start: 2025-06-03

## 2025-06-03 RX ORDER — GLUCOSAM/CHON-MSM1/C/MANG/BOSW 500-416.6
TABLET ORAL
Qty: 100 EACH | Status: CANCELLED | OUTPATIENT
Start: 2025-06-03

## 2025-06-03 RX ORDER — DULAGLUTIDE 4.5 MG/.5ML
INJECTION, SOLUTION SUBCUTANEOUS
Status: CANCELLED | OUTPATIENT
Start: 2025-06-03

## 2025-06-03 RX ORDER — ACYCLOVIR 400 MG/1
TABLET ORAL
Qty: 2 EACH | Refills: 1 | Status: CANCELLED | OUTPATIENT
Start: 2025-06-03

## 2025-06-03 RX ORDER — ASPIRIN 81 MG/1
81 TABLET, COATED ORAL DAILY
Qty: 90 TABLET | Refills: 5 | Status: SHIPPED | OUTPATIENT
Start: 2025-06-03

## 2025-06-03 RX ORDER — ACYCLOVIR 400 MG/1
TABLET ORAL
Qty: 2 EACH | Refills: 3 | Status: CANCELLED | OUTPATIENT
Start: 2025-06-03

## 2025-06-03 RX ORDER — INSULIN GLARGINE 100 [IU]/ML
41 INJECTION, SOLUTION SUBCUTANEOUS 2 TIMES DAILY
Qty: 15 ML | Refills: 3 | Status: SHIPPED | OUTPATIENT
Start: 2025-06-03

## 2025-06-03 RX ORDER — LOSARTAN POTASSIUM 25 MG/1
25 TABLET ORAL DAILY
Qty: 90 TABLET | Refills: 1 | Status: CANCELLED | OUTPATIENT
Start: 2025-06-03

## 2025-06-03 ASSESSMENT — LIFESTYLE VARIABLES
HOW MANY STANDARD DRINKS CONTAINING ALCOHOL DO YOU HAVE ON A TYPICAL DAY: 1 OR 2
HOW OFTEN DO YOU HAVE A DRINK CONTAINING ALCOHOL: 2-4 TIMES A MONTH

## 2025-06-03 ASSESSMENT — PATIENT HEALTH QUESTIONNAIRE - PHQ9
2. FEELING DOWN, DEPRESSED OR HOPELESS: SEVERAL DAYS
SUM OF ALL RESPONSES TO PHQ QUESTIONS 1-9: 1
1. LITTLE INTEREST OR PLEASURE IN DOING THINGS: NOT AT ALL
SUM OF ALL RESPONSES TO PHQ QUESTIONS 1-9: 1

## 2025-06-03 NOTE — PATIENT INSTRUCTIONS
Thank you for letting us take care of you today. We hope all your questions were addressed. If a question was overlooked or something else comes to mind after you return home, please contact a member of your Care Team listed below.      Your Care Team at [facility] is Team #1  Ana Roman M.D. (Faculty)  Cristino Vinson M.D. (Resident)  Selam Glez D.O. (Resident)  Nickolas Paz M.D. (Resident)  Vance Nguyen M.D. (Resident)  Judi Forman, [facility]  Lalo Chinchilla, [facility]  Magalys Goldsmith, [facility]  Chel Connelly, [facility]  Tamra Ziegler, [facility]  Soco Greenberg, [facility]  Melquiades Jameson (LJ) Floridalma,   Mikayla Rebolledo [facility] (Clinical Pharmacist)     Office phone number: [phone]    If you need to get in right away due to illness, please be advised we have \"Same Day\" appointments available Monday-Friday. Please call us at [phone] option #3 to schedule your \"Same Day\" appointment.

## 2025-06-03 NOTE — CARE COORDINATION
attempted to follow up with Ryder regarding medications.    No answer and no VM.   did see in note that Ryder picked up his medications and VALERIA Uriostegui reviewed medications.    Plan:    will sign off.

## 2025-06-03 NOTE — PROGRESS NOTES
Attending Physician Statement  I have discussed the care of Ryder Ha, 65 y.o. male,including pertinent history and exam findings,  with the resident Jass Hooper DO.  History:  Chief Complaint   Patient presents with    Medicare AWV       I have reviewed the key elements of the encounter with the resident. Examination was done by resident as documented in residents note.    BP Readings from Last 3 Encounters:   06/03/25 92/78   05/22/25 (!) 164/104   05/19/25 (!) 140/91     BP 92/78 (BP Site: Left Upper Arm, Patient Position: Sitting, BP Cuff Size: Medium Adult)   Pulse 99   Temp 98.5 °F (36.9 °C) (Oral)   Ht 1.88 m (6' 2.02\")   Wt 108.3 kg (238 lb 12.8 oz)   SpO2 96%   BMI 30.65 kg/m²   Lab Results   Component Value Date    WBC 4.4 06/02/2025    HGB 14.3 06/02/2025    HCT 45.8 06/02/2025     06/02/2025    CHOL 161 05/19/2025    TRIG 147 05/19/2025    HDL 45 05/19/2025    ALT 16 01/15/2025    AST 15 01/15/2025     (L) 05/22/2025    K 4.2 05/22/2025     05/22/2025    CREATININE 1.1 05/22/2025    BUN 14 05/22/2025    CO2 24 05/22/2025    PSA 0.41 06/02/2025    INR 1.0 06/21/2023    LABA1C 8.9 05/19/2025     Lab Results   Component Value Date    CALCIUM 9.1 05/22/2025     No results found for: \"LDLDIRECT\"  I agree with the assessment, plan and diagnosis of    Diagnosis Orders   1. Type 2 diabetes mellitus with other specified complication, with long-term current use of insulin (HCC)        2. Essential hypertension        3. Memory deficit          I agree with orders as documented by the resident.    Recommendations: Agree with resident assessment and plan.  Team to give patient ER precautions as well.  Patient likely to benefit from pharmacy team referral specialist concerns for polypharmacy and issues with patient remembering medications as well.  Patient also may be a candidate for bubble packaging as well.  Patient also to be referred to social terms of health regarding 
Medicare Annual Wellness Visit    Ryder Ha is here for Medicare AWV    Assessment & Plan   Immunization due  -     [facility] Referral for Social Determinants of Health (Primary Care Practices)  Type 2 diabetes mellitus with other specified complication, with long-term current use of insulin (HCC)  -     atorvastatin (LIPITOR) 40 MG tablet; TAKE ONE TABLET BY MOUTH ONE TIME A DAY, Disp-90 tablet, R-5Normal  -     Insulin Pen Needle (B-D ULTRAFINE III SHORT PEN) 31G X 8 MM MISC; Disp-100 each, R-5, NormalUSE AS DIRECTED DAILY  -     metFORMIN (GLUCOPHAGE) 1000 MG tablet; Take 1 tablet by mouth 2 times daily (with meals), Disp-180 tablet, R-1Normal  -     JARDIANCE 25 MG tablet; Take 1 tablet by mouth daily, Disp-30 tablet, R-5, DAWNormal  -     insulin glargine (LANTUS SOLOSTAR) 100 UNIT/ML injection pen; Inject 41 Units into the skin 2 times daily, Disp-15 mL, R-3Normal  -     insulin lispro, 1 Unit Dial, (HUMALOG KWIKPEN) 100 UNIT/ML SOPN; Inject 6 Units into the skin 3 times daily (before meals) Medium Dose Sliding Scale Glucose: Dose; (If <139: No Insulin)  (140-199: 2 Units)  (200-249: 4 Units)  (250-299: 6 Units)  (300-349: 8 Units)  (350-400: 10 Units)  (Above 400: 12 Units), Disp-3 mL, R-5Normal  -     [facility] - Margarito Carlos MD, Ophthalmology, [facility] Referral for Social Determinants of Health (Primary Care Practices)  Essential hypertension  -     carvedilol (COREG) 12.5 MG tablet; Take 1 tablet by mouth 2 times daily, Disp-60 tablet, R-3Normal  -     losartan (COZAAR) 50 MG tablet; Take 1 tablet by mouth daily, Disp-90 tablet, R-1Normal  -     aspirin EC 81 MG EC tablet; Take 1 tablet by mouth daily, Disp-90 tablet, R-5Normal  -     [facility] Referral for Social Determinants of Health (Primary Care Practices)  Memory deficit  -     memantine (NAMENDA) 5 MG tablet; Take 1 tablet by mouth daily, Disp-90 tablet, R-1Normal  -     [facility] Referral to Office-Based Clinical Pharmacy  -     [facility] 
Advantage)  01/01/2025    Colorectal Cancer Screen  07/16/2025    Flu vaccine (Season Ended) 08/01/2025    A1C test (Diabetic or Prediabetic)  05/19/2026    Lipids  05/19/2026    Depression Screen  05/19/2026    Diabetic Alb to Cr ratio (uACR) test  05/20/2026    GFR test (Diabetes, CKD 3-4, OR last GFR 15-59)  05/22/2026    Prostate Specific Antigen (PSA) Screening or Monitoring  06/02/2026    Hepatitis C screen  Completed    Hepatitis A vaccine  Aged Out    Hepatitis B vaccine  Aged Out    Hib vaccine  Aged Out    Polio vaccine  Aged Out    Meningococcal (ACWY) vaccine  Aged Out    Meningococcal B vaccine  Aged Out    Depression Monitoring  Discontinued    Pneumococcal 0-49 years Vaccine  Discontinued    HIV screen  Discontinued

## 2025-06-04 ENCOUNTER — CARE COORDINATION (OUTPATIENT)
Dept: CARE COORDINATION | Age: 65
End: 2025-06-04

## 2025-06-04 NOTE — CARE COORDINATION
Attempted to reach patient today for nutrition CC follow up.  LVM with contact information requesting a return call to [phone].  Will attempt to reach again within 1-2 weeks.  NIKITA Stauffer

## 2025-06-04 NOTE — CARE COORDINATION
Registered Dietitian Initial Assessment for Care Coordination      Name-Ryder Ha  June 4, 2025    Initial Referral Reason: Diabetes    Patient Care Team:  Archana Huerta DO as PCP - General (Family Medicine)  Archana Huerta DO as PCP - Empaneled Provider  Stacey Ware MD as Consulting Physician (Infectious Diseases)  Dedrick Perez MD as Consulting Physician (Hematology and Oncology)  Bigg Salazar DO as Consulting Physician (Hospitalist)  Bigg Salazar DO as Consulting Physician (Hospitalist)  Chung Walker DPM as Physician (Podiatry)  Moody Chase RN as Ambulatory Care Manager  Reno Welch MD as Consulting Physician (Urology)  Barbra Ortega RD, LD as Dietitian    Patient Active Problem List   Diagnosis    DM (diabetes mellitus) (HCC)    HTN (hypertension)    ETOHism (HCC)    Hypertensive urgency    Abnormal ambulatory electrocardiogram    Abnormal ambulatory electrocardiography    Abnormal kidney function    Adiposity    Astigmatism    Atherosclerotic heart disease of native coronary artery without angina pectoris    Cervical radiculopathy    Chronic back pain    Decreased cardiac output    Diabetic peripheral neuropathy (HCC)    Dislocated intraocular lens    Disturbance in sleep behavior    Dizziness    Dyssomnia    Floaters    Impotence of organic origin    Left ventricular dysfunction    Low vision, both eyes    Myopia    Nuclear sclerosis of left eye    Obstructive sleep apnea syndrome    Palpitations    Personal history of other diseases of the digestive system    Posterior vitreous detachment    Presbyopia    Primary osteoarthritis of right hip    Pseudophakia of right eye    Repetitive intrusions of sleep    Sciatica    Tendonitis    Vitamin D deficiency    Vitreous opacities of right eye    Dislocated IOL (intraocular lens), anterior, right    Prostate CA (HCC)    Essential hypertension    Benign essential HTN    Type 2 diabetes mellitus with

## 2025-06-05 ENCOUNTER — TELEPHONE (OUTPATIENT)
Dept: PHARMACY | Facility: CLINIC | Age: 65
End: 2025-06-05

## 2025-06-05 ENCOUNTER — CARE COORDINATION (OUTPATIENT)
Dept: CASE MANAGEMENT | Age: 65
End: 2025-06-05

## 2025-06-05 ENCOUNTER — CARE COORDINATION (OUTPATIENT)
Dept: CARE COORDINATION | Age: 65
End: 2025-06-05

## 2025-06-05 NOTE — CARE COORDINATION
Per AC request, writer mailed list of providers and appointments to patient along with ACP , ED vs UC handout, Walk in clinic hand out, and diabetes zone tool.

## 2025-06-05 NOTE — TELEPHONE ENCOUNTER
Ryder Ha was contacted to schedule an appointment with our clinical pharmacy team per referral on 06/05/25 and was not able to be reached.

## 2025-06-05 NOTE — CARE COORDINATION
Remote Patient Monitoring Welcome Note  Date/Time:  2025 12:53 PM  Patient Current Location: Home: [address] [place] [address]  Verified patients name and  as identifiers.       Completed and confirmed the following:    [x] Patient received all RPM equipment (tablet, scale, blood pressure device and cuff, and pulse oximeter)  Cuff Size: regular (9.05\"-15.74\")    Weight Scale:  none                    [x] Instructed patient keep box for use when returning equipment                                                          [x] Reviewed Patient Welcome Letter with patient    [x]  Reviewed Consent Form  Copy of consent form in chart.                 [x] Reviewed expectations for patient and care team  Monitoring hours M-F 9-4pm  It is important to take your vitals every day, even on the weekends,to keep your care team aware of how you are doing every day of the week.  Completing monitoring by 12pm on  so that alerts can be responded to in the same day  Patient weighs self at same time every day (or after urinating and waking up)  Take blood pressure 1-2 hrs after medications   RPM team may have different phone area code (including [place], [place], [place] or [place])                              [x] Instructed patient to keep scale on flat surface                                                         [x] Instructed patient to keep tablet plugged in at all times                         [x] Instructed how to contact IT support  ([phone])  [x] Provided Remote Patient Monitoring care  information     Emergency Contact Verified: iWndy Ha, phone number:  [phone]               All questions answered at this time.

## 2025-06-09 ENCOUNTER — TELEPHONE (OUTPATIENT)
Dept: PHARMACY | Facility: CLINIC | Age: 65
End: 2025-06-09

## 2025-06-09 NOTE — TELEPHONE ENCOUNTER
CLINICAL PHARMACY NOTE - Medication Review    Ryder Ha is a 65 y.o. male initially referred to a clinical pharmacy specialist by care coordination on 5/23/25 for medication assistance.  See 5/23/25 encounter for more information.    Patient eventually returned to his pharmacy and picked up the following medication:  5/27/25  Lantus  Atorvastatin  Aspirin  Carvedilol  Metformin  Memantine  Jardiance  Hydrochlorothiazide  Glipizide ER   6/4/25  Lantus- picked up again as initial supply was on an 18ds based on dose  Insulin Lispro  Testing supplies    Patient did not  Mounjaro due to cost, and provider aware of this.  Patient also interested in CGM, but will not likely be able to afford.  It has a copay even with Medicare part B    2 attempts made to reach patient for followup and to review all medication that he has in more detail. He answered initial call, but requested a call back 1-2 hours later.  He did not answer 2nd attempt.  Patient has followup visit with PCP to review all meds on 6/10/25 at 1030.  Also noted that he is establishing with [facility] in mid July. I will check back tomorrow and attempt to reach patient again pending outcome of PCP visit    Thank you,  CINDY Nichols, PharmD, BCACP  Ambulatory Care Clinical Pharmacist- [facility] Clinical Pharmacy  Department, toll free: [phone]

## 2025-06-10 ENCOUNTER — CARE COORDINATION (OUTPATIENT)
Dept: CASE MANAGEMENT | Age: 65
End: 2025-06-10

## 2025-06-10 ENCOUNTER — OFFICE VISIT (OUTPATIENT)
Age: 65
End: 2025-06-10
Payer: MEDICARE

## 2025-06-10 VITALS
HEART RATE: 104 BPM | DIASTOLIC BLOOD PRESSURE: 76 MMHG | WEIGHT: 239.2 LBS | SYSTOLIC BLOOD PRESSURE: 90 MMHG | HEIGHT: 74 IN | TEMPERATURE: 99.1 F | BODY MASS INDEX: 30.7 KG/M2 | OXYGEN SATURATION: 97 %

## 2025-06-10 DIAGNOSIS — Z79.4 TYPE 2 DIABETES MELLITUS WITH OTHER SPECIFIED COMPLICATION, WITH LONG-TERM CURRENT USE OF INSULIN (HCC): Primary | ICD-10-CM

## 2025-06-10 DIAGNOSIS — I10 ESSENTIAL HYPERTENSION: ICD-10-CM

## 2025-06-10 DIAGNOSIS — E11.69 TYPE 2 DIABETES MELLITUS WITH OTHER SPECIFIED COMPLICATION, WITH LONG-TERM CURRENT USE OF INSULIN (HCC): Primary | ICD-10-CM

## 2025-06-10 DIAGNOSIS — Z79.899 MEDICATION MANAGEMENT: ICD-10-CM

## 2025-06-10 PROCEDURE — 3074F SYST BP LT 130 MM HG: CPT

## 2025-06-10 PROCEDURE — 1159F MED LIST DOCD IN RCRD: CPT

## 2025-06-10 PROCEDURE — G8427 DOCREV CUR MEDS BY ELIG CLIN: HCPCS

## 2025-06-10 PROCEDURE — 2022F DILAT RTA XM EVC RTNOPTHY: CPT

## 2025-06-10 PROCEDURE — 1125F AMNT PAIN NOTED PAIN PRSNT: CPT

## 2025-06-10 PROCEDURE — 1123F ACP DISCUSS/DSCN MKR DOCD: CPT

## 2025-06-10 PROCEDURE — G8417 CALC BMI ABV UP PARAM F/U: HCPCS

## 2025-06-10 PROCEDURE — 3078F DIAST BP <80 MM HG: CPT

## 2025-06-10 PROCEDURE — 99213 OFFICE O/P EST LOW 20 MIN: CPT

## 2025-06-10 PROCEDURE — 3052F HG A1C>EQUAL 8.0%<EQUAL 9.0%: CPT

## 2025-06-10 PROCEDURE — 1036F TOBACCO NON-USER: CPT

## 2025-06-10 PROCEDURE — 3017F COLORECTAL CA SCREEN DOC REV: CPT

## 2025-06-10 RX ORDER — LOSARTAN POTASSIUM AND HYDROCHLOROTHIAZIDE 12.5; 5 MG/1; MG/1
1 TABLET ORAL DAILY
Qty: 90 TABLET | Refills: 1 | Status: SHIPPED | OUTPATIENT
Start: 2025-06-10

## 2025-06-10 RX ORDER — KETOROLAC TROMETHAMINE 30 MG/ML
1 INJECTION, SOLUTION INTRAMUSCULAR; INTRAVENOUS 3 TIMES DAILY
Qty: 1 EACH | Refills: 0 | Status: SHIPPED | OUTPATIENT
Start: 2025-06-10

## 2025-06-10 RX ORDER — FOLIC ACID 0.4 MG
800 TABLET ORAL DAILY
COMMUNITY

## 2025-06-10 RX ORDER — ACYCLOVIR 800 MG/1
1 TABLET ORAL 3 TIMES DAILY
Qty: 1 EACH | Refills: 0 | Status: SHIPPED | OUTPATIENT
Start: 2025-06-10

## 2025-06-10 RX ORDER — GLUCOSAMINE HCL/CHONDROITIN SU 500-400 MG
CAPSULE ORAL
Qty: 100 STRIP | Refills: 0 | Status: SHIPPED | OUTPATIENT
Start: 2025-06-10

## 2025-06-10 ASSESSMENT — ENCOUNTER SYMPTOMS
SORE THROAT: 0
COUGH: 0
ABDOMINAL PAIN: 0
NAUSEA: 0
SHORTNESS OF BREATH: 0
RHINORRHEA: 0
BACK PAIN: 0
DIARRHEA: 0

## 2025-06-10 ASSESSMENT — PATIENT HEALTH QUESTIONNAIRE - PHQ9
SUM OF ALL RESPONSES TO PHQ QUESTIONS 1-9: 14
10. IF YOU CHECKED OFF ANY PROBLEMS, HOW DIFFICULT HAVE THESE PROBLEMS MADE IT FOR YOU TO DO YOUR WORK, TAKE CARE OF THINGS AT HOME, OR GET ALONG WITH OTHER PEOPLE: NOT DIFFICULT AT ALL
3. TROUBLE FALLING OR STAYING ASLEEP: NEARLY EVERY DAY
5. POOR APPETITE OR OVEREATING: NOT AT ALL
2. FEELING DOWN, DEPRESSED OR HOPELESS: NEARLY EVERY DAY
9. THOUGHTS THAT YOU WOULD BE BETTER OFF DEAD, OR OF HURTING YOURSELF: NOT AT ALL
6. FEELING BAD ABOUT YOURSELF - OR THAT YOU ARE A FAILURE OR HAVE LET YOURSELF OR YOUR FAMILY DOWN: NEARLY EVERY DAY
SUM OF ALL RESPONSES TO PHQ QUESTIONS 1-9: 14
7. TROUBLE CONCENTRATING ON THINGS, SUCH AS READING THE NEWSPAPER OR WATCHING TELEVISION: NOT AT ALL
8. MOVING OR SPEAKING SO SLOWLY THAT OTHER PEOPLE COULD HAVE NOTICED. OR THE OPPOSITE, BEING SO FIGETY OR RESTLESS THAT YOU HAVE BEEN MOVING AROUND A LOT MORE THAN USUAL: NOT AT ALL
4. FEELING TIRED OR HAVING LITTLE ENERGY: NEARLY EVERY DAY
1. LITTLE INTEREST OR PLEASURE IN DOING THINGS: MORE THAN HALF THE DAYS

## 2025-06-10 NOTE — PATIENT INSTRUCTIONS
Thank you for letting us take care of you today. We hope all your questions were addressed. If a question was overlooked or something else comes to mind after you return home, please contact a member of your Care Team listed below.      Your Care Team at [facility] is Team #1  Ana Roman M.D. (Faculty)  Cristino Vinson M.D. (Resident)  Selam Glez D.O. (Resident)  Nickolas Paz M.D. (Resident)  Vance Nguyen M.D. (Resident)  Judi Forman, [facility]  Lalo Chinchilla, [facility]  Magalys Goldsmith, [facility]  Chel Connelly, [facility]  Tamra Ziegler, [facility]  Soco Greenberg, [facility]  Melquiades Jameson (LJ) Floridalma,   Mikayla Rebolledo [facility] (Clinical Pharmacist)     Office phone number: [phone]    If you need to get in right away due to illness, please be advised we have \"Same Day\" appointments available Monday-Friday. Please call us at [phone] option #3 to schedule your \"Same Day\" appointment.

## 2025-06-10 NOTE — PROGRESS NOTES
ATTENDING NOTE    Attending Physician Statement  I have discussed the care of Cristelauding pertinent history and exam findings,  with the resident. I have reviewed the key elements of all parts of the encounter with the resident.  I agree with the assessment, plan and orders as documented by the resident.  (GE Modifier)    Past Medical History:   Diagnosis Date    Arthritis     CAD (coronary artery disease)     Cancer ([facility])     prostate    Cervical radiculopathy     CHF (congestive heart failure) ([facility])     DM (diabetes mellitus) ([facility]) 2009    IDDM    GERD (gastroesophageal reflux disease) 2017    ON RX    Heart murmur 2013    ASYMPTOMATIC    HTN (hypertension) 06/29/2012    ON RX    Hyperlipidemia 06/29/2012    ON RX    Neuropathy     Sleep apnea 2016    TRIED MACHINE COULD NOT TOLERATE    Vision abnormalities 2019    FLOATERS RIGHT EYE    Wears glasses        Ryder was seen today for medication check.    Diagnoses and all orders for this visit:    Type 2 diabetes mellitus with other specified complication, with long-term current use of insulin ([facility])  -     Continuous Glucose  (FREESTYLE MOISES 3 READER) MAMADOU; 1 each by Does not apply route in the morning, at noon, and at bedtime  -     Continuous Glucose Sensor (FREESTYLE MOISES 3 SENSOR) MISC; 1 each by Does not apply route in the morning, at noon, and at bedtime  -     [facility] [place]  -     blood glucose monitor strips; Test 3 times a day & as needed for symptoms of irregular blood glucose. Dispense sufficient amount for indicated testing frequency plus additional to accommodate PRN testing needs.    Essential hypertension  -     losartan-hydroCHLOROthiazide (HYZAAR) 50-12.5 MG per tablet; Take 1 tablet by mouth daily    Medication management        Vitals:    06/10/25 1056   BP: 90/76   Pulse: (!) 104   Temp:    SpO2:          
  Problem Relation Age of Onset    Diabetes Sister     Diabetes Brother     Cancer Brother        Objective:    BP 90/76 (BP Site: Left Upper Arm, Patient Position: Sitting, BP Cuff Size: Medium Adult)   Pulse (!) 104   Temp 99.1 °F (37.3 °C) (Oral)   Ht 1.88 m (6' 2.02\")   Wt 108.5 kg (239 lb 3.2 oz)   SpO2 97%   BMI 30.70 kg/m²    BP Readings from Last 3 Encounters:   06/10/25 90/76   06/10/25 (!) 148/81   06/03/25 92/78       Physical Exam  Vitals reviewed.   Constitutional:       General: He is not in acute distress.     Appearance: Normal appearance.   Cardiovascular:      Rate and Rhythm: Normal rate and regular rhythm.      Pulses: Normal pulses.      Heart sounds: Normal heart sounds. No murmur heard.  Pulmonary:      Effort: Pulmonary effort is normal.      Breath sounds: Normal breath sounds.   Abdominal:      General: Bowel sounds are normal.      Palpations: Abdomen is soft.      Tenderness: There is no abdominal tenderness.   Musculoskeletal:      Cervical back: Normal range of motion.      Right lower leg: No edema.      Left lower leg: No edema.   Neurological:      Mental Status: He is alert.   Psychiatric:         Mood and Affect: Mood normal.         Lab Results   Component Value Date    WBC 4.4 06/02/2025    HGB 14.3 06/02/2025    HCT 45.8 06/02/2025     06/02/2025    CHOL 161 05/19/2025    TRIG 147 05/19/2025    HDL 45 05/19/2025    ALT 16 01/15/2025    AST 15 01/15/2025     (L) 05/22/2025    K 4.2 05/22/2025     05/22/2025    CREATININE 1.1 05/22/2025    BUN 14 05/22/2025    CO2 24 05/22/2025    PSA 0.41 06/02/2025    INR 1.0 06/21/2023    LABA1C 8.9 05/19/2025     Lab Results   Component Value Date    CALCIUM 9.1 05/22/2025     No results found for: \"LDLDIRECT\"    Assessment and Plan:    1. Type 2 diabetes mellitus with other specified complication, with long-term current use of insulin (HCC)  - Continuous Glucose  (FREESTYLE MOISES 3 READER) MAMADOU; 1 each by Does 
test  05/20/2026    GFR test (Diabetes, CKD 3-4, OR last GFR 15-59)  05/22/2026    Prostate Specific Antigen (PSA) Screening or Monitoring  06/02/2026    Depression Screen  06/03/2026    DTaP/Tdap/Td vaccine (2 - Td or Tdap) 06/03/2035    Annual Wellness Visit (Medicare Advantage)  Completed    Pneumococcal 50+ years Vaccine  Completed    Hepatitis C screen  Completed    Hepatitis A vaccine  Aged Out    Hepatitis B vaccine  Aged Out    Hib vaccine  Aged Out    Polio vaccine  Aged Out    Meningococcal (ACWY) vaccine  Aged Out    Meningococcal B vaccine  Aged Out    Depression Monitoring  Discontinued    Pneumococcal 0-49 years Vaccine  Discontinued    HIV screen  Discontinued

## 2025-06-10 NOTE — CARE COORDINATION
-Remote Alert Monitoring Note      Date/Time:  6/10/2025 8:16 AM  Patient Current Location: Home: [address] [place] [address]  Verified patients name and  as identifiers.    Rpm alert to be reviewed by the provider   red alert  blood pressure reading (176/107)  Vitals Recheck blood pressure reading (148/81)  Additional needs to be addressed by provider: No                   LPN contacted patient by telephone regarding red alert received   Background: HTN,DM  Refer to 911 immediately if:  Patient unresponsive or unable to provide history  Change in cognition or sudden confusion  Patient unable to respond in complete sentences  Intense chest pain/tightness  Any concern for any clinical emergency  Red Alert: Provider response time of 1 hr required for any red alert requiring intervention  Yellow Alert: Provider response time of 3hr required for any escalated yellow alert  Patient Chief Complaint:  Blood Pressure BP Triage  Are you having any Chest Pain? no   Are you having any Shortness of Breath? no   Do you have a headache or have any vision changes? no   Are you having any numbness or tingling? no   Are you having any other health concerns or issues? no  Patient/Caregiver educated on how to properly take a blood pressure. Patient/Caregiver verbalizes understanding.     Clinical Interventions: Reviewed and followed up on alerts and treatments-Spoke to patient he denies chest pain, SOB, dizziness states he is doing okay just finished up breakfast patient had not taken his medication this am when he checked his BP repeat BP is now WNL     Plan/Follow Up: Will continue to review, monitor and address alerts with follow up based on severity of symptoms and risk factors.  **For any new or worsening symptoms or you are concerned in anyway, please contact your Provider or report to the nearest Emergency Room.**

## 2025-06-11 ENCOUNTER — CARE COORDINATION (OUTPATIENT)
Dept: CASE MANAGEMENT | Age: 65
End: 2025-06-11

## 2025-06-11 ENCOUNTER — OFFICE VISIT (OUTPATIENT)
Dept: ORTHOPEDIC SURGERY | Age: 65
End: 2025-06-11

## 2025-06-11 VITALS — WEIGHT: 239 LBS | HEIGHT: 74 IN | BODY MASS INDEX: 30.67 KG/M2

## 2025-06-11 DIAGNOSIS — S76.011A TEAR OF RIGHT GLUTEUS MEDIUS TENDON, INITIAL ENCOUNTER: ICD-10-CM

## 2025-06-11 DIAGNOSIS — R52 PAIN: Primary | ICD-10-CM

## 2025-06-11 NOTE — PROGRESS NOTES
Priority:   Routine     Referral Type:   Physical Therapy     Referral Reason:   Specialty Services Required     Requested Specialty:   Physical Therapy     Number of Visits Requested:   1        Sherry Ghosh DO  Orthopedic Surgery Resident, PGY-3  [place], [place]

## 2025-06-11 NOTE — CARE COORDINATION
-Remote Alert Monitoring Note      Date/Time:  2025 8:37 AM  Patient Current Location: Home: [address] [place] [address]  Verified patients name and  as identifiers.    Rpm alert to be reviewed by the provider   red alert  blood pressure reading (153/106)  Vitals Recheck blood pressure reading (128/99)  Additional needs to be addressed by provider: No                   LPN contacted patient by telephone regarding red alert received   Background: HTN, DM  Refer to 911 immediately if:  Patient unresponsive or unable to provide history  Change in cognition or sudden confusion  Patient unable to respond in complete sentences  Intense chest pain/tightness  Any concern for any clinical emergency  Red Alert: Provider response time of 1 hr required for any red alert requiring intervention  Yellow Alert: Provider response time of 3hr required for any escalated yellow alert  Patient Chief Complaint:  Blood Pressure BP Triage  Are you having any Chest Pain? no   Are you having any Shortness of Breath? no   Do you have a headache or have any vision changes? no   Are you having any numbness or tingling? no   Are you having any other health concerns or issues? no  Patient/Caregiver educated on how to properly take a blood pressure. Patient/Caregiver verbalizes understanding.     Clinical Interventions: Reviewed and followed up on alerts and treatments-Spoke to patient he denies chest pain, SOB, dizziness repeat BP is now WNL     Plan/Follow Up: Will continue to review, monitor and address alerts with follow up based on severity of symptoms and risk factors.  **For any new or worsening symptoms or you are concerned in anyway, please contact your Provider or report to the nearest Emergency Room.**

## 2025-06-13 ENCOUNTER — CARE COORDINATION (OUTPATIENT)
Dept: CARE COORDINATION | Facility: CLINIC | Age: 65
End: 2025-06-13

## 2025-06-13 ENCOUNTER — CARE COORDINATION (OUTPATIENT)
Dept: CARE COORDINATION | Age: 65
End: 2025-06-13

## 2025-06-13 VITALS — DIASTOLIC BLOOD PRESSURE: 79 MMHG | HEART RATE: 104 BPM | SYSTOLIC BLOOD PRESSURE: 129 MMHG

## 2025-06-13 NOTE — CARE COORDINATION
6/13/2025 9:04 AM  *  Unable to Reach Date/Time:  6/13/2025 9:04 AM  LPN attempted to reach patient by telephone regarding red alert in remote patient monitoring program. Left HIPAA compliant message requesting a return call. Will attempt to reach patient again.                     
advise to take blood pressure 1-2 hrs after medications unless otherwise instructed by provider. Pt educated on when to notify provider(s) of changes or concerns and when to seek emergent medical care; pt v/u.    Plan/Follow Up: Will continue to review, monitor and address alerts with follow up based on severity of symptoms and risk factors.  **For any new or worsening symptoms or you are concerned in anyway, please contact your Provider or report to the nearest Emergency Room.**

## 2025-06-13 NOTE — CARE COORDINATION
approximately 1 week to complete:  - disease specific assessments  - SDOH assessments  - medication review  - advance care planning  - goal progression  - education   - RPM.

## 2025-06-17 ENCOUNTER — TELEPHONE (OUTPATIENT)
Age: 65
End: 2025-06-17

## 2025-06-18 ENCOUNTER — CARE COORDINATION (OUTPATIENT)
Dept: CARE COORDINATION | Age: 65
End: 2025-06-18

## 2025-06-18 ENCOUNTER — CARE COORDINATION (OUTPATIENT)
Dept: CASE MANAGEMENT | Age: 65
End: 2025-06-18

## 2025-06-18 NOTE — CARE COORDINATION
Nutrition Care Coordinator Follow-Up visit:    Food Recall:eating 2 meals/d    Activity Level:  Sedentary:X  Lightly Active:  Moderately Active:  Very Active:    Adult BMI:  Underweight (below 18.5)  Normal Weight (18.5-24.9)  Overweight (25-29.9)  Obese (30-39.9)X  Morbidly Obese (>40)    Plan:  Plan was established with patient:  Increase dietary fiber by consuming whole grains, fruits and vegetables:X  Limit dietary cholesterol to >200mg/day:  Increase water intake:  Avoid added sugar:X  Avoid sweetened beverages:X  Choose lean meats:  Drinking- water, diet pop, beer, liquor- he was encouraged to drink water and avoid alcohol     Monitoring:  Will monitor weight:  Will monitor adherence to meal plan:X  Will monitor adherence to exercise plan:  Will monitor HGA1c:X    Handouts Provided :  Low Carb snacking:X  Carb counting /individual meal plan:x  Portion Control:X  Food Labels:X  Physical Activity:  Low Fat/Cholesterol:  Hypo/Hyperglycemia:  Calorie Controlled Meal Plan:    Goals:  Increase water consumption to 8oz. 6-8 times daily:  Manage blood sugars by consuming 3 meals spaced every 4-5 hours with 2-3 snacks daily:discussed  Increase fiber and decrease fat intake by consuming 1-2 fruit servings and 2-3 vegetable servings per day.  Increase physical activity by:  Consume less than 2,000mg of sodium/day  Avoid consumption of sweetened beverages and added sugar by reading food labels:discussed  Monitor blood sugars by using meter to check blood glucose before morning meal and 2 hours after a meal daily:  Decrease risk of coronary heart disease by consuming fish that contains omega-3 fatty acids at least twice a week, avoiding partially hydrogenated oil/trans fats and limiting saturated fat intake by reading food labels:    Patient goals set:  1.Patient states eating  2 meal/day most of the time, skips lunch says he snacks. Encouraged set meal times daily and consistency of meal times and explained how this

## 2025-06-18 NOTE — CARE COORDINATION
-Remote Alert Monitoring Note      Date/Time:  2025 8:26 AM  Patient Current Location: Home: [address] [place] [address]  Verified patients name and  as identifiers.    Rpm alert to be reviewed by the provider   red alert  blood pressure reading (138/107)  Vitals Recheck blood pressure reading (na)  Additional needs to be addressed by provider: No                   LPN contacted patient by telephone regarding red alert received   Background: HTN,DM  Refer to 911 immediately if:  Patient unresponsive or unable to provide history  Change in cognition or sudden confusion  Patient unable to respond in complete sentences  Intense chest pain/tightness  Any concern for any clinical emergency  Red Alert: Provider response time of 1 hr required for any red alert requiring intervention  Yellow Alert: Provider response time of 3hr required for any escalated yellow alert  Patient Chief Complaint:  Blood Pressure BP Triage  Are you having any Chest Pain? no   Are you having any Shortness of Breath? no   Do you have a headache or have any vision changes? no   Are you having any numbness or tingling? no   Are you having any other health concerns or issues? no  Patient/Caregiver educated on how to properly take a blood pressure. Patient/Caregiver verbalizes understanding.     Clinical Interventions: Reviewed and followed up on alerts and treatments-Spoke to patient he denies chest pain, SOB, dizziness patient has not taken any medications this am, he will take his medications and repeat BP 1 hour after taking them he is aware if reading is still elevated I will return call to him. He has no current concerns   Plan/Follow Up: Will continue to review, monitor and address alerts with follow up based on severity of symptoms and risk factors.  **For any new or worsening symptoms or you are concerned in anyway, please contact your Provider or report to the nearest Emergency Room.**

## 2025-06-19 ENCOUNTER — TELEPHONE (OUTPATIENT)
Age: 65
End: 2025-06-19

## 2025-06-19 ENCOUNTER — HOSPITAL ENCOUNTER (OUTPATIENT)
Dept: ULTRASOUND IMAGING | Age: 65
Discharge: HOME | End: 2025-06-21
Payer: MEDICARE

## 2025-06-19 ENCOUNTER — HOSPITAL ENCOUNTER (OUTPATIENT)
Dept: VASCULAR LAB | Age: 65
Discharge: HOME | End: 2025-06-21
Payer: MEDICARE

## 2025-06-19 ENCOUNTER — HOSPITAL ENCOUNTER (OUTPATIENT)
Dept: MAMMOGRAPHY | Age: 65
Discharge: HOME | End: 2025-06-21
Payer: MEDICARE

## 2025-06-19 DIAGNOSIS — N64.89 BREAST ASYMMETRY: Primary | ICD-10-CM

## 2025-06-19 DIAGNOSIS — N64.89 BREAST ASYMMETRY: ICD-10-CM

## 2025-06-19 DIAGNOSIS — F17.200 SMOKER: ICD-10-CM

## 2025-06-19 PROCEDURE — 76706 US ABDL AORTA SCREEN AAA: CPT

## 2025-06-19 PROCEDURE — G0279 TOMOSYNTHESIS, MAMMO: HCPCS

## 2025-06-19 PROCEDURE — 76706 US ABDL AORTA SCREEN AAA: CPT | Performed by: STUDENT IN AN ORGANIZED HEALTH CARE EDUCATION/TRAINING PROGRAM

## 2025-06-19 NOTE — TELEPHONE ENCOUNTER
's scheduling called requesting someone put in order for bilateral diagnostic mammogram. Patient is currently waiting in their office

## 2025-06-19 NOTE — TELEPHONE ENCOUNTER
[facility] called stating patient is in need of a Mammogram order Diagnostic bilateral.  Patient has an appointment today.  Please advise and thank you

## 2025-06-19 NOTE — TELEPHONE ENCOUNTER
Spoke with patient briefly today.  Declined full med review/education again. Said that he had all of his medication and was feeling more comfortable with everything.      Discussed change from Losartan 50mg and HCTZ 25mg to the Losartan-hctz 50-12.5mg.  Confirmed he was aware of the change and is taking the new combo medication.     Also reminded him that the Domenico was sent in to [facility], but not covered.  There are no programs that I am aware of that would cover CGM at no cost.  Even getting through a DME supplier would be around $50 per month as they generally require a 20% copay.    Patient has an appointment set with Med Mgmt pharmacist at [facility] on 7/19. Reminded him of this appt and he said he intends on going.  Patient will definitely benefit from chronic disease state management.    Will sign off.    CINDY Nichols, PharmD, BCACP  Ambulatory Care Clinical Pharmacist- [facility] Clinical Pharmacy  Department, toll free: [phone]    For Pharmacy Admin Tracking Only    Program: [facility] [employer]  CPA in place:  No  Gap Closed?: Yes   Time Spent (min): 30

## 2025-06-20 ENCOUNTER — TELEPHONE (OUTPATIENT)
Age: 65
End: 2025-06-20

## 2025-06-20 LAB
VAS AORTA DIST AP: 1.99 CM
VAS AORTA DIST PSV: 63.8 CM/S
VAS AORTA MID AP: 2.46 CM
VAS AORTA MID PSV: 36.7 CM/S
VAS AORTA PROX AP: 2.18 CM
VAS AORTA PROX PSV: 65 CM/S
VAS LEFT COM ILIAC AP: 1.75 CM
VAS RIGHT COM ILIAC AP: 1.6 CM

## 2025-06-20 NOTE — TELEPHONE ENCOUNTER
[facility] called asking if patients paperwork for freestyle  and sensor.  Patient was informed it was in the providers box and will be signed as soon as the provider is in .  Call ended.

## 2025-06-26 ENCOUNTER — OFFICE VISIT (OUTPATIENT)
Age: 65
End: 2025-06-26
Payer: MEDICARE

## 2025-06-26 VITALS
BODY MASS INDEX: 32.1 KG/M2 | WEIGHT: 250 LBS | HEART RATE: 85 BPM | SYSTOLIC BLOOD PRESSURE: 150 MMHG | DIASTOLIC BLOOD PRESSURE: 90 MMHG

## 2025-06-26 DIAGNOSIS — N13.8 BPH WITH OBSTRUCTION/LOWER URINARY TRACT SYMPTOMS: ICD-10-CM

## 2025-06-26 DIAGNOSIS — Z85.46 PERSONAL HISTORY OF PROSTATE CANCER: ICD-10-CM

## 2025-06-26 DIAGNOSIS — N40.1 BPH WITH OBSTRUCTION/LOWER URINARY TRACT SYMPTOMS: ICD-10-CM

## 2025-06-26 DIAGNOSIS — N52.8 OTHER MALE ERECTILE DYSFUNCTION: Primary | ICD-10-CM

## 2025-06-26 DIAGNOSIS — R39.15 URGENCY OF URINATION: ICD-10-CM

## 2025-06-26 PROCEDURE — 1036F TOBACCO NON-USER: CPT | Performed by: SPECIALIST

## 2025-06-26 PROCEDURE — 1159F MED LIST DOCD IN RCRD: CPT | Performed by: SPECIALIST

## 2025-06-26 PROCEDURE — 3017F COLORECTAL CA SCREEN DOC REV: CPT | Performed by: SPECIALIST

## 2025-06-26 PROCEDURE — 1123F ACP DISCUSS/DSCN MKR DOCD: CPT | Performed by: SPECIALIST

## 2025-06-26 PROCEDURE — 3080F DIAST BP >= 90 MM HG: CPT | Performed by: SPECIALIST

## 2025-06-26 PROCEDURE — G8417 CALC BMI ABV UP PARAM F/U: HCPCS | Performed by: SPECIALIST

## 2025-06-26 PROCEDURE — 3077F SYST BP >= 140 MM HG: CPT | Performed by: SPECIALIST

## 2025-06-26 PROCEDURE — 99214 OFFICE O/P EST MOD 30 MIN: CPT | Performed by: SPECIALIST

## 2025-06-26 PROCEDURE — G8427 DOCREV CUR MEDS BY ELIG CLIN: HCPCS | Performed by: SPECIALIST

## 2025-06-26 NOTE — PROGRESS NOTES
Reno Welch MD, FACS  [facility] Consultation/New Patient Office Visit    Patient:  Ryder Ha  YOB: 1960  Date: 6/26/2025  Consult requested from Archana Huerta DO  for purpose of evaluation of ED.    HISTORY OF PRESENT ILLNESS:   The patient is a 65 y.o. male who presents today for evaluation of the following problems:   Erectile Dysfunction:  Patient is here today for erectile dysfunction which started several year(s) ago.   Recently his ED symptoms: are worsening  Currently sexually active? Yes  Sex drive/libido: normal  Current medical Rx for ED: none   The patient presents with long standing ED.  Patient has tried Tadalafil (Cialis) 20 mg and generic sildenafil 100 mg without success.  We discussed the role of Triple mix intracorporeal injection Rx for ED and he is not interested in pursuing this form of treatment.  Patient has a history of prostate cancer s/p XRT 6/20/22 and temporary androgen deprivation therapy prior to this.   No evidence of prostate cancer recurrence s/p XRT ending 2021 since his PSA is 0.41 which is stable/low.  Will repeat a PSA in 8 months.     (Patient's old records have been requested, reviewed and summarized in today's note.)    Summary of old records:   History of prostate cancer (bx 3/30/21; G4+3=7 s/p XRT 6/20/2022, ADT temporary; Med Onc (Chris)  ED: no response to tadalafil, sildenafil; not interested in Trimiix      Procedures Today: N/A    Last several PSA's:  Lab Results   Component Value Date    PSA 0.41 06/02/2025    PSA 0.24 01/15/2025    PSA 0.75 01/23/2024     Last total testosterone:  No results found for: \"TESTOSTERONE\"  Urinalysis today:  No results found for this visit on 06/26/25.    Today's AUA Symptom Score (QOL): 0 (1)    Last BUN and creatinine:  Lab Results   Component Value Date    BUN 14 05/22/2025     Lab Results   Component Value Date    CREATININE 1.1 05/22/2025       Additional Lab/Culture results:

## 2025-06-27 ENCOUNTER — TELEPHONE (OUTPATIENT)
Age: 65
End: 2025-06-27

## 2025-06-30 ENCOUNTER — CARE COORDINATION (OUTPATIENT)
Dept: CARE COORDINATION | Facility: CLINIC | Age: 65
End: 2025-06-30

## 2025-06-30 ENCOUNTER — TELEPHONE (OUTPATIENT)
Age: 65
End: 2025-06-30

## 2025-06-30 DIAGNOSIS — E11.69 TYPE 2 DIABETES MELLITUS WITH OTHER SPECIFIED COMPLICATION, WITH LONG-TERM CURRENT USE OF INSULIN (HCC): Primary | ICD-10-CM

## 2025-06-30 DIAGNOSIS — Z79.4 TYPE 2 DIABETES MELLITUS WITH OTHER SPECIFIED COMPLICATION, WITH LONG-TERM CURRENT USE OF INSULIN (HCC): Primary | ICD-10-CM

## 2025-06-30 RX ORDER — INSULIN ASPART 100 [IU]/ML
INJECTION, SOLUTION INTRAVENOUS; SUBCUTANEOUS
Qty: 5 ADJUSTABLE DOSE PRE-FILLED PEN SYRINGE | Refills: 3 | Status: SHIPPED | OUTPATIENT
Start: 2025-06-30 | End: 2025-07-04

## 2025-06-30 NOTE — CARE COORDINATION
2025 12:09 PM  *  RPM Alert   Remote Patient Monitoring Note      Date/Time:  2025 12:09 PM  Patient Current Location: [facility] contacted patient by telephone regarding yellow alert received for no metrics taken / updated x 3 days. Verified patients name and  as identifiers.  Background: Pt enrolled in RPM r/t HTN and DM  Clinical Interventions: Pt educated on RPM adherence, v/u and denies any known RPM equipment / tech issues at this time. States he will update daily metrics when he returns home \"in about 15 minutes\". Pt also educated on RPM hours and v/u.   Plan/Follow Up: Will continue to review, monitor and address alerts with follow up based on severity of symptoms and risk factors.

## 2025-07-01 ENCOUNTER — CARE COORDINATION (OUTPATIENT)
Dept: CASE MANAGEMENT | Age: 65
End: 2025-07-01

## 2025-07-01 NOTE — CARE COORDINATION
-Remote Alert Monitoring Note      Date/Time:  2025 9:37 AM  Patient Current Location: Home: [address] [place] [address]  Verified patients name and  as identifiers.    Rpm alert to be reviewed by the provider   red alert  blood pressure reading (170/101)  Vitals Recheck blood pressure reading (149/80)  Additional needs to be addressed by provider: No                   LPN contacted patient by telephone regarding red alert received   Background: HTN,DM  Refer to 911 immediately if:  Patient unresponsive or unable to provide history  Change in cognition or sudden confusion  Patient unable to respond in complete sentences  Intense chest pain/tightness  Any concern for any clinical emergency  Red Alert: Provider response time of 1 hr required for any red alert requiring intervention  Yellow Alert: Provider response time of 3hr required for any escalated yellow alert  Patient Chief Complaint:  Blood Pressure BP Triage  Are you having any Chest Pain? no   Are you having any Shortness of Breath? no   Do you have a headache or have any vision changes? no   Are you having any numbness or tingling? no   Are you having any other health concerns or issues? no  Patient/Caregiver educated on how to properly take a blood pressure. Patient/Caregiver verbalizes understanding.     Clinical Interventions: Reviewed and followed up on alerts and treatments-Spoke with patient he denies chest pain, SOB, dizziness headache and vision concerns, patient states he has taken all of his morning medications as directed. Repeat BP now WNL    Plan/Follow Up: Will continue to review, monitor and address alerts with follow up based on severity of symptoms and risk factors.  **For any new or worsening symptoms or you are concerned in anyway, please contact your Provider or report to the nearest Emergency Room.**

## 2025-07-03 ENCOUNTER — CARE COORDINATION (OUTPATIENT)
Dept: CARE COORDINATION | Age: 65
End: 2025-07-03

## 2025-07-03 NOTE — CARE COORDINATION
Ambulatory Care Coordination Note     7/3/2025 1:05 PM     Patient Current Location:  Home: [address] [place] [address]     Patient contacted the ACM by telephone. Verified name and  with ACM as identifiers.         ACM: Moody Chase RN     Challenges to be reviewed by the provider   Additional needs identified to be addressed with provider No                 Method of communication with provider: staff message.    Utilization: Has the patient been seen in the ED since your last call? no    Care Summary Note:     CC Plan:        Review HRS/RPM report with Patient.         2.  Review medications with Patient     3.  Patient kept appointment with Dr. Welch on 25.  Notes reviewed by Writer.     4.  Patient completed Vascular US AAA screening.  No abdominal aortic aneurysm found.     5.  Patient completed bilat digital Mammogram.  IMPRESSION:  No mammographic evidence of malignancy.  Findings consistent with benign  gynecomastia.  Recommend follow-up with referring provider for discussion of  additional diagnostic or management option, as clinically indicated.     6.  Review upcoming appointments with Patient.     PCP/Specialist follow up:      Future Appointments           Provider Specialty Dept Phone     7/10/2025 12:30 PM Liam Polanco MD Neurology [phone]     2025 1:15 PM Chung Walker DPM Podiatry [phone]     2025 10:30 AM Mikayla Rebolledo [facility] Medicine [phone]     2025 1:30 PM Mikayla Rebolledo [facility] Medicine [phone]     2025 10:00 AM Archana Huerta DO Family Medicine [phone]     2025 3:00 PM Dedrick Perez MD Oncology [phone]     2025 11:15 AM Kali Teran MD Cardiology [phone]        Offered patient enrollment in the Remote Patient Monitoring (RPM) program for in-home monitoring: Yes, patient enrolled; current status is activated and monitoring.     Patient returned call to Writer.  He states he has been

## 2025-07-03 NOTE — CARE COORDINATION
Writer received an inquiry from [facility] NURSE CEREDA B in regards to Patient asking about Bill consolidation.    Writer called to inquiry about what Bills Patient was needing assistance with he reported that he had ([employer] BILLS AND HOME DEPOT BILLS)    Writer explained to him that we Do Not get involved in those type of affairs; If he had Medical Bills we could assist but he would have to Contact a Bill Consolidation Organization or Agency for Bills he has for other circumstances.    Writer is Not signing on to the case as he said that was all he needed help with.

## 2025-07-03 NOTE — CARE COORDINATION
Ambulatory Care Coordination Note     7/3/2025 8:53 AM     Patient Current Location:  Home: [address] [place] [address]     ACM contacted the patient by telephone. Verified name and  with patient as identifiers.       ACM: Moody Chase RN     Challenges to be reviewed by the provider   Additional needs identified to be addressed with provider No                 Method of communication with provider: staff message.    Utilization: Has the patient been seen in the ED since your last call? no    Care Summary Note:     CC Plan:       Review HRS/RPM report with Patient.        2.  Review medications with Patient    3.  Patient kept appointment with Dr. Welch on 25.  Notes reviewed by Writer.    4.  Patient completed Vascular US AAA screening.  No abdominal aortic aneurysm found.    5.  Patient completed bilat digital Mammogram.  IMPRESSION:  No mammographic evidence of malignancy.  Findings consistent with benign  gynecomastia.  Recommend follow-up with referring provider for discussion of  additional diagnostic or management option, as clinically indicated.    6.  Review upcoming appointments with Patient.    PCP/Specialist follow up:     Future Appointments         Provider Specialty Dept Phone    7/10/2025 12:30 PM Liam Polanco MD Neurology [phone]    2025 1:15 PM Chung Walker DPM Podiatry [phone]    2025 10:30 AM Mikayla Rebolledo [facility] Family Medicine [phone]    2025 1:30 PM Mikayla Rebolledo [facility] Medicine [phone]    2025 10:00 AM Archana Huerta DO Family Medicine [phone]    2025 3:00 PM Dedrick Perez MD Oncology [phone]    2025 11:15 AM Kali Teran MD Cardiology [phone]          Writer phoned Patient for ACM update and to discuss cc plan of care.  Patient informed Writer that he is outside cutting the grass before it gets too hot outside.  He is unable to review meds.  Writer request return call when Patient is

## 2025-07-04 RX ORDER — INSULIN ASPART 100 [IU]/ML
INJECTION, SOLUTION INTRAVENOUS; SUBCUTANEOUS
Qty: 5 ADJUSTABLE DOSE PRE-FILLED PEN SYRINGE | Refills: 3 | Status: SHIPPED | OUTPATIENT
Start: 2025-07-04

## 2025-07-07 ENCOUNTER — CARE COORDINATION (OUTPATIENT)
Dept: CARE COORDINATION | Age: 65
End: 2025-07-07

## 2025-07-07 NOTE — CARE COORDINATION
Patient states he is on his way out and requested a call back later  This afternoon. Will attempt to reach patient again later today.  NIKITA Carty    Attempted to reach patient today for nutrition CC follow up.  [facility] with contact information requesting a return call to [phone].  Will attempt to reach again within 1-2 weeks.  NIKITA Stauffer

## 2025-07-09 ENCOUNTER — CARE COORDINATION (OUTPATIENT)
Dept: CASE MANAGEMENT | Age: 65
End: 2025-07-09

## 2025-07-09 NOTE — CARE COORDINATION
-Remote Alert Monitoring Note      Date/Time:  2025 8:56 AM  Patient Current Location: Home: [address] [place] [address]  Verified patients name and  as identifiers.    Rpm alert to be reviewed by the provider   red alert  blood pressure reading (155/112)  Vitals Recheck blood pressure reading (145/101)  Additional needs to be addressed by provider: No                   LPN contacted patient by telephone regarding red alert received   Background: HTN,DM  Refer to 911 immediately if:  Patient unresponsive or unable to provide history  Change in cognition or sudden confusion  Patient unable to respond in complete sentences  Intense chest pain/tightness  Any concern for any clinical emergency  Red Alert: Provider response time of 1 hr required for any red alert requiring intervention  Yellow Alert: Provider response time of 3hr required for any escalated yellow alert  Patient Chief Complaint:  Blood Pressure BP Triage  Are you having any Chest Pain? no   Are you having any Shortness of Breath? no   Do you have a headache or have any vision changes? no   Are you having any numbness or tingling? no   Are you having any other health concerns or issues? no  Patient/Caregiver educated on how to properly take a blood pressure. Patient/Caregiver verbalizes understanding.     Clinical Interventions: Reviewed and followed up on alerts and treatments-Spoke to patient he denies chest pain, SOB, dizziness states he took BP without taking hs medication , he states he did take medication 20 minutes ago, repeat BP has improved but still elevated patient will repeat BP in 1 hour and LPN will call him back if reading is still elevated, has no concerns at present time   Plan/Follow Up: Will continue to review, monitor and address alerts with follow up based on severity of symptoms and risk factors.  **For any new or worsening symptoms or you are concerned in anyway, please contact your Provider or report to the nearest

## 2025-07-16 ENCOUNTER — CARE COORDINATION (OUTPATIENT)
Dept: CARE COORDINATION | Age: 65
End: 2025-07-16

## 2025-07-16 ENCOUNTER — CARE COORDINATION (OUTPATIENT)
Dept: PRIMARY CARE CLINIC | Age: 65
End: 2025-07-16

## 2025-07-16 ENCOUNTER — OFFICE VISIT (OUTPATIENT)
Age: 65
End: 2025-07-16
Payer: MEDICARE

## 2025-07-16 VITALS — BODY MASS INDEX: 32.08 KG/M2 | WEIGHT: 250 LBS | HEIGHT: 74 IN

## 2025-07-16 DIAGNOSIS — B35.1 ONYCHOMYCOSIS: Primary | ICD-10-CM

## 2025-07-16 DIAGNOSIS — E11.42 DIABETIC PERIPHERAL NEUROPATHY (HCC): ICD-10-CM

## 2025-07-16 DIAGNOSIS — M79.671 FOOT PAIN, BILATERAL: ICD-10-CM

## 2025-07-16 DIAGNOSIS — M79.672 FOOT PAIN, BILATERAL: ICD-10-CM

## 2025-07-16 PROCEDURE — 1123F ACP DISCUSS/DSCN MKR DOCD: CPT

## 2025-07-16 PROCEDURE — 1125F AMNT PAIN NOTED PAIN PRSNT: CPT

## 2025-07-16 PROCEDURE — 1036F TOBACCO NON-USER: CPT

## 2025-07-16 PROCEDURE — 2022F DILAT RTA XM EVC RTNOPTHY: CPT

## 2025-07-16 PROCEDURE — G8417 CALC BMI ABV UP PARAM F/U: HCPCS

## 2025-07-16 PROCEDURE — 3017F COLORECTAL CA SCREEN DOC REV: CPT

## 2025-07-16 PROCEDURE — G8427 DOCREV CUR MEDS BY ELIG CLIN: HCPCS

## 2025-07-16 PROCEDURE — 3052F HG A1C>EQUAL 8.0%<EQUAL 9.0%: CPT

## 2025-07-16 PROCEDURE — 11721 DEBRIDE NAIL 6 OR MORE: CPT

## 2025-07-16 NOTE — CARE COORDINATION
Patient Ryder Ha  07/16/25     Care Coordination  placed call to patient to arrange RPM kit  through [employer]. Left HIPAA Compliant Message     provided return and how to pack equipment in original packing via the patients voicemail if available and provided call back number should patient have questions.    Patient made aware [employer] will  equipment in 2-4 days.

## 2025-07-16 NOTE — CARE COORDINATION
Leathamonica Nelda  Sent at: Wed, 7/16/2025 1:56 PM  From: [email or web address]  To: All collaborators  Your request has been received and has been assigned to the Care Management Team to address our member's needs. It can take up to 8 calendar days to outreach the member. If you need additional information on the status of the member or referral, please call [phone]. If this is an internal [facility] referral, you can contact your region's Point of Contact (POC) for additional information.        Leathamonica Nelda [facility] Care Support  [employer] Care Support- Care Management  (CMSS)  [facility] [email or web address]     From: [email or web address] <[email or web address]>  Sent: Wednesday, July 16, 2025 12:00 PM  To: [facility] HospitalCecilioProviderReferrals <[facility] Hospital[facility]ho[facility] [email or web address]>  Subject: Provider Referral for Care Management     Form    Your full name: Moody Chase  ContactPhoneNumber: [phone]  Email: [email or web address]  Patient's full name: Ryder Covington member ID: [id number]  YOB: 1960  PatientPhoneNumber: [phone]  Does this patient have a caregiver?: CaregiverNo  Full name: Dr. Archana Huerta  PhysicianPhoneNumber: [phone]  What is your patient's primary health goal or concern? How can the [facility] Care Management team best support your patient?: Patient has type 2 DM, he is insulin dependent. He needs continued diabetes education. He would benefit from a dietician as well. Patient has numerous specialist. He has numerous appointments.  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 - Physical exam findings indicate chronic pain and visible deformity. Hx of Prostate cancer; Neuropathy, sleep apnea, not using a CPAP machine, Hx

## 2025-07-16 NOTE — PROGRESS NOTES
Patient instructed to remove shoes and socks and instructed to sit in exam chair.  Current PCP is Archana Huerta DO and date of last visit was [id number].   Do you have a follow up visit scheduled?  Yes  If yes, the date is [id number]    
peripheral neuropathy  Patient to RTC in 6 months.  Please, call the office with any questions or concerns.      Malu Moore DPM   Podiatric Medicine & Surgery   7/23/2025 at 11:35 AM     I performed a history and physical examination of the patient and discussed management with the resident. I reviewed the resident’s note and agree with the documented findings and plan of care. Any areas of disagreement are noted on the chart. I was personally present for the key portions of any procedures. I have documented in the chart those procedures where I was not present during the key portions. I have reviewed the Podiatry Resident progress note. I agree with the chief complaint, past medical history, past surgical history, allergies, medications, social and family history as documented unless otherwise noted below. Documentation of the HPI, Physical Exam and Medical Decision Making performed by medical students or scribes is based on my personal performance of the HPI, PE and MDM. I have personally evaluated this patient and have completed at least one if not all key elements of the E/M (history, physical exam, and MDM). Additional findings are as noted.     Electronically signed by Chung Walker DPM on 7/23/2025 at 2:21 PM

## 2025-07-16 NOTE — CARE COORDINATION
Ambulatory Care Coordination Note     2025 11:26 AM     Patient Current Location:  Home: [address] [place], [place] [address]     ACM contacted the patient by telephone. Verified name and  with patient as identifiers.       ACM: Moody Chase RN     Challenges to be reviewed by the provider   Additional needs identified to be addressed with provider No                 Method of communication with provider: staff message.    Utilization: Has the patient been seen in the ED since your last call? no    Care Summary Note:     HRS report:        Writer phoned Patient for ACM update.  Writer request Patient put in his RPM readings today for review.  He states his BP was 128/77, HR:  66.  Patient c/o low energy today.  He informed Writer that he drank Pomegranate juice a half an hour ago.  Patient reports he also drinks beet juice.  His BG increased to 294.  It was 219 this morning. Importance of drinking CHO free/sugar free drinks for prevention of hyperglycemia explained to Patient.  He was informed fatigue can be a side effect of hyperglycemia.  Patient states he doesn't have a sensor.  Patient doesn't have his sliding scale for Insulin Lispro Kwikpen and instructions for Lantus.  He states he threw away the boxes his insulin came with.  Patient states he took 25 units of Lantus this morning.  He was informed his med sheet states his dose is 41 units in the am and pm.  Patient is confused regarding insulin doses and purpose of insulin.  Writer explained Patient is to only take Lantus at prescribed dose in the morning and at bedtime.  He was informed the Insulin Lispro Kwikpen is only to be used prior to meals (breakfast, lunch, and dinner) if his blood sugar is greater than 139.  Patient states he forgets to take before meals sometimes.  Writer explained importance of taking insulin prior to meals to prevent spike in his blood sugar from his food.  Writer requested Patient write down the scale initially but he

## 2025-07-17 ENCOUNTER — OFFICE VISIT (OUTPATIENT)
Dept: NEUROLOGY | Age: 65
End: 2025-07-17
Payer: MEDICARE

## 2025-07-17 ENCOUNTER — CARE COORDINATION (OUTPATIENT)
Dept: CARE COORDINATION | Age: 65
End: 2025-07-17

## 2025-07-17 ENCOUNTER — TELEPHONE (OUTPATIENT)
Dept: NEUROLOGY | Age: 65
End: 2025-07-17

## 2025-07-17 ENCOUNTER — HOSPITAL ENCOUNTER (OUTPATIENT)
Age: 65
Discharge: HOME | End: 2025-07-17
Payer: MEDICARE

## 2025-07-17 VITALS
BODY MASS INDEX: 31.6 KG/M2 | WEIGHT: 246.2 LBS | DIASTOLIC BLOOD PRESSURE: 80 MMHG | SYSTOLIC BLOOD PRESSURE: 128 MMHG | HEART RATE: 74 BPM | HEIGHT: 74 IN

## 2025-07-17 DIAGNOSIS — R41.3 MEMORY IMPAIRMENT: ICD-10-CM

## 2025-07-17 DIAGNOSIS — R41.3 MEMORY IMPAIRMENT: Primary | ICD-10-CM

## 2025-07-17 LAB
FOLATE SERPL-MCNC: 22.4 NG/ML (ref 4.8–24.2)
T PALLIDUM AB SER QL IA: NONREACTIVE
VIT B12 SERPL-MCNC: 573 PG/ML (ref 232–1245)

## 2025-07-17 PROCEDURE — G8427 DOCREV CUR MEDS BY ELIG CLIN: HCPCS

## 2025-07-17 PROCEDURE — 3017F COLORECTAL CA SCREEN DOC REV: CPT

## 2025-07-17 PROCEDURE — 3079F DIAST BP 80-89 MM HG: CPT

## 2025-07-17 PROCEDURE — 1159F MED LIST DOCD IN RCRD: CPT

## 2025-07-17 PROCEDURE — 82746 ASSAY OF FOLIC ACID SERUM: CPT

## 2025-07-17 PROCEDURE — 84207 ASSAY OF VITAMIN B-6: CPT

## 2025-07-17 PROCEDURE — 3074F SYST BP LT 130 MM HG: CPT

## 2025-07-17 PROCEDURE — 1036F TOBACCO NON-USER: CPT

## 2025-07-17 PROCEDURE — 82607 VITAMIN B-12: CPT

## 2025-07-17 PROCEDURE — 86780 TREPONEMA PALLIDUM: CPT

## 2025-07-17 PROCEDURE — 1123F ACP DISCUSS/DSCN MKR DOCD: CPT

## 2025-07-17 PROCEDURE — G8417 CALC BMI ABV UP PARAM F/U: HCPCS

## 2025-07-17 PROCEDURE — 36415 COLL VENOUS BLD VENIPUNCTURE: CPT

## 2025-07-17 PROCEDURE — 99204 OFFICE O/P NEW MOD 45 MIN: CPT

## 2025-07-17 PROCEDURE — 82164 ANGIOTENSIN I ENZYME TEST: CPT

## 2025-07-17 RX ORDER — MEMANTINE HYDROCHLORIDE 10 MG/1
10 TABLET ORAL 2 TIMES DAILY
Qty: 180 TABLET | Refills: 1 | Status: SHIPPED | OUTPATIENT
Start: 2025-07-31

## 2025-07-17 RX ORDER — MEMANTINE HYDROCHLORIDE 5 MG/1
TABLET ORAL
Qty: 35 TABLET | Refills: 0 | Status: SHIPPED | OUTPATIENT
Start: 2025-07-17

## 2025-07-17 NOTE — PROGRESS NOTES
[facility] [facility] [address]  [place] [facility] [address]  Dept: [phone]  Dept Fax: [phone]    NEUROLOGY NEW PATIENT NOTE       PATIENT NAME: Ryder Ha  PATIENT MRN: [id number]  PRIMARY CARE PHYSICIAN: Archana Huerta DO      HPI:      Patient is 65 years old came to the memory impairment.  As per patient, he is having memory issues for the last 10 to 15 years.  He is on memantine 5 mg daily.  Recently, his memory issues got worse.  He starts forgetting year, his appointments.  He is independent to complete his daily activities.  Does not have any difficulty in driving.  No complaints of forgetting skills.  He is able to complete household chores, cook by himself.  He lives alone.  Did not report getting lost and unable.  He did report that he keeps forgetting the names of people around him.    His MoCA score during this visit was 21.    PREVIOUS WORKUP:     Lab Results   Component Value Date    WBC 4.4 06/02/2025    HGB 14.3 06/02/2025    HCT 45.8 06/02/2025    MCV 86.6 06/02/2025     06/02/2025       Past Medical History:   Diagnosis Date    Arthritis     CAD (coronary artery disease)     Cancer ([facility])     prostate    Cervical radiculopathy     CHF (congestive heart failure) (Former[facility])     DM (diabetes mellitus) ([facility]) 2009    IDDM    GERD (gastroesophageal reflux disease) 2017    ON RX    Heart murmur 2013    ASYMPTOMATIC    HTN (hypertension) 06/29/2012    ON RX    Hyperlipidemia 06/29/2012    ON RX    Neuropathy     Sleep apnea 2016    TRIED MACHINE COULD NOT TOLERATE    Vision abnormalities 2019    FLOATERS RIGHT EYE    Wears glasses         Past Surgical History:   Procedure Laterality Date    COLONOSCOPY  07/23/2010    Dr. Lucas    EYE SURGERY Bilateral 2017    CATARACT EXTRACTION WITH IOL    IR INSERT PICC VAD W SQ PORT >5 YEARS  10/07/2022    IR INS PICC VAD W SQ PORT GREATER THAN 5 10/7/2022 [facility]    KNEE ARTHROSCOPY

## 2025-07-17 NOTE — CARE COORDINATION
Per [facility] request, letter w/ educational material mailed out to patient.  Diabetes: Blood Sugar Emergencies (English)   Hypoglycemia In Diabetes: General Info (English)   Insulin Glargine (English)   Insulin Lispro-aabc Pen Injector 100 Units/ml (3 Ml) (Insulin Lispro-aabc - Injection) (English)   Diabetes Diet Meal Planning: General Info

## 2025-07-17 NOTE — CARE COORDINATION
Attempted to reach patient today for nutrition CC follow up.  Patient stated he is unable to talk on his way out to an appointment.  Will attempt to reach again within 1-2 weeks.  NIKITA Stauffer

## 2025-07-18 ENCOUNTER — PHARMACY VISIT (OUTPATIENT)
Age: 65
End: 2025-07-18

## 2025-07-18 VITALS — BODY MASS INDEX: 31.82 KG/M2 | WEIGHT: 247.8 LBS

## 2025-07-18 DIAGNOSIS — Z79.4 TYPE 2 DIABETES MELLITUS WITH OTHER SPECIFIED COMPLICATION, WITH LONG-TERM CURRENT USE OF INSULIN (HCC): ICD-10-CM

## 2025-07-18 DIAGNOSIS — E11.69 TYPE 2 DIABETES MELLITUS WITH OTHER SPECIFIED COMPLICATION, WITH LONG-TERM CURRENT USE OF INSULIN (HCC): ICD-10-CM

## 2025-07-18 PROCEDURE — APPNB180 APP NON BILLABLE TIME > 60 MINS: Performed by: PHARMACIST

## 2025-07-18 PROCEDURE — NBSRV NON-BILLABLE SERVICE: Performed by: PHARMACIST

## 2025-07-18 RX ORDER — IBUPROFEN 200 MG
400 TABLET ORAL 2 TIMES DAILY PRN
COMMUNITY

## 2025-07-18 RX ORDER — KETOROLAC TROMETHAMINE 30 MG/ML
INJECTION, SOLUTION INTRAMUSCULAR; INTRAVENOUS
Qty: 1 EACH | Refills: 0 | Status: SHIPPED | OUTPATIENT
Start: 2025-07-18

## 2025-07-18 RX ORDER — HYDROCHLOROTHIAZIDE 12.5 MG/1
CAPSULE ORAL
Qty: 2 EACH | Refills: 11 | Status: SHIPPED | OUTPATIENT
Start: 2025-07-18

## 2025-07-18 RX ORDER — INSULIN GLARGINE 100 [IU]/ML
30 INJECTION, SOLUTION SUBCUTANEOUS 2 TIMES DAILY
Qty: 15 ML | Refills: 3 | Status: SHIPPED | OUTPATIENT
Start: 2025-07-18

## 2025-07-18 NOTE — PROGRESS NOTES
Medication Management Service  [facility]  [phone]    Pharmacist Visit - In-Person Consult  Referring Provider: Dr. Jass Glez DO  Supervising Provider: Dr. Archana Huerta DO    Subjective/Objective   Ryder Ha is a 65 y.o. male seen by Medication Management Service for Diabetes Management under Collaborative Practice Agreement with A1c goal < 7 %. Initial visit. Patient PMH includes HTN, ASCVD of coronary artery w/o angina, LVD, INOCENTE, T2DM, Neuropathy, BPH, retinopathy, ED, CKD, memory impairment. Complications from DM Include Neuropathy, Retinopathy, CKD, ED, and ASCVD.     Reason for Visit / Follow-Up Focus: New pharmacy consult for DM management.    New Problems/Changes since last visit: N/A; initial visit.    LIFESTYLE  Diet: Only eats about 2 meals a day. Only dosing rapid acting insulin when he eats.  B: Assess at next visit.  L:   D:   Drinks:   Sweets/snacks:     Physical Activity:     Sleep     WEIGHT/BODY MASS INDEX  Estimated body mass index is 31.61 kg/m² as calculated from the following:    Height as of 7/17/25: 1.88 m (6' 2\").    Weight as of 7/17/25: 111.7 kg (246 lb 3.2 oz).    DIABETES MANAGEMENT  Current DM Medications:   Novolog inj subq TID AC per sliding scale Pen brought in today was lispro U100 Kwikpen  Medium Dose Sliding Scale Glucose: Dose  If <139: No Insulin  : 2 Units  200-249: 4 Units  250-299: 6 Units  300-349: 8 Units  350-400: 10 Units  Above 400: 12 Units  Lantus inj 41 units subq BID  Mounjaro 2.5mg inj subq daily  Has not picked up or dosed yet  Metformin 1000mg BID  Glipizide XL 2.5mg daily  - When initially prompted, patient reported using 30 units Lantus twice daily and using the Novolog as needed. Later, the patient reported using 20 units of Lantus as needed and Novolog 30 units twice daily. Patient has issues with memory and normally follows the instructions listed from prescriptions for dosing. Patient threw the boxes of the insulins away so

## 2025-07-18 NOTE — PATIENT INSTRUCTIONS
Ordering Domenico 3+ sensors and a reader from [employer] (located in [place]).  They will call you to confirm billing your insurance for the sensors and reader.  For your short acting insulin dosed WITH FOOD, the sliding scale is:  Medium Dose Sliding Scale Glucose: Dose  If <139: No Insulin  : 2 Units  200-249: 4 Units  250-299: 6 Units  300-349: 8 Units  350-400: 10 Units  Above 400: 12 Units  3.  If you have any questions or issues, call [phone]   4.  Please call us to let us know that you have received your new reader and sensors

## 2025-07-20 LAB — ACE SERPL-CCNC: 18 U/L (ref 16–85)

## 2025-07-22 ENCOUNTER — TELEPHONE (OUTPATIENT)
Dept: INTERVENTIONAL RADIOLOGY/VASCULAR | Age: 65
End: 2025-07-22

## 2025-07-22 LAB — PYRIDOXAL PHOS SERPL-SCNC: 98.7 NMOL/L (ref 20–125)

## 2025-07-24 ENCOUNTER — TELEPHONE (OUTPATIENT)
Age: 65
End: 2025-07-24

## 2025-07-24 ENCOUNTER — CARE COORDINATION (OUTPATIENT)
Dept: CARE COORDINATION | Age: 65
End: 2025-07-24

## 2025-07-24 NOTE — CARE COORDINATION
Attempted to reach patient today for nutrition CC follow up.  LVM with contact information requesting a return call to [phone].  Will attempt to reach again within 1-2 weeks.  NIKITA Stauffer

## 2025-07-24 NOTE — TELEPHONE ENCOUNTER
Medical supplies called to ask if we would call patient to inform him that his supply company has been trying to reach him with no success.     Writer called patient telling him all this information, patient took number [phone] he stated he would call them.

## 2025-07-31 ENCOUNTER — CARE COORDINATION (OUTPATIENT)
Dept: CARE COORDINATION | Age: 65
End: 2025-07-31

## 2025-07-31 NOTE — CARE COORDINATION
Ambulatory Care Coordination Note     2025 3:37 PM     Patient Current Location:  Trevor Nava from [email or web address] contacted the ACM by telephone. Verified name and  with Billy as identifiers.         ACM: Moody Chase RN     Challenges to be reviewed by the provider   Additional needs identified to be addressed with provider No                 Method of communication with provider: staff message.    Utilization: Has the patient been seen in the ED since your last call? no    Care Summary Note:     Writer received a message from Elijah ARAIZA With [email or web address] stating she contacted Patient to discuss providing assistance with his Insulin and other medication cost.  Patient informed her Writer is taking care of this for him.  She request a return call.    Writer signed off on Patient on 2025    Writer returned call to Elijah.  She was not available.  Writer spoke with Billy.  He was informed Writer signed off on Patient.  He is currently working with the office Pharmacy team.  He was informed Patient saw Mikayla Quiros [facility], on 2025.  He was given Mikayla's phone number.  He states he will inform Elijah.  Writer will update Mikayla.        PCP/Specialist follow up:   Future Appointments         Provider Specialty Dept Phone    2025 3:00 PM Dedrick Perez MD Oncology [phone]    8/15/2025 10:00 AM Archana Huerta DO Family Medicine [phone]    2025 11:15 AM Kali Teran MD Cardiology [phone]    2025 10:00 AM Mikayla Quiros [facility] Family Medicine [phone]    10/16/2025 1:30 PM Liam Polanco MD Neurology [phone]

## 2025-08-01 ENCOUNTER — CARE COORDINATION (OUTPATIENT)
Dept: CARE COORDINATION | Age: 65
End: 2025-08-01

## 2025-08-01 NOTE — CARE COORDINATION
Nutrition Care Coordinator Follow-Up visit:    Food Recall:    Activity Level:  Sedentary:  Lightly Active:  Moderately Active:  Very Active:    Adult BMI:  Underweight (below 18.5)  Normal Weight (18.5-24.9)  Overweight (25-29.9)  Obese (30-39.9)  Morbidly Obese (>40)    Weight Change:    Plan:  Plan was established with patient:  Increase dietary fiber by consuming whole grains, fruits and vegetables:  Limit dietary cholesterol to >200mg/day:  Increase water intake:  Avoid added sugar:  Avoid sweetened beverages:  Choose lean meats:      Monitoring:  Will monitor weight:  Will monitor adherence to meal plan:  Will monitor adherence to exercise plan:  Will monitor HGA1c:    Handouts Provided :  Low Carb snacking:  Carb counting /individual meal plan:  Portion Control:  Food Labels:  Physical Activity:  Low Fat/Cholesterol:  Hypo/Hyperglycemia:  Calorie Controlled Meal Plan:    Goals:  Increase water consumption to 8oz. 6-8 times daily:  Manage blood sugars by consuming 3 meals spaced every 4-5 hours with 2-3 snacks daily:  Increase fiber and decrease fat intake by consuming 1-2 fruit servings and 2-3 vegetable servings per day.  Increase physical activity by:  Consume less than 2,000mg of sodium/day  Avoid consumption of sweetened beverages and added sugar by reading food labels:  Monitor blood sugars by using meter to check blood glucose before morning meal and 2 hours after a meal daily:  Decrease risk of coronary heart disease by consuming fish that contains omega-3 fatty acids at least twice a week, avoiding partially hydrogenated oil/trans fats and limiting saturated fat intake by reading food labels:    Patient goals set:  1.  2.       NIKITA Rene

## 2025-08-01 NOTE — CARE COORDINATION
Patient goals reviewed-  1.Patient states eating  2 meal/day most of the time, skips lunch says he snacks around lunch time. Encouraged set meal times daily and consistency of meal times and explained how this can effect sugars. Discussed healthy lunch options- having some carb and protein at meals. Encouraged to work on eating 3 meals/d spaced every 4-5 hours.   2. Reviewed what foods contain carbohydrate to limit and how to measure out portions. Patient was taught carb counting with goal of 60gms/meal 15gms/snack. Discussed carb counting in detail- what a serving size is/ how to measure out servings and how to read food labels to carb count. Reviewed low carb snacking. Discussed having a snack with 15gms of carb + a protein source, gave patient several examples. Will send a list of low carb snacks to patient.   3. Reviewed plate method, encouraged patient to increase intake of non-starchy vegetables and lean protein sources.  Goal is 1/2 of plate to be non-starchy vegetables, 1/4 lean protein, 1/4 carbs.  Discussed what a serving size is and again how to measure out servings at meals/snacks. Patient states he uses food [employer] often- does not get a lot of protein sources besides peanut butter and most of the vegetables are canned. Discussed [employer] and farmer's market coupons- he will apply again this year for them.  4. Reviewed avoiding/limiting sweets and sweetened drinks. Patient drinks water, diet pop and states beer and liquor sometimes. Encouraged more water and to work on limiting diet pop. We discussed risks of drinking alcohol- encouraged to avoid alcohol and if he does drink to limit and never drink on an empty stomach. He states does not eats a lot of sweets. Reviewed some alternatives to sweets- fresh fruit- but limit serving, sugar free pudding or SF jello, yogurt, ect.Encouraged to keep sweets out of the house if possible and limit intake    Spoke with patient who relays he is doing ok. He states sugar

## 2025-08-04 ENCOUNTER — TELEPHONE (OUTPATIENT)
Age: 65
End: 2025-08-04

## 2025-08-04 DIAGNOSIS — E11.69 TYPE 2 DIABETES MELLITUS WITH OTHER SPECIFIED COMPLICATION, WITH LONG-TERM CURRENT USE OF INSULIN (HCC): ICD-10-CM

## 2025-08-04 DIAGNOSIS — Z79.4 TYPE 2 DIABETES MELLITUS WITH OTHER SPECIFIED COMPLICATION, WITH LONG-TERM CURRENT USE OF INSULIN (HCC): ICD-10-CM

## 2025-08-04 DIAGNOSIS — I10 ESSENTIAL HYPERTENSION: ICD-10-CM

## 2025-08-07 RX ORDER — INSULIN ASPART 100 [IU]/ML
INJECTION, SOLUTION INTRAVENOUS; SUBCUTANEOUS
Qty: 5 ADJUSTABLE DOSE PRE-FILLED PEN SYRINGE | Refills: 5 | Status: SHIPPED | OUTPATIENT
Start: 2025-08-07

## 2025-08-07 RX ORDER — ATORVASTATIN CALCIUM 40 MG/1
TABLET, FILM COATED ORAL
Qty: 90 TABLET | Refills: 5 | Status: SHIPPED | OUTPATIENT
Start: 2025-08-07

## 2025-08-07 RX ORDER — CARVEDILOL 12.5 MG/1
12.5 TABLET ORAL 2 TIMES DAILY
Qty: 60 TABLET | Refills: 5 | Status: SHIPPED | OUTPATIENT
Start: 2025-08-07

## 2025-08-21 ENCOUNTER — TELEPHONE (OUTPATIENT)
Dept: INTERVENTIONAL RADIOLOGY/VASCULAR | Age: 65
End: 2025-08-21

## 2025-08-21 ENCOUNTER — TELEPHONE (OUTPATIENT)
Age: 65
End: 2025-08-21

## 2025-08-25 ENCOUNTER — HOSPITAL ENCOUNTER (OUTPATIENT)
Age: 65
Setting detail: SPECIMEN
Discharge: HOME | End: 2025-08-25

## 2025-08-25 DIAGNOSIS — C61 PROSTATE CA (HCC): ICD-10-CM

## 2025-08-25 DIAGNOSIS — C61 PROSTATE CA (HCC): Primary | ICD-10-CM

## 2025-08-25 LAB
BASOPHILS # BLD: 0.04 K/UL (ref 0–0.2)
BASOPHILS NFR BLD: 1 % (ref 0–2)
EOSINOPHIL # BLD: 0.15 K/UL (ref 0–0.44)
EOSINOPHILS RELATIVE PERCENT: 3 % (ref 1–4)
ERYTHROCYTE [DISTWIDTH] IN BLOOD BY AUTOMATED COUNT: 15.6 % (ref 11.8–14.4)
HCT VFR BLD AUTO: 42.5 % (ref 40.7–50.3)
HGB BLD-MCNC: 13.6 G/DL (ref 13–17)
IMM GRANULOCYTES # BLD AUTO: <0.03 K/UL (ref 0–0.3)
IMM GRANULOCYTES NFR BLD: 0 %
LYMPHOCYTES NFR BLD: 1.75 K/UL (ref 1.1–3.7)
LYMPHOCYTES RELATIVE PERCENT: 35 % (ref 24–43)
MCH RBC QN AUTO: 26.9 PG (ref 25.2–33.5)
MCHC RBC AUTO-ENTMCNC: 32 G/DL (ref 28.4–34.8)
MCV RBC AUTO: 84.2 FL (ref 82.6–102.9)
MONOCYTES NFR BLD: 0.55 K/UL (ref 0.1–1.2)
MONOCYTES NFR BLD: 11 % (ref 3–12)
NEUTROPHILS NFR BLD: 50 % (ref 36–65)
NEUTS SEG NFR BLD: 2.44 K/UL (ref 1.5–8.1)
NRBC BLD-RTO: 0 PER 100 WBC
PLATELET # BLD AUTO: 343 K/UL (ref 138–453)
PMV BLD AUTO: 10.3 FL (ref 8.1–13.5)
PSA SERPL-MCNC: 0.42 NG/ML (ref 0–4)
RBC # BLD AUTO: 5.05 M/UL (ref 4.21–5.77)
RBC # BLD: ABNORMAL 10*6/UL
WBC OTHER # BLD: 4.9 K/UL (ref 3.5–11.3)

## 2025-09-03 ENCOUNTER — TELEPHONE (OUTPATIENT)
Age: 65
End: 2025-09-03

## 2025-09-03 ENCOUNTER — OFFICE VISIT (OUTPATIENT)
Age: 65
End: 2025-09-03
Payer: MEDICARE

## 2025-09-03 VITALS
DIASTOLIC BLOOD PRESSURE: 79 MMHG | SYSTOLIC BLOOD PRESSURE: 130 MMHG | WEIGHT: 253.7 LBS | RESPIRATION RATE: 18 BRPM | BODY MASS INDEX: 32.57 KG/M2 | TEMPERATURE: 97.4 F | HEART RATE: 74 BPM

## 2025-09-03 DIAGNOSIS — C61 PROSTATE CA (HCC): Primary | ICD-10-CM

## 2025-09-03 PROCEDURE — 1123F ACP DISCUSS/DSCN MKR DOCD: CPT | Performed by: INTERNAL MEDICINE

## 2025-09-03 PROCEDURE — 1160F RVW MEDS BY RX/DR IN RCRD: CPT | Performed by: INTERNAL MEDICINE

## 2025-09-03 PROCEDURE — 99204 OFFICE O/P NEW MOD 45 MIN: CPT | Performed by: INTERNAL MEDICINE

## 2025-09-03 PROCEDURE — 1125F AMNT PAIN NOTED PAIN PRSNT: CPT | Performed by: INTERNAL MEDICINE

## 2025-09-03 PROCEDURE — 1159F MED LIST DOCD IN RCRD: CPT | Performed by: INTERNAL MEDICINE

## 2025-09-03 PROCEDURE — 3078F DIAST BP <80 MM HG: CPT | Performed by: INTERNAL MEDICINE

## 2025-09-03 PROCEDURE — G8427 DOCREV CUR MEDS BY ELIG CLIN: HCPCS | Performed by: INTERNAL MEDICINE

## 2025-09-03 PROCEDURE — 1036F TOBACCO NON-USER: CPT | Performed by: INTERNAL MEDICINE

## 2025-09-03 PROCEDURE — G8417 CALC BMI ABV UP PARAM F/U: HCPCS | Performed by: INTERNAL MEDICINE

## 2025-09-03 PROCEDURE — 3017F COLORECTAL CA SCREEN DOC REV: CPT | Performed by: INTERNAL MEDICINE

## 2025-09-03 PROCEDURE — 3075F SYST BP GE 130 - 139MM HG: CPT | Performed by: INTERNAL MEDICINE

## (undated) DEVICE — CYCLOGEL 1% GTTS

## (undated) DEVICE — AGENT VISCOELASTIC 1ML 2% HYDROXYPROPYL METHCELL PRELD GLS

## (undated) DEVICE — SUTURE STRATAFIX SPRL SZ 1 L14IN ABSRB VLT L48CM CTX 1/2 SXPD2B405

## (undated) DEVICE — PROTECTOR ULN NRV PUR FOAM HK LOOP STRP ANATOMICALLY

## (undated) DEVICE — ZIPPERED TOGA, 2X LARGE: Brand: FLYTE, SURGICOOL

## (undated) DEVICE — KNIFE OPHTH VITRECTOMY 20 GA UNIQUE EDGE STR STRL

## (undated) DEVICE — APPLICATOR MEDICATED 26 CC SOLUTION HI LT ORNG CHLORAPREP

## (undated) DEVICE — SUTURE VCRL SZ 1 L36IN ABSRB UD L36MM CT-1 1/2 CIR J947H

## (undated) DEVICE — NEEDLE FLTR 18GA L1.5IN MEM THK5UM BLNT DISP

## (undated) DEVICE — 6 ML SYRINGE LUER-LOCK TIP: Brand: MONOJECT

## (undated) DEVICE — SOLUTION IRRIG 1000ML PENTALYTE PLAS POUR BTL TIS U SOL

## (undated) DEVICE — SURGICAL PROCEDURE PACK 25 GA VITRECTOMY

## (undated) DEVICE — DRAPE,U/ SHT,SPLIT,PLAS,STERIL: Brand: MEDLINE

## (undated) DEVICE — SUTURE STRATAFIX SPRL SZ 3-0 L5IN ABSRB UD FS L26MM 3/8 CIR SXMP2B411

## (undated) DEVICE — CONTAINER,SPECIMEN,OR STERILE,4OZ: Brand: MEDLINE

## (undated) DEVICE — KIT NEG PRSS FOR NONLIN OR UPTO 90CM LIN INCIS PREVENA +

## (undated) DEVICE — GLOVE SURG SZ 85 CRM LTX FREE POLYISOPRENE POLYMER BEAD ANTI

## (undated) DEVICE — COVER,MAYO STAND,XL,STERILE: Brand: MEDLINE

## (undated) DEVICE — SUTURE STRATAFIX SPRL SZ 2-0 L9IN ABSRB VLT MH L36MM 1/2 SXPD2B408

## (undated) DEVICE — BANDAGE COBAN 6 IN WND 6INX5YD FOAM

## (undated) DEVICE — RETINA PK

## (undated) DEVICE — SOLUTION IRRIG 3000ML 0.9% SOD CHL USP UROMATIC PLAS CONT

## (undated) DEVICE — Device

## (undated) DEVICE — 450 ML BOTTLE OF 0.05% CHLORHEXIDINE GLUCONATE IN 99.95% STERILE WATER FOR IRRIGATION, USP AND APPLICATOR.: Brand: IRRISEPT ANTIMICROBIAL WOUND LAVAGE

## (undated) DEVICE — 25GA EZPASS SOFT TIP CANNULA BOX OF 5: Brand: VORTEX SURGICAL INC

## (undated) DEVICE — YANKAUER,FLEXIBLE HANDLE,REGLR CAPACITY: Brand: MEDLINE INDUSTRIES, INC.

## (undated) DEVICE — SOLUTION IV IRRIG POUR BRL 0.9% SODIUM CHL 2F7124

## (undated) DEVICE — OCCLUDER EYE POLYETH HYPOALRG ADH TAPE W/O PINHOLE

## (undated) DEVICE — STANDARD HYPODERMIC NEEDLE,ALUMINUM HUB: Brand: MONOJECT

## (undated) DEVICE — SUTURE VCRL SZ 0 L36IN ABSRB UD L36MM CT-1 1/2 CIR J946H

## (undated) DEVICE — 1 EACH 40411 STERILE DISPOSABLE SUPER VIEW® LENS SET & 1 EACH 40100 STERILE MICROSCOPE DRAPE: Brand: SUPER VIEW® PACK